# Patient Record
Sex: FEMALE | Race: WHITE | NOT HISPANIC OR LATINO | Employment: FULL TIME | URBAN - METROPOLITAN AREA
[De-identification: names, ages, dates, MRNs, and addresses within clinical notes are randomized per-mention and may not be internally consistent; named-entity substitution may affect disease eponyms.]

---

## 2017-01-12 ENCOUNTER — MYC MEDICAL ADVICE (OUTPATIENT)
Dept: OBGYN | Facility: CLINIC | Age: 47
End: 2017-01-12

## 2017-01-12 DIAGNOSIS — M25.552 PAIN IN JOINT, PELVIC REGION AND THIGH, LEFT: Primary | ICD-10-CM

## 2017-01-13 NOTE — TELEPHONE ENCOUNTER
Her 12/6/16. Please see My Chart message below. Pt is still taking motrin and tylenol around the clock. Pain is on her left side. Notices baljit at night when keeps her from sleeping, side sleeper. Pt states pain is more intense when walking or standing. Pt felt hardness on left side but can not consistently feel it. Routing to Dr. King. Please advise   12/26/16 -S/P HerOption ablation 12/6/16. Symptoms appear to be appropriate for postprocedure course. Reassurance provided. Likely she is experiencing more cramping due to hx of dysmenorrhea. Will monitor. Will determine over next couple weeks-months on severity of periods and if she will need to resume OCPs for control of this. Reviewed medication dosing and timing, is within limits at this time.    Wet prep negative. Informed pt she'd receive call if positive. Discharge likely physiologic after procedure.

## 2017-01-15 DIAGNOSIS — N71.9 ENDOMETRITIS: Primary | ICD-10-CM

## 2017-01-15 RX ORDER — DOXYCYCLINE 100 MG/1
100 CAPSULE ORAL 2 TIMES DAILY
Qty: 28 CAPSULE | Refills: 0 | Status: SHIPPED | OUTPATIENT
Start: 2017-01-15 | End: 2017-01-29

## 2017-01-16 ENCOUNTER — MYC MEDICAL ADVICE (OUTPATIENT)
Dept: PEDIATRICS | Facility: CLINIC | Age: 47
End: 2017-01-16

## 2017-01-17 NOTE — TELEPHONE ENCOUNTER
Called pt back and reached her by cell.  Has not had any further bleeding. Is eating and drinking just fine.  No vaginal discharge.  Bowel movements and urination within normal limits.  Just continues to have persistent left-sided pain, especially notices that when standing for long periods of time, going from sitting to standing position, or turning over in bed.  Reviewed her history and potential complications of the procedure.  Discussed rationale for treatment for endometritis.  Have lower suspicion of a ovarian pathology as this pain started with the procedure therefore I feel it is something related to instrumentation and procedure itself.  However pain is persisting can do further pelvic imaging.  However, last pelvic ultrasounds have not visualized the ovaries, and this is a possibility for next ultrasound.  Therefore she may need higher level imaging, such as CT.  Considering risks and cost of more extensive imaging versus the likelihood of infection and lower cost of medication with lower risks, treatment with doxycycline is my first choice. Patient verbalizes understanding and agrees treatment plan    Harper Busby Masters, DO

## 2017-01-17 NOTE — TELEPHONE ENCOUNTER
Tried to reach pt several times yesterday and left messages on work VM. Tried her work and cell today, left messages. Informed pt to let me know when she is available to talk. Will try back later.    Harper Busby Masters, DO

## 2017-01-20 ENCOUNTER — OFFICE VISIT (OUTPATIENT)
Dept: URGENT CARE | Facility: URGENT CARE | Age: 47
End: 2017-01-20
Payer: COMMERCIAL

## 2017-01-20 VITALS
BODY MASS INDEX: 55.25 KG/M2 | HEART RATE: 93 BPM | SYSTOLIC BLOOD PRESSURE: 106 MMHG | OXYGEN SATURATION: 96 % | TEMPERATURE: 98 F | WEIGHT: 293 LBS | DIASTOLIC BLOOD PRESSURE: 70 MMHG

## 2017-01-20 DIAGNOSIS — H10.33 ACUTE CONJUNCTIVITIS OF BOTH EYES, UNSPECIFIED ACUTE CONJUNCTIVITIS TYPE: Primary | ICD-10-CM

## 2017-01-20 PROCEDURE — 99213 OFFICE O/P EST LOW 20 MIN: CPT | Performed by: INTERNAL MEDICINE

## 2017-01-20 RX ORDER — CIPROFLOXACIN HYDROCHLORIDE 3.5 MG/ML
1 SOLUTION/ DROPS TOPICAL
Qty: 1 BOTTLE | Refills: 0 | Status: SHIPPED | OUTPATIENT
Start: 2017-01-20 | End: 2017-01-27

## 2017-01-20 NOTE — PROGRESS NOTES
[facility] Progress Note        Reena Hobbs MD, MPH  01/20/2017        History:      Ana Wyman is a pleasant 46 year old year old female with Irritation and redness of bilateral eyes, With whitish/yellowish Drainage ( R>L) x 2 days. No change in visual accuity. No fever or illness. No cough or shortness of breath. No headache or neck pain. No trauma to the eye. No photophobia. No nasal drainage. She usually wears contact lenses ( not currently).         Assessment and Plan:        Acute conjunctivitis of both eyes, unspecified acute conjunctivitis type  - ciprofloxacin (CILOXAN) 0.3 % ophthalmic solution; Place 1 drop into both eyes every 4 hours (while awake) for 7 days  Dispense: 1 Bottle; Refill: 0  Advised to wash hands meticulously after caring for the eyes.  Advised to avoid wearing contact lenses and use glasses as she currently is until her symptoms are resolved.  F/u w PCP in 2 days, earlier if symptoms worsen.                   Physical Exam:      /70 mmHg  Pulse 93  Temp(Src) 98  F (36.7  C)  Wt 332 lb (150.594 kg)  SpO2 96%     Constitutional: Patient is in no distress The patient is pleasant and cooperative.   HEENT: Head:  Head is atraumatic, normocephalic.    Eyes: Pupils are equal, round and reactive to light and accomodation.  Sclera is non-icteric. Bilateral conjunctival injection, and exudate noted. Extraocular motion is intact. Visual acuity is intact bilaterally.  Ears:  External acoustic canals are patent and clear.  There is no erythema and bulging( exudate)  of the ( R/L ) tympanic membrane(s ).   Nose:  No Nasal congestion w/o drainage or mucosal ulceration is noted.  Throat:  Oral mucosa is moist.  No oral lesions are noted.  No posterior pharyngeal hyperemia nor exudate noted.     Neck Supple.  There is no cervical lymphadenopathy.  No nuchal rigidity noted.  There is no meningismus.     Cardiovascular: Heart is regular to rate and rhythm.  No  murmur is noted.     Lungs: Clear in the anterior and posterior pulmonary fields.   Abdomen: Soft and non-tender.    Back No flank tenderness is noted.   Extremeties No edema, no calf tenderness.   Neuro: No focal deficit.   Skin No petechiae or purpura is noted.  There is no rash.   Mood Normal              Data:      All new lab and imaging data was reviewed.   Results for orders placed or performed in visit on 12/26/16   Wet prep   Result Value Ref Range    Specimen Description Vagina     Wet Prep       No yeast seen  No clue cells seen  No Trichomonas seen      Micro Report Status FINAL 12/26/2016

## 2017-01-20 NOTE — NURSING NOTE
"Chief Complaint   Patient presents with     Eye Problem     Pain in right eye x 2 days       Initial /70 mmHg  Pulse 93  Temp(Src) 98  F (36.7  C)  Wt 332 lb (150.594 kg)  SpO2 96% Estimated body mass index is 55.25 kg/(m^2) as calculated from the following:    Height as of 12/26/16: 5' 5\" (1.651 m).    Weight as of this encounter: 332 lb (150.594 kg).  BP completed using cuff size: KERI Singleton    "

## 2017-02-02 ENCOUNTER — OFFICE VISIT (OUTPATIENT)
Dept: OBGYN | Facility: CLINIC | Age: 47
End: 2017-02-02
Attending: OBSTETRICS & GYNECOLOGY
Payer: COMMERCIAL

## 2017-02-02 ENCOUNTER — RADIANT APPOINTMENT (OUTPATIENT)
Dept: ULTRASOUND IMAGING | Facility: CLINIC | Age: 47
End: 2017-02-02
Attending: OBSTETRICS & GYNECOLOGY
Payer: COMMERCIAL

## 2017-02-02 VITALS
HEART RATE: 72 BPM | HEIGHT: 65 IN | SYSTOLIC BLOOD PRESSURE: 122 MMHG | WEIGHT: 293 LBS | BODY MASS INDEX: 48.82 KG/M2 | DIASTOLIC BLOOD PRESSURE: 74 MMHG

## 2017-02-02 DIAGNOSIS — R10.32 LLQ PAIN: Primary | ICD-10-CM

## 2017-02-02 DIAGNOSIS — D25.1 INTRAMURAL LEIOMYOMA OF UTERUS: ICD-10-CM

## 2017-02-02 DIAGNOSIS — M25.552 PAIN IN JOINT, PELVIC REGION AND THIGH, LEFT: ICD-10-CM

## 2017-02-02 PROCEDURE — 76830 TRANSVAGINAL US NON-OB: CPT | Performed by: OBSTETRICS & GYNECOLOGY

## 2017-02-02 PROCEDURE — 99213 OFFICE O/P EST LOW 20 MIN: CPT | Performed by: OBSTETRICS & GYNECOLOGY

## 2017-02-02 NOTE — MR AVS SNAPSHOT
"              After Visit Summary   2/2/2017    Ana Wyman    MRN: [id number]           Patient Information     Date Of Birth          1970        Visit Information        Provider Department      2/2/2017 9:40 AM Keith Castaneda MD [facility]        Today's Diagnoses     LLQ pain    -  1     Intramural leiomyoma of uterus            Follow-ups after your visit        Who to contact     If you have questions or need follow up information about today's clinic visit or your schedule please contact [facility] directly at [phone].  Normal or non-critical lab and imaging results will be communicated to you by [employer]hart, letter or phone within 4 business days after the clinic has received the results. If you do not hear from us within 7 days, please contact the clinic through [employer] or phone. If you have a critical or abnormal lab result, we will notify you by phone as soon as possible.  Submit refill requests through [employer] or call your pharmacy and they will forward the refill request to us. Please allow 3 business days for your refill to be completed.          Additional Information About Your Visit        MyChart Information     [employer] gives you secure access to your electronic health record. If you see a primary care provider, you can also send messages to your care team and make appointments. If you have questions, please call your primary care clinic.  If you do not have a primary care provider, please call [phone] and they will assist you.        Care EveryWhere ID     This is your Care EveryWhere ID. This could be used by other organizations to access your [facility] medical records  [id number]        Your Vitals Were     Pulse Height BMI (Body Mass Index)             72 5' 5\" (1.651 m) 55.58 kg/m2          Blood Pressure from Last 3 Encounters:   02/02/17 122/74   01/20/17 106/70   12/26/16 122/82    Weight from Last 3 Encounters:   02/02/17 " 334 lb (151.501 kg)   01/20/17 332 lb (150.594 kg)   12/26/16 332 lb (150.594 kg)              Today, you had the following     No orders found for display       Primary Care Provider Office Phone # Fax #    Kelly Bedoya [phone]       [facility] [address] [place] [address]        Thank you!     Thank you for choosing [facility]  for your care. Our goal is always to provide you with excellent care. Hearing back from our patients is one way we can continue to improve our services. Please take a few minutes to complete the written survey that you may receive in the mail after your visit with us. Thank you!             Your Updated Medication List - Protect others around you: Learn how to safely use, store and throw away your medicines at www.disposemymeds.org.          This list is accurate as of: 2/2/17 11:04 AM.  Always use your most recent med list.                   Brand Name Dispense Instructions for use    clobetasol 0.05 % ointment    TEMOVATE    45 g    Apply clobetasol  0.05 percent ointment, sparingly (a dot 3 mm wide) in a thin film.  Apply to affected area only, once or twice weekly for maintenance.       EPIPEN 2-AMERICA 0.3 MG/0.3ML injection   Generic drug:  EPINEPHrine     1 Device    1 TIME ONLY       multivitamin, therapeutic with minerals Tabs tablet      Take 1 tablet by mouth daily.

## 2017-02-02 NOTE — PROGRESS NOTES
SUBJECTIVE:                                                   Ana Wyman is a 46 year old female who presents to clinic today for the following health issue(s):  Patient presents with:  Pelvic Pain: Had HerOption in December-since that time has always had pain on the left, sometime on the right. Pain increases with exercise.       HPI:  Had HerOption in early DEC. Afterwards noted LLQ pain. Fairly constant. Hurts more with exercising. Hurts if lies on left side. No urinary symptoms. Had course of antibiotics. That gave loose stools.   No bleeding since HerOption so happy about that.   Taking Motrin sporadically. Get some relief.    No LMP recorded. Patient has had an ablation..   Patient is sexually active, .  Using not sexually active for contraception.    reports that she has never smoked. She has never used smokeless tobacco.    STD testing offered?  Declined    Health maintenance updated:  yes    Today's PHQ-2 Score:   PHQ-2 (  [employer]) 2016   Q1: Little interest or pleasure in doing things 0   Q2: Feeling down, depressed or hopeless 0   PHQ-2 Score 0     Today's PHQ-9 Score:   PHQ-9 SCORE 2016   Total Score 2     Today's MACIEL-7 Score:   MACIEL-7 SCORE 2016   Total Score 1       Problem list and histories reviewed & adjusted, as indicated.  Additional history: as documented.    Patient Active Problem List   Diagnosis     Esophageal reflux     Allergic state     Cough     Hypersomnia with sleep apnea     FAMILY HX GI MALIGNANCY     Flatulence, eructation, and gas pain     Family history of malignant neoplasm of genital organ, other     Abdominal pain, left upper quadrant     Dysmenorrhea     Acute bronchitis     CARDIOVASCULAR SCREENING; LDL GOAL LESS THAN 160     Elevated blood pressure reading without diagnosis of hypertension     Lichen sclerosus     Menorrhagia     Right flank pain     Uterine fibroid     Premenstrual syndrome     Weight gain     Morbid obesity (H)     Recurrent  cellulitis of lower leg     MIGUELITO on CPAP     Abnormal glucose     Vitamin D deficiency     Pre-diabetes     Acanthosis nigricans     Cutaneous skin tags     Baker's cyst of knee     Lymphedema bilateral with mixed lipedema.     Past Surgical History   Procedure Laterality Date     C nonspecific procedure       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     C nonspecific procedure  ,     Foot surgeries x 2 (bunionectomy)     C nonspecific procedure       hammer toes     Colonoscopy  5/15/2013     Procedure: COLONOSCOPY;  Colonoscopy;  Surgeon: Kota Blanco MD;  Location: [facility]     Lichen sclerosis       Orthopedic surgery       Vein closure        Social History   Substance Use Topics     Smoking status: Never Smoker      Smokeless tobacco: Never Used     Alcohol Use: 0.0 oz/week     0 Standard drinks or equivalent per week      Comment: 1-2 drink occasionally      Problem (# of Occurrences) Relation (Name,Age of Onset)    Allergies (2) Mother, Brother    C.A.D. (3) Father (92): CAGB 98, Paternal Grandfather (58): fatal, Mother: triple bypass surgery, 65    CANCER (3) Father: stomach Dx age 74, Maternal Grandmother: liver, Paternal Grandmother: stomach    Cancer - colorectal (3) Maternal Grandmother (75), Paternal Aunt, Other: cousin  from colon cancer in 30's    Coronary Artery Disease (4) Father, Paternal Grandfather, Mother, Maternal Grandfather    DIABETES (2) Mother: Type 2, Paternal Grandmother: Type 2    Hypertension (5) Father, Maternal Grandmother, Mother, Brother (Trey), Maternal Grandfather    Lipids (2) Father, Mother    Obesity (5) Father, Mother, Paternal Grandmother, Brother (Trey), Brother (Jayme)            Current Outpatient Prescriptions   Medication Sig     clobetasol (TEMOVATE) 0.05 % ointment Apply clobetasol  0.05 percent ointment, sparingly (a dot 3 mm wide) in a thin film.  Apply to affected area only, once or twice weekly for maintenance.     multivitamin,  "therapeutic with minerals (THERA-VIT-M) TABS Take 1 tablet by mouth daily.     EPIPEN 2-AMERICA 0.3 MG/0.3ML IJ VIVIANA 1 TIME ONLY     No current facility-administered medications for this visit.     Allergies   Allergen Reactions     Clindamycin Diarrhea     Codeine Nausea and Vomiting     Shellfish Allergy        ROS:  12 point review of systems negative other than symptoms noted below.    OBJECTIVE:     /74 mmHg  Pulse 72  Ht 5' 5\" (1.651 m)  Wt 334 lb (151.501 kg)  BMI 55.58 kg/m2  Body mass index is 55.58 kg/(m^2).    Exam:  Constitutional:  Appearance: Well nourished, well developed alert, in no acute distress  Neurologic/Psychiatric:  Mental Status:  Oriented X3      In-Clinic Test Results:  Results for orders placed or performed in visit on 02/02/17 (from the past 24 hour(s))   US Transvaginal Non OB    Narrative    US Transvaginal Non OB    Order #: [id number] Accession #: [id number]         Study Notes        Margaret Siddiqui on 2/2/2017  9:13 AM     Gynecological Ultrasound Report  Pelvic U/S - Transvaginal  [facility]  Referring Provider: Dr. Saleem Masters  Sonographer:  Margaret Siddiqui RDMS  Indication: Pelvic pain post ablation  LMP: No LMP recorded.    Gynecological Ultrasonography:    Uterus: retroverted  Size: 7.5 x 5.5 x 4.2cm  Endometrium: Thickness total 11.0mm  Findings: Thickened  Right Ovary: not seen due to bowel and gas, calcified pedunculated fibroid   vs. bowel  Left Ovary: not seen due to bowel and gas  Cul de Sac/Pouch of Raudel: no free fluid     Impression: Uterus retroverted. Endometrium 11mm. Myometrium not well   visualized. Adnexa not well visualized. Inadequate ultrasound. Needs CT or   MRI if clinically indicated.  Keith Castaneda MD                           ASSESSMENT/PLAN:                                                        ICD-10-CM    1. LLQ pain R10.32    2. Intramural leiomyoma of uterus D25.1      Imaging is inadequate due to her body " habitus. U/S from 2015 points out multiple fibroids. SIS didn't show submucous ones. Procedure was difficult due to to distortion of cavity due to fibroid.   Since pain occurred soon afterwards, fibroid degeneration has to be assumed. NSAIDs are good choice for pain and observation. If pain continues and doesn't improve CT or MRI will be needed for further eval.   Pt is morbidly obese and surgery, LASH, is to be avoided.   Pt was comfortable with explanation and probable etiology. Will call if no improvement for further imaging.    20 minutes was spent face to face with the patient today discussing her history, diagnosis, and follow-up plan as noted above. Over 50% of the visit was spent in counseling and coordination of care.    Total Visit Time: 20 minutes.        Keith Castaneda MD  [facility]

## 2017-03-01 ENCOUNTER — OFFICE VISIT (OUTPATIENT)
Dept: URGENT CARE | Facility: URGENT CARE | Age: 47
End: 2017-03-01
Payer: COMMERCIAL

## 2017-03-01 VITALS — OXYGEN SATURATION: 97 % | HEART RATE: 89 BPM | TEMPERATURE: 98.6 F

## 2017-03-01 DIAGNOSIS — R05.9 COUGH: ICD-10-CM

## 2017-03-01 DIAGNOSIS — Z20.828 EXPOSURE TO INFLUENZA: ICD-10-CM

## 2017-03-01 DIAGNOSIS — J06.9 VIRAL URI: Primary | ICD-10-CM

## 2017-03-01 LAB
FLUAV+FLUBV AG SPEC QL: NEGATIVE
FLUAV+FLUBV AG SPEC QL: NORMAL
SPECIMEN SOURCE: NORMAL

## 2017-03-01 PROCEDURE — 99213 OFFICE O/P EST LOW 20 MIN: CPT

## 2017-03-01 PROCEDURE — 87804 INFLUENZA ASSAY W/OPTIC: CPT | Performed by: PHYSICIAN ASSISTANT

## 2017-03-01 RX ORDER — OSELTAMIVIR PHOSPHATE 75 MG/1
75 CAPSULE ORAL DAILY
Qty: 10 CAPSULE | Refills: 0 | Status: SHIPPED | OUTPATIENT
Start: 2017-03-01 | End: 2017-04-26

## 2017-03-01 NOTE — MR AVS SNAPSHOT
After Visit Summary   3/1/2017    Ana Wyman    MRN: [id number]           Patient Information     Date Of Birth          1970        Visit Information        Provider Department      3/1/2017 9:00 PM Provider, [facility]        Today's Diagnoses     Viral URI    -  1    Exposure to influenza        Cough          Care Instructions      Viral Upper Respiratory Illness (Adult)  You have a viral upper respiratory illness (URI), which is another term for the common cold. This illness is contagious during the first few days. It is spread through the air by coughing and sneezing. It may also be spread by direct contact (touching the sick person and then touching your own eyes, nose, or mouth). Frequent handwashing will decrease risk of spread. Most viral illnesses go away within 7 to 10 days with rest and simple home remedies. Sometimes the illness may last for several weeks. Antibiotics will not kill a virus, and they are generally not prescribed for this condition.    Home care    If symptoms are severe, rest at home for the first 2 to 3 days. When you resume activity, don't let yourself get too tired.    Avoid being exposed to cigarette smoke (yours or others ).    You may use acetaminophen or ibuprofen to control pain and fever, unless another medicine was prescribed. (Note: If you have chronic liver or kidney disease, have ever had a stomach ulcer or gastrointestinal bleeding, or are taking blood-thinning medicines, talk with your healthcare provider before using these medicines.) Aspirin should never be given to anyone under 18 years of age who is ill with a viral infection or fever. It may cause severe liver or brain damage.    Your appetite may be poor, so a light diet is fine. Avoid dehydration by drinking 6 to 8 glasses of fluids per day (water, soft drinks, juices, tea, or soup). Extra fluids will help loosen secretions in the nose and  lungs.    Over-the-counter cold medicines will not shorten the length of time you re sick, but they may be helpful for the following symptoms: cough, sore throat, and nasal and sinus congestion. (Note: Do not use decongestants if you have high blood pressure.)  Follow-up care  Follow up with your healthcare provider, or as advised.  When to seek medical advice  Call your healthcare provider right away if any of these occur:    Cough with lots of colored sputum (mucus)    Severe headache; face, neck, or ear pain    Difficulty swallowing due to throat pain    Fever of 100.4 F (38 C)  Call 911, or get immediate medical care  Call emergency services right away if any of these occur:    Chest pain, shortness of breath, wheezing, or difficulty breathing    Coughing up blood    Inability to swallow due to throat pain    4889-5118 [employer]. [address] [place] [address]. All rights reserved. This information is not intended as a substitute for professional medical care. Always follow your healthcare professional's instructions.      3/1/17 URGENT CARE VISIT:     You are caring for your mother who has just been diagnosed with Influenza A today, I have prescribed Tamiflu for you in an attempt to prevent you from developing Influenza.      However, because you have been recently traveling to the [place] and the [place], you should follow-up immediately if you have any worsening of your fever, neck pain, severe headache, light sensitivity, eye pain, mental confusion, lethargy or if you develop any new illness symptoms.            Follow-ups after your visit        Who to contact     If you have questions or need follow up information about today's clinic visit or your schedule please contact [facility] [place] [facility] directly at [phone].  Normal or non-critical lab and imaging results will be communicated to you by MyChart, letter or phone within 4 business days after the clinic has received  the results. If you do not hear from us within 7 days, please contact the clinic through [employer] or phone. If you have a critical or abnormal lab result, we will notify you by phone as soon as possible.  Submit refill requests through [employer] or call your pharmacy and they will forward the refill request to us. Please allow 3 business days for your refill to be completed.          Additional Information About Your Visit        [employer] Information     [employer] gives you secure access to your electronic health record. If you see a primary care provider, you can also send messages to your care team and make appointments. If you have questions, please call your primary care clinic.  If you do not have a primary care provider, please call [phone] and they will assist you.        Care EveryWhere ID     This is your Care EveryWhere ID. This could be used by other organizations to access your [facility] medical records  [id number]        Your Vitals Were     Pulse Temperature Pulse Oximetry             89 98.6  F (37  C) (Oral) 97%          Blood Pressure from Last 3 Encounters:   02/02/17 122/74   01/20/17 106/70   12/26/16 122/82    Weight from Last 3 Encounters:   02/02/17 (!) 334 lb (151.5 kg)   01/20/17 (!) 332 lb (150.6 kg)   12/26/16 (!) 332 lb (150.6 kg)              We Performed the Following     Influenza A/B antigen          Today's Medication Changes          These changes are accurate as of: 3/1/17  9:42 PM.  If you have any questions, ask your nurse or doctor.               Start taking these medicines.        Dose/Directions    oseltamivir 75 MG capsule   Commonly known as:  TAMIFLU   Used for:  Exposure to influenza   Started by:  ProviderSue        Dose:  75 mg   Take 1 capsule (75 mg) by mouth daily   Quantity:  10 capsule   Refills:  0            Where to get your medicines      These medications were sent to [facility] [address] [place] [address] [id number] AT [facility]  [place] [address]  [address] [facility] [address]    Hours:  24-hours Phone:  [phone]     oseltamivir 75 MG capsule                Primary Care Provider Office Phone # Fax #    Kelly Bedoya [phone]       [facility] [address] [place] [address]        Thank you!     Thank you for choosing [facility]  for your care. Our goal is always to provide you with excellent care. Hearing back from our patients is one way we can continue to improve our services. Please take a few minutes to complete the written survey that you may receive in the mail after your visit with us. Thank you!             Your Updated Medication List - Protect others around you: Learn how to safely use, store and throw away your medicines at www.disposemymeds.org.          This list is accurate as of: 3/1/17  9:42 PM.  Always use your most recent med list.                   Brand Name Dispense Instructions for use    clobetasol 0.05 % ointment    TEMOVATE    45 g    Apply clobetasol  0.05 percent ointment, sparingly (a dot 3 mm wide) in a thin film.  Apply to affected area only, once or twice weekly for maintenance.       EPIPEN 2-AMERICA 0.3 MG/0.3ML injection   Generic drug:  EPINEPHrine     1 Device    1 TIME ONLY       multivitamin, therapeutic with minerals Tabs tablet      Take 1 tablet by mouth daily.       oseltamivir 75 MG capsule    TAMIFLU    10 capsule    Take 1 capsule (75 mg) by mouth daily

## 2017-03-01 NOTE — Clinical Note
Kayla Golden just notified me this chart was assigned to her and in her inbox of overdue charts.   I don't let my charts stay open more than a few days (week max) and this was never in my overdue box.   Anyway, Thankfully, I had 90% of the note done so I was able to close it today.

## 2017-03-02 NOTE — PROGRESS NOTES
SUBJECTIVE:     Ana Wyman presents to  today for evaluation of URI sxs that may be consistent with Influenza.  Pt reports abrupt onset of fever (subjective), chills, dry cough, nasal congestion, clear rhinorrhea, muscle and body aches, intermittent generalized headache and malaise.      She has been staying with her mother who was just dx with influenza today.     ROS:     HEENT: Positive nasal congestion.   RESP: Positive cough as per above. Despite cough, denies any severe SOB.  Denies any hx of asthma or RAD.   GI: Denies any N/V/D. No abdominal pain. Normal BM's  SKIN: Denies rash  NEURO: Positive fever as noted above. Positive mild, waxing and waning generalized HA as per above. Denies any severe headaches, neck stiffness, photophobia, rash, mental status changes or lethargy.           Past Medical History   Diagnosis Date     Allergic rhinitis, cause unspecified      Cellulitis 2005     2 episodes, one with hospitalization [facility]     DUB (dysfunctional uterine bleeding)      Neg EMB 2012     Fibroids      GERD (gastroesophageal reflux disease)      Hallux valgus (acquired)      Hypersomnia with sleep apnea, unspecified      using CPAP     Lichen sclerosus 7/14/2011     Lymph edema 2010- present     Lymphedema bilateral with mixed lipedema. 9/30/2015     Lymphedema bilateral with mixed lipedema. 9/30/2015     Morbid obesity (H) 3/19/2013     MIGUELITO on CPAP 3/19/2013     Sleep study in 2004 and 2012       Pre-diabetes      Sleep apnea      Tendonitis currently     Vitamin D deficiency 3/14/2015       Current Outpatient Prescriptions:      clobetasol (TEMOVATE) 0.05 % ointment, Apply clobetasol  0.05 percent ointment, sparingly (a dot 3 mm wide) in a thin film.  Apply to affected area only, once or twice weekly for maintenance., Disp: 45 g, Rfl: 2     multivitamin, therapeutic with minerals (THERA-VIT-M) TABS, Take 1 tablet by mouth daily., Disp: , Rfl:      EPIPEN 2-AMERICA 0.3 MG/0.3ML IJ VIVIANA, 1 TIME  ONLY, Disp: 1 Device, Rfl: 1    Allergies   Allergen Reactions     Clindamycin Diarrhea     Codeine Nausea and Vomiting     Shellfish Allergy            OBJECTIVE:  Pulse 89  Temp 98.6  F (37  C) (Oral)  SpO2 97%    General appearance: alert and no apparent distress  Skin color is pink and without rash.  HEENT:   Conjunctiva not injected.  Sclera clear.  Left TM is normal: no effusions, no erythema, and normal landmarks.  Right TM is normal: no effusions, no erythema, and normal landmarks.  Nasal mucosa is congested   Oropharyngeal exam is normal: no lesions, erythema, adenopathy or exudate.  Neck is supple with no adenopathy  CARDIAC:NORMAL - regular rate and rhythm without murmur.  RESP: Normal - CTA without rales, rhonchi, or wheezing.  ABDOMEN: Abdomen soft, non-tender. BS normal. No masses, organomegaly    Component      Latest Ref Rng & Units 3/1/2017   Influenza A/B Agn Specimen       Nasal   Influenza A      NEG Negative   Influenza B      NEG Negative . . .       ASSESSMENT/PLAN:      3/1/17 URGENT CARE VISIT:     You are caring for your mother who has just been diagnosed with Influenza A today, I have prescribed Tamiflu for you in an attempt to prevent you from developing Influenza.      However, because you have been recently traveling to the [place] and the [place], you should follow-up immediately if you have any worsening of your fever, neck pain, severe headache, light sensitivity, eye pain, mental confusion, lethargy or if you develop any new illness symptoms.        (J06.9,  B97.89) Viral URI  (primary encounter diagnosis)    (Z20.828) Exposure to influenza  Plan: oseltamivir (TAMIFLU) 75 MG capsule   as per above     (R05) Cough  Plan: Influenza A/B antigen     As per above

## 2017-03-02 NOTE — PATIENT INSTRUCTIONS
Viral Upper Respiratory Illness (Adult)  You have a viral upper respiratory illness (URI), which is another term for the common cold. This illness is contagious during the first few days. It is spread through the air by coughing and sneezing. It may also be spread by direct contact (touching the sick person and then touching your own eyes, nose, or mouth). Frequent handwashing will decrease risk of spread. Most viral illnesses go away within 7 to 10 days with rest and simple home remedies. Sometimes the illness may last for several weeks. Antibiotics will not kill a virus, and they are generally not prescribed for this condition.    Home care    If symptoms are severe, rest at home for the first 2 to 3 days. When you resume activity, don't let yourself get too tired.    Avoid being exposed to cigarette smoke (yours or others ).    You may use acetaminophen or ibuprofen to control pain and fever, unless another medicine was prescribed. (Note: If you have chronic liver or kidney disease, have ever had a stomach ulcer or gastrointestinal bleeding, or are taking blood-thinning medicines, talk with your healthcare provider before using these medicines.) Aspirin should never be given to anyone under 18 years of age who is ill with a viral infection or fever. It may cause severe liver or brain damage.    Your appetite may be poor, so a light diet is fine. Avoid dehydration by drinking 6 to 8 glasses of fluids per day (water, soft drinks, juices, tea, or soup). Extra fluids will help loosen secretions in the nose and lungs.    Over-the-counter cold medicines will not shorten the length of time you re sick, but they may be helpful for the following symptoms: cough, sore throat, and nasal and sinus congestion. (Note: Do not use decongestants if you have high blood pressure.)  Follow-up care  Follow up with your healthcare provider, or as advised.  When to seek medical advice  Call your healthcare provider right away if any  of these occur:    Cough with lots of colored sputum (mucus)    Severe headache; face, neck, or ear pain    Difficulty swallowing due to throat pain    Fever of 100.4 F (38 C)  Call 911, or get immediate medical care  Call emergency services right away if any of these occur:    Chest pain, shortness of breath, wheezing, or difficulty breathing    Coughing up blood    Inability to swallow due to throat pain    6045-8203 [employer]. [address] [place] [address], [place] [address]. All rights reserved. This information is not intended as a substitute for professional medical care. Always follow your healthcare professional's instructions.      3/1/17 URGENT CARE VISIT:     You are caring for your mother who has just been diagnosed with Influenza A today, I have prescribed Tamiflu for you in an attempt to prevent you from developing Influenza.      However, because you have been recently traveling to the [place] and the [facility], you should follow-up immediately if you have any worsening of your fever, neck pain, severe headache, light sensitivity, eye pain, mental confusion, lethargy or if you develop any new illness symptoms.

## 2017-03-02 NOTE — NURSING NOTE
"Chief Complaint   Patient presents with     Urgent Care     URI     cough/cold/body aches     Initial Pulse 89  Temp 98.6  F (37  C) (Oral)  SpO2 97% Estimated body mass index is 55.58 kg/(m^2) as calculated from the following:    Height as of 2/2/17: 5' 5\" (1.651 m).    Weight as of 2/2/17: 334 lb (151.5 kg).  BP completed using cuff size  NA (Not Taken)    Natasha Clay/ALEXIA    "

## 2017-03-24 ENCOUNTER — HOSPITAL ENCOUNTER (OUTPATIENT)
Dept: MAMMOGRAPHY | Facility: CLINIC | Age: 47
Discharge: HOME | End: 2017-03-24
Attending: PEDIATRICS | Admitting: PEDIATRICS
Payer: COMMERCIAL

## 2017-03-24 DIAGNOSIS — Z12.31 VISIT FOR SCREENING MAMMOGRAM: ICD-10-CM

## 2017-03-24 PROCEDURE — G0202 SCR MAMMO BI INCL CAD: HCPCS

## 2017-04-26 ENCOUNTER — OFFICE VISIT (OUTPATIENT)
Dept: URGENT CARE | Facility: URGENT CARE | Age: 47
End: 2017-04-26
Payer: COMMERCIAL

## 2017-04-26 VITALS
DIASTOLIC BLOOD PRESSURE: 70 MMHG | TEMPERATURE: 98.2 F | BODY MASS INDEX: 55.58 KG/M2 | RESPIRATION RATE: 16 BRPM | HEART RATE: 76 BPM | SYSTOLIC BLOOD PRESSURE: 110 MMHG | OXYGEN SATURATION: 98 % | WEIGHT: 293 LBS

## 2017-04-26 DIAGNOSIS — R31.29 MICROSCOPIC HEMATURIA: ICD-10-CM

## 2017-04-26 DIAGNOSIS — M54.50 ACUTE BILATERAL LOW BACK PAIN WITHOUT SCIATICA: Primary | ICD-10-CM

## 2017-04-26 DIAGNOSIS — R42 DIZZINESS: ICD-10-CM

## 2017-04-26 DIAGNOSIS — H69.92 DYSFUNCTION OF EUSTACHIAN TUBE, LEFT: ICD-10-CM

## 2017-04-26 LAB
ALBUMIN UR-MCNC: NEGATIVE MG/DL
APPEARANCE UR: CLEAR
BILIRUB UR QL STRIP: NEGATIVE
COLOR UR AUTO: YELLOW
GLUCOSE UR STRIP-MCNC: NEGATIVE MG/DL
HGB UR QL STRIP: ABNORMAL
KETONES UR STRIP-MCNC: NEGATIVE MG/DL
LEUKOCYTE ESTERASE UR QL STRIP: ABNORMAL
NITRATE UR QL: NEGATIVE
NON-SQ EPI CELLS #/AREA URNS LPF: ABNORMAL /LPF
PH UR STRIP: 6 PH (ref 5–7)
RBC #/AREA URNS AUTO: ABNORMAL /HPF (ref 0–2)
SP GR UR STRIP: 1.01 (ref 1–1.03)
URN SPEC COLLECT METH UR: ABNORMAL
UROBILINOGEN UR STRIP-ACNC: 0.2 EU/DL (ref 0.2–1)
WBC #/AREA URNS AUTO: ABNORMAL /HPF (ref 0–2)

## 2017-04-26 PROCEDURE — 81001 URINALYSIS AUTO W/SCOPE: CPT | Performed by: PHYSICIAN ASSISTANT

## 2017-04-26 PROCEDURE — 87086 URINE CULTURE/COLONY COUNT: CPT | Performed by: FAMILY MEDICINE

## 2017-04-26 PROCEDURE — 99214 OFFICE O/P EST MOD 30 MIN: CPT | Performed by: FAMILY MEDICINE

## 2017-04-26 RX ORDER — CYCLOBENZAPRINE HCL 5 MG
5 TABLET ORAL EVERY 8 HOURS PRN
Qty: 30 TABLET | Refills: 0 | Status: SHIPPED | OUTPATIENT
Start: 2017-04-26 | End: 2017-05-08

## 2017-04-26 RX ORDER — MECLIZINE HYDROCHLORIDE 25 MG/1
25 TABLET ORAL EVERY 6 HOURS PRN
Qty: 30 TABLET | Refills: 0 | Status: SHIPPED | OUTPATIENT
Start: 2017-04-26 | End: 2017-05-08

## 2017-04-26 RX ORDER — SULFAMETHOXAZOLE/TRIMETHOPRIM 800-160 MG
1 TABLET ORAL 2 TIMES DAILY
Qty: 6 TABLET | Refills: 0 | Status: SHIPPED | OUTPATIENT
Start: 2017-04-26 | End: 2017-04-29

## 2017-04-26 NOTE — MR AVS SNAPSHOT
After Visit Summary   4/26/2017    Ana Wyman    MRN: [id number]           Patient Information     Date Of Birth          1970        Visit Information        Provider Department      4/26/2017 5:40 PM Ozzie Mora MD [facility]        Today's Diagnoses     Acute bilateral low back pain without sciatica    -  1    Microscopic hematuria        Dysfunction of eustachian tube, left        Dizziness          Care Instructions    Take full course of antibiotic for possible bladder infection.  We will contact you if any changes in the antibiotic is needed after the urine culture is finalized.  Okay to take flexeril to help with possible back spasm.  Heat or ice to back as needed.  Okay to try meclizine to help with dizziness.  Refrain from bending to minimize symptoms.  Okay to try sudafed 60 mg every 6 hours as needed for eustachian tube dysfunction.          Back Spasm (No Trauma)    Spasm of the back muscles can occur after a sudden forceful twisting or bending force (such as in a car accident), after a simple awkward movement, or after lifting something heavy with poor body positioning. In either case, muscle spasm adds to the pain. Sleeping in an awkward position or on a poor quality mattress can also cause this. Some persons respond to emotional stress by tensing the muscles of their back.  Pain that continues may require further evaluation or other types of treatment such as physical therapy.  Unless you had a physical injury (for example, a car accident or fall), X-rays are usually not ordered for the initial evaluation of back pain. If pain continues and does not respond to medical treatment, X-rays and other tests may be performed at a later time.   Home care  1. As soon as possible, begin sitting or walking to avoid problems from prolonged bedrest (muscle weakness, worsening back stiffness and pain, blood clots in the legs).  2. When in bed, try to find a position of  comfort. A firm mattress is best. Try lying flat on your back with pillows under your knees. You can also try lying on your side with your knees bent up toward your chest and a pillow between your knees.  3. Avoid prolonged sitting, long car rides or travel. This puts more stress on the lower back than standing or walking.   4. During the first 24 to 72 hours after an injury of flare-up apply an ice pack to the painful area for 20 minutes, then remove it for 20 minutes over a period of 60 to 90 minutes or several times a day. This will reduce swelling and pain. Always wrap ice packs in a thin towel.  5. You can start with ice, then switch to heat. Heat (hot shower, hot bath, or heating pad) reduces swelling and pain, and works well for muscle spasms. Heat can be applied to the painful area for 20 minutes , then remove it for 20 minutes over a period of 60 to 90 minutes or several times a day. As a safety precaution, do not sleep on a heating pad as it can burn or damage skin.  6. Ice and heat therapies can be alternated.  7. Gentle stretching will help your back heal faster. Perform this simple routine 2 to 3 times a day until your back is feeling better.     Low back stretch    Lie on your back with your knees bent and both feet on the ground.    Slowly raise your left knee to your chest as you flatten your lower back against the floor. Hold for 20 to 30 seconds.    Relax and repeat the exercise with your right knee.    Do 2 to 3 of these exercises for each leg.    Repeat, hugging both knees to your chest at the same time.    Do not bounce, but use a gentle pull.    8. Be aware of safe lifting methods and do not lift anything over 15 pounds until all the pain is gone.  Medications  Talk to your doctor before using medications, especially if you have other medical problems or are taking other medicines.  You may use acetaminophen (such as Tylenol) or ibuprofen (such as Advil or Motrin) to control pain, unless  another pain medicine was prescribed. If you have a chronic condition such as diabetes, liver or kidney disease, stomach ulcer, or gastrointestinal bleeding, or are taking blood thinners, talk with your doctor before taking any medications.  Be careful if you are given prescription pain medications, narcotics, or medication for muscle spasm. They can cause drowsiness, affect your coordination, reflexes or judgment. Do not drive or operate heavy machinery.  Follow-up care  Follow up with your doctor or this facility if your symptoms do not start to improve after one week. Physical therapy or further tests may be needed.  If X-rays were taken, they will be reviewed by a radiologist. You will be notified of any new findings that may affect your care.  Call 911  Seek emergency medica care if any of the following occur:    Trouble breathing    Confusion    Drowsiness or trouble awakening    Fainting or loss of consciousness    Rapid or very slow heart rate    Loss of bowel or bladder control  When to seek medical care  Get prompt medicat attention if any of the following occur:    Pain becomes worse or spreads to your legs    Weakness or numbness in one or both legs    Numbness in the groin or genital area    Unexplained fever over 100.4 F (38.0 C)    Burning or pain when passing urine    3795-7650 [employer]. [address] [place] [address]. All rights reserved. This information is not intended as a substitute for professional medical care. Always follow your healthcare professional's instructions.        Hematuria: Possible Causes     Many things can lead to blood in the urine (hematuria). The blood may be seen with the eye. Or it may only be seen when the urine is looked at under a microscope. Some of the most common causes of blood in the urine are listed below. Often, no cause for the blood can be found. This is called idiopathic hematuria.    Kidney or bladder stones are collections of  crystals. They form in the urine. Stones may be found anywhere in the urinary tract. But they form most often in the kidneys or bladder. In addition to blood in the urine, they can cause severe pain.   BPH stands for benign prostatic hyperplasia. It is enlargement of the prostate gland. It happens as men age. BPH often causes problems with urination. It sometimes causes blood in the urine.   A urinary tract infection (UTI) is due to bacteria growing in the urinary tract. It can cause blood in the urine. Other symptoms include burning or pain with urination. You may need to urinate often or urgently. You may also have a fever.     Damage to the urinary tract may cause blood in the urine. This damage may be due to a blow or accident. It may also result from the use of a urinary catheter. Very hard exercise may sometimes irritate the urinary tract and cause bleeding.   Cancer may occur anywhere in the urinary tract. A tumor may sometimes cause no symptoms other than bleeding. Other possible causes of bleeding include:    Prostatitis (infection of the prostate gland)    Taking anticoagulant medications    Blockage in the urinary tract    Disease or inflammation of the kidney    Cystic diseases of the kidneys    Sickle cell anemia    Tumors of the urinary tract     0282-3654 The [employer]. [address] [place] [address]. All rights reserved. This information is not intended as a substitute for professional medical care. Always follow your healthcare professional's instructions.        Inner Ear Problems: Causes of Dizziness (Vertigo)  Benign positional vertigo (BPV)    This is the most common cause of vertigo. BPV (also called benign positional paroxysmal vertigo or BPPV) results when crystals in the canals shift into the wrong position. Episodes usually occur when the head is moved in a certain way. This can happen when turning in bed, bending, or looking up.  BPV:    Causes episodes of vertigo  that last for seconds. These episodes can occur several times a day, depending on body position.    Doesn t cause hearing loss.    Often goes away on its own, but may go away sooner with treatment.  Infection or inflammation    Sometimes the semicircular canals swell and send incorrect balance signals. This problem may be caused by a viral infection. Depending on the cause, hearing can be affected (labyrinthitis) or can remain normal (neuronitis).   Infection or inflammation:    Causes episodes of vertigo that last for hours or days. The first episode is usually the worst.    Can cause hearing loss.    Often goes away on its own, but may go away sooner with treatment.    May require vestibular rehabilitation if you have persistent imbalance.  Meniere s disease    Although uncommon, this condition happens when there is too much fluid in the canals. This causes increased pressure and swelling, and affects balance and hearing signals.  Meniere s disease may:    Cause episodes of vertigo that last for hours.    Cause fluctuating hearing problems, usually in one ear, that worsen over time.    Cause buzzing or ringing in the ears (tinnitus).    Cause a feeling of fullness or pressure in the ear.    Cause any of these symptoms: vertigo, hearing loss, tinnitus, or ear fullness to last a lifetime.    6574-7259 [employer]. [address] [place] [address], [place] [address]. All rights reserved. This information is not intended as a substitute for professional medical care. Always follow your healthcare professional's instructions.              Follow-ups after your visit        Who to contact     If you have questions or need follow up information about today's clinic visit or your schedule please contact [facility] directly at [phone].  Normal or non-critical lab and imaging results will be communicated to you by MyChart, letter or phone within 4 business days after the clinic has received the  results. If you do not hear from us within 7 days, please contact the clinic through [employer] or phone. If you have a critical or abnormal lab result, we will notify you by phone as soon as possible.  Submit refill requests through [employer] or call your pharmacy and they will forward the refill request to us. Please allow 3 business days for your refill to be completed.          Additional Information About Your Visit        [employer] Information     [employer] gives you secure access to your electronic health record. If you see a primary care provider, you can also send messages to your care team and make appointments. If you have questions, please call your primary care clinic.  If you do not have a primary care provider, please call [phone] and they will assist you.        Care EveryWhere ID     This is your Care EveryWhere ID. This could be used by other organizations to access your [place] medical records  [id number]        Your Vitals Were     Pulse Temperature Respirations Pulse Oximetry BMI (Body Mass Index)       76 98.2  F (36.8  C) (Oral) 16 98% 55.58 kg/m2        Blood Pressure from Last 3 Encounters:   04/26/17 110/70   02/02/17 122/74   01/20/17 106/70    Weight from Last 3 Encounters:   04/26/17 (!) 334 lb (151.5 kg)   02/02/17 (!) 334 lb (151.5 kg)   01/20/17 (!) 332 lb (150.6 kg)              We Performed the Following     UA reflex to Microscopic and Culture     Urine Culture Aerobic Bacterial     Urine Microscopic          Today's Medication Changes          These changes are accurate as of: 4/26/17  6:18 PM.  If you have any questions, ask your nurse or doctor.               Start taking these medicines.        Dose/Directions    cyclobenzaprine 5 MG tablet   Commonly known as:  FLEXERIL   Used for:  Acute bilateral low back pain without sciatica   Started by:  Ozzie Mora MD        Dose:  5 mg   Take 1 tablet (5 mg) by mouth every 8 hours as needed   Quantity:  30 tablet   Refills:  0        meclizine 25 MG tablet   Commonly known as:  ANTIVERT   Used for:  Dizziness   Started by:  Ozzie Mora MD        Dose:  25 mg   Take 1 tablet (25 mg) by mouth every 6 hours as needed for dizziness   Quantity:  30 tablet   Refills:  0       sulfamethoxazole-trimethoprim 800-160 MG per tablet   Commonly known as:  BACTRIM DS/SEPTRA DS   Used for:  Microscopic hematuria   Started by:  Ozzie Mora MD        Dose:  1 tablet   Take 1 tablet by mouth 2 times daily for 3 days   Quantity:  6 tablet   Refills:  0            Where to get your medicines      These medications were sent to [facility] - [place] - [address]   [address] [place] [address]     Phone:  [phone]     cyclobenzaprine 5 MG tablet    meclizine 25 MG tablet    sulfamethoxazole-trimethoprim 800-160 MG per tablet                Primary Care Provider Office Phone # Fax #    Kelly Bedoya [phone]       [facility] [address] [place] [address]        Thank you!     Thank you for choosing [facility]  for your care. Our goal is always to provide you with excellent care. Hearing back from our patients is one way we can continue to improve our services. Please take a few minutes to complete the written survey that you may receive in the mail after your visit with us. Thank you!             Your Updated Medication List - Protect others around you: Learn how to safely use, store and throw away your medicines at www.disposemymeds.org.          This list is accurate as of: 4/26/17  6:18 PM.  Always use your most recent med list.                   Brand Name Dispense Instructions for use    clobetasol 0.05 % ointment    TEMOVATE    45 g    Apply clobetasol  0.05 percent ointment, sparingly (a dot 3 mm wide) in a thin film.  Apply to affected area only, once or twice weekly for maintenance.       cyclobenzaprine 5 MG tablet    FLEXERIL     30 tablet    Take 1 tablet (5 mg) by mouth every 8 hours as needed       EPIPEN 2-AMERICA 0.3 MG/0.3ML injection   Generic drug:  EPINEPHrine     1 Device    1 TIME ONLY       meclizine 25 MG tablet    ANTIVERT    30 tablet    Take 1 tablet (25 mg) by mouth every 6 hours as needed for dizziness       multivitamin, therapeutic with minerals Tabs tablet      Take 1 tablet by mouth daily.       sulfamethoxazole-trimethoprim 800-160 MG per tablet    BACTRIM DS/SEPTRA DS    6 tablet    Take 1 tablet by mouth 2 times daily for 3 days

## 2017-04-26 NOTE — NURSING NOTE
"Ana Wyman is a 47 year old female.      Chief Complaint   Patient presents with     Ear Problem     pt is here for L ear pain - started on Sunday - has dizziness when she bends over.     Urgent Care     Urinary Problem     pt is also having back pain that started last night and is wondering if she may have a UTI       Initial /70 (BP Location: Right arm, Patient Position: Chair, Cuff Size: Adult Large)  Pulse 76  Temp 98.2  F (36.8  C) (Oral)  Resp 16  Wt (!) 334 lb (151.5 kg)  SpO2 98%  BMI 55.58 kg/m2 Estimated body mass index is 55.58 kg/(m^2) as calculated from the following:    Height as of 2/2/17: 5' 5\" (1.651 m).    Weight as of this encounter: 334 lb (151.5 kg).  Medication Reconciliation: complete      Questioned patient about current smoking habits.  Pt. has never smoked.      Ashly Diana CMA      "

## 2017-04-26 NOTE — PATIENT INSTRUCTIONS
Take full course of antibiotic for possible bladder infection.  We will contact you if any changes in the antibiotic is needed after the urine culture is finalized.  Okay to take flexeril to help with possible back spasm.  Heat or ice to back as needed.  Okay to try meclizine to help with dizziness.  Refrain from bending to minimize symptoms.  Okay to try sudafed 60 mg every 6 hours as needed for eustachian tube dysfunction.          Back Spasm (No Trauma)    Spasm of the back muscles can occur after a sudden forceful twisting or bending force (such as in a car accident), after a simple awkward movement, or after lifting something heavy with poor body positioning. In either case, muscle spasm adds to the pain. Sleeping in an awkward position or on a poor quality mattress can also cause this. Some persons respond to emotional stress by tensing the muscles of their back.  Pain that continues may require further evaluation or other types of treatment such as physical therapy.  Unless you had a physical injury (for example, a car accident or fall), X-rays are usually not ordered for the initial evaluation of back pain. If pain continues and does not respond to medical treatment, X-rays and other tests may be performed at a later time.   Home care  1. As soon as possible, begin sitting or walking to avoid problems from prolonged bedrest (muscle weakness, worsening back stiffness and pain, blood clots in the legs).  2. When in bed, try to find a position of comfort. A firm mattress is best. Try lying flat on your back with pillows under your knees. You can also try lying on your side with your knees bent up toward your chest and a pillow between your knees.  3. Avoid prolonged sitting, long car rides or travel. This puts more stress on the lower back than standing or walking.   4. During the first 24 to 72 hours after an injury of flare-up apply an ice pack to the painful area for 20 minutes, then remove it for 20 minutes  over a period of 60 to 90 minutes or several times a day. This will reduce swelling and pain. Always wrap ice packs in a thin towel.  5. You can start with ice, then switch to heat. Heat (hot shower, hot bath, or heating pad) reduces swelling and pain, and works well for muscle spasms. Heat can be applied to the painful area for 20 minutes , then remove it for 20 minutes over a period of 60 to 90 minutes or several times a day. As a safety precaution, do not sleep on a heating pad as it can burn or damage skin.  6. Ice and heat therapies can be alternated.  7. Gentle stretching will help your back heal faster. Perform this simple routine 2 to 3 times a day until your back is feeling better.     Low back stretch    Lie on your back with your knees bent and both feet on the ground.    Slowly raise your left knee to your chest as you flatten your lower back against the floor. Hold for 20 to 30 seconds.    Relax and repeat the exercise with your right knee.    Do 2 to 3 of these exercises for each leg.    Repeat, hugging both knees to your chest at the same time.    Do not bounce, but use a gentle pull.    8. Be aware of safe lifting methods and do not lift anything over 15 pounds until all the pain is gone.  Medications  Talk to your doctor before using medications, especially if you have other medical problems or are taking other medicines.  You may use acetaminophen (such as Tylenol) or ibuprofen (such as Advil or Motrin) to control pain, unless another pain medicine was prescribed. If you have a chronic condition such as diabetes, liver or kidney disease, stomach ulcer, or gastrointestinal bleeding, or are taking blood thinners, talk with your doctor before taking any medications.  Be careful if you are given prescription pain medications, narcotics, or medication for muscle spasm. They can cause drowsiness, affect your coordination, reflexes or judgment. Do not drive or operate heavy machinery.  Follow-up  care  Follow up with your doctor or this facility if your symptoms do not start to improve after one week. Physical therapy or further tests may be needed.  If X-rays were taken, they will be reviewed by a radiologist. You will be notified of any new findings that may affect your care.  Call 911  Seek emergency medica care if any of the following occur:    Trouble breathing    Confusion    Drowsiness or trouble awakening    Fainting or loss of consciousness    Rapid or very slow heart rate    Loss of bowel or bladder control  When to seek medical care  Get prompt medicat attention if any of the following occur:    Pain becomes worse or spreads to your legs    Weakness or numbness in one or both legs    Numbness in the groin or genital area    Unexplained fever over 100.4 F (38.0 C)    Burning or pain when passing urine    6198-8950 [employer]. [address] [place], [place] [id number]. All rights reserved. This information is not intended as a substitute for professional medical care. Always follow your healthcare professional's instructions.        Hematuria: Possible Causes     Many things can lead to blood in the urine (hematuria). The blood may be seen with the eye. Or it may only be seen when the urine is looked at under a microscope. Some of the most common causes of blood in the urine are listed below. Often, no cause for the blood can be found. This is called idiopathic hematuria.    Kidney or bladder stones are collections of crystals. They form in the urine. Stones may be found anywhere in the urinary tract. But they form most often in the kidneys or bladder. In addition to blood in the urine, they can cause severe pain.   BPH stands for benign prostatic hyperplasia. It is enlargement of the prostate gland. It happens as men age. BPH often causes problems with urination. It sometimes causes blood in the urine.   A urinary tract infection (UTI) is due to bacteria growing in the urinary tract.  It can cause blood in the urine. Other symptoms include burning or pain with urination. You may need to urinate often or urgently. You may also have a fever.     Damage to the urinary tract may cause blood in the urine. This damage may be due to a blow or accident. It may also result from the use of a urinary catheter. Very hard exercise may sometimes irritate the urinary tract and cause bleeding.   Cancer may occur anywhere in the urinary tract. A tumor may sometimes cause no symptoms other than bleeding. Other possible causes of bleeding include:    Prostatitis (infection of the prostate gland)    Taking anticoagulant medications    Blockage in the urinary tract    Disease or inflammation of the kidney    Cystic diseases of the kidneys    Sickle cell anemia    Tumors of the urinary tract     2775-5096 [employer]. [address] [place] [address]. All rights reserved. This information is not intended as a substitute for professional medical care. Always follow your healthcare professional's instructions.        Inner Ear Problems: Causes of Dizziness (Vertigo)  Benign positional vertigo (BPV)    This is the most common cause of vertigo. BPV (also called benign positional paroxysmal vertigo or BPPV) results when crystals in the canals shift into the wrong position. Episodes usually occur when the head is moved in a certain way. This can happen when turning in bed, bending, or looking up.  BPV:    Causes episodes of vertigo that last for seconds. These episodes can occur several times a day, depending on body position.    Doesn t cause hearing loss.    Often goes away on its own, but may go away sooner with treatment.  Infection or inflammation    Sometimes the semicircular canals swell and send incorrect balance signals. This problem may be caused by a viral infection. Depending on the cause, hearing can be affected (labyrinthitis) or can remain normal (neuronitis).   Infection or  inflammation:    Causes episodes of vertigo that last for hours or days. The first episode is usually the worst.    Can cause hearing loss.    Often goes away on its own, but may go away sooner with treatment.    May require vestibular rehabilitation if you have persistent imbalance.  Meniere s disease    Although uncommon, this condition happens when there is too much fluid in the canals. This causes increased pressure and swelling, and affects balance and hearing signals.  Meniere s disease may:    Cause episodes of vertigo that last for hours.    Cause fluctuating hearing problems, usually in one ear, that worsen over time.    Cause buzzing or ringing in the ears (tinnitus).    Cause a feeling of fullness or pressure in the ear.    Cause any of these symptoms: vertigo, hearing loss, tinnitus, or ear fullness to last a lifetime.    5028-9987 [employer]. [address] [place] [address], [place] [address]. All rights reserved. This information is not intended as a substitute for professional medical care. Always follow your healthcare professional's instructions.

## 2017-04-26 NOTE — PROGRESS NOTES
SUBJECTIVE:   Ana Wyman is a 47 year old female presenting with a chief complaint of ear pain left.  Onset of symptoms was 3 day(s) ago.  Course of illness is worsening.    Severity moderate  Current and Associated symptoms: dizziness  Treatment measures tried include Fluids, OTC meds and Rest.  Predisposing factors include seasonal allergies.    Patient also complain of back pain which started last night, worried about UTI.  Denies any dysuria.  Patient denies any history of kidney stones.  Patient did help move boxes and furniture over the weekend, denies any specific injuries.  Patient did drink plenty of fluids.    Past Medical History:   Diagnosis Date     Allergic rhinitis, cause unspecified      Cellulitis 2005    2 episodes, one with hospitalization [facility]     DUB (dysfunctional uterine bleeding)     Neg EMB 2012     Fibroids      GERD (gastroesophageal reflux disease)      Hallux valgus (acquired)      Hypersomnia with sleep apnea, unspecified     using CPAP     Lichen sclerosus 7/14/2011     Lymph edema 2010- present     Lymphedema bilateral with mixed lipedema. 9/30/2015     Lymphedema bilateral with mixed lipedema. 9/30/2015     Morbid obesity (H) 3/19/2013     MIGUELITO on CPAP 3/19/2013    Sleep study in 2004 and 2012       Pre-diabetes      Sleep apnea      Tendonitis currently     Vitamin D deficiency 3/14/2015     Current Outpatient Prescriptions   Medication Sig Dispense Refill     clobetasol (TEMOVATE) 0.05 % ointment Apply clobetasol  0.05 percent ointment, sparingly (a dot 3 mm wide) in a thin film.  Apply to affected area only, once or twice weekly for maintenance. 45 g 2     multivitamin, therapeutic with minerals (THERA-VIT-M) TABS Take 1 tablet by mouth daily.       EPIPEN 2-AMERICA 0.3 MG/0.3ML IJ VIVIANA 1 TIME ONLY 1 Device 1     Social History   Substance Use Topics     Smoking status: Never Smoker     Smokeless tobacco: Never Used     Alcohol use 0.0 oz/week     0 Standard drinks or  equivalent per week      Comment: 1-2 drink occasionally       ROS:  CONSTITUTIONAL:NEGATIVE for fever, chills, change in weight and POSITIVE  for fatigue and malaise  INTEGUMENTARY/SKIN: NEGATIVE for worrisome rashes, moles or lesions  ENT/MOUTH: POSITIVE for earache left  RESP:NEGATIVE for significant cough or SOB  CV: NEGATIVE for chest pain, palpitations or peripheral edema  GI: NEGATIVE for nausea, abdominal pain, heartburn, or change in bowel habits  : POSITIVE for frequency and NEGATIVE for dysuria  MUSCULOSKELETAL: POSITIVE  for back pain   NEURO: NEGATIVE for weakness, dizziness or paresthesias and POSITIVE for vertigo  PSYCHIATRIC: NEGATIVE for changes in mood or affect    OBJECTIVE  :/70 (BP Location: Right arm, Patient Position: Chair, Cuff Size: Adult Large)  Pulse 76  Temp 98.2  F (36.8  C) (Oral)  Resp 16  Wt (!) 334 lb (151.5 kg)  SpO2 98%  BMI 55.58 kg/m2  GENERAL APPEARANCE: healthy, alert and no distress  EYES: EOMI,  PERRL, conjunctiva clear  HENT: ear canals and TM's normal.  Nose and mouth without ulcers, erythema or lesions  NECK: supple, nontender, no lymphadenopathy  RESP: lungs clear to auscultation - no rales, rhonchi or wheezes  CV: regular rates and rhythm, normal S1 S2, no murmur noted  BACK: no flank tenderness  NEURO: Normal strength and tone, sensory exam grossly normal,  normal speech and mentation  SKIN: no suspicious lesions or rashes  PSYCH: mentation appears normal and affect normal/bright    Results for orders placed or performed in visit on 04/26/17   UA reflex to Microscopic and Culture   Result Value Ref Range    Color Urine Yellow     Appearance Urine Clear     Glucose Urine Negative NEG mg/dL    Bilirubin Urine Negative NEG    Ketones Urine Negative NEG mg/dL    Specific Gravity Urine 1.010 1.003 - 1.035    Blood Urine Small (A) NEG    pH Urine 6.0 5.0 - 7.0 pH    Protein Albumin Urine Negative NEG mg/dL    Urobilinogen Urine 0.2 0.2 - 1.0 EU/dL    Nitrite  Urine Negative NEG    Leukocyte Esterase Urine Small (A) NEG    Source Midstream Urine    Urine Microscopic   Result Value Ref Range    WBC Urine 5-10 (A) 0 - 2 /HPF    RBC Urine O - 2 0 - 2 /HPF    Squamous Epithelial /LPF Urine Few FEW /LPF       ASSESSMENT/PLAN:  (M54.5) Acute bilateral low back pain without sciatica  (primary encounter diagnosis)  Plan: UA reflex to Microscopic and Culture,         cyclobenzaprine (FLEXERIL) 5 MG tablet            (R31.29) Microscopic hematuria  Comment: UTI  Plan: Urine Culture Aerobic Bacterial,         sulfamethoxazole-trimethoprim (BACTRIM         DS/SEPTRA DS) 800-160 MG per tablet            (H69.82) Dysfunction of eustachian tube, left  Plan: sudafed    (R42) Dizziness  Comment: inner ear, vertigo  Plan: meclizine (ANTIVERT) 25 MG tablet            Reviewed initial labs, discussed that back pain may be due to musculoskeletal etiology instead of pyelonephritis.  RX flexeril given for back pain and spasm, heat and ice to area and NSAIDs for discomfort.  Due to mildly abnormal urine, will empirically treat for UTI with Bactrim and follow up on urine culture, will adjust antibiotic if needed.      Discussed vertigo and inner ear etiology and eustachian tube dysfunction.  Reassurance given in regards to no acute ear infection.  Encourage to try sudafed to help with eustachian tube dysfunction.  RX meclizine given to help with vertigo sensation and reviewed that if persists, may need to see ENT or vestibular clinic.    Return to clinic if no resolution of symptoms.    Ozzie Mora MD  April 26, 2017 6:27 PM

## 2017-04-27 LAB
BACTERIA SPEC CULT: NORMAL
MICRO REPORT STATUS: NORMAL
SPECIMEN SOURCE: NORMAL

## 2017-04-28 ENCOUNTER — TELEPHONE (OUTPATIENT)
Dept: NURSING | Facility: CLINIC | Age: 47
End: 2017-04-28

## 2017-04-28 PROCEDURE — 96374 THER/PROPH/DIAG INJ IV PUSH: CPT

## 2017-04-28 PROCEDURE — 99285 EMERGENCY DEPT VISIT HI MDM: CPT

## 2017-04-28 PROCEDURE — 96361 HYDRATE IV INFUSION ADD-ON: CPT

## 2017-04-28 PROCEDURE — 93005 ELECTROCARDIOGRAM TRACING: CPT

## 2017-04-29 ENCOUNTER — APPOINTMENT (OUTPATIENT)
Dept: ULTRASOUND IMAGING | Facility: CLINIC | Age: 47
End: 2017-04-29
Attending: EMERGENCY MEDICINE
Payer: COMMERCIAL

## 2017-04-29 ENCOUNTER — HOSPITAL ENCOUNTER (EMERGENCY)
Facility: CLINIC | Age: 47
Discharge: HOME | End: 2017-04-29
Attending: EMERGENCY MEDICINE | Admitting: EMERGENCY MEDICINE
Payer: COMMERCIAL

## 2017-04-29 ENCOUNTER — APPOINTMENT (OUTPATIENT)
Dept: GENERAL RADIOLOGY | Facility: CLINIC | Age: 47
End: 2017-04-29
Attending: EMERGENCY MEDICINE
Payer: COMMERCIAL

## 2017-04-29 VITALS
DIASTOLIC BLOOD PRESSURE: 67 MMHG | HEART RATE: 107 BPM | TEMPERATURE: 100 F | OXYGEN SATURATION: 99 % | HEIGHT: 65 IN | WEIGHT: 293 LBS | SYSTOLIC BLOOD PRESSURE: 108 MMHG | BODY MASS INDEX: 48.82 KG/M2 | RESPIRATION RATE: 22 BRPM

## 2017-04-29 DIAGNOSIS — M79.662 PAIN OF LEFT LOWER LEG: ICD-10-CM

## 2017-04-29 DIAGNOSIS — L03.116 CELLULITIS OF LEFT LOWER LEG: ICD-10-CM

## 2017-04-29 DIAGNOSIS — R50.9 FEVER, UNSPECIFIED: ICD-10-CM

## 2017-04-29 DIAGNOSIS — M54.50 ACUTE LOW BACK PAIN WITHOUT SCIATICA, UNSPECIFIED BACK PAIN LATERALITY: ICD-10-CM

## 2017-04-29 LAB
ALBUMIN SERPL-MCNC: 3.7 G/DL (ref 3.4–5)
ALBUMIN UR-MCNC: NEGATIVE MG/DL
ALP SERPL-CCNC: 111 U/L (ref 40–150)
ALT SERPL W P-5'-P-CCNC: 29 U/L (ref 0–50)
ANION GAP SERPL CALCULATED.3IONS-SCNC: 7 MMOL/L (ref 3–14)
APPEARANCE UR: CLEAR
AST SERPL W P-5'-P-CCNC: 20 U/L (ref 0–45)
BACTERIA #/AREA URNS HPF: ABNORMAL /HPF
BASOPHILS # BLD AUTO: 0.1 10E9/L (ref 0–0.2)
BASOPHILS NFR BLD AUTO: 0.5 %
BILIRUB SERPL-MCNC: 0.3 MG/DL (ref 0.2–1.3)
BILIRUB UR QL STRIP: NEGATIVE
BUN SERPL-MCNC: 12 MG/DL (ref 7–30)
CALCIUM SERPL-MCNC: 8.8 MG/DL (ref 8.5–10.1)
CHLORIDE SERPL-SCNC: 104 MMOL/L (ref 94–109)
CO2 SERPL-SCNC: 26 MMOL/L (ref 20–32)
COLOR UR AUTO: YELLOW
CREAT SERPL-MCNC: 0.85 MG/DL (ref 0.52–1.04)
D DIMER PPP FEU-MCNC: 0.4 UG/ML FEU (ref 0–0.5)
DIFFERENTIAL METHOD BLD: NORMAL
EOSINOPHIL # BLD AUTO: 0.4 10E9/L (ref 0–0.7)
EOSINOPHIL NFR BLD AUTO: 4.5 %
ERYTHROCYTE [DISTWIDTH] IN BLOOD BY AUTOMATED COUNT: 13.4 % (ref 10–15)
GFR SERPL CREATININE-BSD FRML MDRD: 72 ML/MIN/1.7M2
GLUCOSE SERPL-MCNC: 115 MG/DL (ref 70–99)
GLUCOSE UR STRIP-MCNC: NEGATIVE MG/DL
HCT VFR BLD AUTO: 41.6 % (ref 35–47)
HGB BLD-MCNC: 13.5 G/DL (ref 11.7–15.7)
HGB UR QL STRIP: ABNORMAL
IMM GRANULOCYTES # BLD: 0 10E9/L (ref 0–0.4)
IMM GRANULOCYTES NFR BLD: 0.3 %
INTERPRETATION ECG - MUSE: NORMAL
KETONES UR STRIP-MCNC: NEGATIVE MG/DL
LACTATE BLD-SCNC: 1.8 MMOL/L (ref 0.7–2.1)
LEUKOCYTE ESTERASE UR QL STRIP: NEGATIVE
LYMPHOCYTES # BLD AUTO: 1.5 10E9/L (ref 0.8–5.3)
LYMPHOCYTES NFR BLD AUTO: 15.3 %
MCH RBC QN AUTO: 29.3 PG (ref 26.5–33)
MCHC RBC AUTO-ENTMCNC: 32.5 G/DL (ref 31.5–36.5)
MCV RBC AUTO: 90 FL (ref 78–100)
MONOCYTES # BLD AUTO: 0.8 10E9/L (ref 0–1.3)
MONOCYTES NFR BLD AUTO: 8.8 %
MUCOUS THREADS #/AREA URNS LPF: PRESENT /LPF
NEUTROPHILS # BLD AUTO: 6.7 10E9/L (ref 1.6–8.3)
NEUTROPHILS NFR BLD AUTO: 70.6 %
NITRATE UR QL: NEGATIVE
NRBC # BLD AUTO: 0 10*3/UL
NRBC BLD AUTO-RTO: 0 /100
NT-PROBNP SERPL-MCNC: 14 PG/ML (ref 0–450)
PH UR STRIP: 5 PH (ref 5–7)
PLATELET # BLD AUTO: 314 10E9/L (ref 150–450)
POTASSIUM SERPL-SCNC: 4.1 MMOL/L (ref 3.4–5.3)
PROT SERPL-MCNC: 7.8 G/DL (ref 6.8–8.8)
RBC # BLD AUTO: 4.6 10E12/L (ref 3.8–5.2)
RBC #/AREA URNS AUTO: 1 /HPF (ref 0–2)
SODIUM SERPL-SCNC: 137 MMOL/L (ref 133–144)
SP GR UR STRIP: 1.01 (ref 1–1.03)
SQUAMOUS #/AREA URNS AUTO: 1 /HPF (ref 0–1)
TROPONIN I SERPL-MCNC: NORMAL UG/L (ref 0–0.04)
URN SPEC COLLECT METH UR: ABNORMAL
UROBILINOGEN UR STRIP-MCNC: 0 MG/DL (ref 0–2)
WBC # BLD AUTO: 9.5 10E9/L (ref 4–11)
WBC #/AREA URNS AUTO: 4 /HPF (ref 0–2)

## 2017-04-29 PROCEDURE — 71020 XR CHEST 2 VW: CPT

## 2017-04-29 PROCEDURE — 84484 ASSAY OF TROPONIN QUANT: CPT | Performed by: EMERGENCY MEDICINE

## 2017-04-29 PROCEDURE — 81001 URINALYSIS AUTO W/SCOPE: CPT | Performed by: EMERGENCY MEDICINE

## 2017-04-29 PROCEDURE — 85379 FIBRIN DEGRADATION QUANT: CPT | Performed by: EMERGENCY MEDICINE

## 2017-04-29 PROCEDURE — 93970 EXTREMITY STUDY: CPT

## 2017-04-29 PROCEDURE — 85025 COMPLETE CBC W/AUTO DIFF WBC: CPT | Performed by: EMERGENCY MEDICINE

## 2017-04-29 PROCEDURE — 96361 HYDRATE IV INFUSION ADD-ON: CPT

## 2017-04-29 PROCEDURE — 83605 ASSAY OF LACTIC ACID: CPT | Performed by: EMERGENCY MEDICINE

## 2017-04-29 PROCEDURE — 87040 BLOOD CULTURE FOR BACTERIA: CPT | Performed by: EMERGENCY MEDICINE

## 2017-04-29 PROCEDURE — 25000132 ZZH RX MED GY IP 250 OP 250 PS 637: Performed by: EMERGENCY MEDICINE

## 2017-04-29 PROCEDURE — 96374 THER/PROPH/DIAG INJ IV PUSH: CPT

## 2017-04-29 PROCEDURE — 83880 ASSAY OF NATRIURETIC PEPTIDE: CPT | Performed by: EMERGENCY MEDICINE

## 2017-04-29 PROCEDURE — 80053 COMPREHEN METABOLIC PANEL: CPT | Performed by: EMERGENCY MEDICINE

## 2017-04-29 PROCEDURE — 25000128 H RX IP 250 OP 636: Performed by: EMERGENCY MEDICINE

## 2017-04-29 RX ORDER — ACETAMINOPHEN 500 MG
1000 TABLET ORAL ONCE
Status: COMPLETED | OUTPATIENT
Start: 2017-04-29 | End: 2017-04-29

## 2017-04-29 RX ORDER — SODIUM CHLORIDE 9 MG/ML
1000 INJECTION, SOLUTION INTRAVENOUS CONTINUOUS
Status: DISCONTINUED | OUTPATIENT
Start: 2017-04-29 | End: 2017-04-29 | Stop reason: HOSPADM

## 2017-04-29 RX ORDER — HYDROCODONE BITARTRATE AND ACETAMINOPHEN 5; 325 MG/1; MG/1
1-2 TABLET ORAL EVERY 4 HOURS PRN
Qty: 15 TABLET | Refills: 0 | Status: SHIPPED | OUTPATIENT
Start: 2017-04-29 | End: 2017-07-23

## 2017-04-29 RX ORDER — CEPHALEXIN 500 MG/1
500 CAPSULE ORAL 4 TIMES DAILY
Qty: 40 CAPSULE | Refills: 0 | Status: SHIPPED | OUTPATIENT
Start: 2017-04-29 | End: 2017-05-08

## 2017-04-29 RX ORDER — LIDOCAINE 40 MG/G
CREAM TOPICAL
Status: DISCONTINUED | OUTPATIENT
Start: 2017-04-29 | End: 2017-04-29 | Stop reason: HOSPADM

## 2017-04-29 RX ORDER — MORPHINE SULFATE 4 MG/ML
4 INJECTION, SOLUTION INTRAMUSCULAR; INTRAVENOUS
Status: DISCONTINUED | OUTPATIENT
Start: 2017-04-29 | End: 2017-04-29 | Stop reason: HOSPADM

## 2017-04-29 RX ADMIN — MORPHINE SULFATE 4 MG: 4 INJECTION, SOLUTION INTRAMUSCULAR; INTRAVENOUS at 00:55

## 2017-04-29 RX ADMIN — SODIUM CHLORIDE 1000 ML: 9 INJECTION, SOLUTION INTRAVENOUS at 00:54

## 2017-04-29 RX ADMIN — ACETAMINOPHEN 1000 MG: 500 TABLET, FILM COATED ORAL at 00:54

## 2017-04-29 ASSESSMENT — ENCOUNTER SYMPTOMS
COUGH: 0
SHORTNESS OF BREATH: 1
BACK PAIN: 1
NUMBNESS: 1
FEVER: 0
ABDOMINAL PAIN: 0

## 2017-04-29 NOTE — ED NOTES
Low back pain, left leg pain, fever  Thursday.  Went to UC, started on antibiotic and muscle relaxer.  Still having pain, back pain moved to the top of her back today.  Called nurse line, was told to take ASA and come to ED, did not take ASA.  ABCDs intact.

## 2017-04-29 NOTE — ED PROVIDER NOTES
"  History     Chief Complaint:  Back pain    HPI   Ana Wyman is a 47 year old female who presents to the emergency department today for evaluation of back pain. The patient reports yesterday she developed bilateral lower leg pain worse in the left, low back pain, and a fever. She went to urgent care in which she was discharged to home with prescriptions for flexeril and bactrim. However, her pain worsened today and radiate to the eft upper back pain as well as a fever as high as 101 at home.  She has not taken tylenol or ibuprofen and her temperature upon presentation is 100. Her pain is better with drinking water and worse with movement or lying down for a period of time as well as associated with shortness of breath. Due to worsening redness and pain in her left lower extremity, called the nursing line who referred her to take an aspirin and present to the ED for further evaluation. She describes is as hot and as if something is \"climbing up from the shin to the knee\" on the anterior aspect of the left lower leg. She also has numbness to the medial aspect of her left lower leg which is chronic for her. Additionally, the patient complains of left ear pain in which she has been started on sudafed for. The patient denies cough, abdominal pain, tingling, and chest pain.     Allergies:  Clindamycin  Codeine  Shellfish Allergy     Medications:    Flexeril  Bactrim  Meclizine  Temovate  Sudafed    Medications:    Microgestin  Prometrium  MVI  Epipen      Past Medical History:    Hallux valgus  Allergic rhinitis  Hypersomia with sleep apnea  Lymph edema   Tendonitis  Cellulitis  GERD  Fibroids  DUB  Lymphedema  Vitamin D deficiency  Morbid obesity   Lichen sclerosus     Past Surgical History:   Right hand surgery  Bunionectomy x2   Hammer toes  Colonoscopy  Lichen sclerosis  Orthopedic surgery  Vein close     Family History:   CAD  Obesity  Cancer  HLD  HTN  Diabetes  Lipids    Social History:  The patient was " "accompanied to the ED by a friend.  Smoking Status: Never  Smokeless Tobacco: Never  Alcohol Use: Occasionally   Marital Status:  Single      Review of Systems   Constitutional: Negative for fever.   HENT: Positive for ear pain (left).    Respiratory: Positive for shortness of breath. Negative for cough.    Cardiovascular: Positive for leg swelling (with redness). Negative for chest pain.   Gastrointestinal: Negative for abdominal pain.   Musculoskeletal: Positive for back pain.   Neurological: Positive for numbness.   All other systems reviewed and are negative.    Physical Exam   First Vitals:  BP: (!) 166/97  Pulse: 107  Temp: 100  F (37.8  C)  Resp: 22  Height: 165.1 cm (5' 5\")  Weight: (!) 151.5 kg (334 lb)  SpO2: 98 %      Physical Exam  General: Large adult female sitting upright.   Eyes: PERRL, Conjunctive within normal limits. No scleral icterus  ENT: Moist mucous membranes, oropharynx clear. TMs clear bilaterally.  CV: Normal S1S2, no murmur, rub or gallop. Tachycardic rate and rhythm  Resp: Clear to auscultation bilaterally, no wheezes, rales or rhonchi. Normal respiratory effort.  GI: Abdomen is soft, nontender and nondistended. No palpable masses. No rebound or guarding. CVA tenderness to percussion on the left.   MSK: Nontender. Normal active range of motion. Bilateral lower extremity edema, left steadily worse than right, tenderness left posterior calf. No back tenderness to palpation.  Skin: Warm and dry. No rashes or lesions or ecchymoses on visible skin. Erythema bilateral lower legs worse on the left, circumferential and increased warmth bilateral legs.   Neuro: Alert and oriented. Responds appropriately to all questions and commands. No focal findings appreciated. Normal muscle tone. Strength equal bilaterally. Sensation intact light touch bilateral lower extremity exception of medial left calf.   Psych: Normal mood and affect. Pleasant.    Emergency Department Course     ECG:  ECG taken at 0043, " ECG read at 0045  Normal sinus rhythm  Left axis deviation  Minimal voltage criteria for LVH, may be normal variant  Abnormal ECG  Rate 100 bpm. DE interval 142. QRS duration 96. QT/QTc 336/433. P-R-T axes 54 -31 6.    Imaging:  Radiology findings were communicated with the patient who voiced understanding of the findings.  US Lower Extremity Venous Duplex Right  IMPRESSION: No evidence of DVT .  Report per radiology     XR Chest 2 Views  IMPRESSION: No acute cardiopulmonary abnormality.  Report per radiology     Laboratory:  Laboratory findings were communicated with the patient who voiced understanding of the findings.  UA: clear yellow urine, moderate blood, WBC 4, few bacteria, mucous present o/w WNL  CBC: WNL. (WBC 9.5, HGB 13.5, )   CMP: Glucose 115(H) o/w WNL (Creatinine 0.85)  D Dimer (Collected 0103): 0.4  Troponin (Collected 0103): <0.015  BNP: 14  Lactic Acid: 1.8    Blood cultures: Pending    Interventions:  0054 NS 1,000mL IV  0054 Tylenol 1,000mg PO  0055 Morphine 4mg IV     Emergency Department Course:  Nursing notes and vitals reviewed.  The patient was sent for a US Lower Extremity Venous Duplex Right and XR Chest 2 Views while in the emergency department, results above.   IV was inserted and blood was drawn for laboratory testing, results above.  The patient provided a urine sample here in the emergency department. This was sent for laboratory testing, findings above.  0023: I performed an exam of the patient as documented above. Temperature 99.7 when I checked orally.   0310: Patient rechecked and updated. She denies any new concerns. Feels comfortable with plan.  I discussed the treatment plan with the patient. They expressed understanding of this plan and consented to discharge. They will be discharged home with instructions for care and follow up. In addition, the patient will return to the emergency department if their symptoms persist, worsen, if new symptoms arise or if there is any  concern.  All questions were answered.  I personally reviewed the laboratory and imaging results with the Patient and answered all related questions prior to discharge.    Impression & Plan      Medical Decision Making:  This is a 47 year old female who presents to the emergency department with concerns for left leg pain, increased redness on the left leg as well as left flank pain and shortness of breath. She was evaluated by urgent care days ago and diagnosed with possible UTI. She has been on bactrim since for a three day course. Today multiple etiologies are considered for her pain. As she was febrile I considered infectious causes such as UTI or cellulitis. Noninfectious cause such as pulmonary embolism were also considered. D-dimer was low and ultrasound of the leg was negative. This makes pulmonary embolism extremely unlikely. I do not suspect acute coronary syndrome or other chest pathology. Chest x-ray did not show pleural effusion, pneumothorax, or other acute lung pathology. She has no evidence of urinary tract infection with only a small number of white blood cells. She is recommended to finish the course of bactrim and the possibility that she did have a UTI, however, she reported that the provider told her she had 5-10 white blood cells in her urine which makes UTI less likely initially. Ultimately, it seems as though cellulitis is most probable cause of her fever, swelling, and increased redness. The back pain may be musculoskeletal in origin. She will be given a course of keflex and follow up with her primary provider within 2-3 days for reassessment. Return immediately to the emergency department if symptoms worsen. She verbalized understanding of this plan and is discharged in improved condition.     Diagnosis:    ICD-10-CM    1. Cellulitis of left lower leg L03.116    2. Fever, unspecified R50.9    3. Pain of left lower leg M79.662    4. Acute low back pain without sciatica, unspecified back pain  laterality M54.5      Discharge Medications:  Discharge Medication List as of 4/29/2017  3:04 AM      START taking these medications    Details   cephALEXin (KEFLEX) 500 MG capsule Take 1 capsule (500 mg) by mouth 4 times daily for 10 days, Disp-40 capsule, R-0, Local Print      HYDROcodone-acetaminophen (NORCO) 5-325 MG per tablet Take 1-2 tablets by mouth every 4 hours as needed for moderate to severe pain, Disp-15 tablet, R-0, Local Print           Scribe Disclosure:  I, Ninoska Luis, am serving as a scribe at 12:21 AM on 4/29/2017 to document services personally performed by Victoria Andujar MD, based on my observations and the provider's statements to me.    4/28/2017   [facility] EMERGENCY DEPARTMENT       Victoria Andujar MD  04/30/17 0650

## 2017-04-29 NOTE — ED AVS SNAPSHOT
[facility] Emergency Department    [address]    [facility] [address]    Phone:  [phone]    Fax:  [phone]                                       Ana Wyman   MRN: [id number]    Department:  [facility] Emergency Department   Date of Visit:  4/28/2017           Patient Information     Date Of Birth          1970        Your diagnoses for this visit were:     Cellulitis of left lower leg     Fever, unspecified     Pain of left lower leg     Acute low back pain without sciatica, unspecified back pain laterality        You were seen by Victoria Andujar MD.      Follow-up Information     Follow up with Kelly Bedoya MD.    Specialties:  Internal Medicine, Pediatrics    Why:  2-3 days for reassessment    Contact information:    [address] [place] [address]  [phone]          Follow up with [facility] Emergency Department.    Specialty:  EMERGENCY MEDICINE    Why:  As needed, If symptoms worsen    Contact information:    [address] [facility] [address] [phone]        Discharge Instructions         Discharge Instructions  Cellulitis    Cellulitis is an infection of the skin that occurs when bacteria enter the skin.   Symptoms are generally redness, swelling, warmth and pain.  Your infection appeared to be appropriate to treat at home with antibiotics.  However, sometimes your infection may be worse than it seemed at first, or may worsen with time. If you have new or worse symptoms, you may need to be seen again in the Emergency Department or by your primary doctor.    Return to the Emergency Department if:    The redness, pain, or swelling gets a lot worse.  If the red area was marked, return if it is red beyond the marked area.    You are unable to get your antibiotics, or are vomiting them up or you can t take them.    You are feeling more ill, weak or  "lightheaded.    You start to run a new fever (temperature >101).    Anything else about the infection worries or concerns you.  Treatment:    Start your antibiotics right away, and take them as prescribed. Be sure to finish the whole prescription, even if you are better.    Apply a heating pad, warm packs, or warm water soaks to the infected area for 15 minutes at a time, at least 3 times a day. Do not use a heating pad on your feet or legs if you have diabetes. Do not sleep with a heating pad on, since this can cause burns or skin injury.    Rest your injured area for at least 1-2 days. After that you may start using your extremity again as long as there is not too much pain.     Raise the injured area above the level of your heart as much as possible in the first 1-2 days.    Tylenol  (acetaminophen), Motrin  (ibuprofen), or Advil  (ibuprofen) may help may help reduce pain and fever and may help you feel more comfortable. Be sure to read and follow the package directions, and ask your doctor if you have questions.    Follow-up with your doctor:    Re-check in clinic within 2-3 days.  Probiotics: If you have been given an antibiotic, you may want to also take a probiotic pill or eat yogurt with live cultures. Probiotics have \"good bacteria\" to help your intestines stay healthy. Studies have shown that probiotics help prevent diarrhea and other intestine problems (including C. diff infection) when you take antibiotics. You can buy these without a prescription in the pharmacy section of the store.     If you were given a prescription for medicine here today, be sure to read all of the information (including the package insert) that comes with your prescription.  This will include important information about the medicine, its side effects, and any warnings that you need to know about.  The pharmacist who fills the prescription can provide more information and answer questions you may have about the medicine.  If you " have questions or concerns that the pharmacist cannot address, please call or return to the Emergency Department.     Opioid Medication Information    Pain medications are among the most commonly prescribed medicines, so we are including this information for all our patients. If you did not receive pain medication or get a prescription for pain medicine, you can ignore it.     You may have been given a prescription for an opioid (narcotic) pain medicine and/or have received a pain medicine while here in the Emergency Department. These medicines can make you drowsy or impaired. You must not drive, operate dangerous equipment, or engage in any other dangerous activities while taking these medications. If you drive while taking these medications, you could be arrested for DUI, or driving under the influence. Do not drink any alcohol while you are taking these medications.     Opioid pain medications can cause addiction. If you have a history of chemical dependency of any type, you are at a higher risk of becoming addicted to pain medications.  Only take these prescribed medications to treat your pain when all other options have been tried. Take it for as short a time and as few doses as possible. Store your pain pills in a secure place, as they are frequently stolen and provide a dangerous opportunity for children or visitors in your house to start abusing these powerful medications. We will not replace any lost or stolen medicine.  As soon as your pain is better, you should flush all your remaining medication.     Many prescription pain medications contain Tylenol  (acetaminophen), including Vicodin , Tylenol #3 , Norco , Lortab , and Percocet .  You should not take any extra pills of Tylenol  if you are using these prescription medications or you can get very sick.  Do not ever take more than 3000 mg of acetaminophen in any 24 hour period.    All opioids tend to cause constipation. Drink plenty of water and eat foods  that have a lot of fiber, such as fruits, vegetables, prune juice, apple juice and high fiber cereal.  Take a laxative if you don t move your bowels at least every other day. Miralax , Milk of Magnesia, Colace , or Senna  can be used to keep you regular.      Remember that you can always come back to the Emergency Department if you are not able to see your regular doctor in the amount of time listed above, if you get any new symptoms, or if there is anything that worries you.      Discharge Instructions  Fever    You have been seen today for a fever. Fever is a normal body reaction to illness or inflammation. Fever is a sign that your body is doing what it should to fight something off. Fever is not dangerous, but it can make you feel miserable, and you will probably feel better if you get your fever to go down. Most infections are caused by a virus, and antibiotics will not help. Your doctor will tell you whether antibiotics are needed in your case.     At this time your doctor does not find that your fever is a sign of anything dangerous or life-threatening.  However, sometimes the signs of serious illness do not show up right away.  If you have new or worse symptoms, you may need to be seen again in the Emergency Department or by your primary doctor.      What can I do to help myself?    Fill any prescriptions the doctor gave you and take them right away--especially antibiotics.    If you have a fever, get plenty of rest and drink lots of fluids, especially water.    What clothes or blankets you have on won t change your fever. Do what is comfortable for you.    Bathing or sponging in lukewarm water may help you feel better.    Tylenol  (acetaminophen), Motrin  (ibuprofen), or Advil  (ibuprofen) help bring fever down and may help you feel more comfortable. Be sure to read and follow the package directions, and ask your doctor if you have questions.    Do not drink alcohol.    Return to the Emergency Department  "if:    Any of the symptoms you have get much worse.    You seem very sick, like being too weak to get up.    You have any new symptoms, especially serious things like abdominal pain or chest pain.    You are short of breath.    You have a severe headache.    You are vomiting so much you can t keep fluids or medicines down.    You have confusion or seem unusually drowsy.    You have a seizure or convulsion.    You are not getting better after 3-5 days.    You have anything else that worries you.  Probiotics: If you have been given an antibiotic, you may want to also take a probiotic pill or eat yogurt with live cultures. Probiotics have \"good bacteria\" to help your intestines stay healthy. Studies have shown that probiotics help prevent diarrhea and other intestine problems (including C. diff infection) when you take antibiotics. You can buy these without a prescription in the pharmacy section of the store.   If your doctor has told you to follow-up at your clinic, be sure to call right away and go to your appointment.  If there is any problem with keeping your appointment, call your doctor or return to the Emergency Department.  If you were given a prescription for medicine here today, be sure to read all of the information (including the package insert) that comes with your prescription.  This will include important information about the medicine, its side effects, and any warnings that you need to know about.  The pharmacist who fills the prescription can provide more information and answer questions you may have about the medicine.  If you have questions or concerns that the pharmacist cannot address, please call or return to the Emergency Department.       Remember that you can always come back to the Emergency Department if you are not able to see your regular doctor in the amount of time listed above, if you get any new symptoms, or if there is anything that worries you.      Discharge Instructions  Back " Pain  You were seen today for back pain. Back pain can have many causes, but most will get better without surgery or other specific treatment. Sometimes there is a herniated ( slipped ) disc. We don t usually do MRI scans to look for these right away, since most herniated discs will get better on their own with time.  Today, we did not find any evidence that your back pain was caused by a serious condition, such as an infection, fracture, or tumor. However, sometimes symptoms develop over time and cannot be found during an emergency visit, so it is very important that you follow up with your primary doctor.  Return to the Emergency Department if:    You develop a fever with your back pain.     You have weakness or change in sensation in one or both legs.    You lose control of your bowels or bladder, or can t empty your bladder.    Your pain gets much worse.     Follow-up with your doctor:    Unless your pain has completely gone away, please make an appointment with your doctor within one week.  You may need further management of your back pain, such as more pain medication, imaging such as an X-ray or MRI, or physical therapy.    What can I do to help myself?    Remain Active -- People are often afraid that they will hurt their back further or delay recovery by remaining active, but this is one of the best things you can do for your back. In fact, prolonged bed rest is not recommended. Studies have shown that people with low back pain recover faster when they remain active. Movement helps to bring blood flow to the muscles and relieve muscle spasms as well as preventing loss of muscle strength.    Heat -- Using a heating pad can help with low back pain during the first few weeks. Do not sleep with a heating pad, as you can be burned.     Pain medications - You may take a pain medication such as Tylenol  (acetaminophen), Advil , Nuprin  (ibuprofen) or Aleve  (naproxen).  If you have been given a narcotic such as  Vicodin  (hydrocodone with acetaminophen), Percocet  (oxycodone with acetaminophen), codeine, or a muscle relaxant such as Flexeril  (cyclobenzaprine) or Soma  (carisoprodol), do not drive for four hours after you have taken it. If the narcotic contains Tylenol  (acetaminophen), do not take Tylenol  with it. All narcotics will cause constipation, so eat a high fiber diet.   If you were given a prescription for medicine here today, be sure to read all of the information (including the package insert) that comes with your prescription.  This will include important information about the medicine, its side effects, and any warnings that you need to know about.  The pharmacist who fills the prescription can provide more information and answer questions you may have about the medicine.  If you have questions or concerns that the pharmacist cannot address, please call or return to the Emergency Department.   Remember that you can always come back to the Emergency Department if you are not able to see your regular doctor in the amount of time listed above, if you get any new symptoms, or if there is anything that worries you.      24 Hour Appointment Hotline       To make an appointment at any [facility], call [phone] ([phone]). If you don't have a family doctor or clinic, we will help you find one. [facility] are conveniently located to serve the needs of you and your family.             Review of your medicines      START taking        Dose / Directions Last dose taken    cephALEXin 500 MG capsule   Commonly known as:  KEFLEX   Dose:  500 mg   Quantity:  40 capsule        Take 1 capsule (500 mg) by mouth 4 times daily for 10 days   Refills:  0        HYDROcodone-acetaminophen 5-325 MG per tablet   Commonly known as:  NORCO   Dose:  1-2 tablet   Quantity:  15 tablet        Take 1-2 tablets by mouth every 4 hours as needed for moderate to severe pain   Refills:  0          Our records show that you  are taking the medicines listed below. If these are incorrect, please call your family doctor or clinic.        Dose / Directions Last dose taken    clobetasol 0.05 % ointment   Commonly known as:  TEMOVATE   Quantity:  45 g        Apply clobetasol  0.05 percent ointment, sparingly (a dot 3 mm wide) in a thin film.  Apply to affected area only, once or twice weekly for maintenance.   Refills:  2        cyclobenzaprine 5 MG tablet   Commonly known as:  FLEXERIL   Dose:  5 mg   Quantity:  30 tablet        Take 1 tablet (5 mg) by mouth every 8 hours as needed   Refills:  0        EPIPEN 2-AMERICA 0.3 MG/0.3ML injection   Quantity:  1 Device   Generic drug:  EPINEPHrine        1 TIME ONLY   Refills:  1        meclizine 25 MG tablet   Commonly known as:  ANTIVERT   Dose:  25 mg   Quantity:  30 tablet        Take 1 tablet (25 mg) by mouth every 6 hours as needed for dizziness   Refills:  0        multivitamin, therapeutic with minerals Tabs tablet   Dose:  1 tablet        Take 1 tablet by mouth daily.   Refills:  0        sulfamethoxazole-trimethoprim 800-160 MG per tablet   Commonly known as:  BACTRIM DS/SEPTRA DS   Dose:  1 tablet   Quantity:  6 tablet        Take 1 tablet by mouth 2 times daily for 3 days   Refills:  0                Prescriptions were sent or printed at these locations (2 Prescriptions)                   Other Prescriptions                Printed at Department/Unit printer (2 of 2)         cephALEXin (KEFLEX) 500 MG capsule               HYDROcodone-acetaminophen (NORCO) 5-325 MG per tablet                Procedures and tests performed during your visit     Procedure/Test Number of Times Performed    Blood culture 2    CBC with platelets differential 1    Cardiac Continuous Monitoring 1    Comprehensive metabolic panel 1    D dimer quantitative 1    EKG 12-lead, tracing only 1    Lactic acid whole blood 1    Nt probnp inpatient (BNP) 1    Peripheral IV catheter 1    Pulse oximetry nursing 1    Troponin I  1    UA with Microscopic 1    US Lower Extremity Venous Duplex Bilateral 1    XR Chest 2 Views 1      Orders Needing Specimen Collection     None      Pending Results     Date and Time Order Name Status Description    4/29/2017 0036 EKG 12-lead, tracing only Preliminary     4/29/2017 0036 Blood culture In process     4/29/2017 0036 Blood culture In process             Pending Culture Results     Date and Time Order Name Status Description    4/29/2017 0036 Blood culture In process     4/29/2017 0036 Blood culture In process             Test Results From Your Hospital Stay        4/29/2017  1:27 AM      Component Results     Component Value Ref Range & Units Status    WBC 9.5 4.0 - 11.0 10e9/L Final    RBC Count 4.60 3.8 - 5.2 10e12/L Final    Hemoglobin 13.5 11.7 - 15.7 g/dL Final    Hematocrit 41.6 35.0 - 47.0 % Final    MCV 90 78 - 100 fl Final    MCH 29.3 26.5 - 33.0 pg Final    MCHC 32.5 31.5 - 36.5 g/dL Final    RDW 13.4 10.0 - 15.0 % Final    Platelet Count 314 150 - 450 10e9/L Final    Diff Method Automated Method  Final    % Neutrophils 70.6 % Final    % Lymphocytes 15.3 % Final    % Monocytes 8.8 % Final    % Eosinophils 4.5 % Final    % Basophils 0.5 % Final    % Immature Granulocytes 0.3 % Final    Nucleated RBCs 0 0 /100 Final    Absolute Neutrophil 6.7 1.6 - 8.3 10e9/L Final    Absolute Lymphocytes 1.5 0.8 - 5.3 10e9/L Final    Absolute Monocytes 0.8 0.0 - 1.3 10e9/L Final    Absolute Eosinophils 0.4 0.0 - 0.7 10e9/L Final    Absolute Basophils 0.1 0.0 - 0.2 10e9/L Final    Abs Immature Granulocytes 0.0 0 - 0.4 10e9/L Final    Absolute Nucleated RBC 0.0  Final         4/29/2017  1:46 AM      Component Results     Component Value Ref Range & Units Status    Sodium 137 133 - 144 mmol/L Final    Potassium 4.1 3.4 - 5.3 mmol/L Final    Chloride 104 94 - 109 mmol/L Final    Carbon Dioxide 26 20 - 32 mmol/L Final    Anion Gap 7 3 - 14 mmol/L Final    Glucose 115 (H) 70 - 99 mg/dL Final    Urea Nitrogen 12 7 -  30 mg/dL Final    Creatinine 0.85 0.52 - 1.04 mg/dL Final    GFR Estimate 72 >60 mL/min/1.7m2 Final    Non  GFR Calc    GFR Estimate If Black 87 >60 mL/min/1.7m2 Final    African American GFR Calc    Calcium 8.8 8.5 - 10.1 mg/dL Final    Bilirubin Total 0.3 0.2 - 1.3 mg/dL Final    Albumin 3.7 3.4 - 5.0 g/dL Final    Protein Total 7.8 6.8 - 8.8 g/dL Final    Alkaline Phosphatase 111 40 - 150 U/L Final    ALT 29 0 - 50 U/L Final    AST 20 0 - 45 U/L Final         4/29/2017  1:38 AM      Component Results     Component Value Ref Range & Units Status    D Dimer 0.4 0.0 - 0.50 ug/ml FEU Final    This D-dimer assay is intended for use in conjuntion with a clinical pretest   probability assessment model to exclude pulmonary embolism (PE) and as an aid   in the diagnosis of deep venous thrombosis (DVT) in outpatients suspected of   PE   or DVT. The cut-off value is 0.5 g/mL FEU.           4/29/2017  1:46 AM      Component Results     Component Value Ref Range & Units Status    Troponin I ES  0.000 - 0.045 ug/L Final    <0.015  The 99th percentile for upper reference range is 0.045 ug/L.  Troponin values in   the range of 0.045 - 0.120 ug/L may be associated with risks of adverse   clinical events.           4/29/2017  1:46 AM      Component Results     Component Value Ref Range & Units Status    N-Terminal Pro BNP Inpatient 14 0 - 450 pg/mL Final    Reference range shown and results flagged as abnormal are suggested inpatient   cut points for confirming diagnosis if CHF in an acute setting. Establishing   a   baseline value for each individual patient is useful for follow-up. An   inpatient or emergency department NT-proPBNP <300 pg/mL effectively rules out   acute CHF, with 99% negative predictive value.  The outpatient non-acute reference range for ruling out CHF is:   0-125 pg/mL (age 18 to less than 75)   0-450 pg/mL (age 75 yrs and older)           4/29/2017  1:27 AM      Component Results      Component Value Ref Range & Units Status    Lactic Acid 1.8 0.7 - 2.1 mmol/L Final         4/29/2017  2:49 AM      Component Results     Component Value Ref Range & Units Status    Color Urine Yellow  Final    Appearance Urine Clear  Final    Glucose Urine Negative NEG mg/dL Final    Bilirubin Urine Negative NEG Final    Ketones Urine Negative NEG mg/dL Final    Specific Gravity Urine 1.012 1.003 - 1.035 Final    Blood Urine Moderate (A) NEG Final    pH Urine 5.0 5.0 - 7.0 pH Final    Protein Albumin Urine Negative NEG mg/dL Final    Urobilinogen mg/dL 0.0 0.0 - 2.0 mg/dL Final    Nitrite Urine Negative NEG Final    Leukocyte Esterase Urine Negative NEG Final    Source Midstream Urine  Final    WBC Urine 4 (H) 0 - 2 /HPF Final    RBC Urine 1 0 - 2 /HPF Final    Bacteria Urine Few (A) NEG /HPF Final    Squamous Epithelial /HPF Urine 1 0 - 1 /HPF Final    Mucous Urine Present (A) NEG /LPF Final         4/29/2017  1:22 AM         4/29/2017  1:23 AM         4/29/2017  2:09 AM      Narrative     XR CHEST 2 VW 4/29/2017 2:02 AM    HISTORY: Dyspnea.    COMPARISON: 9/26/2013    FINDINGS: No airspace consolidation, pleural effusion or pneumothorax.  Stable heart size.        Impression     IMPRESSION: No acute cardiopulmonary abnormality.    MADISON FIGUEROA MD         4/29/2017  1:56 AM      Narrative     BILATERAL LOWER EXTREMITY VENOUS DOPPLER 4/29/2017 1:53 AM    HISTORY: Leg pain and swelling.    COMPARISON: None.    TECHNIQUE: Color flow and spectral Doppler with waveform analysis  performed.    FINDINGS: Bilateral common femoral, superficial femoral, and popliteal  veins are well seen and appear normal. They have normal patency and  compressibility. Deep veins of the calves are well seen and appear  normal. No evidence for deep venous thrombosis.        Impression     IMPRESSION: No evidence of DVT .    MADISON FIGUEROA MD                Clinical Quality Measure: Blood Pressure Screening     Your blood pressure was checked  while you were in the emergency department today. The last reading we obtained was  BP: 108/67 . Please read the guidelines below about what these numbers mean and what you should do about them.  If your systolic blood pressure (the top number) is less than 120 and your diastolic blood pressure (the bottom number) is less than 80, then your blood pressure is normal. There is nothing more that you need to do about it.  If your systolic blood pressure (the top number) is 120-139 or your diastolic blood pressure (the bottom number) is 80-89, your blood pressure may be higher than it should be. You should have your blood pressure rechecked within a year by a primary care provider.  If your systolic blood pressure (the top number) is 140 or greater or your diastolic blood pressure (the bottom number) is 90 or greater, you may have high blood pressure. High blood pressure is treatable, but if left untreated over time it can put you at risk for heart attack, stroke, or kidney failure. You should have your blood pressure rechecked by a primary care provider within the next 4 weeks.  If your provider in the emergency department today gave you specific instructions to follow-up with your doctor or provider even sooner than that, you should follow that instruction and not wait for up to 4 weeks for your follow-up visit.        Thank you for choosing [facility]       Thank you for choosing [facility] for your care. Our goal is always to provide you with excellent care. Hearing back from our patients is one way we can continue to improve our services. Please take a few minutes to complete the written survey that you may receive in the mail after you visit with us. Thank you!        [employer] Information     [employer] gives you secure access to your electronic health record. If you see a primary care provider, you can also send messages to your care team and make appointments. If you have questions, please call your primary care clinic.   If you do not have a primary care provider, please call [phone] and they will assist you.        Care EveryWhere ID     This is your Care EveryWhere ID. This could be used by other organizations to access your [facility] medical records  [id number]        After Visit Summary       This is your record. Keep this with you and show to your community pharmacist(s) and doctor(s) at your next visit.

## 2017-04-29 NOTE — TELEPHONE ENCOUNTER
"Call Type: Triage Call    Presenting Problem: \"I have upper, left back pain.\" Pain =7-8/10. Pt.  also has a fever. Fever = 101-oral. Pt. says that she has the  chills, a headache, and dizziness-with moving her head. Pt's shins  are red and hot, also.  Triage Note:  Guideline Title: Back Symptoms  Recommended Disposition: Activate   Original Inclination: Wanted to speak with a nurse  Override Disposition:  Intended Action: Go to Hospital / ED  Physician Contacted: No  Any other cardiac signs/symptoms for more than 5 minutes, now or within last hour.  Pain is NOT associated with taking a deep breath or a productive cough, movement,  or touch to a localized area on the chest or upper body. ?  YES  New or worsening signs and symptoms that may indicate shock ? NO  Physician Instructions:  Care Advice: IMMEDIATE ACTION  Write down provider's name. List or place the following in a bag for  transport with the patient: current prescription and/or nonprescription  medications  alternative treatments, therapies and medications  and street drugs.  After calling , have the person chew one aspirin tablet (325 mg), or  4 baby aspirin (81mg) with a small amount of water now if conscious, not  allergic to aspirin, or if has not been told to avoid taking aspirin by  their provider.  It is important to use aspirin, not acetaminophen.  An adult should stay with the patient, preferably one trained in CPR. If  the person is not trained in CPR, then he or she should provide hands-only  (compression-only) CPR as recommended by the [employer] Association.  "

## 2017-04-29 NOTE — DISCHARGE INSTRUCTIONS
Discharge Instructions  Cellulitis    Cellulitis is an infection of the skin that occurs when bacteria enter the skin.   Symptoms are generally redness, swelling, warmth and pain.  Your infection appeared to be appropriate to treat at home with antibiotics.  However, sometimes your infection may be worse than it seemed at first, or may worsen with time. If you have new or worse symptoms, you may need to be seen again in the Emergency Department or by your primary doctor.    Return to the Emergency Department if:    The redness, pain, or swelling gets a lot worse.  If the red area was marked, return if it is red beyond the marked area.    You are unable to get your antibiotics, or are vomiting them up or you can t take them.    You are feeling more ill, weak or lightheaded.    You start to run a new fever (temperature >101).    Anything else about the infection worries or concerns you.  Treatment:    Start your antibiotics right away, and take them as prescribed. Be sure to finish the whole prescription, even if you are better.    Apply a heating pad, warm packs, or warm water soaks to the infected area for 15 minutes at a time, at least 3 times a day. Do not use a heating pad on your feet or legs if you have diabetes. Do not sleep with a heating pad on, since this can cause burns or skin injury.    Rest your injured area for at least 1-2 days. After that you may start using your extremity again as long as there is not too much pain.     Raise the injured area above the level of your heart as much as possible in the first 1-2 days.    Tylenol  (acetaminophen), Motrin  (ibuprofen), or Advil  (ibuprofen) may help may help reduce pain and fever and may help you feel more comfortable. Be sure to read and follow the package directions, and ask your doctor if you have questions.    Follow-up with your doctor:    Re-check in clinic within 2-3 days.  Probiotics: If you have been given an antibiotic, you may want to also  "take a probiotic pill or eat yogurt with live cultures. Probiotics have \"good bacteria\" to help your intestines stay healthy. Studies have shown that probiotics help prevent diarrhea and other intestine problems (including C. diff infection) when you take antibiotics. You can buy these without a prescription in the pharmacy section of the store.     If you were given a prescription for medicine here today, be sure to read all of the information (including the package insert) that comes with your prescription.  This will include important information about the medicine, its side effects, and any warnings that you need to know about.  The pharmacist who fills the prescription can provide more information and answer questions you may have about the medicine.  If you have questions or concerns that the pharmacist cannot address, please call or return to the Emergency Department.     Opioid Medication Information    Pain medications are among the most commonly prescribed medicines, so we are including this information for all our patients. If you did not receive pain medication or get a prescription for pain medicine, you can ignore it.     You may have been given a prescription for an opioid (narcotic) pain medicine and/or have received a pain medicine while here in the Emergency Department. These medicines can make you drowsy or impaired. You must not drive, operate dangerous equipment, or engage in any other dangerous activities while taking these medications. If you drive while taking these medications, you could be arrested for DUI, or driving under the influence. Do not drink any alcohol while you are taking these medications.     Opioid pain medications can cause addiction. If you have a history of chemical dependency of any type, you are at a higher risk of becoming addicted to pain medications.  Only take these prescribed medications to treat your pain when all other options have been tried. Take it for as short " a time and as few doses as possible. Store your pain pills in a secure place, as they are frequently stolen and provide a dangerous opportunity for children or visitors in your house to start abusing these powerful medications. We will not replace any lost or stolen medicine.  As soon as your pain is better, you should flush all your remaining medication.     Many prescription pain medications contain Tylenol  (acetaminophen), including Vicodin , Tylenol #3 , Norco , Lortab , and Percocet .  You should not take any extra pills of Tylenol  if you are using these prescription medications or you can get very sick.  Do not ever take more than 3000 mg of acetaminophen in any 24 hour period.    All opioids tend to cause constipation. Drink plenty of water and eat foods that have a lot of fiber, such as fruits, vegetables, prune juice, apple juice and high fiber cereal.  Take a laxative if you don t move your bowels at least every other day. Miralax , Milk of Magnesia, Colace , or Senna  can be used to keep you regular.      Remember that you can always come back to the Emergency Department if you are not able to see your regular doctor in the amount of time listed above, if you get any new symptoms, or if there is anything that worries you.      Discharge Instructions  Fever    You have been seen today for a fever. Fever is a normal body reaction to illness or inflammation. Fever is a sign that your body is doing what it should to fight something off. Fever is not dangerous, but it can make you feel miserable, and you will probably feel better if you get your fever to go down. Most infections are caused by a virus, and antibiotics will not help. Your doctor will tell you whether antibiotics are needed in your case.     At this time your doctor does not find that your fever is a sign of anything dangerous or life-threatening.  However, sometimes the signs of serious illness do not show up right away.  If you have new or  "worse symptoms, you may need to be seen again in the Emergency Department or by your primary doctor.      What can I do to help myself?    Fill any prescriptions the doctor gave you and take them right away--especially antibiotics.    If you have a fever, get plenty of rest and drink lots of fluids, especially water.    What clothes or blankets you have on won t change your fever. Do what is comfortable for you.    Bathing or sponging in lukewarm water may help you feel better.    Tylenol  (acetaminophen), Motrin  (ibuprofen), or Advil  (ibuprofen) help bring fever down and may help you feel more comfortable. Be sure to read and follow the package directions, and ask your doctor if you have questions.    Do not drink alcohol.    Return to the Emergency Department if:    Any of the symptoms you have get much worse.    You seem very sick, like being too weak to get up.    You have any new symptoms, especially serious things like abdominal pain or chest pain.    You are short of breath.    You have a severe headache.    You are vomiting so much you can t keep fluids or medicines down.    You have confusion or seem unusually drowsy.    You have a seizure or convulsion.    You are not getting better after 3-5 days.    You have anything else that worries you.  Probiotics: If you have been given an antibiotic, you may want to also take a probiotic pill or eat yogurt with live cultures. Probiotics have \"good bacteria\" to help your intestines stay healthy. Studies have shown that probiotics help prevent diarrhea and other intestine problems (including C. diff infection) when you take antibiotics. You can buy these without a prescription in the pharmacy section of the store.   If your doctor has told you to follow-up at your clinic, be sure to call right away and go to your appointment.  If there is any problem with keeping your appointment, call your doctor or return to the Emergency Department.  If you were given a " prescription for medicine here today, be sure to read all of the information (including the package insert) that comes with your prescription.  This will include important information about the medicine, its side effects, and any warnings that you need to know about.  The pharmacist who fills the prescription can provide more information and answer questions you may have about the medicine.  If you have questions or concerns that the pharmacist cannot address, please call or return to the Emergency Department.       Remember that you can always come back to the Emergency Department if you are not able to see your regular doctor in the amount of time listed above, if you get any new symptoms, or if there is anything that worries you.      Discharge Instructions  Back Pain  You were seen today for back pain. Back pain can have many causes, but most will get better without surgery or other specific treatment. Sometimes there is a herniated ( slipped ) disc. We don t usually do MRI scans to look for these right away, since most herniated discs will get better on their own with time.  Today, we did not find any evidence that your back pain was caused by a serious condition, such as an infection, fracture, or tumor. However, sometimes symptoms develop over time and cannot be found during an emergency visit, so it is very important that you follow up with your primary doctor.  Return to the Emergency Department if:    You develop a fever with your back pain.     You have weakness or change in sensation in one or both legs.    You lose control of your bowels or bladder, or can t empty your bladder.    Your pain gets much worse.     Follow-up with your doctor:    Unless your pain has completely gone away, please make an appointment with your doctor within one week.  You may need further management of your back pain, such as more pain medication, imaging such as an X-ray or MRI, or physical therapy.    What can I do to help  myself?    Remain Active -- People are often afraid that they will hurt their back further or delay recovery by remaining active, but this is one of the best things you can do for your back. In fact, prolonged bed rest is not recommended. Studies have shown that people with low back pain recover faster when they remain active. Movement helps to bring blood flow to the muscles and relieve muscle spasms as well as preventing loss of muscle strength.    Heat -- Using a heating pad can help with low back pain during the first few weeks. Do not sleep with a heating pad, as you can be burned.     Pain medications - You may take a pain medication such as Tylenol  (acetaminophen), Advil , Nuprin  (ibuprofen) or Aleve  (naproxen).  If you have been given a narcotic such as Vicodin  (hydrocodone with acetaminophen), Percocet  (oxycodone with acetaminophen), codeine, or a muscle relaxant such as Flexeril  (cyclobenzaprine) or Soma  (carisoprodol), do not drive for four hours after you have taken it. If the narcotic contains Tylenol  (acetaminophen), do not take Tylenol  with it. All narcotics will cause constipation, so eat a high fiber diet.   If you were given a prescription for medicine here today, be sure to read all of the information (including the package insert) that comes with your prescription.  This will include important information about the medicine, its side effects, and any warnings that you need to know about.  The pharmacist who fills the prescription can provide more information and answer questions you may have about the medicine.  If you have questions or concerns that the pharmacist cannot address, please call or return to the Emergency Department.   Remember that you can always come back to the Emergency Department if you are not able to see your regular doctor in the amount of time listed above, if you get any new symptoms, or if there is anything that worries you.

## 2017-04-29 NOTE — ED AVS SNAPSHOT
[facility] Emergency Department    [address]    [facility] [address]    Phone:  [phone]    Fax:  [phone]                                       Ana Wyman   MRN: [id number]    Department:  [facility] Emergency Department   Date of Visit:  4/28/2017           After Visit Summary Signature Page     I have received my discharge instructions, and my questions have been answered. I have discussed any challenges I see with this plan with the nurse or doctor.    ..........................................................................................................................................  Patient/Patient Representative Signature      ..........................................................................................................................................  Patient Representative Print Name and Relationship to Patient    ..................................................               ................................................  Date                                            Time    ..........................................................................................................................................  Reviewed by Signature/Title    ...................................................              ..............................................  Date                                                            Time

## 2017-05-05 LAB
BACTERIA SPEC CULT: NO GROWTH
BACTERIA SPEC CULT: NO GROWTH
Lab: NORMAL
Lab: NORMAL
MICRO REPORT STATUS: NORMAL
MICRO REPORT STATUS: NORMAL
SPECIMEN SOURCE: NORMAL
SPECIMEN SOURCE: NORMAL

## 2017-05-08 ENCOUNTER — OFFICE VISIT (OUTPATIENT)
Dept: PEDIATRICS | Facility: CLINIC | Age: 47
End: 2017-05-08
Payer: COMMERCIAL

## 2017-05-08 VITALS
SYSTOLIC BLOOD PRESSURE: 126 MMHG | DIASTOLIC BLOOD PRESSURE: 80 MMHG | BODY MASS INDEX: 48.82 KG/M2 | TEMPERATURE: 99.3 F | OXYGEN SATURATION: 96 % | WEIGHT: 293 LBS | HEIGHT: 65 IN | HEART RATE: 88 BPM

## 2017-05-08 DIAGNOSIS — L03.116 LEFT LEG CELLULITIS: Primary | ICD-10-CM

## 2017-05-08 DIAGNOSIS — E66.01 MORBID OBESITY, UNSPECIFIED OBESITY TYPE (H): ICD-10-CM

## 2017-05-08 DIAGNOSIS — I89.0 LYMPHEDEMA OF BOTH LOWER EXTREMITIES: ICD-10-CM

## 2017-05-08 DIAGNOSIS — H69.92 DYSFUNCTION OF EUSTACHIAN TUBE, LEFT: ICD-10-CM

## 2017-05-08 PROCEDURE — 99214 OFFICE O/P EST MOD 30 MIN: CPT | Performed by: PEDIATRICS

## 2017-05-08 RX ORDER — CEPHALEXIN 500 MG/1
500 CAPSULE ORAL 4 TIMES DAILY
Qty: 28 CAPSULE | Refills: 0 | Status: SHIPPED | OUTPATIENT
Start: 2017-05-08 | End: 2017-05-15

## 2017-05-08 RX ORDER — FLUTICASONE PROPIONATE 50 MCG
1 SPRAY, SUSPENSION (ML) NASAL DAILY
Qty: 1 BOTTLE | Refills: 1 | Status: SHIPPED | OUTPATIENT
Start: 2017-05-08 | End: 2018-06-11

## 2017-05-08 NOTE — PROGRESS NOTES
"  SUBJECTIVE:                                                    Ana Wyman is a 47 year old female who presents to clinic today for the following health issues:      ED/UC Followup:    Facility:  [facility]   Date of visit: 04/29/17  Reason for visit: Cellulitis of left lower leg   Current Status: Pt continuing to have low back pain but has improved, leg continues to have pain and heat, taking antibiotics as prescribed.             Cellulitis of left lower extremity has been improving on current antibiotic.  Fever curve downtrending.  No fever spikes.   Still with some low back pain.  Pain in left leg has improved.   No streaking of redness up leg.   Usually wearing compression stockings for lymphedema - didn't wear today to help facilitate my exam.  Only one day of antibiotics left - concerned that this will not be sufficient as there is still significant warmth in affected area.    Also with clogged sensation in left ear with some deep internal itch.   High pollen counts at present - though doesn't exhibit other symptoms related to seasonal allergies.    Problem list and histories reviewed & adjusted, as indicated.  Additional history: as documented      Reviewed and updated as needed this visit by clinical staff       Reviewed and updated as needed this visit by Provider         ROS:  Constitutional, derm, msk, ent  systems are negative, except as otherwise noted.    OBJECTIVE:                                                    /80 (BP Location: Right arm, Patient Position: Right side, Cuff Size: Adult Regular)  Pulse 88  Temp 99.3  F (37.4  C) (Tympanic)  Ht 5' 5\" (1.651 m)  Wt (!) 337 lb (152.9 kg)  SpO2 96%  BMI 56.08 kg/m2  Body mass index is 56.08 kg/(m^2).  GENERAL: healthy, alert and no distress  HENT: both ears: normal: no effusions, no erythema, normal landmarks, nose and mouth without ulcers or lesions, oropharynx clear and oral mucous membranes moist  MS: chronic " venous stasis changes with hyperpigmentation to anterior shins, 1+ edema to mid shin  SKIN: lower left leg with almost circumferential area of faint erythema, warmth to palpation under calf muscle - distal lower leg.  No streaking of redness up leg or down into foot.  Mildly tender on exam.  No purulent drainage or open skin.       ASSESSMENT/PLAN:                                                        ICD-10-CM    1. Left leg cellulitis L03.116 cephALEXin (KEFLEX) 500 MG capsule  Plan to extend course of antibiotics for 7 days given incomplete resolution of symptoms, though has shown clear improvement.  Will alert me with any worsening and send me an update upon completion of antibiotics.     2. Dysfunction of eustachian tube, left H69.82 fluticasone (FLONASE) 50 MCG/ACT spray  Trial of nasal steroid for presumed ETD   3. Morbid obesity, unspecified obesity type (H) E66.01 Complicating current infection due to lymphedema   4. Lymphedema of both lower extremities I89.0        Patient Instructions   Extend course of antibiotics an additional 7 days - send me an update when you finish this course - sooner if trending the wrong direction (fevers or rash)    Trial of nasal steroid for your ears      Kelly Bedoya MD  [facility]

## 2017-05-08 NOTE — PATIENT INSTRUCTIONS
Extend course of antibiotics an additional 7 days - send me an update when you finish this course - sooner if trending the wrong direction (fevers or rash)    Trial of nasal steroid for your ears

## 2017-05-08 NOTE — MR AVS SNAPSHOT
After Visit Summary   5/8/2017    Ana Wyman    MRN: [id number]           Patient Information     Date Of Birth          1970        Visit Information        Provider Department      5/8/2017 1:20 PM Kelly Bedoya MD [facility]        Today's Diagnoses     Left leg cellulitis    -  1    Dysfunction of eustachian tube, left          Care Instructions    Extend course of antibiotics an additional 7 days - send me an update when you finish this course - sooner if trending the wrong direction (fevers or rash)    Trial of nasal steroid for your ears        Follow-ups after your visit        Who to contact     If you have questions or need follow up information about today's clinic visit or your schedule please contact [facility] directly at [phone].  Normal or non-critical lab and imaging results will be communicated to you by MyChart, letter or phone within 4 business days after the clinic has received the results. If you do not hear from us within 7 days, please contact the clinic through MyChart or phone. If you have a critical or abnormal lab result, we will notify you by phone as soon as possible.  Submit refill requests through [employer] or call your pharmacy and they will forward the refill request to us. Please allow 3 business days for your refill to be completed.          Additional Information About Your Visit        MyChart Information     [employer] gives you secure access to your electronic health record. If you see a primary care provider, you can also send messages to your care team and make appointments. If you have questions, please call your primary care clinic.  If you do not have a primary care provider, please call [phone] and they will assist you.        Care EveryWhere ID     This is your Care EveryWhere ID. This could be used by other organizations to access your [place] medical records  [id number]        Your Vitals Were      "Pulse Temperature Height Pulse Oximetry BMI (Body Mass Index)       88 99.3  F (37.4  C) (Tympanic) 5' 5\" (1.651 m) 96% 56.08 kg/m2        Blood Pressure from Last 3 Encounters:   05/08/17 126/80   04/29/17 108/67   04/26/17 110/70    Weight from Last 3 Encounters:   05/08/17 (!) 337 lb (152.9 kg)   04/29/17 (!) 334 lb (151.5 kg)   04/26/17 (!) 334 lb (151.5 kg)              Today, you had the following     No orders found for display         Today's Medication Changes          These changes are accurate as of: 5/8/17  1:58 PM.  If you have any questions, ask your nurse or doctor.               Start taking these medicines.        Dose/Directions    fluticasone 50 MCG/ACT spray   Commonly known as:  FLONASE   Used for:  Dysfunction of eustachian tube, left   Started by:  Kelly Bedoya MD        Dose:  1 spray   Spray 1 spray into both nostrils daily   Quantity:  1 Bottle   Refills:  1            Where to get your medicines      These medications were sent to [facility] - [place] - [address] [place] [address]   [address] [place] [address] [address], [place] [address]     Phone:  [phone]     cephALEXin 500 MG capsule    fluticasone 50 MCG/ACT spray                Primary Care Provider Office Phone # Fax #    Kelly Bedoya [phone]       [address] [place] [address]        Thank you!     Thank you for choosing [facility]  for your care. Our goal is always to provide you with excellent care. Hearing back from our patients is one way we can continue to improve our services. Please take a few minutes to complete the written survey that you may receive in the mail after your visit with us. Thank you!             Your Updated Medication List - Protect others around you: Learn how to safely use, store and throw away your medicines at www.disposemymeds.org.          This list is accurate as of: 5/8/17  1:58 PM.  Always use " your most recent med list.                   Brand Name Dispense Instructions for use    cephALEXin 500 MG capsule    KEFLEX    28 capsule    Take 1 capsule (500 mg) by mouth 4 times daily for 7 days       clobetasol 0.05 % ointment    TEMOVATE    45 g    Apply clobetasol  0.05 percent ointment, sparingly (a dot 3 mm wide) in a thin film.  Apply to affected area only, once or twice weekly for maintenance.       EPIPEN 2-AMERICA 0.3 MG/0.3ML injection   Generic drug:  EPINEPHrine     1 Device    1 TIME ONLY       fluticasone 50 MCG/ACT spray    FLONASE    1 Bottle    Spray 1 spray into both nostrils daily       HYDROcodone-acetaminophen 5-325 MG per tablet    NORCO    15 tablet    Take 1-2 tablets by mouth every 4 hours as needed for moderate to severe pain       multivitamin, therapeutic with minerals Tabs tablet      Take 1 tablet by mouth daily.

## 2017-05-08 NOTE — NURSING NOTE
"Chief Complaint   Patient presents with     ER F/U       Initial /80 (BP Location: Right arm, Patient Position: Right side, Cuff Size: Adult Regular)  Pulse 88  Temp 99.3  F (37.4  C) (Tympanic)  Ht 5' 5\" (1.651 m)  Wt (!) 337 lb (152.9 kg)  SpO2 96%  BMI 56.08 kg/m2 Estimated body mass index is 56.08 kg/(m^2) as calculated from the following:    Height as of this encounter: 5' 5\" (1.651 m).    Weight as of this encounter: 337 lb (152.9 kg).  Medication Reconciliation: complete  "

## 2017-06-11 ENCOUNTER — MYC MEDICAL ADVICE (OUTPATIENT)
Dept: PEDIATRICS | Facility: CLINIC | Age: 47
End: 2017-06-11

## 2017-06-13 NOTE — TELEPHONE ENCOUNTER
Huddled with Saida - would recommend an office visit to discuss, looks like new issue.     Sent [employer] message to patient.

## 2017-07-23 ENCOUNTER — OFFICE VISIT (OUTPATIENT)
Dept: URGENT CARE | Facility: URGENT CARE | Age: 47
End: 2017-07-23
Payer: COMMERCIAL

## 2017-07-23 VITALS
TEMPERATURE: 98.3 F | SYSTOLIC BLOOD PRESSURE: 120 MMHG | OXYGEN SATURATION: 96 % | DIASTOLIC BLOOD PRESSURE: 83 MMHG | HEART RATE: 93 BPM

## 2017-07-23 DIAGNOSIS — R10.11 RUQ ABDOMINAL PAIN: ICD-10-CM

## 2017-07-23 DIAGNOSIS — R10.31 ABDOMINAL PAIN, RIGHT LOWER QUADRANT: Primary | ICD-10-CM

## 2017-07-23 DIAGNOSIS — R31.29 MICROHEMATURIA: ICD-10-CM

## 2017-07-23 LAB
ALBUMIN SERPL-MCNC: 3.4 G/DL (ref 3.4–5)
ALBUMIN UR-MCNC: NEGATIVE MG/DL
ALP SERPL-CCNC: 75 U/L (ref 40–150)
ALT SERPL W P-5'-P-CCNC: 29 U/L (ref 0–50)
ANION GAP SERPL CALCULATED.3IONS-SCNC: 5 MMOL/L (ref 3–14)
APPEARANCE UR: CLEAR
AST SERPL W P-5'-P-CCNC: 26 U/L (ref 0–45)
BASOPHILS # BLD AUTO: 0.1 10E9/L (ref 0–0.2)
BASOPHILS NFR BLD AUTO: 0.5 %
BILIRUB SERPL-MCNC: 0.6 MG/DL (ref 0.2–1.3)
BILIRUB UR QL STRIP: NEGATIVE
BUN SERPL-MCNC: 10 MG/DL (ref 7–30)
CALCIUM SERPL-MCNC: 9.1 MG/DL (ref 8.5–10.1)
CHLORIDE SERPL-SCNC: 105 MMOL/L (ref 94–109)
CO2 SERPL-SCNC: 28 MMOL/L (ref 20–32)
COLOR UR AUTO: YELLOW
CREAT SERPL-MCNC: 0.7 MG/DL (ref 0.52–1.04)
DIFFERENTIAL METHOD BLD: NORMAL
EOSINOPHIL # BLD AUTO: 0.4 10E9/L (ref 0–0.7)
EOSINOPHIL NFR BLD AUTO: 3.4 %
ERYTHROCYTE [DISTWIDTH] IN BLOOD BY AUTOMATED COUNT: 14 % (ref 10–15)
ERYTHROCYTE [SEDIMENTATION RATE] IN BLOOD BY WESTERGREN METHOD: 20 MM/H (ref 0–20)
GFR SERPL CREATININE-BSD FRML MDRD: 89 ML/MIN/1.7M2
GLUCOSE SERPL-MCNC: 93 MG/DL (ref 70–99)
GLUCOSE UR STRIP-MCNC: NEGATIVE MG/DL
HCT VFR BLD AUTO: 41.1 % (ref 35–47)
HGB BLD-MCNC: 13.2 G/DL (ref 11.7–15.7)
HGB UR QL STRIP: ABNORMAL
KETONES UR STRIP-MCNC: NEGATIVE MG/DL
LEUKOCYTE ESTERASE UR QL STRIP: NEGATIVE
LYMPHOCYTES # BLD AUTO: 2.4 10E9/L (ref 0.8–5.3)
LYMPHOCYTES NFR BLD AUTO: 23.4 %
MCH RBC QN AUTO: 29.5 PG (ref 26.5–33)
MCHC RBC AUTO-ENTMCNC: 32.1 G/DL (ref 31.5–36.5)
MCV RBC AUTO: 92 FL (ref 78–100)
MONOCYTES # BLD AUTO: 0.8 10E9/L (ref 0–1.3)
MONOCYTES NFR BLD AUTO: 7.6 %
NEUTROPHILS # BLD AUTO: 6.7 10E9/L (ref 1.6–8.3)
NEUTROPHILS NFR BLD AUTO: 65.1 %
NITRATE UR QL: NEGATIVE
NON-SQ EPI CELLS #/AREA URNS LPF: ABNORMAL /LPF
PH UR STRIP: 5.5 PH (ref 5–7)
PLATELET # BLD AUTO: 313 10E9/L (ref 150–450)
POTASSIUM SERPL-SCNC: 4.3 MMOL/L (ref 3.4–5.3)
PROT SERPL-MCNC: 7.1 G/DL (ref 6.8–8.8)
RBC # BLD AUTO: 4.48 10E12/L (ref 3.8–5.2)
RBC #/AREA URNS AUTO: ABNORMAL /HPF (ref 0–2)
SODIUM SERPL-SCNC: 138 MMOL/L (ref 133–144)
SP GR UR STRIP: 1.02 (ref 1–1.03)
URN SPEC COLLECT METH UR: ABNORMAL
UROBILINOGEN UR STRIP-ACNC: 0.2 EU/DL (ref 0.2–1)
WBC # BLD AUTO: 10.3 10E9/L (ref 4–11)
WBC #/AREA URNS AUTO: ABNORMAL /HPF (ref 0–2)

## 2017-07-23 PROCEDURE — 81001 URINALYSIS AUTO W/SCOPE: CPT | Performed by: PHYSICIAN ASSISTANT

## 2017-07-23 PROCEDURE — 85025 COMPLETE CBC W/AUTO DIFF WBC: CPT | Performed by: PHYSICIAN ASSISTANT

## 2017-07-23 PROCEDURE — 99214 OFFICE O/P EST MOD 30 MIN: CPT | Performed by: PHYSICIAN ASSISTANT

## 2017-07-23 PROCEDURE — 36415 COLL VENOUS BLD VENIPUNCTURE: CPT | Performed by: PHYSICIAN ASSISTANT

## 2017-07-23 PROCEDURE — 80053 COMPREHEN METABOLIC PANEL: CPT | Performed by: PHYSICIAN ASSISTANT

## 2017-07-23 PROCEDURE — 85652 RBC SED RATE AUTOMATED: CPT | Performed by: PHYSICIAN ASSISTANT

## 2017-07-23 NOTE — PROGRESS NOTES
"  SUBJECTIVE:      Ana Wyman presents to  today for evaluation of waxing and waning RUQ and RLQ pain.  No fever. Sxs stable/not worsening. Denies any increased RLQ or RUQ pain with walking, bending, stooping or other movement.     Of note, patient offered option of ER evaluation for more extensive evaluation where they have ability to do advanced imaging at beginning of today's visit.  She does not want to go to ER now and requests evaluation here.   RLQ: Pain reports slow, gradual onset of right lower abdominal pain Friday night.  She estimates pain at a constant 5/10 on pain scale.  Positive for radiation into right hip area. NO radiation into back. No worsening and no improvement of pain since onset 48 hours ago. She is unable to identify any aggravating or relieving factors. No fever. Appetite is good/normal. Energy level is good/normal.     RUQ: Patient also developed RUQ pain Friday night. Admits onset of pain came on after eating bratwursts and drinking alcohol at a ball game. Again, this pain is constant and waxes and wanes. She rates this pain at worst 5/10 and states it is 3/10 now. She does have some radiation of this pain into right side. No radiation into back. Denies any interscapular pain. Upon questioning, admits she has had several bouts of similar RUQ pain in past. She has not been evaluated for gallstones previously.   Abdominal surgical Hx: Gallbladder and appendix still intact.     ROS:     RESP: Denies any cough, wheezing, CP or SOB  CARDIAC: Denies any CP, SOB, irregular heartbeats or other cardiac sxs   GI: RLQ and RUQ pain as per above. Appetite still good. Denies any N/V/D. No abdominal pain. Normal BM's. No constipation. No bloody stools.   SKIN: Denies rash  NEURO: Denies any headache, mental status changes or lethargy.   URINARY REVIEW:  When I discussed findings of microhematuria with patient today she states she has had a small amount of blood in her urine for \"about 10 " "years\".  She has never been dx with kidney stones. She has never seen urology for evaluation of microhematuria.  Denies any dysuria, urinary urgency/frequency, hematuria, fever, chills, nausea, vomiting, back or flank pain.  GYN: RLQ pain as per above. Patient states she had similar pain related to fibroids in past. Single. Not sexually active. S/P surgical ablation for what sounds like benign uterine fibroids.   BACK: Denies any back or flank pain. Denies any LE pain, numbness or tingling     Past Medical History:   Diagnosis Date     Allergic rhinitis, cause unspecified      Cellulitis 2005    2 episodes, one with hospitalization [facility]     DUB (dysfunctional uterine bleeding)     Neg EMB 2012     Fibroids      GERD (gastroesophageal reflux disease)      Hallux valgus (acquired)      Hypersomnia with sleep apnea, unspecified     using CPAP     Lichen sclerosus 7/14/2011     Lymph edema 2010- present     Lymphedema bilateral with mixed lipedema. 9/30/2015     Lymphedema bilateral with mixed lipedema. 9/30/2015     Morbid obesity (H) 3/19/2013     MIGUELITO on CPAP 3/19/2013    Sleep study in 2004 and 2012       Pre-diabetes      Sleep apnea      Tendonitis currently     Vitamin D deficiency 3/14/2015     Past Surgical History:   Procedure Laterality Date     C NONSPECIFIC PROCEDURE  1994    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     C NONSPECIFIC PROCEDURE  1999,2001    Foot surgeries x 2 (bunionectomy)     C NONSPECIFIC PROCEDURE  2000    hammer toes     COLONOSCOPY  5/15/2013    Procedure: COLONOSCOPY;  Colonoscopy;  Surgeon: Kota Blanco MD;  Location:  GI     lichen sclerosis       ORTHOPEDIC SURGERY       vein closure  12/16       Current Outpatient Prescriptions   Medication     fluticasone (FLONASE) 50 MCG/ACT spray     clobetasol (TEMOVATE) 0.05 % ointment     multivitamin, therapeutic with minerals (THERA-VIT-M) TABS     EPIPEN 2-AMERICA 0.3 MG/0.3ML IJ VIVIANA     No current facility-administered " medications for this visit.      Allergies   Allergen Reactions     Clindamycin Diarrhea     Codeine Nausea and Vomiting     Shellfish Allergy            OBJECTIVE:  /83 (BP Location: Other (Comment), Patient Position: Chair, Cuff Size: Adult Regular)  Pulse 93  Temp 98.3  F (36.8  C) (Oral)  SpO2 96%    General appearance: alert and no apparent distress. She appears comfortable here today.   Skin color is pink and without rash.  HEENT:   Conjunctiva not injected.  Sclera clear.  Neck is supple. FROM,  with no adenopathy  CARDIAC:NORMAL - regular rate and rhythm without murmur.  RESP: Normal - CTA without rales, rhonchi, or wheezing.  ABDOMEN: Positive for very mild RUQ and RLQ pain on palpation.  No guarding or rebound tenderness. Abdomen is soft and  non-tender elsewhere. BS normal. Exam for masses or organomegaly is compromised due to body habitus but no masses or organomegaly appreciated today. No CVA tenderness   EXT: No LE edema, tenderness or erythema     Component      Latest Ref Rng & Units 7/23/2017   WBC      4.0 - 11.0 10e9/L 10.3   RBC Count      3.8 - 5.2 10e12/L 4.48   Hemoglobin      11.7 - 15.7 g/dL 13.2   Hematocrit      35.0 - 47.0 % 41.1   MCV      78 - 100 fl 92   MCH      26.5 - 33.0 pg 29.5   MCHC      31.5 - 36.5 g/dL 32.1   RDW      10.0 - 15.0 % 14.0   Platelet Count      150 - 450 10e9/L 313   Diff Method       Automated Method   % Neutrophils      % 65.1   % Lymphocytes      % 23.4   % Monocytes      % 7.6   % Eosinophils      % 3.4   % Basophils      % 0.5   Absolute Neutrophil      1.6 - 8.3 10e9/L 6.7   Absolute Lymphocytes      0.8 - 5.3 10e9/L 2.4   Absolute Monocytes      0.0 - 1.3 10e9/L 0.8   Absolute Eosinophils      0.0 - 0.7 10e9/L 0.4   Absolute Basophils      0.0 - 0.2 10e9/L 0.1       Component      Latest Ref Rng & Units 7/23/2017   Color Urine       Yellow   Appearance Urine       Clear   Glucose Urine      NEG mg/dL Negative   Bilirubin Urine      NEG Negative    Ketones Urine      NEG mg/dL Negative   Specific Gravity Urine      1.003 - 1.035 1.020   Blood Urine      NEG Moderate (A)   pH Urine      5.0 - 7.0 pH 5.5   Protein Albumin Urine      NEG mg/dL Negative   Urobilinogen Urine      0.2 - 1.0 EU/dL 0.2   Nitrite Urine      NEG Negative   Leukocyte Esterase Urine      NEG Negative   Source       Midstream Urine   WBC Urine      0 - 2 /HPF O - 2   RBC Urine      0 - 2 /HPF 2-5 (A)   Squamous Epithelial /LPF Urine      FEW /LPF Few       Component      Latest Ref Rng & Units 7/23/2017   Sodium      133 - 144 mmol/L 138   Potassium      3.4 - 5.3 mmol/L 4.3   Chloride      94 - 109 mmol/L 105   Carbon Dioxide      20 - 32 mmol/L 28   Anion Gap      3 - 14 mmol/L 5   Glucose      70 - 99 mg/dL 93   Urea Nitrogen      7 - 30 mg/dL 10   Creatinine      0.52 - 1.04 mg/dL 0.70   GFR Estimate      >60 mL/min/1.7m2 89   GFR Estimate If Black      >60 mL/min/1.7m2 >90   Calcium      8.5 - 10.1 mg/dL 9.1   Bilirubin Total      0.2 - 1.3 mg/dL 0.6   Albumin      3.4 - 5.0 g/dL 3.4   Protein Total      6.8 - 8.8 g/dL 7.1   Alkaline Phosphatase      40 - 150 U/L 75   ALT      0 - 50 U/L 29   AST      0 - 45 U/L 26       Component      Latest Ref Rng & Units 7/23/2017   Sed Rate      0 - 20 mm/h 20     ASSESSMENT/PLAN:        7/23/17 Urgent Care Plan: (copy reviewed verbally and provided to patient in printed form today)    As we discussed, the exact cause of your 2 days of right upper abdominal pain  and right lower abdominal pain is uncertain at this time.  It is good that you have not had any worsening pain, spreading pain, fever, vomiting, diarrhea or any decrease in appetite.  This is reassuring but I do think it is important to have close follow-up tomorrow.      Although your symptoms are not classic, gallstones or appendicitis are possible. Therefore, it will be important to monitor your symptoms closely overnight and to follow-up with your primary care clinic tomorrow for  "further evaluation. Your primary care provider will let you know if you need an ultrasound of your gallbladder or other evaluation.     If your symptoms change, worsen or if you develop any of the \"red flag\" symptoms we reviewed here today (also listed in the above educational handout), report directly to the emergency room.      In addition to the above, abdominal pain uncertain cause \"red flag\" signs and sxs are reviewed with pt both verbally and by way of printed educational material for home review.  Pt verbalizes understanding of and agrees to the above plan.       (R10.31) Abdominal pain, right lower quadrant  (primary encounter diagnosis)  Comment: Potential for appendicitis reviewed with patient.  It is reassuring that she has no fever, no progression of sxs over past 48 hours, excellent/normal appetite, WBC WINL and very reassuring abdominal exam here today. However, smoldering appendicitis unlikely but possible and this is reviewed with patient. Given GYN hx, GYN etiology also possible.     Plan: CBC with platelets differential, ESR:         Erythrocyte sedimentation rate, *UA reflex to         Microscopic and Culture ([place] [facility] [place]         Clinics (except [place] and [place]),         Comprehensive metabolic panel, Urine         Microscopic    Close watchful waiting overnight as per above patient handout. Follow-up with PCP tomorrow for further evaluation.     (R10.11) RUQ abdominal pain  Comment: Good hx for possible gallstones. Reassuring pain is resolving not progressing. LFT's WINL. CBC WINL. Exam of abdomen very reassuring today.   Plan: CBC with platelets differential, ESR:         Erythrocyte sedimentation rate, *UA reflex to         Microscopic and Culture ([place] [facility] [place]         Clinics (except [place] and [place]),         Comprehensive metabolic panel    Advised to avoid fatty foods. Follow-up with PCP tomorrow for consideration of further evaluation. See above patient " misty.     (R31.29) Microhematuria  Comment: Longstanding for many years by patient report   Plan: Advised patient discuss this finding again with PCP at advised follow-up visit.

## 2017-07-23 NOTE — PATIENT INSTRUCTIONS
*Abdominal Pain, Unknown Cause (Female)    The exact cause of your abdominal (stomach) pain is not certain. This does not mean that this is something to worry about, or the right tests were not done. Everyone likes to know the exact cause of the problem, but sometimes with abdominal pain, there is no clear-cut cause, and this could be a good thing. The good news is that your symptoms can be treated, and you will feel better.   Your condition does not seem serious now; however, sometimes the signs of a serious problem may take more time to appear. For this reason, it is important for you to watch for any new symptoms, problems, or worsening of your condition.  Over the next few days, the abdominal pain may come and go, or be continuous. Other common symptoms can include nausea and vomiting. Sometimes it can be difficult to tell if you feel nauseous, you may just feel bad and not associate that feeling with nausea. Constipation, diarrhea, and a fever may go along with the pain.  The pain may continue even if treated correctly over the following days. Depending on how things go, sometimes the cause can become clear and may require further or different treatment. Additional evaluations, medications, or tests may be needed.  Home care  Your health care provider may prescribe medications for pain, symptoms, or an infection.  Follow the health care provider's instructions for taking these medications.  General care    Rest until your next exam. No strenuous activities.    Try to find positions that ease discomfort. A small pillow placed on the abdomen may help relieve pain.    Something warm on your abdomen (such as a heating pad) may help, but be careful not to burn yourself.  Diet    Do not force yourself to eat, especially if having cramps, vomiting, or diarrhea.    Water is important so you do not get dehydrated. Soup may also be good. Sports drinks may also help, especially if they are not too acidic. Make sure you  don't drink sugary drinks as this can make things worse. Take liquids in small amounts. Do not guzzle them.    Caffeine sometimes makes the pain and cramping worse.    Avoid dairy products if you have vomiting or diarrhea.    Don't eat large amounts at a time. Wait a few minutes between bites.    Eat a diet low in fiber (called a low-residue diet). Foods allowed include refined breads, white rice, fruit and vegetable juices without pulp, tender meats. These foods will pass more easily through the intestine.    Avoid fried or fatty foods, dairy, alcohol and spicy foods until your symptoms go away.  Follow-up care  Follow up with your health care provider as instructed, or if your pain does not begin to improve in the next 24 hours.  When to seek medical care  Seek prompt medical care if any of the following occur:    Pain gets worse or moves to the right lower abdomen    New or worsening vomiting or diarrhea    Swelling of the abdomen    Unable to pass stool for more than three days    New fever over 101  F (38.3 C), or rising fever    Blood in vomit or bowel movements (dark red or black color)    Jaundice (yellow color of eyes and skin)    Weakness, dizziness    Chest, arm, back, neck or jaw pain    Unexpected vaginal bleeding or missed period  Call 911  Call emergency services if any of the following occur:    Trouble breathing    Confusion    Fainting or loss of consciousness    Rapid heart rate    Seizure    [id number] [facility], [address] [place] [address], [place] [address]. All rights reserved. This information is not intended as a substitute for professional medical care. Always follow your healthcare professional's instructions.    7/23/17 Urgent Care Plan:     As we discussed, the exact cause of your 2 days of right upper abdominal pain  and right lower abdominal pain is uncertain at this time.  It is good that you have not had any worsening pain, spreading pain, fever, vomiting, diarrhea or any decrease in  "appetite.  This is reassuring but I do think it is important to have close follow-up tomorrow.      Although your symptoms are not classic, gallstones or appendicitis are possible. Therefore, it will be important to monitor your symptoms closely overnight and to follow-up with your primary care clinic tomorrow for further evaluation. Your primary care provider will let you know if you need an ultrasound of your gall bladder or other evaluation.     If your symptoms change, worsen or if you develop any of the \"red flag\" symptoms we reviewed here today (also listed in the above educational handout), report directly to the emergency room.     "

## 2017-07-23 NOTE — MR AVS SNAPSHOT
After Visit Summary   7/23/2017    Ana Wyman    MRN: [id number]           Patient Information     Date Of Birth          1970        Visit Information        Provider Department      7/23/2017 1:10 PM Pritesh Jimenez PA-C [facility]        Today's Diagnoses     Abdominal pain, right lower quadrant    -  1    RUQ abdominal pain          Care Instructions      *Abdominal Pain, Unknown Cause (Female)    The exact cause of your abdominal (stomach) pain is not certain. This does not mean that this is something to worry about, or the right tests were not done. Everyone likes to know the exact cause of the problem, but sometimes with abdominal pain, there is no clear-cut cause, and this could be a good thing. The good news is that your symptoms can be treated, and you will feel better.   Your condition does not seem serious now; however, sometimes the signs of a serious problem may take more time to appear. For this reason, it is important for you to watch for any new symptoms, problems, or worsening of your condition.  Over the next few days, the abdominal pain may come and go, or be continuous. Other common symptoms can include nausea and vomiting. Sometimes it can be difficult to tell if you feel nauseous, you may just feel bad and not associate that feeling with nausea. Constipation, diarrhea, and a fever may go along with the pain.  The pain may continue even if treated correctly over the following days. Depending on how things go, sometimes the cause can become clear and may require further or different treatment. Additional evaluations, medications, or tests may be needed.  Home care  Your health care provider may prescribe medications for pain, symptoms, or an infection.  Follow the health care provider's instructions for taking these medications.  General care    Rest until your next exam. No strenuous activities.    Try to find positions that ease  discomfort. A small pillow placed on the abdomen may help relieve pain.    Something warm on your abdomen (such as a heating pad) may help, but be careful not to burn yourself.  Diet    Do not force yourself to eat, especially if having cramps, vomiting, or diarrhea.    Water is important so you do not get dehydrated. Soup may also be good. Sports drinks may also help, especially if they are not too acidic. Make sure you don't drink sugary drinks as this can make things worse. Take liquids in small amounts. Do not guzzle them.    Caffeine sometimes makes the pain and cramping worse.    Avoid dairy products if you have vomiting or diarrhea.    Don't eat large amounts at a time. Wait a few minutes between bites.    Eat a diet low in fiber (called a low-residue diet). Foods allowed include refined breads, white rice, fruit and vegetable juices without pulp, tender meats. These foods will pass more easily through the intestine.    Avoid fried or fatty foods, dairy, alcohol and spicy foods until your symptoms go away.  Follow-up care  Follow up with your health care provider as instructed, or if your pain does not begin to improve in the next 24 hours.  When to seek medical care  Seek prompt medical care if any of the following occur:    Pain gets worse or moves to the right lower abdomen    New or worsening vomiting or diarrhea    Swelling of the abdomen    Unable to pass stool for more than three days    New fever over 101  F (38.3 C), or rising fever    Blood in vomit or bowel movements (dark red or black color)    Jaundice (yellow color of eyes and skin)    Weakness, dizziness    Chest, arm, back, neck or jaw pain    Unexpected vaginal bleeding or missed period  Call 911  Call emergency services if any of the following occur:    Trouble breathing    Confusion    Fainting or loss of consciousness    Rapid heart rate    Seizure    [id number] Trey Lozano, [address] [facility], [place] [address]. All rights  "reserved. This information is not intended as a substitute for professional medical care. Always follow your healthcare professional's instructions.    7/23/17 Urgent Care Plan:     As we discussed, the exact cause of your 2 days of right upper abdominal pain  and right lower abdominal pain is uncertain at this time.  It is good that you have not had any worsening pain, spreading pain, fever, vomiting, diarrhea or any decrease in appetite.  This is reassuring but I do think it is important to have close follow-up tomorrow.      Although your symptoms are not classic, gallstones or appendicitis are possible. Therefore, it will be important to monitor your symptoms closely overnight and to follow-up with your primary care clinic tomorrow for further evaluation. Your primary care provider will let you know if you need an ultrasound of your gall bladder or other evaluation.     If your symptoms change, worsen or if you develop any of the \"red flag\" symptoms we reviewed here today (also listed in the above educational handout), report directly to the emergency room.             Follow-ups after your visit        Who to contact     If you have questions or need follow up information about today's clinic visit or your schedule please contact [facility] directly at [phone].  Normal or non-critical lab and imaging results will be communicated to you by [employer], letter or phone within 4 business days after the clinic has received the results. If you do not hear from us within 7 days, please contact the clinic through [employer] or phone. If you have a critical or abnormal lab result, we will notify you by phone as soon as possible.  Submit refill requests through [facility] or call your pharmacy and they will forward the refill request to us. Please allow 3 business days for your refill to be completed.          Additional Information About Your Visit        [facility] Information     [facility] gives you secure " access to your electronic health record. If you see a primary care provider, you can also send messages to your care team and make appointments. If you have questions, please call your primary care clinic.  If you do not have a primary care provider, please call [phone] and they will assist you.        Care EveryWhere ID     This is your Care EveryWhere ID. This could be used by other organizations to access your [facility] medical records  [id number]        Your Vitals Were     Pulse Temperature Pulse Oximetry             93 98.3  F (36.8  C) (Oral) 96%          Blood Pressure from Last 3 Encounters:   07/23/17 120/83   05/08/17 126/80   04/29/17 108/67    Weight from Last 3 Encounters:   05/08/17 (!) 337 lb (152.9 kg)   04/29/17 (!) 334 lb (151.5 kg)   04/26/17 (!) 334 lb (151.5 kg)              We Performed the Following     *UA reflex to Microscopic and Culture ([facility] (except [place] and [place])     CBC with platelets differential     Comprehensive metabolic panel     ESR: Erythrocyte sedimentation rate     Urine Microscopic        Primary Care Provider Office Phone # Fax #    Kelly Bedoya [phone]       [facility] [address] [place] [address]        Equal Access to Services     GIO GOMEZ AH: Hadii megan ku hadasho Soomaali, waaxda luqadaha, qaybta kaalmada adeegyada, yoselin idivirginie christina. So [facility] [phone].    ATENCIÓN: Si habla español, tiene a tejada disposición servicios gratuitos de asistencia lingüística. Llame al [phone].    We comply with applicable federal civil rights laws and [place] laws. We do not discriminate on the basis of race, color, national origin, age, disability sex, sexual orientation or gender identity.            Thank you!     Thank you for choosing [facility]  for your care. Our goal is always to provide you with excellent care. Hearing back from our patients  is one way we can continue to improve our services. Please take a few minutes to complete the written survey that you may receive in the mail after your visit with us. Thank you!             Your Updated Medication List - Protect others around you: Learn how to safely use, store and throw away your medicines at www.disposemymeds.org.          This list is accurate as of: 7/23/17  2:55 PM.  Always use your most recent med list.                   Brand Name Dispense Instructions for use Diagnosis    clobetasol 0.05 % ointment    TEMOVATE    45 g    Apply clobetasol  0.05 percent ointment, sparingly (a dot 3 mm wide) in a thin film.  Apply to affected area only, once or twice weekly for maintenance.    Lichen sclerosus       EPIPEN 2-MAERICA 0.3 MG/0.3ML injection   Generic drug:  EPINEPHrine     1 Device    1 TIME ONLY    Food allergies       fluticasone 50 MCG/ACT spray    FLONASE    1 Bottle    Spray 1 spray into both nostrils daily    Dysfunction of eustachian tube, left       multivitamin, therapeutic with minerals Tabs tablet      Take 1 tablet by mouth daily.

## 2017-07-23 NOTE — NURSING NOTE
"Chief Complaint   Patient presents with     Urgent Care     Pain     R lower abdominal quadrant that radiates thru the R side of the hip- started friday. Hx of same issue post uterine ablation, had some test done but no diagnoses.       Initial /83 (BP Location: Other (Comment), Patient Position: Chair, Cuff Size: Adult Regular)  Pulse 93  Temp 98.3  F (36.8  C) (Oral)  SpO2 96% Estimated body mass index is 56.08 kg/(m^2) as calculated from the following:    Height as of 5/8/17: 5' 5\" (1.651 m).    Weight as of 5/8/17: 337 lb (152.9 kg).  Medication Reconciliation: complete     Abbey Whitfield CMA (AAMA)        "

## 2017-10-27 ENCOUNTER — APPOINTMENT (OUTPATIENT)
Dept: MRI IMAGING | Facility: CLINIC | Age: 47
End: 2017-10-27
Attending: EMERGENCY MEDICINE
Payer: COMMERCIAL

## 2017-10-27 ENCOUNTER — OFFICE VISIT (OUTPATIENT)
Dept: URGENT CARE | Facility: URGENT CARE | Age: 47
End: 2017-10-27
Payer: COMMERCIAL

## 2017-10-27 ENCOUNTER — HOSPITAL ENCOUNTER (EMERGENCY)
Facility: CLINIC | Age: 47
Discharge: HOME | End: 2017-10-27
Attending: EMERGENCY MEDICINE | Admitting: EMERGENCY MEDICINE
Payer: COMMERCIAL

## 2017-10-27 VITALS
OXYGEN SATURATION: 98 % | HEART RATE: 84 BPM | SYSTOLIC BLOOD PRESSURE: 100 MMHG | TEMPERATURE: 98.2 F | DIASTOLIC BLOOD PRESSURE: 73 MMHG | RESPIRATION RATE: 18 BRPM

## 2017-10-27 VITALS
TEMPERATURE: 98.9 F | DIASTOLIC BLOOD PRESSURE: 62 MMHG | OXYGEN SATURATION: 95 % | SYSTOLIC BLOOD PRESSURE: 117 MMHG | HEART RATE: 91 BPM

## 2017-10-27 DIAGNOSIS — J34.9 SINUS DISEASE: ICD-10-CM

## 2017-10-27 DIAGNOSIS — R20.9 DISTURBANCE OF SKIN SENSATION: ICD-10-CM

## 2017-10-27 DIAGNOSIS — R42 DIZZINESS: ICD-10-CM

## 2017-10-27 DIAGNOSIS — H53.9 CHANGE IN VISION: ICD-10-CM

## 2017-10-27 DIAGNOSIS — R11.0 NAUSEA: ICD-10-CM

## 2017-10-27 DIAGNOSIS — R41.840 DIFFICULTY CONCENTRATING: ICD-10-CM

## 2017-10-27 DIAGNOSIS — G44.89 OTHER HEADACHE SYNDROME: ICD-10-CM

## 2017-10-27 DIAGNOSIS — R51.9 NONINTRACTABLE EPISODIC HEADACHE, UNSPECIFIED HEADACHE TYPE: Primary | ICD-10-CM

## 2017-10-27 LAB
ANION GAP SERPL CALCULATED.3IONS-SCNC: 6 MMOL/L (ref 3–14)
BASOPHILS # BLD AUTO: 0.1 10E9/L (ref 0–0.2)
BASOPHILS NFR BLD AUTO: 0.7 %
BUN SERPL-MCNC: 12 MG/DL (ref 7–30)
CALCIUM SERPL-MCNC: 9 MG/DL (ref 8.5–10.1)
CHLORIDE SERPL-SCNC: 106 MMOL/L (ref 94–109)
CO2 SERPL-SCNC: 27 MMOL/L (ref 20–32)
COHGB MFR BLD: 0.9 % (ref 0–2)
CREAT SERPL-MCNC: 0.71 MG/DL (ref 0.52–1.04)
DIFFERENTIAL METHOD BLD: NORMAL
EOSINOPHIL # BLD AUTO: 0.3 10E9/L (ref 0–0.7)
EOSINOPHIL NFR BLD AUTO: 2.7 %
ERYTHROCYTE [DISTWIDTH] IN BLOOD BY AUTOMATED COUNT: 13.6 % (ref 10–15)
GFR SERPL CREATININE-BSD FRML MDRD: 88 ML/MIN/1.7M2
GLUCOSE SERPL-MCNC: 98 MG/DL (ref 70–99)
HCT VFR BLD AUTO: 41.9 % (ref 35–47)
HGB BLD-MCNC: 13.6 G/DL (ref 11.7–15.7)
IMM GRANULOCYTES # BLD: 0 10E9/L (ref 0–0.4)
IMM GRANULOCYTES NFR BLD: 0.3 %
INR PPP: 0.98 (ref 0.86–1.14)
LYMPHOCYTES # BLD AUTO: 3 10E9/L (ref 0.8–5.3)
LYMPHOCYTES NFR BLD AUTO: 28.6 %
MCH RBC QN AUTO: 28.8 PG (ref 26.5–33)
MCHC RBC AUTO-ENTMCNC: 32.5 G/DL (ref 31.5–36.5)
MCV RBC AUTO: 89 FL (ref 78–100)
MONOCYTES # BLD AUTO: 0.8 10E9/L (ref 0–1.3)
MONOCYTES NFR BLD AUTO: 7.2 %
NEUTROPHILS # BLD AUTO: 6.3 10E9/L (ref 1.6–8.3)
NEUTROPHILS NFR BLD AUTO: 60.5 %
NRBC # BLD AUTO: 0 10*3/UL
NRBC BLD AUTO-RTO: 0 /100
PLATELET # BLD AUTO: 325 10E9/L (ref 150–450)
POTASSIUM SERPL-SCNC: 3.7 MMOL/L (ref 3.4–5.3)
RBC # BLD AUTO: 4.72 10E12/L (ref 3.8–5.2)
SODIUM SERPL-SCNC: 139 MMOL/L (ref 133–144)
WBC # BLD AUTO: 10.4 10E9/L (ref 4–11)

## 2017-10-27 PROCEDURE — 96374 THER/PROPH/DIAG INJ IV PUSH: CPT | Mod: 59

## 2017-10-27 PROCEDURE — 70544 MR ANGIOGRAPHY HEAD W/O DYE: CPT | Mod: XS

## 2017-10-27 PROCEDURE — 85610 PROTHROMBIN TIME: CPT | Performed by: EMERGENCY MEDICINE

## 2017-10-27 PROCEDURE — 99285 EMERGENCY DEPT VISIT HI MDM: CPT | Mod: 25

## 2017-10-27 PROCEDURE — 93005 ELECTROCARDIOGRAM TRACING: CPT

## 2017-10-27 PROCEDURE — 25000128 H RX IP 250 OP 636: Performed by: EMERGENCY MEDICINE

## 2017-10-27 PROCEDURE — 85025 COMPLETE CBC W/AUTO DIFF WBC: CPT | Performed by: EMERGENCY MEDICINE

## 2017-10-27 PROCEDURE — 80048 BASIC METABOLIC PNL TOTAL CA: CPT | Performed by: EMERGENCY MEDICINE

## 2017-10-27 PROCEDURE — 70553 MRI BRAIN STEM W/O & W/DYE: CPT

## 2017-10-27 PROCEDURE — A9585 GADOBUTROL INJECTION: HCPCS | Performed by: RADIOLOGY

## 2017-10-27 PROCEDURE — 70549 MR ANGIOGRAPH NECK W/O&W/DYE: CPT

## 2017-10-27 PROCEDURE — 96375 TX/PRO/DX INJ NEW DRUG ADDON: CPT | Mod: 59

## 2017-10-27 PROCEDURE — 82375 ASSAY CARBOXYHB QUANT: CPT | Performed by: EMERGENCY MEDICINE

## 2017-10-27 PROCEDURE — 96361 HYDRATE IV INFUSION ADD-ON: CPT

## 2017-10-27 PROCEDURE — 99215 OFFICE O/P EST HI 40 MIN: CPT | Performed by: PHYSICIAN ASSISTANT

## 2017-10-27 PROCEDURE — 25000128 H RX IP 250 OP 636: Performed by: RADIOLOGY

## 2017-10-27 RX ORDER — LORATADINE 10 MG/1
10 TABLET ORAL DAILY
Qty: 7 TABLET | Refills: 0 | Status: SHIPPED | OUTPATIENT
Start: 2017-10-27 | End: 2019-04-30

## 2017-10-27 RX ORDER — KETOROLAC TROMETHAMINE 30 MG/ML
30 INJECTION, SOLUTION INTRAMUSCULAR; INTRAVENOUS ONCE
Status: COMPLETED | OUTPATIENT
Start: 2017-10-27 | End: 2017-10-27

## 2017-10-27 RX ORDER — GADOBUTROL 604.72 MG/ML
10 INJECTION INTRAVENOUS ONCE
Status: COMPLETED | OUTPATIENT
Start: 2017-10-27 | End: 2017-10-27

## 2017-10-27 RX ORDER — METOCLOPRAMIDE HYDROCHLORIDE 5 MG/ML
10 INJECTION INTRAMUSCULAR; INTRAVENOUS ONCE
Status: COMPLETED | OUTPATIENT
Start: 2017-10-27 | End: 2017-10-27

## 2017-10-27 RX ORDER — DIPHENHYDRAMINE HYDROCHLORIDE 50 MG/ML
25 INJECTION INTRAMUSCULAR; INTRAVENOUS ONCE
Status: COMPLETED | OUTPATIENT
Start: 2017-10-27 | End: 2017-10-27

## 2017-10-27 RX ORDER — SODIUM CHLORIDE 9 MG/ML
1000 INJECTION, SOLUTION INTRAVENOUS CONTINUOUS
Status: DISCONTINUED | OUTPATIENT
Start: 2017-10-27 | End: 2017-10-27 | Stop reason: HOSPADM

## 2017-10-27 RX ADMIN — DIPHENHYDRAMINE HYDROCHLORIDE 25 MG: 50 INJECTION, SOLUTION INTRAMUSCULAR; INTRAVENOUS at 18:12

## 2017-10-27 RX ADMIN — KETOROLAC TROMETHAMINE 30 MG: 30 INJECTION, SOLUTION INTRAMUSCULAR at 18:13

## 2017-10-27 RX ADMIN — METOCLOPRAMIDE 10 MG: 5 INJECTION, SOLUTION INTRAMUSCULAR; INTRAVENOUS at 18:14

## 2017-10-27 RX ADMIN — SODIUM CHLORIDE 1000 ML: 9 INJECTION, SOLUTION INTRAVENOUS at 18:11

## 2017-10-27 RX ADMIN — GADOBUTROL 10 ML: 604.72 INJECTION INTRAVENOUS at 19:16

## 2017-10-27 ASSESSMENT — ENCOUNTER SYMPTOMS
DIZZINESS: 1
DIARRHEA: 0
DIZZINESS: 1
HEADACHES: 1
DIARRHEA: 0
CHILLS: 0
BLURRED VISION: 0
SENSORY CHANGE: 1
NAUSEA: 1
FOCAL WEAKNESS: 0
SPEECH CHANGE: 0
ABDOMINAL PAIN: 0
EYE PAIN: 0
DIFFICULTY URINATING: 0
VOMITING: 0
EYE PAIN: 1
LOSS OF CONSCIOUSNESS: 0
FEVER: 0
HEADACHES: 1
DOUBLE VISION: 0
CONSTIPATION: 0
SHORTNESS OF BREATH: 0

## 2017-10-27 NOTE — PROGRESS NOTES
"HPI  October 27, 2017    HPI: Ana Wyman is a 47 year old female with hx impaired glucose & morbid obesity who complains of \"head feeling foggy\" and nausea x 3 weeks with progressive worsening this past week. She notes that she also gets dizzy, especially when looking down, and has intermittent R sided HAs. This week she had had a hard time concentrating and has been mistyping things at work. She noticed a lump on her R occiput yesterday and this is tender, dizziness gets worse when she touches this. Also reports hand numbness earlier today. Denies fever/chills, CP, SOB, abd pain, vomiting, rash, focal weakness, vision changes, syncope, speech changes, facial droop, or any other symptoms. Patient's father had a stroke in his 60's.      Past Medical History:   Diagnosis Date     Allergic rhinitis, cause unspecified      Cellulitis 2005    2 episodes, one with hospitalization [facility]     DUB (dysfunctional uterine bleeding)     Neg EMB 2012     Fibroids      GERD (gastroesophageal reflux disease)      Hallux valgus (acquired)      Hypersomnia with sleep apnea, unspecified     using CPAP     Lichen sclerosus 7/14/2011     Lymph edema 2010- present     Lymphedema bilateral with mixed lipedema. 9/30/2015     Lymphedema bilateral with mixed lipedema. 9/30/2015     Morbid obesity (H) 3/19/2013     MIGUELITO on CPAP 3/19/2013    Sleep study in 2004 and 2012       Pre-diabetes      Sleep apnea      Tendonitis currently     Vitamin D deficiency 3/14/2015     Past Surgical History:   Procedure Laterality Date     C NONSPECIFIC PROCEDURE  1994    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     C NONSPECIFIC PROCEDURE  1999,2001    Foot surgeries x 2 (bunionectomy)     C NONSPECIFIC PROCEDURE  2000    hammer toes     COLONOSCOPY  5/15/2013    Procedure: COLONOSCOPY;  Colonoscopy;  Surgeon: Kota Blanco MD;  Location:  GI     lichen sclerosis       ORTHOPEDIC SURGERY       vein closure  12/16     Social History "   Substance Use Topics     Smoking status: Never Smoker     Smokeless tobacco: Never Used     Alcohol use 0.0 oz/week     0 Standard drinks or equivalent per week      Comment: 1-2 drink occasionally     Patient Active Problem List   Diagnosis     Esophageal reflux     Allergic state     Hypersomnia with sleep apnea     FAMILY HX GI MALIGNANCY     Flatulence, eructation, and gas pain     Family history of malignant neoplasm of genital organ, other     Abdominal pain, left upper quadrant     Dysmenorrhea     Acute bronchitis     CARDIOVASCULAR SCREENING; LDL GOAL LESS THAN 160     Elevated blood pressure reading without diagnosis of hypertension     Lichen sclerosus     Menorrhagia     Right flank pain     Uterine fibroid     Premenstrual syndrome     Weight gain     Morbid obesity (H)     Recurrent cellulitis of lower leg     MIGUELITO on CPAP     Abnormal glucose     Vitamin D deficiency     Pre-diabetes     Acanthosis nigricans     Cutaneous skin tags     Baker's cyst of knee     Lymphedema bilateral with mixed lipedema.     Family History   Problem Relation Age of Onset     C.A.D. Father 92     CAGB 98     Obesity Father      CANCER Father      stomach Dx age 74     Lipids Father      Hypertension Father      Coronary Artery Disease Father      DIABETES Mother      Type 2     Allergies Mother      Obesity Mother      C.A.D. Mother      triple bypass surgery, 65     Lipids Mother      Hypertension Mother      Coronary Artery Disease Mother      C.A.D. Paternal Grandfather 58     fatal     Coronary Artery Disease Paternal Grandfather      Cancer - colorectal Maternal Grandmother 75     CANCER Maternal Grandmother      liver     Hypertension Maternal Grandmother      Cancer - colorectal Paternal Aunt      Allergies Brother      CANCER Paternal Grandmother      stomach     Obesity Paternal Grandmother      DIABETES Paternal Grandmother      Type 2     Obesity Brother      Hypertension Brother      Cancer - colorectal Other  "     cousin  from colon cancer in 30's     Obesity Brother      Coronary Artery Disease Maternal Grandfather      Hypertension Maternal Grandfather         Problem list, Medication list, Allergies, and Medical/Social/Surgical histories reviewed in [facility] and updated as appropriate.      Review of Systems   Constitutional: Negative for chills and fever.        \"head feels foggy\"/difficulty concentrating   Eyes: Negative for blurred vision, double vision and pain.   Respiratory: Negative for shortness of breath.    Cardiovascular: Negative for chest pain.   Gastrointestinal: Positive for nausea. Negative for abdominal pain, diarrhea and vomiting.   Skin: Negative for rash.        Lump on R occiput   Neurological: Positive for dizziness, sensory change and headaches. Negative for speech change, focal weakness and loss of consciousness.   All other systems reviewed and are negative.        Physical Exam   Constitutional: She is oriented to person, place, and time and well-developed, well-nourished, and in no distress.   HENT:   Head: Normocephalic and atraumatic.       Right Ear: Tympanic membrane, external ear and ear canal normal.   Left Ear: Tympanic membrane, external ear and ear canal normal.   Eyes: EOM are normal. Pupils are equal, round, and reactive to light.   Cardiovascular: Normal rate, regular rhythm and normal heart sounds.    Pulmonary/Chest: Effort normal and breath sounds normal.   Musculoskeletal: Normal range of motion.   Neurological: She is alert and oriented to person, place, and time. She has normal sensation, normal strength and intact cranial nerves. Gait normal.   Skin: Skin is warm and dry.   Nursing note and vitals reviewed.    Vital Signs  /62 (BP Location: Right arm, Patient Position: Chair, Cuff Size: Adult Regular)  Pulse 91  Temp 98.9  F (37.2  C) (Oral)  SpO2 95%     Diagnostic Test Results:  none     ASSESSMENT/PLAN      ICD-10-CM    1. Nonintractable episodic headache, " unspecified headache type R51    2. Dizziness R42    3. Nausea R11.0    4. Disturbance of skin sensation R20.9    5. Difficulty concentrating R41.840       Patient with neurologic symptoms and unknown mass on R occiput, expressed concern for intracranial mass, aneurysm or other vascular abnormality and that I felt patient needed imaging. She has been sent to ER at [facility] by private car.      I have discussed any lab or imaging results, the patient's diagnosis, and my plan of treatment with the patient and/or family. Patient is aware to come back in if with worsening symptoms or if no relief despite treatment plan.  Patient voiced understanding and had no further questions.       Follow Up: Data Unavailable    ADAN Arreaga, PACASA  [facility] URGENT [facility]ARE

## 2017-10-27 NOTE — ED AVS SNAPSHOT
[facility] Emergency Department    [address]    [facility] [address]    Phone:  [phone]    Fax:  [phone]                                       Ana Wyman   MRN: [id number]    Department:  [facility] Emergency Department   Date of Visit:  10/27/2017           After Visit Summary Signature Page     I have received my discharge instructions, and my questions have been answered. I have discussed any challenges I see with this plan with the nurse or doctor.    ..........................................................................................................................................  Patient/Patient Representative Signature      ..........................................................................................................................................  Patient Representative Print Name and Relationship to Patient    ..................................................               ................................................  Date                                            Time    ..........................................................................................................................................  Reviewed by Signature/Title    ...................................................              ..............................................  Date                                                            Time

## 2017-10-27 NOTE — ED NOTES
Patient comes in for evaluation of changes in vision and dizziness and confusion. Patient states she was being evaluated for dry eyes for the last few months, now having concerns with vision in right eye for the last week, describes as a fogginess. Has right sided headaches for the last week and also notes a lump behind right ear. Patient also notes the past week she has been typing letters in the wrong order. ABCs intact.

## 2017-10-27 NOTE — NURSING NOTE
"Chief Complaint   Patient presents with     Urgent Care     Facial Pain     right eye pain /right face headache moving towards back of head, dizziness, cloudy vision, lump on back right of head x few weeks       Initial /62 (BP Location: Right arm, Patient Position: Chair, Cuff Size: Adult Regular)  Pulse 91  Temp 98.9  F (37.2  C) (Oral)  SpO2 95% Estimated body mass index is 56.08 kg/(m^2) as calculated from the following:    Height as of 5/8/17: 5' 5\" (1.651 m).    Weight as of 5/8/17: 337 lb (152.9 kg).  Medication Reconciliation: unable or not appropriate to perform   Marce Mack Medical Assistant      "

## 2017-10-27 NOTE — ED PROVIDER NOTES
History     Chief Complaint:  Vision Changes Os    HPI   Ana Wyman is a 47 year old female who presents to the emergency department today with vision changes. The patient reports her symptoms started with vision changes two weeks ago which progressed to headache on right side, cloudy vision in right eye, and last night she noticed a lump behind her right ear, which, when pushed on made her dizzy. After the vision changes she saw her ophthalmologist who did not find anything besides dry eyes, but she decided to visit the ER after her symptoms worsened. She noticed errors in her typing she does not usually make like inverting the letters and addition mistakes. She has a history of migraines from [employer] but nothing recent and denies new medicines, trauma, surgeries, sickness, sinus infections, recent dental work, problems using the bathroom, other pain besides head and eye.     Allergies:  Clindamycin  Codeine    Medications:    Flonase  Temovate  Epipen    Past Medical History:    Allergic rhinitis  Acute bronchitis  Cutaneous skin tags  Cellulitis  Menorrhagia   DUB  GERD  Fibroids  Uterine fibroid   Hallux valgus  Lymph edema  Lichen sclerosus   Hypersomnia with sleep aponia   Lymphedema bilateral with mixed lipedema  Morbid obesity  MIGUELITO on CPAP  Pre-diabetes  Tendonitis   Vitamin D deficiency     Past Surgical History:    Right hand surgery  2 Foot surgeries  Hammer toes   Colonoscopy  Lichen sclerosis   Vein closure    Family History:    CAD  Obesity  Cancer  Lipids  Hypertension  Diabetes    Social History:  The patient was accompanied to the ED by mother.  Smoking Status: Never  Smokeless Tobacco: Never  Alcohol Use: 0/week (1-2 occasionally)   Marital Status:  Single     Review of Systems   Eyes: Positive for pain (right eye) and visual disturbance (cloudy right eye).   Gastrointestinal: Negative for constipation and diarrhea.   Genitourinary: Negative for difficulty urinating.   Neurological:  Positive for dizziness (when touching lump on head) and headaches.   All other systems reviewed and are negative.    Physical Exam   First Vitals:  BP: (!) 143/96  Pulse: 90  Temp: 98.2  F (36.8  C)  Resp: 18  SpO2: 98 %    Physical Exam  General: The patient is alert, in no respiratory distress.    HENT: Injection of both sclera, right more than left. Gross ocular pressure over eyelid is equal.   Mucous membranes moist.    Cardiovascular: Regular rate and rhythm. Good pulses in all four extremities. Normal capillary refill and skin turgor.     Respiratory: Lungs are clear. No nasal flaring. No retractions. No wheezing, no crackles.    Gastrointestinal: Abdomen soft. No guarding, no rebound. No palpable hernias.     Musculoskeletal: No gross deformity.     Skin: No rashes or petechiae.     Neurologic: The patient is alert and oriented x3. GCS 15. No testable cranial nerve deficit. Follows commands with clear and appropriate speech. Gives appropriate answers. Good strength in all extremities. No gross neurologic deficit. Gross sensation intact. Pupils are round and reactive. No meningismus.     Lymphatic:  Motile tender posterior lymph node in right neck. No lower extremity swelling.    Psychiatric: The patient is non-tearful.    Emergency Department Course     ECG:  Indication: rule out atrial fibrillation   Completed at 1724.  Read at 1728.   Normal sinus rhythm  Minimal voltage criteria for LVH, may be normal variant   Borderline ECG   Rate 89 bpm. NV interval 154. QRS duration 98. QT/QTc 360/438. P-R-T axes 51 -24 0.    Imaging:  Radiology findings were communicated with the patient and family who voiced understanding of the findings.    MR Neck w/o & w Contrast Angiogram  IMPRESSION: Normal MR angiogram of the neck.  Report per radiology     MR Head  W/o Contrast Angiogram  IMPRESSION: Normal MR angiogram of the head.    Report per radiology     MR Brain w/o & w Contrast   IMPRESSION: Normal brain MRI.    Report  per radiology     Laboratory:  Laboratory findings were communicated with the patient and family who voiced understanding of the findings.  CBC: AWNL (WBC 10.4, HGB 13.6, )  BMP: AWNL (Creatinine 0.71)  INR: 0.98  Carbon Monoxide: 0.9    Interventions:  1811: 0.9% sodium chloride BOLUS 1,000 mL IV  1812: Benadryl 25mg IV  1813: Toradol 30mg IV  1814: Reglan 10mg IV  1916: Gadavist 10 mL IV  1916: 0.9% sodium chloride flush 60mL IV    Emergency Department Course:  Nursing notes and vitals reviewed.  1715: I performed an exam of the patient as documented above.   The patient was sent for MRs of Head, Neck, and Brain while in the emergency department, results above.   IV was inserted and blood was drawn for laboratory testing, results above.  2032: Patient rechecked and updated.   2035: Findings and plan explained to the Patient and mother. Patient discharged home with instructions regarding supportive care, medications, and reasons to return. The importance of close follow-up was reviewed. The patient was prescribed Claritin    I personally reviewed the laboratory and imaging results with the Patient and mother and answered all related questions prior to discharge.    Impression & Plan      Medical Decision Making:   The patient reports that she first reported problems with her vision and it being cloudy and she was seen by an ophthalmologist, then had an extensive workup of her eye. They thought she had a dry eye. Currently however, tonight though she presents with an episode of inverting letters and unilateral headache. The patient is concerned about potential for stroke or other cause and therefore presented to the ER. I did question about causes that may have lead to this, including trauma, history of migraine, which she does have, use of substances, or exposure to things, and I did check carbon monoxide level on her. An MRI was ordered to look for tumor, I did feel that a seizure would be unlikely, in that  she had pain with this and it appears to be very unilateral, making meningitis unlikely as well. The patient laboratory studies were reassuring. The MRI did note some sinus disease, I therefore did discharge her on a decongestant. We discussed symptomatic treatment and she was discharged home in good condition, ordered to follow up with her primary care doctor as well as neurology as an outpatient. While the right eye was more injected, the left eye was injected as well and I did not feel that  this was likely a primary eye process. The was no signs of dissection on the MRA and no tumor on the MRI.     Diagnosis:    ICD-10-CM    1. Other headache syndrome G44.89    2. Change in vision H53.9    3. Sinus disease J34.9        Disposition:  discharged to home    Discharge Medications:  New Prescriptions    LORATADINE (CLARITIN) 10 MG TABLET    Take 1 tablet (10 mg) by mouth daily       Scribe Disclosure:  Kyara PINTO, am serving as a scribe at 6:29 PM on 10/27/2017 to document services personally performed by Nikhil Curtis MD based on my observations and the provider's statements to me.     10/27/2017   [facility] EMERGENCY DEPARTMENT       Nikhil Curtis MD  10/28/17 0028

## 2017-10-27 NOTE — ED AVS SNAPSHOT
[facility]    [address] [place] [address]    Phone:  [phone]    Fax:  [phone]                                       Ana Wyman   MRN: [id number]    Department:  [facility]   Date of Visit:  10/27/2017           Patient Information     Date Of Birth          1970        Your diagnoses for this visit were:     Other headache syndrome     Change in vision     Sinus disease        You were seen by Nikhil Curtis MD.      Follow-up Information     Follow up with Kelly Bedoya MD. Schedule an appointment as soon as possible for a visit in 4 days.    Specialties:  Internal Medicine, Pediatrics    Contact information:    [facility] [place] [address]  [phone]          Follow up with Neurology, [facility]. Schedule an appointment as soon as possible for a visit in 1 week.    Contact information:    [address]  [facility] [address]  [phone]          Discharge Instructions       Discharge Instructions  Headache    You were seen today for a headache. Headaches may be caused by many different things such as muscle tension, sinus inflammation, anxiety and stress, having too little sleep, too much alcohol, some medical conditions or injury. You may have a migraine, which is caused by changes in the blood vessels in your head.  At this time your doctor does not find that your headache is a sign of anything dangerous or life-threatening.  However, sometimes the signs of serious illness do not show up right away.  If you have new or worse symptoms, you may need to be seen again in the emergency department or by your primary doctor.      Return to the Emergency Department if:    You get a fever of 101 F or higher.    Your headache gets much worse.    You get a stiff neck with your headache.    You get a new headache that is different or worse than  headaches you have had before.    You are vomiting and can t keep food or water down.    You have blurry or double vision or other problems with your eyes.    You have a new weakness on one side of your body.    You have difficulty with balance which is new.    You or your family thinks you are confused.    You have a seizure or convulsion.    What can I do to help myself?    Pain medications - You may take a pain medication such as Tylenol  (acetaminophen), Advil , Nuprin  (ibuprofen) or Aleve  (naproxen).  If you have been given a narcotic such as Vicodin  (hydrocodone with acetaminophen), Percocet  (oxycodone with acetaminophen), codeine, or a muscle relaxant such as Flexeril  (cyclobenzaprine) or Soma  (carisoprodol), do not drive for four hours after you have taken it. If the narcotic contains Tylenol  (acetaminophen), do not take Tylenol  with it. All narcotics will cause constipation, so eat a high fiber diet.        Take a pain reliever as soon as you notice symptoms.  Starting medications as soon as you start to have symptoms may lessen the amount of pain you have.    Relaxing in a quiet, dark room may help.    Get enough sleep and eat meals regularly.    Schedule an appointment with your primary physician as instructed, or at least within 1 week.    You may need to watch for certain foods or other things which may trigger your headaches.  Keeping a journal of your headaches and possible triggers may help you and your primary doctor to identify things which you should avoid which may be causing your headaches.  If you were given a prescription for medicine here today, be sure to read all of the information (including the package insert) that comes with your prescription.  This will include important information about the medicine, its side effects, and any warnings that you need to know about.  The pharmacist who fills the prescription can provide more information and answer questions you may have about the  medicine.  If you have questions or concerns that the pharmacist cannot address, please call or return to the Emergency Department.   Remember that you can always come back to the Emergency Department if you are not able to see your regular doctor in the amount of time listed above, if you get any new symptoms, or if there is anything that worries you.          24 Hour Appointment Hotline       To make an appointment at any [facility], call [phone]). If you don't have a family doctor or clinic, we will help you find one. [facility] are conveniently located to serve the needs of you and your family.             Review of your medicines      START taking        Dose / Directions Last dose taken    loratadine 10 MG tablet   Commonly known as:  CLARITIN   Dose:  10 mg   Quantity:  7 tablet        Take 1 tablet (10 mg) by mouth daily   Refills:  0          Our records show that you are taking the medicines listed below. If these are incorrect, please call your family doctor or clinic.        Dose / Directions Last dose taken    clobetasol 0.05 % ointment   Commonly known as:  TEMOVATE   Quantity:  45 g        Apply clobetasol  0.05 percent ointment, sparingly (a dot 3 mm wide) in a thin film.  Apply to affected area only, once or twice weekly for maintenance.   Refills:  2        EPIPEN 2-AMERICA 0.3 MG/0.3ML injection   Quantity:  1 Device   Generic drug:  EPINEPHrine        1 TIME ONLY   Refills:  1        FISH OIL PO        Refills:  0        fluticasone 50 MCG/ACT spray   Commonly known as:  FLONASE   Dose:  1 spray   Quantity:  1 Bottle        Spray 1 spray into both nostrils daily   Refills:  1        multivitamin, therapeutic with minerals Tabs tablet   Dose:  1 tablet        Take 1 tablet by mouth daily.   Refills:  0                Prescriptions were sent or printed at these locations (1 Prescription)                   Other Prescriptions                Printed at Department/Unit  printer (1 of 1)         loratadine (CLARITIN) 10 MG tablet                Procedures and tests performed during your visit     Basic metabolic panel    CBC with platelets differential    Carbon monoxide    EKG 12 lead    INR    MR Brain w/o & w Contrast    MR Head w/o Contrast Angiogram    MR Neck w/o & w Contrast Angiogram    Peripheral IV: Standard    Pulse oximetry nursing    Vital signs      Orders Needing Specimen Collection     None      Pending Results     No orders found from 10/25/2017 to 10/28/2017.            Pending Culture Results     No orders found from 10/25/2017 to 10/28/2017.            Pending Results Instructions     If you had any lab results that were not finalized at the time of your Discharge, you can call the ED Lab Result RN at [phone]. You will be contacted by this team for any positive Lab results or changes in treatment. The nurses are available 7 days a week from 10A to 6:30P.  You can leave a message 24 hours per day and they will return your call.        Test Results From Your Hospital Stay        10/27/2017  6:37 PM      Component Results     Component Value Ref Range & Units Status    WBC 10.4 4.0 - 11.0 10e9/L Final    RBC Count 4.72 3.8 - 5.2 10e12/L Final    Hemoglobin 13.6 11.7 - 15.7 g/dL Final    Hematocrit 41.9 35.0 - 47.0 % Final    MCV 89 78 - 100 fl Final    MCH 28.8 26.5 - 33.0 pg Final    MCHC 32.5 31.5 - 36.5 g/dL Final    RDW 13.6 10.0 - 15.0 % Final    Platelet Count 325 150 - 450 10e9/L Final    Diff Method Automated Method  Final    % Neutrophils 60.5 % Final    % Lymphocytes 28.6 % Final    % Monocytes 7.2 % Final    % Eosinophils 2.7 % Final    % Basophils 0.7 % Final    % Immature Granulocytes 0.3 % Final    Nucleated RBCs 0 0 /100 Final    Absolute Neutrophil 6.3 1.6 - 8.3 10e9/L Final    Absolute Lymphocytes 3.0 0.8 - 5.3 10e9/L Final    Absolute Monocytes 0.8 0.0 - 1.3 10e9/L Final    Absolute Eosinophils 0.3 0.0 - 0.7 10e9/L Final    Absolute Basophils  0.1 0.0 - 0.2 10e9/L Final    Abs Immature Granulocytes 0.0 0 - 0.4 10e9/L Final    Absolute Nucleated RBC 0.0  Final         10/27/2017  6:56 PM      Component Results     Component Value Ref Range & Units Status    Sodium 139 133 - 144 mmol/L Final    Potassium 3.7 3.4 - 5.3 mmol/L Final    Chloride 106 94 - 109 mmol/L Final    Carbon Dioxide 27 20 - 32 mmol/L Final    Anion Gap 6 3 - 14 mmol/L Final    Glucose 98 70 - 99 mg/dL Final    Urea Nitrogen 12 7 - 30 mg/dL Final    Creatinine 0.71 0.52 - 1.04 mg/dL Final    GFR Estimate 88 >60 mL/min/1.7m2 Final    Non  GFR Calc    GFR Estimate If Black >90 >60 mL/min/1.7m2 Final    African American GFR Calc    Calcium 9.0 8.5 - 10.1 mg/dL Final         10/27/2017  6:52 PM      Component Results     Component Value Ref Range & Units Status    INR 0.98 0.86 - 1.14 Final         10/27/2017  6:42 PM      Component Results     Component Value Ref Range & Units Status    Carbon Monoxide 0.9 0 - 2 % Final         10/27/2017  8:20 PM      Narrative     MRI OF THE BRAIN WITHOUT AND WITH CONTRAST 10/27/2017 7:28 PM     COMPARISON: None.    HISTORY: Headache, right sided, with switching letters.    TECHNIQUE: Multisequence, multiplanar MRI images of the brain were  acquired before and after the administration of IV gadolinium (10 mL  Gadavist).    FINDINGS: The ventricles and basal cisterns are normal in  configuration. There is no midline shift. There are no extra-axial  fluid collections. Gray-white differentiation is well maintained.  There is no evidence for stroke or acute intracranial hemorrhage.  There is no abnormal contrast enhancement in the brain or its  coverings.    There is polypoid mucosal thickening in the right maxillary sinus.  There is no sinusitis or mastoiditis.        Impression     IMPRESSION: Normal brain MRI.    MALIA STEIN MD         10/27/2017  8:20 PM      Narrative     MR ANGIOGRAM OF THE HEAD WITHOUT CONTRAST   10/27/2017 7:29 PM      COMPARISON: None    HISTORY: Headache    TECHNIQUE: 3D time-of-flight MR angiogram of the head without  contrast. MIP reconstruction of all MR angiographic data was  performed.    FINDINGS: The bilateral distal internal carotid, basilar, bilateral  anterior cerebral, bilateral middle cerebral and bilateral posterior  cerebral arteries are patent and unremarkable with no evidence for  cerebral artery stenosis or aneurysm. The anterior communicating  artery is patent and unremarkable. The posterior communicating artery  on the right is patent and unremarkable.         Impression     IMPRESSION: Normal MR angiogram of the head.      MALIA STEIN MD         10/27/2017  8:20 PM      Narrative     MRA NECK WITHOUT AND WITH CONTRAST  10/27/2017 7:28 PM     COMPARISON: None    HISTORY: Headache    TECHNIQUE: 2D time-of-flight MR angiogram of the neck without contrast  and 3D MR angiogram of the neck with 10 mL Gadavist gadolinium IV  contrast.  MIP reconstruction of all MR angiographic data was  performed. Estimates of carotid stenoses are made relative to the  distal internal carotid artery diameters except as noted.    FINDINGS:    Carotids: The common carotid arteries bilaterally are widely patent.  The cervical internal carotid arteries bilaterally are patent without  stenosis.    Vertebrals: The vertebral arteries bilaterally are patent without  stenosis and demonstrate antegrade flow.    Aortic arch: The arteries as they arise from the aortic arch are  normally arranged with no evidence for stenosis.        Impression     IMPRESSION: Normal MR angiogram of the neck.    MALIA STEIN MD                Clinical Quality Measure: Blood Pressure Screening     Your blood pressure was checked while you were in the emergency department today. The last reading we obtained was  BP: 100/73 . Please read the guidelines below about what these numbers mean and what you should do about them.  If your systolic blood pressure  (the top number) is less than 120 and your diastolic blood pressure (the bottom number) is less than 80, then your blood pressure is normal. There is nothing more that you need to do about it.  If your systolic blood pressure (the top number) is 120-139 or your diastolic blood pressure (the bottom number) is 80-89, your blood pressure may be higher than it should be. You should have your blood pressure rechecked within a year by a primary care provider.  If your systolic blood pressure (the top number) is 140 or greater or your diastolic blood pressure (the bottom number) is 90 or greater, you may have high blood pressure. High blood pressure is treatable, but if left untreated over time it can put you at risk for heart attack, stroke, or kidney failure. You should have your blood pressure rechecked by a primary care provider within the next 4 weeks.  If your provider in the emergency department today gave you specific instructions to follow-up with your doctor or provider even sooner than that, you should follow that instruction and not wait for up to 4 weeks for your follow-up visit.        Thank you for choosing [facility]       Thank you for choosing [facility] for your care. Our goal is always to provide you with excellent care. Hearing back from our patients is one way we can continue to improve our services. Please take a few minutes to complete the written survey that you may receive in the mail after you visit with us. Thank you!        [employer] Information     [employer] gives you secure access to your electronic health record. If you see a primary care provider, you can also send messages to your care team and make appointments. If you have questions, please call your primary care clinic.  If you do not have a primary care provider, please call [phone] and they will assist you.        Care EveryWhere ID     This is your Care EveryWhere ID. This could be used by other organizations to access your [facility]  medical records  [id number]        Equal Access to Services     [employer] : Eli Justice, jada beckwith, yoselin pyle. So [facility] [phone].    ATENCIÓN: Si habla español, tiene a tejada disposición servicios gratuitos de asistencia lingüística. Llame al [phone].    We comply with applicable federal civil rights laws and [place] laws. We do not discriminate on the basis of race, color, national origin, age, disability, sex, sexual orientation, or gender identity.            After Visit Summary       This is your record. Keep this with you and show to your community pharmacist(s) and doctor(s) at your next visit.

## 2017-10-27 NOTE — MR AVS SNAPSHOT
After Visit Summary   10/27/2017    Ana Wyman    MRN: [id number]           Patient Information     Date Of Birth          1970        Visit Information        Provider Department      10/27/2017 3:45 PM Linda Naik PA-C [facility]        Today's Diagnoses     Nonintractable episodic headache, unspecified headache type    -  1    Dizziness        Nausea        Disturbance of skin sensation        Difficulty concentrating          Care Instructions    Go to ER at [facility].          Follow-ups after your visit        Who to contact     If you have questions or need follow up information about today's clinic visit or your schedule please contact [facility] directly at [phone].  Normal or non-critical lab and imaging results will be communicated to you by [employer], letter or phone within 4 business days after the clinic has received the results. If you do not hear from us within 7 days, please contact the clinic through [employer] or phone. If you have a critical or abnormal lab result, we will notify you by phone as soon as possible.  Submit refill requests through [employer] or call your pharmacy and they will forward the refill request to us. Please allow 3 business days for your refill to be completed.          Additional Information About Your Visit        MyChart Information     [employer] gives you secure access to your electronic health record. If you see a primary care provider, you can also send messages to your care team and make appointments. If you have questions, please call your primary care clinic.  If you do not have a primary care provider, please call [phone] and they will assist you.        Care EveryWhere ID     This is your Care EveryWhere ID. This could be used by other organizations to access your [facility] medical records  [id number]        Your Vitals Were     Pulse Temperature Pulse Oximetry             91  98.9  F (37.2  C) (Oral) 95%          Blood Pressure from Last 3 Encounters:   10/27/17 117/62   07/23/17 120/83   05/08/17 126/80    Weight from Last 3 Encounters:   05/08/17 (!) 337 lb (152.9 kg)   04/29/17 (!) 334 lb (151.5 kg)   04/26/17 (!) 334 lb (151.5 kg)              Today, you had the following     No orders found for display       Primary Care Provider Office Phone # Fax #    Kelly Bedoya [phone] [address] [place] [address]        Equal Access to Services     [facility]: Hadii megan Justice, jada beckwith, yessy loo, yoselin isbell . So [facility] [phone].    ATENCIÓN: Si habla español, tiene a tejada disposición servicios gratuitos de asistencia lingüística. Llame al [phone].    We comply with applicable federal civil rights laws and [place] laws. We do not discriminate on the basis of race, color, national origin, age, disability, sex, sexual orientation, or gender identity.            Thank you!     Thank you for choosing [facility]  for your care. Our goal is always to provide you with excellent care. Hearing back from our patients is one way we can continue to improve our services. Please take a few minutes to complete the written survey that you may receive in the mail after your visit with us. Thank you!             Your Updated Medication List - Protect others around you: Learn how to safely use, store and throw away your medicines at www.disposemymeds.org.          This list is accurate as of: 10/27/17  4:41 PM.  Always use your most recent med list.                   Brand Name Dispense Instructions for use Diagnosis    clobetasol 0.05 % ointment    TEMOVATE    45 g    Apply clobetasol  0.05 percent ointment, sparingly (a dot 3 mm wide) in a thin film.  Apply to affected area only, once or twice weekly for maintenance.    Lichen sclerosus       EPIPEN 2-AMERICA 0.3 MG/0.3ML  injection   Generic drug:  EPINEPHrine     1 Device    1 TIME ONLY    Food allergies       FISH OIL PO           fluticasone 50 MCG/ACT spray    FLONASE    1 Bottle    Spray 1 spray into both nostrils daily    Dysfunction of Eustachian tube, left       multivitamin, therapeutic with minerals Tabs tablet      Take 1 tablet by mouth daily.

## 2017-10-28 LAB — INTERPRETATION ECG - MUSE: NORMAL

## 2017-10-28 NOTE — DISCHARGE INSTRUCTIONS
Discharge Instructions  Headache    You were seen today for a headache. Headaches may be caused by many different things such as muscle tension, sinus inflammation, anxiety and stress, having too little sleep, too much alcohol, some medical conditions or injury. You may have a migraine, which is caused by changes in the blood vessels in your head.  At this time your doctor does not find that your headache is a sign of anything dangerous or life-threatening.  However, sometimes the signs of serious illness do not show up right away.  If you have new or worse symptoms, you may need to be seen again in the emergency department or by your primary doctor.      Return to the Emergency Department if:    You get a fever of 101 F or higher.    Your headache gets much worse.    You get a stiff neck with your headache.    You get a new headache that is different or worse than headaches you have had before.    You are vomiting and can t keep food or water down.    You have blurry or double vision or other problems with your eyes.    You have a new weakness on one side of your body.    You have difficulty with balance which is new.    You or your family thinks you are confused.    You have a seizure or convulsion.    What can I do to help myself?    Pain medications - You may take a pain medication such as Tylenol  (acetaminophen), Advil , Nuprin  (ibuprofen) or Aleve  (naproxen).  If you have been given a narcotic such as Vicodin  (hydrocodone with acetaminophen), Percocet  (oxycodone with acetaminophen), codeine, or a muscle relaxant such as Flexeril  (cyclobenzaprine) or Soma  (carisoprodol), do not drive for four hours after you have taken it. If the narcotic contains Tylenol  (acetaminophen), do not take Tylenol  with it. All narcotics will cause constipation, so eat a high fiber diet.        Take a pain reliever as soon as you notice symptoms.  Starting medications as soon as you start to have symptoms may lessen the  amount of pain you have.    Relaxing in a quiet, dark room may help.    Get enough sleep and eat meals regularly.    Schedule an appointment with your primary physician as instructed, or at least within 1 week.    You may need to watch for certain foods or other things which may trigger your headaches.  Keeping a journal of your headaches and possible triggers may help you and your primary doctor to identify things which you should avoid which may be causing your headaches.  If you were given a prescription for medicine here today, be sure to read all of the information (including the package insert) that comes with your prescription.  This will include important information about the medicine, its side effects, and any warnings that you need to know about.  The pharmacist who fills the prescription can provide more information and answer questions you may have about the medicine.  If you have questions or concerns that the pharmacist cannot address, please call or return to the Emergency Department.   Remember that you can always come back to the Emergency Department if you are not able to see your regular doctor in the amount of time listed above, if you get any new symptoms, or if there is anything that worries you.

## 2017-11-30 ENCOUNTER — TRANSFERRED RECORDS (OUTPATIENT)
Dept: HEALTH INFORMATION MANAGEMENT | Facility: CLINIC | Age: 47
End: 2017-11-30

## 2017-12-11 ENCOUNTER — TRANSFERRED RECORDS (OUTPATIENT)
Dept: HEALTH INFORMATION MANAGEMENT | Facility: CLINIC | Age: 47
End: 2017-12-11

## 2017-12-15 ENCOUNTER — OFFICE VISIT (OUTPATIENT)
Dept: RHEUMATOLOGY | Facility: CLINIC | Age: 47
End: 2017-12-15
Payer: COMMERCIAL

## 2017-12-15 VITALS
DIASTOLIC BLOOD PRESSURE: 78 MMHG | HEART RATE: 84 BPM | SYSTOLIC BLOOD PRESSURE: 118 MMHG | BODY MASS INDEX: 50.02 KG/M2 | WEIGHT: 293 LBS | OXYGEN SATURATION: 96 % | HEIGHT: 64 IN | TEMPERATURE: 98.9 F

## 2017-12-15 DIAGNOSIS — H04.123 DRY EYES: Primary | ICD-10-CM

## 2017-12-15 LAB — ERYTHROCYTE [SEDIMENTATION RATE] IN BLOOD BY WESTERGREN METHOD: 18 MM/H (ref 0–20)

## 2017-12-15 PROCEDURE — 85652 RBC SED RATE AUTOMATED: CPT | Performed by: INTERNAL MEDICINE

## 2017-12-15 PROCEDURE — 86235 NUCLEAR ANTIGEN ANTIBODY: CPT | Performed by: INTERNAL MEDICINE

## 2017-12-15 PROCEDURE — 86431 RHEUMATOID FACTOR QUANT: CPT | Performed by: INTERNAL MEDICINE

## 2017-12-15 PROCEDURE — 36415 COLL VENOUS BLD VENIPUNCTURE: CPT | Performed by: INTERNAL MEDICINE

## 2017-12-15 PROCEDURE — 86038 ANTINUCLEAR ANTIBODIES: CPT | Performed by: INTERNAL MEDICINE

## 2017-12-15 PROCEDURE — 99204 OFFICE O/P NEW MOD 45 MIN: CPT | Performed by: INTERNAL MEDICINE

## 2017-12-15 RX ORDER — DOXYCYCLINE 50 MG/1
CAPSULE ORAL
COMMUNITY
Start: 2017-12-11 | End: 2018-06-11

## 2017-12-15 ASSESSMENT — ROUTINE ASSESSMENT OF PATIENT INDEX DATA (RAPID3)
TOTAL RAPID3 SCORE: 12.5
RAPID3 INTERPRETATION: HIGH > 12.0

## 2017-12-15 NOTE — MR AVS SNAPSHOT
"              After Visit Summary   12/15/2017    Ana Wyman    MRN: [id number]           Patient Information     Date Of Birth          1970        Visit Information        Provider Department      12/15/2017 10:15 AM Paramjit Bagley MD [facility]        Today's Diagnoses     Dry eyes    -  1       Follow-ups after your visit        Who to contact     If you have questions or need follow up information about today's clinic visit or your schedule please contact [facility] directly at [phone].  Normal or non-critical lab and imaging results will be communicated to you by [employer], letter or phone within 4 business days after the clinic has received the results. If you do not hear from us within 7 days, please contact the clinic through Cloud[employer] or phone. If you have a critical or abnormal lab result, we will notify you by phone as soon as possible.  Submit refill requests through [email or web address] or call your pharmacy and they will forward the refill request to us. Please allow 3 business days for your refill to be completed.          Additional Information About Your Visit        MyChart Information     [email or web address] gives you secure access to your electronic health record. If you see a primary care provider, you can also send messages to your care team and make appointments. If you have questions, please call your primary care clinic.  If you do not have a primary care provider, please call [phone] and they will assist you.        Care EveryWhere ID     This is your Care EveryWhere ID. This could be used by other organizations to access your [facility] medical records  [id number]        Your Vitals Were     Pulse Temperature Height Pulse Oximetry BMI (Body Mass Index)       84 98.9  F (37.2  C) (Oral) 1.626 m (5' 4\") 96% 58.34 kg/m2        Blood Pressure from Last 3 Encounters:   12/15/17 118/78   10/27/17 100/73   10/27/17 117/62    Weight from Last 3 Encounters:   12/15/17 " (!) 154.2 kg (339 lb 14.4 oz)   05/08/17 (!) 152.9 kg (337 lb)   04/29/17 (!) 151.5 kg (334 lb)              We Performed the Following     Anti Nuclear Magaly IgG by IFA with Reflex     ANTONIO antibody panel     ESR     Rheumatoid factor        Primary Care Provider Office Phone # Fax #    Kelly Bedoya [phone] [address] [place] [address]        Equal Access to Services     [facility]: Hadii aad ku hadasho Soomaali, waaxda luqadaha, qaybta kaalmada adeegyada, waxay idiin hayaan adeeg kharash la'miguen . So [facility] [phone].    ATENCIÓN: Si habla español, tiene a tejada disposición servicios gratuitos de asistencia lingüística. [facility] [phone].    We comply with applicable federal civil rights laws and [place] laws. We do not discriminate on the basis of race, color, national origin, age, disability, sex, sexual orientation, or gender identity.            Thank you!     Thank you for choosing [facility]  for your care. Our goal is always to provide you with excellent care. Hearing back from our patients is one way we can continue to improve our services. Please take a few minutes to complete the written survey that you may receive in the mail after your visit with us. Thank you!             Your Updated Medication List - Protect others around you: Learn how to safely use, store and throw away your medicines at www.disposemymeds.org.          This list is accurate as of: 12/15/17 11:11 AM.  Always use your most recent med list.                   Brand Name Dispense Instructions for use Diagnosis    clobetasol 0.05 % ointment    TEMOVATE    45 g    Apply clobetasol  0.05 percent ointment, sparingly (a dot 3 mm wide) in a thin film.  Apply to affected area only, once or twice weekly for maintenance.    Lichen sclerosus       doxycycline monohydrate 50 MG capsule           EPIPEN 2-AMERICA 0.3 MG/0.3ML injection   Generic drug:  EPINEPHrine     1 Device    1  TIME ONLY    Food allergies       FISH OIL PO           fluticasone 50 MCG/ACT spray    FLONASE    1 Bottle    Spray 1 spray into both nostrils daily    Dysfunction of Eustachian tube, left       loratadine 10 MG tablet    CLARITIN    7 tablet    Take 1 tablet (10 mg) by mouth daily        multivitamin, therapeutic with minerals Tabs tablet      Take 1 tablet by mouth daily.        RESTASIS MULTIDOSE 0.05 % ophthalmic emulsion   Generic drug:  cycloSPORINE      INSTILL 1 DROP INTO BOTH EYES TWICE A DAY

## 2017-12-15 NOTE — NURSING NOTE
"Chief Complaint   Patient presents with     [facility] Care       Initial /78 (BP Location: Right arm, Patient Position: Chair, Cuff Size: Adult Large)  Pulse 84  Temp 98.9  F (37.2  C) (Oral)  Ht 1.626 m (5' 4\")  Wt (!) 154.2 kg (339 lb 14.4 oz)  SpO2 96%  BMI 58.34 kg/m2 Estimated body mass index is 58.34 kg/(m^2) as calculated from the following:    Height as of this encounter: 1.626 m (5' 4\").    Weight as of this encounter: 154.2 kg (339 lb 14.4 oz).  Medication Reconciliation: complete    Have you ever seen a rheumatologist No Who NA When NA  Joint pain history  Onset: pt states that she was referred to us from her PCP, her PCP thinks she might have a auto-immune disease. Pt is having problems with her eyes, was told she has inflammation in her eyes. Also has hx of lymphedema. This all started about 6 months ago. Pt has no joint or muscle pains    Involved joints: none  Pain scale:  4.5/10  Eye pain  Wakes the patient from sleep : Yes/ the eye did wake her up in the past  Morning stiffness:No  Meds used:doxycycline, restasis    Interim history  Since last visit:  1. Infections - No  2. New symptoms/medical problem - No  3. Any side effects from Rheum medications -NA  3. ER visits/Hospitalizations/surgeries - No  4. Last PCP visit: 5/8/17  Wt Readings from Last 4 Encounters:   12/15/17 (!) 154.2 kg (339 lb 14.4 oz)   05/08/17 (!) 152.9 kg (337 lb)   04/29/17 (!) 151.5 kg (334 lb)   04/26/17 (!) 151.5 kg (334 lb)     BP Readings from Last 3 Encounters:   12/15/17 118/78   10/27/17 100/73   10/27/17 117/62       "

## 2017-12-15 NOTE — PROGRESS NOTES
[place] - Rheumatology Clinic Visit     Ana Wyman MRN# [id number]   YOB: 1970    Primary care provider: Kelly Bedoya  Dec 15, 2017          Assessment and Plan:   # Dry eyes X onset 2017  # Microscopic Hematuria +ve- chronic of unclear etiology    Patient referred by opthalmology to be evaluated for dry eyes. We will evaluate for Sjogren syndrome. Patient does not have dry mouth. We will get the following work up for Sjogren. Since there is no dry mouth, the yield of lip biopsy (minor salivary gland) is very low.     CBC, Creat within normal limits   AST, ALT within normal limits   TSH, VIt D normal 2016  Sed rate within normal limits  2017    The labs, imaging from patient records are reviewed.     Addendum: December 18, 2017   Antibodies for rheumatoid arthritis, lupus and some related conditions are negative.   Sed rate inflammatory marker is normal.   In Sjogren's we typically positive antibodies such as HAMMAD, SSA, SSB and rheumatoid factor. All of these are normal in your case. My suspicion for primary Sjogren syndrome is very low. If you develop dry mouth as well in future, lip biopsy may be considered to assess for Sjogren syndrome.   We will send these results to your eye doctor.     Most Recent Immunizations   Administered Date(s) Administered     HEPA 05/05/2006     Influenza (IIV3) PF 11/17/2006     Poliovirus, inactivated (IPV) 03/29/2012     TD (ADULT, 7+) 05/23/2005     TDAP Vaccine (Adacel) 03/29/2012       Orders Placed This Encounter   Procedures     Rheumatoid factor     ANTONIO antibody panel     Anti Nuclear Magaly IgG by IFA with Reflex     ESR     F/u PRN    Data Unavailable    There are no discontinued medications.  Current Outpatient Prescriptions   Medication Sig Dispense Refill     RESTASIS MULTIDOSE 0.05 % ophthalmic emulsion INSTILL 1 DROP INTO BOTH EYES TWICE A DAY  5     doxycycline monohydrate 50 MG capsule        Omega-3 Fatty Acids (FISH OIL PO)         multivitamin, therapeutic with minerals (THERA-VIT-M) TABS Take 1 tablet by mouth daily.       EPIPEN 2-AMERICA 0.3 MG/0.3ML IJ VIVIANA 1 TIME ONLY 1 Device 1     loratadine (CLARITIN) 10 MG tablet Take 1 tablet (10 mg) by mouth daily 7 tablet 0     fluticasone (FLONASE) 50 MCG/ACT spray Spray 1 spray into both nostrils daily 1 Bottle 1     clobetasol (TEMOVATE) 0.05 % ointment Apply clobetasol  0.05 percent ointment, sparingly (a dot 3 mm wide) in a thin film.  Apply to affected area only, once or twice weekly for maintenance. 45 g 2       Paramjit Bagley MD  [facility]          Active Problem List:     Patient Active Problem List    Diagnosis Date Noted     Lymphedema bilateral with mixed lipedema. 09/30/2015     Priority: Medium     Baker's cyst of knee 09/03/2015     Priority: Medium     Pre-diabetes 03/24/2015     Priority: Medium     Acanthosis nigricans 03/24/2015     Priority: Medium     Cutaneous skin tags 03/24/2015     Priority: Medium     Vitamin D deficiency 03/14/2015     Priority: Medium     Abnormal glucose 06/03/2013     Priority: Medium     Problem list name updated by automated process. Provider to review       Morbid obesity (H) 03/19/2013     Priority: Medium     Recurrent cellulitis of lower leg 03/19/2013     Priority: Medium     MIGUELITO on CPAP 03/19/2013     Priority: Medium     Sleep study in 2004 and 2012         Weight gain 12/26/2012     Priority: Medium     Premenstrual syndrome 12/04/2012     Priority: Medium     Uterine fibroid 08/02/2012     Priority: Medium     Right flank pain 07/19/2012     Priority: Medium     Menorrhagia 02/20/2012     Priority: Medium     Lichen sclerosus 07/14/2011     Priority: Medium     Elevated blood pressure reading without diagnosis of hypertension 05/23/2011     Priority: Medium     CARDIOVASCULAR SCREENING; LDL GOAL LESS THAN 160 02/10/2010     Priority: Medium     Acute bronchitis 09/14/2009     Priority: Medium     Dysmenorrhea 12/02/2008      Priority: Medium     Flatulence, eructation, and gas pain 06/11/2007     Priority: Medium     Family history of malignant neoplasm of genital organ, other 06/11/2007     Priority: Medium     Abdominal pain, left upper quadrant 06/11/2007     Priority: Medium     FAMILY HX GI MALIGNANCY 05/31/2007     Priority: Medium     Hypersomnia with sleep apnea 07/06/2005     Priority: Medium     Problem list name updated by automated process. Provider to review       Esophageal reflux 09/27/2004     Priority: Medium     Allergic state 09/27/2004     Priority: Medium     Problem list name updated by automated process. Provider to review              History of Present Illness:     Chief Complaint   Patient presents with     Establish Care       December 15, 2017  Have you ever seen a rheumatologist No Who NA When NA  Joint pain history  Onset: pt states that she was referred to us from her PCP, her PCP thinks she might have a auto-immune disease. Pt is having problems with her eyes, was told she has inflammation in her eyes. Also has hx of lymphedema. This all started about 6 months ago. Pt has no joint or muscle pains    Involved joints: none  Pain scale:  4.5/10  Eye pain  Wakes the patient from sleep : Yes/ the eye did wake her up in the past  Morning stiffness:No  Meds used:doxycycline, restasis     Interim history  Since last visit:  1. Infections - No  2. New symptoms/medical problem - No  3. Any side effects from Rheum medications -NA  3. ER visits/Hospitalizations/surgeries - No  4. Last PCP visit: 5/8/17    Wt Readings from Last 4 Encounters:   12/15/17 (!) 154.2 kg (339 lb 14.4 oz)   05/08/17 (!) 152.9 kg (337 lb)   04/29/17 (!) 151.5 kg (334 lb)   04/26/17 (!) 151.5 kg (334 lb)      recurrent mouth ulcers +ve  Hematuria +ve  Dry eyes X onset 2017  No dry mouth    No h/o fevers, rash, swollen glands  No family or personal history of psoriasis, ulcerative colitis or chron's disease. No h/o iritis.   Patient denies  any raynauds, malar rash, skin photosensitivity, recurrent miscarriages or arterial/venous thrombosis in the past  No h/o persistent shortness of breath, cough, chest pain  No h/o persistent nausea, vomiting, constipation, diarrhea, abdominal pain  No h/o hematochezia,  hemoptysis  No h/o seizures  No h/o cancer    BP Readings from Last 3 Encounters:   12/15/17 118/78   10/27/17 100/73   10/27/17 117/62              Review of Systems:   Complete ROS negative except for symptoms mentioned in the HPI          Past Medical History:     Past Medical History:   Diagnosis Date     Allergic rhinitis, cause unspecified      Cellulitis 2005    2 episodes, one with hospitalization [facility]     DUB (dysfunctional uterine bleeding)     Neg EMB 2012     Fibroids      GERD (gastroesophageal reflux disease)      Hallux valgus (acquired)      Hypersomnia with sleep apnea, unspecified     using CPAP     Lichen sclerosus 7/14/2011     Lymph edema 2010- present     Lymphedema bilateral with mixed lipedema. 9/30/2015     Lymphedema bilateral with mixed lipedema. 9/30/2015     Morbid obesity (H) 3/19/2013     MIGUELITO on CPAP 3/19/2013    Sleep study in 2004 and 2012       Pre-diabetes      Sleep apnea      Tendonitis currently     Vitamin D deficiency 3/14/2015     Past Surgical History:   Procedure Laterality Date     C NONSPECIFIC PROCEDURE  1994    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     C NONSPECIFIC PROCEDURE  1999,2001    Foot surgeries x 2 (bunionectomy)     C NONSPECIFIC PROCEDURE  2000    hammer toes     COLONOSCOPY  5/15/2013    Procedure: COLONOSCOPY;  Colonoscopy;  Surgeon: Kota Blanco MD;  Location:  GI     lichen sclerosis       ORTHOPEDIC SURGERY       vein closure  12/16            Social History:     Social History     Occupational History     student life [employer]     Social History Main Topics     Smoking status: Never Smoker     Smokeless tobacco: Never Used     Alcohol use 0.0  oz/week     0 Standard drinks or equivalent per week      Comment: 1-2 drink occasionally     Drug use: No     Sexual activity: No            Family History:     Family History   Problem Relation Age of Onset     C.A.D. Father 92     CAGB 98     Obesity Father      CANCER Father      stomach Dx age 74     Lipids Father      Hypertension Father      Coronary Artery Disease Father      DIABETES Mother      Type 2     Allergies Mother      Obesity Mother      C.A.D. Mother      triple bypass surgery, 65     Lipids Mother      Hypertension Mother      Coronary Artery Disease Mother      C.A.D. Paternal Grandfather 58     fatal     Coronary Artery Disease Paternal Grandfather      Cancer - colorectal Maternal Grandmother 75     CANCER Maternal Grandmother      liver     Hypertension Maternal Grandmother      Cancer - colorectal Paternal Aunt      Allergies Brother      CANCER Paternal Grandmother      stomach     Obesity Paternal Grandmother      DIABETES Paternal Grandmother      Type 2     Obesity Brother      Hypertension Brother      Cancer - colorectal Other      cousin  from colon cancer in 's     Obesity Brother      Coronary Artery Disease Maternal Grandfather      Hypertension Maternal Grandfather             Allergies:     Allergies   Allergen Reactions     Clindamycin Diarrhea     Codeine Nausea and Vomiting     Shellfish Allergy             Medications:     Current Outpatient Prescriptions   Medication Sig Dispense Refill     RESTASIS MULTIDOSE 0.05 % ophthalmic emulsion INSTILL 1 DROP INTO BOTH EYES TWICE A DAY  5     doxycycline monohydrate 50 MG capsule        Omega-3 Fatty Acids (FISH OIL PO)        multivitamin, therapeutic with minerals (THERA-VIT-M) TABS Take 1 tablet by mouth daily.       EPIPEN 2-AMERICA 0.3 MG/0.3ML IJ VIVIANA 1 TIME ONLY 1 Device 1     loratadine (CLARITIN) 10 MG tablet Take 1 tablet (10 mg) by mouth daily 7 tablet 0     fluticasone (FLONASE) 50 MCG/ACT spray Spray 1 spray into  "both nostrils daily 1 Bottle 1     clobetasol (TEMOVATE) 0.05 % ointment Apply clobetasol  0.05 percent ointment, sparingly (a dot 3 mm wide) in a thin film.  Apply to affected area only, once or twice weekly for maintenance. 45 g 2            Physical Exam:   Blood pressure 118/78, pulse 84, temperature 98.9  F (37.2  C), temperature source Oral, height 1.626 m (5' 4\"), weight (!) 154.2 kg (339 lb 14.4 oz), SpO2 96 %, not currently breastfeeding.  Wt Readings from Last 4 Encounters:   12/15/17 (!) 154.2 kg (339 lb 14.4 oz)   05/08/17 (!) 152.9 kg (337 lb)   04/29/17 (!) 151.5 kg (334 lb)   04/26/17 (!) 151.5 kg (334 lb)       Constitutional: well-developed, appearing stated age; cooperative  Eyes: PERRLA, normal conjunctiva, sclera  ENT: nl external ears, nose, lips.No mucous membrane lesions, normal saliva pool  Neck: no cervical lymphadenopathy  Resp: lungs clear to auscultation,   CV: RRR, no added sounds, no edema  GI: Abdomen soft and no tenderness  : not tested  Lymph: no cervical, supraclavicular or epitrochlear nodes  MS: All shoulder, elbow, wrist, MCP/PIP/DIP, hip, knee, ankle, and foot MTP/IP joints were examined and  found normal. No active synovitis or deformity. Full ROM.  No dactylitis,  tenosynovitis, enthesopathy.  Skin: no rash in exposed areas  Psych: nl judgement, orientation, memory, affect.         Data:         Paramjit Bagley MD    [facility]    "

## 2017-12-18 ENCOUNTER — TELEPHONE (OUTPATIENT)
Dept: RHEUMATOLOGY | Facility: CLINIC | Age: 47
End: 2017-12-18

## 2017-12-18 LAB
ANA SER QL IF: NEGATIVE
ENA RNP IGG SER IA-ACNC: <0.2 AI (ref 0–0.9)
ENA SCL70 IGG SER IA-ACNC: <0.2 AI (ref 0–0.9)
ENA SM IGG SER-ACNC: <0.2 AI (ref 0–0.9)
ENA SS-A IGG SER IA-ACNC: <0.2 AI (ref 0–0.9)
ENA SS-B IGG SER IA-ACNC: <0.2 AI (ref 0–0.9)
RHEUMATOID FACT SER NEPH-ACNC: <20 IU/ML (ref 0–20)

## 2017-12-18 NOTE — PROGRESS NOTES
Results released to [facility]:  Dear Ms. Wyman,  Antibodies for rheumatoid arthritis, lupus and some related conditions are negative.   Sed rate inflammatory marker is normal.   In Sjogren's we typically positive antibodies such as HAMMAD, SSA, SSB and rheumatoid factor. All of these are normal in your case. My suspicion for primary Sjogren syndrome is very low. If you develop dry mouth as well in future, lip biopsy may be considered to assess for Sjogren syndrome.   We will send these results to your eye doctor.     Sincerely    Paramjit Bagley MD  [facility]

## 2017-12-19 NOTE — TELEPHONE ENCOUNTER
Faxed records to [facility], Dr. Cyndi Lyles at [phone].    Thank you,    Tiffany Alaniz

## 2017-12-19 NOTE — TELEPHONE ENCOUNTER
Please fax our clinic visit note to [facility] Dr. Rafal SHIPMAN Barnwell. Also please fax the lab results.

## 2018-03-23 ENCOUNTER — OFFICE VISIT (OUTPATIENT)
Dept: URGENT CARE | Facility: URGENT CARE | Age: 48
End: 2018-03-23
Payer: COMMERCIAL

## 2018-03-23 VITALS
BODY MASS INDEX: 57.85 KG/M2 | WEIGHT: 293 LBS | SYSTOLIC BLOOD PRESSURE: 112 MMHG | DIASTOLIC BLOOD PRESSURE: 70 MMHG | RESPIRATION RATE: 20 BRPM | OXYGEN SATURATION: 96 % | TEMPERATURE: 99.3 F | HEART RATE: 107 BPM

## 2018-03-23 DIAGNOSIS — J45.21 MILD INTERMITTENT EXACERBATION OF REACTIVE AIRWAY DISEASE: ICD-10-CM

## 2018-03-23 DIAGNOSIS — R06.2 WHEEZING: Primary | ICD-10-CM

## 2018-03-23 PROCEDURE — 99214 OFFICE O/P EST MOD 30 MIN: CPT | Mod: 25 | Performed by: PHYSICIAN ASSISTANT

## 2018-03-23 PROCEDURE — 94640 AIRWAY INHALATION TREATMENT: CPT | Performed by: PHYSICIAN ASSISTANT

## 2018-03-23 RX ORDER — ALBUTEROL SULFATE 90 UG/1
2 AEROSOL, METERED RESPIRATORY (INHALATION) EVERY 6 HOURS PRN
Qty: 1 INHALER | Refills: 0 | Status: SHIPPED | OUTPATIENT
Start: 2018-03-23 | End: 2018-06-11

## 2018-03-23 RX ORDER — PREDNISONE 20 MG/1
60 TABLET ORAL DAILY
Qty: 15 TABLET | Refills: 0 | Status: SHIPPED | OUTPATIENT
Start: 2018-03-23 | End: 2018-06-11

## 2018-03-23 RX ORDER — AZITHROMYCIN 250 MG/1
TABLET, FILM COATED ORAL
Qty: 6 TABLET | Refills: 0 | Status: SHIPPED | OUTPATIENT
Start: 2018-03-23 | End: 2018-03-27

## 2018-03-23 NOTE — MR AVS SNAPSHOT
After Visit Summary   3/23/2018    Ana Wyman    MRN: [id number]           Patient Information     Date Of Birth          1970        Visit Information        Provider Department      3/23/2018 8:50 PM Alfredito Ely PA-C [facility]        Today's Diagnoses     Wheezing    -  1    Mild intermittent exacerbation of reactive airway disease          Care Instructions    1. Begin oral prednisone in the morning with food  2. Begin antibiotics   3. Continue with albuterol four times daily   4. Follow up Alfredito AYALA 430 pm            Follow-ups after your visit        Who to contact     If you have questions or need follow up information about today's clinic visit or your schedule please contact [facility] directly at [phone].  Normal or non-critical lab and imaging results will be communicated to you by MyChart, letter or phone within 4 business days after the clinic has received the results. If you do not hear from us within 7 days, please contact the clinic through MyChart or phone. If you have a critical or abnormal lab result, we will notify you by phone as soon as possible.  Submit refill requests through [employer] or call your pharmacy and they will forward the refill request to us. Please allow 3 business days for your refill to be completed.          Additional Information About Your Visit        MyChart Information     [employer] gives you secure access to your electronic health record. If you see a primary care provider, you can also send messages to your care team and make appointments. If you have questions, please call your primary care clinic.  If you do not have a primary care provider, please call [phone] and they will assist you.        Care EveryWhere ID     This is your Care EveryWhere ID. This could be used by other organizations to access your [place] medical records  [id number]        Your Vitals Were     Pulse  Temperature Respirations Pulse Oximetry BMI (Body Mass Index)       107 99.3  F (37.4  C) (Tympanic) 20 96% 57.85 kg/m2        Blood Pressure from Last 3 Encounters:   03/23/18 112/70   12/15/17 118/78   10/27/17 100/73    Weight from Last 3 Encounters:   03/23/18 (!) 337 lb (152.9 kg)   12/15/17 (!) 339 lb 14.4 oz (154.2 kg)   05/08/17 (!) 337 lb (152.9 kg)              We Performed the Following     INHALATION/NEBULIZER TREATMENT, INITIAL          Today's Medication Changes          These changes are accurate as of 3/23/18  9:47 PM.  If you have any questions, ask your nurse or doctor.               Start taking these medicines.        Dose/Directions    albuterol 108 (90 BASE) MCG/ACT Inhaler   Commonly known as:  PROAIR HFA/PROVENTIL HFA/VENTOLIN HFA   Used for:  Mild intermittent exacerbation of reactive airway disease        Dose:  2 puff   Inhale 2 puffs into the lungs every 6 hours as needed for shortness of breath / dyspnea or wheezing   Quantity:  1 Inhaler   Refills:  0       azithromycin 250 MG tablet   Commonly known as:  ZITHROMAX   Used for:  Mild intermittent exacerbation of reactive airway disease        Two tablets first day, then one tablet daily for four days.   Quantity:  6 tablet   Refills:  0       predniSONE 20 MG tablet   Commonly known as:  DELTASONE   Used for:  Mild intermittent exacerbation of reactive airway disease        Dose:  60 mg   Take 3 tablets (60 mg) by mouth daily   Quantity:  15 tablet   Refills:  0            Where to get your medicines      Some of these will need a paper prescription and others can be bought over the counter.  Ask your nurse if you have questions.     Bring a paper prescription for each of these medications     albuterol 108 (90 BASE) MCG/ACT Inhaler    azithromycin 250 MG tablet    predniSONE 20 MG tablet                Primary Care Provider Office Phone # Fax #    Kelly Bedoya [phone] [address]  [address] [place] [address]        Equal Access to Services     [facility]: Hadii megan ku hadfarrukho Reshma, waaxda luqadaha, qaybta kaalmada cinda, yoselin francesryanсергей christina. So [facility] [phone].    ATENCIÓN: Si habla español, tiene a tejada disposición servicios gratuitos de asistencia lingüística. Llame al [phone].    We comply with applicable federal civil rights laws and [place] laws. We do not discriminate on the basis of race, color, national origin, age, disability, sex, sexual orientation, or gender identity.            Thank you!     Thank you for choosing [facility] [place] [facility]  for your care. Our goal is always to provide you with excellent care. Hearing back from our patients is one way we can continue to improve our services. Please take a few minutes to complete the written survey that you may receive in the mail after your visit with us. Thank you!             Your Updated Medication List - Protect others around you: Learn how to safely use, store and throw away your medicines at www.disposemymeds.org.          This list is accurate as of 3/23/18  9:47 PM.  Always use your most recent med list.                   Brand Name Dispense Instructions for use Diagnosis    albuterol 108 (90 BASE) MCG/ACT Inhaler    PROAIR HFA/PROVENTIL HFA/VENTOLIN HFA    1 Inhaler    Inhale 2 puffs into the lungs every 6 hours as needed for shortness of breath / dyspnea or wheezing    Mild intermittent exacerbation of reactive airway disease       azithromycin 250 MG tablet    ZITHROMAX    6 tablet    Two tablets first day, then one tablet daily for four days.    Mild intermittent exacerbation of reactive airway disease       clobetasol 0.05 % ointment    TEMOVATE    45 g    Apply clobetasol  0.05 percent ointment, sparingly (a dot 3 mm wide) in a thin film.  Apply to affected area only, once or twice weekly for maintenance.    Lichen sclerosus       doxycycline monohydrate 50 MG capsule           EPIPEN  2-AMERICA 0.3 MG/0.3ML injection   Generic drug:  EPINEPHrine     1 Device    1 TIME ONLY    Food allergies       FISH OIL PO           fluticasone 50 MCG/ACT spray    FLONASE    1 Bottle    Spray 1 spray into both nostrils daily    Dysfunction of Eustachian tube, left       loratadine 10 MG tablet    CLARITIN    7 tablet    Take 1 tablet (10 mg) by mouth daily        multivitamin, therapeutic with minerals Tabs tablet      Take 1 tablet by mouth daily.        predniSONE 20 MG tablet    DELTASONE    15 tablet    Take 3 tablets (60 mg) by mouth daily    Mild intermittent exacerbation of reactive airway disease       RESTASIS MULTIDOSE 0.05 % ophthalmic emulsion   Generic drug:  cycloSPORINE      INSTILL 1 DROP INTO BOTH EYES TWICE A DAY

## 2018-03-24 NOTE — PROGRESS NOTES
"  SUBJECTIVE:   Ana Wyman is a 48 year old female who presents to clinic today for the following health issues:    Acute Illness   Acute illness concerns: cough /chest tightness  Onset: 2 days    Fever: YES-low grade fever    Chills/Sweats: no    Headache (location?): no    Sinus Pressure:no    Conjunctivitis:  no    Ear Pain: no    Rhinorrhea: no    Congestion: no    Sore Throat: no     Cough: YES - productive    Wheeze: YES    Sob     Decreased Appetite: no    Nausea: no    Vomiting: no    Diarrhea:  YES    Fatigue/Achiness: YES    Sick/Strep Exposure: YES     Therapies Tried and outcome: mucinex  History of bronchitis and pneumonia.   Work\" [employer]    ROS:  ROS otherwise negative    OBJECTIVE:                                                    /70 (BP Location: Right arm)  Pulse 107  Temp 99.3  F (37.4  C) (Tympanic)  Resp 20  Wt (!) 337 lb (152.9 kg)  SpO2 96%  BMI 57.85 kg/m2  Body mass index is 57.85 kg/(m^2).   GENERAL: alert, no distress  HENT: ear canals- normal; TMs- normal; Nose- normal; Mouth- no ulcers, no lesions  NECK: no tenderness, no adenopathy  RESP: lungs clear to auscultation - no rales, no rhonchi, no wheezes  CV: regular rates and rhythm, normal S1 S2, no S3 or S4 and no murmur, no click or rub    Diagnostic test results:  duoneb administered with improved airflow. Wheezing reduced but persistent  No results found for this or any previous visit (from the past 24 hour(s)).     ASSESSMENT/PLAN:                                                    (R06.2) Wheezing  (primary encounter diagnosis)  Comment:   Plan: ALBUTEROL/IPRATROPIUM 3ML NEB,         INHALATION/NEBULIZER TREATMENT, INITIAL            (J45.21) Mild intermittent exacerbation of reactive airway disease  Comment: begin oral prednisone tomorrow, zpak and albuterol four times daily. Close follow up in 5 days.   Plan: predniSONE (DELTASONE) 20 MG tablet,         azithromycin (ZITHROMAX) 250 MG tablet,         " albuterol (PROAIR HFA/PROVENTIL HFA/VENTOLIN         HFA) 108 (90 BASE) MCG/ACT Inhaler            [facility] [place] [facility]

## 2018-03-24 NOTE — NURSING NOTE
"Chief Complaint   Patient presents with     Urgent Care     Cough and chest congestion, pain down back, difficulty taking a deep breath sx developed over the past few days.       Initial /70 (BP Location: Right arm)  Pulse 107  Temp 99.3  F (37.4  C) (Tympanic)  Resp 20  Wt (!) 337 lb (152.9 kg)  SpO2 96%  BMI 57.85 kg/m2 Estimated body mass index is 57.85 kg/(m^2) as calculated from the following:    Height as of 12/15/17: 5' 4\" (1.626 m).    Weight as of this encounter: 337 lb (152.9 kg).  Medication Reconciliation: williams Franklin CMA ([facility])    The following nebulizer treatment was given:     MEDICATION: Duoneb  : [employer]  LOT #: [id number]  EXPIRATION DATE: 7/2019  NDC # 5501-4411-80    Pietro Whiting CMA    The following medication was given:     MEDICATION: Prednisone 10 mg  ROUTE: PO  SITE: mouth  DOSE: 20 mg  LOT #: [id number]  :  [employer]  EXPIRATION DATE:  12/2018  NDC#: [id number]    Pietro Whiting CMA                      "

## 2018-03-24 NOTE — PATIENT INSTRUCTIONS
1. Begin oral prednisone in the morning with food  2. Begin antibiotics   3. Continue with albuterol four times daily   4. Follow up Alfredito AYALA 430 pm

## 2018-03-27 ENCOUNTER — RADIANT APPOINTMENT (OUTPATIENT)
Dept: GENERAL RADIOLOGY | Facility: CLINIC | Age: 48
End: 2018-03-27
Attending: PHYSICIAN ASSISTANT
Payer: COMMERCIAL

## 2018-03-27 ENCOUNTER — OFFICE VISIT (OUTPATIENT)
Dept: PEDIATRICS | Facility: CLINIC | Age: 48
End: 2018-03-27
Payer: COMMERCIAL

## 2018-03-27 VITALS
OXYGEN SATURATION: 97 % | TEMPERATURE: 98.3 F | SYSTOLIC BLOOD PRESSURE: 116 MMHG | HEIGHT: 64 IN | DIASTOLIC BLOOD PRESSURE: 78 MMHG | BODY MASS INDEX: 50.02 KG/M2 | WEIGHT: 293 LBS | HEART RATE: 83 BPM

## 2018-03-27 DIAGNOSIS — R06.2 WHEEZING: ICD-10-CM

## 2018-03-27 DIAGNOSIS — J20.9 ACUTE BRONCHITIS, UNSPECIFIED ORGANISM: Primary | ICD-10-CM

## 2018-03-27 DIAGNOSIS — R05.9 COUGH: ICD-10-CM

## 2018-03-27 DIAGNOSIS — R09.89 PULMONARY VASCULAR CONGESTION: ICD-10-CM

## 2018-03-27 PROCEDURE — 71046 X-RAY EXAM CHEST 2 VIEWS: CPT

## 2018-03-27 PROCEDURE — 99214 OFFICE O/P EST MOD 30 MIN: CPT | Performed by: PHYSICIAN ASSISTANT

## 2018-03-27 RX ORDER — BENZONATATE 100 MG/1
100 CAPSULE ORAL 3 TIMES DAILY PRN
Qty: 20 CAPSULE | Refills: 0 | Status: SHIPPED | OUTPATIENT
Start: 2018-03-27 | End: 2018-06-11

## 2018-03-27 RX ORDER — PREDNISONE 20 MG/1
TABLET ORAL
Qty: 18 TABLET | Refills: 0 | Status: SHIPPED | OUTPATIENT
Start: 2018-03-27 | End: 2018-06-11

## 2018-03-27 NOTE — PATIENT INSTRUCTIONS
Begin oral prednisone taper starting on THU  Begin dulera twice daily x one month  Continue with albuterol four times daily   Tessalon pearles as needed

## 2018-03-27 NOTE — PROGRESS NOTES
"  SUBJECTIVE:   Ana Wyman is a 48 year old female who presents to clinic today for the following health issues:    Was seen in  on Friday for bronchitis. I saw patient at that time as well.    She has improved slightly. States she does still have coughing episodes that are productive. She continues to have wheezing and sob. Short term improvement with albuterol.     She has finished her zpak, taken 4 days of prednisone 60 and albuterol throughout the day.     History of bronchitis.     ROS:  ROS otherwise negative    OBJECTIVE:                                                    /78 (BP Location: Right arm, Cuff Size: Adult Large)  Pulse 83  Temp 98.3  F (36.8  C) (Oral)  Ht 5' 4\" (1.626 m)  Wt (!) 336 lb (152.4 kg)  SpO2 97%  BMI 57.67 kg/m2  Body mass index is 57.67 kg/(m^2).   GENERAL: alert, no distress  HENT: ear canals- normal; TMs- normal; Nose- normal; Mouth- no ulcers, no lesions  NECK: no tenderness, no adenopathy  RESP: moderately diminished breath sounds--wheezing and rhonchi present  CV: regular rates and rhythm, normal S1 S2, no S3 or S4 and no murmur, no click or rub    Diagnostic test results:  CXR completed and reveals no infiltrates. Radiology review pending.  No results found for this or any previous visit (from the past 24 hour(s)).     ASSESSMENT/PLAN:                                                    (J20.9) Acute bronchitis, unspecified organism  (primary encounter diagnosis)  Comment: given persistent symptoms, proceed with ICS/LABA MDI, prednisone taper and albuterol PRN.  Plan: XR Chest 2 Views, predniSONE (DELTASONE) 20 MG         tablet, benzonatate (TESSALON PERLES) 100 MG         capsule            (R06.2) Wheezing  Comment:   Plan: XR Chest 2 Views, mometasone-formoterol         (DULERA) 200-5 MCG/ACT oral inhaler, predniSONE        (DELTASONE) 20 MG tablet, DISCONTINUED:         fluticasone-salmeterol (ADVAIR) 250-50 MCG/DOSE        diskus inhaler        "     (R09.89) Pulmonary vascular congestion  Comment: radiology findings after patient left clinic reveals mild vascular congestion. Patient has no prior history of CHF. Unsure if this is reactive to URI or less likely, primarily the cause of patient's symptoms. Discussed with patient this morning and will have her follow up in clinic tomorrow for further evaluation. Her PMD is out of office--she is scheduled with an MD.   Plan:       Alfredito Ely PA-C  [facility]

## 2018-03-27 NOTE — PROGRESS NOTES
"  SUBJECTIVE:   Ana Wyman is a 48 year old female who presents to clinic today for the following health issues:      Acute Illness   Acute illness concerns: cough  Onset: ***    Fever: { :624781::\"no\"}    Chills/Sweats: { :777139::\"no\"}    Headache (location?): { :017062::\"no\"}    Sinus Pressure:{.:205111::\"no\"}    Conjunctivitis:  {.:576478::\"no\"}    Ear Pain: {.:568997::\"no\"}    Rhinorrhea: { :245150::\"no\"}    Congestion: { :782696::\"no\"}    Sore Throat: { :747102::\"no\"}     Cough: {.:880860::\"no\"}    Wheeze: { :556907::\"no\"}    Decreased Appetite: { :814093::\"no\"}    Nausea: { :938624::\"no\"}    Vomiting: { :668184::\"no\"}    Diarrhea:  { :453434::\"no\"}    Dysuria/Freq.: { :040202::\"no\"}    Fatigue/Achiness: { :143894::\"no\"}    Sick/Strep Exposure: { :588624::\"no\"}     Therapies Tried and outcome: ***      {additional problems for provider to add:464267}    Problem list and histories reviewed & adjusted, as indicated.  Additional history: {NONE - AS DOCUMENTED:912402::\"as documented\"}    {HIST REVIEW/ LINKS 2:493128}    Reviewed and updated as needed this visit by clinical staff       Reviewed and updated as needed this visit by Provider         {PROVIDER CHARTING PREFERENCE:018259}  "

## 2018-03-27 NOTE — MR AVS SNAPSHOT
After Visit Summary   3/27/2018    Ana Wyman    MRN: [id number]           Patient Information     Date Of Birth          1970        Visit Information        Provider Department      3/27/2018 4:30 PM Alfredito Ely PA-C [facility]        Today's Diagnoses     Cough    -  1    Wheezing        Acute bronchitis, unspecified organism          Care Instructions    Begin oral prednisone taper starting on THU  Begin dulera twice daily x one month  Continue with albuterol four times daily   Tessalon pearles as needed          Follow-ups after your visit        Who to contact     If you have questions or need follow up information about today's clinic visit or your schedule please [facility] directly at [phone].  Normal or non-critical lab and imaging results will be communicated to you by v2telhart, letter or phone within 4 business days after the clinic has received the results. If you do not hear from us within 7 days, please contact the clinic through [employer] or phone. If you have a critical or abnormal lab result, we will notify you by phone as soon as possible.  Submit refill requests through [employer] or call your pharmacy and they will forward the refill request to us. Please allow 3 business days for your refill to be completed.          Additional Information About Your Visit        MyChart Information     [employer] gives you secure access to your electronic health record. If you see a primary care provider, you can also send messages to your care team and make appointments. If you have questions, please call your primary care clinic.  If you do not have a primary care provider, please call [phone] and they will assist you.        Care EveryWhere ID     This is your Care EveryWhere ID. This could be used by other organizations to access your [place] medical records  [id number]        Your Vitals Were     Pulse Temperature Height Pulse  "Oximetry BMI (Body Mass Index)       83 98.3  F (36.8  C) (Oral) 5' 4\" (1.626 m) 97% 57.67 kg/m2        Blood Pressure from Last 3 Encounters:   03/27/18 116/78   03/23/18 112/70   12/15/17 118/78    Weight from Last 3 Encounters:   03/27/18 (!) 336 lb (152.4 kg)   03/23/18 (!) 337 lb (152.9 kg)   12/15/17 (!) 339 lb 14.4 oz (154.2 kg)                 Today's Medication Changes          These changes are accurate as of 3/27/18  4:55 PM.  If you have any questions, ask your nurse or doctor.               Start taking these medicines.        Dose/Directions    benzonatate 100 MG capsule   Commonly known as:  TESSALON PERLMOHAN   Used for:  Acute bronchitis, unspecified organism   Started by:  Alfredito Ely PA-C        Dose:  100 mg   Take 1 capsule (100 mg) by mouth 3 times daily as needed for cough   Quantity:  20 capsule   Refills:  0       mometasone-formoterol 200-5 MCG/ACT oral inhaler   Commonly known as:  DULERA   Used for:  Wheezing   Started by:  Alfredito Ely PA-C        Dose:  2 puff   Inhale 2 puffs into the lungs 2 times daily   Quantity:  13 g   Refills:  0         These medicines have changed or have updated prescriptions.        Dose/Directions    * predniSONE 20 MG tablet   Commonly known as:  DELTASONE   This may have changed:  Another medication with the same name was added. Make sure you understand how and when to take each.   Used for:  Mild intermittent exacerbation of reactive airway disease   Changed by:  Alfredito Ely PA-C        Dose:  60 mg   Take 3 tablets (60 mg) by mouth daily   Quantity:  15 tablet   Refills:  0       * predniSONE 20 MG tablet   Commonly known as:  DELTASONE   This may have changed:  You were already taking a medication with the same name, and this prescription was added. Make sure you understand how and when to take each.   Used for:  Wheezing, Acute bronchitis, unspecified organism   Changed by:  Alfredito Ely PA-C "        Take 3 tabs (60 mg) by mouth daily x 2 days, 2 tabs (40 mg) daily x 3 days, 1 tab (20 mg) daily x 3 days, then 1/2 tab (10 mg) x 3 days.   Quantity:  18 tablet   Refills:  0       * Notice:  This list has 2 medication(s) that are the same as other medications prescribed for you. Read the directions carefully, and ask your doctor or other care provider to review them with you.         Where to get your medicines      These medications were sent to [facility] - [address]   [address], [place] [address]     Phone:  [phone]     benzonatate 100 MG capsule    mometasone-formoterol 200-5 MCG/ACT oral inhaler    predniSONE 20 MG tablet                Primary Care Provider Office Phone # Fax #    Kelly Bedoya [phone]       [address] [place] [address]        Equal Access to Services     [facility]: Hadii megan ku hadasho Soomaali, waaxda luqadaha, qaybta kaalmada adeegyada, waxay harrisonin haymiguen jakub isbell . So [facility] [phone].    ATENCIÓN: Si lopez celis, tiene a tejada disposición servicios gratuitos de asistencia lingüística. Llame al [phone].    We comply with applicable federal civil rights laws and [place] laws. We do not discriminate on the basis of race, color, national origin, age, disability, sex, sexual orientation, or gender identity.            Thank you!     [facility]. Our goal is always to provide you with excellent care. Hearing back from our patients is one way we can continue to improve our services. Please take a few minutes to complete the written survey that you may receive in the mail after your visit with us. Thank you!             Your Updated Medication List - Protect others around you: Learn how to safely use, store and throw away your medicines at www.disposemymeds.org.          This list is accurate as of  3/27/18  4:55 PM.  Always use your most recent med list.                   Brand Name Dispense Instructions for use Diagnosis    albuterol 108 (90 BASE) MCG/ACT Inhaler    PROAIR HFA/PROVENTIL HFA/VENTOLIN HFA    1 Inhaler    Inhale 2 puffs into the lungs every 6 hours as needed for shortness of breath / dyspnea or wheezing    Mild intermittent exacerbation of reactive airway disease       benzonatate 100 MG capsule    TESSALON PERLES    20 capsule    Take 1 capsule (100 mg) by mouth 3 times daily as needed for cough    Acute bronchitis, unspecified organism       clobetasol 0.05 % ointment    TEMOVATE    45 g    Apply clobetasol  0.05 percent ointment, sparingly (a dot 3 mm wide) in a thin film.  Apply to affected area only, once or twice weekly for maintenance.    Lichen sclerosus       doxycycline monohydrate 50 MG capsule           EPIPEN 2-AMERICA 0.3 MG/0.3ML injection   Generic drug:  EPINEPHrine     1 Device    1 TIME ONLY    Food allergies       FISH OIL PO           fluticasone 50 MCG/ACT spray    FLONASE    1 Bottle    Spray 1 spray into both nostrils daily    Dysfunction of Eustachian tube, left       loratadine 10 MG tablet    CLARITIN    7 tablet    Take 1 tablet (10 mg) by mouth daily        mometasone-formoterol 200-5 MCG/ACT oral inhaler    DULERA    13 g    Inhale 2 puffs into the lungs 2 times daily    Wheezing       multivitamin, therapeutic with minerals Tabs tablet      Take 1 tablet by mouth daily.        * predniSONE 20 MG tablet    DELTASONE    15 tablet    Take 3 tablets (60 mg) by mouth daily    Mild intermittent exacerbation of reactive airway disease       * predniSONE 20 MG tablet    DELTASONE    18 tablet    Take 3 tabs (60 mg) by mouth daily x 2 days, 2 tabs (40 mg) daily x 3 days, 1 tab (20 mg) daily x 3 days, then 1/2 tab (10 mg) x 3 days.    Wheezing, Acute bronchitis, unspecified organism       RESTASIS MULTIDOSE 0.05 % ophthalmic emulsion   Generic drug:  cycloSPORINE      INSTILL 1  DROP INTO BOTH EYES TWICE A DAY        * Notice:  This list has 2 medication(s) that are the same as other medications prescribed for you. Read the directions carefully, and ask your doctor or other care provider to review them with you.

## 2018-03-29 ENCOUNTER — OFFICE VISIT (OUTPATIENT)
Dept: PEDIATRICS | Facility: CLINIC | Age: 48
End: 2018-03-29
Payer: COMMERCIAL

## 2018-03-29 VITALS — HEIGHT: 64 IN | BODY MASS INDEX: 50.02 KG/M2 | WEIGHT: 293 LBS

## 2018-03-29 DIAGNOSIS — R06.02 SHORTNESS OF BREATH: Primary | ICD-10-CM

## 2018-03-29 LAB
BASOPHILS # BLD AUTO: 0 10E9/L (ref 0–0.2)
BASOPHILS NFR BLD AUTO: 0.1 %
DIFFERENTIAL METHOD BLD: ABNORMAL
EOSINOPHIL # BLD AUTO: 0 10E9/L (ref 0–0.7)
EOSINOPHIL NFR BLD AUTO: 0.1 %
ERYTHROCYTE [DISTWIDTH] IN BLOOD BY AUTOMATED COUNT: 14.2 % (ref 10–15)
HCT VFR BLD AUTO: 44.1 % (ref 35–47)
HGB BLD-MCNC: 14.3 G/DL (ref 11.7–15.7)
LYMPHOCYTES # BLD AUTO: 2.3 10E9/L (ref 0.8–5.3)
LYMPHOCYTES NFR BLD AUTO: 17 %
MCH RBC QN AUTO: 29.3 PG (ref 26.5–33)
MCHC RBC AUTO-ENTMCNC: 32.4 G/DL (ref 31.5–36.5)
MCV RBC AUTO: 90 FL (ref 78–100)
MONOCYTES # BLD AUTO: 0.7 10E9/L (ref 0–1.3)
MONOCYTES NFR BLD AUTO: 5.2 %
NEUTROPHILS # BLD AUTO: 10.3 10E9/L (ref 1.6–8.3)
NEUTROPHILS NFR BLD AUTO: 77.6 %
PLATELET # BLD AUTO: 368 10E9/L (ref 150–450)
RBC # BLD AUTO: 4.88 10E12/L (ref 3.8–5.2)
WBC # BLD AUTO: 13.2 10E9/L (ref 4–11)

## 2018-03-29 PROCEDURE — 99214 OFFICE O/P EST MOD 30 MIN: CPT | Mod: GC | Performed by: STUDENT IN AN ORGANIZED HEALTH CARE EDUCATION/TRAINING PROGRAM

## 2018-03-29 PROCEDURE — 36415 COLL VENOUS BLD VENIPUNCTURE: CPT | Performed by: STUDENT IN AN ORGANIZED HEALTH CARE EDUCATION/TRAINING PROGRAM

## 2018-03-29 PROCEDURE — 85025 COMPLETE CBC W/AUTO DIFF WBC: CPT | Performed by: STUDENT IN AN ORGANIZED HEALTH CARE EDUCATION/TRAINING PROGRAM

## 2018-03-29 PROCEDURE — 83880 ASSAY OF NATRIURETIC PEPTIDE: CPT | Performed by: STUDENT IN AN ORGANIZED HEALTH CARE EDUCATION/TRAINING PROGRAM

## 2018-03-29 PROCEDURE — 80053 COMPREHEN METABOLIC PANEL: CPT | Performed by: STUDENT IN AN ORGANIZED HEALTH CARE EDUCATION/TRAINING PROGRAM

## 2018-03-29 NOTE — MR AVS SNAPSHOT
After Visit Summary   3/29/2018    Ana Wyman    MRN: [id number]           Patient Information     Date Of Birth          1970        Visit Information        Provider Department      3/29/2018 3:45 PM Maykel Green[facility] MD [facility]        Today's Diagnoses     Shortness of breath    -  1      Care Instructions    - labs will be done today; will message you with lab results via [email or web address]   - if BNP is high, would do echocardiogram regardless  - echocardiogram has been ordered; they will call you make an appointment; if you are feeling better, don't have to make an appointment  - continue using albuterol and dulera for now  - if not improving in 2-3 weeks or labs/echocardiogram abnormal, f/u with Dr. Bedoya to address          Follow-ups after your visit        Follow-up notes from your care team     Return in about 3 weeks (around 4/19/2018), or if symptoms worsen or fail to improve, for follow-up.      Future tests that were ordered for you today     Open Future Orders        Priority Expected Expires Ordered    Echocardiogram Complete Routine  3/29/2019 3/29/2018            Who to contact     If you have questions or need follow up information about today's clinic visit or your schedule please contact [facility] ([facility]] directly at [phone].  Normal or non-critical lab and imaging results will be communicated to you by MyChart, letter or phone within 4 business days after the clinic has received the results. If you do not hear from us within 7 days, please contact the clinic through Precipiohart or phone. If you have a critical or abnormal lab result, we will notify you by phone as soon as possible.  Submit refill requests through DailyLook or call your pharmacy and they will forward the refill request to us. Please allow 3 business days for your refill to be completed.          Additional Information About Your Visit        Precipiohart Information     DailyLook gives you secure  "access to your electronic health record. If you see a primary care provider, you can also send messages to your care team and make appointments. If you have questions, please call your primary care clinic.  If you do not have a primary care provider, please call [phone] and they will assist you.        Care EveryWhere ID     This is your Care EveryWhere ID. This could be used by other organizations to access your [place] medical records  [id number]        Your Vitals Were     Height BMI (Body Mass Index)                5' 4\" (1.626 m) 57.49 kg/m2           Blood Pressure from Last 3 Encounters:   03/29/18 (P) 110/62   03/27/18 116/78   03/23/18 112/70    Weight from Last 3 Encounters:   03/29/18 (!) 334 lb 14.4 oz (151.9 kg)   03/27/18 (!) 336 lb (152.4 kg)   03/23/18 (!) 337 lb (152.9 kg)              We Performed the Following     BNP-N terminal pro     CBC with platelets and differential     Comprehensive metabolic panel (BMP + Alb, Alk Phos, ALT, AST, Total. Bili, TP)        Primary Care Provider Office Phone # Fax #    Kelly Bedoya [phone]       [address] [place] [address]        Equal Access to Services     GIO GOMEZ AH: Hadii aad ku hadasho Soomaali, waaxda luqadaha, qaybta kaalmada adeegyada, waxay ted haylopez isbell . So [facility] [phone].    ATENCIÓN: Si habla español, tiene a tejada disposición servicios gratuitos de asistencia lingüística. Llame al [phone].    We comply with applicable federal civil rights laws and [place] laws. We do not discriminate on the basis of race, color, national origin, age, disability, sex, sexual orientation, or gender identity.            Thank you!     Thank you for choosing [facility]  for your care. Our goal is always to provide you with excellent care. Hearing back from our patients is one way we can continue to improve our services. Please take a few minutes to complete the written " survey that you may receive in the mail after your visit with us. Thank you!             Your Updated Medication List - Protect others around you: Learn how to safely use, store and throw away your medicines at www.disposemymeds.org.          This list is accurate as of 3/29/18  4:41 PM.  Always use your most recent med list.                   Brand Name Dispense Instructions for use Diagnosis    albuterol 108 (90 BASE) MCG/ACT Inhaler    PROAIR HFA/PROVENTIL HFA/VENTOLIN HFA    1 Inhaler    Inhale 2 puffs into the lungs every 6 hours as needed for shortness of breath / dyspnea or wheezing    Mild intermittent exacerbation of reactive airway disease       benzonatate 100 MG capsule    TESSALON PERLES    20 capsule    Take 1 capsule (100 mg) by mouth 3 times daily as needed for cough    Acute bronchitis, unspecified organism       clobetasol 0.05 % ointment    TEMOVATE    45 g    Apply clobetasol  0.05 percent ointment, sparingly (a dot 3 mm wide) in a thin film.  Apply to affected area only, once or twice weekly for maintenance.    Lichen sclerosus       doxycycline monohydrate 50 MG capsule           EPIPEN 2-AMERICA 0.3 MG/0.3ML injection   Generic drug:  EPINEPHrine     1 Device    1 TIME ONLY    Food allergies       FISH OIL PO           fluticasone 50 MCG/ACT spray    FLONASE    1 Bottle    Spray 1 spray into both nostrils daily    Dysfunction of Eustachian tube, left       loratadine 10 MG tablet    CLARITIN    7 tablet    Take 1 tablet (10 mg) by mouth daily        mometasone-formoterol 200-5 MCG/ACT oral inhaler    DULERA    13 g    Inhale 2 puffs into the lungs 2 times daily    Wheezing       multivitamin, therapeutic with minerals Tabs tablet      Take 1 tablet by mouth daily.        * predniSONE 20 MG tablet    DELTASONE    15 tablet    Take 3 tablets (60 mg) by mouth daily    Mild intermittent exacerbation of reactive airway disease       * predniSONE 20 MG tablet    DELTASONE    18 tablet    Take 3 tabs (60  mg) by mouth daily x 2 days, 2 tabs (40 mg) daily x 3 days, 1 tab (20 mg) daily x 3 days, then 1/2 tab (10 mg) x 3 days.    Wheezing, Acute bronchitis, unspecified organism       RESTASIS MULTIDOSE 0.05 % ophthalmic emulsion   Generic drug:  cycloSPORINE      INSTILL 1 DROP INTO BOTH EYES TWICE A DAY        * Notice:  This list has 2 medication(s) that are the same as other medications prescribed for you. Read the directions carefully, and ask your doctor or other care provider to review them with you.

## 2018-03-29 NOTE — PATIENT INSTRUCTIONS
- labs will be done today; will message you with lab results via Awdio   - if BNP is high, would do echocardiogram regardless  - echocardiogram has been ordered; they will call you make an appointment; if you are feeling better, don't have to make an appointment  - continue using albuterol and dulera for now  - if not improving in 2-3 weeks or labs/echocardiogram abnormal, f/u with Dr. Bedoya to address

## 2018-03-29 NOTE — PROGRESS NOTES
SUBJECTIVE:   Ana Wyman is a 48 year old female who presents to clinic today for the following health issues:      Patient here for follow up chest congestion. Medications not helping. Still coughing, congested, fatigued. Here for further evaluation (see note per VG from 3/27/18).    Ana is a 48-year-old female with history of GERD, MIGUELITO on CPAP, morbid obesity, bilateral lymphedema who presents to clinic for follow-up of shortness of breath. Her symptoms started last Wednesday with chest congestion and shortness of breath, then developed cough on Friday night. Saw Duyen on Friday at , where albuterol nebulizer was tried with mild improvement. She was started on z-akhil and prednisone. Seen again on Tuesday when dulera and tessalon were added. CXR done at that visit and final report showed mild vascular congestion so was called to f/u at clinic. Upon questioning, she usually sleeps with head of bed raised. Also has MIGUELITO and uses CPAP. Currently, not able to walk up stairs much anymore and can only walk short distance even on flat ground before feeling short of breath. This is a change from before. No weight gain or worsening peripheral edema. She does not take any diuretic for her lymphedema. No personal hx of CHF or CAD but does have FH of CAD.    Problem list and histories reviewed & adjusted, as indicated.  Additional history: as documented    Patient Active Problem List   Diagnosis     Esophageal reflux     Allergic state     Hypersomnia with sleep apnea     FAMILY HX GI MALIGNANCY     Flatulence, eructation, and gas pain     Family history of malignant neoplasm of genital organ, other     Abdominal pain, left upper quadrant     Dysmenorrhea     Acute bronchitis     Elevated blood pressure reading without diagnosis of hypertension     Lichen sclerosus     Menorrhagia     Right flank pain     Uterine fibroid     Premenstrual syndrome     Weight gain     Morbid obesity (H)     Recurrent cellulitis of  lower leg     MIGUELITO on CPAP     Abnormal glucose     Vitamin D deficiency     Pre-diabetes     Acanthosis nigricans     Cutaneous skin tags     Baker's cyst of knee     Lymphedema bilateral with mixed lipedema.     Past Surgical History:   Procedure Laterality Date     C NONSPECIFIC PROCEDURE      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     C NONSPECIFIC PROCEDURE  ,    Foot surgeries x 2 (bunionectomy)     C NONSPECIFIC PROCEDURE      hammer toes     COLONOSCOPY  5/15/2013    Procedure: COLONOSCOPY;  Colonoscopy;  Surgeon: Kota Blanco MD;  Location: [facility]     lichen sclerosis       ORTHOPEDIC SURGERY       vein closure         Social History   Substance Use Topics     Smoking status: Never Smoker     Smokeless tobacco: Never Used     Alcohol use 0.0 oz/week     0 Standard drinks or equivalent per week      Comment: 1-2 drink occasionally     Family History   Problem Relation Age of Onset     C.A.D. Father 92     CAGB 98     Obesity Father      CANCER Father      stomach Dx age 74     Lipids Father      Hypertension Father      Coronary Artery Disease Father      DIABETES Mother      Type 2     Allergies Mother      Obesity Mother      C.A.D. Mother      triple bypass surgery, 65     Lipids Mother      Hypertension Mother      Coronary Artery Disease Mother      C.A.D. Paternal Grandfather 58     fatal     Coronary Artery Disease Paternal Grandfather      Cancer - colorectal Maternal Grandmother 75     CANCER Maternal Grandmother      liver     Hypertension Maternal Grandmother      Cancer - colorectal Paternal Aunt      Allergies Brother      CANCER Paternal Grandmother      stomach     Obesity Paternal Grandmother      DIABETES Paternal Grandmother      Type 2     Obesity Brother      Hypertension Brother      Cancer - colorectal Other      cousin  from colon cancer in 's     Obesity Brother      Coronary Artery Disease Maternal Grandfather      Hypertension Maternal  Grandfather          Current Outpatient Prescriptions   Medication Sig Dispense Refill     mometasone-formoterol (DULERA) 200-5 MCG/ACT oral inhaler Inhale 2 puffs into the lungs 2 times daily 13 g 0     predniSONE (DELTASONE) 20 MG tablet Take 3 tabs (60 mg) by mouth daily x 2 days, 2 tabs (40 mg) daily x 3 days, 1 tab (20 mg) daily x 3 days, then 1/2 tab (10 mg) x 3 days. 18 tablet 0     benzonatate (TESSALON PERLES) 100 MG capsule Take 1 capsule (100 mg) by mouth 3 times daily as needed for cough 20 capsule 0     predniSONE (DELTASONE) 20 MG tablet Take 3 tablets (60 mg) by mouth daily 15 tablet 0     albuterol (PROAIR HFA/PROVENTIL HFA/VENTOLIN HFA) 108 (90 BASE) MCG/ACT Inhaler Inhale 2 puffs into the lungs every 6 hours as needed for shortness of breath / dyspnea or wheezing 1 Inhaler 0     RESTASIS MULTIDOSE 0.05 % ophthalmic emulsion INSTILL 1 DROP INTO BOTH EYES TWICE A DAY  5     doxycycline monohydrate 50 MG capsule        Omega-3 Fatty Acids (FISH OIL PO)        loratadine (CLARITIN) 10 MG tablet Take 1 tablet (10 mg) by mouth daily 7 tablet 0     fluticasone (FLONASE) 50 MCG/ACT spray Spray 1 spray into both nostrils daily 1 Bottle 1     clobetasol (TEMOVATE) 0.05 % ointment Apply clobetasol  0.05 percent ointment, sparingly (a dot 3 mm wide) in a thin film.  Apply to affected area only, once or twice weekly for maintenance. 45 g 2     multivitamin, therapeutic with minerals (THERA-VIT-M) TABS Take 1 tablet by mouth daily.       EPIPEN 2-AMERICA 0.3 MG/0.3ML IJ VIVIANA 1 TIME ONLY 1 Device 1     Allergies   Allergen Reactions     Clindamycin Diarrhea     Codeine Nausea and Vomiting     Shellfish Allergy        Reviewed and updated as needed this visit by clinical staff  Tobacco  Allergies  Meds  Problems  Med Hx  Surg Hx  Fam Hx  Soc Hx        Reviewed and updated as needed this visit by Provider  Allergies  Meds  Problems         ROS:  - please review HPI for complete review of system    OBJECTIVE:  "    BP (P) 110/62 (BP Location: Right arm, Patient Position: Sitting, Cuff Size: Adult Regular)  Pulse (P) 102  Temp (P) 98.9  F (37.2  C) (Tympanic)  Ht 5' 4\" (1.626 m)  Wt (!) 334 lb 14.4 oz (151.9 kg)  SpO2 (P) 94%  BMI 57.49 kg/m2  Body mass index is 57.49 kg/(m^2).     GENERAL: alert, obese female, no acute distress  EYES: Eyes grossly normal to inspection, conjunctivae and sclerae normal  NECK: No JVD noted  RESP: Non-labored respirations on room air, good air movement bilaterally, lungs clear to auscultation - no rales, rhonchi or wheezes  CV: Tachycardic, regular rhythm, normal S1 S2, no S3 or S4, no murmur, click or rub, peripheral pulses strong  MS: no gross musculoskeletal defects noted, 2+ pitting edema bilaterally in lower extremity up to knees  SKIN: no suspicious lesions or rashes  NEURO: Normal strength and tone, mentation intact and speech normal  PSYCH: mentation appears normal, affect normal/bright    Diagnostic Test Results:  - CMP: pending  - BNP: pending    ASSESSMENT/PLAN:   1. Shortness of breath  Has seen at clinic multiple times and was treated with acute bronchitis. Finished z-akhil and on albuterol PRN and Dulera. Not improving much. No personal hx of CAD or HF. Previous echo in 2015 showed normal EF and LVH. EKG in 2017 also showed signs of LVH but otherwise normal. No other risk factors for heart disease (non-smoker and non-diabetic). CXR did show mild vascular congestion and she does have 2+ pitting edema but seems like that's at baseline. Not completely consistent with heart failure but should rule this out. This was discussed with pt  - Comprehensive metabolic panel (BMP + Alb, Alk Phos, ALT, AST, Total. Bili, TP)  - BNP-N terminal pro  - CBC with platelets and differential  - Echocardiogram Complete; Future  - continue using albuterol and dulera for now  - prednisone should held if BNP is high  - if not improving in 2-3 weeks or labs/echocardiogram abnormal, f/u with Dr." Elke      FUTURE APPOINTMENTS:       - Follow-up visit in 2-3 weeks with Dr. Cuello    Patient was discussed with Dr. Jed Greenberg MD  [facility]    I have seen this patient and examined him in the presence of Dr. greenberg.  I was present during the key components of the presenting complaints, physical exam, diagnosis, and plan, and fully concur with the plan as listed in the resident's note.

## 2018-03-30 LAB
ALBUMIN SERPL-MCNC: 3.8 G/DL (ref 3.4–5)
ALP SERPL-CCNC: 91 U/L (ref 40–150)
ALT SERPL W P-5'-P-CCNC: 31 U/L (ref 0–50)
ANION GAP SERPL CALCULATED.3IONS-SCNC: 9 MMOL/L (ref 3–14)
AST SERPL W P-5'-P-CCNC: 20 U/L (ref 0–45)
BILIRUB SERPL-MCNC: 0.3 MG/DL (ref 0.2–1.3)
BUN SERPL-MCNC: 18 MG/DL (ref 7–30)
CALCIUM SERPL-MCNC: 9.2 MG/DL (ref 8.5–10.1)
CHLORIDE SERPL-SCNC: 106 MMOL/L (ref 94–109)
CO2 SERPL-SCNC: 24 MMOL/L (ref 20–32)
CREAT SERPL-MCNC: 0.74 MG/DL (ref 0.52–1.04)
GFR SERPL CREATININE-BSD FRML MDRD: 83 ML/MIN/1.7M2
GLUCOSE SERPL-MCNC: 189 MG/DL (ref 70–99)
NT-PROBNP SERPL-MCNC: 13 PG/ML (ref 0–125)
POTASSIUM SERPL-SCNC: 4.3 MMOL/L (ref 3.4–5.3)
PROT SERPL-MCNC: 7.7 G/DL (ref 6.8–8.8)
SODIUM SERPL-SCNC: 139 MMOL/L (ref 133–144)

## 2018-04-02 ENCOUNTER — OFFICE VISIT (OUTPATIENT)
Dept: PEDIATRICS | Facility: CLINIC | Age: 48
End: 2018-04-02
Payer: COMMERCIAL

## 2018-04-02 VITALS
WEIGHT: 293 LBS | HEART RATE: 92 BPM | BODY MASS INDEX: 57.49 KG/M2 | SYSTOLIC BLOOD PRESSURE: 129 MMHG | DIASTOLIC BLOOD PRESSURE: 75 MMHG | OXYGEN SATURATION: 96 % | RESPIRATION RATE: 20 BRPM | TEMPERATURE: 98.2 F

## 2018-04-02 DIAGNOSIS — J20.9 ACUTE BRONCHITIS, UNSPECIFIED ORGANISM: Primary | ICD-10-CM

## 2018-04-02 DIAGNOSIS — R09.89 PULMONARY VASCULAR CONGESTION: ICD-10-CM

## 2018-04-02 PROCEDURE — 99214 OFFICE O/P EST MOD 30 MIN: CPT | Performed by: PHYSICIAN ASSISTANT

## 2018-04-02 RX ORDER — PREDNISONE 10 MG/1
10 TABLET ORAL DAILY
Qty: 3 TABLET | Refills: 0 | Status: SHIPPED | OUTPATIENT
Start: 2018-04-02 | End: 2018-06-11

## 2018-04-02 NOTE — PROGRESS NOTES
SUBJECTIVE:   Ana Wyman is a 48 year old female who presents to clinic today for the following health issues:    Follow Up    Duration: 1 week    Description (location/character/radiation): cough, wheezing, fatigue, shortness of breath, cough    Intensity:  moderate    Accompanying signs and symptoms: HA and diarrhea    History (similar episodes/previous evaluation): Pt was seen on 03/27/2018 for URI    Precipitating or alleviating factors: None    Therapies tried and outcome: prednisone and inhalers were somewhat helpful     Please refer to three previous notes for details.   Patient ran out of prednisone--took extra day of 60 mg.     ROS:  ROS otherwise negative    OBJECTIVE:                                                    /75  Pulse 92  Temp 98.2  F (36.8  C) (Oral)  Resp 20  Wt (!) 334 lb 14.4 oz (151.9 kg)  SpO2 96%  BMI 57.49 kg/m2  Body mass index is 57.49 kg/(m^2).   GENERAL: alert, no resp distress  RESP: diminished breath sounds throughout - no rales, no rhonchi, no wheezes  CV: regular rates and rhythm, normal S1 S2, no S3 or S4 and no murmur, no click or rub    Diagnostic test results:  No results found for this or any previous visit (from the past 24 hour(s)).     ASSESSMENT/PLAN:                                                    1. Acute bronchitis, unspecified organism  Overall improving compared to one week ago. Finish tapering dose of prednisone. Continue with dulera x one month and increase albuterol to four times daily PRN.   - predniSONE (DELTASONE) 10 MG tablet; Take 1 tablet (10 mg) by mouth daily  Dispense: 3 tablet; Refill: 0    (R09.89) Pulmonary vascular congestion  Comment: Labs reviewed and normal. Echo scheduled and since symptoms improving and labs normal, will hold off on echo at this time.   Patient to follow up with PMD in one month.   Plan:     See Patient Instructions    Alfredito Ely PA-C  [facility]

## 2018-04-02 NOTE — PATIENT INSTRUCTIONS
1. Continue with prednisone taper  2. Finish dulera (3 weeks)  3. Continue with albuterol more frequently  4. Follow up with Dr. Bedoya in three weeks

## 2018-04-02 NOTE — MR AVS SNAPSHOT
After Visit Summary   4/2/2018    Ana Wyman    MRN: [id number]           Patient Information     Date Of Birth          1970        Visit Information        Provider Department      4/2/2018 1:30 PM Alfredito Ely PA-C [facility]        Today's Diagnoses     Acute bronchitis, unspecified organism    -  1      Care Instructions    1. Continue with prednisone taper  2. Finish dulera (3 weeks)  3. Continue with albuterol more frequently  4. Follow up with Dr. Bedoya in three weeks            Follow-ups after your visit        Your next 10 appointments already scheduled     Apr 13, 2018 10:45 AM CDT   Ech Complete with [id number]   [facility] ([facility])    [address]  [facility] [address] [phone]           1. Please bring or wear a comfortable two-piece outfit. 2. You may eat, drink and take your normal medicines. 3. For any questions that cannot be answered, please contact the ordering physician ***Please check-in at the [place] Registration Office located in [address] in the [facility] building. When you are finished registering, please go to [address] and have a seat. The technician will call your name for the test.              Who to contact     If you have questions or need follow up information about today's clinic visit or your schedule please contact [facility] directly at [phone].  Normal or non-critical lab and imaging results will be communicated to you by MyChart, letter or phone within 4 business days after the clinic has received the results. If you do not hear from us within 7 days, please contact the clinic through MyChart or phone. If you have a critical or abnormal lab result, we will notify you by phone as soon as possible.  Submit refill requests through [employer] or call your pharmacy and they will forward the refill request to us. Please allow  3 business days for your refill to be completed.          Additional Information About Your Visit        [employer] Information     [employer] gives you secure access to your electronic health record. If you see a primary care provider, you can also send messages to your care team and make appointments. If you have questions, please call your primary care clinic.  If you do not have a primary care provider, please call [phone] and they will assist you.        Care EveryWhere ID     This is your Care EveryWhere ID. This could be used by other organizations to access your [facility] medical records  [id number]        Your Vitals Were     Pulse Temperature Respirations Pulse Oximetry BMI (Body Mass Index)       92 98.2  F (36.8  C) (Oral) 20 96% 57.49 kg/m2        Blood Pressure from Last 3 Encounters:   04/02/18 129/75   03/29/18 (P) 110/62   03/27/18 116/78    Weight from Last 3 Encounters:   04/02/18 (!) 334 lb 14.4 oz (151.9 kg)   03/29/18 (!) 334 lb 14.4 oz (151.9 kg)   03/27/18 (!) 336 lb (152.4 kg)              Today, you had the following     No orders found for display         Today's Medication Changes          These changes are accurate as of 4/2/18  2:05 PM.  If you have any questions, ask your nurse or doctor.               These medicines have changed or have updated prescriptions.        Dose/Directions    * predniSONE 20 MG tablet   Commonly known as:  DELTASONE   This may have changed:  Another medication with the same name was added. Make sure you understand how and when to take each.   Used for:  Mild intermittent exacerbation of reactive airway disease   Changed by:  Alfredito Ely PA-C        Dose:  60 mg   Take 3 tablets (60 mg) by mouth daily   Quantity:  15 tablet   Refills:  0       * predniSONE 20 MG tablet   Commonly known as:  DELTASONE   This may have changed:  Another medication with the same name was added. Make sure you understand how and when to take each.   Used for:   Wheezing, Acute bronchitis, unspecified organism   Changed by:  Alfredito Ely PA-C        Take 3 tabs (60 mg) by mouth daily x 2 days, 2 tabs (40 mg) daily x 3 days, 1 tab (20 mg) daily x 3 days, then 1/2 tab (10 mg) x 3 days.   Quantity:  18 tablet   Refills:  0       * predniSONE 10 MG tablet   Commonly known as:  DELTASONE   This may have changed:  You were already taking a medication with the same name, and this prescription was added. Make sure you understand how and when to take each.   Used for:  Acute bronchitis, unspecified organism   Changed by:  Alfredito Ely PA-C        Dose:  10 mg   Take 1 tablet (10 mg) by mouth daily   Quantity:  3 tablet   Refills:  0       * Notice:  This list has 3 medication(s) that are the same as other medications prescribed for you. Read the directions carefully, and ask your doctor or other care provider to review them with you.         Where to get your medicines      These medications were sent to [facility] - [address]   [address], [place] [address]     Phone:  [phone]     predniSONE 10 MG tablet                Primary Care Provider Office Phone # Fax #    Kelly Bedoya [phone]       [address] [place] [address] [address] [place] [address]        Equal Access to Services     [facility] AH: Hadii megan sheppard hadasho Soange, waaxda luqadaha, qaybta kaalmada cinda, yoselin christina. So [facility] [phone].    ATENCIÓN: Si habla español, tiene a tejada disposición servicios gratuitos de asistencia lingüística. Llame al [phone].    We comply with applicable federal civil rights laws and [place] laws. We do not discriminate on the basis of race, color, national origin, age, disability, sex, sexual orientation, or gender identity.            Thank you!     Thank you for choosing [facility]  for your care. Our goal is always  to provide you with excellent care. Hearing back from our patients is one way we can continue to improve our services. Please take a few minutes to complete the written survey that you may receive in the mail after your visit with us. Thank you!             Your Updated Medication List - Protect others around you: Learn how to safely use, store and throw away your medicines at www.disposemymeds.org.          This list is accurate as of 4/2/18  2:05 PM.  Always use your most recent med list.                   Brand Name Dispense Instructions for use Diagnosis    albuterol 108 (90 BASE) MCG/ACT Inhaler    PROAIR HFA/PROVENTIL HFA/VENTOLIN HFA    1 Inhaler    Inhale 2 puffs into the lungs every 6 hours as needed for shortness of breath / dyspnea or wheezing    Mild intermittent exacerbation of reactive airway disease       benzonatate 100 MG capsule    TESSALON PERLES    20 capsule    Take 1 capsule (100 mg) by mouth 3 times daily as needed for cough    Acute bronchitis, unspecified organism       clobetasol 0.05 % ointment    TEMOVATE    45 g    Apply clobetasol  0.05 percent ointment, sparingly (a dot 3 mm wide) in a thin film.  Apply to affected area only, once or twice weekly for maintenance.    Lichen sclerosus       doxycycline monohydrate 50 MG capsule           EPIPEN 2-AMERICA 0.3 MG/0.3ML injection   Generic drug:  EPINEPHrine     1 Device    1 TIME ONLY    Food allergies       FISH OIL PO           fluticasone 50 MCG/ACT spray    FLONASE    1 Bottle    Spray 1 spray into both nostrils daily    Dysfunction of Eustachian tube, left       loratadine 10 MG tablet    CLARITIN    7 tablet    Take 1 tablet (10 mg) by mouth daily        mometasone-formoterol 200-5 MCG/ACT oral inhaler    DULERA    13 g    Inhale 2 puffs into the lungs 2 times daily    Wheezing       multivitamin, therapeutic with minerals Tabs tablet      Take 1 tablet by mouth daily.        * predniSONE 20 MG tablet    DELTASONE    15 tablet    Take 3  tablets (60 mg) by mouth daily    Mild intermittent exacerbation of reactive airway disease       * predniSONE 20 MG tablet    DELTASONE    18 tablet    Take 3 tabs (60 mg) by mouth daily x 2 days, 2 tabs (40 mg) daily x 3 days, 1 tab (20 mg) daily x 3 days, then 1/2 tab (10 mg) x 3 days.    Wheezing, Acute bronchitis, unspecified organism       * predniSONE 10 MG tablet    DELTASONE    3 tablet    Take 1 tablet (10 mg) by mouth daily    Acute bronchitis, unspecified organism       RESTASIS MULTIDOSE 0.05 % ophthalmic emulsion   Generic drug:  cycloSPORINE      INSTILL 1 DROP INTO BOTH EYES TWICE A DAY        * Notice:  This list has 3 medication(s) that are the same as other medications prescribed for you. Read the directions carefully, and ask your doctor or other care provider to review them with you.

## 2018-04-29 ENCOUNTER — APPOINTMENT (OUTPATIENT)
Dept: ULTRASOUND IMAGING | Facility: CLINIC | Age: 48
End: 2018-04-29
Attending: EMERGENCY MEDICINE
Payer: COMMERCIAL

## 2018-04-29 ENCOUNTER — APPOINTMENT (OUTPATIENT)
Dept: CT IMAGING | Facility: CLINIC | Age: 48
End: 2018-04-29
Attending: EMERGENCY MEDICINE
Payer: COMMERCIAL

## 2018-04-29 ENCOUNTER — HOSPITAL ENCOUNTER (EMERGENCY)
Facility: CLINIC | Age: 48
Discharge: HOME | End: 2018-04-29
Attending: EMERGENCY MEDICINE | Admitting: EMERGENCY MEDICINE
Payer: COMMERCIAL

## 2018-04-29 VITALS
HEART RATE: 89 BPM | SYSTOLIC BLOOD PRESSURE: 134 MMHG | RESPIRATION RATE: 20 BRPM | DIASTOLIC BLOOD PRESSURE: 78 MMHG | TEMPERATURE: 98.4 F | OXYGEN SATURATION: 96 %

## 2018-04-29 DIAGNOSIS — N20.1 URETEROLITHIASIS: ICD-10-CM

## 2018-04-29 LAB
ALBUMIN SERPL-MCNC: 3.7 G/DL (ref 3.4–5)
ALBUMIN UR-MCNC: NEGATIVE MG/DL
ALP SERPL-CCNC: 85 U/L (ref 40–150)
ALT SERPL W P-5'-P-CCNC: 38 U/L (ref 0–50)
ANION GAP SERPL CALCULATED.3IONS-SCNC: 11 MMOL/L (ref 3–14)
APPEARANCE UR: ABNORMAL
AST SERPL W P-5'-P-CCNC: 27 U/L (ref 0–45)
BASOPHILS # BLD AUTO: 0.1 10E9/L (ref 0–0.2)
BASOPHILS NFR BLD AUTO: 0.7 %
BILIRUB DIRECT SERPL-MCNC: <0.1 MG/DL (ref 0–0.2)
BILIRUB SERPL-MCNC: 0.4 MG/DL (ref 0.2–1.3)
BILIRUB UR QL STRIP: NEGATIVE
BUN SERPL-MCNC: 12 MG/DL (ref 7–30)
CALCIUM SERPL-MCNC: 9.1 MG/DL (ref 8.5–10.1)
CHLORIDE SERPL-SCNC: 107 MMOL/L (ref 94–109)
CO2 SERPL-SCNC: 23 MMOL/L (ref 20–32)
COLOR UR AUTO: YELLOW
CREAT SERPL-MCNC: 0.81 MG/DL (ref 0.52–1.04)
D DIMER PPP FEU-MCNC: <0.3 UG/ML FEU (ref 0–0.5)
DIFFERENTIAL METHOD BLD: NORMAL
EOSINOPHIL # BLD AUTO: 0.2 10E9/L (ref 0–0.7)
EOSINOPHIL NFR BLD AUTO: 3 %
ERYTHROCYTE [DISTWIDTH] IN BLOOD BY AUTOMATED COUNT: 13.8 % (ref 10–15)
GFR SERPL CREATININE-BSD FRML MDRD: 75 ML/MIN/1.7M2
GLUCOSE SERPL-MCNC: 138 MG/DL (ref 70–99)
GLUCOSE UR STRIP-MCNC: NEGATIVE MG/DL
HCG UR QL: NEGATIVE
HCT VFR BLD AUTO: 44 % (ref 35–47)
HGB BLD-MCNC: 14.1 G/DL (ref 11.7–15.7)
HGB UR QL STRIP: ABNORMAL
IMM GRANULOCYTES # BLD: 0 10E9/L (ref 0–0.4)
IMM GRANULOCYTES NFR BLD: 0.4 %
KETONES UR STRIP-MCNC: NEGATIVE MG/DL
LEUKOCYTE ESTERASE UR QL STRIP: NEGATIVE
LIPASE SERPL-CCNC: 124 U/L (ref 73–393)
LYMPHOCYTES # BLD AUTO: 2.2 10E9/L (ref 0.8–5.3)
LYMPHOCYTES NFR BLD AUTO: 28.2 %
MCH RBC QN AUTO: 28.8 PG (ref 26.5–33)
MCHC RBC AUTO-ENTMCNC: 32 G/DL (ref 31.5–36.5)
MCV RBC AUTO: 90 FL (ref 78–100)
MONOCYTES # BLD AUTO: 0.6 10E9/L (ref 0–1.3)
MONOCYTES NFR BLD AUTO: 8.1 %
MUCOUS THREADS #/AREA URNS LPF: PRESENT /LPF
NEUTROPHILS # BLD AUTO: 4.5 10E9/L (ref 1.6–8.3)
NEUTROPHILS NFR BLD AUTO: 59.6 %
NITRATE UR QL: NEGATIVE
NRBC # BLD AUTO: 0 10*3/UL
NRBC BLD AUTO-RTO: 0 /100
PH UR STRIP: 5 PH (ref 5–7)
PLATELET # BLD AUTO: 346 10E9/L (ref 150–450)
POTASSIUM SERPL-SCNC: 3.9 MMOL/L (ref 3.4–5.3)
PROT SERPL-MCNC: 7.7 G/DL (ref 6.8–8.8)
RBC # BLD AUTO: 4.9 10E12/L (ref 3.8–5.2)
RBC #/AREA URNS AUTO: 179 /HPF (ref 0–2)
SODIUM SERPL-SCNC: 141 MMOL/L (ref 133–144)
SOURCE: ABNORMAL
SP GR UR STRIP: 1.02 (ref 1–1.03)
SQUAMOUS #/AREA URNS AUTO: 3 /HPF (ref 0–1)
UROBILINOGEN UR STRIP-MCNC: 0 MG/DL (ref 0–2)
WBC # BLD AUTO: 7.6 10E9/L (ref 4–11)
WBC #/AREA URNS AUTO: 3 /HPF (ref 0–5)
YEAST #/AREA URNS HPF: ABNORMAL /HPF

## 2018-04-29 PROCEDURE — 83690 ASSAY OF LIPASE: CPT | Performed by: EMERGENCY MEDICINE

## 2018-04-29 PROCEDURE — 96375 TX/PRO/DX INJ NEW DRUG ADDON: CPT

## 2018-04-29 PROCEDURE — 85379 FIBRIN DEGRADATION QUANT: CPT | Performed by: EMERGENCY MEDICINE

## 2018-04-29 PROCEDURE — 25000128 H RX IP 250 OP 636: Performed by: EMERGENCY MEDICINE

## 2018-04-29 PROCEDURE — 74176 CT ABD & PELVIS W/O CONTRAST: CPT

## 2018-04-29 PROCEDURE — 99285 EMERGENCY DEPT VISIT HI MDM: CPT | Mod: 25

## 2018-04-29 PROCEDURE — 80048 BASIC METABOLIC PNL TOTAL CA: CPT | Performed by: EMERGENCY MEDICINE

## 2018-04-29 PROCEDURE — 85025 COMPLETE CBC W/AUTO DIFF WBC: CPT | Performed by: EMERGENCY MEDICINE

## 2018-04-29 PROCEDURE — 76705 ECHO EXAM OF ABDOMEN: CPT

## 2018-04-29 PROCEDURE — 81025 URINE PREGNANCY TEST: CPT | Performed by: EMERGENCY MEDICINE

## 2018-04-29 PROCEDURE — 25000132 ZZH RX MED GY IP 250 OP 250 PS 637: Performed by: EMERGENCY MEDICINE

## 2018-04-29 PROCEDURE — 96374 THER/PROPH/DIAG INJ IV PUSH: CPT

## 2018-04-29 PROCEDURE — 80076 HEPATIC FUNCTION PANEL: CPT | Performed by: EMERGENCY MEDICINE

## 2018-04-29 PROCEDURE — 96376 TX/PRO/DX INJ SAME DRUG ADON: CPT

## 2018-04-29 PROCEDURE — 81001 URINALYSIS AUTO W/SCOPE: CPT | Performed by: EMERGENCY MEDICINE

## 2018-04-29 RX ORDER — HYDROMORPHONE HYDROCHLORIDE 1 MG/ML
0.5 INJECTION, SOLUTION INTRAMUSCULAR; INTRAVENOUS; SUBCUTANEOUS ONCE
Status: COMPLETED | OUTPATIENT
Start: 2018-04-29 | End: 2018-04-29

## 2018-04-29 RX ORDER — HYDROCODONE BITARTRATE AND ACETAMINOPHEN 5; 325 MG/1; MG/1
1 TABLET ORAL EVERY 6 HOURS PRN
Qty: 20 TABLET | Refills: 0 | Status: SHIPPED | OUTPATIENT
Start: 2018-04-29 | End: 2018-06-11

## 2018-04-29 RX ORDER — TAMSULOSIN HYDROCHLORIDE 0.4 MG/1
0.4 CAPSULE ORAL ONCE
Status: COMPLETED | OUTPATIENT
Start: 2018-04-29 | End: 2018-04-29

## 2018-04-29 RX ORDER — HYDROCODONE BITARTRATE AND ACETAMINOPHEN 5; 325 MG/1; MG/1
2 TABLET ORAL ONCE
Status: COMPLETED | OUTPATIENT
Start: 2018-04-29 | End: 2018-04-29

## 2018-04-29 RX ORDER — ONDANSETRON 4 MG/1
4 TABLET, FILM COATED ORAL EVERY 8 HOURS PRN
Qty: 6 TABLET | Refills: 0 | Status: SHIPPED | OUTPATIENT
Start: 2018-04-29 | End: 2018-07-27

## 2018-04-29 RX ORDER — KETOROLAC TROMETHAMINE 30 MG/ML
30 INJECTION, SOLUTION INTRAMUSCULAR; INTRAVENOUS ONCE
Status: COMPLETED | OUTPATIENT
Start: 2018-04-29 | End: 2018-04-29

## 2018-04-29 RX ORDER — ONDANSETRON 2 MG/ML
4 INJECTION INTRAMUSCULAR; INTRAVENOUS ONCE
Status: COMPLETED | OUTPATIENT
Start: 2018-04-29 | End: 2018-04-29

## 2018-04-29 RX ORDER — TAMSULOSIN HYDROCHLORIDE 0.4 MG/1
0.4 CAPSULE ORAL DAILY
Qty: 7 CAPSULE | Refills: 1 | Status: SHIPPED | OUTPATIENT
Start: 2018-04-30 | End: 2018-06-11

## 2018-04-29 RX ADMIN — HYDROMORPHONE HYDROCHLORIDE 0.5 MG: 1 INJECTION, SOLUTION INTRAMUSCULAR; INTRAVENOUS; SUBCUTANEOUS at 14:18

## 2018-04-29 RX ADMIN — HYDROCODONE BITARTRATE AND ACETAMINOPHEN 2 TABLET: 5; 325 TABLET ORAL at 14:17

## 2018-04-29 RX ADMIN — KETOROLAC TROMETHAMINE 30 MG: 30 INJECTION, SOLUTION INTRAMUSCULAR at 11:16

## 2018-04-29 RX ADMIN — HYDROMORPHONE HYDROCHLORIDE 0.5 MG: 1 INJECTION, SOLUTION INTRAMUSCULAR; INTRAVENOUS; SUBCUTANEOUS at 13:09

## 2018-04-29 RX ADMIN — TAMSULOSIN HYDROCHLORIDE 0.4 MG: 0.4 CAPSULE ORAL at 14:17

## 2018-04-29 RX ADMIN — ONDANSETRON 4 MG: 2 INJECTION INTRAMUSCULAR; INTRAVENOUS at 11:16

## 2018-04-29 ASSESSMENT — ENCOUNTER SYMPTOMS
ABDOMINAL PAIN: 1
FREQUENCY: 1
HEMATURIA: 0
FLANK PAIN: 1
FEVER: 0
BACK PAIN: 1
DYSURIA: 1

## 2018-04-29 ASSESSMENT — PAIN DESCRIPTION - DESCRIPTORS: DESCRIPTORS: ACHING

## 2018-04-29 NOTE — DISCHARGE INSTRUCTIONS
Discharge Instructions  Kidney Stones    Kidney stones are a common problem that can cause a lot of pain but fortunately are usually not dangerous. Kidney stones form in the kidney and then can cause a blockage (obstruction) of the flow of urine from the kidney which leads to pain. Most patients can manage kidney stones at home (without a hospital stay).  However, sometimes your condition may be worse than it seemed at first, or may get worse with time. Most kidney stones will pass on their own, but occasionally stones may need to be removed by an urologist.    Generally, every Emergency Department visit should have a follow-up clinic visit with either a primary or a specialty clinic/provider. Please follow-up as instructed by your emergency provider today.      Return to the Emergency Department if:    Your pain is not controlled despite the medications provided or recommended.    You are vomiting (throwing up) and cannot keep fluids or medications down.    You develop a fever (>100.4 F).    You feel much more ill or develop new symptoms.  What can I do to help myself?    Be sure to drink plenty of fluids.    If instructed to do so, strain your urine (pee) with the urine strainer you were provided with today. Your stone may look like a grain of sand or a small pebble. Collect any stones in the cup provided and bring to your follow-up appointment.    Staying active is good, and may help the stone to pass. You may do whatever you feel up to doing without restrictions.   Treatment:    Non-steroidal anti-inflammatory drugs (NSAIDs). This includes prescription medicines like Toradol  (ketorolac) and non-prescription medicines like Advil  (ibuprofen) and Nuprin  (ibuprofen) and Naproxen. These pain relievers are very effective for kidney stones.    Nausea (sick to your stomach) medication.  Nausea and vomiting are common with kidney stones, so your provider may send you home with medicine for this.     Flomax   (tamsulosin). This medicine is sometimes used for men with prostate problems, but also can help kidney stones to pass. Its effectiveness is controversial or questionable so it is prescribed in certain situations. This medicine can lower blood pressure, and you may feel faint/lightheaded, especially when you first stand up. Be sure to get up gradually, sit down if you feel faint, and avoid activity where feeling faint would be dangerous, such as climbing ladders.  If you were given a prescription for medicine here today, be sure to read all of the information (including the package insert) that comes with your prescription.  This will include important information about the medicine, its side effects, and any warnings that you need to know about.  The pharmacist who fills the prescription can provide more information and answer questions you may have about the medicine.  If you have questions or concerns that the pharmacist cannot address, please call or return to the Emergency Department.   Remember that you can always come back to the Emergency Department if you are not able to see your regular provider in the amount of time listed above, if you get any new symptoms, or if there is anything that worries you.

## 2018-04-29 NOTE — ED NOTES
"Patient reports intermittent flank and right upper abdominal pain for past couple days but noticed worsening of symptoms today. Intermittent nausea with emesis today. Denies fevers but reports feeling \"chills\". Patient denies diarrhea/constipation. Patient arrived at around 10:50 complaining of abdominal pain. ABCs intact. Alert and oriented X4. Oriented to room and call light.    "

## 2018-04-29 NOTE — ED PROVIDER NOTES
History     Chief Complaint:  Flank Pain    HPI   Ana Wyman is a 48 year old female with no history of kidney stones but one previous UTI who presents with right flank pain which wraps to her back. Onset was 2 days ago and worsened this morning after not eating anything last night. Associated is nausea, dry heaving, dysuria and increased frequently. Tylenol has provided minimal relief. The patient denies any previous abdominal surgeries along with recent travel, fever or hematuria.     Allergies:  Clindamycin  Codeine  Shellfish Allergy    Medications:    albuterol (PROAIR HFA/PROVENTIL HFA/VENTOLIN HFA) 108 (90 BASE) MCG/ACT Inhaler  benzonatate (TESSALON PERLES) 100 MG capsule  clobetasol (TEMOVATE) 0.05 % ointment  doxycycline monohydrate 50 MG capsule  EPIPEN 2-AMERICA 0.3 MG/0.3ML IJ VIVIANA  fluticasone (FLONASE) 50 MCG/ACT spray  loratadine (CLARITIN) 10 MG tablet  mometasone-formoterol (DULERA) 200-5 MCG/ACT oral inhaler  multivitamin, therapeutic with minerals (THERA-VIT-M) TABS  Omega-3 Fatty Acids (FISH OIL PO)  predniSONE (DELTASONE) 20 MG tablet  RESTASIS MULTIDOSE 0.05 % ophthalmic emulsion     Past Medical History:    Allergic rhinitis  Cellulitis   DUB (dysfunctional uterine bleeding)   Fibroids  GERD (gastroesophageal reflux disease)   Hallux valgus  Hypersomnia with sleep apnea   Lichen sclerosus   Lymphedema bilateral with mixed lipedema  Morbid obesity  MIGUELITO on CPAP   Pre-diabetes  Sleep apnea   Tendonitis  Vitamin D deficiency  Dysmenorrhea  Uterine fibroid  Skin tags  Baker's cyst of knee  Acanthosis nigricans    Past Surgical History:    Right hand surgery  Foot surgeries x2  Hammer toes  Lichen sclerosis  Vein closure    Family History:    Father: CAD, CABG, obesity, stomach cancer, lipids, hypertension  Mother: Allergies, obesity, CAD, lipids, hypertension    Social History:  Smoking Status: Never  Smokeless Tobacco: Never used  Alcohol Use: Occasional  Marital Status:  Single     Review  of Systems   Constitutional: Negative for fever.   Cardiovascular: Negative for chest pain.   Gastrointestinal: Positive for abdominal pain.   Genitourinary: Positive for dysuria, flank pain and frequency. Negative for hematuria.   Musculoskeletal: Positive for back pain.   All other systems reviewed and are negative.      Physical Exam     Patient Vitals for the past 24 hrs:   BP Temp Temp src Resp SpO2   04/29/18 1100 (!) 138/103 98.4  F (36.9  C) Oral 20 96 %        Physical Exam  Vital signs and nursing notes reviewed.     Constitutional: Pacing in room and appears uncomfortable  HENT: Oropharynx is clear and moist  Eyes: Conjunctivae are normal bilaterally. Pupils equal  Neck: normal range of motion  Cardiovascular: Normal rate, regular rhythm, normal heart sounds.   Pulmonary/Chest: Effort normal and breath sounds normal. No respiratory distress.   Abdominal: Soft. Bowel sounds are normal. Tenderness to RUQ with palpation. No rebound or guarding. Difficult to assess for palpable splenomegaly due to body habitus.  Musculoskeletal: No joint swelling or edema.   Neurological: Alert and oriented. No focal weakness  Skin: Skin is warm and dry. No rash noted.   Psych: normal affect      Emergency Department Course     Imaging:  US Abdomen Limited  Hepatic steatosis. Otherwise normal right upper quadrant abdominal ultrasound.  As per radiology.     CT Abdomen Pelvis w/o Contrast  1. 3-4 mm distal right ureteral stone just above the ureterovesical junction with mild associated hydronephrosis on that side. Additional 1 to 2 mm nonobstructing stone in the lower pole the right kidney.  2. Partially calcified uterine fibroid.  As per radiology.     Laboratory:  CBC: WBC: 7.6, HGB: 14.1, PLT: 346  BMP: Glucose 138 (H), o/w WNL (Creatinine: 0.81)  Hepatic Panel: Bilirubin Direct <0.1. Bilirubin Total 0.4, Albumin 3.7, Protein Total 7.7, ALKPHOS 85, ALT 38, AST 27  Lipase: 124  D dimer: <0.3    UA with micro: blood large,  yeast few,  (H), squamous epithelial 3 (H), mucous present o/w negative  HCG Qualitative Urine: negative    Interventions:  1116 Toradol 30 mg IV   Zofran 4 mg IV  1309 Dilaudid 0.5 mg IV  1417 Flomax 0.4 mg PO   Norco 2 Tablet PO  1418 Dilaudid 0.5 mg IV    Emergency Department Course:  Nursing notes and vitals reviewed.  1053 I performed an exam of the patient as documented above.  Blood drawn. This was sent to the lab for further testing, results above.  IV inserted. Medicine administered as documented above.   The patient provided a urine sample here in the emergency department. This was sent for laboratory testing, findings above.   The patient was sent for a abdomen ultrasound and CT while in the emergency department, findings above.     1225 On recheck, I informed the patient of US results and discussed CT scan.  1346 I reevaluated the patient and provided an update in regards to her ED course.      I personally reviewed the laboratory results with the patient and answered all related questions prior to discharge.   Findings and plan explained to the patient. Patient discharged home with instructions regarding supportive care, medications, and reasons to return. The importance of close follow-up was reviewed.       Impression & Plan      Medical Decision Making:  Ana Wyman is a 48 year old female who presents with acute onset of right upper quadrant and right flank area pain today but she does state she had waxing and waning symptoms the last few days. On examination she did have pain that seemed to be reproducible in the right upper quadrant so at first there was a thought that she may have a biliary colic causing her symptoms so ultrasound and labs were obtained. This however did not show any definitive cause of her pain. It was noted then that she had blood in her urine so CT imaging was obtained which did confirm that she has a distal right ureterolithiasis. She was medicated for pain  control and did quite well. The patient has no suggestion of any other concerning causes of his symptoms. I felt that she was safe to be discharged home. She was given discharge instructions and reasons to return. The patient will follow up with urology if she does not pass the kidney stone in the next 2 days.    Diagnosis:    ICD-10-CM    1. Ureterolithiasis N20.1        Disposition:  Discharged    Discharge Medications:  New Prescriptions    HYDROCODONE-ACETAMINOPHEN (NORCO) 5-325 MG PER TABLET    Take 1 tablet by mouth every 6 hours as needed for severe pain maximum 6 tablet(s) per day    ONDANSETRON (ZOFRAN) 4 MG TABLET    Take 1 tablet (4 mg) by mouth every 8 hours as needed for nausea    TAMSULOSIN (FLOMAX) 0.4 MG CAPSULE    Take 1 capsule (0.4 mg) by mouth daily       Scribe Discloser:  Andrés PINTO, am serving as a scribe on 4/29/2018 at 10:48 AM to personally document services performed by Cesar Abdul MD based on my observations and the provider's statements to me.     4/29/2018   [facility] EMERGENCY DEPARTMENT       Cesar Abdul MD  04/30/18 0758

## 2018-04-29 NOTE — ED AVS SNAPSHOT
[facility] Emergency Department    [address]    [facility] [address]    Phone:  [phone]    Fax:  [phone]                                       Ana Wyman   MRN: [id number]    Department:  [facility] Emergency Department   Date of Visit:  4/29/2018           After Visit Summary Signature Page     I have received my discharge instructions, and my questions have been answered. I have discussed any challenges I see with this plan with the nurse or doctor.    ..........................................................................................................................................  Patient/Patient Representative Signature      ..........................................................................................................................................  Patient Representative Print Name and Relationship to Patient    ..................................................               ................................................  Date                                            Time    ..........................................................................................................................................  Reviewed by Signature/Title    ...................................................              ..............................................  Date                                                            Time

## 2018-04-29 NOTE — ED NOTES
Patient reports improvement in pain after medicine. No emesis after nausea medicine. MD in to see patient and discuss diagnosis, test results, and discharge plan. Patient meets discharge criteria. Discussed AVS with patient. Questions answered. Patient verbalized understanding. Patient reports being ready to go home. Patient discharged home with family by car with all necessary information.

## 2018-04-29 NOTE — LETTER
April 29, 2018      To Whom It May Concern:      Ana Wyman was seen in our Emergency Department today, 04/29/18.  I expect her condition to improve over the next 2 days.  She may return to work when improved.    Sincerely,        Cesar Abdul MD

## 2018-04-29 NOTE — ED AVS SNAPSHOT
[facility] Emergency Department    [address] [place] [address]    Phone:  [phone]    Fax:  [phone]                                       Ana Wyman   MRN: [id number]    Department:  [facility] Emergency Department   Date of Visit:  4/29/2018           Patient Information     Date Of Birth          1970        Your diagnoses for this visit were:     Ureterolithiasis        You were seen by Cesar Abdul MD.      Follow-up Information     Follow up with Clive Nguyen MD. Call in 2 days.    Specialty:  Urology    Why:  if have not passed stone    Contact information:    [address]  [place] [address]  [phone]          Follow up with Kelly Bedoya MD.    Specialties:  Internal Medicine, Pediatrics    Why:  If symptoms worsen specifically fever, uncontrolled pain and vomiting    Contact information:    [address] [place] [address]  [phone]          Discharge Instructions       Discharge Instructions  Kidney Stones    Kidney stones are a common problem that can cause a lot of pain but fortunately are usually not dangerous. Kidney stones form in the kidney and then can cause a blockage (obstruction) of the flow of urine from the kidney which leads to pain. Most patients can manage kidney stones at home (without a hospital stay).  However, sometimes your condition may be worse than it seemed at first, or may get worse with time. Most kidney stones will pass on their own, but occasionally stones may need to be removed by an urologist.    Generally, every Emergency Department visit should have a follow-up clinic visit with either a primary or a specialty clinic/provider. Please follow-up as instructed by your emergency provider today.      Return to the Emergency Department if:    Your pain is not controlled despite the medications provided or recommended.    You are vomiting (throwing up) and  cannot keep fluids or medications down.    You develop a fever (>100.4 F).    You feel much more ill or develop new symptoms.  What can I do to help myself?    Be sure to drink plenty of fluids.    If instructed to do so, strain your urine (pee) with the urine strainer you were provided with today. Your stone may look like a grain of sand or a small pebble. Collect any stones in the cup provided and bring to your follow-up appointment.    Staying active is good, and may help the stone to pass. You may do whatever you feel up to doing without restrictions.   Treatment:    Non-steroidal anti-inflammatory drugs (NSAIDs). This includes prescription medicines like Toradol  (ketorolac) and non-prescription medicines like Advil  (ibuprofen) and Nuprin  (ibuprofen) and Naproxen. These pain relievers are very effective for kidney stones.    Nausea (sick to your stomach) medication.  Nausea and vomiting are common with kidney stones, so your provider may send you home with medicine for this.     Flomax  (tamsulosin). This medicine is sometimes used for men with prostate problems, but also can help kidney stones to pass. Its effectiveness is controversial or questionable so it is prescribed in certain situations. This medicine can lower blood pressure, and you may feel faint/lightheaded, especially when you first stand up. Be sure to get up gradually, sit down if you feel faint, and avoid activity where feeling faint would be dangerous, such as climbing ladders.  If you were given a prescription for medicine here today, be sure to read all of the information (including the package insert) that comes with your prescription.  This will include important information about the medicine, its side effects, and any warnings that you need to know about.  The pharmacist who fills the prescription can provide more information and answer questions you may have about the medicine.  If you have questions or concerns that the pharmacist  cannot address, please call or return to the Emergency Department.   Remember that you can always come back to the Emergency Department if you are not able to see your regular provider in the amount of time listed above, if you get any new symptoms, or if there is anything that worries you.    Your next 10 appointments already scheduled     Apr 30, 2018 10:00 AM SHALAT   [facility] with Kelly Bedoya MD   [facility]    [address] [place] [address]  [address]  [facility] [address] [phone]              24 Hour Appointment Hotline       To make an appointment at any [facility], call [phone] ([phone]). If you don't have a family doctor or clinic, we will help you find one. [facility] are conveniently located to serve the needs of you and your family.             Review of your medicines      START taking        Dose / Directions Last dose taken    HYDROcodone-acetaminophen 5-325 MG per tablet   Commonly known as:  NORCO   Dose:  1 tablet   Quantity:  20 tablet        Take 1 tablet by mouth every 6 hours as needed for severe pain maximum 6 tablet(s) per day   Refills:  0        ondansetron 4 MG tablet   Commonly known as:  ZOFRAN   Dose:  4 mg   Quantity:  6 tablet        Take 1 tablet (4 mg) by mouth every 8 hours as needed for nausea   Refills:  0        tamsulosin 0.4 MG capsule   Commonly known as:  FLOMAX   Dose:  0.4 mg   Quantity:  7 capsule   Start taking on:  4/30/2018        Take 1 capsule (0.4 mg) by mouth daily   Refills:  1          Our records show that you are taking the medicines listed below. If these are incorrect, please call your family doctor or clinic.        Dose / Directions Last dose taken    albuterol 108 (90 Base) MCG/ACT Inhaler   Commonly known as:  PROAIR HFA/PROVENTIL HFA/VENTOLIN HFA   Dose:  2 puff   Quantity:  1 Inhaler        Inhale 2 puffs into the lungs every 6 hours as needed for shortness of  breath / dyspnea or wheezing   Refills:  0        benzonatate 100 MG capsule   Commonly known as:  TESSALON PERLES   Dose:  100 mg   Quantity:  20 capsule        Take 1 capsule (100 mg) by mouth 3 times daily as needed for cough   Refills:  0        clobetasol 0.05 % ointment   Commonly known as:  TEMOVATE   Quantity:  45 g        Apply clobetasol  0.05 percent ointment, sparingly (a dot 3 mm wide) in a thin film.  Apply to affected area only, once or twice weekly for maintenance.   Refills:  2        doxycycline monohydrate 50 MG capsule        Refills:  0        EPIPEN 2-AMERICA 0.3 MG/0.3ML injection   Quantity:  1 Device   Generic drug:  EPINEPHrine        1 TIME ONLY   Refills:  1        FISH OIL PO        Refills:  0        fluticasone 50 MCG/ACT spray   Commonly known as:  FLONASE   Dose:  1 spray   Quantity:  1 Bottle        Spray 1 spray into both nostrils daily   Refills:  1        loratadine 10 MG tablet   Commonly known as:  CLARITIN   Dose:  10 mg   Quantity:  7 tablet        Take 1 tablet (10 mg) by mouth daily   Refills:  0        mometasone-formoterol 200-5 MCG/ACT oral inhaler   Commonly known as:  DULERA   Dose:  2 puff   Quantity:  13 g        Inhale 2 puffs into the lungs 2 times daily   Refills:  0        multivitamin, therapeutic with minerals Tabs tablet   Dose:  1 tablet        Take 1 tablet by mouth daily.   Refills:  0        * predniSONE 20 MG tablet   Commonly known as:  DELTASONE   Dose:  60 mg   Quantity:  15 tablet        Take 3 tablets (60 mg) by mouth daily   Refills:  0        * predniSONE 20 MG tablet   Commonly known as:  DELTASONE   Quantity:  18 tablet        Take 3 tabs (60 mg) by mouth daily x 2 days, 2 tabs (40 mg) daily x 3 days, 1 tab (20 mg) daily x 3 days, then 1/2 tab (10 mg) x 3 days.   Refills:  0        * predniSONE 10 MG tablet   Commonly known as:  DELTASONE   Dose:  10 mg   Quantity:  3 tablet        Take 1 tablet (10 mg) by mouth daily   Refills:  0        RESTASIS  MULTIDOSE 0.05 % ophthalmic emulsion   Generic drug:  cycloSPORINE        INSTILL 1 DROP INTO BOTH EYES TWICE A DAY   Refills:  5        * Notice:  This list has 3 medication(s) that are the same as other medications prescribed for you. Read the directions carefully, and ask your doctor or other care provider to review them with you.            Information about OPIOIDS     PRESCRIPTION OPIOIDS: WHAT YOU NEED TO KNOW   You have a prescription for an opioid (narcotic) pain medicine. Opioids can cause addiction. If you have a history of chemical dependency of any type, you are at a higher risk of becoming addicted to opioids. Only take this medicine after all other options have been tried. Take it for as short a time and as few doses as possible.     Do not:    Drive. If you drive while taking these medicines, you could be arrested for driving under the influence (DUI).    Operate heavy machinery    Do any other dangerous activities while taking these medicines.     Drink any alcohol while taking these medicines.      Take with any other medicines that contain acetaminophen. Read all labels carefully. Look for the word  acetaminophen  or  Tylenol.  Ask your pharmacist if you have questions or are unsure.    Store your pills in a secure place, locked if possible. We will not replace any lost or stolen medicine. If you don t finish your medicine, please throw away (dispose) as directed by your pharmacist. The [place] [employer] has more information about safe disposal: https://www.pca.[facility].[place]/living-green/managing-unwanted-medications    All opioids tend to cause constipation. Drink plenty of water and eat foods that have a lot of fiber, such as fruits, vegetables, prune juice, apple juice and high-fiber cereal. Take a laxative (Miralax, milk of magnesia, Colace, Senna) if you don t move your bowels at least every other day.         Prescriptions were sent or printed at these locations (3  Prescriptions)                   Other Prescriptions                Printed at Department/Unit printer (3 of 3)         HYDROcodone-acetaminophen (NORCO) 5-325 MG per tablet               ondansetron (ZOFRAN) 4 MG tablet               tamsulosin (FLOMAX) 0.4 MG capsule                Procedures and tests performed during your visit     Basic metabolic panel    CBC with platelets differential    CT Abdomen Pelvis w/o Contrast    D dimer quantitative    HCG qualitative urine    Hepatic panel    Lipase    UA with Microscopic    US Abdomen Limited      Orders Needing Specimen Collection     None      Pending Results     No orders found from 4/27/2018 to 4/30/2018.            Pending Culture Results     No orders found from 4/27/2018 to 4/30/2018.            Pending Results Instructions     If you had any lab results that were not finalized at the time of your Discharge, you can call the ED Lab Result RN at [phone]. You will be contacted by this team for any positive Lab results or changes in treatment. The nurses are available 7 days a week from 10A to 6:30P.  You can leave a message 24 hours per day and they will return your call.        Test Results From Your Hospital Stay        4/29/2018 12:08 PM      Component Results     Component Value Ref Range & Units Status    Color Urine Yellow  Final    Appearance Urine Slightly Cloudy  Final    Glucose Urine Negative NEG^Negative mg/dL Final    Bilirubin Urine Negative NEG^Negative Final    Ketones Urine Negative NEG^Negative mg/dL Final    Specific Gravity Urine 1.020 1.003 - 1.035 Final    Blood Urine Large (A) NEG^Negative Final    pH Urine 5.0 5.0 - 7.0 pH Final    Protein Albumin Urine Negative NEG^Negative mg/dL Final    Urobilinogen mg/dL 0.0 0.0 - 2.0 mg/dL Final    Nitrite Urine Negative NEG^Negative Final    Leukocyte Esterase Urine Negative NEG^Negative Final    Source Midstream Urine  Final    WBC Urine 3 0 - 5 /HPF Final    RBC Urine 179 (H) 0 - 2 /HPF  Final    Yeast Urine Few (A) NEG^Negative /HPF Final    Squamous Epithelial /HPF Urine 3 (H) 0 - 1 /HPF Final    Mucous Urine Present (A) NEG^Negative /LPF Final         4/29/2018 11:59 AM      Component Results     Component Value Ref Range & Units Status    HCG Qual Urine Negative NEG^Negative Final    This test is for screening purposes.  Results should be interpreted along with   the clinical picture.  Confirmation testing is available if warranted by   ordering [id number], HCG Quantitative Pregnancy.           4/29/2018 11:52 AM      Component Results     Component Value Ref Range & Units Status    WBC 7.6 4.0 - 11.0 10e9/L Final    RBC Count 4.90 3.8 - 5.2 10e12/L Final    Hemoglobin 14.1 11.7 - 15.7 g/dL Final    Hematocrit 44.0 35.0 - 47.0 % Final    MCV 90 78 - 100 fl Final    MCH 28.8 26.5 - 33.0 pg Final    MCHC 32.0 31.5 - 36.5 g/dL Final    RDW 13.8 10.0 - 15.0 % Final    Platelet Count 346 150 - 450 10e9/L Final    Diff Method Automated Method  Final    % Neutrophils 59.6 % Final    % Lymphocytes 28.2 % Final    % Monocytes 8.1 % Final    % Eosinophils 3.0 % Final    % Basophils 0.7 % Final    % Immature Granulocytes 0.4 % Final    Nucleated RBCs 0 0 /100 Final    Absolute Neutrophil 4.5 1.6 - 8.3 10e9/L Final    Absolute Lymphocytes 2.2 0.8 - 5.3 10e9/L Final    Absolute Monocytes 0.6 0.0 - 1.3 10e9/L Final    Absolute Eosinophils 0.2 0.0 - 0.7 10e9/L Final    Absolute Basophils 0.1 0.0 - 0.2 10e9/L Final    Abs Immature Granulocytes 0.0 0 - 0.4 10e9/L Final    Absolute Nucleated RBC 0.0  Final         4/29/2018 12:11 PM      Component Results     Component Value Ref Range & Units Status    Sodium 141 133 - 144 mmol/L Final    Potassium 3.9 3.4 - 5.3 mmol/L Final    Chloride 107 94 - 109 mmol/L Final    Carbon Dioxide 23 20 - 32 mmol/L Final    Anion Gap 11 3 - 14 mmol/L Final    Glucose 138 (H) 70 - 99 mg/dL Final    Urea Nitrogen 12 7 - 30 mg/dL Final    Creatinine 0.81 0.52 - 1.04 mg/dL Final    GFR  Estimate 75 >60 mL/min/1.7m2 Final    Non  GFR Calc    GFR Estimate If Black >90 >60 mL/min/1.7m2 Final    African American GFR Calc    Calcium 9.1 8.5 - 10.1 mg/dL Final         4/29/2018 12:11 PM      Component Results     Component Value Ref Range & Units Status    Bilirubin Direct <0.1 0.0 - 0.2 mg/dL Final    Bilirubin Total 0.4 0.2 - 1.3 mg/dL Final    Albumin 3.7 3.4 - 5.0 g/dL Final    Protein Total 7.7 6.8 - 8.8 g/dL Final    Alkaline Phosphatase 85 40 - 150 U/L Final    ALT 38 0 - 50 U/L Final    AST 27 0 - 45 U/L Final         4/29/2018 12:11 PM      Component Results     Component Value Ref Range & Units Status    Lipase 124 73 - 393 U/L Final         4/29/2018 11:58 AM      Narrative     RIGHT UPPER QUADRANT ULTRASOUND 4/29/2018 11:55 AM    HISTORY:  Right upper quadrant pain and flank pain.     COMPARISON: None.    FINDINGS:    Gallbladder:  Normal with no cholelithiasis, wall thickening or focal  tenderness.      Bile ducts:   CHD is normal diameter.  No intrahepatic biliary  dilatation.    Liver:  Increased echogenicity and decreased through transmission. No  masses are seen.    Pancreas:   Normal.     Right kidney:   Normal.         Impression     IMPRESSION:  Hepatic steatosis. Otherwise normal right upper quadrant  abdominal ultrasound.    YANE GAY MD         4/29/2018  1:29 PM      Narrative     Exam: CT ABDOMEN PELVIS W/O CONTRAST  4/29/2018 12:57 PM    History: Right flank pain.    Comparison: Ultrasound on the same day.    Technique: Volumetric acquisition with reconstruction in the axial,  coronal planes through the abdomen and pelvis without contrast.  Radiation dose for this scan was reduced using automated exposure  control, adjustment of the mA and/or kV according to patient size, or  iterative reconstruction technique.    Contrast: None    Findings:   Lung Bases: Lung bases are clear. No pleural or pericardial effusion.    Abdomen: Unenhanced liver, spleen, adrenal  glands, pancreas and  gallbladder appear normal. 3-4 mm distal right ureteral stone  approximately 1-2 cm above the ureterovesical junction on that side.  There is mild associated hydronephrosis on the right. Additional 1 to  2 mm nonobstructing stone in the lower pole of the right kidney. No  left renal or ureteral calculi, no left hydronephrosis or hydroureter.    No areas of bowel wall thickening or bowel dilatation. Normal  appendix.    No free fluid. Partially calcified uterine fibroid in the fundus.  Pelvic structures otherwise normal. No abdominal or pelvic  lymphadenopathy.    Bones: No concerning lytic or sclerotic lesions.        Impression     Impression:   1. 3-4 mm distal right ureteral stone just above the ureterovesical  junction with mild associated hydronephrosis on that side. Additional  1 to 2 mm nonobstructing stone in the lower pole the right kidney.  2. Partially calcified uterine fibroid.    YANE GAY MD         4/29/2018 12:54 PM      Component Results     Component Value Ref Range & Units Status    D Dimer <0.3 0.0 - 0.50 ug/ml FEU Final    This D-dimer assay is intended for use in conjunction with a clinical pretest   probability assessment model to exclude pulmonary embolism (PE) and deep   venous thrombosis (DVT) in outpatients suspected of PE or DVT. The cut-off   value is 0.5 ug/mL FEU.                  Clinical Quality Measure: Blood Pressure Screening     Your blood pressure was checked while you were in the emergency department today. The last reading we obtained was  BP: (!) 138/103 . Please read the guidelines below about what these numbers mean and what you should do about them.  If your systolic blood pressure (the top number) is less than 120 and your diastolic blood pressure (the bottom number) is less than 80, then your blood pressure is normal. There is nothing more that you need to do about it.  If your systolic blood pressure (the top number) is 120-139 or your  diastolic blood pressure (the bottom number) is 80-89, your blood pressure may be higher than it should be. You should have your blood pressure rechecked within a year by a primary care provider.  If your systolic blood pressure (the top number) is 140 or greater or your diastolic blood pressure (the bottom number) is 90 or greater, you may have high blood pressure. High blood pressure is treatable, but if left untreated over time it can put you at risk for heart attack, stroke, or kidney failure. You should have your blood pressure rechecked by a primary care provider within the next 4 weeks.  If your provider in the emergency department today gave you specific instructions to follow-up with your doctor or provider even sooner than that, you should follow that instruction and not wait for up to 4 weeks for your follow-up visit.        Thank you for choosing [facility]       Thank you for choosing [facility] for your care. Our goal is always to provide you with excellent care. Hearing back from our patients is one way we can continue to improve our services. Please take a few minutes to complete the written survey that you may receive in the mail after you visit with us. Thank you!        [facility] Information     [facility] gives you secure access to your electronic health record. If you see a primary care provider, you can also send messages to your care team and make appointments. If you have questions, please call your primary care clinic.  If you do not have a primary care provider, please call [phone] and they will assist you.        Care EveryWhere ID     This is your Care EveryWhere ID. This could be used by other organizations to access your [facility] medical records  [id number]        Equal Access to Services     GIO GOMEZ : Hadii megan ordoñezo Soange, waaxda luqadaha, qaybta kaalmada cinda, yoselin christina. So [facility] [phone].    ATENCIÓN: zenobia Veliz  disposición servicios gratuitos de asistencia lingüística. Clementina al [phone].    We comply with applicable federal civil rights laws and [place] laws. We do not discriminate on the basis of race, color, national origin, age, disability, sex, sexual orientation, or gender identity.            After Visit Summary       This is your record. Keep this with you and show to your community pharmacist(s) and doctor(s) at your next visit.

## 2018-05-21 ENCOUNTER — OFFICE VISIT (OUTPATIENT)
Dept: UROLOGY | Facility: CLINIC | Age: 48
End: 2018-05-21
Payer: COMMERCIAL

## 2018-05-21 VITALS — OXYGEN SATURATION: 96 % | BODY MASS INDEX: 50.02 KG/M2 | HEIGHT: 64 IN | WEIGHT: 293 LBS | HEART RATE: 82 BPM

## 2018-05-21 DIAGNOSIS — N20.0 CALCULUS OF KIDNEY: Primary | ICD-10-CM

## 2018-05-21 LAB
ALBUMIN UR-MCNC: NEGATIVE MG/DL
APPEARANCE UR: CLEAR
BILIRUB UR QL STRIP: NEGATIVE
COLOR UR AUTO: YELLOW
GLUCOSE UR STRIP-MCNC: NEGATIVE MG/DL
HGB UR QL STRIP: NEGATIVE
KETONES UR STRIP-MCNC: NEGATIVE MG/DL
LEUKOCYTE ESTERASE UR QL STRIP: NEGATIVE
NITRATE UR QL: NEGATIVE
PH UR STRIP: 7 PH (ref 5–7)
SOURCE: NORMAL
SP GR UR STRIP: 1.02 (ref 1–1.03)
UROBILINOGEN UR STRIP-ACNC: 0.2 EU/DL (ref 0.2–1)

## 2018-05-21 PROCEDURE — 99202 OFFICE O/P NEW SF 15 MIN: CPT | Performed by: PHYSICIAN ASSISTANT

## 2018-05-21 PROCEDURE — 81003 URINALYSIS AUTO W/O SCOPE: CPT | Mod: QW | Performed by: PHYSICIAN ASSISTANT

## 2018-05-21 RX ORDER — ONDANSETRON 4 MG/1
4 TABLET, ORALLY DISINTEGRATING ORAL EVERY 8 HOURS PRN
Qty: 10 TABLET | Refills: 0 | Status: SHIPPED | OUTPATIENT
Start: 2018-05-21 | End: 2018-07-27

## 2018-05-21 ASSESSMENT — PAIN SCALES - GENERAL: PAINLEVEL: MILD PAIN (3)

## 2018-05-21 NOTE — MR AVS SNAPSHOT
After Visit Summary   5/21/2018    Ana Wyman    MRN: [id number]           Patient Information     Date Of Birth          1970        Visit Information        Provider Department      5/21/2018 3:30 PM Margaret Lawrence PA-C [facility] [place]        Today's Diagnoses     Calculus of kidney    -  1      Care Instructions    Standard recommendations on kidney stone prevention:  -These include maintaining fluid intake of 3 liters per day or more with a goal of making 2 or more liters of urine per day.  If alcoholic or caffeinated beverages are consumed, you need to drink water along with these beverages to maintain hydration.    -A few ounces of lemon juice concentrate a day diluted in water can help prevent stones (citrate is a stone inhibitor).    -Sodium influences calcium excretion in the urine. Limit intake of red meat, salt, and salty processed foods.    -Magnesium is also a stone inhibitor.  TheraLith XR is a magnesium citrate supplement that could be taken daily, found at your pharmacy.  -Uric acid-high levels in the blood can lead to kidney stones and gout, especially if the urine pH is low.  Reduce her protein intake, especially red meats.  -Weight loss, if overweight, can reduce the recurrence of kidney stones.  -Diabetes-if diabetic, you are at a greater risk of having kidney stones.  -Maintain calcium intake in diet through continued consumption of dairy products etc.    -Limit foods that are high in oxalate such as spinach, sweet potatoes, dark chocolate, soy products, and some nuts such as peanuts.   -Medications that can increase risk of kidney stones: Ephedrine, Guaifenesin, Triamterene, Lasix, Diamox, Topamax, Zonegran, laxatives.     -Additional/different recommendations pending any stone analysis.      Please call [phone] to schedule CT Scan.          Follow-ups after your visit        Your next 10 appointments already scheduled  "    Jun 11, 2018 10:00 AM CDT   PHYSICAL with Kelly Bedoya MD   [facility] [place] ([facility])    [address]  [address]  [place] [address] [phone]              Future tests that were ordered for you today     Open Future Orders        Priority Expected Expires Ordered    CT Abdomen Pelvis w/o Contrast Routine  5/21/2019 5/21/2018            Who to contact     If you have questions or need follow up information about today's clinic visit or your schedule please contact [facility] [place] directly at [phone].  Normal or non-critical lab and imaging results will be communicated to you by Novogeniehart, letter or phone within 4 business days after the clinic has received the results. If you do not hear from us within 7 days, please contact the clinic through Robotic Warest or phone. If you have a critical or abnormal lab result, we will notify you by phone as soon as possible.  Submit refill requests through SeatID or call your pharmacy and they will forward the refill request to us. Please allow 3 business days for your refill to be completed.          Additional Information About Your Visit        Novogeniehart Information     SeatID gives you secure access to your electronic health record. If you see a primary care provider, you can also send messages to your care team and make appointments. If you have questions, please call your primary care clinic.  If you do not have a primary care provider, please call [phone] and they will assist you.        Care EveryWhere ID     This is your Care EveryWhere ID. This could be used by other organizations to access your [place] medical records  [id number]        Your Vitals Were     Pulse Height Pulse Oximetry BMI (Body Mass Index)          82 1.626 m (5' 4\") 96% 57.33 kg/m2         Blood Pressure from Last 3 Encounters:   04/29/18 134/78   04/02/18 129/75   03/29/18 (P) 110/62    Weight " from Last 3 Encounters:   05/21/18 (!) 151.5 kg (334 lb)   04/02/18 (!) 151.9 kg (334 lb 14.4 oz)   03/29/18 (!) 151.9 kg (334 lb 14.4 oz)              We Performed the Following     UA without Microscopic          Today's Medication Changes          These changes are accurate as of 5/21/18  4:01 PM.  If you have any questions, ask your nurse or doctor.               Start taking these medicines.        Dose/Directions    ondansetron 4 MG ODT tab   Commonly known as:  ZOFRAN ODT   Used for:  Calculus of kidney   Started by:  Margaret Lawrence PA-C        Dose:  4 mg   Take 1 tablet (4 mg) by mouth every 8 hours as needed for nausea   Quantity:  10 tablet   Refills:  0            Where to get your medicines      These medications were sent to [facility] [place] [address] [place] [address], [facility] [address]     Phone:  [phone]     ondansetron 4 MG ODT tab                Primary Care Provider Office Phone # Fax #    Kelly Bedoya [phone] [address] [place] [address]        Equal Access to Services     GIO GOMEZ AH: Hadii megan ku hadasho Soomaali, waaxda luqadaha, qaybta kaalmada adeegyada, yoselin terryin haymiguen jakub christina. So [facility] [phone].    ATENCIÓN: Si habla español, tiene a tejada disposición servicios gratuitos de asistencia lingüística. Llame al [phone].    We comply with applicable federal civil rights laws and [place] laws. We do not discriminate on the basis of race, color, national origin, age, disability, sex, sexual orientation, or gender identity.            Thank you!     Thank you for choosing [facility] [place]  for your care. Our goal is always to provide you with excellent care. Hearing back from our patients is one way we can continue to improve our services. Please take a few minutes to complete the written survey that you may receive in the  mail after your visit with us. Thank you!             Your Updated Medication List - Protect others around you: Learn how to safely use, store and throw away your medicines at www.disposemymeds.org.          This list is accurate as of 5/21/18  4:01 PM.  Always use your most recent med list.                   Brand Name Dispense Instructions for use Diagnosis    albuterol 108 (90 Base) MCG/ACT Inhaler    PROAIR HFA/PROVENTIL HFA/VENTOLIN HFA    1 Inhaler    Inhale 2 puffs into the lungs every 6 hours as needed for shortness of breath / dyspnea or wheezing    Mild intermittent exacerbation of reactive airway disease       benzonatate 100 MG capsule    TESSALON PERLES    20 capsule    Take 1 capsule (100 mg) by mouth 3 times daily as needed for cough    Acute bronchitis, unspecified organism       clobetasol 0.05 % ointment    TEMOVATE    45 g    Apply clobetasol  0.05 percent ointment, sparingly (a dot 3 mm wide) in a thin film.  Apply to affected area only, once or twice weekly for maintenance.    Lichen sclerosus       doxycycline monohydrate 50 MG capsule           EPIPEN 2-AMERICA 0.3 MG/0.3ML injection   Generic drug:  EPINEPHrine     1 Device    1 TIME ONLY    Food allergies       FISH OIL PO           fluticasone 50 MCG/ACT spray    FLONASE    1 Bottle    Spray 1 spray into both nostrils daily    Dysfunction of Eustachian tube, left       HYDROcodone-acetaminophen 5-325 MG per tablet    NORCO    20 tablet    Take 1 tablet by mouth every 6 hours as needed for severe pain maximum 6 tablet(s) per day        loratadine 10 MG tablet    CLARITIN    7 tablet    Take 1 tablet (10 mg) by mouth daily        mometasone-formoterol 200-5 MCG/ACT oral inhaler    DULERA    13 g    Inhale 2 puffs into the lungs 2 times daily    Wheezing       multivitamin, therapeutic with minerals Tabs tablet      Take 1 tablet by mouth daily.        ondansetron 4 MG ODT tab    ZOFRAN ODT    10 tablet    Take 1 tablet (4 mg) by mouth every 8  hours as needed for nausea    Calculus of kidney       ondansetron 4 MG tablet    ZOFRAN    6 tablet    Take 1 tablet (4 mg) by mouth every 8 hours as needed for nausea        * predniSONE 20 MG tablet    DELTASONE    15 tablet    Take 3 tablets (60 mg) by mouth daily    Mild intermittent exacerbation of reactive airway disease       * predniSONE 20 MG tablet    DELTASONE    18 tablet    Take 3 tabs (60 mg) by mouth daily x 2 days, 2 tabs (40 mg) daily x 3 days, 1 tab (20 mg) daily x 3 days, then 1/2 tab (10 mg) x 3 days.    Wheezing, Acute bronchitis, unspecified organism       * predniSONE 10 MG tablet    DELTASONE    3 tablet    Take 1 tablet (10 mg) by mouth daily    Acute bronchitis, unspecified organism       RESTASIS MULTIDOSE 0.05 % ophthalmic emulsion   Generic drug:  cycloSPORINE      INSTILL 1 DROP INTO BOTH EYES TWICE A DAY        tamsulosin 0.4 MG capsule    FLOMAX    7 capsule    Take 1 capsule (0.4 mg) by mouth daily        * Notice:  This list has 3 medication(s) that are the same as other medications prescribed for you. Read the directions carefully, and ask your doctor or other care provider to review them with you.

## 2018-05-21 NOTE — LETTER
5/21/2018       RE: Ana Wyman  [address] [address]     Dear Colleague,    Thank you for referring your patient, Ana Wyman, to the [facility] [place] at [facility]. Please see a copy of my visit note below.    CC: Right ureteral stone.    HPI: It is a pleasure to see Ms. Aan Wyman, a 48 year old female seen today in the urology clinic in consultation from Dr. Abdul for evaluation of the above. This was first detected on CT in the ED on 4/29/18 after the patient presented complaining of acute renal colic symptoms. The stone measures 3-4 mm and is located in the distal right ureter (just above the UVJ) with associated hydronephrosis. UA in the ED was negative for infection. BMP revealed Cr and Ca to be normal. With pain under good control, the patient was discharged with a prescription for tamsulosin and instructions to follow up with urology.    Currently, the patient reports intermittent right flank pain. The patient has not been straining their urine. Denies gross hematuria, dysuria, fevers, chills, N/V. No prior history of kidney stones.    RECENT IMAGING:  Exam: CT ABDOMEN PELVIS W/O CONTRAST  4/29/2018 12:57 PM     History: Right flank pain.     Comparison: Ultrasound on the same day.     Technique: Volumetric acquisition with reconstruction in the axial,  coronal planes through the abdomen and pelvis without contrast.  Radiation dose for this scan was reduced using automated exposure  control, adjustment of the mA and/or kV according to patient size, or  iterative reconstruction technique.     Contrast: None     Findings:   Lung Bases: Lung bases are clear. No pleural or pericardial effusion.     Abdomen: Unenhanced liver, spleen, adrenal glands, pancreas and  gallbladder appear normal. 3-4 mm distal right ureteral stone  approximately 1-2 cm above the ureterovesical junction on  that side.  There is mild associated hydronephrosis on the right. Additional 1 to  2 mm nonobstructing stone in the lower pole of the right kidney. No  left renal or ureteral calculi, no left hydronephrosis or hydroureter.     No areas of bowel wall thickening or bowel dilatation. Normal  appendix.     No free fluid. Partially calcified uterine fibroid in the fundus.  Pelvic structures otherwise normal. No abdominal or pelvic  lymphadenopathy.     Bones: No concerning lytic or sclerotic lesions.       Impression:   1. 3-4 mm distal right ureteral stone just above the ureterovesical  junction with mild associated hydronephrosis on that side. Additional  1 to 2 mm nonobstructing stone in the lower pole the right kidney.  2. Partially calcified uterine fibroid.  Past Medical History:   Diagnosis Date     Allergic rhinitis, cause unspecified      Cellulitis 2005    2 episodes, one with hospitalization [facility]     DUB (dysfunctional uterine bleeding)     Neg EMB 2012     Fibroids      GERD (gastroesophageal reflux disease)      Hallux valgus (acquired)      Hypersomnia with sleep apnea, unspecified     using CPAP     Lichen sclerosus 7/14/2011     Lymph edema 2010- present     Lymphedema bilateral with mixed lipedema. 9/30/2015     Lymphedema bilateral with mixed lipedema. 9/30/2015     Morbid obesity (H) 3/19/2013     MIGUELITO on CPAP 3/19/2013    Sleep study in 2004 and 2012       Pre-diabetes      Sleep apnea      Tendonitis currently     Vitamin D deficiency 3/14/2015     Past Surgical History:   Procedure Laterality Date     C NONSPECIFIC PROCEDURE  1994    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     C NONSPECIFIC PROCEDURE  1999,2001    Foot surgeries x 2 (bunionectomy)     C NONSPECIFIC PROCEDURE  2000    hammer toes     COLONOSCOPY  5/15/2013    Procedure: COLONOSCOPY;  Colonoscopy;  Surgeon: Kota Blanco MD;  Location:  GI     lichen sclerosis       ORTHOPEDIC SURGERY       vein closure  12/16      Current Outpatient Prescriptions   Medication Sig Dispense Refill     loratadine (CLARITIN) 10 MG tablet Take 1 tablet (10 mg) by mouth daily 7 tablet 0     multivitamin, therapeutic with minerals (THERA-VIT-M) TABS Take 1 tablet by mouth daily.       Omega-3 Fatty Acids (FISH OIL PO)        ondansetron (ZOFRAN) 4 MG tablet Take 1 tablet (4 mg) by mouth every 8 hours as needed for nausea 6 tablet 0     albuterol (PROAIR HFA/PROVENTIL HFA/VENTOLIN HFA) 108 (90 BASE) MCG/ACT Inhaler Inhale 2 puffs into the lungs every 6 hours as needed for shortness of breath / dyspnea or wheezing (Patient not taking: Reported on 5/21/2018) 1 Inhaler 0     benzonatate (TESSALON PERLES) 100 MG capsule Take 1 capsule (100 mg) by mouth 3 times daily as needed for cough (Patient not taking: Reported on 5/21/2018) 20 capsule 0     clobetasol (TEMOVATE) 0.05 % ointment Apply clobetasol  0.05 percent ointment, sparingly (a dot 3 mm wide) in a thin film.  Apply to affected area only, once or twice weekly for maintenance. (Patient not taking: Reported on 5/21/2018) 45 g 2     doxycycline monohydrate 50 MG capsule        EPIPEN 2-AMERICA 0.3 MG/0.3ML IJ VIVIANA 1 TIME ONLY 1 Device 1     fluticasone (FLONASE) 50 MCG/ACT spray Spray 1 spray into both nostrils daily (Patient not taking: Reported on 5/21/2018) 1 Bottle 1     HYDROcodone-acetaminophen (NORCO) 5-325 MG per tablet Take 1 tablet by mouth every 6 hours as needed for severe pain maximum 6 tablet(s) per day (Patient not taking: Reported on 5/21/2018) 20 tablet 0     mometasone-formoterol (DULERA) 200-5 MCG/ACT oral inhaler Inhale 2 puffs into the lungs 2 times daily 13 g 0     predniSONE (DELTASONE) 10 MG tablet Take 1 tablet (10 mg) by mouth daily (Patient not taking: Reported on 5/21/2018) 3 tablet 0     predniSONE (DELTASONE) 20 MG tablet Take 3 tablets (60 mg) by mouth daily (Patient not taking: Reported on 5/21/2018) 15 tablet 0     predniSONE (DELTASONE) 20 MG tablet Take 3 tabs (60  mg) by mouth daily x 2 days, 2 tabs (40 mg) daily x 3 days, 1 tab (20 mg) daily x 3 days, then 1/2 tab (10 mg) x 3 days. (Patient not taking: Reported on 5/21/2018) 18 tablet 0     RESTASIS MULTIDOSE 0.05 % ophthalmic emulsion INSTILL 1 DROP INTO BOTH EYES TWICE A DAY  5     tamsulosin (FLOMAX) 0.4 MG capsule Take 1 capsule (0.4 mg) by mouth daily (Patient not taking: Reported on 5/21/2018) 7 capsule 1     Allergies   Allergen Reactions     Clindamycin Diarrhea     Codeine Nausea and Vomiting     Shellfish Allergy      FAMILY HISTORY: There is no family h/o  malignancy.  There is family h/o urolithiasis (dad, brother, mom).     SOCIAL HISTORY: The patient does not smoke cigarettes. Denies EtOH and illicit drug abuse.     ROS: A comprehensive 14 point ROS was obtained and otherwise negative except for that outlined above in the HPI.    GENERAL PHYSICAL EXAM:   Vitals: Stable, afebrile, reviewed in EMR  GENERAL: Well groomed, well developed, well nourished female in NAD.  HEAD: Normocephalic. Atraumatic.  RESPIRATORY: No increased respiratory effort.   GI: Soft, NT  MS: Full ROM in extremities, gait normal, normal muscle tone  SKIN: Warm to touch, dry.  NEURO: Alert and oriented x 3.  PSYCH: Normal mood and affect, pleasant and agreeable during interview and exam.    UA today neg.      ASSESSMENT AND PLAN: 48 year old female with a right-sided distal ureteral calculus with associated  hydronephrosis. Symptoms currently controlled.   - CT w/o to eval for passage. Will call with results. Will need f/u of right nonobstructing kidney stone with KUB in 6-12 months.    - Warning signs discussed including fevers, chills, N/V, gross hematuria, severe pain that is refractory to medications which should prompt more urgent evaluation. Patient understands to proceed to the ER should these warning signs occur outside of clinic hours.    During counseling for this visit, we covered the natural history of kidney stones, the risk  of progression to symptomatic pain/infection, and the possibility of renal failure/kidney damage.  We covered treatment options including observation, medical expulsive therapy, ureteroscopy/laser/stent, extracorporeal shock wave lithotripsy (ESWL), and percutaneous nephrolithotomy (PCNL).      Standard recommendations on kidney stone prevention were provided.    I have enjoyed participating in the medical care of this patient and will continue to keep you updated on her progress.  Please do not hesitate to contact me with any questions or concerns.      Margaret Lawrence PA-C  [facility]    20 minutes were spent with the patient today, > 50% in counseling and coordination of care of kidney stone.

## 2018-05-21 NOTE — NURSING NOTE
Pt had pain and nausea from a stone last week.  Pt sometimes has R flank to the abd from time to time.  Pt denies gross hem.  Pt has 2 stones according to BRODY Ojeda, CMA

## 2018-05-21 NOTE — PROGRESS NOTES
CC: Right ureteral stone.    HPI: It is a pleasure to see Ms. Ana Wyman, a 48 year old female seen today in the urology clinic in consultation from Dr. Abdul for evaluation of the above. This was first detected on CT in the ED on 4/29/18 after the patient presented complaining of acute renal colic symptoms. The stone measures 3-4 mm and is located in the distal right ureter (just above the UVJ) with associated hydronephrosis. UA in the ED was negative for infection. BMP revealed Cr and Ca to be normal. With pain under good control, the patient was discharged with a prescription for tamsulosin and instructions to follow up with urology.    Currently, the patient reports intermittent right flank pain. The patient has not been straining their urine. Denies gross hematuria, dysuria, fevers, chills, N/V. No prior history of kidney stones.    RECENT IMAGING:  Exam: CT ABDOMEN PELVIS W/O CONTRAST  4/29/2018 12:57 PM     History: Right flank pain.     Comparison: Ultrasound on the same day.     Technique: Volumetric acquisition with reconstruction in the axial,  coronal planes through the abdomen and pelvis without contrast.  Radiation dose for this scan was reduced using automated exposure  control, adjustment of the mA and/or kV according to patient size, or  iterative reconstruction technique.     Contrast: None     Findings:   Lung Bases: Lung bases are clear. No pleural or pericardial effusion.     Abdomen: Unenhanced liver, spleen, adrenal glands, pancreas and  gallbladder appear normal. 3-4 mm distal right ureteral stone  approximately 1-2 cm above the ureterovesical junction on that side.  There is mild associated hydronephrosis on the right. Additional 1 to  2 mm nonobstructing stone in the lower pole of the right kidney. No  left renal or ureteral calculi, no left hydronephrosis or hydroureter.     No areas of bowel wall thickening or bowel dilatation. Normal  appendix.     No free fluid. Partially  calcified uterine fibroid in the fundus.  Pelvic structures otherwise normal. No abdominal or pelvic  lymphadenopathy.     Bones: No concerning lytic or sclerotic lesions.       Impression:   1. 3-4 mm distal right ureteral stone just above the ureterovesical  junction with mild associated hydronephrosis on that side. Additional  1 to 2 mm nonobstructing stone in the lower pole the right kidney.  2. Partially calcified uterine fibroid.    Past Medical History:   Diagnosis Date     Allergic rhinitis, cause unspecified      Cellulitis 2005    2 episodes, one with hospitalization [facility]     DUB (dysfunctional uterine bleeding)     Neg EMB 2012     Fibroids      GERD (gastroesophageal reflux disease)      Hallux valgus (acquired)      Hypersomnia with sleep apnea, unspecified     using CPAP     Lichen sclerosus 7/14/2011     Lymph edema 2010- present     Lymphedema bilateral with mixed lipedema. 9/30/2015     Lymphedema bilateral with mixed lipedema. 9/30/2015     Morbid obesity (H) 3/19/2013     MIGUELITO on CPAP 3/19/2013    Sleep study in 2004 and 2012       Pre-diabetes      Sleep apnea      Tendonitis currently     Vitamin D deficiency 3/14/2015       Past Surgical History:   Procedure Laterality Date     C NONSPECIFIC PROCEDURE  1994    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     C NONSPECIFIC PROCEDURE  1999,2001    Foot surgeries x 2 (bunionectomy)     C NONSPECIFIC PROCEDURE  2000    hammer toes     COLONOSCOPY  5/15/2013    Procedure: COLONOSCOPY;  Colonoscopy;  Surgeon: Kota Blanco MD;  Location:  GI     lichen sclerosis       ORTHOPEDIC SURGERY       vein closure  12/16       Current Outpatient Prescriptions   Medication Sig Dispense Refill     loratadine (CLARITIN) 10 MG tablet Take 1 tablet (10 mg) by mouth daily 7 tablet 0     multivitamin, therapeutic with minerals (THERA-VIT-M) TABS Take 1 tablet by mouth daily.       Omega-3 Fatty Acids (FISH OIL PO)        ondansetron (ZOFRAN) 4 MG tablet  Take 1 tablet (4 mg) by mouth every 8 hours as needed for nausea 6 tablet 0     albuterol (PROAIR HFA/PROVENTIL HFA/VENTOLIN HFA) 108 (90 BASE) MCG/ACT Inhaler Inhale 2 puffs into the lungs every 6 hours as needed for shortness of breath / dyspnea or wheezing (Patient not taking: Reported on 5/21/2018) 1 Inhaler 0     benzonatate (TESSALON PERLES) 100 MG capsule Take 1 capsule (100 mg) by mouth 3 times daily as needed for cough (Patient not taking: Reported on 5/21/2018) 20 capsule 0     clobetasol (TEMOVATE) 0.05 % ointment Apply clobetasol  0.05 percent ointment, sparingly (a dot 3 mm wide) in a thin film.  Apply to affected area only, once or twice weekly for maintenance. (Patient not taking: Reported on 5/21/2018) 45 g 2     doxycycline monohydrate 50 MG capsule        EPIPEN 2-AMERICA 0.3 MG/0.3ML IJ VIVIANA 1 TIME ONLY 1 Device 1     fluticasone (FLONASE) 50 MCG/ACT spray Spray 1 spray into both nostrils daily (Patient not taking: Reported on 5/21/2018) 1 Bottle 1     HYDROcodone-acetaminophen (NORCO) 5-325 MG per tablet Take 1 tablet by mouth every 6 hours as needed for severe pain maximum 6 tablet(s) per day (Patient not taking: Reported on 5/21/2018) 20 tablet 0     mometasone-formoterol (DULERA) 200-5 MCG/ACT oral inhaler Inhale 2 puffs into the lungs 2 times daily 13 g 0     predniSONE (DELTASONE) 10 MG tablet Take 1 tablet (10 mg) by mouth daily (Patient not taking: Reported on 5/21/2018) 3 tablet 0     predniSONE (DELTASONE) 20 MG tablet Take 3 tablets (60 mg) by mouth daily (Patient not taking: Reported on 5/21/2018) 15 tablet 0     predniSONE (DELTASONE) 20 MG tablet Take 3 tabs (60 mg) by mouth daily x 2 days, 2 tabs (40 mg) daily x 3 days, 1 tab (20 mg) daily x 3 days, then 1/2 tab (10 mg) x 3 days. (Patient not taking: Reported on 5/21/2018) 18 tablet 0     RESTASIS MULTIDOSE 0.05 % ophthalmic emulsion INSTILL 1 DROP INTO BOTH EYES TWICE A DAY  5     tamsulosin (FLOMAX) 0.4 MG capsule Take 1 capsule (0.4  mg) by mouth daily (Patient not taking: Reported on 5/21/2018) 7 capsule 1       Allergies   Allergen Reactions     Clindamycin Diarrhea     Codeine Nausea and Vomiting     Shellfish Allergy        FAMILY HISTORY: There is no family h/o  malignancy.  There is family h/o urolithiasis (dad, brother, mom).     SOCIAL HISTORY: The patient does not smoke cigarettes. Denies EtOH and illicit drug abuse.     ROS: A comprehensive 14 point ROS was obtained and otherwise negative except for that outlined above in the HPI.    GENERAL PHYSICAL EXAM:   Vitals: Stable, afebrile, reviewed in EMR  GENERAL: Well groomed, well developed, well nourished female in NAD.  HEAD: Normocephalic. Atraumatic.  RESPIRATORY: No increased respiratory effort.   GI: Soft, NT  MS: Full ROM in extremities, gait normal, normal muscle tone  SKIN: Warm to touch, dry.  NEURO: Alert and oriented x 3.  PSYCH: Normal mood and affect, pleasant and agreeable during interview and exam.    UA today neg.      ASSESSMENT AND PLAN: 48 year old female with a right-sided distal ureteral calculus with associated  hydronephrosis. Symptoms currently controlled.   - CT w/o to eval for passage. Will call with results. Will need f/u of right nonobstructing kidney stone with KUB in 6-12 months.    - Warning signs discussed including fevers, chills, N/V, gross hematuria, severe pain that is refractory to medications which should prompt more urgent evaluation. Patient understands to proceed to the ER should these warning signs occur outside of clinic hours.    During counseling for this visit, we covered the natural history of kidney stones, the risk of progression to symptomatic pain/infection, and the possibility of renal failure/kidney damage.  We covered treatment options including observation, medical expulsive therapy, ureteroscopy/laser/stent, extracorporeal shock wave lithotripsy (ESWL), and percutaneous nephrolithotomy (PCNL).      Standard recommendations on  kidney stone prevention were provided.    I have enjoyed participating in the medical care of this patient and will continue to keep you updated on her progress.  Please do not hesitate to contact me with any questions or concerns.      Margaret Lawrence PA-C  [facility]    20 minutes were spent with the patient today, > 50% in counseling and coordination of care of kidney stone.

## 2018-05-22 ENCOUNTER — HOSPITAL ENCOUNTER (OUTPATIENT)
Dept: CT IMAGING | Facility: CLINIC | Age: 48
Discharge: HOME | End: 2018-05-22
Attending: PHYSICIAN ASSISTANT | Admitting: PHYSICIAN ASSISTANT
Payer: COMMERCIAL

## 2018-05-22 DIAGNOSIS — N20.0 CALCULUS OF KIDNEY: ICD-10-CM

## 2018-05-22 PROCEDURE — 74176 CT ABD & PELVIS W/O CONTRAST: CPT

## 2018-06-11 ENCOUNTER — OFFICE VISIT (OUTPATIENT)
Dept: PEDIATRICS | Facility: CLINIC | Age: 48
End: 2018-06-11
Payer: COMMERCIAL

## 2018-06-11 VITALS
HEART RATE: 92 BPM | SYSTOLIC BLOOD PRESSURE: 118 MMHG | DIASTOLIC BLOOD PRESSURE: 82 MMHG | WEIGHT: 293 LBS | HEIGHT: 64 IN | BODY MASS INDEX: 50.02 KG/M2 | TEMPERATURE: 98.7 F

## 2018-06-11 DIAGNOSIS — L90.0 LICHEN SCLEROSUS: ICD-10-CM

## 2018-06-11 DIAGNOSIS — G47.33 OSA ON CPAP: ICD-10-CM

## 2018-06-11 DIAGNOSIS — Z12.11 SPECIAL SCREENING FOR MALIGNANT NEOPLASMS, COLON: ICD-10-CM

## 2018-06-11 DIAGNOSIS — J45.20 MILD INTERMITTENT ASTHMA WITHOUT COMPLICATION: ICD-10-CM

## 2018-06-11 DIAGNOSIS — R10.31 ABDOMINAL PAIN, RIGHT LOWER QUADRANT: ICD-10-CM

## 2018-06-11 DIAGNOSIS — Z12.31 VISIT FOR SCREENING MAMMOGRAM: ICD-10-CM

## 2018-06-11 DIAGNOSIS — K76.0 FATTY INFILTRATION OF LIVER: ICD-10-CM

## 2018-06-11 DIAGNOSIS — N20.0 CALCULUS OF KIDNEY: ICD-10-CM

## 2018-06-11 DIAGNOSIS — I89.0 LYMPHEDEMA: ICD-10-CM

## 2018-06-11 DIAGNOSIS — E66.01 MORBID OBESITY (H): ICD-10-CM

## 2018-06-11 DIAGNOSIS — Z00.00 ROUTINE GENERAL MEDICAL EXAMINATION AT A HEALTH CARE FACILITY: Primary | ICD-10-CM

## 2018-06-11 PROCEDURE — 99214 OFFICE O/P EST MOD 30 MIN: CPT | Mod: 25 | Performed by: PEDIATRICS

## 2018-06-11 PROCEDURE — 99396 PREV VISIT EST AGE 40-64: CPT | Performed by: PEDIATRICS

## 2018-06-11 RX ORDER — CLOBETASOL PROPIONATE 0.5 MG/G
OINTMENT TOPICAL
Qty: 45 G | Refills: 2 | Status: SHIPPED | OUTPATIENT
Start: 2018-06-11 | End: 2021-01-18

## 2018-06-11 RX ORDER — ALBUTEROL SULFATE 90 UG/1
2 AEROSOL, METERED RESPIRATORY (INHALATION) EVERY 6 HOURS PRN
Qty: 2 INHALER | Refills: 3 | Status: SHIPPED | OUTPATIENT
Start: 2018-06-11 | End: 2018-11-23

## 2018-06-11 ASSESSMENT — ENCOUNTER SYMPTOMS
PARESTHESIAS: 0
WEAKNESS: 0
ABDOMINAL PAIN: 1
CONSTIPATION: 0
HEMATOCHEZIA: 0
CHILLS: 0
FREQUENCY: 0
SHORTNESS OF BREATH: 0
SORE THROAT: 0
DIZZINESS: 0
ARTHRALGIAS: 0
NAUSEA: 1
COUGH: 0
NERVOUS/ANXIOUS: 0
JOINT SWELLING: 0
PALPITATIONS: 0
HEARTBURN: 1
DIARRHEA: 0
HEADACHES: 0
MYALGIAS: 0
EYE PAIN: 1
HEMATURIA: 0
DYSURIA: 0
FEVER: 0

## 2018-06-11 NOTE — LETTER
My Asthma Action Plan  Name: Ana Wyman   YOB: 1970  Date: 6/11/2018   My doctor: Kelly Bedoya MD   My clinic: [facility]        My Control Medicine: Loratadine 10 mg tab  My Rescue Medicine: Albuterol (Proair/Ventolin/Proventil) inhaler 108/90 Base   My Asthma Severity: intermittent  Avoid your asthma triggers: upper respiratory infections               GREEN ZONE   Good Control    I feel good    No cough or wheeze    Can work, sleep and play without asthma symptoms       Take your asthma control medicine every day.     1. If exercise triggers your asthma, take your rescue medication    15 minutes before exercise or sports, and    During exercise if you have asthma symptoms  2. Spacer to use with inhaler: If you have a spacer, make sure to use it with your inhaler             YELLOW ZONE Getting Worse  I have ANY of these:    I do not feel good    Cough or wheeze    Chest feels tight    Wake up at night   1. Keep taking your Green Zone medications  2. Start taking your rescue medicine:    every 20 minutes for up to 1 hour. Then every 4 hours for 24-48 hours.  3. If you stay in the Yellow Zone for more than 12-24 hours, contact your doctor.  4. If you do not return to the Green Zone in 12-24 hours or you get worse, start taking your oral steroid medicine if prescribed by your provider.           RED ZONE Medical Alert - Get Help  I have ANY of these:    I feel awful    Medicine is not helping    Breathing getting harder    Trouble walking or talking    Nose opens wide to breathe       1. Take your rescue medicine NOW  2. If your provider has prescribed an oral steroid medicine, start taking it NOW  3. Call your doctor NOW  4. If you are still in the Red Zone after 20 minutes and you have not reached your doctor:    Take your rescue medicine again and    Call 911 or go to the emergency room right away    See your regular doctor within 2 weeks of an Emergency Room or Urgent  Care visit for follow-up treatment.          Annual Reminders:  Meet with Asthma Educator,  Flu Shot in the Fall, consider Pneumonia Vaccination for patients with asthma (aged 19 and older).    Pharmacy:    [id number] [place], [place] - [address]  [id number] - [place], [place] - [address]  [id number] - [place], [place] - [address]  AT [facility] [place], [place] - [address]                       Asthma Triggers  How To Control Things That Make Your Asthma Worse    Triggers are things that make your asthma worse.  Look at the list below to help you find your triggers and what you can do about them.  You can help prevent asthma flare-ups by staying away from your triggers.      Trigger                                                          What you can do   Cigarette Smoke  Tobacco smoke can make asthma worse. Do not allow smoking in your home, car or around you.  Be sure no one smokes at a child s day care or school.  If you smoke, ask your health care provider for ways to help you quit.  Ask family members to quit too.  Ask your health care provider for a referral to Quit Plan to help you quit smoking, or call 5-241-047-PLAN.     Colds, Flu, Bronchitis  These are common triggers of asthma. Wash your hands often.  Don t touch your eyes, nose or mouth.  Get a flu shot every year.     Dust Mites  These are tiny bugs that live in cloth or carpet. They are too small to see. Wash sheets and blankets in hot water every week.   Encase pillows and mattress in dust mite proof covers.  Avoid having carpet if you can. If you have carpet, vacuum weekly.   Use a dust mask and HEPA vacuum.   Pollen and Outdoor Mold  Some people are allergic to trees, grass, or weed pollen, or molds. Try to keep your windows closed.  Limit time out doors when pollen count is high.   Ask you health care provider about taking medicine during  allergy season.     Animal Dander  Some people are allergic to skin flakes, urine or saliva from pets with fur or feathers. Keep pets with fur or feathers out of your home.    If you can t keep the pet outdoors, then keep the pet out of your bedroom.  Keep the bedroom door closed.  Keep pets off cloth furniture and away from stuffed toys.     Mice, Rats, and Cockroaches  Some people are allergic to the waste from these pests.   Cover food and garbage.  Clean up spills and food crumbs.  Store grease in the refrigerator.   Keep food out of the bedroom.   Indoor Mold  This can be a trigger if your home has high moisture. Fix leaking faucets, pipes, or other sources of water.   Clean moldy surfaces.  Dehumidify basement if it is damp and smelly.   Smoke, Strong Odors, and Sprays  These can reduce air quality. Stay away from strong odors and sprays, such as perfume, powder, hair spray, paints, smoke incense, paint, cleaning products, candles and new carpet.   Exercise or Sports  Some people with asthma have this trigger. Be active!  Ask your doctor about taking medicine before sports or exercise to prevent symptoms.    Warm up for 5-10 minutes before and after sports or exercise.     Other Triggers of Asthma  Cold air:  Cover your nose and mouth with a scarf.  Sometimes laughing or crying can be a trigger.  Some medicines and food can trigger asthma.

## 2018-06-11 NOTE — PATIENT INSTRUCTIONS
Expect a phone call for your consultation with liver doctor at [facility] and for your colonoscopy    [facility] [phone] - call to schedule your pelvic US    Schedule here or in [place] for gynecology - Dr Perez, Dr Paul    Referral in place for bariatric clinic - call insurance for coverage    Albuterol and steroid cream refilled    Come back for fasting lab visit when it works for you for cholesterol    Schedule your mammogram anytime - we have a machine here - [phone]            Preventive Health Recommendations  Female Ages 40 to 49    Yearly exam:     See your health care provider every year in order to  1. Review health changes.   2. Discuss preventive care.    3. Review your medicines if your doctor prescribed any.      Get a Pap test every three years (unless you have an abnormal result and your provider advises testing more often).      If you get Pap tests with HPV test, you only need to test every 5 years, unless you have an abnormal result. You do not need a Pap test if your uterus was removed (hysterectomy) and you have not had cancer.      You should be tested each year for STDs (sexually transmitted diseases), if you're at risk.       Ask your doctor if you should have a mammogram.      Have a colonoscopy (test for colon cancer) if someone in your family has had colon cancer or polyps before age 50.       Have a cholesterol test every 5 years.       Have a diabetes test (fasting glucose) after age 45. If you are at risk for diabetes, you should have this test every 3 years.    Shots: Get a flu shot each year. Get a tetanus shot every 10 years.     Nutrition:     Eat at least 5 servings of fruits and vegetables each day.    Eat whole-grain bread, whole-wheat pasta and brown rice instead of white grains and rice.    Talk to your provider about Calcium and Vitamin D.     Lifestyle    Exercise at least 150 minutes a week (an average of 30 minutes a day, 5 days a  week). This will help you control your weight and prevent disease.    Limit alcohol to one drink per day.    No smoking.     Wear sunscreen to prevent skin cancer.    See your dentist every six months for an exam and cleaning.

## 2018-06-11 NOTE — PROGRESS NOTES
SUBJECTIVE:   CC: Ana Wyman is an 48 year old woman who presents for preventive health visit.     Physical   Annual:     Getting at least 3 servings of Calcium per day::  Yes    Bi-annual eye exam::  Yes    Dental care twice a year::  Yes    Sleep apnea or symptoms of sleep apnea::  Sleep apnea    Diet::  Regular (no restrictions), Breakfast skipped and Other    Frequency of exercise::  2-3 days/week    Duration of exercise::  15-30 minutes    Taking medications regularly::  Yes    Medication side effects::  Not applicable    Additional concerns today::  YES (Right lower abdominal pain, DME compression stockings )                  Today's PHQ-2 Score:   PHQ-2 ( 1999 Pfizer) 6/11/2018   Q1: Little interest or pleasure in doing things 0   Q2: Feeling down, depressed or hopeless 0   PHQ-2 Score 0   Q1: Little interest or pleasure in doing things Not at all   Q2: Feeling down, depressed or hopeless Not at all   PHQ-2 Score 0       Abuse: Current or Past(Physical, Sexual or Emotional)- No  Do you feel safe in your environment - Yes    Social History   Substance Use Topics     Smoking status: Never Smoker     Smokeless tobacco: Never Used     Alcohol use 0.0 oz/week     0 Standard drinks or equivalent per week      Comment: 1-2 drink occasionally     Alcohol Use 6/11/2018   If you drink alcohol do you typically have greater than 3 drinks per day OR greater than 7 drinks per week? No   No flowsheet data found.    Reviewed orders with patient.  Reviewed health maintenance and updated orders accordingly - Yes    Patient under age 50, mutual decision reflected in health maintenance.      Pertinent mammograms are reviewed under the imaging tab.  History of abnormal Pap smear: NO - age 30-65 PAP every 5 years with negative HPV co-testing recommended    Reviewed and updated as needed this visit by clinical staff  Tobacco  Allergies  Meds  Med Hx  Surg Hx  Fam Hx  Soc Hx        Reviewed and updated as needed  this visit by Provider          Nephrolithiasis - last saw urology 5/21 - passed stone and doesn't currently have concern for stone, but worried about recurrence.    Unable to catch last stone to strain.    MIGUELITO - on CPAP, but having difficulties with mask - air hitting right eye and causing dryness - getting help with mask fit and ophthalmology.    Right lower abdominal pain - had CT 5/22 - no acute findings, but ovaries not commented upon.  Pain ongoing for about one year.  Usually present.  Flares every now and again - feels like things are stuck together on the inside.  Magnified with kidney stones.      10,000 steps/day at work - with new job at [employer], getting many more steps    Lymphedema - no acute worsening    Lichen sclerosis - diagnosed several years ago - responds well to topical treatments.  No new areas of concern or change in symptoms.    Uterine ablation - one bout of bleeding since that time - otherwise, no bleeding    Pneumonia/bronchitis - following up visit in April -  feeling better now - no acute symptoms.    Asthma - in general well controlled with rare albuterol use.  Hasn't tried albuterol for exercise, but is wondering if this would be helpful with some symptoms that she gets on occasion.    Eye dryness/pain - related to CPAP mask fit - following with eye doctor    Morbid obesity - did well in the past losing weight on a high protein diet - reviewed past weight graphs.   With new job, hopeful that she can recreate this weight loss and devote more time to healthy eating and meal preparation.    Fatty infiltration of liver identified on CT scan - worried about this finding and its implications for her health, long term risks.      Review of Systems   Constitutional: Negative for chills and fever.   HENT: Negative for congestion, ear pain, hearing loss and sore throat.    Eyes: Positive for pain and visual disturbance.   Respiratory: Negative for cough and shortness of breath.   "  Cardiovascular: Positive for peripheral edema. Negative for chest pain and palpitations.   Gastrointestinal: Positive for abdominal pain, heartburn and nausea. Negative for constipation, diarrhea and hematochezia.   Genitourinary: Negative for dysuria, frequency, genital sores, hematuria, pelvic pain, urgency, vaginal bleeding and vaginal discharge.   Musculoskeletal: Negative for arthralgias, joint swelling and myalgias.   Skin: Negative for rash.   Neurological: Negative for dizziness, weakness, headaches and paresthesias.   Psychiatric/Behavioral: Negative for mood changes. The patient is not nervous/anxious.           OBJECTIVE:   /82 (BP Location: Other (Comment), Patient Position: Right side, Cuff Size: Adult Regular)  Pulse 92  Temp 98.7  F (37.1  C) (Tympanic)  Ht 5' 4\" (1.626 m)  Wt (!) 338 lb (153.3 kg)  BMI 58.02 kg/m2  Physical Exam  GENERAL: healthy, alert and no distress  EYES: Eyes grossly normal to inspection, PERRL and conjunctivae and sclerae normal  HENT: ear canals and TM's normal, nose and mouth without ulcers or lesions  NECK: no adenopathy, no asymmetry, masses, or scars and thyroid normal to palpation  RESP: lungs clear to auscultation - no rales, rhonchi or wheezes  BREAST: normal without masses, tenderness or nipple discharge and no palpable axillary masses or adenopathy  CV: regular rates and rhythm, normal S1 S2, no S3 or S4, no murmur, click or rub, peripheral pulses strong and chronic venous stasis changes, woody edema to mid calf bilaterally  ABDOMEN: soft, nontender, no hepatosplenomegaly, no masses and bowel sounds normal  MS: extremities normal- no gross deformities noted  SKIN: acanthosis nigricans axillary region, otherwise no suspicious lesions or rashes  NEURO: Normal strength and tone, mentation intact and speech normal  PSYCH: mentation appears normal, affect normal/bright    ASSESSMENT/PLAN:       ICD-10-CM    1. Routine general medical examination at a [facility] " care facility Z00.00 Lipid panel reflex to direct LDL Fasting     **A1C FUTURE anytime    Reviewed recent extensive lab work with patient   2. Morbid obesity (H) E66.01 BARIATRIC ADULT REFERRAL     Lipid panel reflex to direct LDL Fasting     **A1C FUTURE anytime    Long discussion about treatment options for morbid obesity to help with reversal of fatty liver disease - encouraged patient to think about surgical procedures - referral placed   3. Lichen sclerosus L90.0 clobetasol (TEMOVATE) 0.05 % ointment  Patient to follow up with OB/Gyn for repeat exam   4. MIGUELITO on CPAP G47.33 Continue CPAP - working on mask fit to resolve eye issues    Z99.89    5. Calculus of kidney N20.0 Improved at present, continue with aggressive hydration, watch for recurrence with diet changes   6. Abdominal pain, right lower quadrant R10.31 US Pelvic Complete with Transvaginal  Recommended pelvic ultrasound for better ovary study, follow up with Ob/Gyn   7. Fatty infiltration of liver K76.0 GASTROENTEROLOGY ADULT REF CONSULT ONLY  Hepatology referral  LFTs have been normal to date   8. Mild intermittent asthma without complication J45.20 albuterol (PROAIR HFA/PROVENTIL HFA/VENTOLIN HFA) 108 (90 Base) MCG/ACT Inhaler  Well controlled, continue current medications     9. Lymphedema bilateral with mixed lipedema. I89.0 Stable, continue current treatment measures   10. Visit for screening mammogram Z12.31 *MA Screening Digital Bilateral   11. Special screening for malignant neoplasms, colon Z12.11 GASTROENTEROLOGY ADULT REF PROCEDURE ONLY Sophie Luke [phone]; [facility]       COUNSELING:  Special attention given to:        Regular exercise       Healthy diet/nutrition       Vision screening       Colon cancer screening       ([place])menopause management    BP Screening:   Last 3 BP Readings:    BP Readings from Last 3 Encounters:   06/11/18 118/82   04/29/18 134/78   04/02/18 129/75       The following was recommended to the  "patient:  Re-screen BP within a year and recommended lifestyle modifications     reports that she has never smoked. She has never used smokeless tobacco.    Estimated body mass index is 58.02 kg/(m^2) as calculated from the following:    Height as of this encounter: 5' 4\" (1.626 m).    Weight as of this encounter: 338 lb (153.3 kg).   Weight management plan: Patient referred to endocrine and/or weight management specialty    Counseling Resources:  ATP IV Guidelines  Pooled Cohorts Equation Calculator  Breast Cancer Risk Calculator  FRAX Risk Assessment  ICSI Preventive Guidelines  Dietary Guidelines for Americans, 2010  USDA's MyPlate  ASA Prophylaxis  Lung CA Screening    Kelly Bedoya MD  [facility]  "

## 2018-06-11 NOTE — MR AVS SNAPSHOT
After Visit Summary   2018    Ana Wyman    MRN: [id number]           Patient Information     Date Of Birth          1970        Visit Information        Provider Department      2018 10:00 AM Kelly Bedoya MD [facility]        Today's Diagnoses     Routine general medical examination at a health care facility    -  1    Lichen sclerosus        MIGUELITO on CPAP        Calculus of kidney        Morbid obesity (H)        Abdominal pain, right lower quadrant        Fatty infiltration of liver        Special screening for malignant neoplasms, colon        Visit for screening mammogram        Mild intermittent asthma without complication          Care Instructions    Expect a phone call for your consultation with liver doctor at [facility] and for your colonoscopy    [facility] [phone] - call to schedule your pelvic US    Schedule here or in [place] for gynecology - Dr Perez, Dr Paul    Referral in place for bariatric clinic - call insurance for coverage    Albuterol and steroid cream refilled    Come back for fasting lab visit when it works for you for cholesterol    Schedule your mammogram anytime - we have a machine here - [phone]            Preventive Health Recommendations  Female Ages 40 to 49    Yearly exam:     See your health care provider every year in order to  1. Review health changes.   2. Discuss preventive care.    3. Review your medicines if your doctor prescribed any.      Get a Pap test every three years (unless you have an abnormal result and your provider advises testing more often).      If you get Pap tests with HPV test, you only need to test every 5 years, unless you have an abnormal result. You do not need a Pap test if your uterus was removed (hysterectomy) and you have not had cancer.      You should be tested each year for STDs (sexually transmitted diseases), if you're at risk.       Ask your doctor if  you should have a mammogram.      Have a colonoscopy (test for colon cancer) if someone in your family has had colon cancer or polyps before age 50.       Have a cholesterol test every 5 years.       Have a diabetes test (fasting glucose) after age 45. If you are at risk for diabetes, you should have this test every 3 years.    Shots: Get a flu shot each year. Get a tetanus shot every 10 years.     Nutrition:     Eat at least 5 servings of fruits and vegetables each day.    Eat whole-grain bread, whole-wheat pasta and brown rice instead of white grains and rice.    Talk to your provider about Calcium and Vitamin D.     Lifestyle    Exercise at least 150 minutes a week (an average of 30 minutes a day, 5 days a week). This will help you control your weight and prevent disease.    Limit alcohol to one drink per day.    No smoking.     Wear sunscreen to prevent skin cancer.    See your dentist every six months for an exam and cleaning.          Follow-ups after your visit        Additional Services     BARIATRIC ADULT REFERRAL       Your provider has referred you to: [facility]: [facility] [phone]  [email or web address]    Please be aware that coverage of these services is subject to the terms and limitations of your health insurance plan.  Call member services at your health plan with any benefit or coverage questions.      Please bring the following with you to your appointment:      (1) List of current medications   (2) This referral request   (3) Any documents/labs given to you for this referral            GASTROENTEROLOGY ADULT REF CONSULT ONLY       Preferred Location: [facility] [phone]      Please be aware that coverage of these services is subject to the terms and limitations of your health insurance plan.  Call member services at your health plan with any benefit or coverage questions.   Any procedures must be performed at a [facility] facility OR coordinated by your clinic's referral office.    Please bring the following with you to your appointment:    (1) Any X-Rays, CTs or MRIs which have been performed.  Contact the facility where they were done to arrange for  prior to your scheduled appointment.    (2) List of current medications   (3) This referral request   (4) Any documents/labs given to you for this referral            GASTROENTEROLOGY ADULT REF PROCEDURE ONLY [facility] Luke [phone]; [facility]       Last Lab Result: Creatinine (mg/dL)       Date                     Value                 04/29/2018               0.81             ----------  Body mass index is 58.02 kg/(m^2).      Patient will be contacted to schedule procedure.     Please be aware that coverage of these services is subject to the terms and limitations of your health insurance plan.  Call member services at your health plan with any benefit or coverage questions.  Any procedures must be performed at a [facility] facility OR coordinated by your clinic's referral office.    Please bring the following with you to your appointment:    (1) Any X-Rays, CTs or MRIs which have been performed.  Contact the facility where they were done to arrange for  prior to your scheduled appointment.    (2) List of current medications   (3) This referral request   (4) Any documents/labs given to you for this referral                  Future tests that were ordered for you today     Open Future Orders        Priority Expected Expires Ordered    *MA Screening Digital Bilateral Routine  6/11/2019 6/11/2018    US Pelvic Complete with Transvaginal Routine  6/11/2019 6/11/2018    Lipid panel reflex to direct LDL Fasting Routine 6/11/2018 6/11/2019 6/11/2018    **A1C FUTURE anytime Routine 6/11/2018 6/11/2019 6/11/2018            Who to contact     If you have questions or need follow up information about today's clinic visit or  "your schedule please contact [facility] directly at [phone].  Normal or non-critical lab and imaging results will be communicated to you by MyChart, letter or phone within 4 business days after the clinic has received the results. If you do not hear from us within 7 days, please contact the clinic through [facility]rdPlacehart or phone. If you have a critical or abnormal lab result, we will notify you by phone as soon as possible.  Submit refill requests through [employer] or call your pharmacy and they will forward the refill request to us. Please allow 3 business days for your refill to be completed.          Additional Information About Your Visit        [employer] Information     [employer] gives you secure access to your electronic health record. If you see a primary care provider, you can also send messages to your care team and make appointments. If you have questions, please call your primary care clinic.  If you do not have a primary care provider, please call [phone] and they will assist you.        Care EveryWhere ID     This is your Care EveryWhere ID. This could be used by other organizations to access your [facility] medical records  [id number]        Your Vitals Were     Pulse Temperature Height BMI (Body Mass Index)          92 98.7  F (37.1  C) (Tympanic) 5' 4\" (1.626 m) 58.02 kg/m2         Blood Pressure from Last 3 Encounters:   06/11/18 118/82   04/29/18 134/78   04/02/18 129/75    Weight from Last 3 Encounters:   06/11/18 (!) 338 lb (153.3 kg)   05/21/18 (!) 334 lb (151.5 kg)   04/02/18 (!) 334 lb 14.4 oz (151.9 kg)              We Performed the Following     BARIATRIC ADULT REFERRAL     GASTROENTEROLOGY ADULT REF CONSULT ONLY     GASTROENTEROLOGY ADULT REF PROCEDURE ONLY Sophie Luke [phone]; [facility]          Today's Medication Changes          These changes are accurate as of 6/11/18 10:59 AM.  If you have any questions, ask your nurse or doctor.               Stop taking these " medicines if you haven't already. Please contact your care team if you have questions.     EPIPEN 2-AMERICA 0.3 MG/0.3ML injection   Generic drug:  EPINEPHrine   Stopped by:  Kelly Bedoya MD                Where to get your medicines      These medications were sent to [facility] - [address]   [address], [place] [address]     Phone:  [phone]     albuterol 108 (90 Base) MCG/ACT Inhaler         Some of these will need a paper prescription and others can be bought over the counter.  Ask your nurse if you have questions.     Bring a paper prescription for each of these medications     clobetasol 0.05 % ointment                Primary Care Provider Office Phone # Fax #    Kelly Bedyoa [phone] [address] [place] [address]        Equal Access to Services     [facility]: Hadii aad ku hadasho Soomaali, waaxda luqadaha, qaybta kaalmada adeegyada, waxay idiin hayaan jakub francesaraсергей isbell . So [facility] [place] [phone].    ATENCIÓN: Si habla español, tiene a tejada disposición servicios gratuitos de asistencia lingüística. Llame al [phone].    We comply with applicable federal civil rights laws and [place] laws. We do not discriminate on the basis of race, color, national origin, age, disability, sex, sexual orientation, or gender identity.            Thank you!     Thank you for choosing [facility]  for your care. Our goal is always to provide you with excellent care. Hearing back from our patients is one way we can continue to improve our services. Please take a few minutes to complete the written survey that you may receive in the mail after your visit with us. Thank you!             Your Updated Medication List - Protect others around you: Learn how to safely use, store and throw away your medicines at www.disposemymeds.org.          This list is accurate as of 6/11/18 10:59 AM.  Always  use your most recent med list.                   Brand Name Dispense Instructions for use Diagnosis    albuterol 108 (90 Base) MCG/ACT Inhaler    PROAIR HFA/PROVENTIL HFA/VENTOLIN HFA    2 Inhaler    Inhale 2 puffs into the lungs every 6 hours as needed for shortness of breath / dyspnea or wheezing    Mild intermittent asthma without complication       clobetasol 0.05 % ointment    TEMOVATE    45 g    Apply clobetasol  0.05 percent ointment, sparingly (a dot 3 mm wide) in a thin film.  Apply to affected area only, once or twice weekly for maintenance.    Lichen sclerosus       FISH OIL PO           loratadine 10 MG tablet    CLARITIN    7 tablet    Take 1 tablet (10 mg) by mouth daily        multivitamin, therapeutic with minerals Tabs tablet      Take 1 tablet by mouth daily.        ondansetron 4 MG ODT tab    ZOFRAN ODT    10 tablet    Take 1 tablet (4 mg) by mouth every 8 hours as needed for nausea    Calculus of kidney       ondansetron 4 MG tablet    ZOFRAN    6 tablet    Take 1 tablet (4 mg) by mouth every 8 hours as needed for nausea        RESTASIS MULTIDOSE 0.05 % ophthalmic emulsion   Generic drug:  cycloSPORINE      INSTILL 1 DROP INTO BOTH EYES TWICE A DAY

## 2018-06-12 ASSESSMENT — ASTHMA QUESTIONNAIRES: ACT_TOTALSCORE: 23

## 2018-06-15 ENCOUNTER — MYC MEDICAL ADVICE (OUTPATIENT)
Dept: PEDIATRICS | Facility: CLINIC | Age: 48
End: 2018-06-15

## 2018-06-15 ENCOUNTER — HOSPITAL ENCOUNTER (OUTPATIENT)
Facility: CLINIC | Age: 48
End: 2018-06-15
Attending: INTERNAL MEDICINE | Admitting: INTERNAL MEDICINE
Payer: COMMERCIAL

## 2018-06-17 ENCOUNTER — HEALTH MAINTENANCE LETTER (OUTPATIENT)
Age: 48
End: 2018-06-17

## 2018-06-18 NOTE — TELEPHONE ENCOUNTER
Routing to Joyce, Referral Coordinator - Patient states she needs a Pre-Auth done for her bariatric surgery. Can you get the attached form in the 'Visit Media' tab over to the correct place? I believe the surgeon is suppose to complete this.     Thank you!

## 2018-06-20 PROBLEM — J45.20 MILD INTERMITTENT ASTHMA WITHOUT COMPLICATION: Status: ACTIVE | Noted: 2018-06-20

## 2018-06-20 PROBLEM — J45.20 MILD INTERMITTENT ASTHMA WITHOUT COMPLICATION: Status: ACTIVE | Noted: 2018-06-11

## 2018-06-22 ENCOUNTER — HOSPITAL ENCOUNTER (OUTPATIENT)
Dept: ULTRASOUND IMAGING | Facility: CLINIC | Age: 48
Discharge: HOME | End: 2018-06-22
Attending: PEDIATRICS | Admitting: PEDIATRICS
Payer: COMMERCIAL

## 2018-06-22 DIAGNOSIS — R10.31 ABDOMINAL PAIN, RIGHT LOWER QUADRANT: ICD-10-CM

## 2018-06-22 PROCEDURE — 76830 TRANSVAGINAL US NON-OB: CPT

## 2018-07-15 ENCOUNTER — HEALTH MAINTENANCE LETTER (OUTPATIENT)
Age: 48
End: 2018-07-15

## 2018-07-16 ENCOUNTER — RADIANT APPOINTMENT (OUTPATIENT)
Dept: MAMMOGRAPHY | Facility: CLINIC | Age: 48
End: 2018-07-16
Attending: PEDIATRICS
Payer: COMMERCIAL

## 2018-07-16 DIAGNOSIS — Z12.31 VISIT FOR SCREENING MAMMOGRAM: ICD-10-CM

## 2018-07-16 PROCEDURE — 77067 SCR MAMMO BI INCL CAD: CPT | Mod: TC

## 2018-07-26 NOTE — PROGRESS NOTES
"2018    New Bariatric Surgery Consultation Note    RE: Ana Wyman  MR#: [id number]  : 1970    Referring provider: No flowsheet data found.    Chief Complaint/Reason for visit: evaluation for possible weight loss surgery    Dear Kelly Bedoya MD (General),    I had the pleasure of seeing your patient, Ana Wyman, to evaluate her obesity and consider her for possible weight loss surgery. As you know, Ana Wyman is 48 year old.  She has a height of 5' 4\", a weight of 339 lbs 4.8 oz, and calculated Body mass index is 58.24 kg/(m^2).    HISTORY OF PRESENT ILLNESS:  Weight Loss History Reviewed with Patient 2018   How long have you been overweight? Since late teens through early 20's   What is the most that you have ever weighed? 349   What is the most weight you have lost? 50   I have tried the following methods to lose weight Watching portions or calories, Exercise, Weight Watchers, Atkins type diet (low carb/high protein), Pre packaged meals ex: Nutrisystem, Prescription Medications, Physician directed program   I have tried the following weight loss medications? (Check all that apply) Phentermine/Adipex-p/Suprenza, Qysmia (phentermine + topiramate)   Have you ever had weight loss surgery? No   Best success was with a low carb diet.      CO-MORBIDITIES OF OBESITY INCLUDE:     2018   I have the following co-morbidities associated with obesity: Pre-Diabetes, Sleep Apnea, Lower Extremity Edema, GERD (Reflux), Fatty Liver   Do you use a CPAP? Yes   Are you taking daily medication for heartburn, acid reflux, or GERD (acid reflux disease)? Yes       PAST MEDICAL HISTORY:  Past Medical History:   Diagnosis Date     Allergic rhinitis, cause unspecified      Cellulitis     2 episodes, one with hospitalization [facility]     DUB (dysfunctional uterine bleeding)     Neg EMB 2012     Esophageal reflux     ongoing     Fibroids      Genital problems     Lichens Schlerosis "     GERD (gastroesophageal reflux disease)      Hallux valgus (acquired)      History of steroid therapy     Inhalers, eye drops     Hypersomnia with sleep apnea, unspecified     using CPAP     Kidney stone May 2018    2 stones     Lichen sclerosus 7/14/2011     Lymph edema 2010- present     Lymphedema bilateral with mixed lipedema. 9/30/2015     Lymphedema bilateral with mixed lipedema. 9/30/2015     Morbid obesity (H) 3/19/2013     MIGUELITO on CPAP 3/19/2013    Sleep study in 2004 and 2012       Pneumonia     Years ago - a few times     Pre-diabetes      Sleep apnea      Tendonitis currently     Urinary tract infection     A few times in past 10 years     Vision disorder [id number]    Dry Eye     Vitamin D deficiency 3/14/2015       PAST SURGICAL HISTORY: No previous bleeding, anesthesia concerns with surgery.  Mother has problems with anesthesia.  Was in coma for 3 months follow CABG.     Past Surgical History:   Procedure Laterality Date     C NONSPECIFIC PROCEDURE  1994    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     C NONSPECIFIC PROCEDURE  1999,2001    Foot surgeries x 2 (bunionectomy)     C NONSPECIFIC PROCEDURE  2000    hammer toes     COLONOSCOPY  5/15/2013    Procedure: COLONOSCOPY;  Colonoscopy;  Surgeon: Kota Blanco MD;  Location:  GI     GYN SURGERY  2016    Uterine Ablation     lichen sclerosis       ORTHOPEDIC SURGERY       VASCULAR SURGERY  2015    Vienous closure on both legs     vein closure  12/16       FAMILY HISTORY:   Family History   Problem Relation Age of Onset     C.A.D. Father 92     CAGB 98     Obesity Father      Cancer Father      stomach Dx age 74     Lipids Father      Hypertension Father      Coronary Artery Disease Father      Cerebrovascular Disease Father      Other Cancer Father      Esophogeal     Diabetes Mother      Type 2     Allergies Mother      Obesity Mother      C.A.D. Mother      triple bypass surgery, 65     Lipids Mother      Hypertension Mother      Coronary  Artery Disease Mother      Hyperlipidemia Mother      Colon Polyps Mother      Anesthesia Reaction Mother      Thyroid Disease Mother      C.A.D. Paternal Grandfather 58     fatal     Coronary Artery Disease Paternal Grandfather      Cancer - colorectal Maternal Grandmother 75     Cancer Maternal Grandmother      liver     Hypertension Maternal Grandmother      Colon Cancer Maternal Grandmother      Other Cancer Maternal Grandmother      liver, leaukeamia     Colon Polyps Maternal Grandmother      Cancer - colorectal Paternal Aunt      Allergies Brother      Cancer Paternal Grandmother      stomach     Obesity Paternal Grandmother      Diabetes Paternal Grandmother      Type 2     Asthma Paternal Grandmother      Obesity Brother      Hypertension Brother      Cancer - colorectal Other      cousin  from colon cancer in 30's     Obesity Brother      Coronary Artery Disease Maternal Grandfather      Hypertension Maternal Grandfather        SOCIAL HISTORY:   Social History Questions Reviewed With Patient 2018   Which best describes your employment status (select all that apply) I work full-time   If you work, 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 No   Who do you have in your support network that can be available to help you for the first 2 weeks after surgery? Mother, friend   Who can you count on for support throughout your weight loss surgery journey? Mother, friends   Can you afford 3 meals a day?  Yes   Can you afford 50-60 dollars a month for vitamins? Yes   Works at [employer].  Has been there for 5 months.  Much more active job than before.     HABITS:     2018   How often do you drink alcohol? Monthly or less   If you do drink alcohol, how many drinks might you have in a day? (one drink = 5 oz. wine, 1 can/bottle of beer, 1 shot liquor) 1 or 2   Do you currently use any of the following Nicotine products? No    Have you ever used any of the following nicotine products? No   Have you or are you currently using street drugs or prescription strength medication for which you do not have a prescription for? No   Do you have a history of chemical dependency (alcohol or drug abuse)? No       PSYCHOLOGICAL HISTORY:   Psychological History Reviewed With Patient 7/26/2018   Have you ever attempted suicide? Never.   Have you had thoughts of suicide in the past year? No   Have you ever been hospitalized for mental illness or a suicide attempt? Never.   Do you have a history of chronic pain? No   Have you ever been diagnosed with fibromyalgia? No   Are you currently being treated for any of the following? (select all that apply) I do not have a mental illness       ROS:     7/26/2018   Skin:  Leg swelling   HEENT: Dizziness/lightheadedness   If you answered yes to missing teeth, please indicate how many: 0   Musculoskeletal: Joint Pain, Back pain, Swelling of legs   Cardiovascular: None of the above   Pulmonary: Shortness of breath with activity   Gastrointestinal: Heartburn, Reflux   Genitourinary: None of the above   Hematological: None of the above   Neurological: None of the above   Female only: Irregular menstrual cycles   No menses since uterine ablation.  Currently not sexually active.      EATING BEHAVIORS:     7/26/2018   Have you or anyone else thought that you had an eating disorder? No   Do you currently binge eat (eat a large amount of food in a short time)? No   Are you an emotional eater? Yes   Do you get up to eat after falling asleep? No       EXERCISE:     7/26/2018   How often do you exercise? Less than 1 time per week   What is the duration of your exercise (in minutes)? I do not exercise.   What types of exercise do you do? walking   What keeps you from being more active?  Shortness of breath, Lack of Time, Too tired, Worried people will look at me   Gets 10,000 to 12,000 steps a day.      MEDICATIONS:  Current  "Outpatient Prescriptions   Medication     albuterol (PROAIR HFA/PROVENTIL HFA/VENTOLIN HFA) 108 (90 Base) MCG/ACT Inhaler     clobetasol (TEMOVATE) 0.05 % ointment     loratadine (CLARITIN) 10 MG tablet     multivitamin, therapeutic with minerals (THERA-VIT-M) TABS     Omega-3 Fatty Acids (FISH OIL PO)     Omeprazole Magnesium (PRILOSEC OTC PO)     RESTASIS MULTIDOSE 0.05 % ophthalmic emulsion     ondansetron (ZOFRAN ODT) 4 MG ODT tab     ondansetron (ZOFRAN) 4 MG tablet     No current facility-administered medications for this visit.        ALLERGIES:  Allergies   Allergen Reactions     Clindamycin Diarrhea     Codeine Nausea and Vomiting     Shellfish Allergy        PHYSICAL EXAM:  /86 (BP Location: Right arm, Patient Position: Sitting, Cuff Size: Adult Large)  Pulse 82  Resp 12  Ht 5' 4\" (1.626 m)  Wt (!) 339 lb 4.8 oz (153.9 kg)  SpO2 96%  BMI 58.24 kg/m2  GENERAL:  Good development and normal affect in no acute distress. Patient is alert and orientated x 3 and answers all questions appropriately.  HEENT: Head is normocephalic, nontraumatic. Pupils equal and round without conjunctival injection. Neck is supple without lymphadenopathy, thyroidmegaly, or mass. Trachea midline. Dentition WNL.   CARDIOVASCULAR:  Regular rate and rhythm without murmurs, rubs, or gallops.  RESPIRATORY: Lungs are clear to auscultation bilaterally with good breath sounds.  GASTROINTESTINAL: Abdomen is obese, nondistended, soft, nontender, without organomegaly or masses. No bruits.  LOWER EXTREMITIES: Pitting edema bilaterally seen through support stocking, no cyanosis, ulceration, or chronic venous stasis noted.  MUSCULOSKELETAL:  Moves all 4 extremities equal and strong. Has a normal gait.   NEUROLOGIC: Cranial nerves II-XII grossly intact.  SKIN: No intertriginous irritation or rash.   In summary, Ana Wyman has Class III obesity with a body mass index of Body mass index is 58.24 kg/(m^2). kg/m2 and the " comorbidities stated above. She completed an informational seminar and is a candidate for the laparoscopic gastric bypass.  She will have to complete the following pre-requisites:  Lose 10 lbs prior to surgery.  Goal Weight: 329.3 lbs  Have preoperative laboratory tests drawn.   Psychological Evaluation with MMPI and clearance for weight loss surgery.  Achieve clearance from dietitian to see surgeon.  A total of 6 structured dietitian weight loss visits are required by your insurance.    Today in the office we discussed gastric bypass surgery.  Preoperative, perioperative, and postoperative processes, management, and follow up were addressed.  Risks and benefits were outlined including the risk of death, staple line leak (1-2%), PE, DVT, ulcer, N/V, stricture, hernia, wound infection, weight regain, and vitamin deficiencies. We discussed the need for 2 forms of birth control for the first 18 months following bariatric surgery.  I emphasized exercise and activity along with appropriate food choice as the main foundation for weight loss with surgery providing surgical reinforcement of this. All questions were answered.  A goal sheet and support group handout were given to the patient.      Once the patient has completed the requirements in their task list and there are no further recommendations, the pt will be allowed to see the surgeon of her choice for consultation on the laparoscopic gastric bypass surgery. Patient verbalizes understanding of the process to surgery and expectations for the postoperative period including the need for lifelong lifestyle changes, vitamin supplementation, and laboratory monitoring.     Sincerely,    Sarah Stacy PA-C    I spent a total of 60 minutes face to face with Ana during today's office visit. Over 50% of this time was spent counseling the patient and/or coordinating care.

## 2018-07-27 ENCOUNTER — OFFICE VISIT (OUTPATIENT)
Dept: SURGERY | Facility: CLINIC | Age: 48
End: 2018-07-27
Payer: COMMERCIAL

## 2018-07-27 VITALS — WEIGHT: 293 LBS | BODY MASS INDEX: 58.24 KG/M2

## 2018-07-27 VITALS
HEART RATE: 82 BPM | DIASTOLIC BLOOD PRESSURE: 86 MMHG | SYSTOLIC BLOOD PRESSURE: 128 MMHG | WEIGHT: 293 LBS | BODY MASS INDEX: 50.02 KG/M2 | OXYGEN SATURATION: 96 % | HEIGHT: 64 IN | RESPIRATION RATE: 12 BRPM

## 2018-07-27 DIAGNOSIS — E55.9 VITAMIN D DEFICIENCY: ICD-10-CM

## 2018-07-27 DIAGNOSIS — E66.01 MORBID OBESITY (H): ICD-10-CM

## 2018-07-27 DIAGNOSIS — K21.9 GASTROESOPHAGEAL REFLUX DISEASE WITHOUT ESOPHAGITIS: ICD-10-CM

## 2018-07-27 DIAGNOSIS — I89.0 LYMPHEDEMA: ICD-10-CM

## 2018-07-27 DIAGNOSIS — Z13.0 SCREENING FOR IRON DEFICIENCY ANEMIA: ICD-10-CM

## 2018-07-27 DIAGNOSIS — R73.03 PREDIABETES: ICD-10-CM

## 2018-07-27 DIAGNOSIS — K76.0 FATTY LIVER: ICD-10-CM

## 2018-07-27 DIAGNOSIS — G47.33 OSA ON CPAP: ICD-10-CM

## 2018-07-27 DIAGNOSIS — E66.01 MORBID OBESITY (H): Primary | ICD-10-CM

## 2018-07-27 PROCEDURE — 99215 OFFICE O/P EST HI 40 MIN: CPT | Performed by: PHYSICIAN ASSISTANT

## 2018-07-27 PROCEDURE — 97802 MEDICAL NUTRITION INDIV IN: CPT | Performed by: DIETITIAN, REGISTERED

## 2018-07-27 NOTE — MR AVS SNAPSHOT
MRN:[id number]                      After Visit Summary   7/27/2018    Ana Wyman    MRN: [id number]           Visit Information        Provider Department      7/27/2018 9:30 AM 2, Jolene Larry Diet, RD [facility]      Your next 10 appointments already scheduled     Aug 31, 2018  3:00 PM CDT   Return Bariatric Nutrition Visit with Jolene Larry Diet 1, RD   [facility] [place] ([facility])    [address]  [address]  [place] [address]   [phone]            Sep 04, 2018  8:45 AM CDT   Office Visit with Jaquelin Perez MD   [facility]    [address]  [facility] [address]   [phone]           Bring a current list of meds and any records pertaining to this visit. For Physicals, please bring immunization records and any forms needing to be filled out. Please arrive 10 minutes early to complete paperwork.            Sep 10, 2018   Procedure with Lydia Vickers MD   [facility] ([facility])    [address]  [facility] [address]   [phone]           [facility] is located at [address] [facility]. [place]            Sep 26, 2018 12:00 PM CDT   (Arrive by 11:30 AM)   New Visit with Laila Taylor LP   [facility] ([facility] [place])    [address]  [place] [address] [phone]            Oct 03, 2018  2:00 PM CDT   Return Visit with Laila Taylor LP   [facility] [place] ([facility] [place])    [address]  [place] [address] [phone]            Oct 24, 2018  4:00 PM CDT   Return Visit with Laila Taylor LP   [facility] [place] ([facility] [place])    [address]  [place] [address] [phone]              MyChart Information     MyChart gives  you secure access to your electronic health record. If you see a primary care provider, you can also send messages to your care team and make appointments. If you have questions, please call your primary care clinic.  If you do not have a primary care provider, please call [phone] and they will assist you.        Care EveryWhere ID     This is your Care EveryWhere ID. This could be used by other organizations to access your [place] medical records  [id number]        Equal Access to Services     GIO GOMEZ : Eli Justice, jada beckwith, qagabrielle kaaljayjay loo, yoselin christina. So [facility] [phone].    ATENCIÓN: Si habla español, tiene a tejada disposición servicios gratuitos de asistencia lingüística. Llame al [phone].    We comply with applicable federal civil rights laws and [place] laws. We do not discriminate on the basis of race, color, national origin, age, disability, sex, sexual orientation, or gender identity.

## 2018-07-27 NOTE — PROGRESS NOTES
"New Bariatric Nutrition Consultation Note    Reason For Visit: Nutrition Assessment    Ana Wyman is a 48 year old presenting today for new bariatric nutrition consult.  Pt is interested in laparoscopic gastric bypass.  Patient is accompanied by self.    Support System Reviewed With Patient 7/26/2018   Who do you have in your support network that can be available to help you for the first 2 weeks after surgery? Mother, friend   Who can you count on for support throughout your weight loss surgery journey? Mother, friends       ANTHROPOMETRICS:  Estimated body mass index is 58.24 kg/(m^2) as calculated from the following:    Height as of an earlier encounter on 7/27/18: 1.626 m (5' 4\").    Weight as of this encounter: 153.9 kg (339 lb 4.8 oz).    Required weight loss goal pre-op: 10 lbs from initial consult weight (goal weight 329.3 lbs or less before surgery)       7/26/2018   I have tried the following methods to lose weight Watching portions or calories, Exercise, Weight Watchers, Atkins type diet (low carb/high protein), Pre packaged meals ex: Nutrisystem, Prescription Medications, Physician directed program       Weight Loss Questions Reviewed With Patient 7/26/2018   How long have you been overweight? Since late teens through early 20's       SUPPLEMENT INFORMATION:  Multivitamin   Vitamin D     NUTRITION HISTORY:  Recall Diet Questions Reviewed With Patient 7/26/2018   Describe what you typically consume for breakfast (typical or most recent): egg, toast   Describe what you typically consume for lunch (typical or most recent): protien (chicken , vegetables, fruit)   Describe what you typically consume for supper (typical or most recent): protein, veggies, carb   Describe what you typically consume as snacks (typical or most recent): fruit, popcorn, ice cream   How many ounces of water, or other low calorie drinks, do you drink daily (8 oz=1 glass)? 48 oz   How many ounces of caffeine (coffee, tea, pop) " do you drink daily (8 oz=1 glass)? 16 oz   How many ounces of carbonated (pop, beer, sparkling water) drinks do you drinky daily (8 oz=1 glass)? 8 oz   How often do you drink alcohol? Monthly or less   If you do drink alcohol, how many drinks might you have in a day? (one drink = 5 oz. wine, 1 can/bottle of beer, 1 shot liquor) 1 or 2       Eating Habits 7/26/2018   Do you have any dietary restrictions? No   Do you currently binge eat (eat a large amount of food in a short time)? No   Are you an emotional eater? Yes   Do you get up to eat after falling asleep? No   What foods do you crave? 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  May work 8:00-4:30 or 12:00-8:30.  Patient wants to have surgery by the end of the year.  Explained 6 month structured program required.  Patient feels her emotional eating has improved.  Patient's mother now living with her.  Patient's father passed away from esophageal cancer so prefers gastric bypass surgery. Patient likes carbonated water and coffee/tea.      Dining Out History Reviewed With Patient 7/26/2018   How often do you dine out? Around once a week.   Where do you dine out? (select all that apply) sit-down restaurants   What types of food do you order when you dine out? fish, beef, salads       Physical Activity Reviewed With Patient 7/26/2018   How often do you exercise? Less than 1 time per week   What is the duration of your exercise (in minutes)? I do not exercise.   What types of exercise do you do? walking   What keeps you from being more active?  Shortness of breath, Lack of Time, Too tired, Worried people will look at me       NUTRITION DIAGNOSIS:  Obesity r/t long history of self-monitoring deficit and excessive energy intake aeb BMI >30 kg/m2.    INTERVENTION:  Intervention Provided/Education Provided on post-op diet guidelines, vitamins/minerals essential post-operatively,  ways to help prepare for post-op diet guidelines pre-operatively and portion/calorie-control.  Provided pt with list of goals.       Questions Reviewed With Patient 7/26/2018   How ready are you to make changes regarding your weight? Number 1 = Not ready at all to make changes up to 10 = very ready. 10   How confident are you that you can change? 1 = Not confident that you will be successful making changes up to 10 = very confident. 9       Patient Understanding: good  Expected Compliance: fair-good    GOALS:  Practice drinking liquids at breakfast and lunch  Chew foods to applesauce texture  Start calcium supplements    Follow-Up:   Patient to participate in 6 month structured weight loss program per insurance. Patient may have 1 session from MD visit in June.  PA-C to verify.    Time spent with patient: 55 minutes    Veronique Reina, RD, LD  [facility] Outpatient Dietitian  [phone] (office phone)

## 2018-07-27 NOTE — LETTER
Ana Wyman  July 27, 2018        Bariatric Task List      Required Weight loss:    Lose 10 lbs prior to surgery.  Goal Weight: 329.3 lbs  Tasks:  Have preoperative laboratory tests drawn.   Psychological Evaluation with MMPI and clearance for weight loss surgery.  Achieve clearance from dietitian to see surgeon.  A total of 6 structured dietitian weight loss visits are required by your insurance.

## 2018-07-27 NOTE — Clinical Note
Thank you for referring this patient to our bariatric clinic for an initial evaluation.  I look forward to working with you during this process.  Here is a copy of my visit.    Sincerely,   Sarah Stacy PA-C

## 2018-07-31 DIAGNOSIS — R73.03 PREDIABETES: ICD-10-CM

## 2018-07-31 DIAGNOSIS — K76.0 FATTY LIVER: ICD-10-CM

## 2018-07-31 DIAGNOSIS — G47.33 OSA ON CPAP: ICD-10-CM

## 2018-07-31 DIAGNOSIS — E66.01 MORBID OBESITY (H): ICD-10-CM

## 2018-07-31 DIAGNOSIS — E55.9 VITAMIN D DEFICIENCY: ICD-10-CM

## 2018-07-31 DIAGNOSIS — Z13.0 SCREENING FOR IRON DEFICIENCY ANEMIA: ICD-10-CM

## 2018-07-31 DIAGNOSIS — Z00.00 ROUTINE GENERAL MEDICAL EXAMINATION AT A HEALTH CARE FACILITY: ICD-10-CM

## 2018-07-31 LAB
ALBUMIN SERPL-MCNC: 3.8 G/DL (ref 3.4–5)
ALP SERPL-CCNC: 80 U/L (ref 40–150)
ALT SERPL W P-5'-P-CCNC: 31 U/L (ref 0–50)
ANION GAP SERPL CALCULATED.3IONS-SCNC: 8 MMOL/L (ref 3–14)
AST SERPL W P-5'-P-CCNC: 19 U/L (ref 0–45)
BILIRUB SERPL-MCNC: 0.4 MG/DL (ref 0.2–1.3)
BUN SERPL-MCNC: 11 MG/DL (ref 7–30)
CALCIUM SERPL-MCNC: 8.8 MG/DL (ref 8.5–10.1)
CHLORIDE SERPL-SCNC: 106 MMOL/L (ref 94–109)
CHOLEST SERPL-MCNC: 178 MG/DL
CO2 SERPL-SCNC: 25 MMOL/L (ref 20–32)
CREAT SERPL-MCNC: 0.78 MG/DL (ref 0.52–1.04)
DEPRECATED CALCIDIOL+CALCIFEROL SERPL-MC: 34 UG/L (ref 20–75)
ERYTHROCYTE [DISTWIDTH] IN BLOOD BY AUTOMATED COUNT: 13.5 % (ref 10–15)
GFR SERPL CREATININE-BSD FRML MDRD: 79 ML/MIN/1.7M2
GLUCOSE SERPL-MCNC: 114 MG/DL (ref 70–99)
HBA1C MFR BLD: 6 % (ref 0–5.6)
HCT VFR BLD AUTO: 42.1 % (ref 35–47)
HDLC SERPL-MCNC: 48 MG/DL
HGB BLD-MCNC: 13.7 G/DL (ref 11.7–15.7)
LDLC SERPL CALC-MCNC: 92 MG/DL
MCH RBC QN AUTO: 29.7 PG (ref 26.5–33)
MCHC RBC AUTO-ENTMCNC: 32.5 G/DL (ref 31.5–36.5)
MCV RBC AUTO: 91 FL (ref 78–100)
NONHDLC SERPL-MCNC: 130 MG/DL
PLATELET # BLD AUTO: 294 10E9/L (ref 150–450)
POTASSIUM SERPL-SCNC: 4.1 MMOL/L (ref 3.4–5.3)
PROT SERPL-MCNC: 7.5 G/DL (ref 6.8–8.8)
RBC # BLD AUTO: 4.61 10E12/L (ref 3.8–5.2)
SODIUM SERPL-SCNC: 139 MMOL/L (ref 133–144)
TRIGL SERPL-MCNC: 191 MG/DL
WBC # BLD AUTO: 8.8 10E9/L (ref 4–11)

## 2018-07-31 PROCEDURE — 83036 HEMOGLOBIN GLYCOSYLATED A1C: CPT | Performed by: PEDIATRICS

## 2018-07-31 PROCEDURE — 80061 LIPID PANEL: CPT | Performed by: PEDIATRICS

## 2018-07-31 PROCEDURE — 85027 COMPLETE CBC AUTOMATED: CPT | Performed by: PEDIATRICS

## 2018-07-31 PROCEDURE — 36415 COLL VENOUS BLD VENIPUNCTURE: CPT | Performed by: PEDIATRICS

## 2018-07-31 PROCEDURE — 82306 VITAMIN D 25 HYDROXY: CPT | Performed by: PEDIATRICS

## 2018-07-31 PROCEDURE — 80053 COMPREHEN METABOLIC PANEL: CPT | Performed by: PEDIATRICS

## 2018-08-16 ENCOUNTER — TELEPHONE (OUTPATIENT)
Dept: PEDIATRICS | Facility: CLINIC | Age: 48
End: 2018-08-16

## 2018-08-16 NOTE — TELEPHONE ENCOUNTER
Reason for Call:  Other referral    Detailed comments: pt would like a referral to [facility]. Please call pt when referral is in.    Phone Number Patient can be reached at: Home number on file [phone] (home)    Best Time: any    Can we leave a detailed message on this number? YES    Call taken on 8/16/2018 at 3:21 PM by Ashly Patricio

## 2018-08-17 NOTE — TELEPHONE ENCOUNTER
Left voicemail letting patient know the [facility] consult referral was entered 6/11/18. She can call [facility] to schedule.   Advised she call back if having any problems.

## 2018-08-17 NOTE — TELEPHONE ENCOUNTER
Patient called back, she states [facility] has rescheduled her appointment 3 times.   She'd like another GI group.     Gave her [facility]'s GI scheduling number. Advised she check wit her insurance on covered GI providers.     Patient agrees with plan.

## 2018-08-17 NOTE — TELEPHONE ENCOUNTER
Covering for partner - is this for something new?     I see a referral placed to [facility] on 6/11/18.     Does she need another referral?    RADHA Medrano MD  Internal Medicine-Pediatrics      Procedure Information   Procedure Modifiers Provider Requested Approved   9010.007 - GASTROENTEROLOGY ADULT REF CONSULT ONLY   1 1      Diagnosis Information   Diagnosis   K76.0 (ICD-10-CM) - Fatty infiltration of liver      Referral Notes   Type Date User   Provider Comments 06/11/2018 10:49 AM Kelly Bedoya MD      Summary   Provider Comments      Note   Note     Preferred Location: [facility] [phone]        Please be aware that coverage of these services is subject to the terms and limitations of your health insurance plan.  Call member services at your health plan with any benefit or coverage questions.  Any procedures must be performed at a [facility] facility OR coordinated by your clinic's referral office.     Please bring the following with you to your appointment:     (1) Any X-Rays, CTs or MRIs which have been performed.  Contact the facility where they were done to arrange for  prior to your scheduled appointment.    (2) List of current medications   (3) This referral request   (4) Any documents/labs given to you for this referral

## 2018-08-22 ENCOUNTER — HOSPITAL ENCOUNTER (EMERGENCY)
Facility: CLINIC | Age: 48
Discharge: HOME | End: 2018-08-22
Attending: EMERGENCY MEDICINE | Admitting: EMERGENCY MEDICINE
Payer: COMMERCIAL

## 2018-08-22 ENCOUNTER — APPOINTMENT (OUTPATIENT)
Dept: ULTRASOUND IMAGING | Facility: CLINIC | Age: 48
End: 2018-08-22
Attending: EMERGENCY MEDICINE
Payer: COMMERCIAL

## 2018-08-22 ENCOUNTER — APPOINTMENT (OUTPATIENT)
Dept: CT IMAGING | Facility: CLINIC | Age: 48
End: 2018-08-22
Attending: EMERGENCY MEDICINE
Payer: COMMERCIAL

## 2018-08-22 VITALS
RESPIRATION RATE: 20 BRPM | TEMPERATURE: 99.1 F | HEIGHT: 65 IN | OXYGEN SATURATION: 94 % | DIASTOLIC BLOOD PRESSURE: 62 MMHG | SYSTOLIC BLOOD PRESSURE: 104 MMHG | BODY MASS INDEX: 48.82 KG/M2 | WEIGHT: 293 LBS

## 2018-08-22 DIAGNOSIS — N30.00 ACUTE CYSTITIS WITHOUT HEMATURIA: ICD-10-CM

## 2018-08-22 DIAGNOSIS — R10.84 ABDOMINAL PAIN, GENERALIZED: ICD-10-CM

## 2018-08-22 LAB
ALBUMIN SERPL-MCNC: 3.8 G/DL (ref 3.4–5)
ALBUMIN UR-MCNC: NEGATIVE MG/DL
ALP SERPL-CCNC: 104 U/L (ref 40–150)
ALT SERPL W P-5'-P-CCNC: 31 U/L (ref 0–50)
ANION GAP SERPL CALCULATED.3IONS-SCNC: 8 MMOL/L (ref 3–14)
APPEARANCE UR: CLEAR
AST SERPL W P-5'-P-CCNC: 21 U/L (ref 0–45)
BASOPHILS # BLD AUTO: 0.1 10E9/L (ref 0–0.2)
BASOPHILS NFR BLD AUTO: 0.6 %
BILIRUB DIRECT SERPL-MCNC: <0.1 MG/DL (ref 0–0.2)
BILIRUB SERPL-MCNC: 0.4 MG/DL (ref 0.2–1.3)
BILIRUB UR QL STRIP: NEGATIVE
BUN SERPL-MCNC: 11 MG/DL (ref 7–30)
CALCIUM SERPL-MCNC: 9.2 MG/DL (ref 8.5–10.1)
CHLORIDE SERPL-SCNC: 106 MMOL/L (ref 94–109)
CO2 SERPL-SCNC: 26 MMOL/L (ref 20–32)
COLOR UR AUTO: YELLOW
CREAT SERPL-MCNC: 0.7 MG/DL (ref 0.52–1.04)
DIFFERENTIAL METHOD BLD: ABNORMAL
EOSINOPHIL # BLD AUTO: 0.4 10E9/L (ref 0–0.7)
EOSINOPHIL NFR BLD AUTO: 3.4 %
ERYTHROCYTE [DISTWIDTH] IN BLOOD BY AUTOMATED COUNT: 13.4 % (ref 10–15)
GFR SERPL CREATININE-BSD FRML MDRD: 89 ML/MIN/1.7M2
GLUCOSE SERPL-MCNC: 132 MG/DL (ref 70–99)
GLUCOSE UR STRIP-MCNC: NEGATIVE MG/DL
HCG UR QL: NEGATIVE
HCT VFR BLD AUTO: 44.4 % (ref 35–47)
HGB BLD-MCNC: 14.5 G/DL (ref 11.7–15.7)
HGB UR QL STRIP: ABNORMAL
IMM GRANULOCYTES # BLD: 0 10E9/L (ref 0–0.4)
IMM GRANULOCYTES NFR BLD: 0.3 %
KETONES UR STRIP-MCNC: NEGATIVE MG/DL
LEUKOCYTE ESTERASE UR QL STRIP: ABNORMAL
LIPASE SERPL-CCNC: 117 U/L (ref 73–393)
LYMPHOCYTES # BLD AUTO: 3.6 10E9/L (ref 0.8–5.3)
LYMPHOCYTES NFR BLD AUTO: 31.2 %
MCH RBC QN AUTO: 29.7 PG (ref 26.5–33)
MCHC RBC AUTO-ENTMCNC: 32.7 G/DL (ref 31.5–36.5)
MCV RBC AUTO: 91 FL (ref 78–100)
MONOCYTES # BLD AUTO: 0.6 10E9/L (ref 0–1.3)
MONOCYTES NFR BLD AUTO: 4.9 %
MUCOUS THREADS #/AREA URNS LPF: PRESENT /LPF
NEUTROPHILS # BLD AUTO: 6.8 10E9/L (ref 1.6–8.3)
NEUTROPHILS NFR BLD AUTO: 59.6 %
NITRATE UR QL: NEGATIVE
NRBC # BLD AUTO: 0 10*3/UL
NRBC BLD AUTO-RTO: 0 /100
PH UR STRIP: 6 PH (ref 5–7)
PLATELET # BLD AUTO: 362 10E9/L (ref 150–450)
POTASSIUM SERPL-SCNC: 3.9 MMOL/L (ref 3.4–5.3)
PROT SERPL-MCNC: 8 G/DL (ref 6.8–8.8)
RBC # BLD AUTO: 4.89 10E12/L (ref 3.8–5.2)
RBC #/AREA URNS AUTO: 2 /HPF (ref 0–2)
SODIUM SERPL-SCNC: 140 MMOL/L (ref 133–144)
SOURCE: ABNORMAL
SP GR UR STRIP: 1.01 (ref 1–1.03)
SQUAMOUS #/AREA URNS AUTO: 2 /HPF (ref 0–1)
UROBILINOGEN UR STRIP-MCNC: 0 MG/DL (ref 0–2)
WBC # BLD AUTO: 11.5 10E9/L (ref 4–11)
WBC #/AREA URNS AUTO: 14 /HPF (ref 0–5)

## 2018-08-22 PROCEDURE — 74176 CT ABD & PELVIS W/O CONTRAST: CPT

## 2018-08-22 PROCEDURE — 83690 ASSAY OF LIPASE: CPT | Performed by: EMERGENCY MEDICINE

## 2018-08-22 PROCEDURE — 85025 COMPLETE CBC W/AUTO DIFF WBC: CPT | Performed by: EMERGENCY MEDICINE

## 2018-08-22 PROCEDURE — 80053 COMPREHEN METABOLIC PANEL: CPT | Performed by: EMERGENCY MEDICINE

## 2018-08-22 PROCEDURE — 80076 HEPATIC FUNCTION PANEL: CPT | Performed by: EMERGENCY MEDICINE

## 2018-08-22 PROCEDURE — 99284 EMERGENCY DEPT VISIT MOD MDM: CPT | Mod: 25

## 2018-08-22 PROCEDURE — 76705 ECHO EXAM OF ABDOMEN: CPT

## 2018-08-22 PROCEDURE — 25000128 H RX IP 250 OP 636: Performed by: EMERGENCY MEDICINE

## 2018-08-22 PROCEDURE — 96365 THER/PROPH/DIAG IV INF INIT: CPT

## 2018-08-22 PROCEDURE — 81001 URINALYSIS AUTO W/SCOPE: CPT | Performed by: EMERGENCY MEDICINE

## 2018-08-22 PROCEDURE — 81025 URINE PREGNANCY TEST: CPT | Performed by: EMERGENCY MEDICINE

## 2018-08-22 PROCEDURE — 80048 BASIC METABOLIC PNL TOTAL CA: CPT | Performed by: EMERGENCY MEDICINE

## 2018-08-22 RX ORDER — CEFTRIAXONE 1 G/1
1 INJECTION, POWDER, FOR SOLUTION INTRAMUSCULAR; INTRAVENOUS ONCE
Status: COMPLETED | OUTPATIENT
Start: 2018-08-22 | End: 2018-08-22

## 2018-08-22 RX ORDER — CEPHALEXIN 500 MG/1
500 CAPSULE ORAL 4 TIMES DAILY
Qty: 28 CAPSULE | Refills: 0 | Status: SHIPPED | OUTPATIENT
Start: 2018-08-22 | End: 2019-02-05

## 2018-08-22 RX ADMIN — CEFTRIAXONE SODIUM 1 G: 1 INJECTION, POWDER, FOR SOLUTION INTRAMUSCULAR; INTRAVENOUS at 20:42

## 2018-08-22 ASSESSMENT — ENCOUNTER SYMPTOMS
ABDOMINAL PAIN: 1
FLANK PAIN: 1

## 2018-08-22 NOTE — ED PROVIDER NOTES
"  History     Chief Complaint:    Abdominal Pain and Flank Pain      HPI   Ana Wyman is a 48 year old female with a history of GERD who presents with abdominal pain that radiates to her right flank. The patient states that she has been having epigastric abdominal pain over the past three weeks that radiates to her right flank. She states that her abdominal pain is exacerbated when she eats fried food. She states that he abdominal pain has gotten significantly worse yesterday. Today, she notes that she was very nauseous at work and her abdominal pain continued to worsen so she presented to the ED for evaluation. She does note that her symptoms feel vaguely similar to a kidney stone, however, she denies any urinary symptoms.     Allergies:  Clindamycin  Codeine     Medications:    Claritin  Prilosec  Albuterol     Past Medical History:    Allergic rhinitis  Cellulitis  DUB  GERD  Fibroids  Hallux valgus  Hx of steroid therapy  Hypersomnia with sleep apnea  Lichen sclerosus  Lymphedema bilateral with mixed lipidemia  Pre-diabetes    Past Surgical History:    Right hand surgery  Foot surgery x2  Colonoscopy  GYN surgery  Lichen sclerosis  Orthopedic surgery  Vascular surgery    Family History:    CAD  Obesity  Cancer  Hyperlipidemia  HTN  DM II  Thyroid disease    Social History:  Negative for tobacco use.  Positive for alcohol use.  Marital Status:  Single [1]     Review of Systems   Gastrointestinal: Positive for abdominal pain.   Genitourinary: Positive for flank pain.   All other systems reviewed and are negative.    Physical Exam   First Vitals:  BP: 143/90  Heart Rate: 92  Temp: 99.1  F (37.3  C)  Resp: 20  Height: 165.1 cm (5' 5\")  Weight: 149.7 kg (330 lb)  SpO2: 98 %      Physical Exam  Constitutional: Patient is well appearing. No distress.  Head: Atraumatic.  Mouth/Throat: Oropharynx is clear and moist. No oropharyngeal exudate.  Eyes: Conjunctivae and EOM are normal. No scleral icterus.  Neck: " Normal range of motion. Neck supple.   Cardiovascular: Normal rate, regular rhythm, normal heart sounds and intact distal pulses.   Pulmonary/Chest: Breath sounds normal. No respiratory distress.  Abdominal: Soft. Bowel sounds are normal. No distension. RUQ tenderness worse with deep inspiration. No rebound or guarding.   Musculoskeletal: Normal range of motion. No edema or tenderness.   Neurological: Alert and orientated to person, place, and time. No observable focal neuro deficit  Skin: Warm and dry. No rash noted. Not diaphoretic.       Emergency Department Course   Imaging:  Radiographic findings were communicated with the patient who voiced understanding of the findings.  US Abdomen, limited:  Limited study due to patient body habitus. The common duct  is not visualized. There are no findings to suggest acute as per radiology.       Laboratory:  CBC: WBC: 11.5 (H), HGB: 14.5, PLT: 362  BMP: Glucose 132 (H), o/w WNL (Creatinine: 0.70)    Hepatic panel: All WNL  Lipase: 117  UA with Microscopic: Blood small (A), Leukocyte esterase Small (A), WBC/HPF 14 (H), Squamous epithelial/HPF 2 (H), Mucous Present (A), o/w WNL  HCG: negative      Interventions:  2042 Rocephin 1 g IV      Emergency Department Course:  Nursing notes and vitals reviewed. (1855) I performed an exam of the patient as documented above.     The patient provided a urine sample here in the emergency department. This was sent for laboratory testing, findings above.     IV inserted. Medicine administered as documented above. Blood drawn. This was sent to the lab for further testing, results above.     The patient was sent for a Abdomen US while in the emergency department, findings above.     2022 I rechecked the patient and discussed the results of her workup thus far.     Findings and plan explained to the Patient. Patient discharged home with instructions regarding supportive care, medications, and reasons to return. The importance of close  follow-up was reviewed. The patient was prescribed Keflex.    I personally reviewed the laboratory results with the Patient and answered all related questions prior to discharge.       Impression & Plan    Medical Decision Making:  This patient has symptoms consistent with a urinary tract infection.  Urinalysis confirms the infection.  There has no fever, back/flank pain or significant abdominal pain.  There is no clinical evidence of pyelonephritis, appendicitis,  or diverticulitis.  The patient will be started on antibiotics for the infection.  Extensive workup was undertaken for the vague abdominal pain as above completely neg and no signs or sx above the diaphragm.  Return if increasing pain, vomiting, fever, or inability to tolerate the oral antibiotic.  Follow up with primary physician is indicated if not improving in 2-3 days.  Abd exam benign on 3 separate occasions.    All questions and concerns were answered. The patient was discharged home and recommended to follow up with her primary physician and return with any new or worsening symptoms.       Critical Care time:  none    Diagnosis:    ICD-10-CM    1. Acute cystitis without hematuria N30.00    2. Abdominal pain, generalized R10.84        Disposition:  discharged to home    Discharge Medications:  Discharge Medication List as of 8/22/2018  9:38 PM      START taking these medications    Details   cephALEXin (KEFLEX) 500 MG capsule Take 1 capsule (500 mg) by mouth 4 times daily for 7 days, Disp-28 capsule, R-0, Local Print           Scribe Disclosure:  I,  Andrew Encarnacion, am serving as a scribe on 8/22/2018 at 6:55 PM to personally document services performed by Leo Oleary MD based on my observations and the provider's statements to me.          Andrew Encarnacion  8/22/2018   [facility] EMERGENCY DEPARTMENT       Leo Oleary MD  08/22/18 [id number]

## 2018-08-22 NOTE — ED AVS SNAPSHOT
[facility]    [address] [place] [address]    Phone:  [phone]    Fax:  [phone]                                       Ana Wyman   MRN: [id number]    Department:  [facility]   Date of Visit:  8/22/2018           Patient Information     Date Of Birth          1970        Your diagnoses for this visit were:     Acute cystitis without hematuria     Abdominal pain, generalized        You were seen by Leo Oleary MD.      Follow-up Information     Follow up with Kelly Bedoya MD. Schedule an appointment as soon as possible for a visit in 2 days.    Specialties:  Internal Medicine, Pediatrics    Why:  As needed, If symptoms worsen    Contact information:    [address] [place] [address] [place] [address]  [phone]          Discharge Instructions         Abdominal Pain    Abdominal pain is pain in the stomach or belly area. Everyone has this pain from time to time. In many cases it goes away on its own. But abdominal pain can sometimes be due to a serious problem, such as appendicitis. So it s important to know when to seek help.  Causes of abdominal pain  There are many possible causes of abdominal pain. Common causes in adults include:    Constipation, diarrhea, or gas    Stomach acid flowing back up into the esophagus (acid reflux or heartburn)    Severe acid reflux, called GERD (gastroesophageal reflux disease)    A sore in the lining of the stomach or small intestine (peptic ulcer)    Inflammation of the gallbladder, liver, or pancreas    Gallstones or kidney stones    Appendicitis     Intestinal blockage     An internal organ pushing through a muscle or other tissue (hernia)    Urinary tract infections    In women, menstrual cramps, fibroids, or endometriosis    Inflammation or infection of the intestines  Diagnosing the cause of abdominal pain  Your healthcare provider will do a physical  exam help find the cause of your pain. If needed, tests will be ordered. Belly pain has many possible causes. So it can be hard to find the reason for your pain. Giving details about your pain can help. Tell your provider where and when you feel the pain, and what makes it better or worse. Also let your provider know if you have other symptoms such as:    Fever    Tiredness    Upset stomach (nausea)    Vomiting    Changes in bathroom habits  Treating abdominal pain  Some causes of pain need emergency medical treatment right away. These include appendicitis or a bowel blockage. Other problems can be treated with rest, fluids, or medicines. Your healthcare provider can give you specific instructions for treatment or self-care based on what is causing your pain.  If you have vomiting or diarrhea, sip water or other clear fluids. When you are ready to eat solid foods again, start with small amounts of easy-to-digest, low-fat foods. These include apple sauce, toast, or crackers.   When to seek medical care  Call 911 or go to the hospital right away if you:    Can t pass stool and are vomiting    Are vomiting blood or have bloody diarrhea or black, tarry diarrhea    Have chest, neck, or shoulder pain    Feel like you might pass out    Have pain in your shoulder blades with nausea    Have sudden, severe belly pain    Have new, severe pain unlike any you have felt before    Have a belly that is rigid, hard, and tender to touch  Call your healthcare provider if you have:    Pain for more than 5 days    Bloating for more than 2 days    Diarrhea for more than 5 days    A fever of 100.4 F (38 C) or higher, or as directed by your healthcare provider    Pain that gets worse    Weight loss for no reason    Continued lack of appetite    Blood in your stool  How to prevent abdominal pain  Here are some tips to help prevent abdominal pain:    Eat smaller amounts of food at one time.    Avoid greasy, fried, or other high-fat  foods.    Avoid foods that give you gas.    Exercise regularly.    Drink plenty of fluids.  To help prevent GERD symptoms:    Quit smoking.    Reduce alcohol and certain foods that increase stomach acid.    Avoid aspirin and over-the-counter pain and fever medicines (NSAIDS or nonsteroidal anti-inflammatory drugs), if possible    Lose extra weight.    Finish eating at least 2 hours before you go to bed or lie down.    Raise the head of your bed.  Date Last Reviewed: 7/1/2016 2000-2017 [employer]. [address] [place] [address]. All rights reserved. This information is not intended as a substitute for professional medical care. Always follow your healthcare professional's instructions.          Understanding Urinary Tract Infections (UTIs)  Most UTIs are caused by bacteria, although they may also be caused by viruses or fungi. Bacteria from the bowel are the most common source of infection. The infection may start because of any of the following:    Sexual activity. During sex, bacteria can travel from the penis, vagina, or rectum into the urethra.     Bacteria on the skin outside the rectum may travel into the urethra. This is more common in women since the rectum and urethra are closer to each other than in men. Wiping from front to back after using the toilet and keeping the area clean can help prevent germs from getting to the urethra.    Blockage of urine flow through the urinary tract. If urine sits too long, germs may start to grow out of control.      Parts of the urinary tract  The infection can occur in any part of the urinary tract.    The kidneys collect and store urine.    The ureters carry urine from the kidneys to the bladder.    The bladder holds urine until you are ready to let it out.    The urethra carries urine from the bladder out of the body. It is shorter in women, so bacteria can move through it more easily. The urethra is longer in men, so a UTI is less likely to  reach the bladder or kidneys in men.  Date Last Reviewed: 1/1/2017 2000-2017 [employer]. [address] [place] [address], [place] [address]. All rights reserved. This information is not intended as a substitute for professional medical care. Always follow your healthcare professional's instructions.          Your next 10 appointments already scheduled     Aug 31, 2018  8:30 AM CDT   Return Bariatric Nutrition Visit with Jolene Cadena 1, RD   [facility] ([facility])    [address] [place] [address]  [address]  [facility] [address]   [phone]            Sep 26, 2018 12:00 PM CDT   (Arrive by 11:30 AM)   New Visit with Laila Taylor LP   [facility] ([facility])    [address]  [facility] [address] [phone]            Oct 03, 2018  2:00 PM CDT   Return Visit with Laila Taylor LP   [facility] ([facility])    [address]  [facility] [address] [phone]            Oct 08, 2018   Procedure with Clive Walker MD   [facility] ([facility])    [address] [place] [address]   [phone]           [facility] is located at [address] [place]            Oct 15, 2018 10:15 AM CDT   MyChart OB-GYN Annual Physical with Jaquelin Perez MD   [facility] ([facility])    [address] [place] [address]  [address]  [facility] [address] [phone]            Oct 24, 2018  4:00 PM CDT   Return Visit with Laila Taylor LP   [facility] ([facility])    [address]  [facility] [address] [phone]              24 Hour Appointment Hotline       To make an appointment at any [facility], call [phone] ([phone]). If you don't have a family doctor or clinic, we will help you find one. [facility] are conveniently located to  serve the needs of you and your family.             Review of your medicines      START taking        Dose / Directions Last dose taken    cephALEXin 500 MG capsule   Commonly known as:  KEFLEX   Dose:  500 mg   Quantity:  28 capsule        Take 1 capsule (500 mg) by mouth 4 times daily for 7 days   Refills:  0          Our records show that you are taking the medicines listed below. If these are incorrect, please call your family doctor or clinic.        Dose / Directions Last dose taken    albuterol 108 (90 Base) MCG/ACT inhaler   Commonly known as:  PROAIR HFA/PROVENTIL HFA/VENTOLIN HFA   Dose:  2 puff   Quantity:  2 Inhaler        Inhale 2 puffs into the lungs every 6 hours as needed for shortness of breath / dyspnea or wheezing   Refills:  3        clobetasol 0.05 % ointment   Commonly known as:  TEMOVATE   Quantity:  45 g        Apply clobetasol  0.05 percent ointment, sparingly (a dot 3 mm wide) in a thin film.  Apply to affected area only, once or twice weekly for maintenance.   Refills:  2        FISH OIL PO        Refills:  0        loratadine 10 MG tablet   Commonly known as:  CLARITIN   Dose:  10 mg   Quantity:  7 tablet        Take 1 tablet (10 mg) by mouth daily   Refills:  0        multivitamin, therapeutic with minerals Tabs tablet   Dose:  1 tablet        Take 1 tablet by mouth daily.   Refills:  0        PRILOSEC OTC PO   Dose:  20 mg        Take 20 mg by mouth daily   Refills:  0        RESTASIS MULTIDOSE 0.05 % ophthalmic emulsion   Generic drug:  cycloSPORINE        INSTILL 1 DROP INTO BOTH EYES TWICE A DAY   Refills:  5                Prescriptions were sent or printed at these locations (1 Prescription)                   Other Prescriptions                Printed at Department/Unit printer (1 of 1)         cephALEXin (KEFLEX) 500 MG capsule                Procedures and tests performed during your visit     Basic metabolic panel    CBC with platelets differential    CT Abdomen Pelvis w/o  Contrast    HCG qualitative urine (UPT)    Hepatic panel    Lipase    UA with Microscopic    US Abdomen Limited      Orders Needing Specimen Collection     None      Pending Results     Date and Time Order Name Status Description    8/22/2018 2021 CT Abdomen Pelvis w/o Contrast Preliminary             Pending Culture Results     No orders found from 8/20/2018 to 8/23/2018.            Pending Results Instructions     If you had any lab results that were not finalized at the time of your Discharge, you can call the ED Lab Result RN at [phone]. You will be contacted by this team for any positive Lab results or changes in treatment. The nurses are available 7 days a week from 10A to 6:30P.  You can leave a message 24 hours per day and they will return your call.        Test Results From Your Hospital Stay        8/22/2018  7:29 PM      Component Results     Component Value Ref Range & Units Status    Color Urine Yellow  Final    Appearance Urine Clear  Final    Glucose Urine Negative NEG^Negative mg/dL Final    Bilirubin Urine Negative NEG^Negative Final    Ketones Urine Negative NEG^Negative mg/dL Final    Specific Gravity Urine 1.012 1.003 - 1.035 Final    Blood Urine Small (A) NEG^Negative Final    pH Urine 6.0 5.0 - 7.0 pH Final    Protein Albumin Urine Negative NEG^Negative mg/dL Final    Urobilinogen mg/dL 0.0 0.0 - 2.0 mg/dL Final    Nitrite Urine Negative NEG^Negative Final    Leukocyte Esterase Urine Small (A) NEG^Negative Final    Source Midstream Urine  Final    WBC Urine 14 (H) 0 - 5 /HPF Final    RBC Urine 2 0 - 2 /HPF Final    Squamous Epithelial /HPF Urine 2 (H) 0 - 1 /HPF Final    Mucous Urine Present (A) NEG^Negative /LPF Final         8/22/2018  7:17 PM      Component Results     Component Value Ref Range & Units Status    HCG Qual Urine Negative NEG^Negative Final    This test is for screening purposes.  Results should be interpreted along with   the clinical picture.  Confirmation testing is  available if warranted by   ordering JIT873, HCG Quantitative Pregnancy.           8/22/2018  8:05 PM      Component Results     Component Value Ref Range & Units Status    Sodium 140 133 - 144 mmol/L Final    Potassium 3.9 3.4 - 5.3 mmol/L Final    Chloride 106 94 - 109 mmol/L Final    Carbon Dioxide 26 20 - 32 mmol/L Final    Anion Gap 8 3 - 14 mmol/L Final    Glucose 132 (H) 70 - 99 mg/dL Final    Urea Nitrogen 11 7 - 30 mg/dL Final    Creatinine 0.70 0.52 - 1.04 mg/dL Final    GFR Estimate 89 >60 mL/min/1.7m2 Final    Non  GFR Calc    GFR Estimate If Black >90 >60 mL/min/1.7m2 Final    African American GFR Calc    Calcium 9.2 8.5 - 10.1 mg/dL Final         8/22/2018  7:39 PM      Component Results     Component Value Ref Range & Units Status    WBC 11.5 (H) 4.0 - 11.0 10e9/L Final    RBC Count 4.89 3.8 - 5.2 10e12/L Final    Hemoglobin 14.5 11.7 - 15.7 g/dL Final    Hematocrit 44.4 35.0 - 47.0 % Final    MCV 91 78 - 100 fl Final    MCH 29.7 26.5 - 33.0 pg Final    MCHC 32.7 31.5 - 36.5 g/dL Final    RDW 13.4 10.0 - 15.0 % Final    Platelet Count 362 150 - 450 10e9/L Final    Diff Method Automated Method  Final    % Neutrophils 59.6 % Final    % Lymphocytes 31.2 % Final    % Monocytes 4.9 % Final    % Eosinophils 3.4 % Final    % Basophils 0.6 % Final    % Immature Granulocytes 0.3 % Final    Nucleated RBCs 0 0 /100 Final    Absolute Neutrophil 6.8 1.6 - 8.3 10e9/L Final    Absolute Lymphocytes 3.6 0.8 - 5.3 10e9/L Final    Absolute Monocytes 0.6 0.0 - 1.3 10e9/L Final    Absolute Eosinophils 0.4 0.0 - 0.7 10e9/L Final    Absolute Basophils 0.1 0.0 - 0.2 10e9/L Final    Abs Immature Granulocytes 0.0 0 - 0.4 10e9/L Final    Absolute Nucleated RBC 0.0  Final         8/22/2018  9:22 PM      Narrative      US ABDOMEN LIMITED   8/22/2018 8:14 PM     COMPARISON: None.    HISTORY: Right upper quadrant pain.      FINDINGS: The liver demonstrates a coarse, hyperechoic echotexture  consistent with diffuse  fatty infiltration. No focal liver lesions  identified.     The gallbladder is contracted. There is no shadowing or echogenic  material within the gallbladder. There is no gallbladder wall  thickening. There is no pericholecystic fluid. There is no sonographic  Patel's sign. The common duct is not well visualized.    The visualized portions of the pancreas are unremarkable; the tail and  body are not well visualized due to patient body habitus and overlying  bowel gas.    The right kidney measures 12.3 cm in length. Right renal echotexture  is normal. There is no hydronephrosis on the right. There is no renal  cyst on the right.    The abdominal aorta and inferior vena cava are visualized.        Impression     IMPRESSION: Limited study due to patient body habitus. The common duct  is not visualized. There are no findings to suggest acute  cholecystitis. There is diffuse fatty infiltration of the liver.    MALIA STEIN MD         8/22/2018  8:05 PM      Component Results     Component Value Ref Range & Units Status    Bilirubin Direct <0.1 0.0 - 0.2 mg/dL Final    Bilirubin Total 0.4 0.2 - 1.3 mg/dL Final    Albumin 3.8 3.4 - 5.0 g/dL Final    Protein Total 8.0 6.8 - 8.8 g/dL Final    Alkaline Phosphatase 104 40 - 150 U/L Final    ALT 31 0 - 50 U/L Final    AST 21 0 - 45 U/L Final         8/22/2018  8:05 PM      Component Results     Component Value Ref Range & Units Status    Lipase 117 73 - 393 U/L Final         8/22/2018  9:19 PM      Narrative     CT ABDOMEN AND PELVIS WITHOUT CONTRAST   8/22/2018 8:55 PM     HISTORY: Right flank pain.    COMPARISON: 5/22/2018.    TECHNIQUE: Without intravenous or oral contrast, helical sections were  acquired from the top of the diaphragm through the pubic symphysis.  Coronal reconstructions were generated. Radiation dose for this scan  was reduced using automated exposure control, adjustment of the mA  and/or kV according to the patient's size, or iterative  reconstruction  technique. (Renal stone protocol)    FINDINGS:     Right urinary tract: No renal or ureteral calculi. No dilatation of  the intrarenal collecting system or ureter.    Left urinary tract: No renal or ureteral calculi. No dilatation of the  intrarenal collecting system or ureter.    Urinary bladder: No visualized calculi.    Remainder of the abdomen and pelvis: Scan through the lower chest is  unremarkable. Mild to moderate diffuse fatty infiltration of the  liver. The liver, spleen, pancreas and adrenal glands are otherwise  unremarkable to the limits of a noncontrast CT scan. The gallbladder  is present. The small and large bowel are normal in caliber. The  appendix is unremarkable. No bowel wall thickening, pneumatosis or  free intraperitoneal gas. 6 cm partially calcified uterine fibroid. No  enlarged lymph nodes or free fluid in the abdomen or pelvis.        Impression     IMPRESSION:   1. No renal or ureteral calculi or evidence of urinary obstruction.  2. No other cause of acute pain identified in the abdomen or pelvis.                Clinical Quality Measure: Blood Pressure Screening     Your blood pressure was checked while you were in the emergency department today. The last reading we obtained was  BP: 104/62 . Please read the guidelines below about what these numbers mean and what you should do about them.  If your systolic blood pressure (the top number) is less than 120 and your diastolic blood pressure (the bottom number) is less than 80, then your blood pressure is normal. There is nothing more that you need to do about it.  If your systolic blood pressure (the top number) is 120-139 or your diastolic blood pressure (the bottom number) is 80-89, your blood pressure may be higher than it should be. You should have your blood pressure rechecked within a year by a primary care provider.  If your systolic blood pressure (the top number) is 140 or greater or your diastolic blood pressure  (the bottom number) is 90 or greater, you may have high blood pressure. High blood pressure is treatable, but if left untreated over time it can put you at risk for heart attack, stroke, or kidney failure. You should have your blood pressure rechecked by a primary care provider within the next 4 weeks.  If your provider in the emergency department today gave you specific instructions to follow-up with your doctor or provider even sooner than that, you should follow that instruction and not wait for up to 4 weeks for your follow-up visit.        Thank you for choosing [facility]       Thank you for choosing [facility] for your care. Our goal is always to provide you with excellent care. Hearing back from our patients is one way we can continue to improve our services. Please take a few minutes to complete the written survey that you may receive in the mail after you visit with us. Thank you!        [employer] Information     [employer] gives you secure access to your electronic health record. If you see a primary care provider, you can also send messages to your care team and make appointments. If you have questions, please call your primary care clinic.  If you do not have a primary care provider, please call [phone] and they will assist you.        Care EveryWhere ID     This is your Care EveryWhere ID. This could be used by other organizations to access your [facility] medical records  [id number]        Equal Access to Services     GIO GOMEZ : Eli ordoñezo Reshma, waaxda luqadaha, qaybta kaalmada aderere, yoselin christina. So [facility] [phone].    ATENCIÓN: Si habla español, tiene a tejada disposición servicios gratuitos de asistencia lingüística. Llame al [phone].    We comply with applicable federal civil rights laws and [place] laws. We do not discriminate on the basis of race, color, national origin, age, disability, sex, sexual orientation, or gender identity.             After Visit Summary       This is your record. Keep this with you and show to your community pharmacist(s) and doctor(s) at your next visit.

## 2018-08-22 NOTE — ED AVS SNAPSHOT
[facility] Department    [address]    [facility] [address]    Phone:  [phone]    Fax:  [phone]                                       Ana Wyman   MRN: [id number]    Department:  [facility] Department   Date of Visit:  8/22/2018           After Visit Summary Signature Page     I have received my discharge instructions, and my questions have been answered. I have discussed any challenges I see with this plan with the nurse or doctor.    ..........................................................................................................................................  Patient/Patient Representative Signature      ..........................................................................................................................................  Patient Representative Print Name and Relationship to Patient    ..................................................               ................................................  Date                                            Time    ..........................................................................................................................................  Reviewed by Signature/Title    ...................................................              ..............................................  Date                                                            Time

## 2018-08-22 NOTE — ED TRIAGE NOTES
Pt has right sided abdominal pain getting worse over past 3 weeks.  Pain radiates around to right flank area.  Eating fried or carbonated foods make the pain worse.  She is nauseated.

## 2018-08-23 NOTE — DISCHARGE INSTRUCTIONS
Abdominal Pain    Abdominal pain is pain in the stomach or belly area. Everyone has this pain from time to time. In many cases it goes away on its own. But abdominal pain can sometimes be due to a serious problem, such as appendicitis. So it s important to know when to seek help.  Causes of abdominal pain  There are many possible causes of abdominal pain. Common causes in adults include:    Constipation, diarrhea, or gas    Stomach acid flowing back up into the esophagus (acid reflux or heartburn)    Severe acid reflux, called GERD (gastroesophageal reflux disease)    A sore in the lining of the stomach or small intestine (peptic ulcer)    Inflammation of the gallbladder, liver, or pancreas    Gallstones or kidney stones    Appendicitis     Intestinal blockage     An internal organ pushing through a muscle or other tissue (hernia)    Urinary tract infections    In women, menstrual cramps, fibroids, or endometriosis    Inflammation or infection of the intestines  Diagnosing the cause of abdominal pain  Your healthcare provider will do a physical exam help find the cause of your pain. If needed, tests will be ordered. Belly pain has many possible causes. So it can be hard to find the reason for your pain. Giving details about your pain can help. Tell your provider where and when you feel the pain, and what makes it better or worse. Also let your provider know if you have other symptoms such as:    Fever    Tiredness    Upset stomach (nausea)    Vomiting    Changes in bathroom habits  Treating abdominal pain  Some causes of pain need emergency medical treatment right away. These include appendicitis or a bowel blockage. Other problems can be treated with rest, fluids, or medicines. Your healthcare provider can give you specific instructions for treatment or self-care based on what is causing your pain.  If you have vomiting or diarrhea, sip water or other clear fluids. When you are ready to eat solid foods again,  start with small amounts of easy-to-digest, low-fat foods. These include apple sauce, toast, or crackers.   When to seek medical care  Call 911 or go to the hospital right away if you:    Can t pass stool and are vomiting    Are vomiting blood or have bloody diarrhea or black, tarry diarrhea    Have chest, neck, or shoulder pain    Feel like you might pass out    Have pain in your shoulder blades with nausea    Have sudden, severe belly pain    Have new, severe pain unlike any you have felt before    Have a belly that is rigid, hard, and tender to touch  Call your healthcare provider if you have:    Pain for more than 5 days    Bloating for more than 2 days    Diarrhea for more than 5 days    A fever of 100.4 F (38 C) or higher, or as directed by your healthcare provider    Pain that gets worse    Weight loss for no reason    Continued lack of appetite    Blood in your stool  How to prevent abdominal pain  Here are some tips to help prevent abdominal pain:    Eat smaller amounts of food at one time.    Avoid greasy, fried, or other high-fat foods.    Avoid foods that give you gas.    Exercise regularly.    Drink plenty of fluids.  To help prevent GERD symptoms:    Quit smoking.    Reduce alcohol and certain foods that increase stomach acid.    Avoid aspirin and over-the-counter pain and fever medicines (NSAIDS or nonsteroidal anti-inflammatory drugs), if possible    Lose extra weight.    Finish eating at least 2 hours before you go to bed or lie down.    Raise the head of your bed.  Date Last Reviewed: 7/1/2016 2000-2017 [employer]. [address] [place] [address], [place] [address]. All rights reserved. This information is not intended as a substitute for professional medical care. Always follow your healthcare professional's instructions.          Understanding Urinary Tract Infections (UTIs)  Most UTIs are caused by bacteria, although they may also be caused by viruses or fungi. Bacteria from the bowel  are the most common source of infection. The infection may start because of any of the following:    Sexual activity. During sex, bacteria can travel from the penis, vagina, or rectum into the urethra.     Bacteria on the skin outside the rectum may travel into the urethra. This is more common in women since the rectum and urethra are closer to each other than in men. Wiping from front to back after using the toilet and keeping the area clean can help prevent germs from getting to the urethra.    Blockage of urine flow through the urinary tract. If urine sits too long, germs may start to grow out of control.      Parts of the urinary tract  The infection can occur in any part of the urinary tract.    The kidneys collect and store urine.    The ureters carry urine from the kidneys to the bladder.    The bladder holds urine until you are ready to let it out.    The urethra carries urine from the bladder out of the body. It is shorter in women, so bacteria can move through it more easily. The urethra is longer in men, so a UTI is less likely to reach the bladder or kidneys in men.  Date Last Reviewed: 1/1/2017 2000-2017 [employer]. [address] [place] [address]. All rights reserved. This information is not intended as a substitute for professional medical care. Always follow your healthcare professional's instructions.

## 2018-08-31 ENCOUNTER — OFFICE VISIT (OUTPATIENT)
Dept: SURGERY | Facility: CLINIC | Age: 48
End: 2018-08-31
Payer: COMMERCIAL

## 2018-08-31 VITALS — HEIGHT: 64 IN | WEIGHT: 293 LBS | BODY MASS INDEX: 50.02 KG/M2

## 2018-08-31 DIAGNOSIS — E66.01 MORBID OBESITY (H): ICD-10-CM

## 2018-08-31 PROCEDURE — 97803 MED NUTRITION INDIV SUBSEQ: CPT | Performed by: DIETITIAN, REGISTERED

## 2018-08-31 NOTE — PROGRESS NOTES
"PRE SURGICAL WEIGHT LOSS NUTRITION APPOINTMENT    Ana Wyman  1970  female  [id number]  48 year old    ASSESSMENT    Desired Surgical Procedure: gastric bypass    REASON FOR VISIT:  Ana Wyman is a 48 year old year old female presents today for a pre-surgical weight loss follow-up appointment. Patient accompanied by self.    DIAGNOSIS:  Weight Status Obesity Grade III BMI >40    ANTHROPOMETRICS:  Height: 162.6 cm (5' 4\")  Initial Weight: 153.9 kg (339 lb 4.8 oz)   Current weight: (!) 150.6 kg (332 lb 1.6 oz)  BMI: 57.12 kg/(m^2).    VITAMINS AND MINERALS:  1 Multivitamin with Minerals  1 pill (not sure of amount) Calcium citrate with Vitamin D   5000 International units Vitamin D      NUTRITION HISTORY:  Breakfast: protein drink, banana or 1 egg, turkey sausage, occasionally 1 slice toast  Lunch: lettuce salad with chicken or side salad with left over meat  Supper: protein, vegetable potato or corn on the cob  Snacks: occasional cookie or muffin at work or popcorn  Fluids Consumed: Water, Protein Drink and carbonated water, coffee  Eating slower: No  Chewing foods thoroughly: No  Take 20-30 minutes to consume each meal: No  Fluids and meals separate by at least 30 minutes: No  Carbonation: diet pop, carbonated water  Caffeine: coffee, protein drink  Additional Information: Patient was surprised her weight was down 7 pounds. This past month pt had a vacation and was not always mindful about her food choices. Patient was disappointed to hear that she will not likely be having surgery at the end of 2018. Offered suggestion for strength training (DVD, jm, PT for back/ knee pain).    PHYSICAL ACTIVITY:  Type: walking  Frequency: 1-2 (days per week)  Duration: 10-15 (minutes)     DIAGNOSIS:  Previous Nutrition Diagnosis: Obesity related to long history of self- monitoring deficit and excessive energy intake evidenced by BMI of 58.24 kg/m2  No change, modified below    Previous goals:   Practice " drinking liquids at breakfast and lunch-improving  Chew foods to applesauce texture -improving  Start calcium supplements-partially met    Current Nutrition Diagnosis: Obesity related to long history of self-monitoring deficit and excessive energy intake as evidenced by BMI of 57.12 kg/m2.    INTERVENTION:  Nutrition Prescription: Recommended energy/nutrient modification.    GOALS:  Increase calcium to 5234-5694 mg (2-3 separate doses) and take 2 hours away from multivitamin/ mineral  Separate fluids and meals by 30 minutes  Increase walking to 3 X per week for 10-15 minutes    Intervention:  _Congratulated patient on weight loss  - Discussed progress towards previous goals.  - Reinforced importance of making behavior changes in preparation for bariatric surgery.   - Assessed learning needs and learning preferences       NUTRITION MONITORING AND EVALUATION:  Anticipated compliance: fair-good  Patient demonstrated fair-good understanding.       Follow up: Continue to monitor patient closely regarding weight loss and diet.  # of visits needed: 3 (one visit counted in June 2018 with primary care MD)  Cleared by RD: No     TIME SPENT WITH PATIENT: 35 minutes    Chapo Braun RD, LD  [facility]  [phone]

## 2018-08-31 NOTE — MR AVS SNAPSHOT
MRN:[id number]                      After Visit Summary   8/31/2018    Ana Wyman    MRN: [id number]           Visit Information        Provider Department      8/31/2018 8:30 AM 1Jolene RD [facility] BARIATRICS      Your next 10 appointments already scheduled     Sep 24, 2018  3:00 PM CDT   Return Bariatric Visit with Jolene Cadena 1, LULU   [facility] ([facility])    [address]  [address]  [place] [address]   [phone]            Sep 26, 2018 12:00 PM CDT   (Arrive by 11:30 AM)   New Visit with Laila Taylor LP   [facility] ([facility] [place])    [address] [place] [address] [address]  [facility] [address]   [phone]            Oct 03, 2018  2:00 PM CDT   Return Visit with Laila Taylor LP   [facility] ([facility] [place])    [address] [place] [address] [address]  [facility] [address]   [phone]            Oct 22, 2018  9:30 AM CDT   MyChart OB-GYN Annual Physical with Jaquelin Perez MD   [facility] ([facility])    [address] [place] [address]  [address]  [facility] [address] [phone]            Oct 24, 2018  4:00 PM CDT   Return Visit with Laila Taylor LP   [facility] ([facility] [place])    [address] [place] [address] [address]  [facility] [address]   [phone]            Oct 29, 2018   Procedure with Ochoa Denis MD   [facility] ([facility])    [address] [place] [address] [phone]           [facility] is located at [address] [place]              MyChar Information     [employer] gives you secure access to your electronic health record. If you see a primary care provider, you can also send messages to your care team and make appointments. If you have questions, please call your primary care clinic.  If you do not have a  primary care provider, please call [phone] and they will assist you.        Care EveryWhere ID     This is your Care EveryWhere ID. This could be used by other organizations to access your [facility] medical records  [id number]        Equal Access to Services     GIO GOMEZ : Eli Justice, jada beckwith, yoselin pyle. So [facility] [phone].    ATENCIÓN: Si habla español, tiene a tejada disposición servicios gratuitos de asistencia lingüística. Llame al [phone].    We comply with applicable federal civil rights laws and [place] laws. We do not discriminate on the basis of race, color, national origin, age, disability, sex, sexual orientation, or gender identity.

## 2018-09-06 ENCOUNTER — E-VISIT (OUTPATIENT)
Dept: PEDIATRICS | Facility: CLINIC | Age: 48
End: 2018-09-06
Payer: COMMERCIAL

## 2018-09-06 DIAGNOSIS — N30.00 ACUTE CYSTITIS WITHOUT HEMATURIA: Primary | ICD-10-CM

## 2018-09-06 PROCEDURE — 99444 ZZC PHYSICIAN ONLINE EVALUATION & MANAGEMENT SERVICE: CPT | Performed by: PEDIATRICS

## 2018-09-06 RX ORDER — CEPHALEXIN 500 MG/1
500 CAPSULE ORAL 4 TIMES DAILY
Qty: 40 CAPSULE | Refills: 0 | Status: SHIPPED | OUTPATIENT
Start: 2018-09-06 | End: 2018-10-29

## 2018-09-08 DIAGNOSIS — N30.00 ACUTE CYSTITIS WITHOUT HEMATURIA: ICD-10-CM

## 2018-09-08 LAB
ALBUMIN UR-MCNC: NEGATIVE MG/DL
APPEARANCE UR: CLEAR
BILIRUB UR QL STRIP: NEGATIVE
COLOR UR AUTO: YELLOW
GLUCOSE UR STRIP-MCNC: NEGATIVE MG/DL
HGB UR QL STRIP: ABNORMAL
KETONES UR STRIP-MCNC: NEGATIVE MG/DL
LEUKOCYTE ESTERASE UR QL STRIP: NEGATIVE
NITRATE UR QL: NEGATIVE
NON-SQ EPI CELLS #/AREA URNS LPF: NORMAL /LPF
PH UR STRIP: 5.5 PH (ref 5–7)
RBC #/AREA URNS AUTO: NORMAL /HPF
SOURCE: ABNORMAL
SP GR UR STRIP: 1.01 (ref 1–1.03)
UROBILINOGEN UR STRIP-ACNC: 0.2 EU/DL (ref 0.2–1)
WBC #/AREA URNS AUTO: NORMAL /HPF

## 2018-09-08 PROCEDURE — 81001 URINALYSIS AUTO W/SCOPE: CPT | Performed by: PEDIATRICS

## 2018-09-24 ENCOUNTER — OFFICE VISIT (OUTPATIENT)
Dept: SURGERY | Facility: CLINIC | Age: 48
End: 2018-09-24
Payer: COMMERCIAL

## 2018-09-24 VITALS — WEIGHT: 293 LBS | HEIGHT: 64 IN | BODY MASS INDEX: 50.02 KG/M2

## 2018-09-24 DIAGNOSIS — E66.01 MORBID OBESITY (H): ICD-10-CM

## 2018-09-24 PROCEDURE — 97803 MED NUTRITION INDIV SUBSEQ: CPT | Performed by: DIETITIAN, REGISTERED

## 2018-09-24 NOTE — MR AVS SNAPSHOT
MRN:[id number]                      After Visit Summary   9/24/2018    Ana Wyman    MRN: [id number]           Visit Information        Provider Department      9/24/2018 3:00 PM 1, Sh Kendy Diet, RD [facility] Weight Loss      Your next 10 appointments already scheduled     Sep 26, 2018 12:00 PM CDT   (Arrive by 11:30 AM)   New Visit with Laila Taylor LP   [facility] [place] ([facility] [place])    [address] [place] [address] [address]  [facility] [address]   [phone]            Oct 16, 2018  3:00 PM CDT   Return Visit with Laila Taylor LP   [facility] ([facility] [place])    [address] [place] [address] [address]  [facility] [address]   [phone]            Oct 29, 2018  9:00 AM CDT   MyChart OB-GYN Annual Physical with Jaquelin Perez MD   [facility] ([facility])    [address] [place] [address]  [address]  [facility] [address]   [phone]            Oct 29, 2018  1:30 PM CDT   Return Bariatric Nutrition Visit with Jolene Larry Diet 2, RD   [facility] ([facility])    [address] [place] [address] [address]  [place] [address]   [phone]            Nov 08, 2018  4:00 PM CST   Return Visit with Laila Taylor LP   [facility] ([facility])    [address] [place] [address] [address]  [facility] [address]   [phone]              MyChart Information     [employer] gives you secure access to your electronic health record. If you see a primary care provider, you can also send messages to your care team and make appointments. If you have questions, please call your primary care clinic.  If you do not have a primary care provider, please call [phone] and they will assist you.        Care EveryWhere ID     This is your Care EveryWhere ID. This could be used by other organizations to access your [facility]  records  [id number]        Equal Access to Services     GIO GOMEZ : Eli Justice, jada beckwith, yessy loo, yoselin christina. So [facility] [phone].    ATENCIÓN: Si habla español, tiene a tejada disposición servicios gratuitos de asistencia lingüística. Llame al [phone].    We comply with applicable federal civil rights laws and [place] laws. We do not discriminate on the basis of race, color, national origin, age, disability, sex, sexual orientation, or gender identity.

## 2018-09-24 NOTE — PROGRESS NOTES
"PRE SURGICAL WEIGHT LOSS NUTRITION APPOINTMENT    Ana Wyman  1970  female  [id number]  48 year old    ASSESSMENT    Desired Surgical Procedure: gastric bypass    REASON FOR VISIT:  Ana Wyman is a 48 year old year old female presents today for a pre-surgical weight loss follow-up appointment. Patient accompanied by self.    DIAGNOSIS:  Weight Status Obesity Grade III BMI >40    ANTHROPOMETRICS:  Height: 162.6 cm (5' 4\")  Initial Weight:  153.9 kg (339 lb 4.8 oz)   Weight last visit:  150.6 kg (332 lb 1.6 oz)  Current weight: (!) 151.4 kg (333 lb 11.2 oz)  BMI: 57.4 kg/(m^2).    VITAMINS AND MINERALS:  1 Multivitamin with Minerals  500 mg Calcium with Vitamin D BID or TID  4-5 of 2000 International units Vitamin D  2500 mcg Vitamin B-12      NUTRITION HISTORY:  Breakfast: 1 egg, 1/2 avocado, 1 slice of toast  Lunch: leftovers from dinner (protein salad), fresh fruit  Supper: meat loaf, lettuce salad  Snacks: fresh fruit or microwaved popcorn  Fluids Consumed: Water, Crystal Light and Protein Drink  Eating slower: Yes  Chewing foods thoroughly: Yes  Take 20-30 minutes to consume each meal: No  Fluids and meals separate by at least 30 minutes: Yes  Carbonation: 1-2 flavored logan  Caffeine: coffee  Additional Information: Patient was disappointed her weight was up slightly this month. 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    PHYSICAL ACTIVITY:  Type: treadmill  Frequency: 2-3 (days per week)  Duration: 20 (minutes)     DIAGNOSIS:  Previous Nutrition Diagnosis: Obesity related to long history of self- monitoring deficit and excessive energy intake evidenced by BMI of 57.012 kg/m2  No change, modified below    Previous goals:   Increase calcium to 9320-3608 mg (2-3 separate doses) and take 2 hours away from multivitamin/ mineral-improving  Separate fluids and meals by 30 minutes  Increase walking to 3 X " per week for 10-15 minutes       Current Nutrition Diagnosis: Obesity related to long history of self-monitoring deficit and excessive energy intake as evidenced by BMI of 57.4 kg/m2.    INTERVENTION:  Nutrition Prescription: Recommended energy/nutrient modification.    GOALS:  Reduce vitamin B-12 (2500 mcg) to 1 X per week for now (will adjust post-op as pt purchased 2 different doses)  Transition to decaf coffee  Limit avocado to 1/8-1/4 at one time    Intervention:  - Discussed progress towards previous goals.  - Reinforced importance of making behavior changes in preparation for bariatric surgery.   - Assessed learning needs and learning preferences       NUTRITION MONITORING AND EVALUATION:  Anticipated compliance: good  Patient demonstrated fair-good understanding.   *Patient has met pre bariatric surgery diet requirements    Follow up: Continue to monitor patient closely regarding weight loss and diet.  # of visits needed: 2  Cleared by RD: No     TIME SPENT WITH PATIENT: 25 minutes  Chapo Braun RD, LD    [facility]  [phone]

## 2018-09-26 ENCOUNTER — OFFICE VISIT (OUTPATIENT)
Dept: PSYCHOLOGY | Facility: CLINIC | Age: 48
End: 2018-09-26
Payer: COMMERCIAL

## 2018-09-26 DIAGNOSIS — E66.01 PSYCHOLOGICAL FACTORS AFFECTING MORBID OBESITY (H): Primary | ICD-10-CM

## 2018-09-26 DIAGNOSIS — F54 PSYCHOLOGICAL FACTORS AFFECTING MORBID OBESITY (H): Primary | ICD-10-CM

## 2018-09-26 DIAGNOSIS — E66.01 MORBID OBESITY (H): ICD-10-CM

## 2018-09-26 PROCEDURE — 90834 PSYTX W PT 45 MINUTES: CPT | Performed by: PSYCHOLOGIST

## 2018-09-26 ASSESSMENT — ANXIETY QUESTIONNAIRES
1. FEELING NERVOUS, ANXIOUS, OR ON EDGE: SEVERAL DAYS
2. NOT BEING ABLE TO STOP OR CONTROL WORRYING: NOT AT ALL
GAD7 TOTAL SCORE: 1
7. FEELING AFRAID AS IF SOMETHING AWFUL MIGHT HAPPEN: NOT AT ALL
5. BEING SO RESTLESS THAT IT IS HARD TO SIT STILL: NOT AT ALL
3. WORRYING TOO MUCH ABOUT DIFFERENT THINGS: NOT AT ALL
6. BECOMING EASILY ANNOYED OR IRRITABLE: NOT AT ALL

## 2018-09-26 ASSESSMENT — PATIENT HEALTH QUESTIONNAIRE - PHQ9: 5. POOR APPETITE OR OVEREATING: NOT AT ALL

## 2018-09-26 NOTE — MR AVS SNAPSHOT
MRN:[id number]                      After Visit Summary   9/26/2018    Ana Wyman    MRN: [id number]           Visit Information        Provider Department      9/26/2018 12:00 PM Laila Taylor LP [facility] BARIATRICS      Your next 10 appointments already scheduled     Oct 16, 2018  3:00 PM CDT   Return Visit with Laila Taylor LP   [facility] ([facility])    [address]  [facility] [address]   [phone]            Oct 29, 2018  9:00 AM CDT   MyChart OB-GYN Annual Physical with Jaquelin Perez MD   [facility] ([facility])    [address]  [address]  [facility] [address] [phone]            Oct 29, 2018  1:30 PM CDT   Return Bariatric Nutrition Visit with Jolene Larry Diet 2, RD   [facility] ([facility])    [address] [place] [address]  [address]  [facility] [address] [phone]            Nov 08, 2018  4:00 PM CST   Return Visit with Laila Taylor LP   [facility] ([facility])    [address]  [address]  [facility] [address] [phone]              MyChart Information     [employer] gives you secure access to your electronic health record. If you see a primary care provider, you can also send messages to your care team and make appointments. If you have questions, please call your primary care clinic.  If you do not have a primary care provider, please call [phone] and they will assist you.        Care EveryWhere ID     This is your Care EveryWhere ID. This could be used by other organizations to access your [facility] medical records  [id number]        Equal Access to Services     GIO GOMEZ : Hadii megan garcia Soange, waaxda luqadaha, qaybta kaalmada cinda, yoselin christina. [facility] [phone].    ATENCIÓN: Si zenobia garcia tejada  disposición servicios gratuitos de asistencia lingüística. Llame al [phone].    We comply with applicable federal civil rights laws and [place] laws. We do not discriminate on the basis of race, color, national origin, age, disability, sex, sexual orientation, or gender identity.

## 2018-09-26 NOTE — PROGRESS NOTES
Progress Note - Initial Session    Client Name:  Ana Wyman Date: 9/26/2018       Service Type: Individual/Bariatric Evaluation      Session Start Time: 12:00  Session End Time: 12:50      Session Length: 38 - 52      Session #: 1     Attendees: Client attended alone         Diagnostic Assessment in progress.  Unable to complete documentation at the conclusion of the first session due to gathering extensive informatino regarding client's weight history, eating patterns, knowledge of weight loss surgery process, and current progress toward weight loss goals.       Mental Status Assessment:  Appearance:   Appropriate   Eye Contact:   Good   Psychomotor Behavior: Normal   Attitude:   Cooperative   Orientation:   All  Speech   Rate / Production: Normal    Volume:  Normal   Mood:    Normal  Affect:    Appropriate   Thought Content:  Clear   Thought Form:  Coherent  Goal Directed  Logical   Insight:    Good       Safety Issues and Plan for Safety and Risk Management:  Client denies current fears or concerns for personal safety.  Client denies current or recent suicidal ideation or behaviors.  Client denies current or recent homicidal ideation or behaviors.  Client denies current or recent self injurious behavior or ideation.  Client denies other safety concerns.  A safety and risk management plan has not been developed at this time, however client was given the after-hours number / 911 should there be a change in any of these risk factors.  Client reports there are no firearms in the house.      Diagnostic Criteria:  316 Psychological Factors Affecting Obesity  -emotional/stress eating  -difficulty maintaining motivation and progress with weight loss attempts      DSM5 Diagnoses: (Sustained by DSM5 Criteria Listed Above)  Diagnoses: 316 Psychological Factors Affecting Obesity  Psychosocial & Contextual Factors: preparing for weight loss surgery, good support system, mother living with   WHODAS 2.0  (12 item)            This questionnaire asks about difficulties due to health conditions. Health conditions include disease or illnesses, other health problems that may be short or long lasting, injuries, mental health or emotional problems, and problems with alcohol or drugs.                     Think back over the past 30 days and answer these questions, thinking about how much difficulty you had doing the following activities. For each question, please [place] only one response.    S1 Standing for long periods such as 30 minutes? None =         1   S2 Taking care of household responsibilities? None =         1   S3 Learning a new task, for example, learning how to get to a new place? None =         1   S4 How much of a problem do you have joining community activities (for example, festivals, [employer] or other activities) in the same way as anyone else can? None =         1   S5 How much have you been emotionally affected by your health problems? None =         1     In the past 30 days, how much difficulty did you have in:   S6 Concentrating on doing something for ten minutes? None =         1   S7 Walking a long distance such as a kilometer (or equivalent)? None =         1   S8 Washing your whole body? None =         1   S9 Getting dressed? None =         1   S10 Dealing with people you do not know? None =         1   S11 Maintaining a friendship? None =         1   S12 Your day to day work? None =         1     H1 Overall, in the past 30 days, how many days were these difficulties present? Record number of days 0   H2 In the past 30 days, for how many days were you totally unable to carry out your usual activities or work because of any health condition? Record number of days  0   H3 In the past 30 days, not counting the days that you were totally unable, for how many days did you cut back or reduce your usual activities or work because of any health condition? Record number of days 0       Collateral Reports  Completed:  Not Applicable      PLAN: (Homework, other):  Client will return in 2 weeks to complete the Bariatric DA. Client completed the MMPI-2 following today's session.      Laila Taylor, MARCO

## 2018-09-27 ASSESSMENT — ANXIETY QUESTIONNAIRES: GAD7 TOTAL SCORE: 1

## 2018-09-27 ASSESSMENT — PATIENT HEALTH QUESTIONNAIRE - PHQ9: SUM OF ALL RESPONSES TO PHQ QUESTIONS 1-9: 1

## 2018-10-16 ENCOUNTER — OFFICE VISIT (OUTPATIENT)
Dept: PSYCHOLOGY | Facility: CLINIC | Age: 48
End: 2018-10-16
Payer: COMMERCIAL

## 2018-10-16 DIAGNOSIS — F54 PSYCHOLOGICAL FACTORS AFFECTING MORBID OBESITY (H): Primary | ICD-10-CM

## 2018-10-16 DIAGNOSIS — E66.01 PSYCHOLOGICAL FACTORS AFFECTING MORBID OBESITY (H): Primary | ICD-10-CM

## 2018-10-16 DIAGNOSIS — E66.01 MORBID OBESITY (H): ICD-10-CM

## 2018-10-16 PROCEDURE — 90791 PSYCH DIAGNOSTIC EVALUATION: CPT | Performed by: PSYCHOLOGIST

## 2018-10-16 ASSESSMENT — PATIENT HEALTH QUESTIONNAIRE - PHQ9: 5. POOR APPETITE OR OVEREATING: SEVERAL DAYS

## 2018-10-16 ASSESSMENT — ANXIETY QUESTIONNAIRES
GAD7 TOTAL SCORE: 1
1. FEELING NERVOUS, ANXIOUS, OR ON EDGE: NOT AT ALL
2. NOT BEING ABLE TO STOP OR CONTROL WORRYING: NOT AT ALL
6. BECOMING EASILY ANNOYED OR IRRITABLE: NOT AT ALL
7. FEELING AFRAID AS IF SOMETHING AWFUL MIGHT HAPPEN: NOT AT ALL
5. BEING SO RESTLESS THAT IT IS HARD TO SIT STILL: NOT AT ALL
3. WORRYING TOO MUCH ABOUT DIFFERENT THINGS: NOT AT ALL

## 2018-10-16 NOTE — PROGRESS NOTES
Adult Bariatric Pre-Surgical Psychological Assessment - Structured Interview      CLIENT'S NAME: Ana Wyman  MRN:   [id number]  :   1970  ACCT. NUMBER: [id number]  DATE OF SERVICE: 10/16/18      Identifying Information:  Client is a 48 year old, , single female. Client was referred for an evaluation by the [facility] Weight Loss Surgery Team. Client is currently employed full time. Client attended the session alone.       Client's Statement of Presenting Concern:  Client is presenting for a psychological assessment as a routine part of the process for pursuing weight loss surgery.        History of Presenting Concern:  Client reported she first began struggling with her weight went she went to college. She was reportedly of average weight throughout childhood. Client stated that once she started attending college and working, she was much less active than she was in high school. 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 She does have a family history of obesity. Client endorsed a history of problematic eating patterns including: emotional eating, skipping meals and then overeating at night, eating unhealthy convenience foods, eating fastfood frequently, and eating too large of portions. Since beginning the process of preparing for weight loss surgery, she has been making progress on modifying these patterns and has lost approximately 7 pounds over the past 1-2 months.       Client reported a history of weight loss attempts through diet and exercise. She has attempted to lose weight through Weight Watchers, Velia Ej, [employer], and eating a paleo diet. She reported losing approximately 50 pounds in 9 months on the paleo diet; however, once she discontinued the diet she regained the weight. Client has also participated in a medical weight loss program and was prescribed  "phentermine. Client stated her main motivation for pursuing weight loss surgery is to improve her overall health and quality of life. She would like to be able to be more active again and wants to not have to worry about whether or not she will \"fit in seats.\" She reported hoping that she can reverse or prevent health issues. She has been diagnosed with several medical conditions that may be improved if she loses weight.  Client reported feeling veryconfident that she can make the necessary lifestyle changes and is committed to doing so. Client demonstrated a good understanding of the risks of surgery and the need for ongoing lifestyle changes, including lifelong vitamin supplementation. Client reported her mother, who lives with her, is her main support. Client also has four close friends who will be available immediately following surgery and thereafter. She has attended a weight loss support group and plans to continue going.      Client denied experiencing current stressors she feels would impede her ability to follow through with weight loss surgery recommendations. She has no reported history of mental health disorders and/or treatment for mental health disorders. Client stated her main stressor is her mother's health issues, which currently are improving. Client also stated she has had job changes in the past few years which have been stressful, but for the best. Client stated she took a pay cut for her current job and at times feels she \"took a step backward\" when moving to her new job. She occasionally worries about others' judgments about her in this regard, particularly a friend she works with who is in a higher position. Client did report she has some financial strain, but is able to afford vitamins, doctor appointments, etc. In the past when the client was stressed, she would eat to cope. She is now very aware of this pattern and is actively working to avoid engaging in this behavior. Instead, the " "client likes to work on house projects, talk out the problem, and spend time with friends. Client reports that other professional(s) are involved in providing support / services: PCP and the weight loss surgery team.      Social History:  Client reported she grew up in [place], [place]. She was the third born of three children. Client has two older brothers with whom she has fine relationships. Client's parents were  until her father's death in . Client stated her father  from multiple health issues including esophogeal cancer. Client reported her she had a good relationship with her father although he at times was not the easiest to get along with. Client stated she believes her father had possible mental health issues. She indicated she has a close relationship witg her mother. Client reported that her childhood was \"good\" and \"fun.\" She stated she had a good group of friends, a lot of family support, and was involved in music and sports. Client reported a history of a few committed relationships with the longest lasting approximately 3 years. She has never been  and has no children. Her mother is currently living with her.  Client identified some stable and meaningful social connections. Client reported that she has not been involved with the legal system. Client's highest education level was college graduate. She earned a Bachelor's degree majoring in [employer] and a Master's degree in higher education administration. Client reported she worked at [employer] as the  for 20 years. She now works at [employer] and feels this job is far less stressful. Client did not identify any learning problems. There are no ethnic, cultural or Episcopal factors that may be relevant for therapy. Client identified her preferred language to be English. Client reported she does not need the assistance of an  or other support involved in therapy. Modifications will not be used to assist " communication in therapy. Client did not serve in the .     Client reports family history includes Allergies in her brother and mother; Anesthesia Reaction in her mother; Asthma in her paternal grandmother; C.A.D. in her mother; C.A.D. (age of onset: 58) in her paternal grandfather; C.A.D. (age of onset: 92) in her father; Cancer in her father, maternal grandmother, and paternal grandmother; Cancer - colorectal in her paternal aunt and another family member; Cancer - colorectal (age of onset: 75) in her maternal grandmother; Cerebrovascular Disease in her father; Colon Cancer in her maternal grandmother; Colon Polyps in her maternal grandmother and mother; Coronary Artery Disease in her father, maternal grandfather, mother, and paternal grandfather; Diabetes in her mother and paternal grandmother; Hyperlipidemia in her mother; Hypertension in her brother, father, maternal grandfather, maternal grandmother, and mother; Lipids in her father and mother; Obesity in her brother, brother, father, mother, and paternal grandmother; Other Cancer in her father and maternal grandmother; Thyroid Disease in her mother.    Mental Health History:  Client reported the following biological family members or relatives with mental health issues: father with possible depression/undiagnosed. Client has not received mental health services in the past. Hospitalizations: None.  Client is not currently receiving any mental health services.      Chemical Health History:  Client reported the following biological family members or relatives with chemical health issues: brother with alcohol abuse and two nephews with drug abuse. Client has not received chemical dependency treatment in the past. Client is not currently receiving any chemical dependency treatment. Client reports no problems as a result of their drinking / drug use.    Client Reports:  Client reports using alcohol 1 times per month and has 1-2 mixed drinks at a time.  Client first started drinking at age 19.  Client denies using tobacco.  Client denies using marijuana.  Client reports using caffeine 2 times per day and drinks 1-2 cups of coffee at a time. Client started using caffeine at age 11.  Client denies using street drugs.  Client denies the non-medical use of prescription or over the counter drugs.    CAGE: None of the patient's responses to the CAGE screening were positive / Negative CAGE score   Based on the negative Cage-Aid score and clinical interview there are not indications of drug or alcohol abuse.    Discussed the general effects of drugs and alcohol on health and well-being. Therapist gave client printed information about the effects of chemical use on her health and well being.  We also discussed the specific risks of alcohol use following bariatric surgery, including issues related to cross-addiction.       Significant Losses / Trauma / Abuse / Neglect Issues:  There are indications or report of significant loss, trauma, abuse or neglect issues related to: death of father and grandparents. Client feels she has coped adequately with these losses..    Issues of possible neglect are not present.      Medical Issues:  Client has had a physical exam to rule out medical causes for current symptoms. Date of last physical exam was within the past year. Client was encouraged to follow up with PCP if symptoms were to develop. The client has a [facility] Care Provider, who is named Kelly Bedoya.. The client reports not having a psychiatrist. Client reports the following current medical concerns: obesity, sleep apnea, cellulitis, lymphodema, bunions, hammer toe, back pain, foot pain, and veinous closures. The client reports the presence of chronic or episodic pain in the form of back and foot. The pain level is mild and has a frequency of daily.. There are significant nutritional concerns related to obesity.    Client reports current meds as:   Current  Outpatient Prescriptions   Medication Sig     albuterol (PROAIR HFA/PROVENTIL HFA/VENTOLIN HFA) 108 (90 Base) MCG/ACT Inhaler Inhale 2 puffs into the lungs every 6 hours as needed for shortness of breath / dyspnea or wheezing     cephALEXin (KEFLEX) 500 MG capsule Take 1 capsule (500 mg) by mouth 4 times daily     clobetasol (TEMOVATE) 0.05 % ointment Apply clobetasol  0.05 percent ointment, sparingly (a dot 3 mm wide) in a thin film.  Apply to affected area only, once or twice weekly for maintenance.     loratadine (CLARITIN) 10 MG tablet Take 1 tablet (10 mg) by mouth daily     multivitamin, therapeutic with minerals (THERA-VIT-M) TABS Take 1 tablet by mouth daily.     Omega-3 Fatty Acids (FISH OIL PO)      Omeprazole Magnesium (PRILOSEC OTC PO) Take 20 mg by mouth daily      RESTASIS MULTIDOSE 0.05 % ophthalmic emulsion INSTILL 1 DROP INTO BOTH EYES TWICE A DAY     No current facility-administered medications for this visit.        Client Allergies:  Allergies   Allergen Reactions     Clindamycin Diarrhea     Codeine Nausea and Vomiting     Shellfish Allergy        Medical History:  Past Medical History:   Diagnosis Date     Allergic rhinitis, cause unspecified      Cellulitis 2005    2 episodes, one with hospitalization [facility]     DUB (dysfunctional uterine bleeding)     Neg EMB 2012     Esophageal reflux     ongoing     Fibroids      Genital problems     Lichens Schlerosis     GERD (gastroesophageal reflux disease)      Hallux valgus (acquired)      History of steroid therapy     Inhalers, eye drops     Hypersomnia with sleep apnea, unspecified     using CPAP     Kidney stone May 2018    2 stones     Lichen sclerosus 7/14/2011     Lymph edema 2010- present     Lymphedema bilateral with mixed lipedema. 9/30/2015     Lymphedema bilateral with mixed lipedema. 9/30/2015     Morbid obesity (H) 3/19/2013     MIGUELITO on CPAP 3/19/2013    Sleep study in 2004 and 2012       Pneumonia     Years ago - a few times      Pre-diabetes      Sleep apnea      Tendonitis currently     Urinary tract infection     A few times in past 10 years     Vision disorder [id number]    Dry Eye     Vitamin D deficiency 3/14/2015         Medication Adherence:  N/A - Client does not have prescribed psychiatric medications.      Mental Status Assessment:  Appearance:   Appropriate   Eye Contact:   Good   Psychomotor Behavior: Normal   Attitude:   Cooperative   Orientation:   All  Speech   Rate / Production: Normal    Volume:  Normal   Mood:    Normal  Affect:    Appropriate   Thought Content:  Clear   Thought Form:  Coherent  Goal Directed  Logical   Insight:    Good       Review of Symptoms:  Depression: No symptoms; history of related to stressors  Halima:  No symptoms  Psychosis: No symptoms  Anxiety: No symptoms; history of related to stressors  Panic:  No symptoms  Post Traumatic Stress Disorder: No symptoms  Obsessive Compulsive Disorder: No symptoms  Eating Disorder: No symptoms  ADD / ADHD: No symptoms  Conduct Disorder: No symptoms        Safety Issues and Plan for Safety and Risk Management:  Client denies a history of suicidal ideation, suicide attempts, self-injurious behavior, homicidal ideation, homicidal behavior and and other safety concerns    Client denies current fears or concerns for personal safety.  Client denies current or recent suicidal ideation or behaviors.  Client denies current or recent homicidal ideation or behaviors.  Client denies current or recent self injurious behavior or ideation.  Client denies other safety concerns.  Client reports there are no firearms in the house.  A safety and risk management plan has not been developed at this time, however client was given the after-hours number / 911 should there be a change in any of these risk factors.      Patient's Strengths and Limitations:  Client identified the following strengths or resources that will help her succeed in counseling/assessment: commitment to health and  well being, friends / good social support, family support and positive work environment. Client identified the following supports: family and friends. Things that may interfere with the clients success in counseling/assessment include:none reported.    Diagnostic Criteria:  316 Psychological Factors Affecting Obesity  -emotional/stress eating  -difficulty maintaining motivation and progress with weight loss attempts    R/O Anxiety and Depressive symptoms; history of development when experiencing stressors    Functional Status:  Client's symptoms have caused reduced functional status in the following areas: Activities of Daily Living and and Physical Health      DSM5 Diagnoses: (Sustained by DSM5 Criteria Listed Above)  316 Psychological Factors Affecting Obesity  Psychosocial & Contextual Factors: preparing for weight loss surgery, good support system, mother living with   WHODAS 2.0 (12 item)                          This questionnaire asks about difficulties due to health conditions. Health conditions include disease or illnesses, other health problems that may be short or long lasting, injuries, mental health or emotional problems, and problems with alcohol or drugs.                              Think back over the past 30 days and answer these questions, thinking about how much difficulty you had doing the following activities. For each question, please [place] only one response.     S1 Standing for long periods such as 30 minutes? None =         1   S2 Taking care of household responsibilities? None =         1   S3 Learning a new task, for example, learning how to get to a new place? None =         1   S4 How much of a problem do you have joining community activities (for example, festivals, [employer] or other activities) in the same way as anyone else can? None =         1   S5 How much have you been emotionally affected by your health problems? None =         1           In the past 30 days, how much  "difficulty did you have in:   S6 Concentrating on doing something for ten minutes? None =         1   S7 Walking a long distance such as a kilometer (or equivalent)? None =         1   S8 Washing your whole body? None =         1   S9 Getting dressed? None =         1   S10 Dealing with people you do not know? None =         1   S11 Maintaining a friendship? None =         1   S12 Your day to day work? None =         1      H1 Overall, in the past 30 days, how many days were these difficulties present? Record number of days 0   H2 In the past 30 days, for how many days were you totally unable to carry out your usual activities or work because of any health condition? Record number of days  0   H3 In the past 30 days, not counting the days that you were totally unable, for how many days did you cut back or reduce your usual activities or work because of any health condition? Record number of days 0         Attendance Agreement:  Client has signed Attendance Agreement:Yes      Preliminary Treatment Plan:    Client will return for follow-up session next month.  Client will continue with scheduled weight loss surgery clinic appointments.  She has writer's contact information and is aware that she may contact writer in the meantime as needed.      Client will work on the following homework assignment: Practice no beverages 30 minutes before/after meals at least one meal per day, Walk for at least 15 minutes at least three times per week and Find 2-4 alternatives to carbonated beverages. Also assigned to challenge all-or-nothing thinking regarding foods (eg., \"This is the last time I can have this, so I should\") and begin strategizing holiday meals    The client reports no currently identified Jehovah's witness, ethnic or cultural issues relevant to therapy/assessment.     services are not indicated.    Modifications to assist communication are not indicated.    The concerns identified by the client will be addressed " in therapy/assessment.    The client is receiving treatment / structured support from the following professional(s) / service and treatment. Collaboration will be initiated with: primary care physician and [facility].    Referral to another professional/service is not indicated at this time..    A Release of Information is not needed at this time.    Report to child / adult protection services was NA.    Client will have access to their [facility]' medical record.    Laila Pina PsyD, LP  Licensed Psychologist  [facility]  [phone]         10/16/2018

## 2018-10-16 NOTE — MR AVS SNAPSHOT
MRN:[id number]                      After Visit Summary   10/16/2018    Ana Wyman    MRN: [id number]           Visit Information        Provider Department      10/16/2018 3:00 PM Laila Taylor LP [facility] BARIATRICS      Your next 10 appointments already scheduled     Oct 29, 2018  9:00 AM CDT   MyChart OB-GYN Annual Physical with Jaquelin Perez MD   [facility]    [address]  [address]  [place] [address] [phone]            Oct 29, 2018  1:30 PM CDT   Return Bariatric Nutrition Visit with  Wl Diet 2, RD   [facility] ([facility])    [address]  [address]  [place] [id number] [phone]            Nov 08, 2018  4:00 PM CST   Return Visit with Laila Taylor LP   [facility] ([facility])    [address]  [address]  [place] [id number] [phone]              MyChart Information     [email or web address] gives you secure access to your electronic health record. If you see a primary care provider, you can also send messages to your care team and make appointments. If you have questions, please call your primary care clinic.  If you do not have a primary care provider, please call [phone] and they will assist you.        Care EveryWhere ID     This is your Care EveryWhere ID. This could be used by other organizations to access your [facility] medical records  [id number]        Equal Access to Services     MAX GOMEZ AH: Hadii aad ku hadasho Soomaali, waaxda luqadaha, qaybta kaalmada adeegyada, waxdidier jean haylopez christina. So [facility] [phone].    ATENCIÓN: Si habla español, tiene a tejada disposición servicios gratuitos de asistencia lingüística. Llame al [phone].    We comply with applicable federal civil rights laws and [place] laws. We do not discriminate on the basis of  race, color, national origin, age, disability, sex, sexual orientation, or gender identity.

## 2018-10-17 ASSESSMENT — PATIENT HEALTH QUESTIONNAIRE - PHQ9: SUM OF ALL RESPONSES TO PHQ QUESTIONS 1-9: 1

## 2018-10-17 ASSESSMENT — ANXIETY QUESTIONNAIRES: GAD7 TOTAL SCORE: 1

## 2018-10-29 ENCOUNTER — OFFICE VISIT (OUTPATIENT)
Dept: OBGYN | Facility: CLINIC | Age: 48
End: 2018-10-29
Payer: COMMERCIAL

## 2018-10-29 ENCOUNTER — OFFICE VISIT (OUTPATIENT)
Dept: SURGERY | Facility: CLINIC | Age: 48
End: 2018-10-29
Payer: COMMERCIAL

## 2018-10-29 VITALS — WEIGHT: 293 LBS | BODY MASS INDEX: 56.42 KG/M2

## 2018-10-29 VITALS — DIASTOLIC BLOOD PRESSURE: 74 MMHG | SYSTOLIC BLOOD PRESSURE: 118 MMHG | WEIGHT: 293 LBS | BODY MASS INDEX: 57.02 KG/M2

## 2018-10-29 DIAGNOSIS — Z12.4 SCREENING FOR CERVICAL CANCER: ICD-10-CM

## 2018-10-29 DIAGNOSIS — Z00.01 ENCOUNTER FOR ROUTINE ADULT HEALTH EXAMINATION WITH ABNORMAL FINDINGS: Primary | ICD-10-CM

## 2018-10-29 DIAGNOSIS — E66.01 MORBID OBESITY (H): ICD-10-CM

## 2018-10-29 DIAGNOSIS — B37.2 YEAST INFECTION OF THE SKIN: ICD-10-CM

## 2018-10-29 PROCEDURE — G0145 SCR C/V CYTO,THINLAYER,RESCR: HCPCS | Performed by: OBSTETRICS & GYNECOLOGY

## 2018-10-29 PROCEDURE — 99396 PREV VISIT EST AGE 40-64: CPT | Performed by: OBSTETRICS & GYNECOLOGY

## 2018-10-29 PROCEDURE — 97803 MED NUTRITION INDIV SUBSEQ: CPT | Performed by: DIETITIAN, REGISTERED

## 2018-10-29 PROCEDURE — 87624 HPV HI-RISK TYP POOLED RSLT: CPT | Performed by: OBSTETRICS & GYNECOLOGY

## 2018-10-29 RX ORDER — FLUCONAZOLE 150 MG/1
150 TABLET ORAL
Qty: 3 TABLET | Refills: 0 | Status: SHIPPED | OUTPATIENT
Start: 2018-10-29 | End: 2019-01-14

## 2018-10-29 RX ORDER — CLOTRIMAZOLE 1 %
CREAM (GRAM) TOPICAL 2 TIMES DAILY
Qty: 15 G | Refills: 0 | Status: SHIPPED | OUTPATIENT
Start: 2018-10-29 | End: 2019-02-05

## 2018-10-29 NOTE — PROGRESS NOTES
"PRE SURGICAL WEIGHT LOSS NUTRITION APPOINTMENT    Ana Wyman  1970  female  [id number]  48 year old    ASSESSMENT    Desired Surgical Procedure: gastric bypass    REASON FOR VISIT:  Ana Wyman is a 48 year old year old female presents today for a pre-surgical weight loss follow-up appointment. Patient accompanied by self.    DIAGNOSIS:  Weight Status Obesity Grade III BMI >40    ANTHROPOMETRICS:  Height: 5'4\"  Initial Weight: 339 lbs     Weight last visit: 333 lbs    Current weight: 149.1 kg (328 lb 11.2 oz)  BMI: 56.4  kg/(m^2).    VITAMINS AND MINERALS:  1 Multivitamin with Minerals  500 mg Calcium with Vitamin D 3 times per day  4-5 2000 International units Vitamin D  2500 mcg B12 1 time per week     NUTRITION HISTORY:  Breakfast: egg and toast or pumpkin pancakes (eggs, vanilla, pumpkin)   Lunch: packing lunch (leftovers-chicken or beef stir barnett or fish or salad); peanut butter and jelly sanhwich   Supper: asparagus/squash or vegetable   Snacks: protein shake; sugar free pudding   Fluids Consumed: Water and tea, decaf coffee, milk , Crystal Light   Eating slower: Yes  Chewing foods thoroughly: Yes  Take 20-30 minutes to consume each meal: Yes  Fluids and meals separate by at least 30 minutes: No  Carbonation: carbonation -1 time per week   Caffeine: all decaf coffee or decaf tea   Additional Information: Patient has been making healthy changes.  Patient working with therapist to determine how to inform patient's family that she is having weight loss surgery.  Patient not consistent with stopping liquids prior to meals.  Patient trying to plan for Thanksgiving and how to balance eating.     PHYSICAL ACTIVITY:  Type: treadmill; aims for 8000 steps per day   Frequency: 2 (days per week)    DIAGNOSIS:  Previous Nutrition Diagnosis: Obesity related to long history of self- monitoring deficit and excessive energy intake evidenced by BMI of 57.4 kg/m2  No change, modified below    Previous goals: "   Reduce B12 2500 mcg to 1 time per week-met  Change to decaf coffee-met  Limit avocado to 1/8-1/4 when eats-not met as did not buy avocado    Current Nutrition Diagnosis: Obesity related to long history of self-monitoring deficit and excessive energy intake as evidenced by BMI of 56.4 kg/m2.    INTERVENTION:  Nutrition Prescription: Recommended energy/nutrient modification.    GOALS:  Determine eating plan for Thanksgiving  Avoid liquids 30 minutes before, during and after meals  Eliminate caffeine and carbonation completely    Intervention:  - Discussed progress towards previous goals.  - Reinforced importance of making behavior changes in preparation for bariatric surgery.   - Assessed learning needs and learning preferences  - Congratulated patient on positive changes    NUTRITION MONITORING AND EVALUATION:  Anticipated compliance: fair-good  Patient demonstrated good understanding.     Follow up: Continue to monitor patient closely regarding weight loss and diet.  # of visits needed: 1   Cleared by RD: No     TIME SPENT WITH PATIENT: 25 minutes    Veronique Reina, RD, LD  [facility] Outpatient Dietitian  [phone] (office phone)

## 2018-10-29 NOTE — PATIENT INSTRUCTIONS
Preventive Health Recommendations  Female Ages 40-64  Yearly exam:    See your health care provider every year in order to    Review health changes.      Discuss preventive care.       Review your medicines if you your doctor has prescribed any.    Pap Smears: You should have a Pap test every 3 years or have a Pap test with HPV screening every 5 years.    You do not need a Pap test if your uterus was removed (hysterectomy) and you have not had cancer.   Breast Cancer Screening: You should begin mammography for breast cancer screening by age 40 or 50 depending on your personal risks and your doctors recommendation. Screening should occur every year or every other year based on your discussion with your doctor.  Colorectal Cancer Screening: Starting at age 50 you need to undergo colonoscopy (every 5-10 years) or other colon cancer screening.    Health Maintenance:  - Your cholesterol should be checked every 5 years, more often if you have increased risk factors such as diabetes, high blood pressure, tobacco use, obesity, or a strong family history of cardiovascular disease before age 50 in men and 60 in women  - If you are at risk for diabetes due to being overweight or obese, having a strong family history, or having a history of gestational diabetes you should have a diabetes test (fasting glucose or Hemoglobin A1c level) every 3 years.  Shots: Get a flu shot each year. Get a tetanus shot every 10 years.    Nutrition:      Eat at least 5 servings of fruits and vegetables each day.    Eat whole-grain bread, whole-wheat pasta, faro, quinoa, barley and brown rice instead of white grains and rice.    Consume 1000mg of Calcium and 1000u of Vitamin D daily. If these are not in your regular diet you should take a supplement.    To calculate the calcium in your food add a zero to the percent found on the packaging (ex: 30% = 300mg of calcium per serving)    Good sources of Calcium include:    Low-fat dairy products (200 to  300 milligrams per serving)      Dark green leafy vegetables     Canned salmon or sardines with bones     Soy products, such as tofu     Calcium-fortified cereals and orange juice    Osteopenia is low bone mass, your risk increases once you reach menopause and your calcium needs then increase to 1,200mg daily    The [employer] recommends that total calcium intake, from supplements and diet combined, should be no more than 2,000 milligrams daily for people older than 50.    Lifestyle    Get in at least 150 minutes of physical activity a week (30 minutes a day, 5 days of the week), of which about half should be vigorous exercise (jogging, swimming, lifting weights).  This will help you control your weight and prevent disease. You must increase the intensity and duration of exercise in order to lose weight, if that is your goal. To keep your bones strong and prevent fractures, plan at least 90 min/week of high impact exercise.    High-impact exercise includes workouts like:  Brisk walking.  Climbing stairs.  Dancing.  Hiking.  Jogging.  Jumping rope.  Step aerobics.  Light weight lifting routines.  Chris Chi and yoga.  Tennis or other racquet sports.      Limit alcohol to one drink per day.    No smoking.      Wear sunscreen to prevent skin cancer.    See your dentist every six months for an exam and cleaning        Menopause and Perimenopause    Hot flashes, night sweats, mood changes, sleep disturbances, changes in the menstrual periods, decreased interest in sex, vaginal dryness or painful sex, and changes in the skin and hair are all common symptoms of perimenopause (the period of time, lasting several years, leading up to menopause). Menopause is defined as 12 months without a menstrual period.     It might be helpful to make a list of your symptoms, and to rank them in order of how much they bother you, or which ones you would fix first if you could. This can help us decide what treatment options might  be best for you. Treatment can include one or more of the following: lifestyle changes like diet and exercise, supplements, prescription medications for hormones, and other prescriptions that are nonhormonal. We will discuss these in more detail after your test results (if you are having lab work done) are available, when you come back. Some tests that might be done for women in perimenopause include blood work for hormone levels and to check the thyroid or ultrasound or biopsy of the lining of the uterus (if you are having abnormal bleeding).       Some Suggested Vulvar Pain & Itching Measures    The vulva is the external genitalia in the female.  The skin of the vulva can be quite sensitive.  Because it is moist and frequently subjected to friction while sitting and moving, this area can be easily injured.  There are various strategies that can be used to prevent irritation and allow the vulva to heal.  Keeping this area dry can accelerate healing.  Chemicals found in toilet tissues, laundry soaps and detergents that come in contact with the vulva can cause irritation.  Avoiding contact with potential irritants that contain chemicals is important.  Fabric softeners in undergarments, chemicals in deodorant soaps, bubble baths, feminine hygiene spray and panty liners, etc., can all cause irritation to the vulva.  The following recommendations are specific measures that can help minimize vulvar irritation.    Wear white 100% cotton underwear and do not wear underwear at night.  Do not wear pantyhose, tights, or other close-fitting clothes.  Enclosing this area with synthetic fibers holds both heat and moisture in the skin, conditions which potentiate the development of infections.  Tight-fitting clothes may also increase your symptoms of discomfort.    After washing underwear, put it through at least one whole cycle with water only.  Some women have suffered needlessly from irritants in detergents whose residue was  left in clothes by incomplete rinsing.  Rinsing clothes thoroughly is more important than which detergent is used although to be on the safe side, the milder the soap, the better.  Wash new underwear before wearing.  Fabric softeners and dryer sheets should not be used.    Soaking in the tub or a sitz bath twice daily for 10-15 minutes can help. After soaking, pat dry the vulva and apply vaseline to the entire outer area.     Rinse skin off with plain water frequently.  Use tap water, distilled water, sitz baths, squirt bottles, or bidets.  Additionally, hand held showers can be helpful for rinsing the vulva.  A wet warm washcloth can be held against the vulva to soak if a bath is unavailable.     Use very mild soap for bathing.  It is best not to use any soaps on the vulva.  The vulva should be rinsed with warm water.  Bars of soap such as Neutrogena unscented face soap, Dove, Basis, Pears (made in [place]), and castile soap with olive oil (Ramandeep) are gentle to the other skin areas.  They are found at pharmacies or health food stores.  Remember that frequent baths with soaps may increase the irritation.  You cannot wash away your symptoms.    Some patients find the use of cool gel packs on the vulva to be helpful.    Do not use products with benzocaine.    Consider using 100% cotton menstrual pads and tampons.  Many women with vulvar pain experience a significant increase in irritation and pain every month when they use commercial paper pads or tampons.  This monthly increase in pain can often be reduced by using 100% washable and reusable cotton menstrual pads.  Pure cotton tampons are available. Or try a menstrual cup.    Don't sit or remain in a wet bathing suit for prolonged periods. Change underwear shortly after exercising as well.

## 2018-10-29 NOTE — MR AVS SNAPSHOT
MRN:[id number]                      After Visit Summary   10/29/2018    Ana Wyman    MRN: [id number]           Visit Information        Provider Department      10/29/2018 1:30 PM 2, Jolene Larry Diet, RD [facility] [place] [facility]      Your next 10 appointments already scheduled     Nov 08, 2018  4:00 PM CST   Return Visit with Laila Taylor LP   [facility] [place] ([facility] [place])    [address]  [address]  [place] [address] [phone]            Nov 19, 2018 11:15 AM CST   SHORT with Jaquelin Perez MD   [facility] ([facility])    [address]  [address]  [place] [address] [phone]            Nov 26, 2018  1:30 PM CST   Return Bariatric Nutrition Visit with Jolene Larry Diet 1, RD   [facility] [place] ([facility])    [address]  [address]  [place] [id number] [phone]              MyChart Information     [employer] gives you secure access to your electronic health record. If you see a primary care provider, you can also send messages to your care team and make appointments. If you have questions, please call your primary care clinic.  If you do not have a primary care provider, please call [phone] and they will assist you.        Care EveryWhere ID     This is your Care EveryWhere ID. This could be used by other organizations to access your [facility] records  [id number]        Equal Access to Services     GIO GOMEZ : Hadii aad ku hadasho Soomaali, waaxda luqadaha, qaybta kaalmada adeegyada, waxdidier terryin haymiguen jakub christina. So [facility] [phone].    ATENCIÓN: Si habla español, tiene a tejada disposición servicios gratuitos de asistencia lingüística. Llame al [phone].    We comply with applicable federal civil rights laws and [place] laws. We do not discriminate on the  basis of race, color, national origin, age, disability, sex, sexual orientation, or gender identity.

## 2018-10-29 NOTE — NURSING NOTE
"  Chief Complaint   Patient presents with     Physical     last pap 05/15/14- NIL- no HPV testing     Vaginal Problem     pt has lichen schlerosus, has not had a flare up until recently. Pt reports soreness, itching and some bleeding for the past 10 days.        Initial /74 (BP Location: Right arm, Patient Position: Chair, Cuff Size: Adult Large)  Wt (!) 332 lb 3.2 oz (150.7 kg)  BMI 57.02 kg/m2 Estimated body mass index is 57.02 kg/(m^2) as calculated from the following:    Height as of 9/24/18: 5' 4\" (1.626 m).    Weight as of this encounter: 332 lb 3.2 oz (150.7 kg).  Medication Reconciliation: complete      Mabel Gomez CMA      "

## 2018-10-29 NOTE — MR AVS SNAPSHOT
After Visit Summary   10/29/2018    Ana Wyman    MRN: [id number]           Patient Information     Date Of Birth          1970        Visit Information        Provider Department      10/29/2018 9:00 AM Jaquelin Perez MD [facility]        Today's Diagnoses     Encounter for routine adult health examination with abnormal findings    -  1    Yeast infection of the skin          Care Instructions    Preventive Health Recommendations  Female Ages 40-64  Yearly exam:    See your health care provider every year in order to    Review health changes.      Discuss preventive care.       Review your medicines if you your doctor has prescribed any.    Pap Smears: You should have a Pap test every 3 years or have a Pap test with HPV screening every 5 years.    You do not need a Pap test if your uterus was removed (hysterectomy) and you have not had cancer.   Breast Cancer Screening: You should begin mammography for breast cancer screening by age 40 or 50 depending on your personal risks and your doctors recommendation. Screening should occur every year or every other year based on your discussion with your doctor.  Colorectal Cancer Screening: Starting at age 50 you need to undergo colonoscopy (every 5-10 years) or other colon cancer screening.    Health Maintenance:  - Your cholesterol should be checked every 5 years, more often if you have increased risk factors such as diabetes, high blood pressure, tobacco use, obesity, or a strong family history of cardiovascular disease before age 50 in men and 60 in women  - If you are at risk for diabetes due to being overweight or obese, having a strong family history, or having a history of gestational diabetes you should have a diabetes test (fasting glucose or Hemoglobin A1c level) every 3 years.  Shots: Get a flu shot each year. Get a tetanus shot every 10 years.    Nutrition:      Eat at least 5 servings of fruits and vegetables each  day.    Eat whole-grain bread, whole-wheat pasta, faro, quinoa, barley and brown rice instead of white grains and rice.    Consume 1000mg of Calcium and 1000u of Vitamin D daily. If these are not in your regular diet you should take a supplement.    To calculate the calcium in your food add a zero to the percent found on the packaging (ex: 30% = 300mg of calcium per serving)    Good sources of Calcium include:    Low-fat dairy products (200 to 300 milligrams per serving)      Dark green leafy vegetables     Canned salmon or sardines with bones     Soy products, such as tofu     Calcium-fortified cereals and orange juice    Osteopenia is low bone mass, your risk increases once you reach menopause and your calcium needs then increase to 1,200mg daily    The [place] [employer] recommends that total calcium intake, from supplements and diet combined, should be no more than 2,000 milligrams daily for people older than 50.    Lifestyle    Get in at least 150 minutes of physical activity a week (30 minutes a day, 5 days of the week), of which about half should be vigorous exercise (jogging, swimming, lifting weights).  This will help you control your weight and prevent disease. You must increase the intensity and duration of exercise in order to lose weight, if that is your goal. To keep your bones strong and prevent fractures, plan at least 90 min/week of high impact exercise.    High-impact exercise includes workouts like:  Brisk walking.  Climbing stairs.  Dancing.  Hiking.  Jogging.  Jumping rope.  Step aerobics.  Light weight lifting routines.  Chris Chi and yoga.  Tennis or other racquet sports.      Limit alcohol to one drink per day.    No smoking.      Wear sunscreen to prevent skin cancer.    See your dentist every six months for an exam and cleaning        Menopause and Perimenopause    Hot flashes, night sweats, mood changes, sleep disturbances, changes in the menstrual periods, decreased interest in sex,  vaginal dryness or painful sex, and changes in the skin and hair are all common symptoms of perimenopause (the period of time, lasting several years, leading up to menopause). Menopause is defined as 12 months without a menstrual period.     It might be helpful to make a list of your symptoms, and to rank them in order of how much they bother you, or which ones you would fix first if you could. This can help us decide what treatment options might be best for you. Treatment can include one or more of the following: lifestyle changes like diet and exercise, supplements, prescription medications for hormones, and other prescriptions that are nonhormonal. We will discuss these in more detail after your test results (if you are having lab work done) are available, when you come back. Some tests that might be done for women in perimenopause include blood work for hormone levels and to check the thyroid or ultrasound or biopsy of the lining of the uterus (if you are having abnormal bleeding).       Some Suggested Vulvar Pain & Itching Measures    The vulva is the external genitalia in the female.  The skin of the vulva can be quite sensitive.  Because it is moist and frequently subjected to friction while sitting and moving, this area can be easily injured.  There are various strategies that can be used to prevent irritation and allow the vulva to heal.  Keeping this area dry can accelerate healing.  Chemicals found in toilet tissues, laundry soaps and detergents that come in contact with the vulva can cause irritation.  Avoiding contact with potential irritants that contain chemicals is important.  Fabric softeners in undergarments, chemicals in deodorant soaps, bubble baths, feminine hygiene spray and panty liners, etc., can all cause irritation to the vulva.  The following recommendations are specific measures that can help minimize vulvar irritation.    Wear white 100% cotton underwear and do not wear underwear at  night.  Do not wear pantyhose, tights, or other close-fitting clothes.  Enclosing this area with synthetic fibers holds both heat and moisture in the skin, conditions which potentiate the development of infections.  Tight-fitting clothes may also increase your symptoms of discomfort.    After washing underwear, put it through at least one whole cycle with water only.  Some women have suffered needlessly from irritants in detergents whose residue was left in clothes by incomplete rinsing.  Rinsing clothes thoroughly is more important than which detergent is used although to be on the safe side, the milder the soap, the better.  Wash new underwear before wearing.  Fabric softeners and dryer sheets should not be used.    Soaking in the tub or a sitz bath twice daily for 10-15 minutes can help. After soaking, pat dry the vulva and apply vaseline to the entire outer area.     Rinse skin off with plain water frequently.  Use tap water, distilled water, sitz baths, squirt bottles, or bidets.  Additionally, hand held showers can be helpful for rinsing the vulva.  A wet warm washcloth can be held against the vulva to soak if a bath is unavailable.     Use very mild soap for bathing.  It is best not to use any soaps on the vulva.  The vulva should be rinsed with warm water.  Bars of soap such as Neutrogena unscented face soap, Dove, Basis, Pears (made in [place]), and castile soap with olive oil (Ramandeep) are gentle to the other skin areas.  They are found at pharmacies or health food stores.  Remember that frequent baths with soaps may increase the irritation.  You cannot wash away your symptoms.    Some patients find the use of cool gel packs on the vulva to be helpful.    Do not use products with benzocaine.    Consider using 100% cotton menstrual pads and tampons.  Many women with vulvar pain experience a significant increase in irritation and pain every month when they use commercial paper pads or tampons.  This monthly  increase in pain can often be reduced by using 100% washable and reusable cotton menstrual pads.  Pure cotton tampons are available. Or try a menstrual cup.    Don't sit or remain in a wet bathing suit for prolonged periods. Change underwear shortly after exercising as well.              Follow-ups after your visit        Your next 10 appointments already scheduled     Oct 29, 2018  1:30 PM CDT   Return Bariatric Nutrition Visit with Jolene Cadena 2, RD   [facility] [place] ([facility])    [address]  [address]  [place] [address]   [phone]            Nov 08, 2018  4:00 PM CST   Return Visit with Laila Taylor LP   [facility] ([facility])    [address]  [address]  [place] [address]   [phone]            Nov 19, 2018 11:15 AM CST   [facility]    [address]  [address]  [facility] [address] [phone]              Who to contact     If you have questions or need follow up information about today's clinic visit or your schedule please contact [facility] directly at [phone].  Normal or non-critical lab and imaging results will be communicated to you by [employer], letter or phone within 4 business days after the clinic has received the results. If you do not hear from us within 7 days, please contact the clinic through [employer] or phone. If you have a critical or abnormal lab result, we will notify you by phone as soon as possible.  Submit refill requests through [employer] or call your pharmacy and they will forward the refill request to us. Please allow 3 business days for your refill to be completed.          Additional Information About Your Visit        [employer] Information     [employer] gives you secure access to your electronic health record. If you see a primary care provider, you can also send messages to  your care team and make appointments. If you have questions, please call your primary care clinic.  If you do not have a primary care provider, please call [phone] and they will assist you.        Care EveryWhere ID     This is your Care EveryWhere ID. This could be used by other organizations to access your [facility] medical records  [id number]        Your Vitals Were     BMI (Body Mass Index)                   57.02 kg/m2            Blood Pressure from Last 3 Encounters:   10/29/18 118/74   08/22/18 104/62   07/27/18 128/86    Weight from Last 3 Encounters:   10/29/18 (!) 332 lb 3.2 oz (150.7 kg)   09/24/18 (!) 333 lb 11.2 oz (151.4 kg)   08/31/18 (!) 332 lb 1.6 oz (150.6 kg)              Today, you had the following     No orders found for display         Today's Medication Changes          These changes are accurate as of 10/29/18 10:00 AM.  If you have any questions, ask your nurse or doctor.               Start taking these medicines.        Dose/Directions    clotrimazole 1 % cream   Commonly known as:  LOTRIMIN   Used for:  Yeast infection of the skin   Started by:  Jaquelin Perez MD        Apply topically 2 times daily for 14 days   Quantity:  15 g   Refills:  0       fluconazole 150 MG tablet   Commonly known as:  DIFLUCAN   Used for:  Yeast infection of the skin   Started by:  Jaquelin Perez MD        Dose:  150 mg   Take 1 tablet (150 mg) by mouth every 3 days   Quantity:  3 tablet   Refills:  0            Where to get your medicines      These medications were sent to [facility] - [address]   [address] [address]     Phone:  [phone]     clotrimazole 1 % cream    fluconazole 150 MG tablet                Primary Care Provider Office Phone # Fax #    Kelly Bedoya [phone]       [address] [place] [address]        Equal Access to Services     GIO GOMEZ AH: Hadii aad ku  radha Justice, wabrennada luqadaha, qaybta kaalmada cinda, yoselin christina. So [facility] [phone].    ATENCIÓN: Si lopez celis, tiene a tejada disposición servicios gratuitos de asistencia lingüística. Clementina al [phone].    We comply with applicable federal civil rights laws and [place] laws. We do not discriminate on the basis of race, color, national origin, age, disability, sex, sexual orientation, or gender identity.            Thank you!     Thank you for choosing [facility]  for your care. Our goal is always to provide you with excellent care. Hearing back from our patients is one way we can continue to improve our services. Please take a few minutes to complete the written survey that you may receive in the mail after your visit with us. Thank you!             Your Updated Medication List - Protect others around you: Learn how to safely use, store and throw away your medicines at www.disposemymeds.org.          This list is accurate as of 10/29/18 10:00 AM.  Always use your most recent med list.                   Brand Name Dispense Instructions for use Diagnosis    albuterol 108 (90 Base) MCG/ACT inhaler    PROAIR HFA/PROVENTIL HFA/VENTOLIN HFA    2 Inhaler    Inhale 2 puffs into the lungs every 6 hours as needed for shortness of breath / dyspnea or wheezing    Mild intermittent asthma without complication       clobetasol 0.05 % ointment    TEMOVATE    45 g    Apply clobetasol  0.05 percent ointment, sparingly (a dot 3 mm wide) in a thin film.  Apply to affected area only, once or twice weekly for maintenance.    Lichen sclerosus       clotrimazole 1 % cream    LOTRIMIN    15 g    Apply topically 2 times daily for 14 days    Yeast infection of the skin       FISH OIL PO           fluconazole 150 MG tablet    DIFLUCAN    3 tablet    Take 1 tablet (150 mg) by mouth every 3 days    Yeast infection of the skin       loratadine 10 MG tablet    CLARITIN    7 tablet     Take 1 tablet (10 mg) by mouth daily        multivitamin, therapeutic with minerals Tabs tablet      Take 1 tablet by mouth daily.        PRILOSEC OTC PO      Take 20 mg by mouth daily        RESTASIS MULTIDOSE 0.05 % ophthalmic emulsion   Generic drug:  cycloSPORINE      INSTILL 1 DROP INTO BOTH EYES TWICE A DAY

## 2018-10-29 NOTE — PROGRESS NOTES
SUBJECTIVE:                                                   Ana Wyman is a 48 year old  female who presents to clinic today for preventive health exam.   Amenorrheic secondary to endometrial ablation 2016. No intermenstrual bleeding,   spotting, or discharge. Not sexually active.    She would like to discuss burning sensation in the inguinal folds concerning for exacerbation of lichen sclerosis. She has noted increased discomfort and even scant bleeding over the past 1-2 weeks. Has not needed to use clobetasol since her endometrial ablation. Also noted a rash under her panus that developed at the same time and has since then resolved.     Undergoing preparation for bariatric surgery, likely proceed in 2019.  Exercise: walking occasionally    ROS:  GI: + chronic RLQ abdominal pain  Breast: no nipple discharge, lumps, skin changes    Lab Results   Component Value Date    PAP NIL 05/15/2014    PAP NIL 2011    PAP NIL 2008        Family history of breast CA: No  Family history of uterine/ovarian CA: No  Family history of colon CA: Yes (Please explain): see below    Patient Active Problem List   Diagnosis     Esophageal reflux     Allergic state     Hypersomnia with sleep apnea     FAMILY HX GI MALIGNANCY     Family history of malignant neoplasm of genital organ, other     Elevated blood pressure reading without diagnosis of hypertension     Lichen sclerosus     Uterine fibroid     Morbid obesity (H)     MIGUELITO on CPAP     Vitamin D deficiency     Pre-diabetes     Acanthosis nigricans     Cutaneous skin tags     Baker's cyst of knee     Lymphedema bilateral with mixed lipedema.     Mild intermittent asthma without complication     Fatty liver     Past Surgical History:   Procedure Laterality Date     C NONSPECIFIC PROCEDURE      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     C NONSPECIFIC PROCEDURE  ,    Foot surgeries x 2 (bunionectomy)     C NONSPECIFIC PROCEDURE       hammer toes     COLONOSCOPY  5/15/2013    Procedure: COLONOSCOPY;  Colonoscopy;  Surgeon: Kota Blanco MD;  Location:  GI     GYN SURGERY  2016    Uterine Ablation     lichen sclerosis       ORTHOPEDIC SURGERY       VASCULAR SURGERY  2015    Vienous closure on both legs     vein closure        Social History   Substance Use Topics     Smoking status: Never Smoker     Smokeless tobacco: Never Used     Alcohol use 0.0 oz/week      Comment: occasional      Problem (# of Occurrences) Relation (Name,Age of Onset)    Allergies (2) Mother (Lily), Brother    Anesthesia Reaction (1) Mother (Lily)    Asthma (1) Paternal Grandmother (Shanthi)    C.A.D. (3) Father (Faheem, 92): CAGB 98, Mother (Lily): triple bypass surgery, 65, Paternal Grandfather (Francis, 58): fatal    Cancer (3) Father (Faheem): stomach Dx age 74, Maternal Grandmother (Myrna): liver, Paternal Grandmother (Shanthi): stomach    Cancer - colorectal (3) Maternal Grandmother (Myrna, 75), Paternal Aunt, Other: cousin  from colon cancer in 30's    Cerebrovascular Disease (1) Father (Faheem)    Colon Cancer (1) Maternal Grandmother (Myrna)    Colon Polyps (2) Mother (Lily), Maternal Grandmother (Myrna)    Coronary Artery Disease (4) Father (Faheem), Mother (Lily), Paternal Grandfather (Francis), Maternal Grandfather (Bar)    Diabetes (2) Mother (Lily): Type 2, Paternal Grandmother (Shanthi): Type 2    Hyperlipidemia (1) Mother (Lily)    Hypertension (5) Father (Faheem), Mother (Lily), Maternal Grandmother (Myrna), Brother (Trey), Maternal Grandfather (Bar)    Lipids (2) Father (Faheem), Mother (Lily)    Obesity (5) Father (Faheem), Mother (Lily), Paternal Grandmother (Shanthi), Brother (Trey), Brother (Jayme)    Other Cancer (2) Father (Faheem): Esophogeal, Maternal Grandmother (Myrna): liver, leaukeamia    Thyroid Disease (1) Mother (Lily)              Current Outpatient Prescriptions on File Prior to  Visit:  albuterol (PROAIR HFA/PROVENTIL HFA/VENTOLIN HFA) 108 (90 Base) MCG/ACT Inhaler Inhale 2 puffs into the lungs every 6 hours as needed for shortness of breath / dyspnea or wheezing   loratadine (CLARITIN) 10 MG tablet Take 1 tablet (10 mg) by mouth daily   multivitamin, therapeutic with minerals (THERA-VIT-M) TABS Take 1 tablet by mouth daily.   Omega-3 Fatty Acids (FISH OIL PO)    Omeprazole Magnesium (PRILOSEC OTC PO) Take 20 mg by mouth daily    RESTASIS MULTIDOSE 0.05 % ophthalmic emulsion INSTILL 1 DROP INTO BOTH EYES TWICE A DAY   clobetasol (TEMOVATE) 0.05 % ointment Apply clobetasol  0.05 percent ointment, sparingly (a dot 3 mm wide) in a thin film.  Apply to affected area only, once or twice weekly for maintenance. (Patient not taking: Reported on 10/29/2018)     No current facility-administered medications on file prior to visit.   Allergies   Allergen Reactions     Clindamycin Diarrhea     Codeine Nausea and Vomiting     Shellfish Allergy        Problem list and histories reviewed and adjusted as indicated.     OBJECTIVE:     /74 (BP Location: Right arm, Patient Position: Chair, Cuff Size: Adult Large)  Wt (!) 332 lb 3.2 oz (150.7 kg)  BMI 57.02 kg/m2    Gen: Healthy appearing female, no acute distress, comfortable  HENT: No scleral injection or icterus  CV: Regular rate and rhythm, well perfused, no murmur  Resp: CTAB, normal work of breathing, no cough or wheeze  Breast Exam: No visible masses or suspicious skin changes.  No discrete or dominant masses to palpation.  No axillary lymphadenopathy.  GI: Abdomen soft, non-tender. No masses, organomegaly  Skin: No suspicious lesions or rashes  Psychiatric: mentation appears normal and affect bright    : External genitalia well-estrogenized, some attenuation of labia minora, normal skin turgor and coloration.  No obvious excoriations, lesions, or rashes of the mons or labia. Bilateral inguinal folds with tissue breakdown and white exudate,  left inguinal fold with 2cm fissure, erythema bilaterally. Bartholins, urethra, skeins normal.  Normal pink vaginal mucosa.  SSE: Normal cervix, normal physiologic discharge.   Bimanual: No CMT, exam limited by morbid obesity, uterus not palpable. No adnexal masses or tenderness appreciated.     ASSESSMENT/PLAN:                                                    Ana Wyman is a 48 year old female  with satisfactory annual exam and evaluation of inguinal rashes with skin breakdown concerning for cutaneous yeast vs lichen. Given fissuring and appearance concern for yeast. Will treat accordingly and f/u in 2 weeks. If appearance of lichen at that time will restart clobetasol.     PLAN:  Dx:  1)  Pap smear collected for co-testing  2)  Mammogram: Bi-rads 1, repeat annually  3)  Inguinal yeast infection: due to moist environment and pre-diabetic condition will plan combined oral and topical treatment for 14 day and follow up eval in clinic to determine if ongoing clobetasol for lichen sclerosis is appropriate.     PE:  Reviewed health maintenance including diet, regular exercise   and periodic exams.    Return to clinic in 2w.    COUNSELING:   Reviewed preventive health counseling, as reflected in patient instructions   reports that she has never smoked. She has never used smokeless tobacco.        Jaquelin Perez MD   Obstetrics and Gynecology

## 2018-10-31 LAB
COPATH REPORT: NORMAL
PAP: NORMAL

## 2018-11-02 LAB
FINAL DIAGNOSIS: NORMAL
HPV HR 12 DNA CVX QL NAA+PROBE: NEGATIVE
HPV16 DNA SPEC QL NAA+PROBE: NEGATIVE
HPV18 DNA SPEC QL NAA+PROBE: NEGATIVE
SPECIMEN DESCRIPTION: NORMAL
SPECIMEN SOURCE CVX/VAG CYTO: NORMAL

## 2018-11-06 ENCOUNTER — DOCUMENTATION ONLY (OUTPATIENT)
Dept: PSYCHOLOGY | Facility: CLINIC | Age: 48
End: 2018-11-06
Payer: COMMERCIAL

## 2018-11-06 DIAGNOSIS — F54 PSYCHOLOGICAL FACTORS AFFECTING MORBID OBESITY (H): Primary | ICD-10-CM

## 2018-11-06 DIAGNOSIS — E66.01 PSYCHOLOGICAL FACTORS AFFECTING MORBID OBESITY (H): Primary | ICD-10-CM

## 2018-11-06 DIAGNOSIS — E66.01 MORBID OBESITY (H): ICD-10-CM

## 2018-11-06 PROCEDURE — 96101 HC PSYCH TST BY PSYCHOLOGIST/MD, PER HR MMPI-2: CPT | Performed by: PSYCHOLOGIST

## 2018-11-06 NOTE — PROGRESS NOTES
[facility]  Psychological Evaluation         Patient: Ana Wyman  YOB: 1986  MRN: [id number]      [place] Multiphasic Personality Inventory--Second Edition (MMPI-2)  Client completed the [place] Multiphasic Personality Inventory-2 (MMPI-2), a self-report personality inventory, as a part of the psychological assessment required for pursuing weight loss surgery.  Validity scales indicate the client denied most psychological problems, possibly in an attempt to present oneself in a positive light, limited awareness and insight, or due to truly experiencing few difficulties at this time. As such, test results should be interpreted with caution and in light of other clinical information. Test results were consistent with her report upon direct interview. All clinical scales were within normal limits indicating a reported absence of significant mental health symptoms at this time. Supplemental scales indicated the client is likely to conform to rules and expectations, avoid conflict, and be conventional and low in stimulation-seeking. She is likely to be tolerant, even-tempered, and a team player. Some preoccupation with health problems and somatic symptoms is likely.     Assessment of eating disorder symptoms indicates that client has not met criteria for anorexia nervosa or bulimia nervosa.  Client  reported she has not used laxatives, has not purged, and has not used excessive exercise as a means of weight control.  Client does not meet criteria for binge eating disorder. Client reported she does not eat until uncomfortably full.  She reported she does not feel disgusted, depressed, or guilty after eating.      Generalized Anxiety Disorder Questionnaire (MACIEL-7)  This questionnaire is designed to screen for anxiety in adults.  Based on the client's score of 1, she is reporting minimal symptoms of anxiety. Client identified the following symptoms of anxiety: trouble  relaxing    Patient Health Questionnaire- 9 (PHQ-9)   This questionnaire is designed to screen for depression in adults.  Based on the client's score of 1, she is reporting minimal symptoms of depression. Client identified the following symptoms of depression: feeling tired or having little energy.

## 2018-11-08 ENCOUNTER — DOCUMENTATION ONLY (OUTPATIENT)
Dept: PSYCHOLOGY | Facility: CLINIC | Age: 48
End: 2018-11-08
Payer: COMMERCIAL

## 2018-11-08 ENCOUNTER — OFFICE VISIT (OUTPATIENT)
Dept: PSYCHOLOGY | Facility: CLINIC | Age: 48
End: 2018-11-08
Payer: COMMERCIAL

## 2018-11-08 DIAGNOSIS — E66.01 PSYCHOLOGICAL FACTORS AFFECTING MORBID OBESITY (H): Primary | ICD-10-CM

## 2018-11-08 DIAGNOSIS — F54 PSYCHOLOGICAL FACTORS AFFECTING MORBID OBESITY (H): Primary | ICD-10-CM

## 2018-11-08 DIAGNOSIS — E66.01 MORBID OBESITY (H): ICD-10-CM

## 2018-11-08 PROCEDURE — 90834 PSYTX W PT 45 MINUTES: CPT | Mod: 59 | Performed by: PSYCHOLOGIST

## 2018-11-08 PROCEDURE — 96101 HC PSYCHOLOGICAL TEST BY PSYCHOLOGIST/MD, PER HR: CPT | Performed by: PSYCHOLOGIST

## 2018-11-08 NOTE — PROGRESS NOTES
"Progress Note    Client Name: Ana Wyman    Date: 11/8/2018         Service Type: Individual      Session Start Time: 4:05  Session End Time: 4:55      Session Length: 50 mins     Session #: 3     Attendees: Client attended alone    DATA     Progress Since Last Session (Related to Symptoms / Goals / Homework):  Symptoms: Stable; Depression is reported to be in the minimal range. PHQ-9 score is 1. Anxiety is reported to be in the minimal range. MACIEL-7 score is 1. Client denied risk issues. She has been making progress on following pre-weight loss surgery recommendations. Client denied engaging in disordered eating. She continues to be more active by using the treadmill and walking at work. She was happy to say that she has lost approximately 13 pounds thus far and hopes to lose additional weight prior to surgery. Client has no history of diagnosis or treatment of a mental health disorder.    Homework:  Practice no beverages 30 minutes before/after meals at least one meal per day (met), Walk for at least 15 minutes at least three times per week (met-using treadmill while watching TV); Find 2-4 alternatives to carbonated beverages (met-Crystal Light, essential oil water, peppermint decaf tea),Challenge all-or-nothing thinking regarding foods (eg., \"This is the last time I can have this, so I should\")-met (was helpful overall, able to use most of the time); and begin strategizing holiday meals (met-talked with mother about Thanksgiving dinner, planned healthier meals)     Current / Ongoing Stressors and Concerns:  Client denied experiencing current stressors she feels would impede her ability to follow through with weight loss surgery recommendations. She has no reported history of mental health disorders and/or treatment for mental health disorders. Client stated her main stressor is her mother's health issues, which currently are improving. Client's mother currently lives with her, which the client is enjoying. " "Client also stated she has had job changes in the past few years which have been stressful, but for the best. Client stated she took a pay cut for her current job and at times feels she \"took a step backward\" when moving to her new job. She occasionally worries about others' judgments about her in this regard, particularly a friend she works with who is in a higher position.  Client did report she has some financial strain, but is able to afford vitamins, healthy food options, doctor appointments, etc. In the past when the client was stressed, she would eat to cope. She is now very aware of this pattern and is actively working to avoid engaging in this behavior. Instead, the client likes to work on house projects, talk out the problem, and spend time with friends.         Treatment Objective(s) Addressed in This Session:   Reassess mental health symptoms  Review results of MMPI-2  Review changes that can occur in relationships following weight loss surgery  Provide recommendations      Intervention:  One-month follow-up psychological assessment was conducted. Reviewed client's progress with homework and lifestyle changes over the past month. She successfully completed all assigned homework and has been working on following all dietician recommendations. She continues to lose weight and is feeling positive about the changes she has made. She is aware of the requirements following surgery and stated she is confident in her ability to follow them.       Reviewed results of the MMPI-2. Client indicated the test results are consistent with her experiencess and are an accurate reflection of her functioning. Discussed that mental health symptoms could increase following surgery. Client is agreeable to checking in with this provider as needed and to scheduling with a therapist in the future if needed.       Discussed changes that may occur in relationships following weight loss surgery and how to manage these changes. " Assisted the client in anticipating reactions from others and offered suggestions about how to manage these interactions while staying focused on her own goals. Discussed specific relationships including her relationships with co-workers, friends, and family members. Client voiced increased comfort and confidence in addressing potential issues in an assertive way. She offered assertive examples of what she might say if relational concerns arise. She also stated that she has had open and honest communications with her close friend and family members and reported no concerns about changes in relationships overall.       Summary of Psychological Test  Results:     [place] Multiphasic Personality Inventory--Second Edition (MMPI-2)  Client completed the [place] Multiphasic Personality Inventory-2 (MMPI-2), a self-report personality inventory, as a part of the psychological assessment required for pursuing weight loss surgery.  Validity scales indicate the client denied most psychological problems, possibly in an attempt to present oneself in a positive light, limited awareness and insight, or due to truly experiencing few difficulties at this time. As such, test results should be interpreted with caution and in light of other clinical information. Test results were consistent with her report upon direct interview. All clinical scales were within normal limits indicating a reported absence of significant mental health symptoms at this time. Supplemental scales indicated the client is likely to conform to rules and expectations, avoid conflict, and be conventional and low in stimulation-seeking. She is likely to be tolerant, even-tempered, and a team player. Some preoccupation with health problems and somatic symptoms is likely.      Assessment of eating disorder symptoms indicates that client has not met criteria for anorexia nervosa or bulimia nervosa.  Client  reported she has not used laxatives, has not purged, and  has not used excessive exercise as a means of weight control.  Client does not meet criteria for binge eating disorder. Client reported she does not eat until uncomfortably full.  She reported she does not feel disgusted, depressed, or guilty after eating.       Generalized Anxiety Disorder Questionnaire (MACIEL-7)  This questionnaire is designed to screen for anxiety in adults.  Based on the client's score of 1, she is reporting minimal symptoms of anxiety. Client identified the following symptoms of anxiety: trouble relaxing     Patient Health Questionnaire- 9 (PHQ-9)   This questionnaire is designed to screen for depression in adults.  Based on the client's score of 1, she is reporting minimal symptoms of depression. Client identified the following symptoms of depression: feeling tired or having little energy.       ASSESSMENT: Current Emotional / Mental Status (status of significant symptoms):   Risk status (Self / Other harm or suicidal ideation)   Client denies current fears or concerns for personal safety.   Client denies current or recent suicidal ideation or behaviors.   Client denies current or recent homicidal ideation or behaviors.   Client denies current or recent self injurious behavior or ideation.   Client denies other safety concerns.   A safety and risk management plan has not been developed at this time, however client was given the after-hours number / 911 should there be a change in any of these risk factors.     Appearance:   Appropriate    Eye Contact:   Good    Psychomotor Behavior: Normal    Attitude:   Cooperative    Orientation:   All   Speech    Rate / Production: Normal     Volume:  Normal    Mood:    Normal   Affect:    Appropriate    Thought Content:  Clear    Thought Form:  Coherent  Goal Directed  Logical    Insight:    Good      Medication Review:   No current psychiatric medications prescribed     Medication Compliance:   NA     Changes in Health Issues:   None reported     Chemical  Use Review:   Substance Use: Chemical use reviewed, no active concerns identified      Tobacco Use: No current tobacco use.       Collateral Reports Completed:   Routed note to Care Team Member(s)    Summary and Final Recommendation:  To summarize the 3 primary conditions being evaluated in the psychological assessment:      1. Client is prepared to comply with ongoing aftercare and lifestyle changes after surgery--condition satisfied  Client has made meaningful initial changes to her eating and activity habits.  Client reported an understanding of the need for lifestyle changes to eating and activity habits.  Client reported she is willing to focus on making lifestyle changes to eating and activity habits post surgery.             2. Client is emotionally stable to proceed with surgery--condition satisfied  Client is not reporting any current symptoms consistent with a mental health disorder. She has a history of some anxiety and depressive symptoms related to past stressors (e.g., leaving a place of employment after 25 years), but has never been diagnosed with a mental health disorder nor has she felt the need to seek treatment.  There is no history of suicidal ideation or hospitalizations for mental health reasons. She has been committed to making healthy lifestyle changes despite experiencing mild stressors in her life.      3. Client is cognitively capable of understanding the risks of the procedure--condition satisfied   Client has been able to demonstrate that she understands the risks of surgery (compared to the risks of not having surgery).  Client voiced understanding that mental health symptoms could worsen during the process of weight loss surgery and agreed to check-in as needed with concerns. She agreed to come in and see this provider as needed or if mental health disorder symptoms develop. There are no reported mental health or substance use risk factors that should preclude the client from  continuing the process of weight loss surgery.          PLAN: (Client Tasks / Therapist Tasks / Other)  Client has completed psychological assessment required for bariatric surgery.  Complete report will be forwarded to the weight loss surgery clinic for review.  Client has been cleared from a psychological perspective to continue with the process of weight loss surgery.       Recommend standard post-surgical follow up with sessions 1- and 3- months post-surgery, to assess client's functioning and adjustment post-surgical lifestyle.        Client was encouraged to attend a weight loss surgery support group, starting before surgery and continuing afterwards, for additional accountability and support.      Client was provided with writer's contact information and is aware that she may contact writer sooner as needed.        Laila Pina PsyD, LP                                         11/8/2018  Licensed Psychologist  [facility]  [phone]

## 2018-11-08 NOTE — Clinical Note
Hello: I have cleared Ms. Wyman from a psychological perspective to go ahead with surgery. There are no mental concerns at this time and she has no history of significant mental health problems. The full test report can be found under Doc Only dated 11/8/18.  Please let me know if you have any questions or concerns. Thank you for the referral!

## 2018-11-08 NOTE — PROGRESS NOTES
Adult Bariatric Pre-Surgical Psychological Assessment - Structured Interview      CLIENT'S NAME: Ana Wyman  MRN:   [id number]  :   1970  ACCT. NUMBER: [id number]  DATE OF SERVICE: 18      Identifying Information:  Client is a 48 year old, , single female. Client was referred for an evaluation by the [facility] Weight Loss Surgery Team. Client is currently employed full time. Client attended the session alone.       Client's Statement of Presenting Concern:  Client is presenting for a psychological assessment as a routine part of the process for pursuing weight loss surgery.        History of Presenting Concern:  Client reported she first began struggling with her weight went she went to college. She was reportedly of average weight throughout childhood. Client stated that once she started attending college and working, she was much less active than she was in high school. 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 She does have a family history of obesity. Client endorsed a history of problematic eating patterns including: emotional eating, skipping meals and then overeating at night, eating unhealthy convenience foods, eating fastfood frequently, and eating too large of portions. Since beginning the process of preparing for weight loss surgery, she has been making progress on modifying these patterns and has lost approximately 7 pounds over the past 1-2 months.       Client reported a history of weight loss attempts through diet and exercise. She has attempted to lose weight through Weight Watchers, [employer]ia [employer], [employer], and eating a paleo diet. She reported losing approximately 50 pounds in 9 months on the paleo diet; however, once she discontinued the diet she regained the weight. Client has also participated in a medical weight loss program and was prescribed  "phentermine. Client stated her main motivation for pursuing weight loss surgery is to improve her overall health and quality of life. She would like to be able to be more active again and wants to not have to worry about whether or not she will \"fit in seats.\" She reported hoping that she can reverse or prevent health issues. She has been diagnosed with several medical conditions that may be improved if she loses weight.  Client reported feeling veryconfident that she can make the necessary lifestyle changes and is committed to doing so. Client demonstrated a good understanding of the risks of surgery and the need for ongoing lifestyle changes, including lifelong vitamin supplementation. Client reported her mother, who lives with her, is her main support. Client also has four close friends who will be available immediately following surgery and thereafter. She has attended a weight loss support group and plans to continue going.      Client denied experiencing current stressors she feels would impede her ability to follow through with weight loss surgery recommendations. She has no reported history of mental health disorders and/or treatment for mental health disorders. Client stated her main stressor is her mother's health issues, which currently are improving. Client also stated she has had job changes in the past few years which have been stressful, but for the best. Client stated she took a pay cut for her current job and at times feels she \"took a step backward\" when moving to her new job. She occasionally worries about others' judgments about her in this regard, particularly a friend she works with who is in a higher position. Client did report she has some financial strain, but is able to afford vitamins, doctor appointments, etc. In the past when the client was stressed, she would eat to cope. She is now very aware of this pattern and is actively working to avoid engaging in this behavior. Instead, the " "client likes to work on house projects, talk out the problem, and spend time with friends. Client reports that other professional(s) are involved in providing support / services: PCP and the weight loss surgery team.      Social History:  Client reported she grew up in [place], [place]. She was the third born of three children. Client has two older brothers with whom she has fine relationships. Client's parents were  until her father's death in . Client stated her father  from multiple health issues including esophogeal cancer. Client reported her she had a good relationship with her father although he at times was not the easiest to get along with. Client stated she believes her father had possible mental health issues. She indicated she has a close relationship witg her mother. Client reported that her childhood was \"good\" and \"fun.\" She stated she had a good group of friends, a lot of family support, and was involved in music and sports. Client reported a history of a few committed relationships with the longest lasting approximately 3 years. She has never been  and has no children. Her mother is currently living with her.  Client identified some stable and meaningful social connections. Client reported that she has not been involved with the legal system. Client's highest education level was college graduate. She earned a Bachelor's degree majoring in [employer] and a Master's degree in higher education administration. Client reported she worked at [employer] as the  for 20 years. She now works at [employer] and feels this job is far less stressful. Client did not identify any learning problems. There are no ethnic, cultural or Judaism factors that may be relevant for therapy. Client identified her preferred language to be English. Client reported she does not need the assistance of an  or other support involved in therapy. Modifications will not be used to assist " communication in therapy. Client did not serve in the .     Client reports family history includes Allergies in her brother and mother; Anesthesia Reaction in her mother; Asthma in her paternal grandmother; C.A.D. in her mother; C.A.D. (age of onset: 58) in her paternal grandfather; C.A.D. (age of onset: 92) in her father; Cancer in her father, maternal grandmother, and paternal grandmother; Cancer - colorectal in her paternal aunt and another family member; Cancer - colorectal (age of onset: 75) in her maternal grandmother; Cerebrovascular Disease in her father; Colon Cancer in her maternal grandmother; Colon Polyps in her maternal grandmother and mother; Coronary Artery Disease in her father, maternal grandfather, mother, and paternal grandfather; Diabetes in her mother and paternal grandmother; Hyperlipidemia in her mother; Hypertension in her brother, father, maternal grandfather, maternal grandmother, and mother; Lipids in her father and mother; Obesity in her brother, brother, father, mother, and paternal grandmother; Other Cancer in her father and maternal grandmother; Thyroid Disease in her mother.    Mental Health History:  Client reported the following biological family members or relatives with mental health issues: father with possible depression/undiagnosed. Client has not received mental health services in the past. Hospitalizations: None.  Client is not currently receiving any mental health services.      Chemical Health History:  Client reported the following biological family members or relatives with chemical health issues: brother with alcohol abuse and two nephews with drug abuse. Client has not received chemical dependency treatment in the past. Client is not currently receiving any chemical dependency treatment. Client reports no problems as a result of their drinking / drug use.    Client Reports:  Client reports using alcohol 1 times per month and has 1-2 mixed drinks at a time.  Client first started drinking at age 19.  Client denies using tobacco.  Client denies using marijuana.  Client reports using caffeine 2 times per day and drinks 1-2 cups of coffee at a time. Client started using caffeine at age 11.  Client denies using street drugs.  Client denies the non-medical use of prescription or over the counter drugs.    CAGE: None of the patient's responses to the CAGE screening were positive / Negative CAGE score   Based on the negative Cage-Aid score and clinical interview there are not indications of drug or alcohol abuse.    Discussed the general effects of drugs and alcohol on health and well-being. Therapist gave client printed information about the effects of chemical use on her health and well being.  We also discussed the specific risks of alcohol use following bariatric surgery, including issues related to cross-addiction.       Significant Losses / Trauma / Abuse / Neglect Issues:  There are indications or report of significant loss, trauma, abuse or neglect issues related to: death of father and grandparents. Client feels she has coped adequately with these losses..    Issues of possible neglect are not present.      Medical Issues:  Client has had a physical exam to rule out medical causes for current symptoms. Date of last physical exam was within the past year. Client was encouraged to follow up with PCP if symptoms were to develop. The client has a [place] Primary Care Provider, who is named Kelly Bedoya.. The client reports not having a psychiatrist. Client reports the following current medical concerns: obesity, sleep apnea, cellulitis, lymphodema, bunions, hammer toe, back pain, foot pain, and veinous closures. The client reports the presence of chronic or episodic pain in the form of back and foot. The pain level is mild and has a frequency of daily.. There are significant nutritional concerns related to obesity.    Client reports current meds as:   Current  Outpatient Prescriptions   Medication Sig     albuterol (PROAIR HFA/PROVENTIL HFA/VENTOLIN HFA) 108 (90 Base) MCG/ACT Inhaler Inhale 2 puffs into the lungs every 6 hours as needed for shortness of breath / dyspnea or wheezing     clobetasol (TEMOVATE) 0.05 % ointment Apply clobetasol  0.05 percent ointment, sparingly (a dot 3 mm wide) in a thin film.  Apply to affected area only, once or twice weekly for maintenance. (Patient not taking: Reported on 10/29/2018)     clotrimazole (LOTRIMIN) 1 % cream Apply topically 2 times daily for 14 days     fluconazole (DIFLUCAN) 150 MG tablet Take 1 tablet (150 mg) by mouth every 3 days     loratadine (CLARITIN) 10 MG tablet Take 1 tablet (10 mg) by mouth daily     multivitamin, therapeutic with minerals (THERA-VIT-M) TABS Take 1 tablet by mouth daily.     Omega-3 Fatty Acids (FISH OIL PO)      Omeprazole Magnesium (PRILOSEC OTC PO) Take 20 mg by mouth daily      RESTASIS MULTIDOSE 0.05 % ophthalmic emulsion INSTILL 1 DROP INTO BOTH EYES TWICE A DAY     No current facility-administered medications for this visit.        Client Allergies:  Allergies   Allergen Reactions     Clindamycin Diarrhea     Codeine Nausea and Vomiting     Shellfish Allergy        Medical History:  Past Medical History:   Diagnosis Date     Allergic rhinitis, cause unspecified      Cellulitis 2005    2 episodes, one with hospitalization [facility]     DUB (dysfunctional uterine bleeding)     Neg EMB 2012     Esophageal reflux     ongoing     Fibroids      Genital problems     Lichens Schlerosis     GERD (gastroesophageal reflux disease)      Hallux valgus (acquired)      History of steroid therapy     Inhalers, eye drops     Hypersomnia with sleep apnea, unspecified     using CPAP     Kidney stone May 2018    2 stones     Lichen sclerosus 7/14/2011     Lymph edema 2010- present     Lymphedema bilateral with mixed lipedema. 9/30/2015     Lymphedema bilateral with mixed lipedema. 9/30/2015     Morbid obesity  (H) 3/19/2013     MIGUELITO on CPAP 3/19/2013    Sleep study in 2004 and 2012       Pneumonia     Years ago - a few times     Pre-diabetes      Sleep apnea      Tendonitis currently     Urinary tract infection     A few times in past 10 years     Vision disorder [id number]    Dry Eye     Vitamin D deficiency 3/14/2015         Medication Adherence:  N/A - Client does not have prescribed psychiatric medications.      Mental Status Assessment:  Appearance:   Appropriate   Eye Contact:   Good   Psychomotor Behavior: Normal   Attitude:   Cooperative   Orientation:   All  Speech   Rate / Production: Normal    Volume:  Normal   Mood:    Normal  Affect:    Appropriate   Thought Content:  Clear   Thought Form:  Coherent  Goal Directed  Logical   Insight:    Good       Review of Symptoms:  Depression: No symptoms; history of related to stressors  Halima:  No symptoms  Psychosis: No symptoms  Anxiety: No symptoms; history of related to stressors  Panic:  No symptoms  Post Traumatic Stress Disorder: No symptoms  Obsessive Compulsive Disorder: No symptoms  Eating Disorder: No symptoms  ADD / ADHD: No symptoms  Conduct Disorder: No symptoms        Safety Issues and Plan for Safety and Risk Management:  Client denies a history of suicidal ideation, suicide attempts, self-injurious behavior, homicidal ideation, homicidal behavior and and other safety concerns    Client denies current fears or concerns for personal safety.  Client denies current or recent suicidal ideation or behaviors.  Client denies current or recent homicidal ideation or behaviors.  Client denies current or recent self injurious behavior or ideation.  Client denies other safety concerns.  Client reports there are no firearms in the house.  A safety and risk management plan has not been developed at this time, however client was given the after-hours number / 911 should there be a change in any of these risk factors.      Patient's Strengths and Limitations:  Client  identified the following strengths or resources that will help her succeed in counseling/assessment: commitment to health and well being, friends / good social support, family support and positive work environment. Client identified the following supports: family and friends. Things that may interfere with the clients success in counseling/assessment include:none reported.    Diagnostic Criteria:  316 Psychological Factors Affecting Obesity  -emotional/stress eating  -difficulty maintaining motivation and progress with weight loss attempts    R/O Anxiety and Depressive symptoms; history of development when experiencing stressors    Functional Status:  Client's symptoms have caused reduced functional status in the following areas: Activities of Daily Living and and Physical Health      DSM5 Diagnoses: (Sustained by DSM5 Criteria Listed Above)  316 Psychological Factors Affecting Obesity  Psychosocial & Contextual Factors: preparing for weight loss surgery, good support system, mother living with   WHODAS 2.0 (12 item)                          This questionnaire asks about difficulties due to health conditions. Health conditions include disease or illnesses, other health problems that may be short or long lasting, injuries, mental health or emotional problems, and problems with alcohol or drugs.                              Think back over the past 30 days and answer these questions, thinking about how much difficulty you had doing the following activities. For each question, please [place] only one response.     S1 Standing for long periods such as 30 minutes? None =         1   S2 Taking care of household responsibilities? None =         1   S3 Learning a new task, for example, learning how to get to a new place? None =         1   S4 How much of a problem do you have joining community activities (for example, festivals, Anglican or other activities) in the same way as anyone else can? None =         1   S5 How much  "have you been emotionally affected by your health problems? None =         1           In the past 30 days, how much difficulty did you have in:   S6 Concentrating on doing something for ten minutes? None =         1   S7 Walking a long distance such as a kilometer (or equivalent)? None =         1   S8 Washing your whole body? None =         1   S9 Getting dressed? None =         1   S10 Dealing with people you do not know? None =         1   S11 Maintaining a friendship? None =         1   S12 Your day to day work? None =         1      H1 Overall, in the past 30 days, how many days were these difficulties present? Record number of days 0   H2 In the past 30 days, for how many days were you totally unable to carry out your usual activities or work because of any health condition? Record number of days  0   H3 In the past 30 days, not counting the days that you were totally unable, for how many days did you cut back or reduce your usual activities or work because of any health condition? Record number of days 0         Attendance Agreement:  Client has signed Attendance Agreement:Yes      Preliminary Treatment Plan:    Client will return for follow-up session next month.  Client will continue with scheduled weight loss surgery clinic appointments.  She has writer's contact information and is aware that she may contact writer in the meantime as needed.      Client will work on the following homework assignment: Practice no beverages 30 minutes before/after meals at least one meal per day, Walk for at least 15 minutes at least three times per week and Find 2-4 alternatives to carbonated beverages. Also assigned to challenge all-or-nothing thinking regarding foods (eg., \"This is the last time I can have this, so I should\") and begin strategizing holiday meals    The client reports no currently identified Church, ethnic or cultural issues relevant to therapy/assessment.     services are not " "indicated.    Modifications to assist communication are not indicated.    The concerns identified by the client will be addressed in therapy/assessment.    The client is receiving treatment / structured support from the following professional(s) / service and treatment. Collaboration will be initiated with: primary care physician and [facility].    Referral to another professional/service is not indicated at this time..    A Release of Information is not needed at this time.    Report to child / adult protection services was NA.    Client will have access to their [facility]' medical record.      1-Month Follow-Up Session (11/8/18)       DATA     Progress Since Last Session (Related to Symptoms / Goals / Homework):  Symptoms: Stable; Depression is reported to be in the minimal range. PHQ-9 score is 1. Anxiety is reported to be in the minimal range. MACIEL-7 score is 1. Client denied risk issues. She has been making progress on following pre-weight loss surgery recommendations. Client denied engaging in disordered eating. She continues to be more active by using the treadmill and walking at work. She was happy to say that she has lost approximately 13 pounds thus far and hopes to lose additional weight prior to surgery. Client has no history of diagnosis or treatment of a mental health disorder.    Homework:  Practice no beverages 30 minutes before/after meals at least one meal per day (met), Walk for at least 15 minutes at least three times per week (met-using treadmill while watching TV); Find 2-4 alternatives to carbonated beverages (met-Crystal Light, essential oil water, peppermint decaf tea),Challenge all-or-nothing thinking regarding foods (eg., \"This is the last time I can have this, so I should\")-met (was helpful overall, able to use most of the time); and begin strategizing holiday meals (met-talked with mother about Thanksgiving dinner, planned healthier " "meals)     Current / Ongoing Stressors and Concerns:  Client denied experiencing current stressors she feels would impede her ability to follow through with weight loss surgery recommendations. She has no reported history of mental health disorders and/or treatment for mental health disorders. Client stated her main stressor is her mother's health issues, which currently are improving. Client's mother currently lives with her, which the client is enjoying. Client also stated she has had job changes in the past few years which have been stressful, but for the best. Client stated she took a pay cut for her current job and at times feels she \"took a step backward\" when moving to her new job. She occasionally worries about others' judgments about her in this regard, particularly a friend she works with who is in a higher position.  Client did report she has some financial strain, but is able to afford vitamins, healthy food options, doctor appointments, etc. In the past when the client was stressed, she would eat to cope. She is now very aware of this pattern and is actively working to avoid engaging in this behavior. Instead, the client likes to work on house projects, talk out the problem, and spend time with friends.       Treatment Objective(s) Addressed in This Session:   Reassess mental health symptoms  Review results of MMPI-2  Review changes that can occur in relationships following weight loss surgery  Provide recommendations    Intervention:  One-month follow-up psychological assessment was conducted. Reviewed client's progress with homework and lifestyle changes over the past month. She successfully completed all assigned homework and has been working on following all dietician recommendations. She continues to lose weight and is feeling positive about the changes she has made. She is aware of the requirements following surgery and stated she is confident in her ability to follow them.       Reviewed results " of the MMPI-2. Client indicated the test results are consistent with her experiencess and are an accurate reflection of her functioning. Discussed that mental health symptoms could increase following surgery. Client is agreeable to checking in with this provider as needed and to scheduling with a therapist in the future if needed.       Discussed changes that may occur in relationships following weight loss surgery and how to manage these changes. Assisted the client in anticipating reactions from others and offered suggestions about how to manage these interactions while staying focused on her own goals. Discussed specific relationships including her relationships with co-workers, friends, and family members. Client voiced increased comfort and confidence in addressing potential issues in an assertive way. She offered assertive examples of what she might say if relational concerns arise. She also stated that she has had open and honest communications with her close friend and family members and reported no concerns about changes in relationships overall.       Summary of Psychological Test  Results:     [place] Multiphasic Personality Inventory--Second Edition (MMPI-2)  Client completed the [place] Multiphasic Personality Inventory-2 (MMPI-2), a self-report personality inventory, as a part of the psychological assessment required for pursuing weight loss surgery.  Validity scales indicate the client denied most psychological problems, possibly in an attempt to present oneself in a positive light, limited awareness and insight, or due to truly experiencing few difficulties at this time. As such, test results should be interpreted with caution and in light of other clinical information. Test results were consistent with her report upon direct interview. All clinical scales were within normal limits indicating a reported absence of significant mental health symptoms at this time. Supplemental scales indicated the  client is likely to conform to rules and expectations, avoid conflict, and be conventional and low in stimulation-seeking. She is likely to be tolerant, even-tempered, and a team player. Some preoccupation with health problems and somatic symptoms is likely.      Assessment of eating disorder symptoms indicates that client has not met criteria for anorexia nervosa or bulimia nervosa.  Client  reported she has not used laxatives, has not purged, and has not used excessive exercise as a means of weight control.  Client does not meet criteria for binge eating disorder. Client reported she does not eat until uncomfortably full.  She reported she does not feel disgusted, depressed, or guilty after eating.       Generalized Anxiety Disorder Questionnaire (MACIEL-7)  This questionnaire is designed to screen for anxiety in adults.  Based on the client's score of 1, she is reporting minimal symptoms of anxiety. Client identified the following symptoms of anxiety: trouble relaxing     Patient Health Questionnaire- 9 (PHQ-9)   This questionnaire is designed to screen for depression in adults.  Based on the client's score of 1, she is reporting minimal symptoms of depression. Client identified the following symptoms of depression: feeling tired or having little energy.       Summary and Final Recommendation:  To summarize the 3 primary conditions being evaluated in the psychological assessment:      1. Client is prepared to comply with ongoing aftercare and lifestyle changes after surgery--condition satisfied  Client has made meaningful initial changes to her eating and activity habits.  Client reported an understanding of the need for lifestyle changes to eating and activity habits.  Client reported she is willing to focus on making lifestyle changes to eating and activity habits post surgery.         2. Client is emotionally stable to proceed with surgery--condition satisfied  Client is not reporting any current symptoms  consistent with a mental health disorder. She has a history of some anxiety and depressive symptoms related to past stressors (e.g., leaving a place of employment after 25 years), but has never been diagnosed with a mental health disorder nor has she felt the need to seek treatment.  There is no history of suicidal ideation or hospitalizations for mental health reasons. She has been committed to making healthy lifestyle changes despite experiencing mild stressors in her life.      3. Client is cognitively capable of understanding the risks of the procedure--condition satisfied   Client has been able to demonstrate that she understands the risks of surgery (compared to the risks of not having surgery).  Client voiced understanding that mental health symptoms could worsen during the process of weight loss surgery and agreed to check-in as needed with concerns. She agreed to come in and see this provider as needed or if mental health disorder symptoms develop. There are no reported mental health or substance use risk factors that should preclude the client from continuing the process of weight loss surgery.          PLAN: (Client Tasks / Therapist Tasks / Other)  Client has completed psychological assessment required for bariatric surgery. Complete report will be forwarded to the weight loss surgery clinic for review.  Client has been cleared from a psychological perspective to continue with the process of weight loss surgery.       Recommend standard post-surgical follow up with sessions 1- and 3- months post-surgery, to assess client's functioning and adjustment post-surgical lifestyle.        Client was encouraged to attend a weight loss surgery support group, starting before surgery and continuing afterwards, for additional accountability and support.      Client was provided with writer's contact information and is aware that she may contact writer sooner as needed.        Laila Pina PsyD,  MARCO                                         11/8/2018  Licensed Psychologist  [place]  [phone]

## 2018-11-08 NOTE — MR AVS SNAPSHOT
MRN:[id number]                      After Visit Summary   11/8/2018    Ana Wyman    MRN: [id number]           Visit Information        Provider Department      11/8/2018 4:00 PM Laila Taylor LP [facility]      Your next 10 appointments already scheduled     Nov 26, 2018  1:30 PM CST   Return Bariatric Nutrition Visit with Jolene Wl Diet 1, RD   [facility] ([facility])    [address] [place] [address]  [address]  [place] [address] [phone]            Nov 26, 2018  2:45 PM CST   MyChart OB-GYN Return with Jaquelin Perez MD   [facility] ([facility]    [address]  [address]  [place] [address] [phone]            Dec 03, 2018  9:30 AM CST   MyChart Podiatry New with Wilder Roberts DPM   [facility] ([facility])    [address]  [address]  [place] [address] [phone]              MyChart Information     EV Connect gives you secure access to your electronic health record. If you see a primary care provider, you can also send messages to your care team and make appointments. If you have questions, please call your primary care clinic.  If you do not have a primary care provider, please call [phone] and they will assist you.        Care EveryWhere ID     This is your Care EveryWhere ID. This could be used by other organizations to access your [facility] medical records  [id number]        Equal Access to Services     MAX GOMEZ AH: Hadii aad ku hadasho Soomaali, waaxda luqadaha, qaybta kaalmada adeegyada, yoselin jean haylopez christina. So [facility] [phone].    ATENCIÓN: Si habla español, tiene a tejada disposición servicios gratuitos de asistencia lingüística. Llame al [phone].    We comply with applicable federal civil rights laws and [place] laws. We do not discriminate  on the basis of race, color, national origin, age, disability, sex, sexual orientation, or gender identity.

## 2018-11-23 ENCOUNTER — OFFICE VISIT (OUTPATIENT)
Dept: URGENT CARE | Facility: URGENT CARE | Age: 48
End: 2018-11-23
Payer: COMMERCIAL

## 2018-11-23 VITALS
HEART RATE: 109 BPM | TEMPERATURE: 99.2 F | OXYGEN SATURATION: 95 % | SYSTOLIC BLOOD PRESSURE: 112 MMHG | DIASTOLIC BLOOD PRESSURE: 70 MMHG

## 2018-11-23 DIAGNOSIS — J45.20 MILD INTERMITTENT ASTHMA WITHOUT COMPLICATION: ICD-10-CM

## 2018-11-23 DIAGNOSIS — J06.9 VIRAL URI WITH COUGH: Primary | ICD-10-CM

## 2018-11-23 LAB
DEPRECATED S PYO AG THROAT QL EIA: NORMAL
SPECIMEN SOURCE: NORMAL

## 2018-11-23 PROCEDURE — 87081 CULTURE SCREEN ONLY: CPT | Performed by: FAMILY MEDICINE

## 2018-11-23 PROCEDURE — 99213 OFFICE O/P EST LOW 20 MIN: CPT | Performed by: FAMILY MEDICINE

## 2018-11-23 PROCEDURE — 87880 STREP A ASSAY W/OPTIC: CPT | Performed by: PEDIATRICS

## 2018-11-23 RX ORDER — ALBUTEROL SULFATE 90 UG/1
2 AEROSOL, METERED RESPIRATORY (INHALATION) EVERY 6 HOURS PRN
Qty: 2 INHALER | Refills: 2 | Status: SHIPPED | OUTPATIENT
Start: 2018-11-23 | End: 2020-02-14

## 2018-11-23 NOTE — MR AVS SNAPSHOT
After Visit Summary   11/23/2018    Ana Wyman    MRN: [id number]           Patient Information     Date Of Birth          1970        Visit Information        Provider Department      11/23/2018 9:55 AM [facility]        Today's Diagnoses     Throat pain    -  1    Mild intermittent asthma without complication          Care Instructions    Daytime cold/dayquil (has tylenol built in) and night time cold/nyquil for cough/congestion symptoms      If short of breath or wheezy take 2 puffs of albuterol every 2-4 hours as needed. Use this with a spacer/chamber      Return if symptoms persist: fevers beyond day 3    Stay hydrated: around 8 glasses of water a day          Follow-ups after your visit        Your next 10 appointments already scheduled     Nov 26, 2018  1:30 PM CST   Return Bariatric Nutrition Visit with Jolene Bell, RD   [facility] ([facility])    [address] [place] [address] [phone]            Nov 26, 2018  2:45 PM CST   MyChart OB-GYN [facility] with [facility]quelin [facility] MD   [facility]    [address] [place] [address]  [address]  [facility] [address] [phone]            Dec 03, 2018  9:30 AM CST   MyChart Podiatry New with Wilder Roberts DPM   [facility]    [address] [place] [address]  [address]  [facility] [address] [phone]              Who to contact     If you have questions or need follow up information about today's clinic visit or your schedule please contact [facility] directly at [phone].  Normal or non-critical lab and imaging results will be communicated to you by MyChart, letter or phone within 4 business days after the clinic has received the results. If you do not hear from us within 7 days, please contact the clinic  through Watch-Sites or phone. If you have a critical or abnormal lab result, we will notify you by phone as soon as possible.  Submit refill requests through Watch-Sites or call your pharmacy and they will forward the refill request to us. Please allow 3 business days for your refill to be completed.          Additional Information About Your Visit        Artklikkhart Information     Watch-Sites gives you secure access to your electronic health record. If you see a primary care provider, you can also send messages to your care team and make appointments. If you have questions, please call your primary care clinic.  If you do not have a primary care provider, please call [phone] and they will assist you.        Care EveryWhere ID     This is your Care EveryWhere ID. This could be used by other organizations to access your [facility] medical records  [id number]        Your Vitals Were     Pulse Temperature Pulse Oximetry             109 99.2  F (37.3  C) (Oral) 95%          Blood Pressure from Last 3 Encounters:   11/23/18 112/70   10/29/18 118/74   08/22/18 104/62    Weight from Last 3 Encounters:   10/29/18 328 lb 11.2 oz (149.1 kg)   10/29/18 (!) 332 lb 3.2 oz (150.7 kg)   09/24/18 (!) 333 lb 11.2 oz (151.4 kg)              We Performed the Following     Beta strep group A culture     Rapid strep screen          Where to get your medicines      These medications were sent to [facility] - [address]   [address], [place] [address]     Phone:  [phone]     albuterol 108 (90 Base) MCG/ACT inhaler          Primary Care Provider Office Phone # Fax #    Kelly Bedoya [phone] [address] [place] [address]        Equal Access to Services     GIO GOMEZ : Eli Justice, jada beckwith, qaybta kaaljayjay loo, yoselin christina. So [facility] [phone].    ATENCIÓN: Hardik marroquin  español, tiene a tejada disposición servicios gratuitos de asistencia lingüística. Clementina stiles [phone].    We comply with applicable federal civil rights laws and [place] laws. We do not discriminate on the basis of race, color, national origin, age, disability, sex, sexual orientation, or gender identity.            Thank you!     Thank you for choosing [facility]  for your care. Our goal is always to provide you with excellent care. Hearing back from our patients is one way we can continue to improve our services. Please take a few minutes to complete the written survey that you may receive in the mail after your visit with us. Thank you!             Your Updated Medication List - Protect others around you: Learn how to safely use, store and throw away your medicines at www.disposemymeds.org.          This list is accurate as of 11/23/18 10:46 AM.  Always use your most recent med list.                   Brand Name Dispense Instructions for use Diagnosis    albuterol 108 (90 Base) MCG/ACT inhaler    PROAIR HFA/PROVENTIL HFA/VENTOLIN HFA    2 Inhaler    Inhale 2 puffs into the lungs every 6 hours as needed for shortness of breath / dyspnea or wheezing    Mild intermittent asthma without complication       clobetasol 0.05 % ointment    TEMOVATE    45 g    Apply clobetasol  0.05 percent ointment, sparingly (a dot 3 mm wide) in a thin film.  Apply to affected area only, once or twice weekly for maintenance.    Lichen sclerosus       FISH OIL PO           fluconazole 150 MG tablet    DIFLUCAN    3 tablet    Take 1 tablet (150 mg) by mouth every 3 days    Yeast infection of the skin       loratadine 10 MG tablet    CLARITIN    7 tablet    Take 1 tablet (10 mg) by mouth daily        multivitamin, therapeutic with minerals Tabs tablet      Take 1 tablet by mouth daily.        PRILOSEC OTC PO      Take 20 mg by mouth daily        RESTASIS MULTIDOSE 0.05 % ophthalmic emulsion   Generic drug:  cycloSPORINE       INSTILL 1 DROP INTO BOTH EYES TWICE A DAY

## 2018-11-23 NOTE — PROGRESS NOTES
Subjective:   Ana Wyman is a 48 year old female who presents for   Chief Complaint   Patient presents with     URI     started yesterday with cough, sore throat, headache, bodyaches, chills, fever, red eyes      Sore throat starting yesterday and had a fever to 101-103 -> tylenol knocked it down to 100.   Strep throat going around in the family. Having head aches and body aches. Cough starting today.   No hx of asthma but gets chest colds that causes reactive airway disease. At this time not feeling wheezy but a little short of breath.     Patient Active Problem List    Diagnosis Date Noted     Fatty liver 07/27/2018     Priority: Medium     Mild intermittent asthma without complication 06/11/2018     Priority: Medium     Lymphedema bilateral with mixed lipedema. 09/30/2015     Priority: Medium     Baker's cyst of knee 09/03/2015     Priority: Medium     Pre-diabetes 03/24/2015     Priority: Medium     Acanthosis nigricans 03/24/2015     Priority: Medium     Cutaneous skin tags 03/24/2015     Priority: Medium     Vitamin D deficiency 03/14/2015     Priority: Medium     Morbid obesity (H) 03/19/2013     Priority: Medium     MIGUELITO on CPAP 03/19/2013     Priority: Medium     Sleep study in 2004 and 2012         Uterine fibroid 08/02/2012     Priority: Medium     Lichen sclerosus 07/14/2011     Priority: Medium     Elevated blood pressure reading without diagnosis of hypertension 05/23/2011     Priority: Medium     Family history of malignant neoplasm of genital organ, other 06/11/2007     Priority: Medium     FAMILY HX GI MALIGNANCY 05/31/2007     Priority: Medium     Hypersomnia with sleep apnea 07/06/2005     Priority: Medium     Problem list name updated by automated process. Provider to review       Esophageal reflux 09/27/2004     Priority: Medium     Allergic state 09/27/2004     Priority: Medium     Problem list name updated by automated process. Provider to review         Current Outpatient Prescriptions    Medication     albuterol (PROAIR HFA/PROVENTIL HFA/VENTOLIN HFA) 108 (90 Base) MCG/ACT inhaler     clobetasol (TEMOVATE) 0.05 % ointment     fluconazole (DIFLUCAN) 150 MG tablet     loratadine (CLARITIN) 10 MG tablet     multivitamin, therapeutic with minerals (THERA-VIT-M) TABS     Omega-3 Fatty Acids (FISH OIL PO)     Omeprazole Magnesium (PRILOSEC OTC PO)     RESTASIS MULTIDOSE 0.05 % ophthalmic emulsion     [DISCONTINUED] albuterol (PROAIR HFA/PROVENTIL HFA/VENTOLIN HFA) 108 (90 Base) MCG/ACT Inhaler     No current facility-administered medications for this visit.      ROS:  As above per HPI    Objective:   /70 (BP Location: Right arm, Patient Position: Sitting, Cuff Size: Adult Regular)  Pulse 109  Temp 99.2  F (37.3  C) (Oral)  SpO2 95%, There is no height or weight on file to calculate BMI.  Gen:  NAD, well-nourished, sitting in chair comfortably  HEENT: EOMI, sclera anicteric, Head normocephalic, ; nares patent; moist mucous membranes, erythematous oropharynx  Neck: trachea midline, no thyromegaly  CV:  Hemodynamically stable, RRR  Pulm:  no increased work of breathing , CTAB, no wheezes/rales/rhonchi   Extrem: no cyanosis, edema or clubbing  Skin: no obvious rashes or abnormalities  Psych: Euthymic, linear thoughts, normal rate of speech    Results for orders placed or performed in visit on 11/23/18   Rapid strep screen   Result Value Ref Range    Specimen Description Throat     Rapid Strep A Screen       NEGATIVE: No Group A streptococcal antigen detected by immunoassay, await culture report.     Assessment & Plan:   Ana Wyman, 48 year old female who presents with:  Viral URI with cough  Unremarkable lung exam. Discussed if fevers persist beyond day 3 to call for recommendation. Early presentation of pneumonia can slowly develop and discussed imaging at this time is not indicated.     Mild intermittent asthma without complication  Refill provided - encouraged using with spacer. 2 puffs  every 2-4 hours as needed  - albuterol (PROAIR HFA/PROVENTIL HFA/VENTOLIN HFA) 108 (90 Base) MCG/ACT inhaler  Dispense: 2 Inhaler; Refill: 2      Price Rae MD   [facility]     Options for treatment and/or follow-up care were reviewed with the patient. Ana Wyman and/or legal guardian was engaged and actively involved in the decision making process. Patient/guardian verbalized understanding of the options discussed and was satisfied with the final plan.

## 2018-11-23 NOTE — PATIENT INSTRUCTIONS
Daytime cold/dayquil (has tylenol built in) and night time cold/nyquil for cough/congestion symptoms      If short of breath or wheezy take 2 puffs of albuterol every 2-4 hours as needed. Use this with a spacer/chamber      Return if symptoms persist: fevers beyond day 3    Stay hydrated: around 8 glasses of water a day

## 2018-11-24 LAB
BACTERIA SPEC CULT: NORMAL
SPECIMEN SOURCE: NORMAL

## 2018-11-26 ENCOUNTER — OFFICE VISIT (OUTPATIENT)
Dept: SURGERY | Facility: CLINIC | Age: 48
End: 2018-11-26
Payer: COMMERCIAL

## 2018-11-26 ENCOUNTER — OFFICE VISIT (OUTPATIENT)
Dept: OBGYN | Facility: CLINIC | Age: 48
End: 2018-11-26
Payer: COMMERCIAL

## 2018-11-26 VITALS — DIASTOLIC BLOOD PRESSURE: 70 MMHG | SYSTOLIC BLOOD PRESSURE: 104 MMHG | BODY MASS INDEX: 56.32 KG/M2 | WEIGHT: 293 LBS

## 2018-11-26 VITALS — WEIGHT: 293 LBS | HEIGHT: 64 IN | BODY MASS INDEX: 50.02 KG/M2

## 2018-11-26 DIAGNOSIS — E66.01 MORBID OBESITY (H): ICD-10-CM

## 2018-11-26 DIAGNOSIS — B37.2 YEAST DERMATITIS: Primary | ICD-10-CM

## 2018-11-26 PROCEDURE — 97803 MED NUTRITION INDIV SUBSEQ: CPT | Performed by: DIETITIAN, REGISTERED

## 2018-11-26 PROCEDURE — 99213 OFFICE O/P EST LOW 20 MIN: CPT | Performed by: OBSTETRICS & GYNECOLOGY

## 2018-11-26 NOTE — MR AVS SNAPSHOT
MRN:[id number]                      After Visit Summary   11/26/2018    Ana Wyman    MRN: [id number]           Visit Information        Provider Department      11/26/2018 1:30 PM 1, Jolene Cadena RD [facility]      Your next 10 appointments already scheduled     Nov 26, 2018  2:45 PM CST   MyChart OB-GYN Return with Jaquelin Perez MD   [facility] ([facility]    [address] [place] [address]  [address]  [facility] [address] [phone]            Dec 03, 2018  9:30 AM CST   [facility] Podiatry New with Wilder Roberts DPM   [facility] ([facility]    [address] [place] [address]  [address]  [facility] [address] [phone]              MyChart Information     Mark Forgedt gives you secure access to your electronic health record. If you see a primary care provider, you can also send messages to your care team and make appointments. If you have questions, please call your primary care clinic.  If you do not have a primary care provider, please call [phone] and they will assist you.        Care EveryWhere ID     This is your Care EveryWhere ID. This could be used by other organizations to access your [facility] medical records  [id number]        Equal Access to Services     GIO GOMEZ AH: Hadii megan sheppard hadasho Soomaali, waaxda luqadaha, qaybta kaalmada adeegyada, yoselin christina. So [facility] [phone].    ATENCIÓN: Si habla español, tiene a tejada disposición servicios gratuitos de asistencia lingüística. Llame al [phone].    We comply with applicable federal civil rights laws and [place] laws. We do not discriminate on the basis of race, color, national origin, age, disability, sex, sexual orientation, or gender identity.

## 2018-11-26 NOTE — MR AVS SNAPSHOT
After Visit Summary   11/26/2018    Ana Wyman    MRN: [id number]           Patient Information     Date Of Birth          1970        Visit Information        Provider Department      11/26/2018 2:45 PM Jaquelin Perez MD [facility]        Today's Diagnoses     Yeast dermatitis    -  1       Follow-ups after your visit        Your next 10 appointments already scheduled     Dec 03, 2018  9:30 AM [facility] Wilder [facility]   [facility]    [address]  [address]  [facility] [address] [phone]              Who to contact     If you have questions or need follow up information about today's clinic visit or your schedule please contact [facility] directly at [phone].  Normal or non-critical lab and imaging results will be communicated to you by [place] [employer], letter or phone within 4 business days after the clinic has received the results. If you do not hear from us within 7 days, please contact the clinic through [place] [employer] or phone. If you have a critical or abnormal lab result, we will notify you by phone as soon as possible.  Submit refill requests through Framed Data or call your pharmacy and they will forward the refill request to us. Please allow 3 business days for your refill to be completed.          Additional Information About Your Visit        MyChart Information     Framed Data gives you secure access to your electronic health record. If you see a primary care provider, you can also send messages to your care team and make appointments. If you have questions, please call your primary care clinic.  If you do not have a primary care provider, please call [phone] and they will assist you.        Care EveryWhere ID     This is your Care EveryWhere ID. This could be used by other organizations to access your [facility] medical records  [id number]        Your Vitals Were     BMI  (Body Mass Index)                   56.32 kg/m2            Blood Pressure from Last 3 Encounters:   11/26/18 104/70   11/23/18 112/70   10/29/18 118/74    Weight from Last 3 Encounters:   11/26/18 328 lb 1.6 oz (148.8 kg)   11/26/18 326 lb 6.4 oz (148.1 kg)   10/29/18 328 lb 11.2 oz (149.1 kg)              Today, you had the following     No orders found for display       Primary Care Provider Office Phone # Fax #    Kelly Bedoya [phone] [address] [place] [address]        Equal Access to Services     [facility]: Hadii megan sheppard hadneli [place], waaxda luqadaha, qaybta kaalmada cinda, yoselin isbell . So [facility] [phone].    ATENCIÓN: Si habla español, tiene a tejada disposición servicios gratuitos de asistencia lingüística. Llame al [phone].    We comply with applicable federal civil rights laws and [place] laws. We do not discriminate on the basis of race, color, national origin, age, disability, sex, sexual orientation, or gender identity.            Thank you!     Thank you for choosing [facility]  for your care. Our goal is always to provide you with excellent care. Hearing back from our patients is one way we can continue to improve our services. Please take a few minutes to complete the written survey that you may receive in the mail after your visit with us. Thank you!             Your Updated Medication List - Protect others around you: Learn how to safely use, store and throw away your medicines at www.disposemymeds.org.          This list is accurate as of 11/26/18  3:04 PM.  Always use your most recent med list.                   Brand Name Dispense Instructions for use Diagnosis    albuterol 108 (90 Base) MCG/ACT inhaler    PROAIR HFA/PROVENTIL HFA/VENTOLIN HFA    2 Inhaler    Inhale 2 puffs into the lungs every 6 hours as needed for shortness of breath / dyspnea or wheezing    Mild intermittent asthma  without complication       clobetasol 0.05 % ointment    TEMOVATE    45 g    Apply clobetasol  0.05 percent ointment, sparingly (a dot 3 mm wide) in a thin film.  Apply to affected area only, once or twice weekly for maintenance.    Lichen sclerosus       FISH OIL PO           fluconazole 150 MG tablet    DIFLUCAN    3 tablet    Take 1 tablet (150 mg) by mouth every 3 days    Yeast infection of the skin       loratadine 10 MG tablet    CLARITIN    7 tablet    Take 1 tablet (10 mg) by mouth daily        multivitamin, therapeutic with minerals Tabs tablet      Take 1 tablet by mouth daily.        PRILOSEC OTC PO      Take 20 mg by mouth daily        RESTASIS MULTIDOSE 0.05 % ophthalmic emulsion   Generic drug:  cycloSPORINE      INSTILL 1 DROP INTO BOTH EYES TWICE A DAY

## 2018-11-26 NOTE — NURSING NOTE
"Chief Complaint   Patient presents with     RECHECK     vaginal irritation and itching, patient states overall she is doing better, still has off and on episodes of itching.        Initial /70 (BP Location: Right arm, Patient Position: Chair, Cuff Size: Adult Large)  Wt 328 lb 1.6 oz (148.8 kg)  BMI 56.32 kg/m2 Estimated body mass index is 56.32 kg/(m^2) as calculated from the following:    Height as of an earlier encounter on 18: 5' 4\" (1.626 m).    Weight as of this encounter: 328 lb 1.6 oz (148.8 kg).  BP completed using cuff size: large    Questioned patient about current smoking habits.  Pt. has never smoked.          The following HM Due: NONE    Mabel Gomez CMA               "

## 2018-11-26 NOTE — PROGRESS NOTES
"PRE SURGICAL WEIGHT LOSS NUTRITION APPOINTMENT    Ana Wyman  1970  female  [id number]  48 year old    ASSESSMENT    Desired Surgical Procedure: gastric bypass    REASON FOR VISIT:  Ana Wyman is a 48 year old year old female presents today for a pre-surgical weight loss follow-up appointment. Patient accompanied by self.    DIAGNOSIS:  Weight Status Obesity Grade III BMI >40    ANTHROPOMETRICS:  Height: 162.6 cm (5' 4\")  Initial Weight:  153.8 kg (339 lb)   Weight last visit:  149.1 kg (328 lb 11.2 oz)  Current weight: 148.1 kg (326 lb 6.4 oz)  BMI: 56.14 kg/(m^2).    VITAMINS AND MINERALS:  1 Multivitamin with Minerals  500 mg Calcium with Vitamin D TID  4000 International units Vitamin D      NUTRITION HISTORY:  Breakfast: 1 egg, [place] real or high protein oatmeal, fresh fruit  Lunch: left overs from dinner (protein, vegetable, sometimes a grain of potatoes)  Supper: protein, 2 different vegetables, some times a grain or potato  Snacks: light pop corn or apple (2-3X per week)   Fluids Consumed: Water, Protein Drink and herbal tea  Eating slower: Yes  Chewing foods thoroughly: Yes  Take 20-30 minutes to consume each meal: Yes  Fluids and meals separate by at least 30 minutes: Yes  Carbonation: none  Caffeine: none  Additional Information: Patient has made excellent progress with making lifestyle changes in preparation for surgery. Over [place] she told her family about her family about her plan for bariatric surgery and they seem to be very supportive.    PHYSICAL ACTIVITY:  Type: treadmill  Frequency: 3 (days per week)  Duration: 15-20 (minutes)     DIAGNOSIS:  Previous Nutrition Diagnosis: Obesity related to long history of self- monitoring deficit and excessive energy intake evidenced by BMI of 56.4 kg/m2  No change, modified below    Previous goals:   Determine eating plan for [place]-met  Avoid liquids 30 minutes before, during and after meals-met  Eliminate caffeine and " carbonation completely-met       Current Nutrition Diagnosis: Obesity related to long history of self-monitoring deficit and excessive energy intake as evidenced by BMI of 56.14 kg/m2.    INTERVENTION:  Nutrition Prescription: Recommended energy/nutrient modification.    GOALS:  Continue to practice all pre-bariatric lifestyle changes    Intervention:  - Discussed progress towards previous goals.  - Reinforced importance of making behavior changes in preparation for bariatric surgery.   - Assessed learning needs and learning preferences       NUTRITION MONITORING AND EVALUATION:  Anticipated compliance: good  Patient demonstrated good understanding.   Patient has met pre bariatric surgery diet requirements    Follow up: Continue to monitor patient closely regarding weight loss and diet.  # of visits needed: 0  Cleared by RD: Yes     TIME SPENT WITH PATIENT: 20 minutes    Chapo Braun RD, LD  [facility]  [phone]

## 2018-11-26 NOTE — PROGRESS NOTES
Gyn Follow up    CC: Inguinal rash    S: Feeling much better after a 14 day course of over the counter yeast cream. Rare episodes of pruritis in the inguinal creases since then, self limited.   Reports mild vasomotor symptoms.   Preparing for bariatric surgery in 1 month.     ROS:   Gen: no weight changes, fevers or chills    O: /70 (BP Location: Right arm, Patient Position: Chair, Cuff Size: Adult Large)  Wt 328 lb 1.6 oz (148.8 kg)  BMI 56.32 kg/m2   Gen: sitting in chair in no acute distress, comfortable  Skin: warm and dry, no rashes/lesions  Psych: mood stable, appropriate affect  Neuro: A+Ox3   : External genitalia normal well-estrogenized, healthy tissue.  Inguinal crease without rash or excretions. Very superficial 1cm fissure in left inguinal crease. No central/labial discoloration, unchanged attenuation of labia minora.       A/P: Ana Wyman is a 48 year old female  who cutaneous yeast infection, resolved with over the counter topical miconazole. No evidence of active lichen at this time.     - continue topical monistat as needed. If no resolution with 7 days of treatment for recurrent yeast outbreaks return to clinic for evaluation.     Jaquelin Perez MD

## 2018-12-03 ENCOUNTER — OFFICE VISIT (OUTPATIENT)
Dept: URGENT CARE | Facility: URGENT CARE | Age: 48
End: 2018-12-03
Payer: COMMERCIAL

## 2018-12-03 ENCOUNTER — OFFICE VISIT (OUTPATIENT)
Dept: PODIATRY | Facility: CLINIC | Age: 48
End: 2018-12-03
Payer: COMMERCIAL

## 2018-12-03 ENCOUNTER — RADIANT APPOINTMENT (OUTPATIENT)
Dept: GENERAL RADIOLOGY | Facility: CLINIC | Age: 48
End: 2018-12-03
Attending: PHYSICIAN ASSISTANT
Payer: COMMERCIAL

## 2018-12-03 ENCOUNTER — RADIANT APPOINTMENT (OUTPATIENT)
Dept: GENERAL RADIOLOGY | Facility: CLINIC | Age: 48
End: 2018-12-03
Attending: PODIATRIST
Payer: COMMERCIAL

## 2018-12-03 VITALS
SYSTOLIC BLOOD PRESSURE: 110 MMHG | DIASTOLIC BLOOD PRESSURE: 72 MMHG | BODY MASS INDEX: 50.02 KG/M2 | WEIGHT: 293 LBS | HEIGHT: 64 IN

## 2018-12-03 VITALS
DIASTOLIC BLOOD PRESSURE: 68 MMHG | SYSTOLIC BLOOD PRESSURE: 108 MMHG | TEMPERATURE: 97.5 F | HEART RATE: 86 BPM | OXYGEN SATURATION: 95 %

## 2018-12-03 DIAGNOSIS — B35.1 ONYCHOMYCOSIS: ICD-10-CM

## 2018-12-03 DIAGNOSIS — M79.674 TOE PAIN, RIGHT: Primary | ICD-10-CM

## 2018-12-03 DIAGNOSIS — M79.674 TOE PAIN, RIGHT: ICD-10-CM

## 2018-12-03 DIAGNOSIS — R05.9 COUGH: ICD-10-CM

## 2018-12-03 DIAGNOSIS — R07.0 THROAT PAIN: Primary | ICD-10-CM

## 2018-12-03 DIAGNOSIS — L60.0 ONYCHOCRYPTOSIS: ICD-10-CM

## 2018-12-03 DIAGNOSIS — J45.20 MILD INTERMITTENT ASTHMA WITHOUT COMPLICATION: ICD-10-CM

## 2018-12-03 LAB
DEPRECATED S PYO AG THROAT QL EIA: NORMAL
SPECIMEN SOURCE: NORMAL

## 2018-12-03 PROCEDURE — 71046 X-RAY EXAM CHEST 2 VIEWS: CPT

## 2018-12-03 PROCEDURE — 99243 OFF/OP CNSLTJ NEW/EST LOW 30: CPT | Mod: 25 | Performed by: PODIATRIST

## 2018-12-03 PROCEDURE — 87880 STREP A ASSAY W/OPTIC: CPT | Performed by: PEDIATRICS

## 2018-12-03 PROCEDURE — 73660 X-RAY EXAM OF TOE(S): CPT | Mod: RT

## 2018-12-03 PROCEDURE — 99214 OFFICE O/P EST MOD 30 MIN: CPT | Performed by: PHYSICIAN ASSISTANT

## 2018-12-03 PROCEDURE — 11720 DEBRIDE NAIL 1-5: CPT | Performed by: PODIATRIST

## 2018-12-03 PROCEDURE — 87081 CULTURE SCREEN ONLY: CPT | Performed by: PHYSICIAN ASSISTANT

## 2018-12-03 RX ORDER — PREDNISONE 20 MG/1
40 TABLET ORAL DAILY
Qty: 12 TABLET | Refills: 0 | Status: SHIPPED | OUTPATIENT
Start: 2018-12-03 | End: 2019-02-05

## 2018-12-03 NOTE — MR AVS SNAPSHOT
After Visit Summary   12/3/2018    Ana Wyman    MRN: [id number]           Patient Information     Date Of Birth          1970        Visit Information        Provider Department      12/3/2018 9:30 AM Wilder Ricks DPM [facility]        Today's Diagnoses     Toe pain, right    -  1      Care Instructions    Thank you for choosing [facility]!    DR. RICKS'S CLINIC LOCATIONS:   MONDAY - [place] TUESDAY - [place]   [address] [facility]  [address]   [place] [address] [place] [address]   [phone]       THURSDAY AM - [place] THURSDAY PM - [place]   [address]   [place] [address] [place], [place] [address] [phone]       FRIDAY AM - [place] SET UP SURGERY: [phone] [address] APPOINTMENTS: [phone]   [place] [address] BILLING QUESTIONS: [phone] FAX NUMBER: [phone]     Follow Up: as needed    ROUTINE FOOT CARE NURSES  [facility]  [phone] [facility]  [phone]   [facility]  [phone]  [facility] [phone]  [place]   [facility]  [phone]  [facility] [phone]       Please call one of the above companies for insurance coverage, cost, and location information.      FYI: Body Mass Index (BMI)  Many things can cause foot and ankle problems. Foot structure, activity level, foot mechanics and injuries are common causes of pain. One very important issue that often goes unmentioned, is body weight. Extra weight can cause increased stress on muscles, ligaments, bones and tendons. Sometimes just a few extra pounds is all it takes to put one over her/his threshold. Without reducing that stress, it can be difficult to alleviate pain. Some people are uncomfortable addressing this issue, but we feel it is  important for you to think about it. As Foot &  Ankle specialists, our job is addressing the lower extremity problem and possible causes. Regarding extra body weight, we encourage patients to discuss diet and weight management plans with their primary care doctors. It is this team approach that gives you the best opportunity for pain relief and getting you back on your feet.              Follow-ups after your visit        Your next 10 appointments already scheduled     Dec 04, 2018 10:30 AM CST   (Arrive by 10:15 AM)   Bariatric Surgeon Visit with Maykel Calvin MD   [facility] ([facility])    [address] [place] [address] [phone]              Who to contact     If you have questions or need follow up information about today's clinic visit or your schedule please contact [facility] [place] directly at [phone].  Normal or non-critical lab and imaging results will be communicated to you by MyChart, letter or phone within 4 business days after the clinic has received the results. If you do not hear from us within 7 days, please contact the clinic through [facility] [employer] or phone. If you have a critical or abnormal lab result, we will notify you by phone as soon as possible.  Submit refill requests through [employer] or call your pharmacy and they will forward the refill request to us. Please allow 3 business days for your refill to be completed.          Additional Information About Your Visit        MyChart Information     [employer] gives you secure access to your electronic health record. If you see a primary care provider, you can also send messages to your care team and make appointments. If you have questions, please call your primary care clinic.  If you do not have a primary care provider, please call [phone] and they will assist you.        Care EveryWhere ID     This is your Care EveryWhere ID. This could be used  "by other organizations to access your [place] medical records  [id number]        Your Vitals Were     Height BMI (Body Mass Index)                5' 4\" (1.626 m) 56.32 kg/m2           Blood Pressure from Last 3 Encounters:   12/03/18 110/72   11/26/18 104/70   11/23/18 112/70    Weight from Last 3 Encounters:   12/03/18 328 lb 1.6 oz (148.8 kg)   11/26/18 328 lb 1.6 oz (148.8 kg)   11/26/18 326 lb 6.4 oz (148.1 kg)               Primary Care Provider Office Phone # Fax #    Kelly Bedoya [phone] [address] [place] [address]        Equal Access to Services     [facility] Center[facility] : Hadii megan ordoñezo Soange, waaxda luqadaha, qaybta kaalmada adeegyada, yoselin isbell . So [facility] [phone].    ATENCIÓN: Si habla español, tiene a tejada disposición servicios gratuitos de asistencia lingüística. Llame al [phone].    We comply with applicable federal civil rights laws and [place] laws. We do not discriminate on the basis of race, color, national origin, age, disability, sex, sexual orientation, or gender identity.            Thank you!     Thank you for choosing [facility]  for your care. Our goal is always to provide you with excellent care. Hearing back from our patients is one way we can continue to improve our services. Please take a few minutes to complete the written survey that you may receive in the mail after your visit with us. Thank you!             Your Updated Medication List - Protect others around you: Learn how to safely use, store and throw away your medicines at www.disposemymeds.org.          This list is accurate as of 12/3/18 10:06 AM.  Always use your most recent med list.                   Brand Name Dispense Instructions for use Diagnosis    albuterol 108 (90 Base) MCG/ACT inhaler    PROAIR HFA/PROVENTIL HFA/VENTOLIN HFA    2 Inhaler    Inhale 2 puffs into the lungs every 6 hours as needed for shortness of " breath / dyspnea or wheezing    Mild intermittent asthma without complication       clobetasol 0.05 % external ointment    TEMOVATE    45 g    Apply clobetasol  0.05 percent ointment, sparingly (a dot 3 mm wide) in a thin film.  Apply to affected area only, once or twice weekly for maintenance.    Lichen sclerosus       FISH OIL PO           fluconazole 150 MG tablet    DIFLUCAN    3 tablet    Take 1 tablet (150 mg) by mouth every 3 days    Yeast infection of the skin       loratadine 10 MG tablet    CLARITIN    7 tablet    Take 1 tablet (10 mg) by mouth daily        multivitamin w/minerals tablet      Take 1 tablet by mouth daily.        PRILOSEC OTC PO      Take 20 mg by mouth daily        RESTASIS MULTIDOSE 0.05 % ophthalmic emulsion   Generic drug:  cycloSPORINE      INSTILL 1 DROP INTO BOTH EYES TWICE A DAY

## 2018-12-03 NOTE — PROGRESS NOTES
SUBJECTIVE:   Ana Wyman is a 48 year old female presenting with a chief complaint of cough, ST, chest congestion with no SOB or chest pain.  Felt feverish.  Hoarse voice.  Hx of pneumonia and asthma with cold sx.  Onset of symptoms was 10 day(s) ago.  Course of illness is worsening.    Severity moderate  Current and Associated symptoms: no ear pain, GI sx, rashes  Treatment measures tried include Tylenol/Ibuprofen, Decongestants, OTC Cough med, Fluids and Rest.  Predisposing factors include as above.    Past Medical History:   Diagnosis Date     Allergic rhinitis, cause unspecified      Cellulitis 2005    2 episodes, one with hospitalization [facility]     DUB (dysfunctional uterine bleeding)     Neg EMB 2012     Esophageal reflux     ongoing     Fibroids      Genital problems     Lichens Schlerosis     GERD (gastroesophageal reflux disease)      Hallux valgus (acquired)      History of steroid therapy     Inhalers, eye drops     Hypersomnia with sleep apnea, unspecified     using CPAP     Kidney stone May 2018    2 stones     Lichen sclerosus 7/14/2011     Lymph edema 2010- present     Lymphedema bilateral with mixed lipedema. 9/30/2015     Lymphedema bilateral with mixed lipedema. 9/30/2015     Morbid obesity (H) 3/19/2013     MIGUELITO on CPAP 3/19/2013    Sleep study in 2004 and 2012       Pneumonia     Years ago - a few times     Pre-diabetes      Sleep apnea      Tendonitis currently     Urinary tract infection     A few times in past 10 years     Vision disorder [id number]    Dry Eye     Vitamin D deficiency 3/14/2015     Current Outpatient Prescriptions   Medication Sig Dispense Refill     albuterol (PROAIR HFA/PROVENTIL HFA/VENTOLIN HFA) 108 (90 Base) MCG/ACT inhaler Inhale 2 puffs into the lungs every 6 hours as needed for shortness of breath / dyspnea or wheezing 2 Inhaler 2     clobetasol (TEMOVATE) 0.05 % ointment Apply clobetasol  0.05 percent ointment, sparingly (a dot 3 mm wide) in a thin film.   Apply to affected area only, once or twice weekly for maintenance. 45 g 2     fluconazole (DIFLUCAN) 150 MG tablet Take 1 tablet (150 mg) by mouth every 3 days 3 tablet 0     loratadine (CLARITIN) 10 MG tablet Take 1 tablet (10 mg) by mouth daily 7 tablet 0     multivitamin, therapeutic with minerals (THERA-VIT-M) TABS Take 1 tablet by mouth daily.       Omega-3 Fatty Acids (FISH OIL PO)        Omeprazole Magnesium (PRILOSEC OTC PO) Take 20 mg by mouth daily        RESTASIS MULTIDOSE 0.05 % ophthalmic emulsion INSTILL 1 DROP INTO BOTH EYES TWICE A DAY  5     Social History   Substance Use Topics     Smoking status: Never Smoker     Smokeless tobacco: Never Used     Alcohol use 0.0 oz/week      Comment: occasional       ROS:  Review of systems negative except as stated above.    OBJECTIVE:  /68 (BP Location: Right arm, Patient Position: Sitting, Cuff Size: Adult Large)  Pulse 86  Temp 97.5  F (36.4  C) (Tympanic)  SpO2 95%  GENERAL APPEARANCE: healthy, alert and no distress  EYES: EOMI,  PERRL, conjunctiva clear  HENT: ear canals and TM's normal.  Nose and mouth without ulcers, erythema or lesions  NECK: supple, nontender, no lymphadenopathy  RESP: scattered rhonchi and mild wheezing late expiratory phase  CV: regular rates and rhythm, normal S1 S2, no murmur noted  NEURO: Normal strength and tone, sensory exam grossly normal,  normal speech and mentation  SKIN: no suspicious lesions or rashes    Results for orders placed or performed in visit on 12/03/18   Rapid strep screen   Result Value Ref Range    Specimen Description Throat     Rapid Strep A Screen       NEGATIVE: No Group A streptococcal antigen detected by immunoassay, await culture report.   Beta strep group A culture   Result Value Ref Range    Specimen Description Throat     Culture Micro No beta hemolytic Streptococcus Group A isolated        Chest x-ray - no infiltrate noted.      assessment/plan:  (R07.0) Throat pain  (primary encounter  diagnosis)  Comment:   Plan: Rapid strep screen, Beta strep group A culture        Viral in nature and OTC med for sx relief, gargles.  Follow-up with PCP as needed      (R05) Cough  Comment:   Plan: XR Chest 2 Views        Chest x-ray clear for infection.      (J45.20) Mild intermittent asthma without complication  Comment:   Plan: predniSONE (DELTASONE) 20 MG tablet         Mild wheezing and rhonchi noted. Short burst of Prednisone and use albuterol as needed. RTC if SOB or chest pain develops.  OTC med for sx relief.  No signs of pneumonia, cardiac issues, pneumothorax.

## 2018-12-03 NOTE — PROGRESS NOTES
"Foot & Ankle Surgery  December 3, 2018    CC: \"toenails on both feet - ingrown and growing strangely\"; R 2nd toe pain    I was asked to see Ana Wyman regarding the chief complaint by:  Dr. JUVENAL Bedoya    HPI:  Pt is a 48 year old female who presents with above complaint.  Problems with the L 3rd and R 2nd toenails.  States the L 3rd toe is growing a new nail and the old nail is growing crooked.  She also states the R 2nd toenail is \"sucking in\" her toe.  She tried  Getting a pedicure but this hasn't been sufficient for discomfort control.  She is also having problems with the R 2nd toe.  She had a \"pin\" placed in the past by an outside doctor and the toe is starting to hurt    ROS:   Pos for CC.  The patient denies current nausea, vomiting, chills, fevers, belly pain, calf pain, chest pain or SOB.  Complete remainder of ROS is otherwise neg.    VITALS:    Vitals:    12/03/18 0938   BP: 110/72   Weight: 328 lb 1.6 oz (148.8 kg)   Height: 5' 4\" (1.626 m)       PMH:    Past Medical History:   Diagnosis Date     Allergic rhinitis, cause unspecified      Cellulitis 2005    2 episodes, one with hospitalization [facility]     DUB (dysfunctional uterine bleeding)     Neg EMB 2012     Esophageal reflux     ongoing     Fibroids      Genital problems     Lichens Schlerosis     GERD (gastroesophageal reflux disease)      Hallux valgus (acquired)      History of steroid therapy     Inhalers, eye drops     Hypersomnia with sleep apnea, unspecified     using CPAP     Kidney stone May 2018    2 stones     Lichen sclerosus 7/14/2011     Lymph edema 2010- present     Lymphedema bilateral with mixed lipedema. 9/30/2015     Lymphedema bilateral with mixed lipedema. 9/30/2015     Morbid obesity (H) 3/19/2013     MIGUELITO on CPAP 3/19/2013    Sleep study in 2004 and 2012       Pneumonia     Years ago - a few times     Pre-diabetes      Sleep apnea      Tendonitis currently     Urinary tract infection     A few times in past 10 years     " Vision disorder [id number]    Dry Eye     Vitamin D deficiency 3/14/2015       SXHX:    Past Surgical History:   Procedure Laterality Date     C NONSPECIFIC PROCEDURE  1994    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     C NONSPECIFIC PROCEDURE  1999,2001    Foot surgeries x 2 (bunionectomy)     C NONSPECIFIC PROCEDURE  2000    hammer toes     COLONOSCOPY  5/15/2013    Procedure: COLONOSCOPY;  Colonoscopy;  Surgeon: Kota Blanco MD;  Location:  GI     GYN SURGERY  2016    Uterine Ablation     lichen sclerosis       ORTHOPEDIC SURGERY       VASCULAR SURGERY  2015    Vienous closure on both legs     vein closure  12/16        MEDS:    Current Outpatient Prescriptions   Medication     albuterol (PROAIR HFA/PROVENTIL HFA/VENTOLIN HFA) 108 (90 Base) MCG/ACT inhaler     clobetasol (TEMOVATE) 0.05 % ointment     fluconazole (DIFLUCAN) 150 MG tablet     loratadine (CLARITIN) 10 MG tablet     multivitamin, therapeutic with minerals (THERA-VIT-M) TABS     Omega-3 Fatty Acids (FISH OIL PO)     Omeprazole Magnesium (PRILOSEC OTC PO)     RESTASIS MULTIDOSE 0.05 % ophthalmic emulsion     No current facility-administered medications for this visit.        ALL:     Allergies   Allergen Reactions     Clindamycin Diarrhea     Codeine Nausea and Vomiting     Shellfish Allergy        FMH:    Family History   Problem Relation Age of Onset     C.A.D. Father 92     CAGB 98     Obesity Father      Cancer Father      stomach Dx age 74     Lipids Father      Hypertension Father      Coronary Artery Disease Father      Cerebrovascular Disease Father      Other Cancer Father      Esophogeal     Diabetes Mother      Type 2     Allergies Mother      Obesity Mother      C.A.D. Mother      triple bypass surgery, 65     Lipids Mother      Hypertension Mother      Coronary Artery Disease Mother      Hyperlipidemia Mother      Colon Polyps Mother      Anesthesia Reaction Mother      Thyroid Disease Mother      C.A.D. Paternal Grandfather 58      fatal     Coronary Artery Disease Paternal Grandfather      Cancer - colorectal Maternal Grandmother 75     Cancer Maternal Grandmother      liver     Hypertension Maternal Grandmother      Colon Cancer Maternal Grandmother      Other Cancer Maternal Grandmother      liver, leaukeamia     Colon Polyps Maternal Grandmother      Cancer - colorectal Paternal Aunt      Allergies Brother      Cancer Paternal Grandmother      stomach     Obesity Paternal Grandmother      Diabetes Paternal Grandmother      Type 2     Asthma Paternal Grandmother      Obesity Brother      Hypertension Brother      Cancer - colorectal Other      cousin  from colon cancer in 30's     Obesity Brother      Coronary Artery Disease Maternal Grandfather      Hypertension Maternal Grandfather        SocHx:    Social History     Social History     Marital status: Single     Spouse name: N/A     Number of children: 0     Years of education: 18     Occupational History     student life [employer]     Social History Main Topics     Smoking status: Never Smoker     Smokeless tobacco: Never Used     Alcohol use 0.0 oz/week      Comment: occasional     Drug use: No     Sexual activity: No     Other Topics Concern     Blood Transfusions No     Weight Concern Yes     20 pound weight loss on weight watchers     Parent/Sibling W/ Cabg, Mi Or Angioplasty Before 65f 55m? No     Social History Narrative    Living arrangements - the patient lives alone.            EXAMINATION:  Gen:   No apparent distress  Neuro:   A&Ox3, no deficits  Psych:    Answering questions appropriately for age and situation with normal affect  Head:    NCAT  Eye:    Visual scanning without deficit  Ear:    Response to auditory stimuli wnl  Lung:    Non-labored breathing on RA noted  Abd:    NTND per patient report  Lymph:    Neg for pitting/non-pitting edema BLE  Vasc:    Pulses palpable, CFT minimally delayed  Neuro:    Light touch sensation intact to all  sensory nerve distributions without paresthesias  Derm:    Multiple nails are mycotic.  L 3rd toe shows a distal, medially-deviating old nail plate, ingrown medially, and a separate underlying nail plate.  R 2nd nail is mycotic, onychocryptosis and ingrown medially.  No wound or SOI.  MSK:    R 2nd toe previous hammertoe surgery.  Mildly tender with palpation.  No acute deformity noted  Calf:    Neg for redness, swelling or tenderness      Imaging:  3 views WB - pin in hallux.  Previous surgery vs DJD 2nd PIPJ.      Assessment:  48 year old female with onychocryptosis L 3rd and R 2nd toe in setting of onychomycosis; R 2nd toe pain sp previous hammertoe surgery with pin placement      Plan:  Discussed etiologies, anatomy and options  1.  Onychocryptosis L 3rd and R 2nd toenails in setting of onychomycosis  -slantbacks performed, leading edges  and removed with nail clipper  -nail care handout dispesned for routine nail care  -discussed avulsion options.  Her nails, baljit R 2nd, is nearly cylindrical and may been a P&A.      2.  Right 2nd toe pain  -personally reviewed imaging with patient  -RICE/NSAID vs tylenol prn  -comfortable shoe gear  -discussed fluoro-guided steroid injection and revision surgery.  She states she'd like to start with the basic treatment plan and will follow up prn for further cares, which is reasonable.     Follow up:  prn or sooner with acute issues      Patient's medical history was reviewed today    Body mass index is 56.32 kg/(m^2).  Weight management plan: Patient was referred to their PCP to discuss a diet and exercise plan.        Wilder Roberts, RONDA FACFAS FACFAOM  Podiatric Foot & Ankle Surgeon  [facility]  [phone]

## 2018-12-03 NOTE — PATIENT INSTRUCTIONS
Thank you for choosing [facility]!    DR. RICKS'S CLINIC LOCATIONS:   MONDAY - [place] TUESDAY - [place]   [address] [facility]  [address]   [place] [address] [place] [address]   [phone]       THURSDAY AM - [place] THURSDAY PM - [place]   [address]   [place] [address] [place], [place] [id number] [phone]       FRIDAY AM - [place] SET UP SURGERY: [phone] [address] APPOINTMENTS: [phone]   [place] [address] BILLING QUESTIONS: [phone] FAX NUMBER: [phone]     Follow Up: as needed    ROUTINE FOOT CARE NURSES  [facility]  [phone] [facility]  [phone]   [facility]  [phone]  [facility] [phone]  [place]   [facility]  [phone]  [facility] [phone]       Please call one of the above companies for insurance coverage, cost, and location information.      FYI: Body Mass Index (BMI)  Many things can cause foot and ankle problems. Foot structure, activity level, foot mechanics and injuries are common causes of pain. One very important issue that often goes unmentioned, is body weight. Extra weight can cause increased stress on muscles, ligaments, bones and tendons. Sometimes just a few extra pounds is all it takes to put one over her/his threshold. Without reducing that stress, it can be difficult to alleviate pain. Some people are uncomfortable addressing this issue, but we feel it is important for you to think about it. As Foot &  Ankle specialists, our job is addressing the lower extremity problem and possible causes. Regarding extra body weight, we encourage patients to discuss diet and weight management plans with their primary care doctors. It is this team approach that gives you the best opportunity for pain relief and getting  you back on your feet.

## 2018-12-04 ENCOUNTER — OFFICE VISIT (OUTPATIENT)
Dept: SURGERY | Facility: CLINIC | Age: 48
End: 2018-12-04
Payer: COMMERCIAL

## 2018-12-04 VITALS
HEIGHT: 64 IN | WEIGHT: 293 LBS | RESPIRATION RATE: 16 BRPM | OXYGEN SATURATION: 96 % | HEART RATE: 94 BPM | SYSTOLIC BLOOD PRESSURE: 128 MMHG | BODY MASS INDEX: 50.02 KG/M2 | DIASTOLIC BLOOD PRESSURE: 71 MMHG

## 2018-12-04 DIAGNOSIS — E66.01 MORBID OBESITY WITH BMI OF 50.0-59.9, ADULT (H): Primary | ICD-10-CM

## 2018-12-04 LAB
BACTERIA SPEC CULT: NORMAL
SPECIMEN SOURCE: NORMAL

## 2018-12-04 PROCEDURE — 99243 OFF/OP CNSLTJ NEW/EST LOW 30: CPT | Performed by: SURGERY

## 2018-12-04 NOTE — MR AVS SNAPSHOT
"              After Visit Summary   12/4/2018    Ana Wyman    MRN: [id number]           Patient Information     Date Of Birth          1970        Visit Information        Provider Department      12/4/2018 10:30 AM Maykel Calvin MD [facility]      Today's Diagnoses     Morbid obesity with BMI of 50.0-59.9, adult (H)    -  1       Follow-ups after your visit        Who to contact     If you have questions or need follow up information about today's clinic visit or your schedule please contact [facility] directly at [phone].  Normal or non-critical lab and imaging results will be communicated to you by MyChart, letter or phone within 4 business days after the clinic has received the results. If you do not hear from us within 7 days, please contact the clinic through [employer] or phone. If you have a critical or abnormal lab result, we will notify you by phone as soon as possible.  Submit refill requests through [employer] or call your pharmacy and they will forward the refill request to us. Please allow 3 business days for your refill to be completed.          Additional Information About Your Visit        MyChart Information     [employer] gives you secure access to your electronic health record. If you see a primary care provider, you can also send messages to your care team and make appointments. If you have questions, please call your primary care clinic.  If you do not have a primary care provider, please call [phone] and they will assist you.        Care EveryWhere ID     This is your Care EveryWhere ID. This could be used by other organizations to access your [facility] records  [id number]        Your Vitals Were     Pulse Respirations Height Pulse Oximetry BMI (Body Mass Index)       94 16 5' 4\" (1.626 m) 96% 55.73 kg/m2        Blood Pressure from Last 3 Encounters: "   12/04/18 128/71   12/03/18 108/68   12/03/18 110/72    Weight from Last 3 Encounters:   12/04/18 324 lb 11.2 oz (147.3 kg)   12/03/18 328 lb 1.6 oz (148.8 kg)   11/26/18 328 lb 1.6 oz (148.8 kg)              Today, you had the following     No orders found for display       Primary Care Provider Office Phone # Fax #    Kelly Bedoya [phone] [address] [place] [address]        Equal Access to Services     : Hadii aad ku hadasho Soomaali, waaxda luqadaha, qaybta kaalmada cinda, yoselin isbell . So [facility] [phone].    ATENCIÓN: Si habla español, tiene a tejada disposición servicios gratuitos de asistencia lingüística. [facility] [phone].    We comply with applicable federal civil rights laws and [place] laws. We do not discriminate on the basis of race, color, national origin, age, disability, sex, sexual orientation, or gender identity.            Thank you!     Thank you for choosing [facility]  for your care. Our goal is always to provide you with excellent care. Hearing back from our patients is one way we can continue to improve our services. Please take a few minutes to complete the written survey that you may receive in the mail after your visit with us. Thank you!             Your Updated Medication List - Protect others around you: Learn how to safely use, store and throw away your medicines at www.disposemymeds.org.          This list is accurate as of 12/4/18 11:06 AM.  Always use your most recent med list.                   Brand Name Dispense Instructions for use Diagnosis    albuterol 108 (90 Base) MCG/ACT inhaler    PROAIR HFA/PROVENTIL HFA/VENTOLIN HFA    2 Inhaler    Inhale 2 puffs into the lungs every 6 hours as needed for shortness of breath / dyspnea or wheezing    Mild intermittent asthma without complication       clobetasol 0.05 % external ointment    TEMOVATE    45  g    Apply clobetasol  0.05 percent ointment, sparingly (a dot 3 mm wide) in a thin film.  Apply to affected area only, once or twice weekly for maintenance.    Lichen sclerosus       FISH OIL PO           fluconazole 150 MG tablet    DIFLUCAN    3 tablet    Take 1 tablet (150 mg) by mouth every 3 days    Yeast infection of the skin       loratadine 10 MG tablet    CLARITIN    7 tablet    Take 1 tablet (10 mg) by mouth daily        multivitamin w/minerals tablet      Take 1 tablet by mouth daily.        predniSONE 20 MG tablet    DELTASONE    12 tablet    Take 2 tablets (40 mg) by mouth daily for 6 days    Mild intermittent asthma without complication       PRILOSEC OTC PO      Take 20 mg by mouth daily        RESTASIS MULTIDOSE 0.05 % ophthalmic emulsion   Generic drug:  cycloSPORINE      INSTILL 1 DROP INTO BOTH EYES TWICE A DAY

## 2018-12-04 NOTE — PROGRESS NOTES
"Dear Dr. Bedoya, Kelly Curiel,      Referring provider: Dr. Bedoya.  I was asked to see the patient regarding obesity by the referring provider above.    I had the pleasure of meeting with your patient Ana Wyman in our weight loss surgery office.  This patient is a 48 year old female who has been undergoing our thorough preoperative screening process in anticipation of potential bariatric surgery.    At initial evaluation we recorded Ana Wyman's Initial BMI: 58.2 and Initial Weight: 339 lb (153.8 kg).  The patient has been unsuccessful with other methods of permanent weight loss and suffers from multiple weight related medical conditions.  Due to lack of success in achieving weight loss through other methods, she is interested in undergoing bariatric surgery.    PREVIOUS WEIGHT LOSS ATTEMPTS:     7/26/2018   I have tried the following methods to lose weight Watching portions or calories, Exercise, Weight Watchers, Atkins type diet (low carb/high protein), Pre packaged meals ex: Nutrisystem, Prescription Medications, Physician directed program       CO-MORBIDITIES OF OBESITY INCLUDE:     7/26/2018   I have the following co-morbidities associated with obesity: Pre-Diabetes, Sleep Apnea, Lower Extremity Edema, GERD (Reflux), Fatty Liver   Do you use a CPAP? Yes   Are you taking daily medication for heartburn, acid reflux, or GERD (acid reflux disease)? Yes       VITALS:  /71 (BP Location: Right arm, Patient Position: Sitting, Cuff Size: Adult Large)  Pulse 94  Resp 16  Ht 5' 4\" (1.626 m)  Wt 324 lb 11.2 oz (147.3 kg)  SpO2 96%  BMI 55.73 kg/m2    PE:  GENERAL: Alert and oriented x3. NAD  HEENT exam: Sclerae not icteric. Hearing good. Head normocephalic and atraumatic. Thyroid not enlarged  CARDIOVASCULAR: No JVD  RESPIRATORY: Breathing unlabored  GASTROINTESTINAL: Obese, no obvious hernia  LOWER EXTREMITIES: no deformities, no edema, pulses OK  MUSCULOSKELETAL: Normal gait, Moves all 4 " extremities equal and strong  NEUROLOGIC: no gross defect  SKIN: warm and dry to touch     In summary, she has undergone an appropriate medical evaluation, dietitian evaluation, as well as psychologic screening. The patient appears to be an appropriate candidate for bariatric surgery.    In the office today, I discussed the laparoscopic gastric bypass surgery.  Risks, benefits and anticipated outcomes were outlined including the risk of death, staple line leak (1-2%), PE, DVT, ulcer, worsening GERD, N/V, stricture, hernia, wound infection, weight regain, and vitamin deficiencies. This patient has a good chance of sustaining permanent weight loss due to this procedure.  This should also allow improvement if not resolution of his/her weight related medical conditions.    At present we are going to present your patient's file for prior authorization to insurance.  Pending prior authorization, I anticipate a surgical date in the near future.  We will keep you updated on any progress.  If you have questions regarding care please feel free to contact me.  Total time spent in the clinic was 20 minutes with greater than 50% in face-to-face consultation.        Sincerely,    Maykel Calvin    Please route or send letter to:  Primary Care Provider (PCP) and Referring Provider

## 2018-12-21 ENCOUNTER — TELEPHONE (OUTPATIENT)
Dept: SURGERY | Facility: CLINIC | Age: 48
End: 2018-12-21

## 2018-12-21 NOTE — TELEPHONE ENCOUNTER
Reason for call:  Other   Patient called regarding (reason for call): call back  Additional comments: Patient would like to schedule surgery. She recieved authorization from her insurance.    Phone number to reach patient:  Cell number on file:    Telephone Information:   Mobile [phone]       Best Time:  Before Noon if possible    Can we leave a detailed message on this number?  YES

## 2018-12-28 ENCOUNTER — TELEPHONE (OUTPATIENT)
Dept: PEDIATRICS | Facility: CLINIC | Age: 48
End: 2018-12-28

## 2018-12-28 NOTE — TELEPHONE ENCOUNTER
Yes, ok to use a same day spot earlier in January.      Kelly Bedoya MD  Internal Medicine - Pediatrics

## 2018-12-28 NOTE — TELEPHONE ENCOUNTER
Pt is scheduled for bariatric surgery on 1/28/18.  She is wondering if  can fit her in for a pre-op.    Please advise-    Anny Carson RN

## 2019-01-03 ENCOUNTER — ALLIED HEALTH/NURSE VISIT (OUTPATIENT)
Dept: SURGERY | Facility: CLINIC | Age: 49
End: 2019-01-03
Payer: COMMERCIAL

## 2019-01-10 ENCOUNTER — MYC MEDICAL ADVICE (OUTPATIENT)
Dept: PODIATRY | Facility: CLINIC | Age: 49
End: 2019-01-10

## 2019-01-14 ENCOUNTER — OFFICE VISIT (OUTPATIENT)
Dept: PEDIATRICS | Facility: CLINIC | Age: 49
End: 2019-01-14
Payer: COMMERCIAL

## 2019-01-14 VITALS
WEIGHT: 293 LBS | OXYGEN SATURATION: 96 % | HEART RATE: 89 BPM | HEIGHT: 64 IN | BODY MASS INDEX: 50.02 KG/M2 | DIASTOLIC BLOOD PRESSURE: 84 MMHG | SYSTOLIC BLOOD PRESSURE: 132 MMHG | TEMPERATURE: 99.2 F

## 2019-01-14 DIAGNOSIS — J45.20 MILD INTERMITTENT ASTHMA WITHOUT COMPLICATION: ICD-10-CM

## 2019-01-14 DIAGNOSIS — Z01.818 PREOP GENERAL PHYSICAL EXAM: Primary | ICD-10-CM

## 2019-01-14 DIAGNOSIS — G47.33 OSA ON CPAP: ICD-10-CM

## 2019-01-14 DIAGNOSIS — E66.01 MORBID OBESITY (H): ICD-10-CM

## 2019-01-14 PROCEDURE — 93000 ELECTROCARDIOGRAM COMPLETE: CPT | Performed by: PEDIATRICS

## 2019-01-14 PROCEDURE — 99214 OFFICE O/P EST MOD 30 MIN: CPT | Performed by: PEDIATRICS

## 2019-01-14 ASSESSMENT — MIFFLIN-ST. JEOR: SCORE: 2088.73

## 2019-01-14 NOTE — PROGRESS NOTES
[facility]  [address]  [address]  [place] [address]  [phone]  Dept: [phone]    PRE-OP EVALUATION:  Today's date: 2019    Ana Wyman (: 1970) presents for pre-operative evaluation assessment as requested by Dr. Calvin.  She requires evaluation and anesthesia risk assessment prior to undergoing surgery/procedure for treatment of LAPAROSCOPIC GASTRIC BYPASS POSSIBLE LAPAROSOPIC CHOLECYSTECTOMY  .    Fax number for surgical facility: within system   Primary Physician: Kelly Bedoya  Type of Anesthesia Anticipated: General    Patient has a Health Care Directive or Living Will:  NO    Preop Questions 2019   Who is doing your surgery? Dr Calvin   [facility]   Date of Surgery/Procedure: 19   Facility or Hospital where procedure/surgery will be performed: [facility]   1.  Do you have a history of Heart attack, stroke, stent, coronary bypass surgery, or other heart surgery? No   2.  Do you ever have any pain or discomfort in your chest? No   3.  Do you have a history of  Heart Failure? No   4.   Are you troubled by shortness of breath when:  walking on a level surface, or up a slight hill, or at night? No   5.  Do you currently have a cold, bronchitis or other respiratory infection? UNKNOWN - recent URI   6.  Do you have a cough, shortness of breath, or wheezing? No   7.  Do you sometimes get pains in the calves of your legs when you walk? No   8. Do you or anyone in your family have previous history of blood clots? No    9.  Do you or does anyone in your family have a serious bleeding problem such as prolonged bleeding following surgeries or cuts? No   10. Have you ever had problems with anemia or been told to take iron pills? No   11. Have you had any abnormal blood loss such as black, tarry or bloody stools, or abnormal vaginal bleeding? No   12. Have you ever had a blood  transfusion? No   13. Have you or any of your relatives ever had problems with anesthesia? YES - pt's mother and father - sensitive to anesthesia, patient gets PONV   14. Do you have sleep apnea, excessive snoring or daytime drowsiness? YES - Sleep apnea - cpap machine    15. Do you have any prosthetic heart valves? No   16. Do you have prosthetic joints? No   17. Is there any chance that you may be pregnant? No         HPI:     HPI related to upcoming procedure: patient with obesity planning geri-en-Y gastric bypass surgery and possible cholecystectomy.    Intermittent RUQ pain, no pain at present, but has had some recent episodes.    MIGUELITO - on CPAP and plans to bring her own machine to the hospital.    Reflux - controlled on current medications    Asthma - mild, intermittent - well controlled without current exacerbation.      See problem list for active medical problems.  Problems all longstanding and stable, except as noted/documented.  See ROS for pertinent symptoms related to these conditions.                                                                                                                                                          .    MEDICAL HISTORY:     Patient Active Problem List    Diagnosis Date Noted     Fatty liver 07/27/2018     Priority: Medium     Mild intermittent asthma without complication 06/11/2018     Priority: Medium     Lymphedema bilateral with mixed lipedema. 09/30/2015     Priority: Medium     Baker's cyst of knee 09/03/2015     Priority: Medium     Pre-diabetes 03/24/2015     Priority: Medium     Acanthosis nigricans 03/24/2015     Priority: Medium     Cutaneous skin tags 03/24/2015     Priority: Medium     Vitamin D deficiency 03/14/2015     Priority: Medium     Morbid obesity (H) 03/19/2013     Priority: Medium     MIGUELITO on CPAP 03/19/2013     Priority: Medium     Sleep study in 2004 and 2012         Uterine fibroid 08/02/2012     Priority: Medium     Lichen sclerosus  07/14/2011     Priority: Medium     Elevated blood pressure reading without diagnosis of hypertension 05/23/2011     Priority: Medium     Family history of malignant neoplasm of genital organ, other 06/11/2007     Priority: Medium     FAMILY HX GI MALIGNANCY 05/31/2007     Priority: Medium     Hypersomnia with sleep apnea 07/06/2005     Priority: Medium     Problem list name updated by automated process. Provider to review       Esophageal reflux 09/27/2004     Priority: Medium     Allergic state 09/27/2004     Priority: Medium     Problem list name updated by automated process. Provider to review        Past Medical History:   Diagnosis Date     Allergic rhinitis, cause unspecified      Cellulitis 2005    2 episodes, one with hospitalization [facility]     DUB (dysfunctional uterine bleeding)     Neg EMB 2012     Esophageal reflux     ongoing     Fibroids      Genital problems     Lichens Schlerosis     GERD (gastroesophageal reflux disease)      Hallux valgus (acquired)      History of steroid therapy     Inhalers, eye drops     Hypersomnia with sleep apnea, unspecified     using CPAP     Kidney stone May 2018    2 stones     Lichen sclerosus 7/14/2011     Lymph edema 2010- present     Lymphedema bilateral with mixed lipedema. 9/30/2015     Lymphedema bilateral with mixed lipedema. 9/30/2015     Morbid obesity (H) 3/19/2013     MIGUELITO on CPAP 3/19/2013    Sleep study in 2004 and 2012       Pneumonia     Years ago - a few times     Pre-diabetes      Sleep apnea      Tendonitis currently     Urinary tract infection     A few times in past 10 years     Vision disorder [id number]    Dry Eye     Vitamin D deficiency 3/14/2015     Past Surgical History:   Procedure Laterality Date     C NONSPECIFIC PROCEDURE  1994    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     C NONSPECIFIC PROCEDURE  1999,2001    Foot surgeries x 2 (bunionectomy)     C NONSPECIFIC PROCEDURE  2000    hammer toes     COLONOSCOPY  5/15/2013    Procedure:  "COLONOSCOPY;  Colonoscopy;  Surgeon: Kota Blanco MD;  Location:  GI     GYN SURGERY  2016    Uterine Ablation     lichen sclerosis       ORTHOPEDIC SURGERY       VASCULAR SURGERY  2015    Vienous closure on both legs     vein closure  12/16     Current Outpatient Medications   Medication Sig Dispense Refill     albuterol (PROAIR HFA/PROVENTIL HFA/VENTOLIN HFA) 108 (90 Base) MCG/ACT inhaler Inhale 2 puffs into the lungs every 6 hours as needed for shortness of breath / dyspnea or wheezing 2 Inhaler 2     clobetasol (TEMOVATE) 0.05 % ointment Apply clobetasol  0.05 percent ointment, sparingly (a dot 3 mm wide) in a thin film.  Apply to affected area only, once or twice weekly for maintenance. 45 g 2     multivitamin, therapeutic with minerals (THERA-VIT-M) TABS Take 1 tablet by mouth daily.       Omeprazole Magnesium (PRILOSEC OTC PO) Take 20 mg by mouth daily        loratadine (CLARITIN) 10 MG tablet Take 1 tablet (10 mg) by mouth daily 7 tablet 0     Omega-3 Fatty Acids (FISH OIL PO)        OTC products: herbals and vitamins - fish oil - stopped today    Allergies   Allergen Reactions     Clindamycin Diarrhea     Codeine Nausea and Vomiting     Shellfish Allergy       Latex Allergy: NO    Social History     Tobacco Use     Smoking status: Never Smoker     Smokeless tobacco: Never Used   Substance Use Topics     Alcohol use: Yes     Alcohol/week: 0.0 oz     Comment: occasional     History   Drug Use No       REVIEW OF SYSTEMS:   Constitutional, neuro, ENT, endocrine, pulmonary, cardiac, gastrointestinal, genitourinary, musculoskeletal, integument and psychiatric systems are negative, except as otherwise noted.    EXAM:   /84 (BP Location: Right arm, Patient Position: Right side, Cuff Size: Adult Large)   Pulse 89   Temp 99.2  F (37.3  C) (Tympanic)   Ht 1.626 m (5' 4\")   Wt 147.9 kg (326 lb)   SpO2 96%   BMI 55.96 kg/m      GENERAL APPEARANCE: healthy, alert and no distress     EYES: EOMI, PERRL    "  HENT: ear canals and TM's normal and nose and mouth without ulcers or lesions     NECK: no adenopathy, no asymmetry, masses, or scars and thyroid normal to palpation     RESP: lungs clear to auscultation - no rales, rhonchi or wheezes     CV: regular rates and rhythm, normal S1 S2, no S3 or S4 and no murmur, click or rub     ABDOMEN: obese, soft, nontender, no HSM or masses and bowel sounds normal     MS: extremities normal- no gross deformities noted, no evidence of inflammation in joints, FROM in all extremities.     SKIN: no suspicious lesions or rashes     NEURO: Normal strength and tone, sensory exam grossly normal, mentation intact and speech normal     PSYCH: mentation appears normal. and affect normal/bright     LYMPHATICS: No cervical adenopathy    DIAGNOSTICS:   EKG: appears normal, NSR, normal axis, normal intervals, no acute ST/T changes c/w ischemia, no LVH by voltage criteria    Recent Labs   Lab Test 08/22/18  1845 07/31/18  1022  10/27/17  1803  03/12/15  0925   HGB 14.5 13.7   < > 13.6   < > 13.3    294   < > 325   < > 345   INR  --   --   --  0.98  --   --     139   < > 139   < > 141   POTASSIUM 3.9 4.1   < > 3.7   < > 4.4   CR 0.70 0.78   < > 0.71   < > 0.75   A1C  --  6.0*  --   --   --  6.0    < > = values in this interval not displayed.        IMPRESSION:   Reason for surgery/procedure: geri-en-Y gastric bypass to treat morbid obesity, possible cholecystectomy     The proposed surgical procedure is considered INTERMEDIATE risk.    REVISED CARDIAC RISK INDEX  The patient has the following serious cardiovascular risks for perioperative complications such as (MI, PE, VFib and 3  AV Block):  No serious cardiac risks  INTERPRETATION: 0 risks: Class I (very low risk - 0.4% complication rate)    The patient has the following additional risks for perioperative complications:  Morbid obesity  MIGUELITO      ICD-10-CM    1. Preop general physical exam Z01.818 EKG 12-lead complete w/read - Clinics    2. Morbid obesity (H) E66.01    3. MIGUELITO on CPAP G47.33     Z99.89    4. Mild intermittent asthma without complication J45.20        RECOMMENDATIONS:       Cardiovascular Risk  Performs 4 METs exercise without symptoms (Light housework (dusting, washing dishes), Climb a flight of stairs and Walk on level ground at 15 minutes per mile (4 miles/hour)) .       --Patient is to hold all scheduled medications on the day of surgery    Anticoagulant or Antiplatelet Medication Use  NSAIDS: hold 7-10 days prior to procedure and after surgery        APPROVAL GIVEN to proceed with proposed procedure, without further diagnostic evaluation       Signed Electronically by: Kelly Bedoya MD    Copy of this evaluation report is provided to requesting physician.    [facility] Preop Guidelines    Revised Cardiac Risk Index

## 2019-01-14 NOTE — H&P (VIEW-ONLY)
[facility]  [address]  [address]  [place] [address]  [phone]  Dept: [phone]    PRE-OP EVALUATION:  Today's date: 2019    Ana Wyman (: 1970) presents for pre-operative evaluation assessment as requested by Dr. Calvin.  She requires evaluation and anesthesia risk assessment prior to undergoing surgery/procedure for treatment of LAPAROSCOPIC GASTRIC BYPASS POSSIBLE LAPAROSOPIC CHOLECYSTECTOMY  .    Fax number for surgical facility: within system   Primary Physician: Kelly Bedoya  Type of Anesthesia Anticipated: General    Patient has a Health Care Directive or Living Will:  NO    Preop Questions 2019   Who is doing your surgery? Dr Calvin   [facility]   Date of Surgery/Procedure: 19   Facility or Hospital where procedure/surgery will be performed: [facility]   1.  Do you have a history of Heart attack, stroke, stent, coronary bypass surgery, or other heart surgery? No   2.  Do you ever have any pain or discomfort in your chest? No   3.  Do you have a history of  Heart Failure? No   4.   Are you troubled by shortness of breath when:  walking on a level surface, or up a slight hill, or at night? No   5.  Do you currently have a cold, bronchitis or other respiratory infection? UNKNOWN - recent URI   6.  Do you have a cough, shortness of breath, or wheezing? No   7.  Do you sometimes get pains in the calves of your legs when you walk? No   8. Do you or anyone in your family have previous history of blood clots? No    9.  Do you or does anyone in your family have a serious bleeding problem such as prolonged bleeding following surgeries or cuts? No   10. Have you ever had problems with anemia or been told to take iron pills? No   11. Have you had any abnormal blood loss such as black, tarry or bloody stools, or abnormal vaginal bleeding? No   12. Have you ever had a blood  transfusion? No   13. Have you or any of your relatives ever had problems with anesthesia? YES - pt's mother and father - sensitive to anesthesia, patient gets PONV   14. Do you have sleep apnea, excessive snoring or daytime drowsiness? YES - Sleep apnea - cpap machine    15. Do you have any prosthetic heart valves? No   16. Do you have prosthetic joints? No   17. Is there any chance that you may be pregnant? No         HPI:     HPI related to upcoming procedure: patient with obesity planning geri-en-Y gastric bypass surgery and possible cholecystectomy.    Intermittent RUQ pain, no pain at present, but has had some recent episodes.    MIGUELITO - on CPAP and plans to bring her own machine to the hospital.    Reflux - controlled on current medications    Asthma - mild, intermittent - well controlled without current exacerbation.      See problem list for active medical problems.  Problems all longstanding and stable, except as noted/documented.  See ROS for pertinent symptoms related to these conditions.                                                                                                                                                          .    MEDICAL HISTORY:     Patient Active Problem List    Diagnosis Date Noted     Fatty liver 07/27/2018     Priority: Medium     Mild intermittent asthma without complication 06/11/2018     Priority: Medium     Lymphedema bilateral with mixed lipedema. 09/30/2015     Priority: Medium     Baker's cyst of knee 09/03/2015     Priority: Medium     Pre-diabetes 03/24/2015     Priority: Medium     Acanthosis nigricans 03/24/2015     Priority: Medium     Cutaneous skin tags 03/24/2015     Priority: Medium     Vitamin D deficiency 03/14/2015     Priority: Medium     Morbid obesity (H) 03/19/2013     Priority: Medium     MIGUELITO on CPAP 03/19/2013     Priority: Medium     Sleep study in 2004 and 2012         Uterine fibroid 08/02/2012     Priority: Medium     Lichen sclerosus  07/14/2011     Priority: Medium     Elevated blood pressure reading without diagnosis of hypertension 05/23/2011     Priority: Medium     Family history of malignant neoplasm of genital organ, other 06/11/2007     Priority: Medium     FAMILY HX GI MALIGNANCY 05/31/2007     Priority: Medium     Hypersomnia with sleep apnea 07/06/2005     Priority: Medium     Problem list name updated by automated process. Provider to review       Esophageal reflux 09/27/2004     Priority: Medium     Allergic state 09/27/2004     Priority: Medium     Problem list name updated by automated process. Provider to review        Past Medical History:   Diagnosis Date     Allergic rhinitis, cause unspecified      Cellulitis 2005    2 episodes, one with hospitalization [facility]     DUB (dysfunctional uterine bleeding)     Neg EMB 2012     Esophageal reflux     ongoing     Fibroids      Genital problems     Lichens Schlerosis     GERD (gastroesophageal reflux disease)      Hallux valgus (acquired)      History of steroid therapy     Inhalers, eye drops     Hypersomnia with sleep apnea, unspecified     using CPAP     Kidney stone May 2018    2 stones     Lichen sclerosus 7/14/2011     Lymph edema 2010- present     Lymphedema bilateral with mixed lipedema. 9/30/2015     Lymphedema bilateral with mixed lipedema. 9/30/2015     Morbid obesity (H) 3/19/2013     MIGUELITO on CPAP 3/19/2013    Sleep study in 2004 and 2012       Pneumonia     Years ago - a few times     Pre-diabetes      Sleep apnea      Tendonitis currently     Urinary tract infection     A few times in past 10 years     Vision disorder [id number]    Dry Eye     Vitamin D deficiency 3/14/2015     Past Surgical History:   Procedure Laterality Date     C NONSPECIFIC PROCEDURE  1994    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     C NONSPECIFIC PROCEDURE  1999,2001    Foot surgeries x 2 (bunionectomy)     C NONSPECIFIC PROCEDURE  2000    hammer toes     COLONOSCOPY  5/15/2013    Procedure:  "COLONOSCOPY;  Colonoscopy;  Surgeon: Kota Blanco MD;  Location:  GI     GYN SURGERY  2016    Uterine Ablation     lichen sclerosis       ORTHOPEDIC SURGERY       VASCULAR SURGERY  2015    Vienous closure on both legs     vein closure  12/16     Current Outpatient Medications   Medication Sig Dispense Refill     albuterol (PROAIR HFA/PROVENTIL HFA/VENTOLIN HFA) 108 (90 Base) MCG/ACT inhaler Inhale 2 puffs into the lungs every 6 hours as needed for shortness of breath / dyspnea or wheezing 2 Inhaler 2     clobetasol (TEMOVATE) 0.05 % ointment Apply clobetasol  0.05 percent ointment, sparingly (a dot 3 mm wide) in a thin film.  Apply to affected area only, once or twice weekly for maintenance. 45 g 2     multivitamin, therapeutic with minerals (THERA-VIT-M) TABS Take 1 tablet by mouth daily.       Omeprazole Magnesium (PRILOSEC OTC PO) Take 20 mg by mouth daily        loratadine (CLARITIN) 10 MG tablet Take 1 tablet (10 mg) by mouth daily 7 tablet 0     Omega-3 Fatty Acids (FISH OIL PO)        OTC products: herbals and vitamins - fish oil - stopped today    Allergies   Allergen Reactions     Clindamycin Diarrhea     Codeine Nausea and Vomiting     Shellfish Allergy       Latex Allergy: NO    Social History     Tobacco Use     Smoking status: Never Smoker     Smokeless tobacco: Never Used   Substance Use Topics     Alcohol use: Yes     Alcohol/week: 0.0 oz     Comment: occasional     History   Drug Use No       REVIEW OF SYSTEMS:   Constitutional, neuro, ENT, endocrine, pulmonary, cardiac, gastrointestinal, genitourinary, musculoskeletal, integument and psychiatric systems are negative, except as otherwise noted.    EXAM:   /84 (BP Location: Right arm, Patient Position: Right side, Cuff Size: Adult Large)   Pulse 89   Temp 99.2  F (37.3  C) (Tympanic)   Ht 1.626 m (5' 4\")   Wt 147.9 kg (326 lb)   SpO2 96%   BMI 55.96 kg/m      GENERAL APPEARANCE: healthy, alert and no distress     EYES: EOMI, PERRL    "  HENT: ear canals and TM's normal and nose and mouth without ulcers or lesions     NECK: no adenopathy, no asymmetry, masses, or scars and thyroid normal to palpation     RESP: lungs clear to auscultation - no rales, rhonchi or wheezes     CV: regular rates and rhythm, normal S1 S2, no S3 or S4 and no murmur, click or rub     ABDOMEN: obese, soft, nontender, no HSM or masses and bowel sounds normal     MS: extremities normal- no gross deformities noted, no evidence of inflammation in joints, FROM in all extremities.     SKIN: no suspicious lesions or rashes     NEURO: Normal strength and tone, sensory exam grossly normal, mentation intact and speech normal     PSYCH: mentation appears normal. and affect normal/bright     LYMPHATICS: No cervical adenopathy    DIAGNOSTICS:   EKG: appears normal, NSR, normal axis, normal intervals, no acute ST/T changes c/w ischemia, no LVH by voltage criteria    Recent Labs   Lab Test 08/22/18  1845 07/31/18  1022  10/27/17  1803  03/12/15  0925   HGB 14.5 13.7   < > 13.6   < > 13.3    294   < > 325   < > 345   INR  --   --   --  0.98  --   --     139   < > 139   < > 141   POTASSIUM 3.9 4.1   < > 3.7   < > 4.4   CR 0.70 0.78   < > 0.71   < > 0.75   A1C  --  6.0*  --   --   --  6.0    < > = values in this interval not displayed.        IMPRESSION:   Reason for surgery/procedure: geri-en-Y gastric bypass to treat morbid obesity, possible cholecystectomy     The proposed surgical procedure is considered INTERMEDIATE risk.    REVISED CARDIAC RISK INDEX  The patient has the following serious cardiovascular risks for perioperative complications such as (MI, PE, VFib and 3  AV Block):  No serious cardiac risks  INTERPRETATION: 0 risks: Class I (very low risk - 0.4% complication rate)    The patient has the following additional risks for perioperative complications:  Morbid obesity  MIGUELITO      ICD-10-CM    1. Preop general physical exam Z01.818 EKG 12-lead complete w/read - Clinics    2. Morbid obesity (H) E66.01    3. MIGUELITO on CPAP G47.33     Z99.89    4. Mild intermittent asthma without complication J45.20        RECOMMENDATIONS:       Cardiovascular Risk  Performs 4 METs exercise without symptoms (Light housework (dusting, washing dishes), Climb a flight of stairs and Walk on level ground at 15 minutes per mile (4 miles/hour)) .       --Patient is to hold all scheduled medications on the day of surgery    Anticoagulant or Antiplatelet Medication Use  NSAIDS: hold 7-10 days prior to procedure and after surgery        APPROVAL GIVEN to proceed with proposed procedure, without further diagnostic evaluation       Signed Electronically by: Kelly Bedoya MD    Copy of this evaluation report is provided to requesting physician.    [place] Preop Guidelines    Revised Cardiac Risk Index

## 2019-01-15 ASSESSMENT — ASTHMA QUESTIONNAIRES: ACT_TOTALSCORE: 23

## 2019-01-25 ENCOUNTER — TELEPHONE (OUTPATIENT)
Dept: SURGERY | Facility: CLINIC | Age: 49
End: 2019-01-25

## 2019-01-25 NOTE — TELEPHONE ENCOUNTER
Patient called and asked a question about whether or not she would need to buy iron to take PO.  She is scheduled for surgery in 3 days and had an ablation over a year ago.    After reviewing her last hemoglobin informed her that as long as she is taking her 2 MVI with iron she can hold off buying additional iron until her levels are checked at hr 3 month visit.    Corinne Webb MS, PAAbdullahiC

## 2019-01-28 ENCOUNTER — APPOINTMENT (OUTPATIENT)
Dept: SURGERY | Facility: PHYSICIAN GROUP | Age: 49
End: 2019-01-28
Payer: COMMERCIAL

## 2019-01-28 ENCOUNTER — ANESTHESIA EVENT (OUTPATIENT)
Dept: SURGERY | Facility: CLINIC | Age: 49
End: 2019-01-28
Payer: COMMERCIAL

## 2019-01-28 ENCOUNTER — ANESTHESIA (OUTPATIENT)
Dept: SURGERY | Facility: CLINIC | Age: 49
End: 2019-01-28
Payer: COMMERCIAL

## 2019-01-28 ENCOUNTER — HOSPITAL ENCOUNTER (INPATIENT)
Facility: CLINIC | Age: 49
LOS: 2 days | Discharge: HOME | End: 2019-01-30
Attending: SURGERY | Admitting: SURGERY
Payer: COMMERCIAL

## 2019-01-28 DIAGNOSIS — G89.18 ACUTE POST-OPERATIVE PAIN: ICD-10-CM

## 2019-01-28 DIAGNOSIS — E66.01 MORBID OBESITY (H): Primary | ICD-10-CM

## 2019-01-28 LAB
CREAT SERPL-MCNC: 0.63 MG/DL (ref 0.52–1.04)
GFR SERPL CREATININE-BSD FRML MDRD: >90 ML/MIN/{1.73_M2}
HCG UR QL: NEGATIVE
HGB BLD-MCNC: 14.1 G/DL (ref 11.7–15.7)
PLATELET # BLD AUTO: 253 10E9/L (ref 150–450)

## 2019-01-28 PROCEDURE — 25000128 H RX IP 250 OP 636: Performed by: PHYSICIAN ASSISTANT

## 2019-01-28 PROCEDURE — 81025 URINE PREGNANCY TEST: CPT | Performed by: ANESTHESIOLOGY

## 2019-01-28 PROCEDURE — 37000009 ZZH ANESTHESIA TECHNICAL FEE, EACH ADDTL 15 MIN: Performed by: SURGERY

## 2019-01-28 PROCEDURE — 25000128 H RX IP 250 OP 636: Performed by: NURSE ANESTHETIST, CERTIFIED REGISTERED

## 2019-01-28 PROCEDURE — 36415 COLL VENOUS BLD VENIPUNCTURE: CPT | Performed by: ANESTHESIOLOGY

## 2019-01-28 PROCEDURE — 25000128 H RX IP 250 OP 636: Performed by: ANESTHESIOLOGY

## 2019-01-28 PROCEDURE — 25000125 ZZHC RX 250: Performed by: NURSE ANESTHETIST, CERTIFIED REGISTERED

## 2019-01-28 PROCEDURE — 43644 LAP GASTRIC BYPASS/ROUX-EN-Y: CPT | Mod: AS | Performed by: PHYSICIAN ASSISTANT

## 2019-01-28 PROCEDURE — 36000063 ZZH SURGERY LEVEL 4 EA 15 ADDTL MIN: Performed by: SURGERY

## 2019-01-28 PROCEDURE — 25000128 H RX IP 250 OP 636: Performed by: SURGERY

## 2019-01-28 PROCEDURE — 37000008 ZZH ANESTHESIA TECHNICAL FEE, 1ST 30 MIN: Performed by: SURGERY

## 2019-01-28 PROCEDURE — 85049 AUTOMATED PLATELET COUNT: CPT | Performed by: PHYSICIAN ASSISTANT

## 2019-01-28 PROCEDURE — 25000125 ZZHC RX 250: Performed by: SURGERY

## 2019-01-28 PROCEDURE — 36415 COLL VENOUS BLD VENIPUNCTURE: CPT | Performed by: PHYSICIAN ASSISTANT

## 2019-01-28 PROCEDURE — 71000013 ZZH RECOVERY PHASE 1 LEVEL 1 EA ADDTL HR: Performed by: SURGERY

## 2019-01-28 PROCEDURE — 40000169 ZZH STATISTIC PRE-PROCEDURE ASSESSMENT I: Performed by: SURGERY

## 2019-01-28 PROCEDURE — 25000125 ZZHC RX 250: Performed by: PHYSICIAN ASSISTANT

## 2019-01-28 PROCEDURE — 82565 ASSAY OF CREATININE: CPT | Performed by: PHYSICIAN ASSISTANT

## 2019-01-28 PROCEDURE — 12000000 ZZH R&B MED SURG/OB

## 2019-01-28 PROCEDURE — 0D164ZA BYPASS STOMACH TO JEJUNUM, PERCUTANEOUS ENDOSCOPIC APPROACH: ICD-10-PCS | Performed by: SURGERY

## 2019-01-28 PROCEDURE — 25800025 ZZH RX 258: Performed by: SURGERY

## 2019-01-28 PROCEDURE — 27210794 ZZH OR GENERAL SUPPLY STERILE: Performed by: SURGERY

## 2019-01-28 PROCEDURE — 71000012 ZZH RECOVERY PHASE 1 LEVEL 1 FIRST HR: Performed by: SURGERY

## 2019-01-28 PROCEDURE — 36000093 ZZH SURGERY LEVEL 4 1ST 30 MIN: Performed by: SURGERY

## 2019-01-28 PROCEDURE — 85018 HEMOGLOBIN: CPT | Performed by: ANESTHESIOLOGY

## 2019-01-28 PROCEDURE — 25000566 ZZH SEVOFLURANE, EA 15 MIN: Performed by: SURGERY

## 2019-01-28 PROCEDURE — 43644 LAP GASTRIC BYPASS/ROUX-EN-Y: CPT | Performed by: SURGERY

## 2019-01-28 RX ORDER — SODIUM CHLORIDE, SODIUM LACTATE, POTASSIUM CHLORIDE, CALCIUM CHLORIDE 600; 310; 30; 20 MG/100ML; MG/100ML; MG/100ML; MG/100ML
INJECTION, SOLUTION INTRAVENOUS CONTINUOUS
Status: DISCONTINUED | OUTPATIENT
Start: 2019-01-28 | End: 2019-01-28 | Stop reason: HOSPADM

## 2019-01-28 RX ORDER — ONDANSETRON 4 MG/1
4 TABLET, ORALLY DISINTEGRATING ORAL EVERY 30 MIN PRN
Status: DISCONTINUED | OUTPATIENT
Start: 2019-01-28 | End: 2019-01-28 | Stop reason: HOSPADM

## 2019-01-28 RX ORDER — CEFAZOLIN SODIUM IN 0.9 % NACL 3 G/100 ML
3 INTRAVENOUS SOLUTION, PIGGYBACK (ML) INTRAVENOUS
Status: COMPLETED | OUTPATIENT
Start: 2019-01-28 | End: 2019-01-28

## 2019-01-28 RX ORDER — ONDANSETRON 2 MG/ML
4 INJECTION INTRAMUSCULAR; INTRAVENOUS EVERY 6 HOURS PRN
Status: DISCONTINUED | OUTPATIENT
Start: 2019-01-28 | End: 2019-01-30 | Stop reason: HOSPADM

## 2019-01-28 RX ORDER — FENTANYL CITRATE 50 UG/ML
25-50 INJECTION, SOLUTION INTRAMUSCULAR; INTRAVENOUS
Status: DISCONTINUED | OUTPATIENT
Start: 2019-01-28 | End: 2019-01-28 | Stop reason: HOSPADM

## 2019-01-28 RX ORDER — SCOLOPAMINE TRANSDERMAL SYSTEM 1 MG/1
1 PATCH, EXTENDED RELEASE TRANSDERMAL ONCE
Status: COMPLETED | OUTPATIENT
Start: 2019-01-28 | End: 2019-01-28

## 2019-01-28 RX ORDER — DIAZEPAM 10 MG/2ML
5 INJECTION, SOLUTION INTRAMUSCULAR; INTRAVENOUS EVERY 4 HOURS PRN
Status: DISCONTINUED | OUTPATIENT
Start: 2019-01-28 | End: 2019-01-30 | Stop reason: HOSPADM

## 2019-01-28 RX ORDER — ALBUTEROL SULFATE 90 UG/1
2 AEROSOL, METERED RESPIRATORY (INHALATION) EVERY 6 HOURS PRN
Status: DISCONTINUED | OUTPATIENT
Start: 2019-01-28 | End: 2019-01-30 | Stop reason: HOSPADM

## 2019-01-28 RX ORDER — LIDOCAINE 40 MG/G
CREAM TOPICAL
Status: DISCONTINUED | OUTPATIENT
Start: 2019-01-28 | End: 2019-01-30 | Stop reason: HOSPADM

## 2019-01-28 RX ORDER — HYDROMORPHONE HYDROCHLORIDE 1 MG/ML
.3-.5 INJECTION, SOLUTION INTRAMUSCULAR; INTRAVENOUS; SUBCUTANEOUS EVERY 5 MIN PRN
Status: DISCONTINUED | OUTPATIENT
Start: 2019-01-28 | End: 2019-01-28 | Stop reason: HOSPADM

## 2019-01-28 RX ORDER — SIMETHICONE 20 MG/.3ML
100 EMULSION ORAL 4 TIMES DAILY PRN
Status: DISCONTINUED | OUTPATIENT
Start: 2019-01-28 | End: 2019-01-30 | Stop reason: HOSPADM

## 2019-01-28 RX ORDER — GLYCOPYRROLATE 0.2 MG/ML
INJECTION, SOLUTION INTRAMUSCULAR; INTRAVENOUS PRN
Status: DISCONTINUED | OUTPATIENT
Start: 2019-01-28 | End: 2019-01-28

## 2019-01-28 RX ORDER — DIPHENHYDRAMINE HCL 25 MG
25 CAPSULE ORAL EVERY 6 HOURS PRN
Status: DISCONTINUED | OUTPATIENT
Start: 2019-01-28 | End: 2019-01-30 | Stop reason: HOSPADM

## 2019-01-28 RX ORDER — ONDANSETRON 2 MG/ML
INJECTION INTRAMUSCULAR; INTRAVENOUS PRN
Status: DISCONTINUED | OUTPATIENT
Start: 2019-01-28 | End: 2019-01-28

## 2019-01-28 RX ORDER — ONDANSETRON 4 MG/1
4 TABLET, ORALLY DISINTEGRATING ORAL EVERY 6 HOURS PRN
Status: DISCONTINUED | OUTPATIENT
Start: 2019-01-28 | End: 2019-01-30 | Stop reason: HOSPADM

## 2019-01-28 RX ORDER — OXYCODONE HYDROCHLORIDE 5 MG/1
5-10 TABLET ORAL
Status: DISCONTINUED | OUTPATIENT
Start: 2019-01-28 | End: 2019-01-30 | Stop reason: HOSPADM

## 2019-01-28 RX ORDER — LIDOCAINE HYDROCHLORIDE 20 MG/ML
INJECTION, SOLUTION INFILTRATION; PERINEURAL PRN
Status: DISCONTINUED | OUTPATIENT
Start: 2019-01-28 | End: 2019-01-28

## 2019-01-28 RX ORDER — CEFAZOLIN SODIUM IN 0.9 % NACL 3 G/100 ML
3 INTRAVENOUS SOLUTION, PIGGYBACK (ML) INTRAVENOUS EVERY 8 HOURS
Status: COMPLETED | OUTPATIENT
Start: 2019-01-28 | End: 2019-01-29

## 2019-01-28 RX ORDER — CEFAZOLIN SODIUM 1 G/3ML
INJECTION, POWDER, FOR SOLUTION INTRAMUSCULAR; INTRAVENOUS PRN
Status: DISCONTINUED | OUTPATIENT
Start: 2019-01-28 | End: 2019-01-28

## 2019-01-28 RX ORDER — PROPOFOL 10 MG/ML
INJECTION, EMULSION INTRAVENOUS PRN
Status: DISCONTINUED | OUTPATIENT
Start: 2019-01-28 | End: 2019-01-28

## 2019-01-28 RX ORDER — KETOROLAC TROMETHAMINE 30 MG/ML
30 INJECTION, SOLUTION INTRAMUSCULAR; INTRAVENOUS
Status: DISCONTINUED | OUTPATIENT
Start: 2019-01-28 | End: 2019-01-28 | Stop reason: HOSPADM

## 2019-01-28 RX ORDER — NALOXONE HYDROCHLORIDE 0.4 MG/ML
.1-.4 INJECTION, SOLUTION INTRAMUSCULAR; INTRAVENOUS; SUBCUTANEOUS
Status: DISCONTINUED | OUTPATIENT
Start: 2019-01-28 | End: 2019-01-30 | Stop reason: HOSPADM

## 2019-01-28 RX ORDER — ACETAMINOPHEN 650 MG/1
650 SUPPOSITORY RECTAL EVERY 4 HOURS PRN
Status: DISCONTINUED | OUTPATIENT
Start: 2019-01-28 | End: 2019-01-30 | Stop reason: HOSPADM

## 2019-01-28 RX ORDER — DEXAMETHASONE SODIUM PHOSPHATE 4 MG/ML
INJECTION, SOLUTION INTRA-ARTICULAR; INTRALESIONAL; INTRAMUSCULAR; INTRAVENOUS; SOFT TISSUE PRN
Status: DISCONTINUED | OUTPATIENT
Start: 2019-01-28 | End: 2019-01-28

## 2019-01-28 RX ORDER — PROCHLORPERAZINE MALEATE 5 MG
10 TABLET ORAL EVERY 6 HOURS PRN
Status: DISCONTINUED | OUTPATIENT
Start: 2019-01-28 | End: 2019-01-30 | Stop reason: HOSPADM

## 2019-01-28 RX ORDER — EPHEDRINE SULFATE 50 MG/ML
INJECTION, SOLUTION INTRAMUSCULAR; INTRAVENOUS; SUBCUTANEOUS PRN
Status: DISCONTINUED | OUTPATIENT
Start: 2019-01-28 | End: 2019-01-28

## 2019-01-28 RX ORDER — ONDANSETRON 2 MG/ML
4 INJECTION INTRAMUSCULAR; INTRAVENOUS EVERY 30 MIN PRN
Status: DISCONTINUED | OUTPATIENT
Start: 2019-01-28 | End: 2019-01-28 | Stop reason: HOSPADM

## 2019-01-28 RX ORDER — HEPARIN SODIUM 5000 [USP'U]/.5ML
5000 INJECTION, SOLUTION INTRAVENOUS; SUBCUTANEOUS
Status: COMPLETED | OUTPATIENT
Start: 2019-01-28 | End: 2019-01-28

## 2019-01-28 RX ORDER — DIPHENHYDRAMINE HYDROCHLORIDE 25 MG/1
5000 TABLET ORAL DAILY
COMMUNITY

## 2019-01-28 RX ORDER — HYDROMORPHONE HYDROCHLORIDE 1 MG/ML
.3-.5 INJECTION, SOLUTION INTRAMUSCULAR; INTRAVENOUS; SUBCUTANEOUS
Status: DISCONTINUED | OUTPATIENT
Start: 2019-01-28 | End: 2019-01-30 | Stop reason: HOSPADM

## 2019-01-28 RX ORDER — MEPERIDINE HYDROCHLORIDE 25 MG/ML
12.5 INJECTION INTRAMUSCULAR; INTRAVENOUS; SUBCUTANEOUS EVERY 5 MIN PRN
Status: DISCONTINUED | OUTPATIENT
Start: 2019-01-28 | End: 2019-01-28 | Stop reason: HOSPADM

## 2019-01-28 RX ORDER — FENTANYL CITRATE 50 UG/ML
INJECTION, SOLUTION INTRAMUSCULAR; INTRAVENOUS PRN
Status: DISCONTINUED | OUTPATIENT
Start: 2019-01-28 | End: 2019-01-28

## 2019-01-28 RX ORDER — SODIUM CHLORIDE, SODIUM LACTATE, POTASSIUM CHLORIDE, CALCIUM CHLORIDE 600; 310; 30; 20 MG/100ML; MG/100ML; MG/100ML; MG/100ML
INJECTION, SOLUTION INTRAVENOUS CONTINUOUS
Status: DISCONTINUED | OUTPATIENT
Start: 2019-01-28 | End: 2019-01-30 | Stop reason: HOSPADM

## 2019-01-28 RX ORDER — LABETALOL HYDROCHLORIDE 5 MG/ML
10 INJECTION, SOLUTION INTRAVENOUS
Status: DISCONTINUED | OUTPATIENT
Start: 2019-01-28 | End: 2019-01-28 | Stop reason: HOSPADM

## 2019-01-28 RX ORDER — NALOXONE HYDROCHLORIDE 0.4 MG/ML
.1-.4 INJECTION, SOLUTION INTRAMUSCULAR; INTRAVENOUS; SUBCUTANEOUS
Status: ACTIVE | OUTPATIENT
Start: 2019-01-28 | End: 2019-01-29

## 2019-01-28 RX ORDER — MAGNESIUM HYDROXIDE 1200 MG/15ML
LIQUID ORAL PRN
Status: DISCONTINUED | OUTPATIENT
Start: 2019-01-28 | End: 2019-01-28 | Stop reason: HOSPADM

## 2019-01-28 RX ORDER — DIPHENHYDRAMINE HYDROCHLORIDE 50 MG/ML
25 INJECTION INTRAMUSCULAR; INTRAVENOUS EVERY 6 HOURS PRN
Status: DISCONTINUED | OUTPATIENT
Start: 2019-01-28 | End: 2019-01-30 | Stop reason: HOSPADM

## 2019-01-28 RX ORDER — BUPIVACAINE HYDROCHLORIDE AND EPINEPHRINE 2.5; 5 MG/ML; UG/ML
INJECTION, SOLUTION INFILTRATION; PERINEURAL PRN
Status: DISCONTINUED | OUTPATIENT
Start: 2019-01-28 | End: 2019-01-28 | Stop reason: HOSPADM

## 2019-01-28 RX ADMIN — HYDROMORPHONE HYDROCHLORIDE 0.5 MG: 1 INJECTION, SOLUTION INTRAMUSCULAR; INTRAVENOUS; SUBCUTANEOUS at 22:11

## 2019-01-28 RX ADMIN — HYDROMORPHONE HYDROCHLORIDE 0.5 MG: 1 INJECTION, SOLUTION INTRAMUSCULAR; INTRAVENOUS; SUBCUTANEOUS at 11:24

## 2019-01-28 RX ADMIN — LIDOCAINE HYDROCHLORIDE 80 MG: 20 INJECTION, SOLUTION INFILTRATION; PERINEURAL at 07:45

## 2019-01-28 RX ADMIN — ROCURONIUM BROMIDE 20 MG: 10 INJECTION INTRAVENOUS at 08:43

## 2019-01-28 RX ADMIN — CEFAZOLIN 1 G: 1 INJECTION, POWDER, FOR SOLUTION INTRAMUSCULAR; INTRAVENOUS at 09:40

## 2019-01-28 RX ADMIN — Medication 3 G: at 07:48

## 2019-01-28 RX ADMIN — ROCURONIUM BROMIDE 10 MG: 10 INJECTION INTRAVENOUS at 09:46

## 2019-01-28 RX ADMIN — ONDANSETRON 4 MG: 2 INJECTION INTRAMUSCULAR; INTRAVENOUS at 11:09

## 2019-01-28 RX ADMIN — SUCCINYLCHOLINE CHLORIDE 160 MG: 20 INJECTION, SOLUTION INTRAMUSCULAR; INTRAVENOUS; PARENTERAL at 07:45

## 2019-01-28 RX ADMIN — HYDROMORPHONE HYDROCHLORIDE 0.5 MG: 1 INJECTION, SOLUTION INTRAMUSCULAR; INTRAVENOUS; SUBCUTANEOUS at 11:47

## 2019-01-28 RX ADMIN — HYDROMORPHONE HYDROCHLORIDE 0.5 MG: 1 INJECTION, SOLUTION INTRAMUSCULAR; INTRAVENOUS; SUBCUTANEOUS at 11:35

## 2019-01-28 RX ADMIN — FENTANYL CITRATE 50 MCG: 50 INJECTION, SOLUTION INTRAMUSCULAR; INTRAVENOUS at 11:08

## 2019-01-28 RX ADMIN — Medication 3 G: at 16:01

## 2019-01-28 RX ADMIN — FENTANYL CITRATE 100 MCG: 50 INJECTION, SOLUTION INTRAMUSCULAR; INTRAVENOUS at 07:45

## 2019-01-28 RX ADMIN — SUGAMMADEX 300 MG: 100 INJECTION, SOLUTION INTRAVENOUS at 10:38

## 2019-01-28 RX ADMIN — DEXAMETHASONE SODIUM PHOSPHATE 4 MG: 4 INJECTION, SOLUTION INTRA-ARTICULAR; INTRALESIONAL; INTRAMUSCULAR; INTRAVENOUS; SOFT TISSUE at 07:55

## 2019-01-28 RX ADMIN — ROCURONIUM BROMIDE 30 MG: 10 INJECTION INTRAVENOUS at 07:55

## 2019-01-28 RX ADMIN — HYDROMORPHONE HYDROCHLORIDE 0.5 MG: 1 INJECTION, SOLUTION INTRAMUSCULAR; INTRAVENOUS; SUBCUTANEOUS at 15:32

## 2019-01-28 RX ADMIN — ROCURONIUM BROMIDE 10 MG: 10 INJECTION INTRAVENOUS at 09:31

## 2019-01-28 RX ADMIN — PROPOFOL 350 MG: 10 INJECTION, EMULSION INTRAVENOUS at 07:45

## 2019-01-28 RX ADMIN — ONDANSETRON 4 MG: 2 INJECTION INTRAMUSCULAR; INTRAVENOUS at 10:31

## 2019-01-28 RX ADMIN — SODIUM CHLORIDE, POTASSIUM CHLORIDE, SODIUM LACTATE AND CALCIUM CHLORIDE: 600; 310; 30; 20 INJECTION, SOLUTION INTRAVENOUS at 21:37

## 2019-01-28 RX ADMIN — MIDAZOLAM 2 MG: 1 INJECTION INTRAMUSCULAR; INTRAVENOUS at 07:35

## 2019-01-28 RX ADMIN — ROCURONIUM BROMIDE 10 MG: 10 INJECTION INTRAVENOUS at 09:10

## 2019-01-28 RX ADMIN — HYDROMORPHONE HYDROCHLORIDE 0.5 MG: 1 INJECTION, SOLUTION INTRAMUSCULAR; INTRAVENOUS; SUBCUTANEOUS at 18:51

## 2019-01-28 RX ADMIN — DEXMEDETOMIDINE HYDROCHLORIDE 0.5 MCG/KG/HR: 100 INJECTION, SOLUTION INTRAVENOUS at 07:43

## 2019-01-28 RX ADMIN — HEPARIN SODIUM 5000 UNITS: 10000 INJECTION, SOLUTION INTRAVENOUS; SUBCUTANEOUS at 07:55

## 2019-01-28 RX ADMIN — PROCHLORPERAZINE EDISYLATE 5 MG: 5 INJECTION INTRAMUSCULAR; INTRAVENOUS at 11:35

## 2019-01-28 RX ADMIN — FENTANYL CITRATE 50 MCG: 50 INJECTION, SOLUTION INTRAMUSCULAR; INTRAVENOUS at 11:18

## 2019-01-28 RX ADMIN — HYDROMORPHONE HYDROCHLORIDE 0.5 MG: 1 INJECTION, SOLUTION INTRAMUSCULAR; INTRAVENOUS; SUBCUTANEOUS at 08:13

## 2019-01-28 RX ADMIN — SCOPALAMINE 1 PATCH: 1 PATCH, EXTENDED RELEASE TRANSDERMAL at 07:09

## 2019-01-28 RX ADMIN — ROCURONIUM BROMIDE 10 MG: 10 INJECTION INTRAVENOUS at 10:07

## 2019-01-28 RX ADMIN — SODIUM CHLORIDE, POTASSIUM CHLORIDE, SODIUM LACTATE AND CALCIUM CHLORIDE: 600; 310; 30; 20 INJECTION, SOLUTION INTRAVENOUS at 14:21

## 2019-01-28 RX ADMIN — SODIUM CHLORIDE, POTASSIUM CHLORIDE, SODIUM LACTATE AND CALCIUM CHLORIDE: 600; 310; 30; 20 INJECTION, SOLUTION INTRAVENOUS at 09:16

## 2019-01-28 RX ADMIN — SODIUM CHLORIDE, POTASSIUM CHLORIDE, SODIUM LACTATE AND CALCIUM CHLORIDE: 600; 310; 30; 20 INJECTION, SOLUTION INTRAVENOUS at 06:01

## 2019-01-28 RX ADMIN — GLYCOPYRROLATE 0.2 MG: 0.2 INJECTION, SOLUTION INTRAMUSCULAR; INTRAVENOUS at 08:06

## 2019-01-28 RX ADMIN — SODIUM CHLORIDE, POTASSIUM CHLORIDE, SODIUM LACTATE AND CALCIUM CHLORIDE: 600; 310; 30; 20 INJECTION, SOLUTION INTRAVENOUS at 08:10

## 2019-01-28 RX ADMIN — ROCURONIUM BROMIDE 10 MG: 10 INJECTION INTRAVENOUS at 08:58

## 2019-01-28 RX ADMIN — ROCURONIUM BROMIDE 20 MG: 10 INJECTION INTRAVENOUS at 08:01

## 2019-01-28 RX ADMIN — FAMOTIDINE 20 MG: 10 INJECTION, SOLUTION INTRAVENOUS at 13:19

## 2019-01-28 ASSESSMENT — ACTIVITIES OF DAILY LIVING (ADL)
ADLS_ACUITY_SCORE: 13
ADLS_ACUITY_SCORE: 12

## 2019-01-28 ASSESSMENT — ENCOUNTER SYMPTOMS: DYSRHYTHMIAS: 0

## 2019-01-28 NOTE — PROGRESS NOTES
Admission medication history interview status for the 1/28/2019  admission is complete. See EPIC admission navigator for prior to admission medications     Medication history source reliability:Good    Medication history interview source(s):Patient    Medication history resources (including written lists, pill bottles, clinic record):None    Primary pharmacy[facility]    Additional medication history information not noted on PTA med list :  Patient brought home med - Albuterol Inhaler and artificial tears    Time spent in this activity: 45 minutes    Prior to Admission medications    Medication Sig Last Dose Taking? Auth Provider   albuterol (PROAIR HFA/PROVENTIL HFA/VENTOLIN HFA) 108 (90 Base) MCG/ACT inhaler Inhale 2 puffs into the lungs every 6 hours as needed for shortness of breath / dyspnea or wheezing more than a week at prn Yes Price Rae MD   BIOTIN PO Take 1 tablet by mouth daily 1/27/2019 at am Yes Reported, Patient   CALCIUM PO Take 1 tablet by mouth daily 1/27/2019 at am Yes Reported, Patient   Cholecalciferol (VITAMIN D PO) Take 1 tablet by mouth daily 1/27/2019 at a Yes Reported, Patient   clobetasol (TEMOVATE) 0.05 % ointment Apply clobetasol  0.05 percent ointment, sparingly (a dot 3 mm wide) in a thin film.  Apply to affected area only, once or twice weekly for maintenance.  Patient taking differently: Apply topically 2 times daily as needed Apply clobetasol  0.05 percent ointment, sparingly (a dot 3 mm wide) in a thin film.  Apply to affected area only, once or twice weekly for maintenance. more than a month at prn Yes Kelly Bedoya MD   Hypromellose (ARTIFICIAL TEARS OP) Apply 1-2 drops to eye daily as needed Past Week at prn Yes Reported, Patient   loratadine (CLARITIN) 10 MG tablet Take 1 tablet (10 mg) by mouth daily  Patient taking differently: Take 10 mg by mouth daily as needed  more than a week at prn Yes Nikhil Curtis MD   multivitamin, therapeutic with minerals  (THERA-VIT-M) TABS Take 1 tablet by mouth daily. 1/27/2019 at am Yes Unknown, Entered By History   ranitidine (ZANTAC) 150 MG tablet Take 150 mg by mouth daily as needed for heartburn 1/24/2019 at prn Yes Reported, Patient

## 2019-01-28 NOTE — ANESTHESIA CARE TRANSFER NOTE
Patient: Ana Wyman    Procedure(s):  LAPAROSCOPIC GASTRIC BYPASS    Diagnosis: MORBID OBESITY  Diagnosis Additional Information: No value filed.    Anesthesia Type:   General, ETT, RSI     Note:  Airway :Face Mask  Patient transferred to:PACU  Handoff Report: Identifed the Patient, Identified the Reponsible Provider, Reviewed the pertinent medical history, Discussed the surgical course, Reviewed Intra-OP anesthesia mangement and issues during anesthesia, Set expectations for post-procedure period and Allowed opportunity for questions and acknowledgement of understanding      Vitals: (Last set prior to Anesthesia Care Transfer)    CRNA VITALS  1/28/2019 1017 - 1/28/2019 1053      1/28/2019             EKG:  Sinus rhythm                Electronically Signed By: JEOVANNY Carmen CRNA  January 28, 2019  10:53 AM

## 2019-01-28 NOTE — BRIEF OP NOTE
General Surgery Brief Operative Note    Pre-operative diagnosis: MORBID OBESITY   Post-operative diagnosis Same   Procedure: Procedure(s):  LAPAROSCOPIC GASTRIC BYPASS    Surgeon(s), Assistant(s): Surgeon(s) and Role:     * Maykel Calvin MD - Primary     * Jody Cordova PA-C - Assisting   Estimated blood loss: 5 mL   Drains: None   Specimens: * No specimens in log *   Findings: See postop diagnosis   Condition: Stable   Comments:      Jody Cordova PA-C See dictated operative report for full details              Teaching Attending Note


Name of Resident: Donta Butterfield





ATTENDING PHYSICIAN STATEMENT





I saw and evaluated the patient.


I reviewed the resident's note and discussed the case with the resident.


I agree with the resident's findings and plan as documented.








SUBJECTIVE:





No fever or chills, no pain.  no events 


OBJECTIVE:


NAD, R mouth angle pulled to R . no other asymmetry  . lip smacking activity  


Cv: RRR


Lungs: decreased breath sounds b/l bases 


Abd:  soft, Nt, ND , NL BS 


Ext: 1 + edema 





A/P 





Unfortunate 62 y/o lady with h/o TD , ESRD, VC syndrome , iron def anemia , 

sacral decub, Dm II , and Ht and other medical problems who presented from 

wound care with unusual facial movements . 





1- Sacral decub ulcer. possible infection 


- Abx stopped by ID 


- MRI pending  to r/o OM .





2- ESRD: cont HD per renal 





3- TD: chronic. monitor 


off her antiphsycotics 





4- h/o seizure , cont keppra 





5- SVc syndrome : cont eliquis 











HLOC pending MRI , after that SNF

## 2019-01-28 NOTE — ANESTHESIA PREPROCEDURE EVALUATION
Anesthesia Pre-Procedure Evaluation    Patient: Ana Wyman   MRN: [id number] : 1970          Preoperative Diagnosis: MORBID OBESITY    Procedure(s):  LAPAROSCOPIC GASTRIC BYPASS POSSIBLE LAPAROSOPIC CHOLECYSTECTOMY    Past Medical History:   Diagnosis Date     Allergic rhinitis, cause unspecified      Cellulitis     2 episodes, one with hospitalization [facility]     DUB (dysfunctional uterine bleeding)     Neg EMB 2012     Esophageal reflux     ongoing     Fibroids      Genital problems     Lichens Schlerosis     GERD (gastroesophageal reflux disease)      Hallux valgus (acquired)      History of steroid therapy     Inhalers, eye drops     Hypersomnia with sleep apnea, unspecified     using CPAP     Kidney stone May 2018    2 stones     Lichen sclerosus 2011     Lymph edema 2010- present     Lymphedema bilateral with mixed lipedema. 2015     Lymphedema bilateral with mixed lipedema. 2015     Morbid obesity (H) 3/19/2013     MIGUELITO on CPAP 3/19/2013    Sleep study in  and        Pneumonia     Years ago - a few times     Pre-diabetes      Sleep apnea      Tendonitis currently     Urinary tract infection     A few times in past 10 years     Vision disorder [id number]    Dry Eye     Vitamin D deficiency 3/14/2015     Past Surgical History:   Procedure Laterality Date     C NONSPECIFIC PROCEDURE      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     C NONSPECIFIC PROCEDURE  ,    Foot surgeries x 2 (bunionectomy)     C NONSPECIFIC PROCEDURE      hammer toes     COLONOSCOPY  5/15/2013    Procedure: COLONOSCOPY;  Colonoscopy;  Surgeon: Kota Blanco MD;  Location:  GI     GYN SURGERY  2016    Uterine Ablation     lichen sclerosis       ORTHOPEDIC SURGERY       VASCULAR SURGERY      Vienous closure on both legs     vein closure  2016    to prevent lymphedema     Allergies   Allergen Reactions     Clindamycin Diarrhea     Codeine Nausea and Vomiting      Shellfish Allergy      Social History     Tobacco Use     Smoking status: Never Smoker     Smokeless tobacco: Never Used   Substance Use Topics     Alcohol use: Yes     Alcohol/week: 0.0 oz     Comment: occasional     Prior to Admission medications    Medication Sig Start Date End Date Taking? Authorizing Provider   albuterol (PROAIR HFA/PROVENTIL HFA/VENTOLIN HFA) 108 (90 Base) MCG/ACT inhaler Inhale 2 puffs into the lungs every 6 hours as needed for shortness of breath / dyspnea or wheezing 11/23/18  Yes Price Rae MD   BIOTIN PO Take 1 tablet by mouth daily   Yes Reported, Patient   CALCIUM PO Take 1 tablet by mouth daily   Yes Reported, Patient   Cholecalciferol (VITAMIN D PO) Take 1 tablet by mouth daily   Yes Reported, Patient   clobetasol (TEMOVATE) 0.05 % ointment Apply clobetasol  0.05 percent ointment, sparingly (a dot 3 mm wide) in a thin film.  Apply to affected area only, once or twice weekly for maintenance.  Patient taking differently: Apply topically 2 times daily as needed Apply clobetasol  0.05 percent ointment, sparingly (a dot 3 mm wide) in a thin film.  Apply to affected area only, once or twice weekly for maintenance. 6/11/18  Yes Kelly Bedoya MD   Hypromellose (ARTIFICIAL TEARS OP) Apply 1-2 drops to eye daily as needed   Yes Reported, Patient   loratadine (CLARITIN) 10 MG tablet Take 1 tablet (10 mg) by mouth daily  Patient taking differently: Take 10 mg by mouth daily as needed  10/27/17  Yes Nikhil Curtis MD   multivitamin, therapeutic with minerals (THERA-VIT-M) TABS Take 1 tablet by mouth daily.   Yes Unknown, Entered By History   ranitidine (ZANTAC) 150 MG tablet Take 150 mg by mouth daily as needed for heartburn   Yes Reported, Patient     Current Facility-Administered Medications Ordered in Epic   Medication Dose Route Frequency Last Rate Last Dose     ceFAZolin (ANCEF) intermittent infusion 3 g (pre-mix)  3 g Intravenous Pre-Op/Pre-procedure x 1 dose          heparin sodium injection 5,000 Units  5,000 Units Subcutaneous Pre-Op/Pre-procedure x 1 dose         lactated ringers infusion   Intravenous Continuous 25 mL/hr at 01/28/19 0601       lidocaine 1 % 1 mL  1 mL Other Q1H PRN         No current [facility]-ordered outpatient medications on file.       lactated ringers 25 mL/hr at 01/28/19 0601     Recent Labs   Lab Test 08/22/18  1845 07/31/18  1022    139   POTASSIUM 3.9 4.1   CHLORIDE 106 106   CO2 26 25   ANIONGAP 8 8   * 114*   BUN 11 11   CR 0.70 0.78   SINGH 9.2 8.8     Recent Labs   Lab Test 01/28/19  0618 08/22/18  1845 07/31/18  1022   WBC  --  11.5* 8.8   HGB 14.1 14.5 13.7   PLT  --  362 294     Recent Labs   Lab Test 01/25/12  1705   ABO O   RH  Pos     Recent Labs   Lab Test 04/29/17  0103 02/21/12  2050 02/21/12  1830   TROPI <0.015  The 99th percentile for upper reference range is 0.045 ug/L.  Troponin values in   the range of 0.045 - 0.120 ug/L may be associated with risks of adverse   clinical events.   0.013 <0.012     No results for input(s): PH, PCO2, PO2, HCO3 in the last 84185 hours.  Recent Labs   Lab Test 01/28/19  0605   HCG Negative     No results found for this or any previous visit (from the past 744 hour(s)).    RECENT LABS:         Anesthesia Evaluation     .             ROS/MED HX    ENT/Pulmonary:     (+)sleep apnea, allergic rhinitis, asthma uses CPAP , recent URI resolved . .    Neurologic:       Cardiovascular:        (-) CHF and arrhythmias   METS/Exercise Tolerance:  >4 METS   Hematologic:         Musculoskeletal:   (+) , , other musculoskeletal- lymphedema      GI/Hepatic:     (+) GERD liver disease,       Renal/Genitourinary:     (+) Nephrolithiasis , Other Renal/ Genitourinary, DUB, fibroids      Endo: Comment: Pre-DM    (+) Obesity, .      Psychiatric:         Infectious Disease:         Malignancy:         Other:                          Physical Exam  Normal systems: dental    Airway   Mallampati: II  TM distance: >3  "FB  Neck ROM: full    Dental     Cardiovascular   Rhythm and rate: regular and normal      Pulmonary    breath sounds clear to auscultation            Lab Results   Component Value Date    WBC 11.5 (H) 08/22/2018    HGB 14.5 08/22/2018    HCT 44.4 08/22/2018     08/22/2018    CRP 27.2 (H) 10/14/2016    SED 18 12/15/2017     08/22/2018    POTASSIUM 3.9 08/22/2018    CHLORIDE 106 08/22/2018    CO2 26 08/22/2018    BUN 11 08/22/2018    CR 0.70 08/22/2018     (H) 08/22/2018    SINGH 9.2 08/22/2018    ALBUMIN 3.8 08/22/2018    PROTTOTAL 8.0 08/22/2018    ALT 31 08/22/2018    AST 21 08/22/2018    ALKPHOS 104 08/22/2018    BILITOTAL 0.4 08/22/2018    LIPASE 117 08/22/2018    AMYLASE 56 03/22/2004    INR 0.98 10/27/2017    TSH 2.60 02/04/2016    T4 0.81 08/07/2014    HCG Negative 01/28/2019    HCGS Negative 09/26/2013       Preop Vitals  BP Readings from Last 3 Encounters:   01/28/19 119/72   01/14/19 132/84   12/04/18 128/71    Pulse Readings from Last 3 Encounters:   01/28/19 87   01/14/19 89   12/04/18 94      Resp Readings from Last 3 Encounters:   01/28/19 16   12/04/18 16   08/22/18 20    SpO2 Readings from Last 3 Encounters:   01/28/19 96%   01/14/19 96%   12/04/18 96%      Temp Readings from Last 1 Encounters:   01/28/19 36.4  C (97.5  F) (Temporal)    Ht Readings from Last 1 Encounters:   01/28/19 1.651 m (5' 5\")      Wt Readings from Last 1 Encounters:   01/14/19 147.9 kg (326 lb)    Estimated body mass index is 54.25 kg/m  as calculated from the following:    Height as of this encounter: 1.651 m (5' 5\").    Weight as of 1/14/19: 147.9 kg (326 lb).       Anesthesia Plan      History & Physical Review  History and physical reviewed and following examination; no interval change.    ASA Status:  3 .    NPO Status:  > 8 hours    Plan for General, ETT and RSI with Intravenous and Propofol induction. Maintenance will be Balanced.    PONV prophylaxis:  Ondansetron (or other 5HT-3) and Dexamethasone or " Solumedrol  Additional equipment: Videolaryngoscope Modified RSI with Sux  Hydromorphone  glidescope      Postoperative Care  Postoperative pain management:  IV analgesics.      Consents  Anesthetic plan, risks, benefits and alternatives discussed with:  Patient..                 Tyler Vizcaino MD

## 2019-01-28 NOTE — ADDENDUM NOTE
Addendum  created 01/28/19 1336 by Kristian Stanley APRN CRNA    Intraprocedure Smart[employer] edited

## 2019-01-28 NOTE — OR NURSING
Patient Ana Wyman is in stable condition and has met criteria for PACU discharge.  SONIA Charis was  informed and a sign out was given for Unit/Station transfer.

## 2019-01-28 NOTE — ANESTHESIA POSTPROCEDURE EVALUATION
Patient: Ana Wyman    Procedure(s):  LAPAROSCOPIC GASTRIC BYPASS    Diagnosis:MORBID OBESITY  Diagnosis Additional Information: No value filed.    Anesthesia Type:  General, ETT, RSI    Note:  Anesthesia Post Evaluation    Patient location during evaluation: PACU  Patient participation: Able to fully participate in evaluation  Level of consciousness: sleepy but conscious and responsive to verbal stimuli  Pain management: adequate  Airway patency: patent  Cardiovascular status: acceptable and hemodynamically stable  Respiratory status: acceptable and unassisted  Hydration status: acceptable  PONV: none     Anesthetic complications: None          Last vitals:  Vitals:    01/28/19 1130 01/28/19 1140 01/28/19 1150   BP: 128/68 111/73 116/67   Pulse: 77 82 77   Resp: 11 15 12   Temp:      SpO2: 97% 95% 95%         Electronically Signed By: Tyler Vizcaino MD  January 28, 2019  11:57 AM

## 2019-01-28 NOTE — OP NOTE
General Surgery Operative Note    PREOPERATIVE DIAGNOSIS:  MORBID OBESITY    POSTOPERATIVE DIAGNOSIS:  Same    PROCEDURE:   Procedure(s):  LAPAROSCOPIC GASTRIC BYPASS    ANESTHESIA:  General.      SURGEON:  Maykel Calvin MD    ASSISTANT:  Jody Cordova PA-C. The physician assistant was medically necessary for their expertise in camera management, suctioning, and retraction    INDICATIONS:  Morbid Obesity, multiple co-morbidities  Due to the patient's comorbidity conditions of pre-diabetes, Sleep apnea, and GERD, in association with elevated body mass index, bariatric surgery has been recommended and is being performed today.   Moreover, as the surgeon performing this procedure, I certify that the following are true in regards to this patient at or prior to the day of surgery:   1. The patient's body mass index (BMI) is or has been greater than or equal to 35 kg/m2.  2. The patient has at least one co-morbidity related to obesity (as outlined above).  3. The patient has been previously unsuccessful with medical treatment for obesity.  Please note that some of this information has been documented as part of a comprehensive pre-bariatric surgery process in the outpatient clinic and is NOT immediately available in the inpatient encounter for this bariatric operation.        PROCEDURE:  The patient was taken to the operating suite.  The operative area was prepped and draped in a sterile fashion.  Surgeon initiated timeout was acknowledged.  The abdomen was insufflated via a left supraumbilical incision using a veress needle with low pressures. An 11mm trocar was inserted. There was no injury seen when the camera was placed. 5mm trocars were placed in the right subxiphoid position and the left upper quadrant and 12 mm trocars in the right medial and left far lateral abdomen. The abdomen was inspected. The ligament of Treitz was identified and we went distal to an area of long mesenteric reach. The mesentery was divided with the  harmonic scalpel and the bowel divided with a 60 blue stapler load. A 130cm Kay limb was measured and a side to side Jejuno-jejunostomy was created with a blue 60 load. The defect was closed with a transversely oriented 60 blue load. The mesenteric defect was closed with running 2-0 silk, the anastomotic crotch was reinforced with 2-0 suture. The omentum was divided in the midline. A liver retractor was placed. The angle of His was dissected clean. A window was made behind the stomach.  An anterior gastrotomy was made and a circular stapler anvil size 21 was introduced.  This was placed out through the anterior portion of the proximal stomach.  Gastrostomy was closed with a blue load of the 60 stapler after the orogastric tube was removed.  Pouch was created with multiple applications of the 60 blue load stapler.  Hemostasis was assured on both staple lines with clips and cautery.  The Kay limb was brought up from below and checked for twists, there were none.  The end of the Kay limb was opened with harmonic scalpel.  A mesenteric vessel was sacrificed.  The circular stapler size 21 was introduced from the left side and placed into the small bowel.  The shaft was placed through the antimesenteric portion of the small bowel.  Pieces of the stapler were connected. It was closed, fired, opened and removed without difficulty.   Excess Kay limb was divided with a blue load of the stapler with suture line reinforcement.  Hemostasis on the candycane was assured with cautery.  Gastrojejunostomy was reinforced with interrupted 2-0 Vicryl.  Bubble test was done showing no leakage.  Bell's defect was closed with running 2-0 silk.  Omentum was laid in front. Gallbladder appeared soft and normal. The abdomen was inspected and appeared to be dry.  Left lateral trocar was removed and the fascia closed with 0 Vicryl under direct vision.  Gas was suctioned out of the abdomen and trocars were all removed.   Sponge count was  reported as correct.  The skin edges were reapproximated with 4-0 Vicryl and Steri-Strips.  The patient was  awakened and taken to the PACU in stable condition.  At the conclusion of the case, all counts were correct.    Maykel Calvin

## 2019-01-29 ENCOUNTER — APPOINTMENT (OUTPATIENT)
Dept: GENERAL RADIOLOGY | Facility: CLINIC | Age: 49
End: 2019-01-29
Attending: SURGERY
Payer: COMMERCIAL

## 2019-01-29 LAB
ANION GAP SERPL CALCULATED.3IONS-SCNC: 6 MMOL/L (ref 3–14)
CHLORIDE SERPL-SCNC: 109 MMOL/L (ref 94–109)
CO2 SERPL-SCNC: 28 MMOL/L (ref 20–32)
HGB BLD-MCNC: 12.5 G/DL (ref 11.7–15.7)
POTASSIUM SERPL-SCNC: 3.8 MMOL/L (ref 3.4–5.3)
SODIUM SERPL-SCNC: 143 MMOL/L (ref 133–144)

## 2019-01-29 PROCEDURE — 25000125 ZZHC RX 250: Performed by: PHYSICIAN ASSISTANT

## 2019-01-29 PROCEDURE — 80051 ELECTROLYTE PANEL: CPT | Performed by: PHYSICIAN ASSISTANT

## 2019-01-29 PROCEDURE — 25000128 H RX IP 250 OP 636: Performed by: PHYSICIAN ASSISTANT

## 2019-01-29 PROCEDURE — 12000000 ZZH R&B MED SURG/OB

## 2019-01-29 PROCEDURE — 40000986 XR UPPER GI WATER SOLUBLE

## 2019-01-29 PROCEDURE — 25000132 ZZH RX MED GY IP 250 OP 250 PS 637: Performed by: PHYSICIAN ASSISTANT

## 2019-01-29 PROCEDURE — 85018 HEMOGLOBIN: CPT | Performed by: PHYSICIAN ASSISTANT

## 2019-01-29 PROCEDURE — 36415 COLL VENOUS BLD VENIPUNCTURE: CPT | Performed by: PHYSICIAN ASSISTANT

## 2019-01-29 RX ADMIN — ACETAMINOPHEN 650 MG: 160 SUSPENSION ORAL at 15:01

## 2019-01-29 RX ADMIN — FAMOTIDINE 20 MG: 10 INJECTION, SOLUTION INTRAVENOUS at 13:32

## 2019-01-29 RX ADMIN — ENOXAPARIN SODIUM 40 MG: 40 INJECTION SUBCUTANEOUS at 20:41

## 2019-01-29 RX ADMIN — OXYCODONE HYDROCHLORIDE 5 MG: 5 TABLET ORAL at 13:32

## 2019-01-29 RX ADMIN — Medication 3 G: at 00:36

## 2019-01-29 RX ADMIN — ENOXAPARIN SODIUM 40 MG: 40 INJECTION SUBCUTANEOUS at 09:08

## 2019-01-29 RX ADMIN — HYDROMORPHONE HYDROCHLORIDE 0.5 MG: 1 INJECTION, SOLUTION INTRAMUSCULAR; INTRAVENOUS; SUBCUTANEOUS at 04:48

## 2019-01-29 RX ADMIN — SODIUM CHLORIDE, POTASSIUM CHLORIDE, SODIUM LACTATE AND CALCIUM CHLORIDE: 600; 310; 30; 20 INJECTION, SOLUTION INTRAVENOUS at 10:59

## 2019-01-29 RX ADMIN — SODIUM CHLORIDE, POTASSIUM CHLORIDE, SODIUM LACTATE AND CALCIUM CHLORIDE: 600; 310; 30; 20 INJECTION, SOLUTION INTRAVENOUS at 04:48

## 2019-01-29 RX ADMIN — OXYCODONE HYDROCHLORIDE 5 MG: 5 TABLET ORAL at 23:22

## 2019-01-29 RX ADMIN — SODIUM CHLORIDE, POTASSIUM CHLORIDE, SODIUM LACTATE AND CALCIUM CHLORIDE: 600; 310; 30; 20 INJECTION, SOLUTION INTRAVENOUS at 23:25

## 2019-01-29 RX ADMIN — FAMOTIDINE 20 MG: 10 INJECTION, SOLUTION INTRAVENOUS at 02:14

## 2019-01-29 RX ADMIN — HYDROMORPHONE HYDROCHLORIDE 0.5 MG: 1 INJECTION, SOLUTION INTRAMUSCULAR; INTRAVENOUS; SUBCUTANEOUS at 09:08

## 2019-01-29 RX ADMIN — SODIUM CHLORIDE, POTASSIUM CHLORIDE, SODIUM LACTATE AND CALCIUM CHLORIDE: 600; 310; 30; 20 INJECTION, SOLUTION INTRAVENOUS at 17:21

## 2019-01-29 RX ADMIN — DIATRIZOATE MEGLUMINE AND DIATRIZOATE SODIUM 45 ML: 660; 100 SOLUTION ORAL; RECTAL at 08:38

## 2019-01-29 ASSESSMENT — ACTIVITIES OF DAILY LIVING (ADL)
ADLS_ACUITY_SCORE: 13

## 2019-01-29 ASSESSMENT — MIFFLIN-ST. JEOR: SCORE: 2095.88

## 2019-01-29 NOTE — CONSULTS
"NUTRITION EDUCATION    REASON FOR ASSESSMENT:  Bariatric Surgery Consult    CURRENT DIET:  Bariatric Clear Liquids    NUTRITION HISTORY:  Patient worked with clinical dietitian in [facility] prior to surgery to assist with lifestyle modification.    ANTHROPOMETRICS:   Ht: 5' 5\"  Wt: 324 lbs 1.22 oz (147 kg)  BMI: Body mass index is 53.93 kg/m .  IBW: 56.82 kg  %IBW: 259%    ASSESSED NUTRITION NEEDS:  Energy Needs: 0188-4890 kcals (11 - 14 kcal/kg ABW)                      Protein Needs: 57-85g (1 - 1.5 gm/kg IBW)  Fluid Needs: 0328-5033 mL (1mL/kcal/ABW)    Know patient will not meet assessed needs due to the restrictive nature of surgery.    NUTRITION DIAGNOSIS:   Food- and nutrition related knowledge deficit related to bariatric surgery as evidence by laparoscopic gastric bypass surgery on 1/28/2019.    INTERVENTIONS:    Nutrition Prescription:    Recommended patient follow bariatric diet advancement for laparoscopic gastric bypass    Implementation:    Nutrition Education (Content):  a) Reviewed laparoscopic gastric bypass diet guidelines  b) Provided handouts:  Nutrition After laparoscopic gastric bypass and Vitamin and Mineral Supplements After Weight Loss Surgery    Nutrition Education (Application):  c) Discussed current eating habits and recommended alternative food choices  d) Patient verbalizes understanding of diet by listing appropriate foods for home    Anticipate good compliance    Diet Education - refer to Education Flowsheet    Goals:    Patient will follow bariatric diet advancement schedule    Patient will follow post-operative vitamin mineral schedule      Follow Up:    Patient to follow up in 2 weeks with RD in [facility]    Provided RD contact information for future questions        Lydia Quijano RD, LD  Clinical Dietitian   "

## 2019-01-29 NOTE — PROGRESS NOTES
"[facility]  Bariatric Surgery Progress Note    Admission Date: 2019         Assessment and Plan:   Ana Wyman is a 48 year old female S/P Procedure(s):  LAPAROSCOPIC GASTRIC BYPASS, 1 Day Post-Op.    - UGI pending, if nL start clears and PO meds  - Start PO pain meds and ordered home meds  - Hgb stable, start Lovenox  - Continue Pepcid IV  - Appreciate Pharmacy and Nutrition assistance  - Ambulate 4x day and encourage IS  - Home probably tomorrow             Interval History:   Sore but pain controlled, UO adequate, ambulating some. Meds reviewed.                     Physical Exam:   Blood pressure 131/68, pulse 93, temperature 98.4  F (36.9  C), temperature source Oral, resp. rate 16, height 1.651 m (5' 5\"), weight 147 kg (324 lb 1.2 oz), SpO2 92 %, not currently breastfeeding.  Temperature Temp  Av.2  F (36.8  C)  Min: 97.5  F (36.4  C)  Max: 98.7  F (37.1  C)   I/O last 3 completed shifts:  In: 5355 [I.V.:5355]  Out: 4030 [Urine:4025; Blood:5]  Constitutional:  Awake, alert, oriented, and in no apparent distress.   Lungs: No increased work of breathing, good air exchange, clear to auscultation bilaterally, and no crackles or wheezing.   Cardiovascular: Regular rate and rhythm, normal S1 and S2, and no murmur noted.   Abdomen: Soft, non-distended, appropriately tender at incision(s). Binder present.    Wounds: Clean, dry, and intact. No erythema or drainage.    Extremities: No edema or calf tenderness. +SCDs.          Data:   UGI: pending    Recent Labs   Lab Test 19  0733 19  1505 19  0618 18  1845 18  1022 18  1120   WBC  --   --   --  11.5* 8.8 7.6   HGB 12.5  --  14.1 14.5 13.7 14.1   HCT  --   --   --  44.4 42.1 44.0   PLT  --  253  --  362 294 346      Recent Labs   Lab Test 19  0733 19  1505 18  1845 18  1022 18  1120   POTASSIUM 3.8  --  3.9 4.1 3.9   CHLORIDE 109  --  106 106 107   CO2 28  --  " 26 25 23   BUN  --   --  11 11 12   CR  --  0.63 0.70 0.78 0.81     No results for input(s): GLC, BGM in the last 168 hours.    Jody Cordova PA-C  [facility]  [phone]

## 2019-01-29 NOTE — PLAN OF CARE
A&O, VSS on 2L O2, Lung sounds clear, Bowel sounds hypoactive, Pt. Has had frequent urination w/ polyuria. 6 lap sites covered with steri-strips and band aids, w/ scant dried drainage. Ambulates standby assist. NPO. Pain controlled by PRN dilaudid.

## 2019-01-30 VITALS
HEART RATE: 83 BPM | DIASTOLIC BLOOD PRESSURE: 75 MMHG | BODY MASS INDEX: 48.82 KG/M2 | SYSTOLIC BLOOD PRESSURE: 128 MMHG | RESPIRATION RATE: 18 BRPM | TEMPERATURE: 98.1 F | OXYGEN SATURATION: 95 % | WEIGHT: 293 LBS | HEIGHT: 65 IN

## 2019-01-30 PROCEDURE — 25000125 ZZHC RX 250: Performed by: PHYSICIAN ASSISTANT

## 2019-01-30 PROCEDURE — 25000128 H RX IP 250 OP 636: Performed by: PHYSICIAN ASSISTANT

## 2019-01-30 PROCEDURE — 25000132 ZZH RX MED GY IP 250 OP 250 PS 637: Performed by: PHYSICIAN ASSISTANT

## 2019-01-30 RX ORDER — OXYCODONE HYDROCHLORIDE 5 MG/1
5-10 TABLET ORAL EVERY 4 HOURS PRN
Qty: 25 TABLET | Refills: 0 | Status: SHIPPED | OUTPATIENT
Start: 2019-01-30 | End: 2019-02-05

## 2019-01-30 RX ADMIN — OXYCODONE HYDROCHLORIDE 5 MG: 5 TABLET ORAL at 06:05

## 2019-01-30 RX ADMIN — ENOXAPARIN SODIUM 40 MG: 40 INJECTION SUBCUTANEOUS at 09:49

## 2019-01-30 RX ADMIN — SODIUM CHLORIDE, POTASSIUM CHLORIDE, SODIUM LACTATE AND CALCIUM CHLORIDE: 600; 310; 30; 20 INJECTION, SOLUTION INTRAVENOUS at 06:05

## 2019-01-30 RX ADMIN — FAMOTIDINE 20 MG: 10 INJECTION, SOLUTION INTRAVENOUS at 00:58

## 2019-01-30 RX ADMIN — OXYCODONE HYDROCHLORIDE 5 MG: 5 TABLET ORAL at 12:21

## 2019-01-30 RX ADMIN — RANITIDINE 75 MG: 75 TABLET, COATED ORAL at 09:49

## 2019-01-30 ASSESSMENT — ACTIVITIES OF DAILY LIVING (ADL)
ADLS_ACUITY_SCORE: 13

## 2019-01-30 NOTE — DISCHARGE SUMMARY
"  Discharge Summary    Ana Wyman MRN# [id number]   YOB: 1970 Age: 48 year old     Date of Admission:  1/28/2019  Date of Discharge:  1/30/2019  Admitting Physician:  Maykel Calvin MD  Discharging Service:  Surgery  Primary Provider: Kelly Bedoya         Admission Diagnosis:   Principle Diagnosis: Morbid Obesity Body mass index is 53.93 kg/m .  Secondary Diagnosis: None         Procedures:   Procedure(s):  LAPAROSCOPIC GASTRIC BYPASS         Brief History of Illness:   This patient was a 48 year old female who presented to the [facility] for potential bariatric surgery.  The patient has failed previous weight loss attempts and has co-morbidities of pre-diabetes, sleep apnea, lower extremity edema, GERD, and fatty liver due to morbid obesity.  After pre-op screening, discussing the risks, benefits, and possible complications, an informed consent was obtained and the patient underwent the above procedure.  Please see the Operative Report for full details.         Hospital Course:   The patient recovered as anticipated.  The patient was discharged to home in stable condition by the surgical service.  She verbalized understanding of all discharge instructions.  She was asked to call with any further questions or concerns.  Please see chart for details.          Consultations:   Consultation during this admission received from nutrition          Discharge Disposition:   Discharged to home          Condition on Discharge:   Discharge condition: Stable   Discharge vitals: Blood pressure 128/75, pulse 83, temperature 98.1  F (36.7  C), temperature source Oral, resp. rate 18, height 1.651 m (5' 5\"), weight 147 kg (324 lb 1.2 oz), SpO2 95 %, not currently breastfeeding.           Discharge Medications:     Current Discharge Medication List      START taking these medications    Details   oxyCODONE (ROXICODONE) 5 MG tablet Take 1-2 tablets (5-10 mg) by mouth every 4 " hours as needed for moderate to severe pain  Qty: 25 tablet, Refills: 0    Associated Diagnoses: Acute post-operative pain         CONTINUE these medications which have CHANGED    Details   ranitidine (ZANTAC) 75 MG tablet Take 1 tablet (75 mg) by mouth 2 times daily  Qty: 60 tablet, Refills: 2    Associated Diagnoses: Morbid obesity (H)         CONTINUE these medications which have NOT CHANGED    Details   albuterol (PROAIR HFA/PROVENTIL HFA/VENTOLIN HFA) 108 (90 Base) MCG/ACT inhaler Inhale 2 puffs into the lungs every 6 hours as needed for shortness of breath / dyspnea or wheezing  Qty: 2 Inhaler, Refills: 2    Comments: Okay to dispense generic equivalent, other formulation, or similar medication covered by patient's insurance  Associated Diagnoses: Mild intermittent asthma without complication      BIOTIN PO Take 1 tablet by mouth daily      CALCIUM PO Take 1 tablet by mouth daily      Cholecalciferol (VITAMIN D PO) Take 1 tablet by mouth daily      clobetasol (TEMOVATE) 0.05 % ointment Apply clobetasol  0.05 percent ointment, sparingly (a dot 3 mm wide) in a thin film.  Apply to affected area only, once or twice weekly for maintenance.  Qty: 45 g, Refills: 2    Associated Diagnoses: Lichen sclerosus      Hypromellose (ARTIFICIAL TEARS OP) Apply 1-2 drops to eye daily as needed      loratadine (CLARITIN) 10 MG tablet Take 1 tablet (10 mg) by mouth daily  Qty: 7 tablet, Refills: 0      multivitamin, therapeutic with minerals (THERA-VIT-M) TABS Take 1 tablet by mouth daily.                  Discharge Instructions:   Follow up at the [facility] next week as scheduled.  Please call the clinic if you have any questions or concerns.  Follow up with your PCP in 4-6 weeks.  Continue to ambulate and use your incentive spirometer at home.  Do not take NSAIDs (such as ibuprofen, naproxen, or aspirin).  You may hold your vitamins/supplements if you are having nausea, try to resume your chewable vitamin if you are  able to.  You may take a chewable calcium in place of the tablet for 1-2 months.                After Care Instructions     Activity      Your activity upon discharge: activity as tolerated. No heavy lifting > 20 lbs or strenuous exercise x 2-3 weeks. No driving or alcohol while on pain meds.         Diet      Follow this diet upon discharge: bariatric full  lqiuids         Wound care and dressings      Instructions to care for your wound at home: keep wound(s) clean and dry. You may shower, but do not soak incisions x 2 weeks. Leave steri strips in place, they will fall off on their own. Apply dry dressing as needed.               Jody Cordova PA-C, dictating on behalf of Maykel Calvin MD  Office #: [phone]

## 2019-01-30 NOTE — PLAN OF CARE
A&O x4. VSS on RA.  POD 1 Gastric Bypass. Sites CDI. C/o pain in her upper left abdomen at 5-9/10. Dilaudid, oxycodone and Tylenol to manage. Up SBA x4. UGI showed no leak. Started on clear liquids. Tolerating well. IVF @150 ml/hr. LS clear. IS to 1500. BS hypoactive. Pt reports passing minimal amounts of gas. Denies nausea. Scopolamine patch to manage. Voiding adequately.

## 2019-01-30 NOTE — PLAN OF CARE
A+O AVSS on CPAP. LS clear, IS to 2000. BS hypo, flatus+. Lap sites CDI. Oxy for pain. Voiding adequately. Up SBA. Tolerating clears

## 2019-01-30 NOTE — PROGRESS NOTES
Discharge and follow up instructions provided. Patient verifies understanding. Discharge medication received. Patient discharging home accompanied by spouse and step force.

## 2019-01-30 NOTE — PROGRESS NOTES
"[facility]  Bariatric Surgery Progress Note    Admission Date: 2019         Assessment and Plan:   Ana Wyman is a 48 year old female S/P Procedure(s):  LAPAROSCOPIC GASTRIC BYPASS, 2 Days Post-Op.     - Fulls  - Ambulate 4x day and encourage IS  - Home after lunch  - DC instructions discussed             Interval History:   Tolerating diet, pain controlled with PO meds, UO adequate, ambulating, + flatus. Meds reviewed.                     Physical Exam:   Blood pressure 128/75, pulse 83, temperature 98.1  F (36.7  C), temperature source Oral, resp. rate 18, height 1.651 m (5' 5\"), weight 147 kg (324 lb 1.2 oz), SpO2 95 %, not currently breastfeeding.  Temperature Temp  Av.3  F (36.8  C)  Min: 97.8  F (36.6  C)  Max: 98.7  F (37.1  C)   I/O last 3 completed shifts:  In: 6744.5 [P.O.:780; I.V.:5964.5]  Out: 3950 [Urine:3950]  Constitutional:  Awake, alert, oriented, and in no apparent distress.   Lungs: No increased work of breathing, good air exchange, clear to auscultation bilaterally, and no crackles or wheezing.   Cardiovascular: Regular rate and rhythm, normal S1 and S2, and no murmur noted.   Abdomen: Soft, non-distended, appropriately tender at incision(s), + BS. Binder present.    Wounds: Clean, dry, and intact.  No erythema or drainage.    Extremities: No edema or calf tenderness. +SCDs.          Data:   No new labs/imaging.      Jody Cordova PA-C  [facility]  [phone]  "

## 2019-01-30 NOTE — PLAN OF CARE
A&O. VSS on RA. Up with SBA. Tylenol for pain, oxy available. +flatus. Incisions intact. Plans discharge tomorrow. IVF infusing.

## 2019-01-30 NOTE — DISCHARGE INSTRUCTIONS
You were given the medication Sugammadex that may decrease effectiveness of birth control.  We recommend you use a backup method of birth control for 7 days after surgery.  Continue to take your hormonal contraceptive during this period.

## 2019-01-31 ENCOUNTER — TELEPHONE (OUTPATIENT)
Dept: PEDIATRICS | Facility: CLINIC | Age: 49
End: 2019-01-31

## 2019-01-31 NOTE — TELEPHONE ENCOUNTER
Please contact patient for In-patient follow up.  [phone] (home) [phone] (work)    Visit date: 01/28 - D/C'd 01/30/2019  Diagnosis listed:Morbid Obesity (H), Morbid Obesity  Number of visits in past 12 months:ED 1/ IP 1

## 2019-01-31 NOTE — TELEPHONE ENCOUNTER
"ED / Discharge Outreach Protocol    Patient Contact    Attempt # 1    Was call answered?  Yes.  \"May I please speak with <patient name>\"  Is patient available?   Yes    ED/Discharge Protocol    \"Hi, my name is Anny Carson, a registered nurse, and I am calling on behalf of Dr. Bedoya's office at [place].  I am calling to follow up and see how things are going for you after your recent visit.\"    \"I see that you were in the (ER/UC/IP) on 1/28/19.    How are you doing now that you are home?\" \"Doing well\"    Is patient experiencing symptoms that may require a hospital visit?  no    Discharge Instructions    \"Let's review your discharge instructions.  What is/are the follow-up recommendations?  Pt. Response: Reviewed discharge instructions.    \"Were you instructed to make a follow-up appointment?\"  Pt. Response: Yes.  Has appointment been made?   Yes      \"When you see the provider, I would recommend that you bring your discharge instructions with you.    Medications    \"How many new medications are you on since your hospitalization/ED visit?\"    2 or more - Epic MTM referral needed  \"How many of your current medicines changed (dose, timing, name, etc.) while you were in the hospital/ED visit?\"   0-1  \"Do you have questions about your medications?\"   No  \"Were you newly diagnosed with heart failure, COPD, diabetes or did you have a heart attack?\"   No  For patients on insulin: \"Did you start on insulin in the hospital or did you have your insulin dose changed?\"   No    Medication reconciliation completed? Yes    Was MTM referral placed (*Make sure to put transitions as reason for referral)?   No    Call Summary    \"Do you have any questions or concerns about your condition or care plan at the moment?\"    No      Patient was in ER 2 in the past year (assess appropriateness of ER visits.)      \"If you have questions or things don't continue to improve, we encourage you contact us through the main clinic number,  " "[phone].  Even if the clinic is not open, triage nurses are available 24/7 to help you.     We would like you to know that our clinic has extended hours (provide information).  We also have urgent care (provide details on closest location and hours/contact info)\"      \"Thank you for your time and take care!\"      "

## 2019-02-01 ENCOUNTER — TELEPHONE (OUTPATIENT)
Dept: SURGERY | Facility: CLINIC | Age: 49
End: 2019-02-01

## 2019-02-01 NOTE — TELEPHONE ENCOUNTER
Surgery Date: 1/28/19  Surgery Type: bypass  Surgeon: Nikunj    Fluid Intake: 59 oz of water; asked about drinking protein water and protein drinks; encouraged patient that these things do count as liquid if not consumed at mealtime.    Pain Assessment:    Pain level: 0/10 at this time; states well controlled with tylenol during the day and 5 mg of oxycodone at night.    Patient asked about tylenol dosage.  Reviewed recommended dose of 1000 mg every 8 hours scheduled; patient asking about different forms of tylenol; states patient can take liquid or similar amount of child chewable if not able to swallow pills.  Reminded of limit of 3000 mg in 24 hours.    Diet: full liquids well tolerated. Pt referred to dietician for diet specific questions; see plan.    Nausea: no  Vomiting: no  NSAIDs: no  Smoking: no    Fever: no    Incisions: clean, dry, intact with steristrips; no s/s of infection per pt report    BMs:  Last BM: today  Color/consistency: dark brown, semisolid    Sleeping/Rest: pt reports resting well    Activity: reports walking around the home; tolerating activity well    Other symptoms : denies chest pain, SOB; states she is slightly dizzy when getting up.  Discussed increasing fluids, changing positions slowly.    Plan/Advice: Patient referred to dietician on phone for diet specific questions about specific items.  1 week followup appointment verified.   Patient encouraged to call triage line with any questions or concerns.    Patient verbalized understanding/agreement with plan.  Ne Morris RN on 2/1/2019 at 10:29 AM

## 2019-02-05 ENCOUNTER — OFFICE VISIT (OUTPATIENT)
Dept: SURGERY | Facility: CLINIC | Age: 49
End: 2019-02-05
Payer: COMMERCIAL

## 2019-02-05 VITALS
TEMPERATURE: 97.8 F | OXYGEN SATURATION: 96 % | DIASTOLIC BLOOD PRESSURE: 70 MMHG | WEIGHT: 293 LBS | SYSTOLIC BLOOD PRESSURE: 112 MMHG | RESPIRATION RATE: 18 BRPM | BODY MASS INDEX: 51.4 KG/M2 | HEART RATE: 82 BPM

## 2019-02-05 DIAGNOSIS — Z98.84 BARIATRIC SURGERY STATUS: Primary | ICD-10-CM

## 2019-02-05 DIAGNOSIS — G47.33 OSA ON CPAP: ICD-10-CM

## 2019-02-05 DIAGNOSIS — K91.2 POSTSURGICAL MALABSORPTION: ICD-10-CM

## 2019-02-05 DIAGNOSIS — E66.01 MORBID OBESITY (H): ICD-10-CM

## 2019-02-05 PROCEDURE — 99024 POSTOP FOLLOW-UP VISIT: CPT | Performed by: PHYSICIAN ASSISTANT

## 2019-02-05 PROCEDURE — 99207 ZZC NO CHARGE NURSE ONLY: CPT

## 2019-02-05 RX ORDER — CYANOCOBALAMIN (VITAMIN B-12) 2500 MCG
1250 TABLET, SUBLINGUAL SUBLINGUAL
COMMUNITY
End: 2022-07-18

## 2019-02-05 RX ORDER — CHOLECALCIFEROL (VITAMIN D3) 50 MCG
1 TABLET ORAL EVERY MORNING
COMMUNITY

## 2019-02-05 NOTE — PROGRESS NOTES
[facility]   1 Week Surgical Follow-Up     PCP:  Kelly Bedoya    DOS: 01/28/19  Surgeon: JERI  Surgery Type: Bypass  HISTORY OF PRESENT ILLNESS:  Ana Wyman returns today for her follow-up appointment status post bariatric surgery.  She is currently using Nothing for pain medication. Last dose of tylenol 2 days yesterday Refill Needed: No.  Patient's Pain Scale: 2. The pain is located left side abdomen.  Yesterday was helping mother and took her to the ED.  She did more activity than she should have.    Patient's current daily fluid intake in oz is: 58 oz water, protein water.  Patient has started postoperative Vitamins: Yes.  Using CPAP    Activity:   Activity: walking 5 minutes 5-6 times/day  REVIEW OF SYSTEMS:  GI:    Nausea: No  Vomiting: No  Diarrhea: No  Constipation: No  Last BM: today; more formed, dark  Dysphagia: No  GERD: No - taking zantac    CV/Pulmonary:   Chest Pain: No  Dizzy: Yes  Light Headed: Yes(moving from bent over to standing, in shower, before meals)  Patient has a history of low blood pressure.    SOB: No - Using IS up to 4000  Endo:  Diabetes: No  :    Contraception: ablation  Dysuria: No(urine is dark in color, encouraged fluids; denies other s/s)  Vascular:    LE Edema: No  PHYSICAL EXAMINATION:    /70 (BP Location: Left arm, Patient Position: Sitting, Cuff Size: Adult Large)   Pulse 82   Temp 97.8  F (36.6  C) (Oral)   Resp 18   Wt 308 lb 14.4 oz (140.1 kg)   SpO2 96%   BMI 51.40 kg/m         GENERAL: No acute distress. Alert and oriented time 3.  HEART: Regular rate and rhythm.  LUNGS:  Clear to auscultation bilaterally.  ABDOMEN: Soft, incisions clean,dry, and intact. Tenderness WNL for post op.  EXTREMITIES: No lower extremity edema bilaterally. No calf swelling or tenderness. Negative pushpa's  SKIN: No rashes.  PSYCHOLOGICAL: Stable. Pleasant      ASSESSMENT:    1. S/P bariatric surgery.  2. Post surgical malabsorption  3. Low blood  pressure    PLAN:   Continue with recommended antacid medication for the next 3 months.   Strive to drink 64 oz of fluid daily.  Strive to move hourly while awake to decrease risk of developing a blood clot.  Continue to do deep breathing exercises twice daily or use your spirometer.  Follow up with your primary care provider to discuss obesity related conditions by one month post op. - Can also discuss low blood pressure  Start recommended postoperative vitamins within in the first 6 weeks.  Advance diet per Dietitian at your 2 week appointment.   Make follow up appointment to meet with psychologist at 1 and 3 months post operatively.   Wear binder to support abdominal muscles and gradually wean off over the next few weeks.   Return to clinic for 2 wk post op appointment and bring post op vitamins along.  Call with questions or concerns at any time.

## 2019-02-05 NOTE — PATIENT INSTRUCTIONS
Continue with recommended antacid medication for the next 3 months.   Strive to drink 64 oz of fluid daily.  Strive to move hourly while awake to decrease risk of developing a blood clot.  Continue to do deep breathing exercises twice daily or use your spirometer.  Follow up with your primary care provider to discuss obesity related conditions by one month post op. - can discuss low blood pressure  Start recommended postoperative vitamins within in the first 6 weeks.  Advance diet per Dietitian at your 2 week appointment.   Make follow up appointment to meet with psychologist at 1 and 3 months post operatively.   Wear binder to support abdominal muscles and gradually wean off over the next few weeks.   Return to clinic for 2 wk post op appointment and bring post op vitamins along.  Call with questions or concerns at any time.

## 2019-02-05 NOTE — PROGRESS NOTES
Patient seen by provider in clinic who completed assessment.    Ne Morris RN on 2/5/2019 at 1:41 PM

## 2019-02-07 ENCOUNTER — TELEPHONE (OUTPATIENT)
Dept: SURGERY | Facility: CLINIC | Age: 49
End: 2019-02-07

## 2019-02-13 ENCOUNTER — OFFICE VISIT (OUTPATIENT)
Dept: SURGERY | Facility: CLINIC | Age: 49
End: 2019-02-13
Payer: COMMERCIAL

## 2019-02-13 ENCOUNTER — TELEPHONE (OUTPATIENT)
Dept: SURGERY | Facility: CLINIC | Age: 49
End: 2019-02-13

## 2019-02-13 VITALS
RESPIRATION RATE: 16 BRPM | DIASTOLIC BLOOD PRESSURE: 77 MMHG | SYSTOLIC BLOOD PRESSURE: 105 MMHG | HEART RATE: 77 BPM | BODY MASS INDEX: 50.37 KG/M2 | WEIGHT: 293 LBS | OXYGEN SATURATION: 97 % | TEMPERATURE: 98.1 F

## 2019-02-13 VITALS — WEIGHT: 293 LBS | BODY MASS INDEX: 50.02 KG/M2 | HEIGHT: 64 IN

## 2019-02-13 DIAGNOSIS — K91.2 POSTOPERATIVE MALABSORPTION: ICD-10-CM

## 2019-02-13 DIAGNOSIS — E66.01 MORBID OBESITY (H): ICD-10-CM

## 2019-02-13 DIAGNOSIS — E66.01 MORBID OBESITY WITH BMI OF 50.0-59.9, ADULT (H): Primary | ICD-10-CM

## 2019-02-13 DIAGNOSIS — Z98.84 BARIATRIC SURGERY STATUS: ICD-10-CM

## 2019-02-13 PROCEDURE — 99207 ZZC NO CHARGE NURSE ONLY: CPT

## 2019-02-13 PROCEDURE — 97803 MED NUTRITION INDIV SUBSEQ: CPT | Performed by: DIETITIAN, REGISTERED

## 2019-02-13 RX ORDER — MULTIVITAMIN WITH IRON
1 TABLET ORAL EVERY EVENING
COMMUNITY
End: 2022-07-18

## 2019-02-13 ASSESSMENT — MIFFLIN-ST. JEOR: SCORE: 1983.04

## 2019-02-13 NOTE — TELEPHONE ENCOUNTER
"Patient was seen for her 2 week PO and doing well.  States she was feeling \"kind of funny here\" but didn't report that.  After she left the appointment today felt tightness in her chest and vomited.    Reports that at breakfast today she had 1 egg and multi-vitamins.  Drank 8 ounces water between breakfast and her visit.    States left the appointment and about 45\" later had water and 1 sip of protein drink.  Had tightness in her chest and vomited.     She then waited a bit and had a tiny bit of cottage and chix.  Vomited food and water after this. - also had the chest tightness after eating.  Chest tightness doesn't occur unless eats.    Had nose bleed this am and had blood in first vomit but non since.  Vomited total of 6-7 times since left appointment.    Patient reports some nausea and when she gets the pain in right between breast bones after eating - otherwise no vomiting.     Not light headed of dizzy but has HA.  No fever.  BM's - same/normalizing.    Suspect from sx that patient is blocked from breakfast this am.     Plan:  Sip on warm water//tea until it goes through or comes up.   Sleep propped up if still vomiting.  Once dislodged, stay on liquids for 2-3 days and then gradually advance to purees.   Call clinic at 0830 tomorrow with update.  If light headed or dizzy, increase HE, SOB, blood in vomit or stool or vomiting/chest pain not felt controlled go to ED.   Patient verbalized understanding and is agreeable to plan.  Laura Paulino, MS, RD, RN    "

## 2019-02-13 NOTE — PROGRESS NOTES
"BARIATRIC PROGRESS NOTE - 2 Week Post Op  DATE OF VISIT: February 13, 2019    Ana Wyman  1970  female  [id number]  48 year old    ASSESSMENT:    REASON FOR VISIT:  Ana Wyman is a 48 year old year old female presents today for a 2 Week Post Op nutrition follow-up appointment. Patient is accompanied by self      DIAGNOSIS:  Status: post gastric bypass surgery.   Obesity Grade III BMI >40    ANTHROPOMETRICS:  Height: 5'4\"  Initial weight: 153.8 kg (339 lbs)    Current Weight: 302.7 lbs   BMI: 51.96 kg/(m^2).    VITAMINS AND MINERALS:   2 Multivitamin with Minerals   500 mg Calcium With Vitamin D x 2  2000 International units Vitamin Dx 2  2500 mcg Vitamin B-12 sublingual per week  N/A mg Iron  5000 mcg Biotin      NUTRITION HISTORY:  Tolerating diet: Bariatric pureed diet  Fluids/water intake: 64 ounces/day  Small bites: Yes  Portion size: 1/2-1 cup or less  20-30 minute meals: Yes  Fluids and meals  by 30 minutes: Yes  Chew foods thoroughly: Yes  Breakfast: egg, banana   Lunch: chicken, cottage cheese, baby food (ham)    Dinner: chili (mashed), mashed potato   Snacks: protein drink   Nausea: a little bit with banana and yogurt, guessing it has to do more with taking vitamins in the morning   Vomiting: none  Constipation: a little with full liquid diet  Additional Information:   Pt is back to work (from home) and planing to go in by next week. Patient's mother is in hospital, pt has been visiting mother everyday and taking walk breaks to get in her physical activity. Pt mentions burning pain on her side, where her incisions is located, states that it is better today. Per RN, pt will be increasing vitamin D to 2000 international unit(s) x 3 per day and moving multivitamin to HS.     PHYSICAL ACTIVITY:  Type: 2-3 x per day walk   Frequency: 7 days a week.  Duration: 10 minutes.    DIAGNOSIS:   Current Nutrition Diagnosis: Altered gastrointestinal function related to alternation in " gastrointestinal structure as evidenced by history of gastric bypass.     INTERVENTION  Nutrition Prescription: Recommended bariatric puree diet.    Goals:  Start puree diet at day 14 post op.  Continue MVI, calcium with vitamin D, vitamin B12, vitamin D  Aim for 60 to 90 grams protein.  Continue 48 to 64 oz of fluid per day.    Implementation:  Discussed transition to puree diet.  Emphasized importance of adequate protein.  Reviewed required vitamins and mineral supplements.  Verbalizes good understanding of surgery diet guidelines.  Assessed learning needs and learning preferences.      NUTRITION MONITORING AND EVALUATION:   Monitor diet tolerance and weight loss.      Anticipated Compliance: good  Follow Up: Continue to monitor patient closely regarding weight loss and diet.  At 4 weeks Post Op    TIME SPENT WITH PATIENT: 25 minutes.    Katiana Bell RD, LD

## 2019-02-13 NOTE — PROGRESS NOTES
"[facility]  2 Week Surgical Follow-Up     Current PCP:  Kelly Bedoya  Surgeon: JERI  HISTORY OF PRESENT ILLNESS:  Ana Wyman returns today for her follow-up appointment status post Surgery Type: Bypass DOS: 01/28/19.  She is accompanied by Self.  Her daily fluid intake in oz is: 48-64 oz water, protien water and protein drink.   Feels well emotionally Yes.   Patient reports using: Alcohol - No     Cigarettes - No  Pain:    She is currently using Pain Meds: nothing for 2 days for pain relief. She rates her pain at a Pain Scale: 5 - 6 on the pain scale. The pain is located left side of abdomen and mostly with movement and deep breathing. Discomfort started Saturday after patient walked and then did grocery shopping - more than she had done since surgery.  Refill Needed: No  Activity:  The patient is considered a fall risk: No.   What are you doing for physical activity? Activity: walking 2-3 times daily for 10\"  Patient plans to walk at work and use treadmill, DVD's and W[employer] Fit.    Review of Systems:   GI:  Nausea: Yes occurs occasionally.  It is related to taking MVI, B-Complex and Biotin in the am with little food in her stomach. She will change these to HS and take calcium in morning.  Vomiting: No   GERD: Yes with taking MVI in morning.   Diarrhea: No          Constipation: No    Patient's Last BM: more NL - today       Neuro/CV/Pulmonary:   Dizzy: No   Light Headed: Yes when needs to eat and with showering so is now taking warm showers  SOB: No   Chest Pain: No .  Vascular:    LE Edema: slight but patient states markedly improved from prior to surgery - no longer feels need to wear support hose.  Gui's Negative  :  Form of birth control is Contraception: ablation  Symptoms of UTI:  Dysuria: No  Endo: Diabetes: No  BS check (freq): na                  Avg. BS Level: na  PHYSICAL EXAMINATION:    VITALS:  /77 (BP Location: Left arm, Patient Position: Sitting, Cuff Size: " Adult Large)   Pulse 77   Temp 98.1  F (36.7  C)   Resp 16   Wt 302 lb 11.2 oz (137.3 kg)   SpO2 97%   BMI 50.37 kg/m                      LUNGS:  clear to auscultation  ABDOMEN: Abdomen soft, non-tender. BS normal. No masses, organomegaly  INCISION: dry and intact    MEDICATIONS/VITAMINS/ALLERGIES REVIEWED: Yes  ASA Hx: No    ASSESSMENT:    1.  DOS: 01/28/19 status post gastric bypass surgery.    Steri strip removed?  Yes       PLAN:    Make follow up appointment to meet with psychologist within 3 months post operatively.     Follow up with your primary care provider to obesity related conditions by one month post op.     Advance diet per Dietitian at your 2 week appointment.     Call with questions or concerns at any time.    Return to clinic in 4 weeks for nutrition visit.    Return to clinic postop month 3.    She will change MVI, B-Complex, and Niacin to HS and take calcium in morning to help alleviate am nausea.    Guidelines provided re: how to increase activity/exercise?  Yes

## 2019-02-14 ENCOUNTER — TELEPHONE (OUTPATIENT)
Dept: SURGERY | Facility: CLINIC | Age: 49
End: 2019-02-14

## 2019-02-14 NOTE — TELEPHONE ENCOUNTER
Patient had gastric bypass 1/28/19.  Spoke with her yesterday re: suspected food/vitamin blockage.    Called patient today to check status.  Patient reports she kept drinking warm water yesterday until food passed about 6 pm last night.    Today feels well - is able to drink.  Plans to stay on liquids for about 2 days and gradually advance to purees.    Suggested that next time patient have eggs, she keeps them very moist or whips with milk to get them fluffy.   Also may want to take multivitamin with liquids about 1/2 hour after eating.    Patient to call with further questions/concerns.  Patient verbalized understanding and is agreeable to plan.  Laura Paulino, MS, RD, RN

## 2019-02-25 ENCOUNTER — OFFICE VISIT (OUTPATIENT)
Dept: SURGERY | Facility: CLINIC | Age: 49
End: 2019-02-25
Payer: COMMERCIAL

## 2019-02-25 VITALS — BODY MASS INDEX: 50.02 KG/M2 | WEIGHT: 293 LBS | HEIGHT: 64 IN

## 2019-02-25 DIAGNOSIS — E66.01 MORBID OBESITY (H): ICD-10-CM

## 2019-02-25 DIAGNOSIS — Z98.84 BARIATRIC SURGERY STATUS: ICD-10-CM

## 2019-02-25 PROCEDURE — 97803 MED NUTRITION INDIV SUBSEQ: CPT | Performed by: DIETITIAN, REGISTERED

## 2019-02-25 ASSESSMENT — MIFFLIN-ST. JEOR: SCORE: 1945.84

## 2019-02-25 NOTE — PROGRESS NOTES
"BARIATRIC PROGRESS NOTE - 4 Week Post Op  DATE OF VISIT: February 25, 2019    Ana Wyman  1970  female  [id number]  48 year old    ASSESSMENT:    REASON FOR VISIT:  Ana Wyman is a 48 year old year old female presents today for a 4 Week Post Op nutrition follow-up appointment. Patient is accompanied by self      DIAGNOSIS:  Status: post gastric bypass surgery.   Obesity Grade III BMI >40    ANTHROPOMETRICS:  Height: 162.6 cm (5' 4\")  Initial weight: 339 lbs    Current Weight: 133.6 kg (294 lb 8 oz)  BMI: 50.66 kg/(m^2).    VITAMINS AND MINERALS:   2 Multivitamin with Minerals (HS)  500 mg Calcium With Vitamin D (TID with meals)  2000 International units Vitamin Dx 2  2500 mcg Vitamin B-12 sublingual per week  N/A mg Iron  5000 mcg Biotin    NUTRITION HISTORY:  Tolerating diet: Bariatric pureed diet  Fluids/water intake: 50-60 ounces/day (water, herbal tea, crystal light, protein drink)  Small bites: Yes  Portion size: 1/2-3/4  20-30 minute meals: Yes  Fluids and meals  by 30 minutes: Yes  Chew foods thoroughly: Yes  Breakfast: oatmeal with PB2  scrambled eggs  yogurt   Lunch: chili (pureed)  mashed potatoes  cottage cheese  canned chicken  tuna   Dinner: (same as lunch) SF pudding made with protein drink   Snacks: none  Nausea: occasional - after eating too quickly  Vomiting: occasional - after eating too quickly   Constipation: yes   Additional Information: Pt feeling well and tolerating diet. One episode of eating too quickly then vomited. Some constipation followed by loose stool.       PHYSICAL ACTIVITY:  Type: walking/generally keeping active    DIAGNOSIS:   Previous Nutrition Diagnosis: Altered gastrointestinal function related to alternation in gastrointestinal structure as evidenced by history of gastric bypass.   No change    Previous Goals:  Start puree diet at day 14 post op. - met  Continue MVI, calcium with vitamin D, vitamin B12, vitamin D - met  Aim for 60 to 90 " grams protein. - not met  Continue 48 to 64 oz of fluid per day. - met    Current Nutrition Diagnosis: Altered gastrointestinal function related to alternation in gastrointestinal structure as evidenced by history of gastric bypass.     INTERVENTION  Nutrition Prescription: Recommended bariatric soft diet.    Goals:  Start soft diet at day 28 post op.  Continue MVI, calcium with vitamin D, vitamin B12, vitamin D  Continue 48 to 64 oz of fluid per day.    Implementation:  Discussed transition to soft diet.  Emphasized importance of adequate protein.  Reviewed required vitamins and mineral supplements.  Verbalizes good understanding of surgery diet guidelines.  Assessed learning needs and learning preferences.      NUTRITION MONITORING AND EVALUATION:   Monitor diet tolerance and weight loss.      Anticipated Compliance: good  Follow Up: Continue to monitor patient closely regarding weight loss and diet.  At 3 months Post Op    TIME SPENT WITH PATIENT: 35 minutes.    Lydia Quijano RD, LD  Clinical Dietitian

## 2019-03-15 ENCOUNTER — APPOINTMENT (OUTPATIENT)
Dept: CT IMAGING | Facility: CLINIC | Age: 49
End: 2019-03-15
Attending: EMERGENCY MEDICINE
Payer: COMMERCIAL

## 2019-03-15 ENCOUNTER — HOSPITAL ENCOUNTER (EMERGENCY)
Facility: CLINIC | Age: 49
Discharge: HOME | End: 2019-03-15
Attending: EMERGENCY MEDICINE | Admitting: EMERGENCY MEDICINE
Payer: COMMERCIAL

## 2019-03-15 VITALS
RESPIRATION RATE: 18 BRPM | BODY MASS INDEX: 48.41 KG/M2 | WEIGHT: 282 LBS | DIASTOLIC BLOOD PRESSURE: 82 MMHG | OXYGEN SATURATION: 97 % | TEMPERATURE: 98 F | SYSTOLIC BLOOD PRESSURE: 127 MMHG

## 2019-03-15 DIAGNOSIS — R10.11 RUQ ABDOMINAL PAIN: ICD-10-CM

## 2019-03-15 LAB
ALBUMIN SERPL-MCNC: 3.8 G/DL (ref 3.4–5)
ALBUMIN UR-MCNC: NEGATIVE MG/DL
ALP SERPL-CCNC: 72 U/L (ref 40–150)
ALT SERPL W P-5'-P-CCNC: 38 U/L (ref 0–50)
ANION GAP SERPL CALCULATED.3IONS-SCNC: 7 MMOL/L (ref 3–14)
APPEARANCE UR: CLEAR
AST SERPL W P-5'-P-CCNC: 25 U/L (ref 0–45)
BACTERIA #/AREA URNS HPF: ABNORMAL /HPF
BASOPHILS # BLD AUTO: 0 10E9/L (ref 0–0.2)
BASOPHILS NFR BLD AUTO: 0.4 %
BILIRUB SERPL-MCNC: 0.6 MG/DL (ref 0.2–1.3)
BILIRUB UR QL STRIP: NEGATIVE
BUN SERPL-MCNC: 15 MG/DL (ref 7–30)
CALCIUM SERPL-MCNC: 9.2 MG/DL (ref 8.5–10.1)
CHLORIDE SERPL-SCNC: 109 MMOL/L (ref 94–109)
CO2 SERPL-SCNC: 25 MMOL/L (ref 20–32)
COLOR UR AUTO: ABNORMAL
CREAT SERPL-MCNC: 0.78 MG/DL (ref 0.52–1.04)
D DIMER PPP FEU-MCNC: 1.8 UG/ML FEU (ref 0–0.5)
DIFFERENTIAL METHOD BLD: NORMAL
EOSINOPHIL # BLD AUTO: 0.3 10E9/L (ref 0–0.7)
EOSINOPHIL NFR BLD AUTO: 3.7 %
ERYTHROCYTE [DISTWIDTH] IN BLOOD BY AUTOMATED COUNT: 14 % (ref 10–15)
GFR SERPL CREATININE-BSD FRML MDRD: 89 ML/MIN/{1.73_M2}
GLUCOSE SERPL-MCNC: 90 MG/DL (ref 70–99)
GLUCOSE UR STRIP-MCNC: NEGATIVE MG/DL
HCT VFR BLD AUTO: 42.8 % (ref 35–47)
HGB BLD-MCNC: 13.7 G/DL (ref 11.7–15.7)
HGB UR QL STRIP: ABNORMAL
IMM GRANULOCYTES # BLD: 0 10E9/L (ref 0–0.4)
IMM GRANULOCYTES NFR BLD: 0.1 %
KETONES UR STRIP-MCNC: NEGATIVE MG/DL
LEUKOCYTE ESTERASE UR QL STRIP: NEGATIVE
LIPASE SERPL-CCNC: 185 U/L (ref 73–393)
LYMPHOCYTES # BLD AUTO: 2.6 10E9/L (ref 0.8–5.3)
LYMPHOCYTES NFR BLD AUTO: 34.2 %
MCH RBC QN AUTO: 29.2 PG (ref 26.5–33)
MCHC RBC AUTO-ENTMCNC: 32 G/DL (ref 31.5–36.5)
MCV RBC AUTO: 91 FL (ref 78–100)
MONOCYTES # BLD AUTO: 0.6 10E9/L (ref 0–1.3)
MONOCYTES NFR BLD AUTO: 7.3 %
MUCOUS THREADS #/AREA URNS LPF: PRESENT /LPF
NEUTROPHILS # BLD AUTO: 4.1 10E9/L (ref 1.6–8.3)
NEUTROPHILS NFR BLD AUTO: 54.3 %
NITRATE UR QL: NEGATIVE
NRBC # BLD AUTO: 0 10*3/UL
NRBC BLD AUTO-RTO: 0 /100
PH UR STRIP: 6 PH (ref 5–7)
PLATELET # BLD AUTO: 258 10E9/L (ref 150–450)
POTASSIUM SERPL-SCNC: 3.3 MMOL/L (ref 3.4–5.3)
PROT SERPL-MCNC: 7.7 G/DL (ref 6.8–8.8)
RBC # BLD AUTO: 4.69 10E12/L (ref 3.8–5.2)
RBC #/AREA URNS AUTO: 1 /HPF (ref 0–2)
SODIUM SERPL-SCNC: 141 MMOL/L (ref 133–144)
SOURCE: ABNORMAL
SP GR UR STRIP: 1 (ref 1–1.03)
SQUAMOUS #/AREA URNS AUTO: 1 /HPF (ref 0–1)
TROPONIN I SERPL-MCNC: <0.015 UG/L (ref 0–0.04)
UROBILINOGEN UR STRIP-MCNC: 0 MG/DL (ref 0–2)
WBC # BLD AUTO: 7.6 10E9/L (ref 4–11)
WBC #/AREA URNS AUTO: 1 /HPF (ref 0–5)

## 2019-03-15 PROCEDURE — 84484 ASSAY OF TROPONIN QUANT: CPT | Performed by: EMERGENCY MEDICINE

## 2019-03-15 PROCEDURE — 71260 CT THORAX DX C+: CPT

## 2019-03-15 PROCEDURE — 83690 ASSAY OF LIPASE: CPT | Performed by: EMERGENCY MEDICINE

## 2019-03-15 PROCEDURE — 81001 URINALYSIS AUTO W/SCOPE: CPT | Performed by: EMERGENCY MEDICINE

## 2019-03-15 PROCEDURE — 25800030 ZZH RX IP 258 OP 636: Performed by: EMERGENCY MEDICINE

## 2019-03-15 PROCEDURE — 85025 COMPLETE CBC W/AUTO DIFF WBC: CPT | Performed by: EMERGENCY MEDICINE

## 2019-03-15 PROCEDURE — 25000128 H RX IP 250 OP 636: Performed by: EMERGENCY MEDICINE

## 2019-03-15 PROCEDURE — 74177 CT ABD & PELVIS W/CONTRAST: CPT

## 2019-03-15 PROCEDURE — 25000132 ZZH RX MED GY IP 250 OP 250 PS 637: Performed by: EMERGENCY MEDICINE

## 2019-03-15 PROCEDURE — 85379 FIBRIN DEGRADATION QUANT: CPT | Performed by: EMERGENCY MEDICINE

## 2019-03-15 PROCEDURE — 25000125 ZZHC RX 250: Performed by: EMERGENCY MEDICINE

## 2019-03-15 PROCEDURE — 80053 COMPREHEN METABOLIC PANEL: CPT | Performed by: EMERGENCY MEDICINE

## 2019-03-15 PROCEDURE — 96361 HYDRATE IV INFUSION ADD-ON: CPT

## 2019-03-15 PROCEDURE — 93005 ELECTROCARDIOGRAM TRACING: CPT

## 2019-03-15 PROCEDURE — 96360 HYDRATION IV INFUSION INIT: CPT

## 2019-03-15 PROCEDURE — 99285 EMERGENCY DEPT VISIT HI MDM: CPT | Mod: 25

## 2019-03-15 RX ORDER — IOPAMIDOL 755 MG/ML
500 INJECTION, SOLUTION INTRAVASCULAR ONCE
Status: COMPLETED | OUTPATIENT
Start: 2019-03-15 | End: 2019-03-15

## 2019-03-15 RX ORDER — ONDANSETRON 4 MG/1
4 TABLET, ORALLY DISINTEGRATING ORAL EVERY 8 HOURS PRN
Qty: 10 TABLET | Refills: 0 | Status: SHIPPED | OUTPATIENT
Start: 2019-03-15 | End: 2019-03-29

## 2019-03-15 RX ADMIN — SODIUM CHLORIDE, POTASSIUM CHLORIDE, SODIUM LACTATE AND CALCIUM CHLORIDE 500 ML: 600; 310; 30; 20 INJECTION, SOLUTION INTRAVENOUS at 21:43

## 2019-03-15 RX ADMIN — SODIUM CHLORIDE 90 ML: 9 INJECTION, SOLUTION INTRAVENOUS at 22:37

## 2019-03-15 RX ADMIN — IOPAMIDOL 83 ML: 755 INJECTION, SOLUTION INTRAVENOUS at 22:37

## 2019-03-15 RX ADMIN — LIDOCAINE HYDROCHLORIDE 15 ML: 20 SOLUTION ORAL; TOPICAL at 21:52

## 2019-03-15 ASSESSMENT — ENCOUNTER SYMPTOMS
ABDOMINAL PAIN: 1
DIARRHEA: 0
CONSTIPATION: 0
NAUSEA: 0

## 2019-03-15 NOTE — ED AVS SNAPSHOT
[facility]  [address]  [facility] [address]  Phone:  [phone]  Fax:  [phone]                                    Ana Wyman   MRN: [id number]    Department:  [facility]   Date of Visit:  3/15/2019           After Visit Summary Signature Page    I have received my discharge instructions, and my questions have been answered. I have discussed any challenges I see with this plan with the nurse or doctor.    ..........................................................................................................................................  Patient/Patient Representative Signature      ..........................................................................................................................................  Patient Representative Print Name and Relationship to Patient    ..................................................               ................................................  Date                                   Time    ..........................................................................................................................................  Reviewed by Signature/Title    ...................................................              ..............................................  Date                                               Time          22EPIC Rev 08/18

## 2019-03-16 NOTE — ED PROVIDER NOTES
"  History     Chief Complaint:  Abdominal Pain      HPI   Ana Wyman is a 49 year old female status post gastric bypass surgery at the end of January who presents with abdominal pain. The patient states that for the last several days she has had right upper quadrant abdominal pain radiating to her right shoulder blade. The pain is described as a constant dull pain that is worse with breathing. She states that she does not feel like she is breathing as deep due to the pain. Initially she thought she might be constipated, however, she used prune juice and had normal bowel movements but her pain persisted. The patient denies any nausea.  She does note that earlier today while she was standing she felt a \"lump\" in her right upper quadrant, however, since laying down here in the ED it is no longer present.     Allergies:  Nsaids  Clindamycin   Codeine      Medications:    Albuterol   Claritin   Vitamin B12  Vitamin D     Past Medical History:    Esophageal reflux  Hallux valgus   History of steroid therapy   Kidney stone   Lichen Sclerosus   Lymphedema bilateral   Asthma   Vitamin D deficiency     Past Surgical History:    Right hand surgery   Foot surgeries x 2  Uterine ablation   Laparoscopic bypass gastric   Orthopedic surgery     Family History:    CAD-Father, Mother  Obesity-Father, Mother, Brother   Cancer-Father  Lipids-Father, Mother  Hypertension-Father, Mother, Brother  Diabetes-Mother  Thyroid Disease-Mother    Social History:  Marital Status: Single  Presents to the ED with brother  Tobacco Use: Never Used  Alcohol Use: Yes  PCP: Kelly Bedoya        Review of Systems   Gastrointestinal: Positive for abdominal pain. Negative for constipation, diarrhea and nausea.   All other systems reviewed and are negative.      Physical Exam   First Vitals:  BP: 127/82  Heart Rate: 73  Temp: 98  F (36.7  C)  Resp: 18  Weight: 127.9 kg (282 lb)  SpO2: 97 %      Physical Exam  Gen: well appearing, in no acute " distress  HEENT:  mmm, no rhinorrhea  Neck: supple, no abnormal swelling  Lungs:  CTAB,  no resp distress  CV: rrr, no m/r/g, ppi  Abd: soft, + ttp RUQ and epigastrium, no rigidity  Ext: no peripheral edema  Skin: warm, dry, well perfused, no rashes/bruising/lesions on exposed skin  Neuro: alert, MAEE, no gross motor or sensory deficits, gait stable  Psych: Normal mood, normal affect      Emergency Department Course   ECG:  @   Indication: Abdominal pain   Vent. Rate 68 bpm. MS interval 160 ms. QRS duration 104 ms. QT/QTc 400/425 ms. P-R-T axis 18 -25 2.   Normal sinus rhythm. Normal ECG.    Read @  by Dr. Shaw.     Imaging:  CT Chest (PE) Abdomen Pelvis With Contrast:   No acute process  Report per radiology.   Radiographic findings were communicated with the patient who voiced understanding of the findings.    Laboratory:  D dimer: 1.8 (H)   Lipase: 185  CMP: Potassium 3.3 (L), otherwise WNL (Creatinine 0.78)   CBC:  WBC 7.6, HGB 13.7, , otherwise WNL   () Troponin I: <0.015    UA: Clear straw urine, Blood small, Bacteria few, Mucous present, otherwise WNL     Interventions:  () Lactated Ringers, 500 mLs, IV   () GI Cocktail (Maalox/Mylanta and viscous Lidocaine), 30 mL suspension, PO      Emergency Department Course:  Nursing notes and vitals reviewed.  () I performed an exam of the patient as documented above.    EKG was done, interpretation as above.   A peripheral IV was established. Blood was drawn from the patient. This was sent for laboratory testing, findings above.    The patient was sent for a CT PE while in the emergency department, findings above.        Impression & Plan      Medical Decision Makin-year-old female with a recent history of lap scopic gastric bypass surgery at the end of January presenting with epigastric and right upper quadrant pain/right lower chest pain.  Concern here would be for cholecystitis acute hepatitis choledocholithiasis ureteral stone  right lower lobe pneumonia or PE.  Less likely atypical ACS pneumothorax leaking ruptured AAA.  We will do EKG basic blood tests d-dimer for risk stratification and then see which CT scan we do a chest abdomen pelvis or abdomen pelvis dictated by d-dimer.      Update: Patient remained stable here in the emergency department.  Her workup was unremarkable for a significant cardiopulmonary etiology or intra-abdominal etiology.  At this point we do not see an infectious/vascular/surgical emergency and I think she can be safely discharged home focus on symptomatic treatment we did discuss gastritis versus peptic ulcer disease versus sphincter of Oddi dysfunction and what the ongoing treatment for that would be.  She will contact her bariatric surgery service in the morning to let him know the symptoms she is having and is welcome to return with any new or worsening his symptoms here to the emergency department.      Diagnosis:    ICD-10-CM    1. RUQ abdominal pain R10.11        Disposition:    discharged to home    Discharge Medications:     Medication List      ASK your doctor about these medications    ranitidine 75 MG tablet  Commonly known as:  ZANTAC  75 mg, Oral, 2 TIMES DAILY  Ask about: Should I take this medication?              I, Mamie Diaz, am serving as a scribe on 3/15/2019 at 9:11 PM to personally document services performed by Dr. Shaw based on my observations and the provider's statements to me.   3/15/2019   [facility] EMERGENCY DEPARTMENT       Stanley Shaw MD  03/15/19 [id number]

## 2019-03-16 NOTE — DISCHARGE INSTRUCTIONS
Please make an appointment to follow up with your Bariatric Surgeon  at least by phone tomorrow    Return to ER immediately if you develop: worsening symptoms, Fever > 101, persistent nausea or vomiting OR you have any other concerns about your health.    Discharge Instructions  Abdominal Pain    Abdominal pain (belly pain) can be caused by many things. Your evaluation today does not show the exact cause for your pain. Your provider today has decided that it is unlikely your pain is due to a life threatening problem, or a problem requiring surgery or hospital admission. Sometimes those problems cannot be found right away, so it is very important that you follow up as directed.  Sometimes only the changes which occur over time allow the cause of your pain to be found.    Generally, every Emergency Department visit should have a follow-up clinic visit with either a primary or a specialty clinic/provider. Please follow-up as instructed by your emergency provider today. With abdominal pain, we often recommend very close follow-up, such as the following day.    ADULTS:  Return to the Emergency Department right away if:    You get an oral temperature above 102oF or as directed by your provider.  You have blood in your stools. This may be bright red or appear as black, tarry stools.    You keep vomiting (throwing up) or cannot drink liquids.  You see blood when you vomit.   You cannot have a bowel movement or you cannot pass gas.  Your stomach gets bloated or bigger.  Your skin or the whites of your eyes look yellow.  You faint.  You have bloody, frequent or painful urination (peeing).  You have new symptoms or anything that worries you.    CHILDREN:  Return to the Emergency Department right away if your child has any of the above-listed symptoms or the following:    Pushes your hand away or screams/cries when his/her belly is touched.  You notice your child is very fussy or weak.  Your child is very tired and is too tired  to eat or drink.  Your child is dehydrated.  Signs of dehydration can be:  Significant change in the amount of wet diapers/urine.  Your infant or child starts to have dry mouth and lips, or no saliva (spit) or tears.    PREGNANT WOMEN:  Return to the Emergency Department right away if you have any of the above-listed symptoms or the following:    You have bleeding, leaking fluid or passing tissue from the vagina.  You have worse pain or cramping, or pain in your shoulder or back.  You have vomiting that will not stop.  You have a temperature of 100oF or more.  Your baby is not moving as much as usual.  You faint.  You get a bad headache with or without eye problems and abdominal pain.  You have a seizure.  You have unusual discharge from your vagina and abdominal pain.    Abdominal pain is pretty common during pregnancy.  Your pain may or may not be related to your pregnancy. You should follow-up closely with your OB provider so they can evaluate you and your baby.  Until you follow-up with your regular provider, do the following:     Avoid sex and do not put anything in your vagina.  Drink clear fluids.  Only take medications approved by your provider.    MORE INFORMATION:    Appendicitis:  A possible cause of abdominal pain in any person who still has their appendix is acute appendicitis. Appendicitis is often hard to diagnose.  Testing does not always rule out early appendicitis or other causes of abdominal pain. Close follow-up with your provider and re-evaluations may be needed to figure out the reason for your abdominal pain.    Follow-up:  It is very important that you make an appointment with your clinic and go to the appointment.  If you do not follow-up with your primary provider, it may result in missing an important development which could result in permanent injury or disability and/or lasting pain.  If there is any problem keeping your appointment, call your provider or return to the Emergency  "Department.    Medications:  Take your medications as directed by your provider today.  Before using over-the-counter medications, ask your provider and make sure to take the medications as directed.  If you have any questions about medications, ask your provider.    Diet:  Resume your normal diet as much as possible, but do not eat fried, fatty or spicy foods while you have pain.  Do not drink alcohol or have caffeine.  Do not smoke tobacco.    Probiotics: If you have been given an antibiotic, you may want to also take a probiotic pill or eat yogurt with live cultures. Probiotics have \"good bacteria\" to help your intestines stay healthy. Studies have shown that probiotics help prevent diarrhea (loose stools) and other intestine problems (including C. diff infection) when you take antibiotics. You can buy these without a prescription in the pharmacy section of the store.     If you were given a prescription for medicine here today, be sure to read all of the information (including the package insert) that comes with your prescription.  This will include important information about the medicine, its side effects, and any warnings that you need to know about.  The pharmacist who fills the prescription can provide more information and answer questions you may have about the medicine.  If you have questions or concerns that the pharmacist cannot address, please call or return to the Emergency Department.       Remember that you can always come back to the Emergency Department if you are not able to see your regular provider in the amount of time listed above, if you get any new symptoms, or if there is anything that worries you.      "

## 2019-03-16 NOTE — ED TRIAGE NOTES
Pt with RQ abd pain for last two days.  Worsening today, worse with inspiration.  Recent gastric bypass surgery.  Denies n/v, endorses mild constipation.

## 2019-03-17 LAB — INTERPRETATION ECG - MUSE: NORMAL

## 2019-03-18 ENCOUNTER — TELEPHONE (OUTPATIENT)
Dept: SURGERY | Facility: CLINIC | Age: 49
End: 2019-03-18

## 2019-03-18 DIAGNOSIS — R10.11 RUQ ABDOMINAL PAIN: Primary | ICD-10-CM

## 2019-03-18 RX ORDER — SUCRALFATE 1 G/1
1 TABLET ORAL 4 TIMES DAILY
Qty: 40 TABLET | Refills: 0 | Status: SHIPPED | OUTPATIENT
Start: 2019-03-18 | End: 2019-03-29

## 2019-03-18 RX ORDER — OMEPRAZOLE 40 MG/1
40 CAPSULE, DELAYED RELEASE ORAL DAILY
Qty: 30 CAPSULE | Refills: 1 | Status: SHIPPED | OUTPATIENT
Start: 2019-03-18 | End: 2019-04-30

## 2019-03-18 NOTE — TELEPHONE ENCOUNTER
Called pt back about plan from VOLODYMYR Stacy.    Plan of care:  -stop Zantac, replace with omeprazole 40 mg daily (x30 days)  -start carafate tablets 1 gm four times a day (before meals and bed)  -apply ice pack several times a day to painful area  -take tylenol 1000 mg every 8 hours  -use IcyHot patches to painful area per package directions (avoid patches with NSAIDs like aspirin or ibuprofen)    RN to call and check in tomorrow to see if these approaches helped.  Consult with surgeon tomorrow if no relief.    Pt agrees to try this; agrees to call clinic if symptoms become worse.  Ne Morris RN on 3/18/2019 at 10:43 AM

## 2019-03-18 NOTE — TELEPHONE ENCOUNTER
Pt reports she was in ED Friday and told to follow up with clinic today about her pain. Pt reports as follows:    -Pt reports RUQ abdominal pain just below rib radiating to R back and R shoulder that started as dull pain on Tuesday and gradually got worse over the week. Reports pain became sharp 7/10 on Friday after she worked and was active at a concert.  States pain associated with movement and not related to eating or drinking; does not move anywhere else.  States pain is worse at times but constant, never goes away.  States pain did not really improve in ED with GI cocktail. States pain is now 5/10, not too bad.    -Seen in ED on Friday, CT abdomen completed, ruled out for PE, gallstones, kidney stones; MD suggested that pt may have ulcer or gallbladder dysfuction. Told to check with clinic today to see if she needs HIDA scan.    -States she has nausea with eating for the past several days.  States vomited x 1 yesterday after eating roast beef, carrots, potato (something she had tolerated before).  States she has not been eating sugary or fatty/greasy foods and has been following her diet.    -States she has had a little constipation and has needed to use prune juice; last prune juice on Friday, last BM Saturday.  No lower abdominal pain.    RN to check with PA, call pt back.  Ne Morris RN on 3/18/2019 at 9:46 AM

## 2019-03-19 ENCOUNTER — TELEPHONE (OUTPATIENT)
Dept: SURGERY | Facility: CLINIC | Age: 49
End: 2019-03-19

## 2019-03-19 DIAGNOSIS — R10.11 ABDOMINAL PAIN, RIGHT UPPER QUADRANT: Primary | ICD-10-CM

## 2019-03-19 NOTE — TELEPHONE ENCOUNTER
Consulted with PA, Dr. Calvin regarding pt concerns/symptoms.  Discussed presentation, previous tests, ED visit.    RUQ US ordered by VOLODYMYR Stacy.    Per radiology pt needs to be NPO for 8 hours before procedure and can have it done at any [facility] location.    Order entered for future order.  PA discussed with RN that pt may wait and see if pain continues to improve before trying US.    Called pt to update as above, including prep instructions.  Per PA did offer to pt that she could wait and see if pain improves before scheduling US.  Pt states she believes she will wait 24 hours and see how she feels first.      Agrees to call clinic with any worsening s/s or concerns.  Ne Morris, RN on 3/19/2019 at 2:15 PM

## 2019-03-19 NOTE — TELEPHONE ENCOUNTER
"Follow up done with patient today re: right-sided abdominal pain under rib and wraps around back.    Patient reports her mom is finally out of hospital as of yesterday after 6 weeks.  Patient did a lot of running around all day yesterday - got plenty of activity but didn't do her usual 30\" of walking/exercise twice daily.    Didn't have a chance to  her Omeprazole/Carafate until last night.  Took the Omeprazole 40 mg last night along with the Carafate and 1000 mg Tylenol last night and again this am.  Will take the Carafate before lunch today.  No pain patch due to lack of time to pick it up.    Today patient states she still has a more dull pain on her right side at 1-2/10.  States the pain didn't flare up like in the past after both 30\" walking sessions and after working Saturday night at [employer].    With flare-up pain lasts several hours at 7/10.  Hasn't experienced the flare-up pain yesterday or today.     Discussed that this is possibly musculoskeletal  pain but will discuss possible GI causes with surgeon today and get back to her today.  In the meantime, if patient has compression garments/wear, she could try these to support her abdomen.  Patient verbalized understanding and is agreeable to plan.  Also continue meds as above until plan developed with surgeon.  Laura Paulino, MS, RD, RN    "

## 2019-03-25 ENCOUNTER — HOSPITAL ENCOUNTER (OUTPATIENT)
Dept: ULTRASOUND IMAGING | Facility: CLINIC | Age: 49
Discharge: HOME | End: 2019-03-25
Attending: PHYSICIAN ASSISTANT | Admitting: PHYSICIAN ASSISTANT
Payer: COMMERCIAL

## 2019-03-25 DIAGNOSIS — R10.11 ABDOMINAL PAIN, RIGHT UPPER QUADRANT: ICD-10-CM

## 2019-03-25 PROCEDURE — 76705 ECHO EXAM OF ABDOMEN: CPT

## 2019-03-26 ENCOUNTER — TELEPHONE (OUTPATIENT)
Dept: SURGERY | Facility: CLINIC | Age: 49
End: 2019-03-26

## 2019-03-26 NOTE — TELEPHONE ENCOUNTER
Pt called to ask about medications post bariatric surgery.  Asked if it was ok to take sudafed and claritin for her allergies.  Reviewed Micromedex; no contraindications found.  Pt informed there are no bariatric surgery concerns with these medications.  Ne Morris RN on 3/26/2019 at 9:08 AM

## 2019-03-27 ENCOUNTER — TELEPHONE (OUTPATIENT)
Dept: SURGERY | Facility: CLINIC | Age: 49
End: 2019-03-27

## 2019-03-27 NOTE — TELEPHONE ENCOUNTER
"Patient had gastric bypass 1/28/19.    Seen in ED 3/15/19 for RUQ pain.    When speaking with patient it is difficult to determine if pain related to GB or musculoskeletal.  Difficult to determine if vomiting related to food advancement or GB.    Patient had RUQ US 3/25/19 =   IMPRESSION:   1. Gallbladder sludge and suspected tiny gallstones/gravel.  2. Fatty liver.    Patient calls today with 2 issues.    1. Right-sided hip and right lower back pain past 2-3 days 8-9/10 has to stop every 20\" - after walking at least 60\" can stand straighter and not have stop walking due to the pain which goes to a 4-5/10.  Going to chiropractor tonight for relief but not sure if related to RUQ pain.    Pain RUQ continues to radiate to back 3-4/10 pain comes and goes but is always there as a dull ache.    2. Vomiting started 3 days ago  Occurs after meals - occurred 3 times in the past 3 days.  Prior to vomiting patient gets pain between center of chest - feels like it is sitting there and comes up 20-60\" later.  Phlegmy looking food - encased in bubble is what comes up.  Patient experiences a lot of gurgling noises after vomits.    Patient vomited with 1 oz beef roast, 1 spoonful of carrots and potato X 2 - once with mild seasoning and once without.  No food got stuck.     Patient has not had new foods but more solid meats in the past couple of days; not purees as she was.    Cottage cheese last night with crax stayed down but one crax came up   Yogurt went fine this am - 5 oz.     Patient taking for GI meds:  Call out to patient - per note only taking Omeprazole 40 mg daily.     BM's - less frequent and more hard in consistency.    No nausea between meals or at night.    Plan:  Will contact surgeon re: RUQ US read and get back to patient.  Patient to go back to purees and full liquids.  Patient to go to ED if blood in stool or vomit, if increase HR or dyspnea, or light headed or dizzy or increase in pain and vomiting.  Patient " verbalized understanding and is agreeable to plan.  Laura Paulino, MS, RD, RN

## 2019-03-27 NOTE — TELEPHONE ENCOUNTER
When patient returns call, please ask her what GI meds she is taking and note.  Thanks Laura Paulino MS, RD, RN    Patient returned call.  Patient stated she is only taking the Omeprazole 40 mg daily.    Stopped the Carafate 2 days ago - didn't notice a difference when on or off it.  Not taking the Zantac of Zofran.     Laura Paulino MS, RD, RN

## 2019-03-28 ENCOUNTER — TELEPHONE (OUTPATIENT)
Dept: SURGERY | Facility: CLINIC | Age: 49
End: 2019-03-28

## 2019-03-28 NOTE — TELEPHONE ENCOUNTER
Patient called with update today.    Patient reports that she hasn't vomited since changing to purees yesterday.    Pain right hip and right lower back is just slightly better with chiropractor adjustment yesterday - states can walk upright without a bit better.    RUQ pain continues as dull ache.  Patient has lost 42 # since surgery.  Informed patient will discuss update with surgeon and get back to her.  Patient verbalized understanding and is agreeable to plan.  Laura Paulino, MS, RD, RN

## 2019-03-29 ENCOUNTER — OFFICE VISIT (OUTPATIENT)
Dept: SURGERY | Facility: CLINIC | Age: 49
End: 2019-03-29
Payer: COMMERCIAL

## 2019-03-29 ENCOUNTER — TELEPHONE (OUTPATIENT)
Dept: SURGERY | Facility: CLINIC | Age: 49
End: 2019-03-29

## 2019-03-29 VITALS
OXYGEN SATURATION: 98 % | DIASTOLIC BLOOD PRESSURE: 60 MMHG | HEART RATE: 71 BPM | WEIGHT: 282 LBS | HEIGHT: 64 IN | SYSTOLIC BLOOD PRESSURE: 110 MMHG | RESPIRATION RATE: 16 BRPM | BODY MASS INDEX: 48.14 KG/M2

## 2019-03-29 DIAGNOSIS — K80.20 GALLSTONES: Primary | ICD-10-CM

## 2019-03-29 DIAGNOSIS — R10.11 RIGHT UPPER QUADRANT PAIN: ICD-10-CM

## 2019-03-29 PROCEDURE — 99214 OFFICE O/P EST MOD 30 MIN: CPT | Mod: 24 | Performed by: SURGERY

## 2019-03-29 ASSESSMENT — MIFFLIN-ST. JEOR: SCORE: 1884.14

## 2019-03-29 NOTE — TELEPHONE ENCOUNTER
Type of surgery: laparoscopic cholecystectomy  Location of surgery: [facility]  Date and time of surgery: 04/11/19 12:20pm  Surgeon: Dr Calvin  Pre-Op Appt Date: 4/5/19 Dr Vani Xie  Post-Op Appt Date: pt to schedule   Packet sent out: Yes  Pre-cert/Authorization completed:  Not Applicable  Date: 03/29/19    General anesthesia

## 2019-03-29 NOTE — NURSING NOTE
Pain Location: RUQ    Pain type: dull, sharp and takes breath away    Pain radiating to back: Yes    Does food affect pain: Yes    Start of Symptoms: 2-3 week(s) ago    Symptoms: Constipation, Back pain, Vomiting

## 2019-03-29 NOTE — PROGRESS NOTES
Chief complaint:  Abdominal pain, right upper quadrant    HPI:  This patient is a 49 year old female who presents with intermittent right upper quadrant pain for 1 month.  The pain is associated with eating any type of food. The pain radiates to the right back.  Additional associated symptoms include with nausea and anorexia.  She  does not have a history of jaundice or dark urine.  She  has not had pancreatitis in the past.    She has also noted some GERD and nausea with eating certain foods.     Past Medical History:   has a past medical history of Allergic rhinitis, cause unspecified, Cellulitis (2005), DUB (dysfunctional uterine bleeding), Esophageal reflux, Fibroids, Genital problems, GERD (gastroesophageal reflux disease), Hallux valgus (acquired), History of steroid therapy, Hypersomnia with sleep apnea, unspecified, Kidney stone (May 2018), Lichen sclerosus (7/14/2011), Lymph edema (2010- present), Lymphedema bilateral with mixed lipedema. (9/30/2015), Lymphedema bilateral with mixed lipedema. (9/30/2015), Morbid obesity (H) (3/19/2013), MIGUELITO on CPAP (3/19/2013), Pneumonia, Pre-diabetes, Sleep apnea, Tendonitis (currently), Uncomplicated asthma, Urinary tract infection, Vision disorder (9940-5837), and Vitamin D deficiency (3/14/2015).    Past Surgical History:  Past Surgical History:   Procedure Laterality Date     C NONSPECIFIC PROCEDURE  1994    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     C NONSPECIFIC PROCEDURE  1999,2001    Foot surgeries x 2 (bunionectomy)     C NONSPECIFIC PROCEDURE  2000    hammer toes     COLONOSCOPY  5/15/2013    Procedure: COLONOSCOPY;  Colonoscopy;  Surgeon: Kota Blanco MD;  Location:  GI     GYN SURGERY  2016    Uterine Ablation     LAPAROSCOPIC BYPASS GASTRIC, CHOLECYSTECTOMY, COMBINED N/A 1/28/2019    Procedure: LAPAROSCOPIC GASTRIC BYPASS;  Surgeon: Maykel Calvin MD;  Location: [facility] OR     lichen sclerosis       ORTHOPEDIC SURGERY       VASCULAR SURGERY  2015     Vienous closure on both legs     vein closure  12/2016    to prevent lymphedema      Additional abdominal operations: gastric bypass earlier this year    Social History:  Social History     Socioeconomic History     Marital status: Single     Spouse name: Not on file     Number of children: 0     Years of education: 18     Highest education level: Not on file   Occupational History     Occupation: student life     Employer: "[employer]"   Social Needs     Financial resource strain: Not on file     Food insecurity:     Worry: Not on file     Inability: Not on file     Transportation needs:     Medical: Not on file     Non-medical: Not on file   Tobacco Use     Smoking status: Never Smoker     Smokeless tobacco: Never Used   Substance and Sexual Activity     Alcohol use: Yes     Alcohol/week: 0.0 oz     Comment: occasional     Drug use: No     Sexual activity: No     Partners: Male     Birth control/protection: Pill   Lifestyle     Physical activity:     Days per week: Not on file     Minutes per session: Not on file     Stress: Not on file   Relationships     Social connections:     Talks on phone: Not on file     Gets together: Not on file     Attends Latter day service: Not on file     Active member of club or organization: Not on file     Attends meetings of clubs or organizations: Not on file     Relationship status: Not on file     Intimate partner violence:     Fear of current or ex partner: Not on file     Emotionally abused: Not on file     Physically abused: Not on file     Forced sexual activity: Not on file   Other Topics Concern      Service Not Asked     Blood Transfusions No     Caffeine Concern Not Asked     Occupational Exposure Not Asked     Hobby Hazards Not Asked     Sleep Concern Not Asked     Stress Concern Not Asked     Weight Concern Yes     Comment: 20 pound weight loss on weight watchers     Special Diet Not Asked     Back Care Not Asked     Exercise Not Asked      Bike Helmet Not Asked     Seat Belt Not Asked     Self-Exams Not Asked     Parent/sibling w/ CABG, MI or angioplasty before 65F 55M? No   Social History Narrative    Living arrangements - the patient lives alone.         Family History:  Family History   Problem Relation Age of Onset     C.A.D. Father 92        CAGB 98     Obesity Father      Cancer Father         stomach Dx age 74     Lipids Father      Hypertension Father      Coronary Artery Disease Father      Cerebrovascular Disease Father      Other Cancer Father         Esophogeal     Diabetes Mother         Type 2     Allergies Mother      Obesity Mother      C.A.D. Mother         triple bypass surgery, 65     Lipids Mother      Hypertension Mother      Coronary Artery Disease Mother      Hyperlipidemia Mother      Colon Polyps Mother      Anesthesia Reaction Mother      Thyroid Disease Mother      C.A.D. Paternal Grandfather 58        fatal     Coronary Artery Disease Paternal Grandfather      Cancer - colorectal Maternal Grandmother 75     Cancer Maternal Grandmother         liver     Hypertension Maternal Grandmother      Colon Cancer Maternal Grandmother      Other Cancer Maternal Grandmother         liver, leaukeamia     Colon Polyps Maternal Grandmother      Cancer - colorectal Paternal Aunt      Allergies Brother      Cancer Paternal Grandmother         stomach     Obesity Paternal Grandmother      Diabetes Paternal Grandmother         Type 2     Asthma Paternal Grandmother      Obesity Brother      Hypertension Brother      Cancer - colorectal Other         cousin  from colon cancer in 30's     Obesity Brother      Coronary Artery Disease Maternal Grandfather      Hypertension Maternal Grandfather      Gallbladder disease: No    Review of Systems:  The 10 point Review of Systems is negative other than noted in the HPI and above.    Physical Exam:  /60 (BP Location: Right arm, Cuff Size: Adult Large)   Pulse 71   Resp 16   Ht 1.626 m  "(5' 4\")   Wt 127.9 kg (282 lb)   SpO2 98%   BMI 48.41 kg/m    General - Well developed, well nourished female in no apparent distress  HEENT:  Head normocephalic and atraumatic, pupils equal and round, conjunctivae clear, no scleral icterus, mucous membranes moist, external ears and nose normal  Neck: Supple without thyromegaly or masses  Lymphatic: No cervical, or supraclavicular lymphadenopathy  Lungs: normal respiratory effort  Abdomen:   soft, obese, non-distended with mild tenderness noted in the right upper quadrant . no masses palpated  Extremities: Warm without edema  Musculoskeletal:  Normal station and gait  Neurologic: nonfocal  Psychiatric: Mood and affect appropriate  Skin: Without lesions or rashes, or jaundice    Relevant labs:  Liver function panel- normal  WBC- normal  Lipase- normal    Imaging:  Ultrasound RUQ: positive cholelithiasis, negative gallbladder wall thickening, negative ductal dilatation, negative pericholecystic fluid, negative sonographic Patel's sign.    Assessment and Plan:  It is my impression that Ana has symptomatic gallstones.   I have offered her a laparoscopic cholecystectomy.  We have discussed the indication, risks and expected recovery.      We will schedule surgery at her convenience.    Maykel Calvin MD     Copy to PCP          "

## 2019-04-05 ENCOUNTER — OFFICE VISIT (OUTPATIENT)
Dept: PEDIATRICS | Facility: CLINIC | Age: 49
End: 2019-04-05
Payer: COMMERCIAL

## 2019-04-05 ENCOUNTER — TELEPHONE (OUTPATIENT)
Dept: PEDIATRICS | Facility: CLINIC | Age: 49
End: 2019-04-05

## 2019-04-05 VITALS
SYSTOLIC BLOOD PRESSURE: 100 MMHG | DIASTOLIC BLOOD PRESSURE: 60 MMHG | WEIGHT: 274 LBS | BODY MASS INDEX: 46.78 KG/M2 | HEIGHT: 64 IN | OXYGEN SATURATION: 97 % | HEART RATE: 65 BPM | TEMPERATURE: 98 F

## 2019-04-05 DIAGNOSIS — E66.01 MORBID OBESITY (H): ICD-10-CM

## 2019-04-05 DIAGNOSIS — G47.33 OSA ON CPAP: ICD-10-CM

## 2019-04-05 DIAGNOSIS — Z01.818 PREOP GENERAL PHYSICAL EXAM: Primary | ICD-10-CM

## 2019-04-05 DIAGNOSIS — R23.4 PEELING SKIN: ICD-10-CM

## 2019-04-05 DIAGNOSIS — K80.20 GALLSTONES: ICD-10-CM

## 2019-04-05 PROCEDURE — 99214 OFFICE O/P EST MOD 30 MIN: CPT | Performed by: PEDIATRICS

## 2019-04-05 ASSESSMENT — MIFFLIN-ST. JEOR: SCORE: 1847.86

## 2019-04-05 NOTE — TELEPHONE ENCOUNTER
Reason for Call:  Same Day Appointment, Requested Provider:  Kelly Bedoya MD    PCP: Kelly Bedoya    Reason for visit: Surgical F/U, results of diagnostic mammo/ US / possible biopsy for DOS 4/11/19 and breast diagnostic procedures to be performed on 4/12/19    Duration of symptoms:     Have you been treated for this in the past? Yes    Additional comments: Please work-in within a reasonable period post surgery to go over findings and call Ana back. Thanks!    Can we leave a detailed message on this number? YES    Phone number patient can be reached at: Cell number on file:    Telephone Information:   Mobile [phone]       Best Time: ASAP    Call taken on 4/5/2019 at 1:58 PM by Daily Sandy

## 2019-04-05 NOTE — TELEPHONE ENCOUNTER
OK to use a same day spot that works for patient.  Please also make sure she is aware that the radiologist will share her breast ultrasound and mammogram results with her before she leaves radiology on 4/12.  The surgeons will want to follow up with her after surgery and will arrange this appointment before she leaves the hospital.         Kelly Bedoya MD  Internal Medicine - Pediatrics

## 2019-04-05 NOTE — PATIENT INSTRUCTIONS
Pre-op Instructions:  -From now until surgery: NO aspirin or ibuprofen products (Advil, excerdrin, etc)  -OK to use tylenol for aches and pains    Stop taking your multivitamin today.    You will be called to schedule left breast mammogram and ultrasound.  If you don't hear from someone in 1-2 days, please call radiology department at [phone] to schedule your test at [facility].      Then make a follow up appointment with Dr. Bedoya in a few weeks (to review mammogram and do further evaluation if symptoms still present.  In the mean time use only plain vaseline or aquaphor.    Before Your Surgery      Call your surgeon if there is any change in your health. This includes signs of a cold or flu (such as a sore throat, runny nose, cough, rash or fever).    Do not smoke, drink alcohol or take over the counter medicine (unless your surgeon or primary care doctor tells you to) for the 24 hours before and after surgery.    If you take prescribed drugs: Follow your doctor s orders about which medicines to take and which to stop until after surgery.    Eating and drinking prior to surgery: follow the instructions from your surgeon    Take a shower or bath the night before surgery. Use the soap your surgeon gave you to gently clean your skin. If you do not have soap from your surgeon, use your regular soap. Do not shave or scrub the surgery site.  Wear clean pajamas and have clean sheets on your bed.

## 2019-04-05 NOTE — PROGRESS NOTES
[facility]  [address]  [address]  [place] [address]  [phone]  Dept: [phone]    PRE-OP EVALUATION:  Today's date: 2019    Ana Wyman (: 1970) presents for pre-operative evaluation assessment as requested by Dr. Calvin.  She requires evaluation and anesthesia risk assessment prior to undergoing surgery/procedure for treatment of gallstones .    Fax number for surgical facility: [facility] [phone] Same day surgery [phone]  Primary Physician: Kelly Bedoya  Type of Anesthesia Anticipated: General    Patient has a Health Care Directive or Living Will:  NO    Preop Questions 4/3/2019   Who is doing your surgery? Nikunj   What are you having done? Gallbladder removal   Date of Surgery/Procedure: 19   Facility or Hospital where procedure/surgery will be performed: [facility]   1.  Do you have a history of Heart attack, stroke, stent, coronary bypass surgery, or other heart surgery? No   2.  Do you ever have any pain or discomfort in your chest? No   3.  Do you have a history of  Heart Failure? No   4.   Are you troubled by shortness of breath when:  walking on a level surface, or up a slight hill, or at night? No   5.  Do you currently have a cold, bronchitis or other respiratory infection? No   6.  Do you have a cough, shortness of breath, or wheezing? No   7.  Do you sometimes get pains in the calves of your legs when you walk? No   8. Do you or anyone in your family have previous history of blood clots? No   9.  Do you or does anyone in your family have a serious bleeding problem such as prolonged bleeding following surgeries or cuts? No   10. Have you ever had problems with anemia or been told to take iron pills? No   11. Have you had any abnormal blood loss such as black, tarry or bloody stools, or abnormal vaginal bleeding? No   12. Have you ever had a blood transfusion? No   13. Have you or any of your relatives ever had  problems with anesthesia? YES - mom with 3 months of coma after bypass - thought to be secondary to anesthesia.  Dad with altered mental status after surgery.  No personal problems for patient with anesthesia.   14. Do you have sleep apnea, excessive snoring or daytime drowsiness? YES - CPAP   15. Do you have any prosthetic heart valves? No   16. Do you have prosthetic joints? No   17. Is there any chance that you may be pregnant? No         HPI:     HPI related to upcoming procedure: Patient seen previously in EMERGENCY DEPARTMENT for RUQ pain, ultimately found to have symptomatic gallstones.      See problem list for active medical problems.  Problems all longstanding and stable, except as noted/documented.  See ROS for pertinent symptoms related to these conditions.                                                                                                                                                          .    MEDICAL HISTORY:     Patient Active Problem List    Diagnosis Date Noted     Bariatric surgery status 02/05/2019     Priority: Medium     Postsurgical malabsorption 02/05/2019     Priority: Medium     Fatty liver 07/27/2018     Priority: Medium     Mild intermittent asthma without complication 06/11/2018     Priority: Medium     Lymphedema bilateral with mixed lipedema. 09/30/2015     Priority: Medium     Baker's cyst of knee 09/03/2015     Priority: Medium     Pre-diabetes 03/24/2015     Priority: Medium     Acanthosis nigricans 03/24/2015     Priority: Medium     Cutaneous skin tags 03/24/2015     Priority: Medium     Vitamin D deficiency 03/14/2015     Priority: Medium     Morbid obesity (H) 03/19/2013     Priority: Medium     MIGUELITO on CPAP 03/19/2013     Priority: Medium     Sleep study in 2004 and 2012         Uterine fibroid 08/02/2012     Priority: Medium     Lichen sclerosus 07/14/2011     Priority: Medium     Elevated blood pressure reading without diagnosis of hypertension 05/23/2011      Priority: Medium     Family history of malignant neoplasm of genital organ, other 06/11/2007     Priority: Medium     FAMILY HX GI MALIGNANCY 05/31/2007     Priority: Medium     Hypersomnia with sleep apnea 07/06/2005     Priority: Medium     Problem list name updated by automated process. Provider to review       Esophageal reflux 09/27/2004     Priority: Medium     Allergic state 09/27/2004     Priority: Medium     Problem list name updated by automated process. Provider to review        Past Medical History:   Diagnosis Date     Allergic rhinitis, cause unspecified      Cellulitis 2005    2 episodes, one with hospitalization [facility]     DUB (dysfunctional uterine bleeding)     Neg EMB 2012     Esophageal reflux     ongoing     Fibroids      Genital problems     Lichens Schlerosis     GERD (gastroesophageal reflux disease)      Hallux valgus (acquired)      History of steroid therapy     Inhalers, eye drops     Hypersomnia with sleep apnea, unspecified     using CPAP     Kidney stone May 2018    2 stones     Lichen sclerosus 7/14/2011     Lymph edema 2010- present     Lymphedema bilateral with mixed lipedema. 9/30/2015     Lymphedema bilateral with mixed lipedema. 9/30/2015     Morbid obesity (H) 3/19/2013     MIGUELITO on CPAP 3/19/2013    Sleep study in 2004 and 2012       Pneumonia     Years ago - a few times     Pre-diabetes      Sleep apnea      Tendonitis currently     Uncomplicated asthma     mild     Urinary tract infection     A few times in past 10 years     Vision disorder [id number]    Dry Eye     Vitamin D deficiency 3/14/2015     Past Surgical History:   Procedure Laterality Date     C NONSPECIFIC PROCEDURE  1994    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     C NONSPECIFIC PROCEDURE  1999,2001    Foot surgeries x 2 (bunionectomy)     C NONSPECIFIC PROCEDURE  2000    hammer toes     COLONOSCOPY  5/15/2013    Procedure: COLONOSCOPY;  Colonoscopy;  Surgeon: Kota Blanco MD;  Location:  GI     GYN  SURGERY  2016    Uterine Ablation     LAPAROSCOPIC BYPASS GASTRIC, CHOLECYSTECTOMY, COMBINED N/A 1/28/2019    Procedure: LAPAROSCOPIC GASTRIC BYPASS;  Surgeon: Maykel Calvin MD;  Location: [facility] OR     lichen sclerosis       ORTHOPEDIC SURGERY       VASCULAR SURGERY  2015    Vienous closure on both legs     vein closure  12/2016    to prevent lymphedema     Current Outpatient Medications   Medication Sig Dispense Refill     BIOTIN PO Take 5,000 mcg by mouth daily At noon       Calcium Citrate-Vitamin D (CALCIUM CITRATE CHEWY BITE PO) Take 500 mg by mouth 3 times daily Plus D,noon, supper, HS       childrens multivitamin w/iron (FLINTSTONES COMPLETE) 60 MG chewable tablet Take 2 chew tab by mouth daily With breakfast       Cholecalciferol (VITAMIN D) 2000 units CAPS Take 3 capsules by mouth At Bedtime        Hypromellose (ARTIFICIAL TEARS OP) Apply 1-2 drops to eye daily as needed       loratadine (CLARITIN) 10 MG tablet Take 1 tablet (10 mg) by mouth daily 7 tablet 0     omeprazole (PRILOSEC) 40 MG DR capsule Take 1 capsule (40 mg) by mouth daily 30 capsule 1     vitamin (B COMPLEX-C) tablet Take 1 tablet by mouth every morning       vitamin B-12 (CYANOCOBALAMIN) 2500 MCG sublingual tablet Take 1,250 mcg by mouth twice a week With breakfast       vitamin B-Complex Take 1 tablet by mouth daily With breakfast       albuterol (PROAIR HFA/PROVENTIL HFA/VENTOLIN HFA) 108 (90 Base) MCG/ACT inhaler Inhale 2 puffs into the lungs every 6 hours as needed for shortness of breath / dyspnea or wheezing (Patient not taking: Reported on 4/5/2019) 2 Inhaler 2     clobetasol (TEMOVATE) 0.05 % ointment Apply clobetasol  0.05 percent ointment, sparingly (a dot 3 mm wide) in a thin film.  Apply to affected area only, once or twice weekly for maintenance. (Patient not taking: Reported on 4/5/2019) 45 g 2     OTC products: None, except as noted above    Allergies   Allergen Reactions     Nsaids Other (See Comments)     Had gastric  "bypass - needs to avoid NSAIDS     Clindamycin Diarrhea     Codeine Nausea and Vomiting     Shellfish Allergy       Latex Allergy: NO    Social History     Tobacco Use     Smoking status: Never Smoker     Smokeless tobacco: Never Used   Substance Use Topics     Alcohol use: Yes     Alcohol/week: 0.0 oz     Comment: occasional     History   Drug Use No       REVIEW OF SYSTEMS:   Constitutional, HEENT, cardiovascular, pulmonary, gi and gu systems are negative, except as otherwise noted.    EXAM:   /60 (BP Location: Right arm, Cuff Size: Adult Large)   Pulse 65   Temp 98  F (36.7  C) (Oral)   Ht 1.626 m (5' 4\")   Wt 124.3 kg (274 lb)   SpO2 97%   BMI 47.03 kg/m      GENERAL APPEARANCE: healthy, alert and no distress     EYES: EOMI, PERRL     HENT: ear canals and TM's normal and nose and mouth without ulcers or lesions     NECK: no adenopathy, no asymmetry, masses, or scars and thyroid normal to palpation     RESP: lungs clear to auscultation - no rales, rhonchi or wheezes     BREAST: normal without masses, tenderness or nipple discharge and no palpable axillary masses or adenopathy     CV: regular rates and rhythm, normal S1 S2, no S3 or S4 and no murmur, click or rub     ABDOMEN:  soft, nontender, no HSM or masses and bowel sounds normal     MS: extremities normal- no gross deformities noted, no evidence of inflammation in joints, FROM in all extremities.     SKIN: no suspicious lesions or rashes     NEURO: Normal strength and tone, sensory exam grossly normal, mentation intact and speech normal     PSYCH: mentation appears normal. and affect normal/bright     LYMPHATICS: No cervical adenopathy    DIAGNOSTICS:   EKG: appears normal, NSR, normal axis, normal intervals, no acute ST/T changes c/w ischemia, no LVH by voltage criteria, unchanged from previous tracings    IMPRESSION:   Reason for surgery/procedure: gallstones    The proposed surgical procedure is considered INTERMEDIATE risk.    REVISED CARDIAC " RISK INDEX  The patient has the following serious cardiovascular risks for perioperative complications such as (MI, PE, VFib and 3  AV Block):  No serious cardiac risks  INTERPRETATION: 0 risks: Class I (very low risk - 0.4% complication rate)    The patient has the following additional risks for perioperative complications:  No identified additional risks  Morbid obesity      ICD-10-CM    1. Preop general physical exam Z01.818    2. Gallstones K80.20    3. Peeling skin - around left areola x 3 weeks, no nipple discharge - last mammo normal in 7/2018.  No FH breast cancer.  Patient concerned about Bechets. R23.4 MA Diagnostic Digital Left  Use vaseline or aquaphor to dry skin and follow up with PCP after mammogram is symptoms continue.     US Breast Left Complete 4 Quadrants   4. Morbid obesity (H) E66.01    5. MIGUELITO on CPAP G47.33     Z99.89        RECOMMENDATIONS:     --Consult hospital rounder / IM to assist post-op medical management    Obstructive Sleep Apnea (or suspected sleep apnea)  Patient is to bring their home CPAP with them on the day of surgery      --Patient is to take all scheduled medications on the day of surgery EXCEPT for modifications listed below - hold supplements    APPROVAL GIVEN to proceed with proposed procedure, without further diagnostic evaluation       Signed Electronically by: Vani Xie MD    Copy of this evaluation report is provided to requesting physician.    [facility] Preop Guidelines    Revised Cardiac Risk Index

## 2019-04-10 ENCOUNTER — ANESTHESIA EVENT (OUTPATIENT)
Dept: SURGERY | Facility: CLINIC | Age: 49
End: 2019-04-10
Payer: COMMERCIAL

## 2019-04-11 ENCOUNTER — HOSPITAL ENCOUNTER (OUTPATIENT)
Facility: CLINIC | Age: 49
Discharge: HOME | End: 2019-04-11
Attending: SURGERY | Admitting: SURGERY
Payer: COMMERCIAL

## 2019-04-11 ENCOUNTER — APPOINTMENT (OUTPATIENT)
Dept: SURGERY | Facility: PHYSICIAN GROUP | Age: 49
End: 2019-04-11
Payer: COMMERCIAL

## 2019-04-11 ENCOUNTER — ANESTHESIA (OUTPATIENT)
Dept: SURGERY | Facility: CLINIC | Age: 49
End: 2019-04-11
Payer: COMMERCIAL

## 2019-04-11 VITALS
TEMPERATURE: 99 F | OXYGEN SATURATION: 96 % | DIASTOLIC BLOOD PRESSURE: 72 MMHG | HEART RATE: 54 BPM | HEIGHT: 65 IN | SYSTOLIC BLOOD PRESSURE: 120 MMHG | WEIGHT: 271.8 LBS | RESPIRATION RATE: 16 BRPM | BODY MASS INDEX: 45.29 KG/M2

## 2019-04-11 DIAGNOSIS — K80.20 CALCULUS OF GALLBLADDER WITHOUT CHOLECYSTITIS WITHOUT OBSTRUCTION: Primary | ICD-10-CM

## 2019-04-11 DIAGNOSIS — R11.2 PONV (POSTOPERATIVE NAUSEA AND VOMITING): ICD-10-CM

## 2019-04-11 DIAGNOSIS — Z98.890 PONV (POSTOPERATIVE NAUSEA AND VOMITING): ICD-10-CM

## 2019-04-11 LAB — HCG UR QL: NEGATIVE

## 2019-04-11 PROCEDURE — 71000012 ZZH RECOVERY PHASE 1 LEVEL 1 FIRST HR: Performed by: SURGERY

## 2019-04-11 PROCEDURE — 25000125 ZZHC RX 250: Performed by: NURSE ANESTHETIST, CERTIFIED REGISTERED

## 2019-04-11 PROCEDURE — 36000058 ZZH SURGERY LEVEL 3 EA 15 ADDTL MIN: Performed by: SURGERY

## 2019-04-11 PROCEDURE — 47562 LAPAROSCOPIC CHOLECYSTECTOMY: CPT | Mod: AS | Performed by: PHYSICIAN ASSISTANT

## 2019-04-11 PROCEDURE — 25800030 ZZH RX IP 258 OP 636: Performed by: NURSE ANESTHETIST, CERTIFIED REGISTERED

## 2019-04-11 PROCEDURE — 71000027 ZZH RECOVERY PHASE 2 EACH 15 MINS: Performed by: SURGERY

## 2019-04-11 PROCEDURE — 25000125 ZZHC RX 250: Performed by: ANESTHESIOLOGY

## 2019-04-11 PROCEDURE — 27210794 ZZH OR GENERAL SUPPLY STERILE: Performed by: SURGERY

## 2019-04-11 PROCEDURE — 88304 TISSUE EXAM BY PATHOLOGIST: CPT | Performed by: SURGERY

## 2019-04-11 PROCEDURE — 25000566 ZZH SEVOFLURANE, EA 15 MIN: Performed by: SURGERY

## 2019-04-11 PROCEDURE — 37000009 ZZH ANESTHESIA TECHNICAL FEE, EACH ADDTL 15 MIN: Performed by: SURGERY

## 2019-04-11 PROCEDURE — 71000013 ZZH RECOVERY PHASE 1 LEVEL 1 EA ADDTL HR: Performed by: SURGERY

## 2019-04-11 PROCEDURE — 25000125 ZZHC RX 250: Performed by: SURGERY

## 2019-04-11 PROCEDURE — 36000056 ZZH SURGERY LEVEL 3 1ST 30 MIN: Performed by: SURGERY

## 2019-04-11 PROCEDURE — 25000128 H RX IP 250 OP 636: Performed by: NURSE ANESTHETIST, CERTIFIED REGISTERED

## 2019-04-11 PROCEDURE — 81025 URINE PREGNANCY TEST: CPT | Performed by: ANESTHESIOLOGY

## 2019-04-11 PROCEDURE — 37000008 ZZH ANESTHESIA TECHNICAL FEE, 1ST 30 MIN: Performed by: SURGERY

## 2019-04-11 PROCEDURE — 47562 LAPAROSCOPIC CHOLECYSTECTOMY: CPT | Mod: 78 | Performed by: SURGERY

## 2019-04-11 PROCEDURE — 40000170 ZZH STATISTIC PRE-PROCEDURE ASSESSMENT II: Performed by: SURGERY

## 2019-04-11 PROCEDURE — 25000128 H RX IP 250 OP 636: Performed by: ANESTHESIOLOGY

## 2019-04-11 RX ORDER — LIDOCAINE HYDROCHLORIDE 20 MG/ML
INJECTION, SOLUTION INFILTRATION; PERINEURAL PRN
Status: DISCONTINUED | OUTPATIENT
Start: 2019-04-11 | End: 2019-04-11

## 2019-04-11 RX ORDER — ONDANSETRON 4 MG/1
4 TABLET, ORALLY DISINTEGRATING ORAL EVERY 30 MIN PRN
Status: DISCONTINUED | OUTPATIENT
Start: 2019-04-11 | End: 2019-04-11 | Stop reason: HOSPADM

## 2019-04-11 RX ORDER — SODIUM CHLORIDE, SODIUM LACTATE, POTASSIUM CHLORIDE, CALCIUM CHLORIDE 600; 310; 30; 20 MG/100ML; MG/100ML; MG/100ML; MG/100ML
INJECTION, SOLUTION INTRAVENOUS CONTINUOUS PRN
Status: DISCONTINUED | OUTPATIENT
Start: 2019-04-11 | End: 2019-04-11

## 2019-04-11 RX ORDER — PROPOFOL 10 MG/ML
INJECTION, EMULSION INTRAVENOUS CONTINUOUS PRN
Status: DISCONTINUED | OUTPATIENT
Start: 2019-04-11 | End: 2019-04-11

## 2019-04-11 RX ORDER — NALOXONE HYDROCHLORIDE 0.4 MG/ML
.1-.4 INJECTION, SOLUTION INTRAMUSCULAR; INTRAVENOUS; SUBCUTANEOUS
Status: DISCONTINUED | OUTPATIENT
Start: 2019-04-11 | End: 2019-04-11 | Stop reason: HOSPADM

## 2019-04-11 RX ORDER — HYDROCODONE BITARTRATE AND ACETAMINOPHEN 5; 325 MG/1; MG/1
1 TABLET ORAL
Status: DISCONTINUED | OUTPATIENT
Start: 2019-04-11 | End: 2019-04-11 | Stop reason: HOSPADM

## 2019-04-11 RX ORDER — HYDROCODONE BITARTRATE AND ACETAMINOPHEN 5; 325 MG/1; MG/1
1-2 TABLET ORAL EVERY 4 HOURS PRN
Qty: 20 TABLET | Refills: 0 | Status: SHIPPED | OUTPATIENT
Start: 2019-04-11 | End: 2019-04-18

## 2019-04-11 RX ORDER — ONDANSETRON 2 MG/ML
4 INJECTION INTRAMUSCULAR; INTRAVENOUS EVERY 30 MIN PRN
Status: DISCONTINUED | OUTPATIENT
Start: 2019-04-11 | End: 2019-04-11 | Stop reason: HOSPADM

## 2019-04-11 RX ORDER — ONDANSETRON 4 MG/1
4 TABLET, ORALLY DISINTEGRATING ORAL EVERY 6 HOURS PRN
Qty: 10 TABLET | Refills: 0 | Status: SHIPPED | OUTPATIENT
Start: 2019-04-11 | End: 2019-04-18

## 2019-04-11 RX ORDER — FENTANYL CITRATE 50 UG/ML
INJECTION, SOLUTION INTRAMUSCULAR; INTRAVENOUS PRN
Status: DISCONTINUED | OUTPATIENT
Start: 2019-04-11 | End: 2019-04-11

## 2019-04-11 RX ORDER — ONDANSETRON 2 MG/ML
INJECTION INTRAMUSCULAR; INTRAVENOUS PRN
Status: DISCONTINUED | OUTPATIENT
Start: 2019-04-11 | End: 2019-04-11

## 2019-04-11 RX ORDER — SODIUM CHLORIDE, SODIUM LACTATE, POTASSIUM CHLORIDE, CALCIUM CHLORIDE 600; 310; 30; 20 MG/100ML; MG/100ML; MG/100ML; MG/100ML
INJECTION, SOLUTION INTRAVENOUS CONTINUOUS
Status: DISCONTINUED | OUTPATIENT
Start: 2019-04-11 | End: 2019-04-11 | Stop reason: HOSPADM

## 2019-04-11 RX ORDER — PROPOFOL 10 MG/ML
INJECTION, EMULSION INTRAVENOUS PRN
Status: DISCONTINUED | OUTPATIENT
Start: 2019-04-11 | End: 2019-04-11

## 2019-04-11 RX ORDER — FENTANYL CITRATE 50 UG/ML
25-50 INJECTION, SOLUTION INTRAMUSCULAR; INTRAVENOUS
Status: DISCONTINUED | OUTPATIENT
Start: 2019-04-11 | End: 2019-04-11 | Stop reason: HOSPADM

## 2019-04-11 RX ORDER — DEXAMETHASONE SODIUM PHOSPHATE 4 MG/ML
INJECTION, SOLUTION INTRA-ARTICULAR; INTRALESIONAL; INTRAMUSCULAR; INTRAVENOUS; SOFT TISSUE PRN
Status: DISCONTINUED | OUTPATIENT
Start: 2019-04-11 | End: 2019-04-11

## 2019-04-11 RX ORDER — HYDROMORPHONE HYDROCHLORIDE 1 MG/ML
.3-.5 INJECTION, SOLUTION INTRAMUSCULAR; INTRAVENOUS; SUBCUTANEOUS EVERY 5 MIN PRN
Status: DISCONTINUED | OUTPATIENT
Start: 2019-04-11 | End: 2019-04-11 | Stop reason: HOSPADM

## 2019-04-11 RX ORDER — SCOLOPAMINE TRANSDERMAL SYSTEM 1 MG/1
1 PATCH, EXTENDED RELEASE TRANSDERMAL
Status: DISCONTINUED | OUTPATIENT
Start: 2019-04-11 | End: 2019-04-11 | Stop reason: HOSPADM

## 2019-04-11 RX ADMIN — PROPOFOL 30 MCG/KG/MIN: 10 INJECTION, EMULSION INTRAVENOUS at 12:39

## 2019-04-11 RX ADMIN — ROCURONIUM BROMIDE 10 MG: 10 INJECTION INTRAVENOUS at 12:52

## 2019-04-11 RX ADMIN — SODIUM CHLORIDE, POTASSIUM CHLORIDE, SODIUM LACTATE AND CALCIUM CHLORIDE: 600; 310; 30; 20 INJECTION, SOLUTION INTRAVENOUS at 12:25

## 2019-04-11 RX ADMIN — ONDANSETRON 4 MG: 2 INJECTION INTRAMUSCULAR; INTRAVENOUS at 12:40

## 2019-04-11 RX ADMIN — FENTANYL CITRATE 50 MCG: 50 INJECTION, SOLUTION INTRAMUSCULAR; INTRAVENOUS at 13:00

## 2019-04-11 RX ADMIN — LIDOCAINE HYDROCHLORIDE 100 MG: 20 INJECTION, SOLUTION INFILTRATION; PERINEURAL at 12:33

## 2019-04-11 RX ADMIN — ROCURONIUM BROMIDE 10 MG: 10 INJECTION INTRAVENOUS at 12:40

## 2019-04-11 RX ADMIN — FENTANYL CITRATE 50 MCG: 50 INJECTION, SOLUTION INTRAMUSCULAR; INTRAVENOUS at 13:46

## 2019-04-11 RX ADMIN — ONDANSETRON 4 MG: 2 INJECTION INTRAMUSCULAR; INTRAVENOUS at 14:29

## 2019-04-11 RX ADMIN — FENTANYL CITRATE 50 MCG: 50 INJECTION, SOLUTION INTRAMUSCULAR; INTRAVENOUS at 14:03

## 2019-04-11 RX ADMIN — DEXMEDETOMIDINE HYDROCHLORIDE 12 MCG: 100 INJECTION, SOLUTION INTRAVENOUS at 13:00

## 2019-04-11 RX ADMIN — ROCURONIUM BROMIDE 50 MG: 10 INJECTION INTRAVENOUS at 12:33

## 2019-04-11 RX ADMIN — SUGAMMADEX 400 MG: 100 INJECTION, SOLUTION INTRAVENOUS at 13:40

## 2019-04-11 RX ADMIN — DEXMEDETOMIDINE HYDROCHLORIDE 8 MCG: 100 INJECTION, SOLUTION INTRAVENOUS at 13:03

## 2019-04-11 RX ADMIN — HYDROMORPHONE HYDROCHLORIDE 0.5 MG: 1 INJECTION, SOLUTION INTRAMUSCULAR; INTRAVENOUS; SUBCUTANEOUS at 14:52

## 2019-04-11 RX ADMIN — FENTANYL CITRATE 100 MCG: 50 INJECTION, SOLUTION INTRAMUSCULAR; INTRAVENOUS at 12:33

## 2019-04-11 RX ADMIN — HYDROMORPHONE HYDROCHLORIDE 0.5 MG: 1 INJECTION, SOLUTION INTRAMUSCULAR; INTRAVENOUS; SUBCUTANEOUS at 16:10

## 2019-04-11 RX ADMIN — HYDROMORPHONE HYDROCHLORIDE 0.5 MG: 1 INJECTION, SOLUTION INTRAMUSCULAR; INTRAVENOUS; SUBCUTANEOUS at 16:24

## 2019-04-11 RX ADMIN — MIDAZOLAM 2 MG: 1 INJECTION INTRAMUSCULAR; INTRAVENOUS at 12:25

## 2019-04-11 RX ADMIN — ROCURONIUM BROMIDE 10 MG: 10 INJECTION INTRAVENOUS at 12:43

## 2019-04-11 RX ADMIN — SCOPALAMINE 1 PATCH: 1 PATCH, EXTENDED RELEASE TRANSDERMAL at 11:37

## 2019-04-11 RX ADMIN — PROPOFOL 200 MG: 10 INJECTION, EMULSION INTRAVENOUS at 12:33

## 2019-04-11 RX ADMIN — DEXAMETHASONE SODIUM PHOSPHATE 4 MG: 4 INJECTION, SOLUTION INTRA-ARTICULAR; INTRALESIONAL; INTRAMUSCULAR; INTRAVENOUS; SOFT TISSUE at 12:40

## 2019-04-11 ASSESSMENT — MIFFLIN-ST. JEOR: SCORE: 1853.76

## 2019-04-11 NOTE — OR NURSING
Room air sao2 94-95%. States her pain is tolerable and requests to go home Ride called and will escort to door one via wheelchair with all personal belongings. Mother present,

## 2019-04-11 NOTE — OR NURSING
"Patient states her pain is down to a 4-5 on a 1-10 scale. Denies nausea at this time. States she is ready to transfer back to phase 2 and thinks she is ready to go home soon. States she will start her oral pain pills once she gets home to avoid getting 'car sick\"  "

## 2019-04-11 NOTE — OP NOTE
General Surgery Operative Note    PREOPERATIVE DIAGNOSIS:  GALLSTONES, morbid obesity with BMI 45.    POSTOPERATIVE DIAGNOSIS:  Same    PROCEDURE:   Procedure(s):  LAPAROSCOPIC CHOLECYSTECTOMY    ANESTHESIA:  General.      SURGEON:  Maykel Calvin MD    ASSISTANT:  Jerson Pagan PA-C. The physician assistant was medically necessary for their expertise in camera management, suctioning, and retraction    INDICATIONS:  The patient has abdominal pain and gallstones. The risks, including but not limited to bleeding, infection, bile duct or bowel injury, anesthesia, and the possible need for an open approach were reviewed. The patient appeared to understand and wished to proceed with operation.    PROCEDURE:  The patient was taken to the operating suite.  The operative area was prepped and draped in a sterile fashion.  Surgeon initiated timeout was acknowledged.      Under general anesthesia the abdomen was insufflated through a secondary right paramedian incision with a veress needle. Over 3 liters were place with low pressures. A 5mm trocar was placed. There was no injury seen when the camera was placed. Under direct vision a 5mm trocar was placed in the right upper quadrant and an 11mm trocar placed below the xiphoid. Later another 5mm trocar was placed in the left upper quadrant. The gallbladder was grasped and adhesions taken down with blunt dissection and cautery. The peritoneal surfaces of the critical angle were cauterized posteriorly and anteriorly. The critical angle was dissected out, until there were only three structures remaining. One appeared small and filmy. This was partially cut and had no blood or bile in it. I clipped it proximally and divided it. What appeared to be a pulsating artery was opened slightly, pulsatile blood was seen. I clipped it twice proximally, once distally and divided it.  The duct appeared quite large.  The gallbladder was dissected off its bed with cautery from top down. The duct  was secured with an 0 PDS endoloop and a clip, then divided. The gallbladder was set aside and hemostasis assured on the liver. Irrigation was used and suctioned out. The bed was dry and the clips were intact. The gallbladder was removed through the larger incision, which was enlarged as needed to permit passage of the gallbladder. We then irrigated again, and saw no sign of blood of bile leak. We checked for veress needle injury, there was none. The large trocar was removed and the fascia closed with 0 Vicryl. Marcaine was instilled. Gas was suctioned out. Trocars were removed. Sponge count was reported as correct. All incisions were closed with 4-0 Vicryl and steri-strips.            INTRAOPERATIVE FINDINGS:  Cholelithiasis, morbid obesity    Maykel Calvin

## 2019-04-11 NOTE — ANESTHESIA CARE TRANSFER NOTE
Patient: Ana Wyman    Procedure(s):  LAPAROSCOPIC CHOLECYSTECTOMY    Diagnosis: GALLSTONES  Diagnosis Additional Information: No value filed.    Anesthesia Type:   General, ETT     Note:  Airway :Face Mask  Patient transferred to:PACU  Comments: Pt to PACU with O2 via mask, airway patent, VSS.  Report to RN.Handoff Report: Identifed the Patient, Identified the Reponsible Provider, Reviewed the pertinent medical history, Discussed the surgical course, Reviewed Intra-OP anesthesia mangement and issues during anesthesia, Set expectations for post-procedure period and Allowed opportunity for questions and acknowledgement of understanding      Vitals: (Last set prior to Anesthesia Care Transfer)    CRNA VITALS  4/11/2019 1316 - 4/11/2019 1353      4/11/2019             Pulse:  99    SpO2:  96 %    Resp Rate (set):  10                Electronically Signed By: JEOVANNY Gonzalez CRNA  April 11, 2019  1:53 PM

## 2019-04-11 NOTE — ANESTHESIA PREPROCEDURE EVALUATION
Procedure: Procedure(s):  LAPAROSCOPIC CHOLECYSTECTOMY  Preop diagnosis: GALLSTONES    Allergies   Allergen Reactions     Nsaids Other (See Comments)     Had gastric bypass - needs to avoid NSAIDS     Clindamycin Diarrhea     Codeine Nausea and Vomiting     Shellfish Allergy      Past Medical History:   Diagnosis Date     Allergic rhinitis, cause unspecified      Cellulitis 2005    2 episodes, one with hospitalization [facility]     DUB (dysfunctional uterine bleeding)     Neg EMB 2012     Esophageal reflux     ongoing     Fibroids      Genital problems     Lichens Schlerosis     GERD (gastroesophageal reflux disease)      Hallux valgus (acquired)      History of steroid therapy     Inhalers, eye drops     Hypersomnia with sleep apnea, unspecified     using CPAP     Kidney stone May 2018    2 stones     Lichen sclerosus 7/14/2011     Lymph edema 2010- present     Lymphedema bilateral with mixed lipedema. 9/30/2015     Lymphedema bilateral with mixed lipedema. 9/30/2015     Morbid obesity (H) 3/19/2013     MIGUELITO on CPAP 3/19/2013    Sleep study in 2004 and 2012       Pneumonia     Years ago - a few times     Pre-diabetes      Sleep apnea      Tendonitis currently     Uncomplicated asthma     mild     Urinary tract infection     A few times in past 10 years     Vision disorder [id number]    Dry Eye     Vitamin D deficiency 3/14/2015     Past Surgical History:   Procedure Laterality Date     C NONSPECIFIC PROCEDURE  1994    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     C NONSPECIFIC PROCEDURE  1999,2001    Foot surgeries x 2 (bunionectomy)     C NONSPECIFIC PROCEDURE  2000    hammer toes     COLONOSCOPY  5/15/2013    Procedure: COLONOSCOPY;  Colonoscopy;  Surgeon: Kota Blanco MD;  Location:  GI     GYN SURGERY  2016    Uterine Ablation     LAPAROSCOPIC BYPASS GASTRIC, CHOLECYSTECTOMY, COMBINED N/A 1/28/2019    Procedure: LAPAROSCOPIC GASTRIC BYPASS;  Surgeon: Maykel Calvin MD;  Location: [facility] OR     lichen  sclerosis       ORTHOPEDIC SURGERY       VASCULAR SURGERY  2015    Vienous closure on both legs     vein closure  12/2016    to prevent lymphedema     Social History     Tobacco Use     Smoking status: Never Smoker     Smokeless tobacco: Never Used   Substance Use Topics     Alcohol use: Not Currently     Alcohol/week: 0.0 oz     Prior to Admission medications    Medication Sig Start Date End Date Taking? Authorizing Provider   Hypromellose (ARTIFICIAL TEARS OP) Apply 1-2 drops to eye daily as needed   Yes Reported, Patient   loratadine (CLARITIN) 10 MG tablet Take 1 tablet (10 mg) by mouth daily 10/27/17  Yes Nikhil Curtis MD   omeprazole (PRILOSEC) 40 MG DR capsule Take 1 capsule (40 mg) by mouth daily 3/18/19  Yes Sarah Stacy PA-C   albuterol (PROAIR HFA/PROVENTIL HFA/VENTOLIN HFA) 108 (90 Base) MCG/ACT inhaler Inhale 2 puffs into the lungs every 6 hours as needed for shortness of breath / dyspnea or wheezing  Patient not taking: Reported on 4/5/2019 11/23/18   Price Rae MD   BIOTIN PO Take 5,000 mcg by mouth daily At noon    Reported, Patient   Calcium Citrate-Vitamin D (CALCIUM CITRATE CHEWY BITE PO) Take 500 mg by mouth 3 times daily Plus D,noon, supper,     Reported, Patient   childrens multivitamin w/iron (FLINTSTONES COMPLETE) 60 MG chewable tablet Take 2 chew tab by mouth daily With breakfast    Reported, Patient   Cholecalciferol (VITAMIN D) 2000 units CAPS Take 3 capsules by mouth At Bedtime     Reported, Patient   clobetasol (TEMOVATE) 0.05 % ointment Apply clobetasol  0.05 percent ointment, sparingly (a dot 3 mm wide) in a thin film.  Apply to affected area only, once or twice weekly for maintenance.  Patient not taking: Reported on 4/5/2019 6/11/18   Kelly Bedoya MD   vitamin (B COMPLEX-C) tablet Take 1 tablet by mouth every morning    Reported, Patient   vitamin B-12 (CYANOCOBALAMIN) 2500 MCG sublingual tablet Take 1,250 mcg by mouth twice a week With  breakfast    Reported, Patient     Current Facility-Administered Medications Ordered in Epic   Medication Dose Route Frequency Last Rate Last Dose     PRE OP antibiotics NOT needed for this surgical procedure.  1 each As instructed Continuous         No current [facility]-ordered outpatient medications on file.       no pre procedure antibiotic needed       Wt Readings from Last 1 Encounters:   04/11/19 123.3 kg (271 lb 12.8 oz)     Temp Readings from Last 1 Encounters:   04/11/19 36.6  C (97.9  F) (Oral)     BP Readings from Last 6 Encounters:   04/11/19 102/56   04/05/19 100/60   03/29/19 110/60   03/15/19 127/82   02/13/19 105/77   02/05/19 112/70     Pulse Readings from Last 4 Encounters:   04/11/19 71   04/05/19 65   03/29/19 71   02/13/19 77     Resp Readings from Last 1 Encounters:   04/11/19 16     SpO2 Readings from Last 1 Encounters:   04/11/19 96%     Recent Labs   Lab Test 03/15/19  2142 01/29/19  0733 01/28/19  1505 08/22/18  1845    143  --  140   POTASSIUM 3.3* 3.8  --  3.9   CHLORIDE 109 109  --  106   CO2 25 28  --  26   ANIONGAP 7 6  --  8   GLC 90  --   --  132*   BUN 15  --   --  11   CR 0.78  --  0.63 0.70   SINGH 9.2  --   --  9.2     Recent Labs   Lab Test 03/15/19  2142 08/22/18  1845   AST 25 21   ALT 38 31   ALKPHOS 72 104   BILITOTAL 0.6 0.4   LIPASE 185 117     Recent Labs   Lab Test 03/15/19  2142 01/29/19  0733 01/28/19  1505  08/22/18  1845   WBC 7.6  --   --   --  11.5*   HGB 13.7 12.5  --    < > 14.5     --  253  --  362    < > = values in this interval not displayed.     Recent Labs   Lab Test 01/25/12  1705   ABO O   RH  Pos     Recent Labs   Lab Test 10/27/17  1803   INR 0.98      Recent Labs   Lab Test 03/15/19  2142 04/29/17  0103 02/21/12  2050   TROPI <0.015 <0.015  The 99th percentile for upper reference range is 0.045 ug/L.  Troponin values in   the range of 0.045 - 0.120 ug/L may be associated with risks of adverse   clinical events.   0.013     No results for  input(s): PH, PCO2, PO2, HCO3 in the last 03393 hours.  Recent Labs   Lab Test 04/11/19  1045   HCG Negative     Recent Results (from the past 744 hour(s))   CT Chest (PE) Abdomen Pelvis w Contrast    Narrative    CT CHEST, ABDOMEN AND PELVIS WITH CONTRAST  3/15/2019 10:43 PM     HISTORY: Status post laparoscopic gastric bypass on 1/29/2019. Right  lower chest pain and right upper quadrant pain. Elevated D-dimer.    COMPARISON: 8/22/2018 - CT abdomen and pelvis.    TECHNIQUE: Following the uneventful administration of 83 mL Isovue-370  intravenous contrast, helical sections were acquired through the lungs  according to the pulmonary embolism protocol. Helical sections were  acquired from the top of the diaphragm through the pubic symphysis  during portal venous phase. Coronal reconstructions were generated.  Radiation dose for this scan was reduced using automated exposure  control, adjustment of the mA and/or kV according to the patient's  size, or iterative reconstruction technique.    FINDINGS:  Chest: No convincing pulmonary embolus. The aorta is normal in caliber  without dissection. Subsegmental atelectasis scattered within both  lungs. The lungs are otherwise clear. No pleural or pericardial  effusion.    Abdomen: The liver, spleen, pancreas, adrenal glands and kidneys are  unremarkable. The gallbladder is present. Prior gastric surgery. No  enlarged lymph nodes or free fluid in the upper abdomen. A small  region of ill-defined regular-shaped hazy opacities in the  intraperitoneal fat of the anterior aspect of the left hemiabdomen  (for example, series 10 image 46), not present on 8/22/2018. Per  report, the patient had a laparoscopic gastric bypass on 1/29/2019 and  these therefore very likely represent resolving postsurgical changes  related to that surgery.    Pelvis: The small and large bowel are normal in caliber. The appendix  is unremarkable. No bowel wall thickening, pneumatosis or  free  intraperitoneal gas. 4 cm calcified uterine fibroid. No enlarged lymph  nodes or free fluid in the pelvis.      Impression    IMPRESSION:  1. No visualized pulmonary embolus or other evidence of active  pulmonary disease.  2. No convincing cause of acute pain identified in the abdomen or  pelvis.    PAUL MIKE MD   US Abdomen Limited    Narrative    ULTRASOUND ABDOMEN LIMITED  3/25/2019 9:33 AM     HISTORY: Right upper quadrant abdominal ultrasound--right upper  quadrant abdominal pain post bariatric gastric bypass surgery, CT  negative. Abdominal pain, right upper quadrant.    COMPARISON: 2018    FINDINGS: Mild gallbladder sludge. There are also findings consistent  with gallbladder adenomyomatosis. There may well be some tiny  nonshadowing gallstones. No gallbladder wall thickening. Negative  sonographic Patel's sign. No focal hepatic lesion or common bile duct  dilatation. The portions of the pancreas visualized are unremarkable.  The right kidney is unremarkable.  Findings consistent with fatty  liver infiltration.       Impression    IMPRESSION:   1. Gallbladder sludge and suspected tiny gallstones/gravel.  2. Fatty liver.    RAMESH THORPE MD       RECENT LABS:   ECG:   ECHO:   Anesthesia Pre-Procedure Evaluation    Patient: Ana Wyman   MRN: [id number] : 1970          Preoperative Diagnosis: GALLSTONES    Procedure(s):  LAPAROSCOPIC CHOLECYSTECTOMY    Past Medical History:   Diagnosis Date     Allergic rhinitis, cause unspecified      Cellulitis     2 episodes, one with hospitalization [facility]     DUB (dysfunctional uterine bleeding)     Neg EMB      Esophageal reflux     ongoing     Fibroids      Genital problems     Lichens Schlerosis     GERD (gastroesophageal reflux disease)      Hallux valgus (acquired)      History of steroid therapy     Inhalers, eye drops     Hypersomnia with sleep apnea, unspecified     using CPAP     Kidney stone May 2018    2 stones      Lichen sclerosus 7/14/2011     Lymph edema 2010- present     Lymphedema bilateral with mixed lipedema. 9/30/2015     Lymphedema bilateral with mixed lipedema. 9/30/2015     Morbid obesity (H) 3/19/2013     MIGUELITO on CPAP 3/19/2013    Sleep study in 2004 and 2012       Pneumonia     Years ago - a few times     Pre-diabetes      Sleep apnea      Tendonitis currently     Uncomplicated asthma     mild     Urinary tract infection     A few times in past 10 years     Vision disorder [id number]    Dry Eye     Vitamin D deficiency 3/14/2015     Past Surgical History:   Procedure Laterality Date     C NONSPECIFIC PROCEDURE  1994    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     C NONSPECIFIC PROCEDURE  1999,2001    Foot surgeries x 2 (bunionectomy)     C NONSPECIFIC PROCEDURE  2000    hammer toes     COLONOSCOPY  5/15/2013    Procedure: COLONOSCOPY;  Colonoscopy;  Surgeon: Kota Blanco MD;  Location:  GI     GYN SURGERY  2016    Uterine Ablation     LAPAROSCOPIC BYPASS GASTRIC, CHOLECYSTECTOMY, COMBINED N/A 1/28/2019    Procedure: LAPAROSCOPIC GASTRIC BYPASS;  Surgeon: Maykel Calvin MD;  Location: [facility] OR     lichen sclerosis       ORTHOPEDIC SURGERY       VASCULAR SURGERY  2015    Vienous closure on both legs     vein closure  12/2016    to prevent lymphedema       Anesthesia Evaluation     . Pt has had prior anesthetic.     - PONV        ROS/MED HX    ENT/Pulmonary:     (+)sleep apnea, asthma uses CPAP , recent URI . .    Neurologic:       Cardiovascular:         METS/Exercise Tolerance:     Hematologic:         Musculoskeletal:         GI/Hepatic: Comment: GREEN  S/p Gastric bypass    (+) GERD liver disease,       Renal/Genitourinary:     (+) Nephrolithiasis ,       Endo:     (+) Obesity, .      Psychiatric:         Infectious Disease:         Malignancy:         Other:                          Physical Exam  Normal systems: cardiovascular, pulmonary and dental    Airway   Mallampati: II  Neck ROM:  "limited    Dental     Cardiovascular       Pulmonary             Lab Results   Component Value Date    WBC 7.6 03/15/2019    HGB 13.7 03/15/2019    HCT 42.8 03/15/2019     03/15/2019    CRP 27.2 (H) 10/14/2016    SED 18 12/15/2017     03/15/2019    POTASSIUM 3.3 (L) 03/15/2019    CHLORIDE 109 03/15/2019    CO2 25 03/15/2019    BUN 15 03/15/2019    CR 0.78 03/15/2019    GLC 90 03/15/2019    SINGH 9.2 03/15/2019    ALBUMIN 3.8 03/15/2019    PROTTOTAL 7.7 03/15/2019    ALT 38 03/15/2019    AST 25 03/15/2019    ALKPHOS 72 03/15/2019    BILITOTAL 0.6 03/15/2019    LIPASE 185 03/15/2019    AMYLASE 56 03/22/2004    INR 0.98 10/27/2017    TSH 2.60 02/04/2016    T4 0.81 08/07/2014    HCG Negative 04/11/2019    HCGS Negative 09/26/2013       Preop Vitals  BP Readings from Last 3 Encounters:   04/11/19 102/56   04/05/19 100/60   03/29/19 110/60    Pulse Readings from Last 3 Encounters:   04/11/19 71   04/05/19 65   03/29/19 71      Resp Readings from Last 3 Encounters:   04/11/19 16   03/29/19 16   03/15/19 18    SpO2 Readings from Last 3 Encounters:   04/11/19 96%   04/05/19 97%   03/29/19 98%      Temp Readings from Last 1 Encounters:   04/11/19 36.6  C (97.9  F) (Oral)    Ht Readings from Last 1 Encounters:   04/11/19 1.651 m (5' 5\")      Wt Readings from Last 1 Encounters:   04/11/19 123.3 kg (271 lb 12.8 oz)    Estimated body mass index is 45.23 kg/m  as calculated from the following:    Height as of this encounter: 1.651 m (5' 5\").    Weight as of this encounter: 123.3 kg (271 lb 12.8 oz).       Anesthesia Plan      History & Physical Review  History and physical reviewed and following examination; no interval change.    ASA Status:  2 .    NPO Status:  > 8 hours    Plan for General and ETT with Intravenous induction. Maintenance will be Balanced.    PONV prophylaxis:  Ondansetron (or other 5HT-3), Scopolamine patch and Dexamethasone or Solumedrol  Additional equipment: Videolaryngoscope      Postoperative " Care  Postoperative pain management:  IV analgesics.      Consents  Anesthetic plan, risks, benefits and alternatives discussed with:  Patient..                 Avi Saab MD

## 2019-04-11 NOTE — ANESTHESIA POSTPROCEDURE EVALUATION
Patient: Ana Wyman    Procedure(s):  LAPAROSCOPIC CHOLECYSTECTOMY    Diagnosis:GALLSTONES  Diagnosis Additional Information: No value filed.    Anesthesia Type:  General, ETT    Note:  Anesthesia Post Evaluation    Patient location during evaluation: bedside  Patient participation: Able to fully participate in evaluation  Level of consciousness: awake and alert  Pain management: adequate  Airway patency: patent  Cardiovascular status: acceptable  Respiratory status: acceptable  Hydration status: acceptable  PONV: none and controlled     Anesthetic complications: None          Last vitals:  Vitals:    04/11/19 1116 04/11/19 1345 04/11/19 1400   BP:  130/65 135/72   Pulse:   84   Resp:  16 9   Temp:  36.3  C (97.4  F) 36.5  C (97.7  F)   SpO2: 96% 99% 99%         Electronically Signed By: Avi Saab MD  April 11, 2019  2:11 PM

## 2019-04-11 NOTE — BRIEF OP NOTE
[facility]    Brief Operative Note    Pre-operative diagnosis: GALLSTONES  Post-operative diagnosis Cholelithiasis  Procedure: Procedure(s):  LAPAROSCOPIC CHOLECYSTECTOMY  Surgeon: Surgeon(s) and Role:     * Maykel Calvin MD - Primary   Jerson Pagan PA-C - Assisting  Anesthesia: General   Estimated blood loss: 12 mL  Drains: None  Specimens:   ID Type Source Tests Collected by Time Destination   A : gallbladder and contents Tissue Gallbladder and Contents SURGICAL PATHOLOGY EXAM Maykel Calvin MD 4/11/2019  1:18 PM      Findings:   None.  Complications: None.  Implants: None  See Operative Report for full details.  No complications noted.  .

## 2019-04-11 NOTE — OR NURSING
Pt moved back to phase I to continue to manage pain and nausea. Tata MACIEL RN resumed cares for pt.

## 2019-04-11 NOTE — OR NURSING
Patient brought back to recovery and given 2 doses of IV Dilaudid(see MAR)  States nausea is much less and declines taking the Compazine at this time. VSS. Alert and calm. Continue to monitor

## 2019-04-11 NOTE — DISCHARGE INSTRUCTIONS
Information for Patients Discharging with a Transderm Scopolamine Patch       Dry mouth is a common side effect.    Drowsiness is another common side effect especially when combined with pain medication.  Please avoid activities that require mental alertness such as driving a car or making important legal decisions.    Since Scopolamine can cause temporary dilation of the pupils and blurred vision if it comes in contact with the eyes; be sure to wash your hands thoroughly with soap and water immediately after handling the patch.   When you remove your patch, please stick it to a tissue or paper towel for disposal.      Remove the patch immediately and contact a physician in the unlikely event that you experience symptoms of acute glaucoma (pain and reddening of the eyes, accompanied by dilated pupils).    Remove the patch if you develop any difficulties urinating.  If you cannot urinate after removing your patch, please notify your surgeon.    Remove the patch 24 hours after surgery.      Same Day Surgery Discharge Instructions for  Sedation and General Anesthesia       It's not unusual to feel dizzy, light-headed or faint for up to 24 hours after surgery or while taking pain medication.  If you have these symptoms: sit for a few minutes before standing and have someone assist you when you get up to walk or use the bathroom.      You should rest and relax for the next 24 hours. We recommend you make arrangements to have an adult stay with you for at least 24 hours after your discharge.  Avoid hazardous and strenuous activity.      DO NOT DRIVE any vehicle or operate mechanical equipment for 24 hours following the end of your surgery.  Even though you may feel normal, your reactions may be affected by the medication you have received.      Do not drink alcoholic beverages for 24 hours following surgery.       Slowly progress to your regular diet as you feel able. It's not unusual to feel nauseated and/or vomit  after receiving anesthesia.  If you develop these symptoms, drink clear liquids (apple juice, ginger ale, broth, 7-up, etc. ) until you feel better.  If your nausea and vomiting persists for 24 hours, please notify your surgeon.        All narcotic pain medications, along with inactivity and anesthesia, can cause constipation. Drinking plenty of liquids and increasing fiber intake will help.      For any questions of a medical nature, call your surgeon.      Do not make important decisions for 24 hours.      If you had general anesthesia, you may have a sore throat for a couple of days related to the breathing tube used during surgery.  You may use Cepacol lozenges to help with this discomfort.  If it worsens or if you develop a fever, contact your surgeon.       If you feel your pain is not well managed with the pain medications prescribed by your surgeon, please contact your surgeon's office to let them know so they can address your concerns.       **Because you had anesthesia today and your history of sleep apnea, it is extremely important that you use your CPAP machine for the next 24 hours while napping or sleeping.**      [facility]  Discharge Instructions: Post-Operative Laparoscopic Cholecystectomy    ACTIVITY    Expect to feel tired after your surgery.  This will gradually resolve.    Take frequent, short walks and increase your activity gradually.      Avoid strenuous physical activity or heavy lifting greater than 15-20 lbs. for 2-3 weeks.  You may climb stairs.    You may drive without restrictions when you are not using any prescription pain medication and comfortable in a car.    You may return to work/school when you are comfortable without any prescription pain medication.    WOUND CARE    You may remove your outer dressing or Band-Aids and shower 48 hours after the surgery.  Pat your incisions dry and leave open to air.  Re-apply dressing (Band-aid or  gauze/tape) as needed for comfort or drainage.    You may have steri-strips (looks like white tape) on your incision.  You may peel off the steri-strip 2 weeks after surgery if they have not peeled off on their own.    Do not soak your incisions in a tub or pool for 2 weeks.     Do not apply any lotions, creams, or ointments to your incisions.    A ridge under incisions is normal and will gradually resolve.    DIET    Start with liquids, then gradually resume your regular diet as tolerated.  Avoid heavy, spicy, and greasy meals for 2-3 days.    Drink plenty of liquids to stay hydrated.     It is not uncommon to experience some loose stools or diarrhea after surgery.  This is your body s way of adapting to the bile which will slowly drain into your intestine. A low fat diet may help with this.  This should improve over 1-2 months.     PAIN    Expect some tenderness and discomfort at the incision sites.  Use the prescribed pain medication at your discretion.  Expect gradual resolution of your pain over several days.    You may take ibuprofen with food (unless you have been told not to) instead of or in addition to your prescribed pain medication.  If you are taking Norco or Percocet, do not take any additional acetaminophen/APAP/Tylenol.    Do not drink alcohol or drive while you are taking pain medications.    You may apply ice to your incisions in 20 minute intervals as needed for the next 48 hours.  After that time, consider switching to heat if you prefer.    EXPECTATIONS    Pain medications can cause constipation.  Limit use when possible.  Take over the counter stool softener/stimulant, such as Colace or Senna, 1-2 times a day with plenty of water.  You may take a mild over the counter laxative, such as Miralax or a suppository, as needed.  You may discontinue these medications once you are having regular bowel movements and/or are no longer taking your narcotic pain medication.      You may have shoulder or  upper back discomfort due to the gas used in surgery.  This is temporary and should resolve in 48-72 hours.  Short, frequent walks may help with this.      FOLLOW UP    Our office will contact you approximately 2 weeks to check on your progress and answer any questions you may have.  If you are doing well, you will not need to return for a follow up appointment.  If any concerns are identified over the phone, we will help you make an appointment to see a provider.    CALL OUR OFFICE IF YOU HAVE:     Chills or fever above 101.5 F.    Increased redness or drainage at your incisions.    Significant bleeding.    Pain not relieved by your pain medication or rest.    Increasing pain after the first 48 hours.    Any other concerns or questions.      Revised January 2018

## 2019-04-12 ENCOUNTER — HOSPITAL ENCOUNTER (OUTPATIENT)
Dept: ULTRASOUND IMAGING | Facility: CLINIC | Age: 49
End: 2019-04-12
Attending: PEDIATRICS
Payer: COMMERCIAL

## 2019-04-12 ENCOUNTER — HOSPITAL ENCOUNTER (OUTPATIENT)
Dept: MAMMOGRAPHY | Facility: CLINIC | Age: 49
Discharge: HOME | End: 2019-04-12
Attending: PEDIATRICS | Admitting: PEDIATRICS
Payer: COMMERCIAL

## 2019-04-12 DIAGNOSIS — R23.4 PEELING SKIN: ICD-10-CM

## 2019-04-12 LAB — COPATH REPORT: NORMAL

## 2019-04-12 PROCEDURE — G0279 TOMOSYNTHESIS, MAMMO: HCPCS

## 2019-04-12 PROCEDURE — 77066 DX MAMMO INCL CAD BI: CPT

## 2019-04-12 PROCEDURE — 76642 ULTRASOUND BREAST LIMITED: CPT | Mod: LT

## 2019-04-17 ENCOUNTER — TELEPHONE (OUTPATIENT)
Dept: SURGERY | Facility: CLINIC | Age: 49
End: 2019-04-17

## 2019-04-17 NOTE — TELEPHONE ENCOUNTER
Left message providing patient with direct dial line.  Encouraged her to call and leave a detailed message with her present concerns and her call will be returned as soon as able.    Marianne Shaver RN

## 2019-04-17 NOTE — TELEPHONE ENCOUNTER
Patient had laparoscopic cholecystectomy 4/11/19 with Dr. Calvin.    She reports ongoing RUQ pain and swelling.  She is no longer utilizing the narcotic pain medication because it causes a drop in her O2 saturation levels.    She has just started having bowel movements.  She denies fever or any s/s of infection. No recent nausea or vomiting. She has tried ice packs and she is currently trying abdominal binder with no relief.    She reports the pain is primarily with movement, but there is a constant, dull pain even when utilizing pain medications (tylenol).    Encouraged her to try heat an alternate with ice.      She has a visit with her PCP tomorrow.  Scheduled post op with Dr. Calvin for Friday.  Will call patient with any further recommendations after discussing with PA in clinic today.    Marianne Shaver RN

## 2019-04-17 NOTE — TELEPHONE ENCOUNTER
Name of caller: Patient    Reason for Call:  Patient is having more pain than she thinks may be normal    Surgeon:  Dr. Calvin     Recent Surgery:  Yes.    If yes, when & what type:  4/11/19 gall bladder      Best phone number to reach pt at is: [phone]  Ok to leave a message with medical info? Yes.    Pharmacy preferred (if calling for a refill):

## 2019-04-18 ENCOUNTER — OFFICE VISIT (OUTPATIENT)
Dept: PEDIATRICS | Facility: CLINIC | Age: 49
End: 2019-04-18
Payer: COMMERCIAL

## 2019-04-18 VITALS
WEIGHT: 268.7 LBS | RESPIRATION RATE: 14 BRPM | DIASTOLIC BLOOD PRESSURE: 60 MMHG | HEART RATE: 78 BPM | TEMPERATURE: 97 F | OXYGEN SATURATION: 98 % | BODY MASS INDEX: 45.87 KG/M2 | HEIGHT: 64 IN | SYSTOLIC BLOOD PRESSURE: 100 MMHG

## 2019-04-18 DIAGNOSIS — N64.59 NIPPLE PROBLEM: ICD-10-CM

## 2019-04-18 DIAGNOSIS — G89.18 POSTOPERATIVE PAIN: Primary | ICD-10-CM

## 2019-04-18 DIAGNOSIS — Z90.49 S/P LAPAROSCOPIC CHOLECYSTECTOMY: ICD-10-CM

## 2019-04-18 PROCEDURE — 99213 OFFICE O/P EST LOW 20 MIN: CPT | Performed by: PEDIATRICS

## 2019-04-18 ASSESSMENT — MIFFLIN-ST. JEOR: SCORE: 1823.82

## 2019-04-18 NOTE — PROGRESS NOTES
"  SUBJECTIVE:   Ana Wyman is a 49 year old female who presents to clinic today for the following   health issues:      Patient presents today for a follow up on a laparoscopic cholecystectomy she had on 4/11. Patient states that she has been having pain at the incision site since her surgery. Patient also would like to discuss recent mammogram done.     HPI:    Increased pain right after gallbladder surgery 4/11.  Just went back to work yesterday and struggled due to pain.    Able to eat, but was constipated from medications.  Has tried laxative and finally had good BMs, most recently this morning.  Is passing gas.    No fevers and able to ambulate.      Pain is right where the trocar sites are, but feels deeper.  Using ice/heat, and tylenol every 4 hours.  This helps with pain.   Has not been using narcotic pain medications because she can't work/drive on these.       Her mother has been very ill and she has been helping her.    Pain worse with movement and when hasn't taken tylenol    Left nipple skin changes - increased redness - prompted recent diagnostic mammogram/US - which were normal.   Nipple redness/peeling has now resolved with use of vaseline.    Patient has had excellent weight loss after recent gastric bypass surgery - she is encouraged by her progress.      Additional history: as documented    Reviewed  and updated as needed this visit by clinical staff         Reviewed and updated as needed this visit by Provider           ROS:  Constitutional, breast, cardiovascular, gi and gu systems are negative, except as otherwise noted.    OBJECTIVE:     /60 (BP Location: Right arm, Patient Position: Sitting, Cuff Size: Adult Large)   Pulse 78   Temp 97  F (36.1  C) (Tympanic)   Resp 14   Ht 1.626 m (5' 4\")   Wt 121.9 kg (268 lb 11.2 oz)   SpO2 98%   BMI 46.12 kg/m    Body mass index is 46.12 kg/m .  GENERAL: alert and no distress  BREAST: left breast with normal nipple and surrounding " skin  ABDOMEN: obese, +BS, well healing trocar sites from recent surgeries, 2 trocar sites in right upper abdomen with resolving bruising and mild tenderness to palpation, no rebound or guarding, tolerates exam well.  PSYCH: mentation appears normal, affect normal/bright    Diagnostic Test Results:  Reviewed recent mammogram results    ASSESSMENT/PLAN:       ICD-10-CM    1. Postoperative pain G89.18 Now 1 week post lap bernabe.   Has had more pain than expected, but reassuring exam today, afebrile, stable vitals, able to eat, and now stooling.   Continue tylenol and current heat/ice regimen for pain control.   Continue to monitor and alert me/her surgical team with new concerns   2. Nipple problem N64.59 Reviewed recent normal diagnostic breast imaging results - nipple skin changes now resolved   3. S/P laparoscopic cholecystectomy Z90.49 See above       Patient Instructions   Keep up your tylenol, heat, and ice for pain - exam reassuring today    Breast exam much improved too    Try to rest as much as you can          Kelly Bedoya MD  [facility]

## 2019-04-18 NOTE — PATIENT INSTRUCTIONS
Keep up your tylenol, heat, and ice for pain - exam reassuring today    Breast exam much improved too    Try to rest as much as you can

## 2019-04-24 ENCOUNTER — TELEPHONE (OUTPATIENT)
Dept: SURGERY | Facility: CLINIC | Age: 49
End: 2019-04-24

## 2019-04-24 ENCOUNTER — OFFICE VISIT (OUTPATIENT)
Dept: SURGERY | Facility: CLINIC | Age: 49
End: 2019-04-24
Payer: COMMERCIAL

## 2019-04-24 VITALS — HEART RATE: 69 BPM | TEMPERATURE: 98.1 F

## 2019-04-24 DIAGNOSIS — Z09 SURGERY FOLLOW-UP EXAMINATION: Primary | ICD-10-CM

## 2019-04-24 PROCEDURE — 99024 POSTOP FOLLOW-UP VISIT: CPT | Performed by: SURGERY

## 2019-04-24 NOTE — TELEPHONE ENCOUNTER
Name of caller: Patient    Reason for Call:  questions    Surgeon:  Dr. Calvin     Recent Surgery:  Yes.    If yes, when & what type:  Lap bernabe  04/19/19      Best phone number to reach pt at is: [phone]  Ok to leave a message with medical info? Yes.    Pharmacy preferred (if calling for a refill): na

## 2019-04-24 NOTE — PROGRESS NOTES
Doing well after operation. Pathology reviewed.  New drainage from umbilicus today. Red, serous. No incision near there.  Operative Wounds OK, discussed restrictions, all questions answered.  Umbilicus cleaned with peroxide. Debris came out. Then cleaned with saline. Packed with dry gauze.  Needs deep cleaning daily with moist Q-tip. No surrounding erythema now, so no antibiotics.   Plan:  call PRN. She will work on cleaning umbilicus regularly.     Copy to PCP    Maykel Calvin MD

## 2019-04-24 NOTE — TELEPHONE ENCOUNTER
"Patient had laparoscopic cholecystectomy 4/11/19 with Dr. Calvin.    She is calling today reporting that she has been having ongoing pain that initially was present in the RUQ only, but has recently began to experience pain under and around her belly button. She reports that she woke up to bleeding and draining from her belly button.    She reports that there was \"quite a bit\" of bleeding and that there is a strong, foul odor to it. She also reports that there is a red line that travels down the center of her abdomen.    Scheduled her to see Dr. Calvin later this afternoon for post operative evaluation.    Marianne Shaver RN  "

## 2019-04-29 ENCOUNTER — OFFICE VISIT (OUTPATIENT)
Dept: SURGERY | Facility: CLINIC | Age: 49
End: 2019-04-29
Payer: COMMERCIAL

## 2019-04-29 ENCOUNTER — HOSPITAL ENCOUNTER (OUTPATIENT)
Dept: LAB | Facility: CLINIC | Age: 49
Discharge: HOME | End: 2019-04-29
Attending: PHYSICIAN ASSISTANT | Admitting: PHYSICIAN ASSISTANT
Payer: COMMERCIAL

## 2019-04-29 VITALS
BODY MASS INDEX: 44.68 KG/M2 | WEIGHT: 261.7 LBS | SYSTOLIC BLOOD PRESSURE: 114 MMHG | HEART RATE: 62 BPM | OXYGEN SATURATION: 97 % | HEIGHT: 64 IN | DIASTOLIC BLOOD PRESSURE: 67 MMHG

## 2019-04-29 VITALS — WEIGHT: 261.69 LBS | HEIGHT: 64 IN | BODY MASS INDEX: 44.68 KG/M2

## 2019-04-29 DIAGNOSIS — K59.01 SLOW TRANSIT CONSTIPATION: ICD-10-CM

## 2019-04-29 DIAGNOSIS — Z98.84 BARIATRIC SURGERY STATUS: ICD-10-CM

## 2019-04-29 DIAGNOSIS — E66.01 MORBID OBESITY (H): ICD-10-CM

## 2019-04-29 DIAGNOSIS — G47.33 OSA (OBSTRUCTIVE SLEEP APNEA): ICD-10-CM

## 2019-04-29 DIAGNOSIS — Z98.84 BARIATRIC SURGERY STATUS: Primary | ICD-10-CM

## 2019-04-29 DIAGNOSIS — K91.2 POSTSURGICAL MALABSORPTION: ICD-10-CM

## 2019-04-29 DIAGNOSIS — B95.8 STAPH SKIN INFECTION: ICD-10-CM

## 2019-04-29 DIAGNOSIS — L08.9 STAPH SKIN INFECTION: ICD-10-CM

## 2019-04-29 LAB
FERRITIN SERPL-MCNC: 253 NG/ML (ref 8–252)
IRON SATN MFR SERPL: 22 % (ref 15–46)
IRON SERPL-MCNC: 58 UG/DL (ref 35–180)
PTH-INTACT SERPL-MCNC: 33 PG/ML (ref 18–80)
TIBC SERPL-MCNC: 266 UG/DL (ref 240–430)
VIT B12 SERPL-MCNC: 656 PG/ML (ref 193–986)

## 2019-04-29 PROCEDURE — 36415 COLL VENOUS BLD VENIPUNCTURE: CPT | Performed by: PHYSICIAN ASSISTANT

## 2019-04-29 PROCEDURE — 99214 OFFICE O/P EST MOD 30 MIN: CPT | Mod: 24 | Performed by: PHYSICIAN ASSISTANT

## 2019-04-29 PROCEDURE — 97803 MED NUTRITION INDIV SUBSEQ: CPT | Performed by: DIETITIAN, REGISTERED

## 2019-04-29 PROCEDURE — 83970 ASSAY OF PARATHORMONE: CPT | Performed by: PHYSICIAN ASSISTANT

## 2019-04-29 PROCEDURE — 82607 VITAMIN B-12: CPT | Performed by: PHYSICIAN ASSISTANT

## 2019-04-29 PROCEDURE — 83540 ASSAY OF IRON: CPT | Performed by: PHYSICIAN ASSISTANT

## 2019-04-29 PROCEDURE — 82306 VITAMIN D 25 HYDROXY: CPT | Performed by: PHYSICIAN ASSISTANT

## 2019-04-29 PROCEDURE — 83550 IRON BINDING TEST: CPT | Performed by: PHYSICIAN ASSISTANT

## 2019-04-29 PROCEDURE — 82728 ASSAY OF FERRITIN: CPT | Performed by: PHYSICIAN ASSISTANT

## 2019-04-29 RX ORDER — POLYETHYLENE GLYCOL 3350 17 G/17G
1 POWDER, FOR SOLUTION ORAL DAILY
Qty: 90 PACKET | Refills: 3 | Status: SHIPPED | OUTPATIENT
Start: 2019-04-29 | End: 2024-08-11

## 2019-04-29 ASSESSMENT — MIFFLIN-ST. JEOR
SCORE: 1792.06
SCORE: 1792

## 2019-04-29 NOTE — PROGRESS NOTES
"NUTRITION POST OP APPOINTMENT  DATE OF VISIT: April 29, 2019    Ana Wyman  1970  female  [id number]  49 year old     ASSESSMENT:    REASON FOR VISIT:  Ana is a 49 year old year old female presents today for 3 month PO nutrition follow-up appointment. Patient is accompanied by mother, Yovana    DIAGNOSIS:  Status post gastric bypass surgery.  Obesity Grade III BMI >40     ANTHROPOMETRICS:  Initial Weight:  339 lb  Height: 162.6 cm (5' 4\")  Current Weight: 118.7 kg (261 lb 11 oz)   BMI: 44.92 kg/(m^2).    VITAMINS AND MINERALS:  2 Multivitamin with Minerals-HS  500 mg Calcium With Vitamin D TID with meals  3-04430 International units Vitamin D  1250 mcg Vitamin B-12 sublingual 2X per week  No Iron needed  1 vitamin B-complex    NUTRITION HISTORY:  Breakfast: oatmeal + PB2 or yogurt with [place] flakes or 1 egg + high fiber toast or 1 high protein waffle with PB2   Lunch: leftover ~ 2oz meat, veggies, fruit cup or yogurt  Supper: seafood or roast beef or turkey or chicken, asparagus tips, fresh berries or low sugar mandarin oranges  Snacks: protein drink  Fluids consumed: Water, Crystal Light, Protein Drink and herbal or decaf tea (40-70 oz per day)  Consuming liquid calories: Yes  Protein intake: 60-70 grams/day  Tolerate regular texture food: Yes  Any foods not tolerated details: Yes  If any food not tolerated: chicken or turkey  Portion size: 1/2-1 cup  Take 20-30 minutes to consume each meal: Yes   Eat protein foods first: Yes  Fluids and meals separate by at least 30 minutes: Yes  Chew foods thoroughly: Yes  Tolerating diet: Yes  Drinking high protein supplements: Yes  Consuming snacks per day: 0  Additional Information: Pt.had gall bladder surgery ~ 2 weeks ago. Struggling with constipation  (1 stool every 2-3 day with us of smooth moves tea). Will be starting Miralax steve Stacy PA-C. [id number] per week (one time after having a mint another time after possibly eating too " fast).        PHYSICAL ACTIVITY:  Type: none since gall gallbladdder surgery      DIAGNOSIS:  Previous Nutrition Diagnosis: Altered gastrointestinal function related to alteration in gastrointestinal structure as evidenced by history of gastric bypass surgery.- no change    Previous goals:  Start soft diet at day 28 post op.-met  Continue MVI, calcium with vitamin D, vitamin B12, vitamin D-met  Continue 48 to 64 oz of fluid per day.-not met        Current Nutrition Diagnosis: Altered gastrointestinal function related to alteration in gastrointestinal structure as evidenced by history of gastric bypass surgery.    INTERVENTION:   Nutrition Prescription: Eat 3 meals a day at regular intervals. Consume 60-90 grams of protein daily. Follow post-surgical vitamin and mineral protocol.  Assessed learning needs and learning preferences.    GOALS:  Increase fluid intake to 64 oz per day  Restart exercise as able  Gradually increase fiber intake to 10-15 grams of fiber per day       Implementation: Discussed progress toward previous goals; reinforced importance of following bariatric lifestyle changes.    NUTRITION MONITORING AND EVALUATION:  Anticipated compliance: good  Verbalized good understanding.    Follow up: Patient to follow up in 3 months or 6 month post-op    TIME SPENT WITH PATIENT:  25 minutes    Chapo Braun RD, LD  [facility]  [phone]

## 2019-04-29 NOTE — PROGRESS NOTES
2019      Return Bariatric Surgery Note    RE: Ana Wyman  MR#: [id number]  : 1970  VISIT DATE: 2019    Dear Elke, Kelly Curiel,    I had the pleasure of seeing your patient, Ana Wyman, in my post-bariatric surgery assessment clinic.    CHIEF COMPLAINT: Post-bariatric surgery follow-up    HISTORY OF PRESENT ILLNESS:  Questions Regarding Prior Weight Loss Surgery Reviewed With Patient 2019   I had the following weight loss procedure: Kay-en-y Gastric Bypass   What year was your surgery? 2019   How has your weight changed since your last visit? I have lost weight   Are you currently taking any weight loss medications? No   Do you currently have any of the following: Sleep Apnea   Have you been to the Emergency room since your last visit with us? Yes   Were you in the hospital since your last visit with us? Yes, had lap bernabe with Dr. Calvin on 2019.  Has had some post op RUQ pain and incision site with movement.    Do you have any concerns today? Still pain at gallbladder surgery site, calli infection-Pt called clinic and was started on Keflex.  Pain resolved since starting Keflex Friday.     Symptoms have improved since starting the Keflex.  She is still having a lot of pain at the site of the gallbladder incision site.      Weight History:     2019   What is your highest lifetime weight? 344   What is your lowest weight since surgery? (In pounds) 260     Initial Weight: 339 lb (153.8 kg)  Current Weight: Weight: 261 lb 11.2 oz (118.7 kg)  Cumulative weight loss (lbs): 77.3  Last Visits Weight: 294 lb (133.4 kg)    Questions Regarding Co-Morbidities and Health Concerns Reviewed With Patient 2019   Pre-diabetes: Gone away   Diabetes II: Never   High Blood Pressure: Gone Away   High cholesterol: Never   Heartburn/Reflux: Gone away   Are you taking daily medication for heartburn, acid reflux, or GERD (acid reflux disease)? -   Sleep apnea: Stayed the  same   Do you use a CPAP? Yes   PCOS: Never   Back pain: Gone away   Joint pain: Improved   Lower leg swelling: Improved       Eating Habits 4/26/2019   How many meals do you eat per day? 3   Do you snack between meals? Sometimes   How much food are you eating at each meal? 1/2 cup to 1 cup   Are you able to separate your meals and liquids by at least 30 minutes? Yes   Are you able to avoid liquid calories? Sometimes       Exercise Questions Reviewed With Patient 4/26/2019   How often do you exercise? 3 to 4 times per week   What is the duration of your exercise (in minutes)? 30 Minutes   What types of exercise do you do? walking   What keeps you from being more active?  I have recently been sick   Has been hard to exercise since getting her gallbladder removed.      Social History:      4/26/2019   Are you smoking? No   Are you drinking alcohol? No     Patient is taking the following bariatric postoperative vitamins:  2 Complete multivitamins with minerals (at different times than calcium)  6000 Int Units of Vitamin D daily   500 mg of Calcium TID   1250 mcg of Vitamin B12 sl twice weekly  B complex    Medications:  Current Outpatient Medications   Medication     albuterol (PROAIR HFA/PROVENTIL HFA/VENTOLIN HFA) 108 (90 Base) MCG/ACT inhaler     BIOTIN PO     Calcium Citrate-Vitamin D (CALCIUM CITRATE CHEWY BITE PO)     cephALEXin (KEFLEX) 500 MG capsule     childrens multivitamin w/iron (FLINTSTONES COMPLETE) 60 MG chewable tablet     Cholecalciferol (VITAMIN D) 2000 units CAPS     clobetasol (TEMOVATE) 0.05 % ointment     Hypromellose (ARTIFICIAL TEARS OP)     polyethylene glycol (MIRALAX/GLYCOLAX) packet     vitamin (B COMPLEX-C) tablet     vitamin B-12 (CYANOCOBALAMIN) 2500 MCG sublingual tablet     No current facility-administered medications for this visit.          4/26/2019   Do you avoid NSAIDs such as (Ibuprofen, Aleve, Naproxen, Advil)?   No       ROS:  GI:      4/26/2019   Vomiting: Yes, about once a  "week.     Diarrhea: Yes, just since gallbladder was removed.  Now improved.   Constipation: Yes, if she drinks smooth move tea she has BM every 2-3 days.     Swallowing trouble: No   Abdominal pain: Yes, below umbilicus incision s/p Connie. Resolved over weekend since on Keflex.     Heartburn: No   Rash in skin folds: No   Depression: No   Stress urinary incontinence No     Skin:   BAR RBS ROS - SKIN 4/26/2019   Rash in skin folds: No     Psych:      4/26/2019   Depression: No   Anxiety: No     Female Only:   BAR RBS ROS - FEMALE ONLY 4/26/2019   Female only: None of the above       LABS/IMAGING/MEDICAL RECORDS REVIEW:   Component      Latest Ref Rng & Units 4/29/2019   Iron      35 - 180 ug/dL 58   Iron Binding Cap      240 - 430 ug/dL 266   Iron Saturation Index      15 - 46 % 22   Vitamin B12      193 - 986 pg/mL 656   Ferritin      8 - 252 ng/mL 253 (H)   Parathyroid Hormone Intact      18 - 80 pg/mL 33   Vitamin D Deficiency screening      20 - 75 ug/L 55       PHYSICAL EXAMINATION:  /67 (BP Location: Left arm, Patient Position: Sitting, Cuff Size: Adult Large)   Pulse 62   Ht 5' 4\" (1.626 m)   Wt 261 lb 11.2 oz (118.7 kg)   SpO2 97%   BMI 44.92 kg/m     HEART: No JVD  LUNGS: Breathing unlabored.  ABD: Soft, NT, incisions healing well. C/D/I.  No warmth, erythema, drainage, or induration seen below umbilicus.     SKIN: No intertriginous irritation under pannus  MUSC: Gait normal  NEURO: Alert and oriented x3.    ASSESSMENT AND PLAN:      1. 3 months status gastric bypass   Discontinue Omeprazole.  2. Morbid Obesity  current BMI: Body mass index is 44.92 kg/m .  3. Post surgical malabsorption:   Labs ordered per protocol.   Follow food plan per dietitian recommendations.   Continue taking recommended post-op vitamins.  4. Constipation    Recommend starting Miralax. Can continue smooth move tea and taper after starting Miralax.  Discussed tapering to effect.  - polyethylene glycol (MIRALAX/GLYCOLAX) " packet; Take 17 g by mouth daily  Dispense: 90 packet; Refill: 3  5. MIGUELITO   Continue CPAP use.  Will consider repeat sleep study at 1 year post op.  Pt to contact sleep clinic if he experience abdominal pain, gas or distention in am after use.  This may indicate pressures/mask need adjusting.   6. Staph skin infection   Improving. Finish 7 day course of Keflex. . Return to clinic in 3 months.    Sincerely,    Sarah Stacy PA-C    I spent a total of 25 minutes face to face with Ana during today's office visit. Over 50% of this time was spent counseling the patient and/or coordinating care.

## 2019-04-30 LAB — DEPRECATED CALCIDIOL+CALCIFEROL SERPL-MC: 55 UG/L (ref 20–75)

## 2019-06-21 ENCOUNTER — TRANSFERRED RECORDS (OUTPATIENT)
Dept: HEALTH INFORMATION MANAGEMENT | Facility: CLINIC | Age: 49
End: 2019-06-21

## 2019-07-25 ENCOUNTER — TELEPHONE (OUTPATIENT)
Dept: SURGERY | Facility: CLINIC | Age: 49
End: 2019-07-25

## 2019-07-25 DIAGNOSIS — K91.2 POSTSURGICAL MALABSORPTION: ICD-10-CM

## 2019-07-25 DIAGNOSIS — Z98.84 BARIATRIC SURGERY STATUS: ICD-10-CM

## 2019-07-25 NOTE — TELEPHONE ENCOUNTER
Called pt back. Left detailed message as instructed in previous note.  Apologized for inconvenience, informed pt that orders are in and attempt was made for lab appointment at [place] (pt desired location) for tomorrow as requested in voicemail.  Unable to make appointment when pt is available before 1100.  Tentative appointment made for lab draw at [facility] (pt lives in [place]) at 1000 (only available time).  Let patient know that this can be changed if needed.  Awaiting return call.  Ne Morris RN on 7/25/2019 at 12:24 PM

## 2019-07-25 NOTE — TELEPHONE ENCOUNTER
VOLODYMYR Stacy patient:    Reason for call:  Other   Patient called regarding (reason for call): appointment  Additional comments: Patient is wondering why lab orders were not placed for her 6 month lab appointment today. She was told at her last appointment that she could go in to any [facility] and the labs would be placed. At her appointment fro today they said there were no labs placed, and that they had been trying to reach out for a week with no results. She would like a call back when the orders are placed so she can try to get them done before her upcoming appointment. --Did not call nurse line, because it was before 8 AM, and patient did not want to leave message--    Phone number to reach patient:  Cell number on file:    Telephone Information:   Mobile [phone]       Best Time:  Anytime     Can we leave a detailed message on this number?  YES

## 2019-07-25 NOTE — TELEPHONE ENCOUNTER
Pt called back. Further updates given.  Pt states she is able to go to scheduled appointment tomorrow.  No further needs at this time.  Ne Morris RN on 7/25/2019 at 3:49 PM

## 2019-07-26 DIAGNOSIS — Z98.84 BARIATRIC SURGERY STATUS: ICD-10-CM

## 2019-07-26 DIAGNOSIS — K91.2 POSTSURGICAL MALABSORPTION: ICD-10-CM

## 2019-07-26 LAB
ERYTHROCYTE [DISTWIDTH] IN BLOOD BY AUTOMATED COUNT: 13.7 % (ref 10–15)
FERRITIN SERPL-MCNC: 172 NG/ML (ref 8–252)
HCT VFR BLD AUTO: 44.1 % (ref 35–47)
HGB BLD-MCNC: 14.5 G/DL (ref 11.7–15.7)
IRON SATN MFR SERPL: 27 % (ref 15–46)
IRON SERPL-MCNC: 86 UG/DL (ref 35–180)
MCH RBC QN AUTO: 30.6 PG (ref 26.5–33)
MCHC RBC AUTO-ENTMCNC: 32.9 G/DL (ref 31.5–36.5)
MCV RBC AUTO: 93 FL (ref 78–100)
PLATELET # BLD AUTO: 295 10E9/L (ref 150–450)
PTH-INTACT SERPL-MCNC: 40 PG/ML (ref 18–80)
RBC # BLD AUTO: 4.74 10E12/L (ref 3.8–5.2)
TIBC SERPL-MCNC: 316 UG/DL (ref 240–430)
WBC # BLD AUTO: 6.8 10E9/L (ref 4–11)

## 2019-07-26 PROCEDURE — 36415 COLL VENOUS BLD VENIPUNCTURE: CPT | Performed by: PHYSICIAN ASSISTANT

## 2019-07-26 PROCEDURE — 83970 ASSAY OF PARATHORMONE: CPT | Performed by: PHYSICIAN ASSISTANT

## 2019-07-26 PROCEDURE — 82728 ASSAY OF FERRITIN: CPT | Performed by: PHYSICIAN ASSISTANT

## 2019-07-26 PROCEDURE — 85027 COMPLETE CBC AUTOMATED: CPT | Performed by: PHYSICIAN ASSISTANT

## 2019-07-26 PROCEDURE — 83540 ASSAY OF IRON: CPT | Performed by: PHYSICIAN ASSISTANT

## 2019-07-26 PROCEDURE — 82306 VITAMIN D 25 HYDROXY: CPT | Performed by: PHYSICIAN ASSISTANT

## 2019-07-26 PROCEDURE — 83550 IRON BINDING TEST: CPT | Performed by: PHYSICIAN ASSISTANT

## 2019-07-29 ENCOUNTER — OFFICE VISIT (OUTPATIENT)
Dept: SURGERY | Facility: CLINIC | Age: 49
End: 2019-07-29
Payer: COMMERCIAL

## 2019-07-29 VITALS
DIASTOLIC BLOOD PRESSURE: 58 MMHG | SYSTOLIC BLOOD PRESSURE: 97 MMHG | BODY MASS INDEX: 39.93 KG/M2 | WEIGHT: 233.9 LBS | HEIGHT: 64 IN | HEART RATE: 76 BPM | OXYGEN SATURATION: 96 %

## 2019-07-29 VITALS — BODY MASS INDEX: 39.93 KG/M2 | HEIGHT: 64 IN | WEIGHT: 233.91 LBS

## 2019-07-29 DIAGNOSIS — K59.01 SLOW TRANSIT CONSTIPATION: ICD-10-CM

## 2019-07-29 DIAGNOSIS — I95.1 POSTURAL HYPOTENSION: ICD-10-CM

## 2019-07-29 DIAGNOSIS — Z98.84 BARIATRIC SURGERY STATUS: Primary | ICD-10-CM

## 2019-07-29 DIAGNOSIS — L65.0 TELOGEN EFFLUVIUM: ICD-10-CM

## 2019-07-29 DIAGNOSIS — L30.4 INTERTRIGO: ICD-10-CM

## 2019-07-29 DIAGNOSIS — E66.01 MORBID OBESITY (H): ICD-10-CM

## 2019-07-29 DIAGNOSIS — R10.11 RIGHT UPPER QUADRANT ABDOMINAL PAIN: ICD-10-CM

## 2019-07-29 DIAGNOSIS — G47.33 OSA ON CPAP: ICD-10-CM

## 2019-07-29 DIAGNOSIS — K91.2 MALNUTRITION FOLLOWING GASTROINTESTINAL SURGERY: ICD-10-CM

## 2019-07-29 DIAGNOSIS — R73.03 PREDIABETES: ICD-10-CM

## 2019-07-29 PROCEDURE — 99214 OFFICE O/P EST MOD 30 MIN: CPT | Performed by: PHYSICIAN ASSISTANT

## 2019-07-29 PROCEDURE — 97803 MED NUTRITION INDIV SUBSEQ: CPT | Performed by: DIETITIAN, REGISTERED

## 2019-07-29 RX ORDER — TRIAMCINOLONE ACETONIDE 1 MG/G
CREAM TOPICAL 2 TIMES DAILY PRN
Qty: 30 G | Refills: 3 | Status: SHIPPED | OUTPATIENT
Start: 2019-07-29 | End: 2020-01-27

## 2019-07-29 RX ORDER — NYSTATIN 100000 U/G
CREAM TOPICAL 2 TIMES DAILY PRN
Qty: 30 G | Refills: 3 | Status: SHIPPED | OUTPATIENT
Start: 2019-07-29 | End: 2020-01-27

## 2019-07-29 ASSESSMENT — MIFFLIN-ST. JEOR
SCORE: 1665.96
SCORE: 1666

## 2019-07-29 NOTE — PROGRESS NOTES
"NUTRITION POST OP APPOINTMENT  DATE OF VISIT: July 29, 2019    Ana Wyman  1970  female  [id number]  49 year old     ASSESSMENT:    REASON FOR VISIT:  Ana is a 49 year old year old female presents today for 6 month PO nutrition follow-up appointment. Patient is accompanied by self.    DIAGNOSIS:  Status post gastric bypass surgery.  Obesity Grade III BMI >40     ANTHROPOMETRICS:  Initial Weight: 64\"   Height: 162.6 cm (5' 4\")  Current Weight: 106.1 kg (233 lb 14.5 oz)   BMI: 40.15 kg/(m^2).    VITAMINS AND MINERALS:  2  Multivitamin with Minerals  600 mg Calcium With Vitamin D BID  3-2000 International units Vitamin D  1250 mcg Vitamin B-12 sublingual 2X per week  No Iron needed per clinic guidelines  1 vitamin B complex      NUTRITION HISTORY:  Breakfast: Greek yogurt, fiber one flakes or  egg cup (frozen), 1 blue berry high protein pancakes, veggie sausage  Lunch: salad with cottage cheese or tuna, assorted raw veggies, blue berries, Hippies (chick pea chick peas)   Supper: meat or shrimp or fish, butter lettuce salad or green beans or black beans  Snacks: sugar free popsicles   Fluids consumed: Water, herbal tea, protein drink, enhance water, Fairlife milk  Consuming liquid calories: Yes  Protein intake: 40-50 grams/day  Tolerate regular texture food: Yes  Any foods not tolerated details: Yes  If any food not tolerated: watermelon  Portion size: 1 cup  Take 20-30 minutes to consume each meal: Yes   Eat protein foods first: Yes  Fluids and meals separate by at least 30 minutes: Yes  Chew foods thoroughly: Yes  Tolerating diet: Yes  Drinking high protein supplements: Yes-2 or 3 X per week  Consuming snacks per day: rare  Additional Information: Patient recently joined the [employer] and likes this option for exercise. Weight loss is \"slowed down last month,\" but no plateau yet. Patient focus on trying got select food that have protein and fiber (legume, legume based product). Encouraged pt to explore " bariatric cooking further (offered suggestions).    PHYSICAL ACTIVITY:  Type: [employer] (likes group fitness or cardio/ strength training) or walking  Frequency (days per week): 3  Duration (min): 30-60    DIAGNOSIS:  Previous Nutrition Diagnosis: Altered gastrointestinal function related to alteration in gastrointestinal structure as evidenced by history of gastric bypass surgery.- no change    Previous goals:  Increase fluid intake to 64 oz per day-met  Restart exercise as able-met  Gradually increase fiber intake to 10-15 grams of fiber per day-improving    Current Nutrition Diagnosis: Altered gastrointestinal function related to alteration in gastrointestinal structure as evidenced by history of gastric bypass surgery.    INTERVENTION:   Nutrition Prescription: Eat 3 meals a day at regular intervals. Consume 60-90 grams of protein daily. Follow post-surgical vitamin and mineral protocol.  Assessed learning needs and learning preferences.    GOALS:  Aim for 60-90 g protein per day  Continue to practice all post- bariatric diet guidelines  Drink 1 protein drink or up to 16 oz milk pr day      Implementation: Discussed progress toward previous goals; reinforced importance of following bariatric lifestyle changes.    NUTRITION MONITORING AND EVALUATION:  Anticipated compliance: good  Verbalized good understanding.    Follow up: Patient to follow up in 6 months.    TIME SPENT WITH PATIENT:  25 minutes    Chapo Braun RD, LD  [facility]  [phone]

## 2019-07-29 NOTE — LETTER
[facility]          [address]  [address]  [place] [address]                                         Tel:  [phone]  Fax: [phone]    2020    Ana Wyman  [address] [id number]          Dear Ana;     We have identified that you have outstanding lab tests that have . If you would like to have the  labs completed, please contact our clinic at [phone] to have them re-ordered. If you have any questions, please contact our office.  Sincerely,    Katiana FERNANDEZ MA

## 2019-07-29 NOTE — PROGRESS NOTES
Return Bariatric Surgery Note    RE: Ana Wyman  MR#: [id number]  : 1970  VISIT DATE: 2019    Dear Kelly Bedoya,    I had the pleasure of seeing your patient, Ana Wyman, in my post-bariatric surgery assessment clinic.    CHIEF COMPLAINT: Post-bariatric surgery follow-up    HISTORY OF PRESENT ILLNESS:  Questions Regarding Prior Weight Loss Surgery Reviewed With Patient 2019   I had the following weight loss procedure: Kay-en-y Gastric Bypass   What year was your surgery? 2019   How has your weight changed since your last visit? I have lost weight   Are you currently taking any weight loss medications? No   Do you currently have any of the following: Sleep Apnea   Have you been to the Emergency room since your last visit with us? Yes   Were you in the hospital since your last visit with us? Yes   Do you have any concerns today? Low blood pressure, side pain, a1c, sleep apnea, constioation       Weight History:     2019   What is your highest lifetime weight? 350   What is your lowest weight since surgery? (In pounds) 230     Initial Weight: 339 lb (153.8 kg)  Current Weight: Weight: 233 lb 14.4 oz (106.1 kg)  Cumulative weight loss (lbs): 105.1  Last Visits Weight: 261 lb 11 oz (118.7 kg)     Patient is taking the following bariatric postoperative vitamins:  2 Complete multivitamins with minerals (at different times than calcium)   6000 Int Units of Vitamin D daily   1200  mg of Calcium daily in divided doses  Biotin  B complex  2500 B12 twice weekly    Questions Regarding Co-Morbidities and Health Concerns Reviewed With Patient 2019   Pre-diabetes: Gone away   Diabetes II: Never   High Blood Pressure: Never   High cholesterol: Never   Heartburn/Reflux: Gone away   Are you taking daily medication for heartburn, acid reflux, or GERD (acid reflux disease)? -   Sleep apnea: Improved   Do you use a CPAP? Yes   PCOS: Never   Back pain: Improved   Joint pain:  Worsened   Lower leg swelling: Gone away       Eating Habits 7/29/2019   How many meals do you eat per day? 3   Do you snack between meals? Sometimes   How much food are you eating at each meal? 1/2 cup to 1 cup   Are you able to separate your meals and liquids by at least 30 minutes? Yes   Are you able to avoid liquid calories? Sometimes       Exercise Questions Reviewed With Patient 7/29/2019   How often do you exercise? 3 to 4 times per week   What is the duration of your exercise (in minutes)? 30 Minutes   What types of exercise do you do? gym membership at the [facility]   What keeps you from being more active?  Lack of Time       Social History:      7/29/2019   Are you smoking? No   Are you drinking alcohol? No       Medications:  Current Outpatient Medications   Medication     BIOTIN PO     Calcium Citrate-Vitamin D (CALCIUM CITRATE CHEWY BITE PO)     childrens multivitamin w/iron (FLINTSTONES COMPLETE) 60 MG chewable tablet     Cholecalciferol (VITAMIN D) 2000 units CAPS     clobetasol (TEMOVATE) 0.05 % ointment     Hypromellose (ARTIFICIAL TEARS OP)     polyethylene glycol (MIRALAX/GLYCOLAX) packet     vitamin (B COMPLEX-C) tablet     vitamin B-12 (CYANOCOBALAMIN) 2500 MCG sublingual tablet     albuterol (PROAIR HFA/PROVENTIL HFA/VENTOLIN HFA) 108 (90 Base) MCG/ACT inhaler     No current facility-administered medications for this visit.          7/29/2019   Do you avoid NSAIDs such as (Ibuprofen, Aleve, Naproxen, Advil)?   Yes       ROS:  GI:      7/29/2019   Vomiting: Yes   Diarrhea: Yes, watery diarrhea Friday and Saturday   Constipation: Yes, on Miralax daily.  Has BM most days   Swallowing trouble: No   Abdominal pain: Yes, Right sided dull pain for last week.     Heartburn: No   Rash in skin folds: Yes   Depression: No   Stress urinary incontinence No     Skin:   BAR RBS ROS - SKIN 7/29/2019   Rash in skin folds: Yes, under pannus.  Cleans and uses valaline.      Psych:      7/29/2019   Depression: No  "  Anxiety: No     Female Only:   BAR RBS ROS - FEMALE ONLY 7/29/2019   Female only: Loss of menstrual cycles       LABS/IMAGING/MEDICAL RECORDS REVIEW:       Component      Latest Ref Rng & Units 4/29/2019 7/26/2019   WBC      4.0 - 11.0 10e9/L  6.8   RBC Count      3.8 - 5.2 10e12/L  4.74   Hemoglobin      11.7 - 15.7 g/dL  14.5   Hematocrit      35.0 - 47.0 %  44.1   MCV      78 - 100 fl  93   MCH      26.5 - 33.0 pg  30.6   MCHC      31.5 - 36.5 g/dL  32.9   RDW      10.0 - 15.0 %  13.7   Platelet Count      150 - 450 10e9/L  295   Iron      35 - 180 ug/dL 58 86   Iron Binding Cap      240 - 430 ug/dL 266 316   Iron Saturation Index      15 - 46 % 22 27   Vitamin B12      193 - 986 pg/mL 656    Ferritin      8 - 252 ng/mL 253 (H) 172   Parathyroid Hormone Intact      18 - 80 pg/mL 33 40   Vitamin D Deficiency screening      20 - 75 ug/L 55        PHYSICAL EXAMINATION:  BP 97/58 (BP Location: Right arm, Patient Position: Sitting, Cuff Size: Adult Large)   Pulse 76   Ht 5' 4\" (1.626 m)   Wt 233 lb 14.4 oz (106.1 kg)   SpO2 96%   BMI 40.15 kg/m     GENERAL: Alert and oriented x3. NAD  HEART: No JVD  LUNGS: Breathing unlabored.  ABDOMEN: No hernias, Incisions well healed, soft and nontender  EXTREMITIES: No LE edema bilaterally.  Superficial Varicose veins medially on BLE  SKIN: Warm, moist, and erythematous under pannus.     ASSESSMENT AND PLAN:      1. 6 months status laparoscopic gastric bypass    2. Morbid Obesity current BMI: Body mass index is 40.15 kg/m .   Follow food plan per dietitian recommendations    3. Post surgical malabsorption:  Labs ordered per protocol.  - Vitamin B12; Future  - Parathyroid Hormone Intact; Future  - CBC with platelets; Future  - Vitamin D Screen; Future  - IRON; Future  - IRON AND IRON BINDING CAPACITY; Future  - FERRITIN; Future  Continue taking recommended post-op vitamins.    4. MIGUELITO  Continue using CPAP nightly  With weight loss you may need adjustments with you CPAP.  Call " your sleep clinic if mask becomes too loose or pressures seem high.    Will consider follow up sleep study at 1 year post op.    5. Postural Hypotension  Reviewed MOA for this. Most likely due to dehydration and low blood pressure. Discussed pushing fluids when this symptoms is present. Also discussed holding on to something to avoid falling.      6. Constipation  Continue Miralax daily    7 Rt sided abdominal Pain  - ranitidine (ZANTAC) 300 MG tablet; Take 1 tablet (300 mg) by mouth At Bedtime  Dispense: 30 tablet; Refill: 38. Prediabetes  - Hemoglobin A1c; Future    9. Intertrigo  Ordered  - nystatin (MYCOSTATIN) 080470 UNIT/GM external cream; Apply topically 2 times daily as needed for dry skin Apply as needed for rash.  Dispense: 30 g; Refill: 3  - triamcinolone (KENALOG) 0.1 % external cream; Apply topically 2 times daily as needed for irritation Apply twice daily PRN rash  Dispense: 30 g; Refill: 3    10. Telogen Effluvium  Discussed patient information on telogen effluvium and discussed normal course of hair loss.    Follow up: Return to clinic in 6 months.    Sincerely,    Sarah Stacy PA-C    I spent a total of 20 minutes face to face with Ana during today's office visit. Over 50% of this time was spent counseling the patient and/or coordinating care.

## 2019-07-30 LAB — DEPRECATED CALCIDIOL+CALCIFEROL SERPL-MC: 60 UG/L (ref 20–75)

## 2019-08-22 ENCOUNTER — MYC MEDICAL ADVICE (OUTPATIENT)
Dept: SURGERY | Facility: CLINIC | Age: 49
End: 2019-08-22

## 2019-08-23 NOTE — TELEPHONE ENCOUNTER
Pt been having dizzy spells with change in position and having consistent BP 80/50's. Patient reports she historically has had issues with this in highschool/college. More recently she thinks it could be due to weight loss. Encouraged patient to present to ER if symptoms or BP worsen. Patient states she feels comfortable waiting until Tuesday.     Per Dr. Bedoya okay to schedule    Next 5 appointments (look out 90 days)    Aug 27, 2019  3:25 PM CDT  Office Visit with Kelly Bedoya MD  [facility] [address] [place] [id number] [phone]

## 2019-08-23 NOTE — TELEPHONE ENCOUNTER
Forwarded to triage nursing to help patient schedule and review her symptoms today to make sure she doesn't need urgent appt today.      I could see her next Tuesday, 8/27 at 3:25 (spot saved in my schedule).   If that doesn't work, she could use a 20-25 minute GABE slot in September.         Kelly Bedoya MD  Internal Medicine - Pediatrics

## 2019-08-27 ENCOUNTER — OFFICE VISIT (OUTPATIENT)
Dept: PEDIATRICS | Facility: CLINIC | Age: 49
End: 2019-08-27
Attending: PEDIATRICS
Payer: COMMERCIAL

## 2019-08-27 ENCOUNTER — OFFICE VISIT (OUTPATIENT)
Dept: PEDIATRICS | Facility: CLINIC | Age: 49
End: 2019-08-27
Payer: COMMERCIAL

## 2019-08-27 VITALS
HEIGHT: 64 IN | TEMPERATURE: 98.5 F | DIASTOLIC BLOOD PRESSURE: 54 MMHG | SYSTOLIC BLOOD PRESSURE: 82 MMHG | BODY MASS INDEX: 39.95 KG/M2 | WEIGHT: 234 LBS | RESPIRATION RATE: 16 BRPM | OXYGEN SATURATION: 99 % | HEART RATE: 65 BPM

## 2019-08-27 VITALS
WEIGHT: 234 LBS | TEMPERATURE: 98.4 F | BODY MASS INDEX: 40.17 KG/M2 | DIASTOLIC BLOOD PRESSURE: 62 MMHG | SYSTOLIC BLOOD PRESSURE: 94 MMHG | RESPIRATION RATE: 16 BRPM | OXYGEN SATURATION: 99 % | HEART RATE: 59 BPM

## 2019-08-27 DIAGNOSIS — R42 DIZZINESS: ICD-10-CM

## 2019-08-27 DIAGNOSIS — I95.1 ORTHOSTATIC HYPOTENSION: ICD-10-CM

## 2019-08-27 DIAGNOSIS — R42 DIZZINESS: Primary | ICD-10-CM

## 2019-08-27 DIAGNOSIS — E86.0 DEHYDRATION: Primary | ICD-10-CM

## 2019-08-27 DIAGNOSIS — E86.0 DEHYDRATION: ICD-10-CM

## 2019-08-27 LAB
ALBUMIN SERPL-MCNC: 3.7 G/DL (ref 3.4–5)
ALBUMIN UR-MCNC: NEGATIVE MG/DL
ALP SERPL-CCNC: 86 U/L (ref 40–150)
ALT SERPL W P-5'-P-CCNC: 27 U/L (ref 0–50)
ANION GAP SERPL CALCULATED.3IONS-SCNC: 3 MMOL/L (ref 3–14)
APPEARANCE UR: CLEAR
AST SERPL W P-5'-P-CCNC: 16 U/L (ref 0–45)
BASOPHILS # BLD AUTO: 0 10E9/L (ref 0–0.2)
BASOPHILS NFR BLD AUTO: 0.6 %
BILIRUB SERPL-MCNC: 0.4 MG/DL (ref 0.2–1.3)
BILIRUB UR QL STRIP: NEGATIVE
BUN SERPL-MCNC: 13 MG/DL (ref 7–30)
CALCIUM SERPL-MCNC: 8.9 MG/DL (ref 8.5–10.1)
CHLORIDE SERPL-SCNC: 110 MMOL/L (ref 94–109)
CO2 SERPL-SCNC: 29 MMOL/L (ref 20–32)
COLOR UR AUTO: NORMAL
CREAT SERPL-MCNC: 0.66 MG/DL (ref 0.52–1.04)
DIFFERENTIAL METHOD BLD: NORMAL
EOSINOPHIL # BLD AUTO: 0.2 10E9/L (ref 0–0.7)
EOSINOPHIL NFR BLD AUTO: 3.6 %
ERYTHROCYTE [DISTWIDTH] IN BLOOD BY AUTOMATED COUNT: 12.7 % (ref 10–15)
GFR SERPL CREATININE-BSD FRML MDRD: >90 ML/MIN/{1.73_M2}
GLUCOSE SERPL-MCNC: 120 MG/DL (ref 70–99)
GLUCOSE UR STRIP-MCNC: NEGATIVE MG/DL
HCT VFR BLD AUTO: 42.5 % (ref 35–47)
HGB BLD-MCNC: 13.6 G/DL (ref 11.7–15.7)
HGB UR QL STRIP: NEGATIVE
IMM GRANULOCYTES # BLD: 0 10E9/L (ref 0–0.4)
IMM GRANULOCYTES NFR BLD: 0.1 %
KETONES UR STRIP-MCNC: NEGATIVE MG/DL
LEUKOCYTE ESTERASE UR QL STRIP: NEGATIVE
LYMPHOCYTES # BLD AUTO: 3.1 10E9/L (ref 0.8–5.3)
LYMPHOCYTES NFR BLD AUTO: 46 %
MCH RBC QN AUTO: 30.4 PG (ref 26.5–33)
MCHC RBC AUTO-ENTMCNC: 32 G/DL (ref 31.5–36.5)
MCV RBC AUTO: 95 FL (ref 78–100)
MONOCYTES # BLD AUTO: 0.4 10E9/L (ref 0–1.3)
MONOCYTES NFR BLD AUTO: 6.5 %
NEUTROPHILS # BLD AUTO: 2.9 10E9/L (ref 1.6–8.3)
NEUTROPHILS NFR BLD AUTO: 43.2 %
NITRATE UR QL: NEGATIVE
NRBC # BLD AUTO: 0 10*3/UL
NRBC BLD AUTO-RTO: 0 /100
PH UR STRIP: 6 PH (ref 5–7)
PLATELET # BLD AUTO: 286 10E9/L (ref 150–450)
POTASSIUM SERPL-SCNC: 3.5 MMOL/L (ref 3.4–5.3)
PROT SERPL-MCNC: 7.1 G/DL (ref 6.8–8.8)
RBC # BLD AUTO: 4.48 10E12/L (ref 3.8–5.2)
RBC #/AREA URNS AUTO: 1 /HPF (ref 0–2)
SODIUM SERPL-SCNC: 142 MMOL/L (ref 133–144)
SOURCE: NORMAL
SP GR UR STRIP: 1 (ref 1–1.03)
SQUAMOUS #/AREA URNS AUTO: <1 /HPF (ref 0–1)
UROBILINOGEN UR STRIP-MCNC: NORMAL MG/DL (ref 0–2)
WBC # BLD AUTO: 6.7 10E9/L (ref 4–11)
WBC #/AREA URNS AUTO: 1 /HPF (ref 0–5)

## 2019-08-27 PROCEDURE — 81001 URINALYSIS AUTO W/SCOPE: CPT | Performed by: PHYSICIAN ASSISTANT

## 2019-08-27 PROCEDURE — 96360 HYDRATION IV INFUSION INIT: CPT | Performed by: PHYSICIAN ASSISTANT

## 2019-08-27 PROCEDURE — 85025 COMPLETE CBC W/AUTO DIFF WBC: CPT | Performed by: PHYSICIAN ASSISTANT

## 2019-08-27 PROCEDURE — 96361 HYDRATE IV INFUSION ADD-ON: CPT | Performed by: PHYSICIAN ASSISTANT

## 2019-08-27 PROCEDURE — 99207 REFERRAL TO ACUTE AND DIAGNOSTIC SERVICES: CPT | Performed by: PEDIATRICS

## 2019-08-27 PROCEDURE — 80053 COMPREHEN METABOLIC PANEL: CPT | Performed by: PHYSICIAN ASSISTANT

## 2019-08-27 PROCEDURE — 36415 COLL VENOUS BLD VENIPUNCTURE: CPT | Performed by: PHYSICIAN ASSISTANT

## 2019-08-27 PROCEDURE — 99215 OFFICE O/P EST HI 40 MIN: CPT | Mod: 25 | Performed by: PHYSICIAN ASSISTANT

## 2019-08-27 RX ADMIN — Medication 1000 ML: at 18:38

## 2019-08-27 ASSESSMENT — MIFFLIN-ST. JEOR: SCORE: 1666.42

## 2019-08-27 NOTE — PATIENT INSTRUCTIONS
Go to the HUB for IV fluids tonight.  They will also do lab work and alert me if anything changes apart from our plan.   Please send me a message tomorrow with your blood pressure readings and how you are feeling so we can make a plan for the rest of the week and your vacation.

## 2019-08-27 NOTE — PROGRESS NOTES
Subjective     Ana Wyman is a 49 year old female who presents to clinic today for the following health issues:    HPI     Patient states that she has been checking her blood pressures every day and they have been pretty low since her surgery. She has them documented and brought them with.     Patient states that she has felt fatigued, cold and has not been able to go to the gym without feeling like she needs to pass out, which has been stalling her weight loss.         How many servings of fruits and vegetables do you eat daily?  4 or more    On average, how many sweetened beverages do you drink each day (soda, juice, sweet tea, etc)?   0    How many days per week do you miss taking your medication? 0      Low blood pressures started to become problematic 2 months ago.  Has had lab work with normal blood counts during that time period through bariatric clinic.    Blood pressure trends - 84/60, 90/72, 87/58, 88/57, 84/60 consistently at home.    Decreased urination - only voiding 2-3 times per day and dark urine.    Feels like energy is much lower - headaches are back, pain in right side.  Had been feeling great after her surgery, now struggling with energy and dizziness with standing/bending over.    Fluids intake during the day: 16 oz on the way to work with miralax, 2 cups of decaf tea at work (at least 16 ounces), 16 oz of water on the way home, tries to do 16 oz at home.   Shooting for 64 ounces daily.  Trying to increase salt.    In high school and college, had very low blood pressure.  Father had very low blood pressure.  Has had a history of passing out.   Believes that old patterns have been unmasked after tremendous weight loss post gastric bypass surgery.    Current diet:  Morning - yogurt/protein shake/scrmbled egg  Lunch - leftovers - crab and shrimp salad, blueberries  Dinner - taco salad/shrimp    Serving sizes - can tolerate about 3/4 cup.    Has been trying to exercise more as her weight has  "hit a plateau, but this has been difficult with her dizziness.       No recent infections.    Has to limit fluids around eating, complicating her ability to take in adequate fluids during the day.      Has also been having some RUQ pain.  Recent laparoscopic cholecystectomy.      Stressful time period - has had to take care of multiple family members with complicated care needs.  Mother currently living with her.      Reviewed and updated as needed this visit by Provider         Review of Systems   ROS COMP: Constitutional, HEENT, cardiovascular, pulmonary, gi and gu systems are negative, except as otherwise noted.      Objective    BP (!) 82/54 (BP Location: Right arm, Patient Position: Sitting, Cuff Size: Adult Large)   Pulse 65   Temp 98.5  F (36.9  C) (Tympanic)   Resp 16   Ht 1.626 m (5' 4\")   Wt 106.1 kg (234 lb)   SpO2 99%   BMI 40.17 kg/m    Body mass index is 40.17 kg/m .   Wt Readings from Last 4 Encounters:   08/27/19 106.1 kg (234 lb)   07/29/19 106.1 kg (233 lb 14.5 oz)   07/29/19 106.1 kg (233 lb 14.4 oz)   04/29/19 118.7 kg (261 lb 11 oz)       Physical Exam   GENERAL: alert, no distress, very pleasant and talkative  EYES: Eyes grossly normal to inspection, PERRL and conjunctivae and sclerae normal  RESP: no increased work of breathing, no cough, moving air easily  MS: rises unaided from seated position, normal gait, no gross joint deformities  PSYCH: mentation appears normal, affect normal/bright, judgement and insight intact and appearance well groomed    Diagnostic Test Results:  pending        Assessment & Plan       ICD-10-CM    1. Dizziness R42 Referral to Acute and Diagnostic Services (Day of diagnostic / First order acute)   2. Orthostatic hypotension I95.1 Referral to Acute and Diagnostic Services (Day of diagnostic / First order acute)   3. Dehydration E86.0        Patient with tremendous weight loss status post gastric bypass surgery 7 months ago.  Over the last 2 months, she has had " "increasing issues with hypotension, which was a significant problem for her in adolescence and young adulthood.  She has been struggling to keep on top of fluid needs, currently taking approximately 64 ounces of fluid per day.  Her urinary output has decreased significantly, currently only voiding 2-3 times per day.  Our plan today is to have her go to our first order acute diagnostic center to have 2 L of fluid administered.  She will also have laboratory evaluations done at the Center to include blood counts, urinalysis, and a complete metabolic panel.  She will follow-up with me about her symptoms via electronic update tomorrow, she also continue to follow her blood pressures at home.  Her blood pressure measures will be related to me tomorrow.  Pending her response to IV fluids, we can create a plan to help make sure she is maintaining adequate fluid intake after gastric bypass surgery.     BMI:   Estimated body mass index is 40.17 kg/m  as calculated from the following:    Height as of this encounter: 1.626 m (5' 4\").    Weight as of this encounter: 106.1 kg (234 lb).   Weight management plan: Patient referred to endocrine and/or weight management specialty        See Patient Instructions    Kelly Bedoya MD  [facility]    "

## 2019-08-27 NOTE — PROGRESS NOTES
No chief complaint on file.    SUBJECTIVE:   Ana Wyman is a 49 year old female referred to the Acute and Diagnostic Center from her PCP Dr. Bedoya for IV rehydration.      She has been unable to maintain good fluid intake since her bariatric procedure 1/28/19.  Suffers from intermittent dizziness and hypotension.   This makes it challenging for her to maintain an exercise program and is ultimately hindering her weight loss overall.    SH: she will be travelling to [facility] in 5 days to vacation with a friend.         Past Medical History:   Diagnosis Date     Allergic rhinitis, cause unspecified      Cellulitis 2005    2 episodes, one with hospitalization [facility]     DUB (dysfunctional uterine bleeding)     Neg EMB 2012     Esophageal reflux     ongoing     Fibroids      Genital problems     Lichens Schlerosis     GERD (gastroesophageal reflux disease)      Hallux valgus (acquired)      History of steroid therapy     Inhalers, eye drops     Hypersomnia with sleep apnea, unspecified     using CPAP     Kidney stone May 2018    2 stones     Lichen sclerosus 7/14/2011     Lymph edema 2010- present     Lymphedema bilateral with mixed lipedema. 9/30/2015     Lymphedema bilateral with mixed lipedema. 9/30/2015     Morbid obesity (H) 3/19/2013     MIGUELITO on CPAP 3/19/2013    Sleep study in 2004 and 2012       Pneumonia     Years ago - a few times     Pre-diabetes      Sleep apnea      Tendonitis currently     Uncomplicated asthma     mild     Urinary tract infection     A few times in past 10 years     Vision disorder [id number]    Dry Eye     Vitamin D deficiency 3/14/2015     Patient Active Problem List   Diagnosis     Esophageal reflux     Allergic state     Hypersomnia with sleep apnea     FAMILY HX GI MALIGNANCY     Family history of malignant neoplasm of genital organ, other     Lichen sclerosus     Uterine fibroid     MIGUELITO on CPAP     Pre-diabetes     Acanthosis nigricans     Cutaneous skin  tags     Baker's cyst of knee     Lymphedema bilateral with mixed lipedema.     Mild intermittent asthma without complication     Fatty liver     Bariatric surgery status     Postsurgical malabsorption     S/P laparoscopic cholecystectomy     Postural hypotension     Intertrigo     Morbid obesity (H)     Social History     Tobacco Use     Smoking status: Never Smoker     Smokeless tobacco: Never Used   Substance Use Topics     Alcohol use: Not Currently     Alcohol/week: 0.0 oz       ROS:  CONSTITUTIONAL:NEGATIVE for fever, chills, change in weight  INTEGUMENTARY/SKIN: NEGATIVE for worrisome rashes, moles or lesions  EYES: NEGATIVE for vision changes or irritation  ENT/MOUTH: NEGATIVE for ear, mouth and throat problems  RESP:NEGATIVE for significant cough or SOB  CV: NEGATIVE for chest pain, palpitations or peripheral edema  GI: NEGATIVE for nausea, abdominal pain, heartburn, or change in bowel habits  MUSCULOSKELETAL: NEGATIVE for significant arthralgias or myalgia  NEURO: as per HPI  Review of systems negative except as stated above.    OBJECTIVE  :BP 94/62 (BP Location: Left arm, Patient Position: Chair, Cuff Size: Adult Large)   Pulse 54   Temp 98.4  F (36.9  C) (Oral)   Resp 16   Wt 106.1 kg (234 lb)   SpO2 99%   BMI 40.17 kg/m    GENERAL APPEARANCE: healthy, alert and no distress  EYES: EOMI,  PERRL, conjunctiva clear  OP: moist oral mucosa  RESP: lungs clear to auscultation - no rales, rhonchi or wheezes  CV: regular rates and rhythm, normal S1 S2, no murmur noted  NEURO: Normal strength and tone, sensory exam grossly normal,  normal speech and mentation  SKIN: no suspicious lesions or rashes, no tenting    Results for orders placed or performed in visit on 08/27/19   CBC with platelets differential   Result Value Ref Range    WBC 6.7 4.0 - 11.0 10e9/L    RBC Count 4.48 3.8 - 5.2 10e12/L    Hemoglobin 13.6 11.7 - 15.7 g/dL    Hematocrit 42.5 35.0 - 47.0 %    MCV 95 78 - 100 fl    MCH 30.4 26.5 - 33.0 pg     MCHC 32.0 31.5 - 36.5 g/dL    RDW 12.7 10.0 - 15.0 %    Platelet Count 286 150 - 450 10e9/L    Diff Method Automated Method     % Neutrophils 43.2 %    % Lymphocytes 46.0 %    % Monocytes 6.5 %    % Eosinophils 3.6 %    % Basophils 0.6 %    % Immature Granulocytes 0.1 %    Nucleated RBCs 0 0 /100    Absolute Neutrophil 2.9 1.6 - 8.3 10e9/L    Absolute Lymphocytes 3.1 0.8 - 5.3 10e9/L    Absolute Monocytes 0.4 0.0 - 1.3 10e9/L    Absolute Eosinophils 0.2 0.0 - 0.7 10e9/L    Absolute Basophils 0.0 0.0 - 0.2 10e9/L    Abs Immature Granulocytes 0.0 0 - 0.4 10e9/L    Absolute Nucleated RBC 0.0    Comprehensive metabolic panel   Result Value Ref Range    Sodium 142 133 - 144 mmol/L    Potassium 3.5 3.4 - 5.3 mmol/L    Chloride 110 (H) 94 - 109 mmol/L    Carbon Dioxide 29 20 - 32 mmol/L    Anion Gap 3 3 - 14 mmol/L    Glucose 120 (H) 70 - 99 mg/dL    Urea Nitrogen 13 7 - 30 mg/dL    Creatinine 0.66 0.52 - 1.04 mg/dL    GFR Estimate >90 >60 mL/min/[1.73_m2]    GFR Estimate If Black >90 >60 mL/min/[1.73_m2]    Calcium 8.9 8.5 - 10.1 mg/dL    Bilirubin Total 0.4 0.2 - 1.3 mg/dL    Albumin 3.7 3.4 - 5.0 g/dL    Protein Total 7.1 6.8 - 8.8 g/dL    Alkaline Phosphatase 86 40 - 150 U/L    ALT 27 0 - 50 U/L    AST 16 0 - 45 U/L   UA with Microscopic reflex to Culture   Result Value Ref Range    Color Urine Straw     Appearance Urine Clear     Glucose Urine Negative NEG^Negative mg/dL    Bilirubin Urine Negative NEG^Negative    Ketones Urine Negative NEG^Negative mg/dL    Specific Gravity Urine 1.003 1.003 - 1.035    Blood Urine Negative NEG^Negative    pH Urine 6.0 5.0 - 7.0 pH    Protein Albumin Urine Negative NEG^Negative mg/dL    Urobilinogen mg/dL Normal 0.0 - 2.0 mg/dL    Nitrite Urine Negative NEG^Negative    Leukocyte Esterase Urine Negative NEG^Negative    Source Midstream Urine     WBC Urine 1 0 - 5 /HPF    RBC Urine 1 0 - 2 /HPF    Squamous Epithelial /HPF Urine <1 0 - 1 /HPF       (E86.0) Dehydration  (primary  encounter diagnosis)  Comment: symptoms of dizziness near resolved after fluids  Plan: IV access, sodium chloride (PF) 0.9% PF flush 3        mL, 0.9% sodium chloride BOLUS, CBC with         platelets differential, Comprehensive metabolic        panel, UA with Microscopic reflex to Culture            (R42) Dizziness  Comment: improved after IV fluids  Plan:  UA with Microscopic reflex to Culture            (I95.1) Orthostatic hypotension  Comment: Blood Pressure after IV fluids 94/62 pulse 59   Plan: Comprehensive metabolic panel          Discussed with Dr. Bedoya, patient to continue monitoring Blood pressure and contact Dr. Bedoya tomorrow with readings.

## 2019-08-27 NOTE — LETTER
My Asthma Action Plan    Name: Ana Wyman   YOB: 1970  Date: 8/27/2019   My doctor: Kelly Bedoya MD   [facility]        My Rescue Medicine:   Albuterol inhaler (Proair/Ventolin/Proventil HFA)  2-4 puffs EVERY 4 HOURS as needed. Use a spacer if recommended by your provider.   My Asthma Severity:   Intermittent / Exercise Induced  Know your asthma triggers: Patient is unaware of triggers             GREEN ZONE   Good Control    I feel good    No cough or wheeze    Can work, sleep and play without asthma symptoms       Take your asthma control medicine every day.     1. If exercise triggers your asthma, take your rescue medication    15 minutes before exercise or sports, and    During exercise if you have asthma symptoms  2. Spacer to use with inhaler: If you have a spacer, make sure to use it with your inhaler             YELLOW ZONE Getting Worse  I have ANY of these:    I do not feel good    Cough or wheeze    Chest feels tight    Wake up at night   1. Keep taking your Green Zone medications  2. Start taking your rescue medicine:    every 20 minutes for up to 1 hour. Then every 4 hours for 24-48 hours.  3. If you stay in the Yellow Zone for more than 12-24 hours, contact your doctor.  4. If you do not return to the Green Zone in 12-24 hours or you get worse, start taking your oral steroid medicine if prescribed by your provider.           RED ZONE Medical Alert - Get Help  I have ANY of these:    I feel awful    Medicine is not helping    Breathing getting harder    Trouble walking or talking    Nose opens wide to breathe       1. Take your rescue medicine NOW  2. If your provider has prescribed an oral steroid medicine, start taking it NOW  3. Call your doctor NOW  4. If you are still in the Red Zone after 20 minutes and you have not reached your doctor:    Take your rescue medicine again and    Call 911 or go to the emergency room right away    See your  regular doctor within 2 weeks of an Emergency Room or Urgent Care visit for follow-up treatment.          Annual Reminders:  Meet with Asthma Educator,  Flu Shot in the Fall, consider Pneumonia Vaccination for patients with asthma (aged 19 and older).    Pharmacy: [place], [place] - [address] [facility] [address]                        Asthma Triggers  How To Control Things That Make Your Asthma Worse    Triggers are things that make your asthma worse.  Look at the list below to help you find your triggers and   what you can do about them. You can help prevent asthma flare-ups by staying away from your triggers.      Trigger                                                          What you can do   Cigarette Smoke  Tobacco smoke can make asthma worse. Do not allow smoking in your home, car or around you.  Be sure no one smokes at a child s day care or school.  If you smoke, ask your health care provider for ways to help you quit.  Ask family members to quit too.  Ask your health care provider for a referral to Quit Plan to help you quit smoking, or call 1-361-289-PLAN.     Colds, Flu, Bronchitis  These are common triggers of asthma. Wash your hands often.  Don t touch your eyes, nose or mouth.  Get a flu shot every year.     Dust Mites  These are tiny bugs that live in cloth or carpet. They are too small to see. Wash sheets and blankets in hot water every week.   Encase pillows and mattress in dust mite proof covers.  Avoid having carpet if you can. If you have carpet, vacuum weekly.   Use a dust mask and HEPA vacuum.   Pollen and Outdoor Mold  Some people are allergic to trees, grass, or weed pollen, or molds. Try to keep your windows closed.  Limit time out doors when pollen count is high.   Ask you health care provider about taking medicine during allergy season.     Animal Dander  Some people are allergic to skin flakes, urine or saliva from pets with fur or feathers. Keep pets with fur or  feathers out of your home.    If you can t keep the pet outdoors, then keep the pet out of your bedroom.  Keep the bedroom door closed.  Keep pets off cloth furniture and away from stuffed toys.     Mice, Rats, and Cockroaches  Some people are allergic to the waste from these pests.   Cover food and garbage.  Clean up spills and food crumbs.  Store grease in the refrigerator.   Keep food out of the bedroom.   Indoor Mold  This can be a trigger if your home has high moisture. Fix leaking faucets, pipes, or other sources of water.   Clean moldy surfaces.  Dehumidify basement if it is damp and smelly.   Smoke, Strong Odors, and Sprays  These can reduce air quality. Stay away from strong odors and sprays, such as perfume, powder, hair spray, paints, smoke incense, paint, cleaning products, candles and new carpet.   Exercise or Sports  Some people with asthma have this trigger. Be active!  Ask your doctor about taking medicine before sports or exercise to prevent symptoms.    Warm up for 5-10 minutes before and after sports or exercise.     Other Triggers of Asthma  Cold air:  Cover your nose and mouth with a scarf.  Sometimes laughing or crying can be a trigger.  Some medicines and food can trigger asthma.

## 2019-08-28 ENCOUNTER — HOSPITAL ENCOUNTER (OUTPATIENT)
Dept: CARDIOLOGY | Facility: CLINIC | Age: 49
Discharge: HOME | End: 2019-08-28
Attending: PEDIATRICS | Admitting: PEDIATRICS
Payer: COMMERCIAL

## 2019-08-28 ENCOUNTER — MYC MEDICAL ADVICE (OUTPATIENT)
Dept: PEDIATRICS | Facility: CLINIC | Age: 49
End: 2019-08-28

## 2019-08-28 ENCOUNTER — OFFICE VISIT (OUTPATIENT)
Dept: PEDIATRICS | Facility: CLINIC | Age: 49
End: 2019-08-28
Payer: COMMERCIAL

## 2019-08-28 VITALS
BODY MASS INDEX: 40.32 KG/M2 | TEMPERATURE: 97.8 F | DIASTOLIC BLOOD PRESSURE: 60 MMHG | RESPIRATION RATE: 16 BRPM | WEIGHT: 234.9 LBS | SYSTOLIC BLOOD PRESSURE: 90 MMHG | OXYGEN SATURATION: 98 % | HEART RATE: 66 BPM

## 2019-08-28 DIAGNOSIS — I95.1 ORTHOSTATIC HYPOTENSION: ICD-10-CM

## 2019-08-28 DIAGNOSIS — R00.1 BRADYCARDIA: Primary | ICD-10-CM

## 2019-08-28 DIAGNOSIS — R10.11 RUQ ABDOMINAL PAIN: ICD-10-CM

## 2019-08-28 DIAGNOSIS — I95.9 HYPOTENSION, UNSPECIFIED HYPOTENSION TYPE: ICD-10-CM

## 2019-08-28 DIAGNOSIS — R00.1 BRADYCARDIA: ICD-10-CM

## 2019-08-28 DIAGNOSIS — R42 DIZZINESS: Primary | ICD-10-CM

## 2019-08-28 PROCEDURE — 99000 SPECIMEN HANDLING OFFICE-LAB: CPT | Performed by: PEDIATRICS

## 2019-08-28 PROCEDURE — 82024 ASSAY OF ACTH: CPT | Performed by: PEDIATRICS

## 2019-08-28 PROCEDURE — 99214 OFFICE O/P EST MOD 30 MIN: CPT | Performed by: PEDIATRICS

## 2019-08-28 PROCEDURE — 84244 ASSAY OF RENIN: CPT | Mod: 90 | Performed by: PEDIATRICS

## 2019-08-28 PROCEDURE — 84443 ASSAY THYROID STIM HORMONE: CPT | Performed by: PEDIATRICS

## 2019-08-28 PROCEDURE — 93000 ELECTROCARDIOGRAM COMPLETE: CPT | Performed by: PEDIATRICS

## 2019-08-28 PROCEDURE — 93226 XTRNL ECG REC<48 HR SCAN A/R: CPT

## 2019-08-28 PROCEDURE — 80053 COMPREHEN METABOLIC PANEL: CPT | Performed by: PEDIATRICS

## 2019-08-28 PROCEDURE — 93227 XTRNL ECG REC<48 HR R&I: CPT | Performed by: INTERNAL MEDICINE

## 2019-08-28 PROCEDURE — 36415 COLL VENOUS BLD VENIPUNCTURE: CPT | Performed by: PEDIATRICS

## 2019-08-28 PROCEDURE — 82533 TOTAL CORTISOL: CPT | Performed by: PEDIATRICS

## 2019-08-28 PROCEDURE — 82088 ASSAY OF ALDOSTERONE: CPT | Performed by: PEDIATRICS

## 2019-08-28 PROCEDURE — 40001081 ZZHCL STATISICAL ALDOSTERONE/RENIN RATIO: Performed by: PEDIATRICS

## 2019-08-28 NOTE — TELEPHONE ENCOUNTER
Called and spoke with patient to relay information in [employer] message - she plans to come in today at 2:15 to see me.  Will need an EKG right away.  Please add her to the schedule.  Please also help with the care coordination outlined below.    Thank you!!      Kelly Bedoya MD  Internal Medicine - Pediatrics

## 2019-08-28 NOTE — TELEPHONE ENCOUNTER
Called cardiology. Cardiologist appointment is booking out into October at both clinics. Representative states a Echo could be scheduled as early as 9/18 and Holter can be scheduled as soon as 9/4.    Huddled with Dr. Bedoya about next available appointments. At this time Dr. Bedoya is going to visit with patient and discuss plan with patient as well as speak to cardiology colleague to see if an appointment could be expedited.     Will wait for further directions from Dr. Bedoya.

## 2019-08-28 NOTE — PROGRESS NOTES
Subjective     Ana Wyman is a 49 year old female who presents to clinic today for the following health issues:    HPI     Patient states that her symptoms have not improved since yesterday. Patient has had her monitor placed.     Patient thinks that her symptoms are caused by something other than dehydration.     Patient is here today with her mother to follow-up visit yesterday in clinic and also visit to the hub for IV fluids.  Interestingly, she notes no change in her dizziness after receiving a large amount of IV fluid and hydrating to the point that her urine was clear.  Her lab work has remained unremarkable.  Reviewed her history today, including a history of hypotension and syncopal episodes in early adulthood that resolved with her weight gain.  Now that she has undergone massive weight loss after bariatric surgery, her issues with dizziness and hypotension have resurfaced.Her symptoms are orthostatic in nature, provoked by position changes and walking.    Our initial presumption yesterday had been that her symptoms were related to inadequate fluid intake in the setting of massive weight loss and limitations in ability to take and fluid related to her gastric bypass surgery.  I would not feel that those assumptions may have been an error as her symptoms have really not resolved with aggressive fluid rehydration.  She is continue to push fluids and has a new fluid goal of approximately 100 ounces daily.  She is not limiting salt in her diet.    Reviewed with patient today her low pulse, and went back in electronic medical record, also noticing that her pulse is unusually low.  She is on no medications that should be causing bradycardia or low blood pressure.  Her regimen now consists of her vitamin supplements after her weight loss surgery.  Even with standing, she is unable to make a heart rate related response.    She also notes over the last few days that she has had some right upper quadrant and  epigastric pain.  She was recently started on a PPI at her bariatric clinic follow-up, and has been taking that on a regular basis.  She is continued to be able to eat and drink normally.  She has had alternating diarrhea and constipation since her surgery.  No fevers.  No recent infection concerns.  Of note, patient is status post cholecystectomy earlier this year.       Reviewed and updated as needed this visit by Provider         Review of Systems   Full 10 point review of systems negative apart from those concerns outlined above.      Objective    BP 90/60 (BP Location: Right arm, Patient Position: Sitting, Cuff Size: Adult Large)   Pulse 66   Temp 97.8  F (36.6  C) (Oral)   Resp 16   Wt 106.5 kg (234 lb 14.4 oz)   SpO2 98%   BMI 40.32 kg/m    Body mass index is 40.32 kg/m .   Standing bp - 82/58, pulse 68  Physical Exam   GENERAL: alert and no distress  EYES: Eyes grossly normal to inspection, PERRL and conjunctivae and sclerae normal  HENT: ear canals and TM's normal, nose and mouth without ulcers or lesions  NECK: no adenopathy, no asymmetry, masses, or scars and thyroid normal to palpation  RESP: lungs clear to auscultation - no rales, rhonchi or wheezes  CV: bradycardia, normal S1 S2, no S3 or S4, no murmur, click or rub, peripheral pulses strong and no peripheral edema  ABDOMEN: Soft, bowel sounds positive, no rebound or guarding, mild tenderness to palpation in epigastric region and right upper quadrant  MS: no gross musculoskeletal defects noted, no edema  PSYCH: mentation appears normal, affect normal/bright    Diagnostic Test Results:  Repeat blood work pending labs from yesterday reviewed in epic, normal blood counts, CMP, dilute urine without evidence of infection    EKG -bradycardia, sinus rhythm, normal intervals, no ST or T wave changes to suggest ischemia        Assessment & Plan       ICD-10-CM    1. Dizziness R42 TSH with free T4 reflex     Adrenal corticotropin     Aldosterone     Renin  activity     Aldosterone Renin Ratio     Cortisol     Comprehensive metabolic panel   2. Bradycardia R00.1 EKG 12-lead complete w/read - Clinics   3. Hypotension, unspecified hypotension type I95.9 TSH with free T4 reflex     Adrenal corticotropin     Aldosterone     Renin activity     Aldosterone Renin Ratio     Cortisol   4. RUQ abdominal pain R10.11 CT Abdomen Pelvis w Contrast     Comprehensive metabolic panel       Patient with an unusual clinical picture.  Was evaluated yesterday in clinic and made a visit to the hub, where she received 2 L of IV fluids.  In addition to the IV fluid, she has been increasing her oral intake of fluids.  Despite these efforts, her dizziness and hypotension remain.  Her symptoms seem to be progressing more rapidly over the course of the last few days, and are now associated with some abdominal pain.  Reviewed her EKG and her case with cardiology in clinic today.  She has Holter monitor in place and plan to obtain formal cardiology consultation and echocardiogram in the near future.  Per cardiology recommendations today, will also have patient elevate her head of bed, continue aggressive oral rehydration, and maximize salt intake.  For abdominal pain in the setting of multiple recent abdominal surgeries, will obtain CT of the abdomen and pelvis.  Patient is continued to be able to eat and drink and have bowel movements, lower concern for small bowel obstruction.  To complicate the clinical picture, patient is leaving to go out of town in a few days time.  We plan to obtain repeat lab work, along with some additional lab work to evaluate her thyroid function and FIORDALIZA system.  She will continue to follow her blood pressure and her blood sugar in the outpatient setting.  She will update me with these values and with her clinical picture when she returns in a proximally 1 week from [place].  Reviewed indications to seek emergency medical attention.       BMI:  "  Estimated body mass index is 40.32 kg/m  as calculated from the following:    Height as of 8/27/19: 1.626 m (5' 4\").    Weight as of this encounter: 106.5 kg (234 lb 14.4 oz).   Weight management plan: Status post bariatric surgery January 2019        See Patient Instructions    Kelly Bedoya MD  [facility] [place]      "

## 2019-08-28 NOTE — LETTER
[facility]  [address]  [address]  [place] [address] [phone]          August 28, 2019    RE:  Ana Wyman                                                                                                                                                       [address] [address] [address]            To whom it may concern:    Ana Wyman is under my professional care.    Due to recent medical issues that are requiring urgent attention, I recommend that she work from home for the remainder of the week.    Sincerely,        Kelly Bedoya MD

## 2019-08-28 NOTE — LETTER
[facility]  [address] [address]                  [phone]   September 6, 2019    Ana Wyman  [address] [address] [address]      Dear Ana,    Here is a summary of your recent test results:    Please find your heart monitor report enclosed for your review and records.  I know that you will probably already have reviewed this with Dr Rondon by the time you receive this, but wanted you to have a copy for your records.  The results were reassuring with no dangerous arrhythmias identified.      Please contact me if you have any questions.           Thank you very much for choosing [facility]    Best regards,    Kelly Bedoya MD        Results for orders placed or performed in visit on 08/28/19   TSH with free T4 reflex   Result Value Ref Range    TSH 3.63 0.40 - 4.00 mU/L   Adrenal corticotropin   Result Value Ref Range    Adrenal Corticotropin 13 <47 pg/mL   Aldosterone   Result Value Ref Range    Aldosterone 3.6 0.0 - 31.0 ng/dL   Renin activity   Result Value Ref Range    Renin Activity 0.1 ng/mL/hr   Aldosterone Renin Ratio   Result Value Ref Range    Aldosterone Renin Ratio 36.0 (H) 0 - 25   Cortisol   Result Value Ref Range    Cortisol Serum 5.7 4 - 22 ug/dL   Comprehensive metabolic panel   Result Value Ref Range    Sodium 145 (H) 133 - 144 mmol/L    Potassium 4.1 3.4 - 5.3 mmol/L    Chloride 109 94 - 109 mmol/L    Carbon Dioxide 27 20 - 32 mmol/L    Anion Gap 9 3 - 14 mmol/L    Glucose 86 70 - 99 mg/dL    Urea Nitrogen 11 7 - 30 mg/dL    Creatinine 0.69 0.52 - 1.04 mg/dL    GFR Estimate >90 >60 mL/min/[1.73_m2]    GFR Estimate If Black >90 >60 mL/min/[1.73_m2]    Calcium 9.3 8.5 - 10.1 mg/dL    Bilirubin Total 0.4 0.2 - 1.3 mg/dL    Albumin 3.8 3.4 - 5.0 g/dL    Protein Total 7.2 6.8 - 8.8 g/dL    Alkaline Phosphatase 86 40 - 150 U/L    ALT 24 0 - 50 U/L    AST 17 0 - 45 U/L

## 2019-08-28 NOTE — PATIENT INSTRUCTIONS
Continue with omeprazole or zantac regularly    We'll help to schedule a CT scan later this week    Try to elevated the head of your bed about 30 degrees, keep pushing fluids and eat as much salt as you can tolerate    Keep your cardiology visit with Dr Rondon next week    Keep following your blood pressure a couple times daily while home and check your blood sugar when you feel dizzy.    Please send me an update message when you get back from your trip    I recommend working from home the next 2 days - letter written.

## 2019-08-28 NOTE — TELEPHONE ENCOUNTER
Forwarded to triage nursing - please help patient schedule heart echocardiogram, holter monitor, and visit with cardiology as soon as possible.       I'm also trying to get her worked in to my clinic schedule - either today at 2:30 (after my routine hours) or tomorrow at 3:40 (also after my routine hours).   Can you please help facilitate this appointment too?    Thanks so much,    Kelly Bedoya MD  Internal Medicine - Pediatrics

## 2019-08-29 LAB
ALBUMIN SERPL-MCNC: 3.8 G/DL (ref 3.4–5)
ALP SERPL-CCNC: 86 U/L (ref 40–150)
ALT SERPL W P-5'-P-CCNC: 24 U/L (ref 0–50)
ANION GAP SERPL CALCULATED.3IONS-SCNC: 9 MMOL/L (ref 3–14)
AST SERPL W P-5'-P-CCNC: 17 U/L (ref 0–45)
BILIRUB SERPL-MCNC: 0.4 MG/DL (ref 0.2–1.3)
BUN SERPL-MCNC: 11 MG/DL (ref 7–30)
CALCIUM SERPL-MCNC: 9.3 MG/DL (ref 8.5–10.1)
CHLORIDE SERPL-SCNC: 109 MMOL/L (ref 94–109)
CO2 SERPL-SCNC: 27 MMOL/L (ref 20–32)
CORTIS SERPL-MCNC: 5.7 UG/DL (ref 4–22)
CREAT SERPL-MCNC: 0.69 MG/DL (ref 0.52–1.04)
GFR SERPL CREATININE-BSD FRML MDRD: >90 ML/MIN/{1.73_M2}
GLUCOSE SERPL-MCNC: 86 MG/DL (ref 70–99)
POTASSIUM SERPL-SCNC: 4.1 MMOL/L (ref 3.4–5.3)
PROT SERPL-MCNC: 7.2 G/DL (ref 6.8–8.8)
RENIN PLAS-CCNC: 0.1 NG/ML/HR
SODIUM SERPL-SCNC: 145 MMOL/L (ref 133–144)
TSH SERPL DL<=0.005 MIU/L-ACNC: 3.63 MU/L (ref 0.4–4)

## 2019-08-30 ENCOUNTER — HOSPITAL ENCOUNTER (OUTPATIENT)
Dept: CT IMAGING | Facility: CLINIC | Age: 49
Discharge: HOME | End: 2019-08-30
Attending: PEDIATRICS | Admitting: PEDIATRICS
Payer: COMMERCIAL

## 2019-08-30 DIAGNOSIS — R10.11 RUQ ABDOMINAL PAIN: ICD-10-CM

## 2019-08-30 PROCEDURE — 74177 CT ABD & PELVIS W/CONTRAST: CPT

## 2019-08-30 PROCEDURE — 25000128 H RX IP 250 OP 636: Performed by: PEDIATRICS

## 2019-08-30 PROCEDURE — 25000125 ZZHC RX 250: Performed by: PEDIATRICS

## 2019-08-30 RX ORDER — IOPAMIDOL 755 MG/ML
116 INJECTION, SOLUTION INTRAVASCULAR ONCE
Status: COMPLETED | OUTPATIENT
Start: 2019-08-30 | End: 2019-08-30

## 2019-08-30 RX ADMIN — SODIUM CHLORIDE 73 ML: 9 INJECTION, SOLUTION INTRAVENOUS at 11:52

## 2019-08-30 RX ADMIN — IOPAMIDOL 116 ML: 755 INJECTION, SOLUTION INTRAVENOUS at 11:51

## 2019-09-03 ENCOUNTER — MYC MEDICAL ADVICE (OUTPATIENT)
Dept: PEDIATRICS | Facility: CLINIC | Age: 49
End: 2019-09-03

## 2019-09-03 LAB
ALDOST SERPL-MCNC: 3.6 NG/DL (ref 0–31)
ALDOSTERONE RENIN RATIO: 36 (ref 0–25)

## 2019-09-04 LAB — ACTH PLAS-MCNC: 13 PG/ML

## 2019-09-04 NOTE — TELEPHONE ENCOUNTER
Routed to PCP to review - patient was seen twice last week. She is doing everything that was suggested.     Rosaura Walters, CMA

## 2019-09-05 NOTE — PROGRESS NOTES
CARDIOLOGY CONSULT    REASON FOR CONSULT: orthostatic hypotension    PRIMARY CARE PHYSICIAN:  Kelly Bedoya    HISTORY OF PRESENT ILLNESS:  49-year-old female seen for follow-up of orthostatic hypotension.  She underwent gastric bypass in January 2019.    Coronary CTA in 2012 showed calcium score 0, normal coronary arteries.     Echo in 2015 showed EF 60%, normal RV, no valve disease.     Holter monitor August 2019 showed sinus rhythm, rare ectopy, no arrhythmias, 6 symptoms of lightheadedness and dizziness during sinus rhythm with rate between 70 and 100.    In the past 1 to 2 months she has had episodes of dizziness and lightheadedness.  This typically occurs when she changes position, symptoms will last 1 to 2 minutes.  She denies any recent syncopal episodes.  Blood pressure has been running 80-90/50-60 with heart rate in the 60s to 70s.  Historically blood pressure has been more in the low 100s.  She has been try to drink a lot more fluid intake and more salt.  This may have helped a little.  She denies any chest pain or palpitations.    She has been undergoing extensive evaluation for her lower blood pressure.  Recently adrenal hormones were normal, the renin to aldosterone ratio was slightly elevated.    PAST MEDICAL HISTORY:  Past Medical History:   Diagnosis Date     Allergic rhinitis, cause unspecified      Cellulitis 2005    2 episodes, one with hospitalization [facility]     DUB (dysfunctional uterine bleeding)     Neg EMB 2012     Esophageal reflux     ongoing     Fibroids      Genital problems     Lichens Schlerosis     GERD (gastroesophageal reflux disease)      Hallux valgus (acquired)      History of steroid therapy     Inhalers, eye drops     Hypersomnia with sleep apnea, unspecified     using CPAP     Kidney stone May 2018    2 stones     Lichen sclerosus 7/14/2011     Lymph edema 2010- present     Lymphedema bilateral with mixed lipedema. 9/30/2015     Lymphedema bilateral with mixed  lipedema. 9/30/2015     Morbid obesity (H) 3/19/2013     MIGUELITO on CPAP 3/19/2013    Sleep study in 2004 and 2012       Pneumonia     Years ago - a few times     Pre-diabetes      Sleep apnea      Tendonitis currently     Uncomplicated asthma     mild     Urinary tract infection     A few times in past 10 years     Vision disorder [id number]    Dry Eye     Vitamin D deficiency 3/14/2015       MEDICATIONS:  Current Outpatient Medications   Medication     albuterol (PROAIR HFA/PROVENTIL HFA/VENTOLIN HFA) 108 (90 Base) MCG/ACT inhaler     BIOTIN PO     Calcium Citrate-Vitamin D (CALCIUM CITRATE CHEWY BITE PO)     childrens multivitamin w/iron (FLINTSTONES COMPLETE) 60 MG chewable tablet     Cholecalciferol (VITAMIN D) 2000 units CAPS     clobetasol (TEMOVATE) 0.05 % ointment     Hypromellose (ARTIFICIAL TEARS OP)     nystatin (MYCOSTATIN) 631976 UNIT/GM external cream     polyethylene glycol (MIRALAX/GLYCOLAX) packet     ranitidine (ZANTAC) 300 MG tablet     triamcinolone (KENALOG) 0.1 % external cream     vitamin (B COMPLEX-C) tablet     vitamin B-12 (CYANOCOBALAMIN) 2500 MCG sublingual tablet     No current facility-administered medications for this visit.        ALLERGIES:  Allergies   Allergen Reactions     Nsaids Other (See Comments)     Had gastric bypass - needs to avoid NSAIDS     Clindamycin Diarrhea     Codeine Nausea and Vomiting     Shellfish Allergy        SOCIAL HISTORY:  I have reviewed this patient's social history and updated it with pertinent information if needed. Ana ORA Wyman  reports that she has never smoked. She has never used smokeless tobacco. She reports that she drank alcohol. She reports that she does not use drugs.    FAMILY HISTORY:  I have reviewed this patient's family history and updated it with pertinent information if needed.   Family History   Problem Relation Age of Onset     C.A.D. Father 92        CAGB 98     Obesity Father      Cancer Father         stomach Dx age 74     Lipids  Father      Hypertension Father      Coronary Artery Disease Father      Cerebrovascular Disease Father      Other Cancer Father         Esophogeal     Diabetes Mother         Type 2     Allergies Mother      Obesity Mother      C.A.D. Mother         triple bypass surgery, 65     Lipids Mother      Hypertension Mother      Coronary Artery Disease Mother      Hyperlipidemia Mother      Colon Polyps Mother      Anesthesia Reaction Mother      Thyroid Disease Mother      C.A.D. Paternal Grandfather 58        fatal     Coronary Artery Disease Paternal Grandfather      Cancer - colorectal Maternal Grandmother 75     Cancer Maternal Grandmother         liver     Hypertension Maternal Grandmother      Colon Cancer Maternal Grandmother      Other Cancer Maternal Grandmother         liver, leaukeamia     Colon Polyps Maternal Grandmother      Cancer - colorectal Paternal Aunt      Allergies Brother      Cancer Paternal Grandmother         stomach     Obesity Paternal Grandmother      Diabetes Paternal Grandmother         Type 2     Asthma Paternal Grandmother      Obesity Brother      Hypertension Brother      Cancer - colorectal Other         cousin  from colon cancer in 's     Obesity Brother      Coronary Artery Disease Maternal Grandfather      Hypertension Maternal Grandfather        REVIEW OF SYSTEMS:  Constitutional:  No weight loss, fever, chills, weakness or fatigue.  HEENT:  Eyes:  No visual loss, blurred vision, double vision or yellow sclerae. No hearing loss, sneezing, congestion, runny nose or sore throat.  Skin:  No rash or itching.  Cardiovascular: per HPI  Respiratory: per HPI  GI:  No anorexia, nausea, vomiting or diarrhea. No abdominal pain or blood.  :  No dysurea, hematuria  Neurologic:  No headache, dizziness, syncope, paralysis, ataxia, numbness or tingling in the extremities. No change in bowel or bladder control.  Musculoskeletal:  No muscle, back pain, joint pain or  "stiffness.  Hematologic:  No anemia, bleeding or bruising.  Lymphatics:  No enlarged nodes. No history of splenectomy.  Psychiatric:  No history of depression or anxiety.  Endocrine:  No reports of sweating, cold or heat intolerance. No polyuria or polydipsia.  Allergies:  No history of asthma, hives, eczema or rhinitis.    PHYSICAL EXAM:  Ht 1.626 m (5' 4\")   Wt 105.2 kg (232 lb)   BMI 39.82 kg/m    Orthostatic vitals: Lying 108/74, pulse 79, sitting 100/66, pulse 80, standing 91/64, pulse 80  Constitutional: awake, alert, no distress  Eyes: PERRL, sclera nonicteric  ENT: trachea midline  Respiratory: Lungs clear  Cardiovascular: Regular rate and rhythm, no murmurs  GI: nondistended, nontender, bowel sounds present  Lymph/Hematologic: no lymphadenopathy  Skin: dry, no rash  Musculoskeletal: good muscle tone, strength 5/5 in upper and lower extremities  Neurologic: no focal deficits  Neuropsychiatric: appropriate affact    DATA:  Labs:   Recent Labs   Lab Test 07/31/18  1022 03/12/15  0925 03/30/13  0917   CHOL 178 163 174   HDL 48* 65 54   LDL 92 66 84   TRIG 191* 158* 178*   CHOLHDLRATIO  --  2.5 3.2     EKG, August 28, 2019: Sinus bradycardia, rate 54.     ASSESSMENT:  49-year-old female seen for orthostatic symptoms.  She seems to run a lower blood pressure, which is a newer issue for her.  Her Holter monitor was normal, there is no significant bradycardia or tachycardia, POTS is unlikely.  She is having an echo in 1 week, suspect this will be normal.    She does not have adrenal insufficiency.  The renin aldosterone ratio was a little high, nephrology could comment on this, usually this is related to hypertension, not hypotension.    There could be some kind of autonomic issue treated with her 90 pound weight loss this year since surgery.  She has been drinking fluids and taking in more salt.    Doubt there is any obvious cardiac issue contributing to the symptoms.  A trial of Florinef or midodrine might be " reasonable.  She could see neurology also for potentially autonomic dysfunction.    RECOMMENDATIONS:  1.  Orthostatic symptoms there is no obvious cardiac cardiology, no obvious cardiac etiology, echo pending for next week  -Nephrology could comment on renin aldosterone ratio, although doubtful this would explain relative hypotension  -Consider neurology evaluation for autonomic dysfunction  -Encouraged fluids and salt intake  -Could try Florinef or midodrine empirically for a few months to see if this makes a difference    Follow-up with cardiology as needed.    Ren Rondon MD  Cardiology - [facility]  Pager:  [phone]  Text Page  September 5, 2019

## 2019-09-06 ENCOUNTER — OFFICE VISIT (OUTPATIENT)
Dept: CARDIOLOGY | Facility: CLINIC | Age: 49
End: 2019-09-06
Attending: PEDIATRICS
Payer: COMMERCIAL

## 2019-09-06 VITALS — WEIGHT: 232 LBS | HEIGHT: 64 IN | BODY MASS INDEX: 39.61 KG/M2

## 2019-09-06 DIAGNOSIS — I95.1 ORTHOSTATIC HYPOTENSION: Primary | ICD-10-CM

## 2019-09-06 PROCEDURE — 99204 OFFICE O/P NEW MOD 45 MIN: CPT | Performed by: INTERNAL MEDICINE

## 2019-09-06 ASSESSMENT — MIFFLIN-ST. JEOR: SCORE: 1657.35

## 2019-09-06 NOTE — LETTER
9/6/2019    Kelly Bedoya MD  [address] [place] [address]    RE: Ana Wyman       Dear Colleague,    I had the pleasure of seeing Ana Wyman in the [facility].    CARDIOLOGY CONSULT    REASON FOR CONSULT: orthostatic hypotension    PRIMARY CARE PHYSICIAN:  Kelly Bedoya    HISTORY OF PRESENT ILLNESS:  49-year-old female seen for follow-up of orthostatic hypotension.  She underwent gastric bypass in January 2019.    Coronary CTA in 2012 showed calcium score 0, normal coronary arteries.     Echo in 2015 showed EF 60%, normal RV, no valve disease.     Holter monitor August 2019 showed sinus rhythm, rare ectopy, no arrhythmias, 6 symptoms of lightheadedness and dizziness during sinus rhythm with rate between 70 and 100.    In the past 1 to 2 months she has had episodes of dizziness and lightheadedness.  This typically occurs when she changes position, symptoms will last 1 to 2 minutes.  She denies any recent syncopal episodes.  Blood pressure has been running 80-90/50-60 with heart rate in the 60s to 70s.  Historically blood pressure has been more in the low 100s.  She has been try to drink a lot more fluid intake and more salt.  This may have helped a little.  She denies any chest pain or palpitations.    She has been undergoing extensive evaluation for her lower blood pressure.  Recently adrenal hormones were normal, the renin to aldosterone ratio was slightly elevated.    PAST MEDICAL HISTORY:  Past Medical History:   Diagnosis Date     Allergic rhinitis, cause unspecified      Cellulitis 2005    2 episodes, one with hospitalization [facility]     DUB (dysfunctional uterine bleeding)     Neg EMB 2012     Esophageal reflux     ongoing     Fibroids      Genital problems     Lichens Schlerosis     GERD (gastroesophageal reflux disease)      Hallux valgus (acquired)      History of steroid therapy     Inhalers, eye drops     Hypersomnia with  sleep apnea, unspecified     using CPAP     Kidney stone May 2018    2 stones     Lichen sclerosus 7/14/2011     Lymph edema 2010- present     Lymphedema bilateral with mixed lipedema. 9/30/2015     Lymphedema bilateral with mixed lipedema. 9/30/2015     Morbid obesity (H) 3/19/2013     MIGUELITO on CPAP 3/19/2013    Sleep study in 2004 and 2012       Pneumonia     Years ago - a few times     Pre-diabetes      Sleep apnea      Tendonitis currently     Uncomplicated asthma     mild     Urinary tract infection     A few times in past 10 years     Vision disorder [id number]    Dry Eye     Vitamin D deficiency 3/14/2015       MEDICATIONS:  Current Outpatient Medications   Medication     albuterol (PROAIR HFA/PROVENTIL HFA/VENTOLIN HFA) 108 (90 Base) MCG/ACT inhaler     BIOTIN PO     Calcium Citrate-Vitamin D (CALCIUM CITRATE CHEWY BITE PO)     childrens multivitamin w/iron (FLINTSTONES COMPLETE) 60 MG chewable tablet     Cholecalciferol (VITAMIN D) 2000 units CAPS     clobetasol (TEMOVATE) 0.05 % ointment     Hypromellose (ARTIFICIAL TEARS OP)     nystatin (MYCOSTATIN) 875757 UNIT/GM external cream     polyethylene glycol (MIRALAX/GLYCOLAX) packet     ranitidine (ZANTAC) 300 MG tablet     triamcinolone (KENALOG) 0.1 % external cream     vitamin (B COMPLEX-C) tablet     vitamin B-12 (CYANOCOBALAMIN) 2500 MCG sublingual tablet     No current facility-administered medications for this visit.        ALLERGIES:  Allergies   Allergen Reactions     Nsaids Other (See Comments)     Had gastric bypass - needs to avoid NSAIDS     Clindamycin Diarrhea     Codeine Nausea and Vomiting     Shellfish Allergy        SOCIAL HISTORY:  I have reviewed this patient's social history and updated it with pertinent information if needed. Ana Wyman  reports that she has never smoked. She has never used smokeless tobacco. She reports that she drank alcohol. She reports that she does not use drugs.    FAMILY HISTORY:  I have reviewed this  patient's family history and updated it with pertinent information if needed.   Family History   Problem Relation Age of Onset     C.A.D. Father 92        CAGB 98     Obesity Father      Cancer Father         stomach Dx age 74     Lipids Father      Hypertension Father      Coronary Artery Disease Father      Cerebrovascular Disease Father      Other Cancer Father         Esophogeal     Diabetes Mother         Type 2     Allergies Mother      Obesity Mother      C.A.D. Mother         triple bypass surgery, 65     Lipids Mother      Hypertension Mother      Coronary Artery Disease Mother      Hyperlipidemia Mother      Colon Polyps Mother      Anesthesia Reaction Mother      Thyroid Disease Mother      C.A.D. Paternal Grandfather 58        fatal     Coronary Artery Disease Paternal Grandfather      Cancer - colorectal Maternal Grandmother 75     Cancer Maternal Grandmother         liver     Hypertension Maternal Grandmother      Colon Cancer Maternal Grandmother      Other Cancer Maternal Grandmother         liver, leaukeamia     Colon Polyps Maternal Grandmother      Cancer - colorectal Paternal Aunt      Allergies Brother      Cancer Paternal Grandmother         stomach     Obesity Paternal Grandmother      Diabetes Paternal Grandmother         Type 2     Asthma Paternal Grandmother      Obesity Brother      Hypertension Brother      Cancer - colorectal Other         cousin  from colon cancer in 30's     Obesity Brother      Coronary Artery Disease Maternal Grandfather      Hypertension Maternal Grandfather        REVIEW OF SYSTEMS:  Constitutional:  No weight loss, fever, chills, weakness or fatigue.  HEENT:  Eyes:  No visual loss, blurred vision, double vision or yellow sclerae. No hearing loss, sneezing, congestion, runny nose or sore throat.  Skin:  No rash or itching.  Cardiovascular: per HPI  Respiratory: per HPI  GI:  No anorexia, nausea, vomiting or diarrhea. No abdominal pain or blood.  :  No  "dysurea, hematuria  Neurologic:  No headache, dizziness, syncope, paralysis, ataxia, numbness or tingling in the extremities. No change in bowel or bladder control.  Musculoskeletal:  No muscle, back pain, joint pain or stiffness.  Hematologic:  No anemia, bleeding or bruising.  Lymphatics:  No enlarged nodes. No history of splenectomy.  Psychiatric:  No history of depression or anxiety.  Endocrine:  No reports of sweating, cold or heat intolerance. No polyuria or polydipsia.  Allergies:  No history of asthma, hives, eczema or rhinitis.    PHYSICAL EXAM:  Ht 1.626 m (5' 4\")   Wt 105.2 kg (232 lb)   BMI 39.82 kg/m     Orthostatic vitals: Lying 108/74, pulse 79, sitting 100/66, pulse 80, standing 91/64, pulse 80  Constitutional: awake, alert, no distress  Eyes: PERRL, sclera nonicteric  ENT: trachea midline  Respiratory: Lungs clear  Cardiovascular: Regular rate and rhythm, no murmurs  GI: nondistended, nontender, bowel sounds present  Lymph/Hematologic: no lymphadenopathy  Skin: dry, no rash  Musculoskeletal: good muscle tone, strength 5/5 in upper and lower extremities  Neurologic: no focal deficits  Neuropsychiatric: appropriate affact    DATA:  Labs:   Recent Labs   Lab Test 07/31/18  1022 03/12/15  0925 03/30/13  0917   CHOL 178 163 174   HDL 48* 65 54   LDL 92 66 84   TRIG 191* 158* 178*   CHOLHDLRATIO  --  2.5 3.2     EKG, August 28, 2019: Sinus bradycardia, rate 54.     ASSESSMENT:  49-year-old female seen for orthostatic symptoms.  She seems to run a lower blood pressure, which is a newer issue for her.  Her Holter monitor was normal, there is no significant bradycardia or tachycardia, POTS is unlikely.  She is having an echo in 1 week, suspect this will be normal.    She does not have adrenal insufficiency.  The renin aldosterone ratio was a little high, nephrology could comment on this, usually this is related to hypertension, not hypotension.    There could be some kind of autonomic issue treated with " her 90 pound weight loss this year since surgery.  She has been drinking fluids and taking in more salt.    Doubt there is any obvious cardiac issue contributing to the symptoms.  A trial of Florinef or midodrine might be reasonable.  She could see neurology also for potentially autonomic dysfunction.    RECOMMENDATIONS:  1.  Orthostatic symptoms there is no obvious cardiac cardiology, no obvious cardiac etiology, echo pending for next week  -Nephrology could comment on renin aldosterone ratio, although doubtful this would explain relative hypotension  -Consider neurology evaluation for autonomic dysfunction  -Encouraged fluids and salt intake  -Could try Florinef or midodrine empirically for a few months to see if this makes a difference    Follow-up with cardiology as needed.    Ren Rondon MD  Cardiology - [facility]  Pager:  [phone]  Text Page  September 5, 2019      Thank you for allowing me to participate in the care of your patient.    Sincerely,     Ren Rondon MD     [facility]

## 2019-09-13 ENCOUNTER — MYC MEDICAL ADVICE (OUTPATIENT)
Dept: PEDIATRICS | Facility: CLINIC | Age: 49
End: 2019-09-13

## 2019-09-18 ENCOUNTER — HOSPITAL ENCOUNTER (OUTPATIENT)
Dept: CARDIOLOGY | Facility: CLINIC | Age: 49
Discharge: HOME | End: 2019-09-18
Attending: PEDIATRICS | Admitting: PEDIATRICS
Payer: COMMERCIAL

## 2019-09-18 DIAGNOSIS — I95.1 ORTHOSTATIC HYPOTENSION: ICD-10-CM

## 2019-09-18 DIAGNOSIS — R00.1 BRADYCARDIA: ICD-10-CM

## 2019-09-18 PROCEDURE — 93306 TTE W/DOPPLER COMPLETE: CPT

## 2019-09-18 PROCEDURE — 93306 TTE W/DOPPLER COMPLETE: CPT | Mod: 26 | Performed by: INTERNAL MEDICINE

## 2019-10-09 ENCOUNTER — OFFICE VISIT (OUTPATIENT)
Dept: URGENT CARE | Facility: URGENT CARE | Age: 49
End: 2019-10-09
Payer: COMMERCIAL

## 2019-10-09 VITALS
OXYGEN SATURATION: 97 % | RESPIRATION RATE: 20 BRPM | BODY MASS INDEX: 39.82 KG/M2 | WEIGHT: 232 LBS | TEMPERATURE: 97.8 F | HEART RATE: 68 BPM | DIASTOLIC BLOOD PRESSURE: 68 MMHG | SYSTOLIC BLOOD PRESSURE: 110 MMHG

## 2019-10-09 DIAGNOSIS — J40 BRONCHITIS: ICD-10-CM

## 2019-10-09 DIAGNOSIS — R05.9 COUGH: ICD-10-CM

## 2019-10-09 DIAGNOSIS — J45.21 MILD INTERMITTENT REACTIVE AIRWAY DISEASE WITH ACUTE EXACERBATION: ICD-10-CM

## 2019-10-09 DIAGNOSIS — J02.9 SORE THROAT: Primary | ICD-10-CM

## 2019-10-09 LAB
DEPRECATED S PYO AG THROAT QL EIA: NORMAL
SPECIMEN SOURCE: NORMAL

## 2019-10-09 PROCEDURE — 87880 STREP A ASSAY W/OPTIC: CPT | Performed by: PHYSICIAN ASSISTANT

## 2019-10-09 PROCEDURE — 87081 CULTURE SCREEN ONLY: CPT | Performed by: PHYSICIAN ASSISTANT

## 2019-10-09 PROCEDURE — 99214 OFFICE O/P EST MOD 30 MIN: CPT | Performed by: PHYSICIAN ASSISTANT

## 2019-10-09 RX ORDER — BENZONATATE 200 MG/1
200 CAPSULE ORAL 3 TIMES DAILY PRN
Qty: 21 CAPSULE | Refills: 0 | Status: SHIPPED | OUTPATIENT
Start: 2019-10-09 | End: 2020-02-14

## 2019-10-09 RX ORDER — ALBUTEROL SULFATE 90 UG/1
2 AEROSOL, METERED RESPIRATORY (INHALATION) EVERY 4 HOURS PRN
Qty: 1 INHALER | Refills: 0 | Status: SHIPPED | OUTPATIENT
Start: 2019-10-09 | End: 2020-03-27

## 2019-10-09 NOTE — PROGRESS NOTES
SUBJECTIVE:  Ana Wyman is a 49 year old female who presents to the clinic today with a chief complaint of cough  for 1 week(s).  Her cough is described as productive of yellow sputum.    The patient's symptoms are moderate and worsening.  Associated symptoms include wheeze, cough, chills. The patient's symptoms are exacerbated by lying down  Patient has been using sudafed, quils  to improve symptoms.    Past Medical History:   Diagnosis Date     Allergic rhinitis, cause unspecified      Cellulitis 2005    2 episodes, one with hospitalization [facility]     DUB (dysfunctional uterine bleeding)     Neg EMB 2012     Esophageal reflux     ongoing     Fibroids      Genital problems     Lichens Schlerosis     GERD (gastroesophageal reflux disease)      Hallux valgus (acquired)      History of steroid therapy     Inhalers, eye drops     Hypersomnia with sleep apnea, unspecified     using CPAP     Kidney stone May 2018    2 stones     Lichen sclerosus 7/14/2011     Lymph edema 2010- present     Lymphedema bilateral with mixed lipedema. 9/30/2015     Lymphedema bilateral with mixed lipedema. 9/30/2015     Morbid obesity (H) 3/19/2013     MIGUELITO on CPAP 3/19/2013    Sleep study in 2004 and 2012       Pneumonia     Years ago - a few times     Pre-diabetes      Sleep apnea      Tendonitis currently     Uncomplicated asthma     mild     Urinary tract infection     A few times in past 10 years     Vision disorder [id number]    Dry Eye     Vitamin D deficiency 3/14/2015       Current Outpatient Medications   Medication Sig Dispense Refill     BIOTIN PO Take 5,000 mcg by mouth daily At noon       Calcium Citrate-Vitamin D (CALCIUM CITRATE CHEWY BITE PO) Take 500 mg by mouth 3 times daily Plus D,noon, supper, HS       childrens multivitamin w/iron (FLINTSTONES COMPLETE) 60 MG chewable tablet Take 2 chew tab by mouth daily With breakfast       Cholecalciferol (VITAMIN D) 2000 units CAPS Take 3 capsules by mouth At Bedtime         clobetasol (TEMOVATE) 0.05 % ointment Apply clobetasol  0.05 percent ointment, sparingly (a dot 3 mm wide) in a thin film.  Apply to affected area only, once or twice weekly for maintenance. 45 g 2     Hypromellose (ARTIFICIAL TEARS OP) Apply 1-2 drops to eye daily as needed       nystatin (MYCOSTATIN) 346388 UNIT/GM external cream Apply topically 2 times daily as needed for dry skin Apply as needed for rash. 30 g 3     polyethylene glycol (MIRALAX/GLYCOLAX) packet Take 17 g by mouth daily 90 packet 3     ranitidine (ZANTAC) 300 MG tablet Take 1 tablet (300 mg) by mouth At Bedtime 30 tablet 3     triamcinolone (KENALOG) 0.1 % external cream Apply topically 2 times daily as needed for irritation Apply twice daily PRN rash 30 g 3     vitamin (B COMPLEX-C) tablet Take 1 tablet by mouth every morning       vitamin B-12 (CYANOCOBALAMIN) 2500 MCG sublingual tablet Take 1,250 mcg by mouth twice a week With breakfast       albuterol (PROAIR HFA/PROVENTIL HFA/VENTOLIN HFA) 108 (90 Base) MCG/ACT inhaler Inhale 2 puffs into the lungs every 6 hours as needed for shortness of breath / dyspnea or wheezing (Patient not taking: Reported on 7/29/2019) 2 Inhaler 2       Social History     Tobacco Use     Smoking status: Never Smoker     Smokeless tobacco: Never Used   Substance Use Topics     Alcohol use: Not Currently     Alcohol/week: 0.0 standard drinks       ROS  10 point ROS negative except as listed above      OBJECTIVE:  /68   Pulse 68   Temp 97.8  F (36.6  C)   Resp 20   Wt 105.2 kg (232 lb)   SpO2 97%   BMI 39.82 kg/m    GENERAL APPEARANCE: healthy, alert and no distress  EYES: EOMI,  PERRL, conjunctiva clear  HENT: ear canals and TM's normal.  Nose and mouth without ulcers, erythema or lesions  NECK: supple, nontender, no lymphadenopathy  RESP: lungs clear to auscultation - no rales, rhonchi or wheezes  CV: regular rates and rhythm, normal S1 S2, no murmur noted  ABDOMEN:  soft, nontender, no HSM or masses and  bowel sounds normal  NEURO: Normal strength and tone, sensory exam grossly normal,  normal speech and mentation  SKIN: no suspicious lesions or rashes    Results for orders placed or performed in visit on 10/09/19   Rapid strep screen   Result Value Ref Range    Specimen Description Throat     Rapid Strep A Screen       NEGATIVE: No Group A streptococcal antigen detected by immunoassay, await culture report.         ASSESSMENT:  (J02.9) Sore throat  (primary encounter diagnosis)  Comment: no evidenc eof abscess or strep  Plan: Rapid strep screen, Beta strep group A culture      (J45.21) Mild intermittent reactive airway disease with acute exacerbation  Plan: albuterol (PROAIR HFA/PROVENTIL HFA/VENTOLIN         HFA) 108 (90 Base) MCG/ACT inhaler      (R05) Cough  (J40) Bronchitis  Comment: no evidence of pneumonia  Plan: albuterol (PROAIR HFA/PROVENTIL HFA/VENTOLIN         HFA) 108 (90 Base) MCG/ACT inhaler, benzonatate        (TESSALON) 200 MG capsule      Follow up with PCP if symptoms worsen or fail to improve      Patient Instructions         With distilled water 2-3x per day for a minimum 2-3 days  Mucinex, Robitussin DM at bedtime increased fluids, humidifier at night, steaming in the day  Cough drops and hot saltwater gargles as needed    Adult Self-Care for Colds  Colds are caused by viruses. They can't be cured with antibiotics. However, you can ease symptoms and support your body's efforts to heal itself.  No matter which symptoms you have, be sure to:    Drink plenty of fluids (water or clear soup)    Stop smoking and drinking alcohol    Get plenty of rest    Understand a fever    Take your temperature several times a day. If your fever is 100.4 F (38.0 C) for more than a day, call your healthcare provider.    Relax, lie down. Go to bed if you want. Just get off your feet and rest. Also, drink plenty of fluids to avoid dehydration.    Take acetaminophen or a nonsteroidal anti-inflammatory agent (NSAID),  such as ibuprofen.  Treat a troubled nose kindly    Breathe steam or heated humidified air to open blocked nasal passages.  a hot shower or use a vaporizer. Be careful not to get burned by the steam.    Saline nasal sprays and decongestant tablets help open a stuffy nose. Antihistamines can also help, but they can cause side effects such as drowsiness and drying of the eyes, nose, and mouth.  Soothe a sore throat and cough    Gargle every 2 hours with 1/4 teaspoon of salt dissolved in 1/2 cup of warm water. Suck on throat lozenges and cough drops to moisten your throat.    Cough medicines are available but it is unclear how well they actually work.    Take acetaminophen or an NSAID, such as ibuprofen, to ease throat pain  Ease digestive problems    Put fluids back into your body. Take frequent sips of clear liquids such as water or broth. Avoid drinks that have a lot of sugar in them, such as juices and sodas. These can make diarrhea worse. Older children and adults can drink sports drinks.    As your appetite returns, you can resume your normal diet. Ask your healthcare provider if there are any foods you should avoid.  When to seek medical care  When you first notice symptoms, ask your healthcare provider if antiviral medicines are appropriate. Antibiotics should not be taken for colds or flu. Also, call your healthcare provider if you have any of the following symptoms or if you aren't feeling better after 7 days:    Shortness of breath    Pain or pressure in the chest or belly (abdomen)    Worsening symptoms, especially after a period of improvement    Fever of 100.4 F  (38.0 C) or higher, or fever that doesn't go down with medicine    Sudden dizziness or confusion    Severe or continued vomiting    Signs of dehydration, including extreme thirst, dark urine, infrequent urination, dry mouth    Spotted, red, or very sore throat   Date Last Reviewed: 12/1/2016 2000-2017 [employer] [address]  [place] [address]. All rights reserved. This information is not intended as a substitute for professional medical care. Always follow your healthcare professional's instructions.      Patient Education     Viral or Bacterial Bronchitis with Wheezing (Adult)    Bronchitis is an infection of the air passages. It often occurs during a cold and is usually caused by a virus. Symptoms include cough with mucus (phlegm) and low-grade fever. This illness is contagious during the first few days and is spread through the air by coughing and sneezing, or by direct contact (touching the sick person and then touching your own eyes, nose, or mouth).  If there is a lot of inflammation, air flow is restricted. The air passages may also go into spasm, especially if you have asthma. This causes wheezing and difficulty breathing even in people who do not have asthma.  Bronchitis usually lasts 7 to 14 days. The wheezing should improve with treatment during the first week. An inhaler is often prescribed to relax the air passages and stop wheezing. Antibiotics will be prescribed if your doctor thinks there is also a secondary bacterial infection.  Home care    If symptoms are severe, rest at home for the first 2 to 3 days. When you go back to your usual activities, don't let yourself get too tired.    Dont s'moke. Also avoid being exposed to secondhand smoke.    You may use over-the-counter medicine to control fever or pain, unless another medicine was prescribed. Note: If you have chronic liver or kidney disease or have ever had a stomach ulcer or gastrointestinal bleeding, talk with your healthcare provider before using these medicines. Also talk to your provider if you are taking medicine to prevent blood clots.) Aspirin should never be given to anyone younger than 18 years of age who is ill with a viral infection or fever. It may cause severe liver or brain damage.    Your appetite may be poor, so a light diet is  fine. Stay well hydrated by drinking 6 to 8 glasses of fluids per day (such as water, soft drinks, sports drinks, juices, tea, or soup). Extra fluids will help loosen secretions in the nose and lungs.    Over-the-counter cough, cold, and sore-throat medicines will not shorten the length of the illness, but they may be helpful to reduce symptoms. (Note: Don't use decongestants if you have high blood pressure.)    If you were given an inhaler, use it exactly as directed. If you need to use it more often than prescribed, your condition may be worsening. If this happens, contact your healthcare provider.    If prescribed, finish all antibiotic medicine, even if you are feeling better after only a few days.  Follow-up care  Follow up with your healthcare provider, or as advised. If you had an X-ray or ECG (electrocardiogram), a specialist will review it. You will be notified of any new findings that may affect your care.  If you are age 65 or older, or if you have a chronic lung disease or condition that affects your immune system, or you smoke, ask your healthcare provider about getting a pneumococcal vaccine and a yearly flu shot (influenza vaccine).  When to seek medical advice  Call your healthcare provider right away if any of these occur:    Fever of 100.4 F (38 C) or higher, or as directed by your healthcare provider    Coughing up increasing amounts of colored sputum    Weakness, drowsiness, headache, facial pain, ear pain, or a stiff neck  Call 911  Call 911 if any of these occur.    Coughing up blood    Worsening weakness, drowsiness, headache, or stiff neck    Increased wheezing not helped with medication, shortness of breath, or pain with breathing  Date Last Reviewed: 6/1/2018 2000-2018 [employer]. [address] [place] [address], [place] [address]. All rights reserved. This information is not intended as a substitute for professional medical care. Always follow your healthcare professional's  instructions.

## 2019-10-09 NOTE — PATIENT INSTRUCTIONS
With distilled water 2-3x per day for a minimum 2-3 days  Mucinex, Robitussin DM at bedtime increased fluids, humidifier at night, steaming in the day  Cough drops and hot saltwater gargles as needed    Adult Self-Care for Colds  Colds are caused by viruses. They can't be cured with antibiotics. However, you can ease symptoms and support your body's efforts to heal itself.  No matter which symptoms you have, be sure to:    Drink plenty of fluids (water or clear soup)    Stop smoking and drinking alcohol    Get plenty of rest    Understand a fever    Take your temperature several times a day. If your fever is 100.4 F (38.0 C) for more than a day, call your healthcare provider.    Relax, lie down. Go to bed if you want. Just get off your feet and rest. Also, drink plenty of fluids to avoid dehydration.    Take acetaminophen or a nonsteroidal anti-inflammatory agent (NSAID), such as ibuprofen.  Treat a troubled nose kindly    Breathe steam or heated humidified air to open blocked nasal passages.  a hot shower or use a vaporizer. Be careful not to get burned by the steam.    Saline nasal sprays and decongestant tablets help open a stuffy nose. Antihistamines can also help, but they can cause side effects such as drowsiness and drying of the eyes, nose, and mouth.  Soothe a sore throat and cough    Gargle every 2 hours with 1/4 teaspoon of salt dissolved in 1/2 cup of warm water. Suck on throat lozenges and cough drops to moisten your throat.    Cough medicines are available but it is unclear how well they actually work.    Take acetaminophen or an NSAID, such as ibuprofen, to ease throat pain  Ease digestive problems    Put fluids back into your body. Take frequent sips of clear liquids such as water or broth. Avoid drinks that have a lot of sugar in them, such as juices and sodas. These can make diarrhea worse. Older children and adults can drink sports drinks.    As your appetite returns, you can resume  your normal diet. Ask your healthcare provider if there are any foods you should avoid.  When to seek medical care  When you first notice symptoms, ask your healthcare provider if antiviral medicines are appropriate. Antibiotics should not be taken for colds or flu. Also, call your healthcare provider if you have any of the following symptoms or if you aren't feeling better after 7 days:    Shortness of breath    Pain or pressure in the chest or belly (abdomen)    Worsening symptoms, especially after a period of improvement    Fever of 100.4 F  (38.0 C) or higher, or fever that doesn't go down with medicine    Sudden dizziness or confusion    Severe or continued vomiting    Signs of dehydration, including extreme thirst, dark urine, infrequent urination, dry mouth    Spotted, red, or very sore throat   Date Last Reviewed: 12/1/2016 2000-2017 [employer]. [address] [place] [address]. All rights reserved. This information is not intended as a substitute for professional medical care. Always follow your healthcare professional's instructions.      Patient Education     Viral or Bacterial Bronchitis with Wheezing (Adult)    Bronchitis is an infection of the air passages. It often occurs during a cold and is usually caused by a virus. Symptoms include cough with mucus (phlegm) and low-grade fever. This illness is contagious during the first few days and is spread through the air by coughing and sneezing, or by direct contact (touching the sick person and then touching your own eyes, nose, or mouth).  If there is a lot of inflammation, air flow is restricted. The air passages may also go into spasm, especially if you have asthma. This causes wheezing and difficulty breathing even in people who do not have asthma.  Bronchitis usually lasts 7 to 14 days. The wheezing should improve with treatment during the first week. An inhaler is often prescribed to relax the air passages and stop wheezing.  Antibiotics will be prescribed if your doctor thinks there is also a secondary bacterial infection.  Home care    If symptoms are severe, rest at home for the first 2 to 3 days. When you go back to your usual activities, don't let yourself get too tired.    Dont s'moke. Also avoid being exposed to secondhand smoke.    You may use over-the-counter medicine to control fever or pain, unless another medicine was prescribed. Note: If you have chronic liver or kidney disease or have ever had a stomach ulcer or gastrointestinal bleeding, talk with your healthcare provider before using these medicines. Also talk to your provider if you are taking medicine to prevent blood clots.) Aspirin should never be given to anyone younger than 18 years of age who is ill with a viral infection or fever. It may cause severe liver or brain damage.    Your appetite may be poor, so a light diet is fine. Stay well hydrated by drinking 6 to 8 glasses of fluids per day (such as water, soft drinks, sports drinks, juices, tea, or soup). Extra fluids will help loosen secretions in the nose and lungs.    Over-the-counter cough, cold, and sore-throat medicines will not shorten the length of the illness, but they may be helpful to reduce symptoms. (Note: Don't use decongestants if you have high blood pressure.)    If you were given an inhaler, use it exactly as directed. If you need to use it more often than prescribed, your condition may be worsening. If this happens, contact your healthcare provider.    If prescribed, finish all antibiotic medicine, even if you are feeling better after only a few days.  Follow-up care  Follow up with your healthcare provider, or as advised. If you had an X-ray or ECG (electrocardiogram), a specialist will review it. You will be notified of any new findings that may affect your care.  If you are age 65 or older, or if you have a chronic lung disease or condition that affects your immune system, or you smoke, ask  your healthcare provider about getting a pneumococcal vaccine and a yearly flu shot (influenza vaccine).  When to seek medical advice  Call your healthcare provider right away if any of these occur:    Fever of 100.4 F (38 C) or higher, or as directed by your healthcare provider    Coughing up increasing amounts of colored sputum    Weakness, drowsiness, headache, facial pain, ear pain, or a stiff neck  Call 911  Call 911 if any of these occur.    Coughing up blood    Worsening weakness, drowsiness, headache, or stiff neck    Increased wheezing not helped with medication, shortness of breath, or pain with breathing  Date Last Reviewed: 6/1/2018 2000-2018 [employer]. [address] [place] [address], [place] [address]. All rights reserved. This information is not intended as a substitute for professional medical care. Always follow your healthcare professional's instructions.

## 2019-10-10 LAB
BACTERIA SPEC CULT: NORMAL
SPECIMEN SOURCE: NORMAL

## 2019-10-14 ENCOUNTER — OFFICE VISIT (OUTPATIENT)
Dept: URGENT CARE | Facility: URGENT CARE | Age: 49
End: 2019-10-14
Payer: COMMERCIAL

## 2019-10-14 VITALS
OXYGEN SATURATION: 98 % | BODY MASS INDEX: 39.31 KG/M2 | SYSTOLIC BLOOD PRESSURE: 99 MMHG | RESPIRATION RATE: 16 BRPM | WEIGHT: 229 LBS | DIASTOLIC BLOOD PRESSURE: 65 MMHG | HEART RATE: 61 BPM | TEMPERATURE: 98.3 F

## 2019-10-14 DIAGNOSIS — R05.9 COUGH: Primary | ICD-10-CM

## 2019-10-14 PROCEDURE — 99214 OFFICE O/P EST MOD 30 MIN: CPT | Performed by: PHYSICIAN ASSISTANT

## 2019-10-14 RX ORDER — AZITHROMYCIN 250 MG/1
TABLET, FILM COATED ORAL
Qty: 6 TABLET | Refills: 0 | Status: SHIPPED | OUTPATIENT
Start: 2019-10-14 | End: 2020-02-14

## 2019-10-14 RX ORDER — BENZONATATE 200 MG/1
200 CAPSULE ORAL 3 TIMES DAILY PRN
Qty: 21 CAPSULE | Refills: 0 | Status: SHIPPED | OUTPATIENT
Start: 2019-10-14 | End: 2020-02-14

## 2019-10-14 RX ORDER — PREDNISONE 20 MG/1
40 TABLET ORAL DAILY
Qty: 10 TABLET | Refills: 0 | Status: SHIPPED | OUTPATIENT
Start: 2019-10-14 | End: 2020-02-14

## 2019-10-14 NOTE — PROGRESS NOTES
SUBJECTIVE:   Ana Wyman is a 49 year old female presenting with a chief complaint of cough and cold sx, chest congestion, sinus pain and pressure. Productive cough, ears itch and some chills but no fever. Was seen last week for same sx and given Tessalon and albuterol.  Not getting better and hx of bronchitis issues and pneumonia .  Requesting med and Prednisone.  .  Onset of symptoms was 2 week(s) ago.  Course of illness is worsening.    Severity moderate  Current and Associated symptoms: no GI sx, ST, rashes noted.  Mild fatigue   Treatment measures tried include Tylenol/Ibuprofen, OTC Cough med, Fluids, Rest and and rx from last visit .  Predisposing factors include as above.    Past Medical History:   Diagnosis Date     Allergic rhinitis, cause unspecified      Cellulitis 2005    2 episodes, one with hospitalization [facility]     DUB (dysfunctional uterine bleeding)     Neg EMB 2012     Esophageal reflux     ongoing     Fibroids      Genital problems     Lichens Schlerosis     GERD (gastroesophageal reflux disease)      Hallux valgus (acquired)      History of steroid therapy     Inhalers, eye drops     Hypersomnia with sleep apnea, unspecified     using CPAP     Kidney stone May 2018    2 stones     Lichen sclerosus 7/14/2011     Lymph edema 2010- present     Lymphedema bilateral with mixed lipedema. 9/30/2015     Lymphedema bilateral with mixed lipedema. 9/30/2015     Morbid obesity (H) 3/19/2013     MIGUELITO on CPAP 3/19/2013    Sleep study in 2004 and 2012       Pneumonia     Years ago - a few times     Pre-diabetes      Sleep apnea      Tendonitis currently     Uncomplicated asthma     mild     Urinary tract infection     A few times in past 10 years     Vision disorder [id number]    Dry Eye     Vitamin D deficiency 3/14/2015     Current Outpatient Medications   Medication Sig Dispense Refill     benzonatate (TESSALON) 200 MG capsule Take 1 capsule (200 mg) by mouth 3 times daily as needed for cough 21  capsule 0     BIOTIN PO Take 5,000 mcg by mouth daily At noon       Calcium Citrate-Vitamin D (CALCIUM CITRATE CHEWY BITE PO) Take 500 mg by mouth 3 times daily Plus D,noon, supper, HS       childrens multivitamin w/iron (FLINTSTONES COMPLETE) 60 MG chewable tablet Take 2 chew tab by mouth daily With breakfast       Cholecalciferol (VITAMIN D) 2000 units CAPS Take 3 capsules by mouth At Bedtime        Hypromellose (ARTIFICIAL TEARS OP) Apply 1-2 drops to eye daily as needed       nystatin (MYCOSTATIN) 707426 UNIT/GM external cream Apply topically 2 times daily as needed for dry skin Apply as needed for rash. 30 g 3     polyethylene glycol (MIRALAX/GLYCOLAX) packet Take 17 g by mouth daily 90 packet 3     ranitidine (ZANTAC) 300 MG tablet Take 1 tablet (300 mg) by mouth At Bedtime 30 tablet 3     triamcinolone (KENALOG) 0.1 % external cream Apply topically 2 times daily as needed for irritation Apply twice daily PRN rash 30 g 3     vitamin (B COMPLEX-C) tablet Take 1 tablet by mouth every morning       vitamin B-12 (CYANOCOBALAMIN) 2500 MCG sublingual tablet Take 1,250 mcg by mouth twice a week With breakfast       albuterol (PROAIR HFA/PROVENTIL HFA/VENTOLIN HFA) 108 (90 Base) MCG/ACT inhaler Inhale 2 puffs into the lungs every 4 hours as needed for shortness of breath / dyspnea or wheezing (Patient not taking: Reported on 10/14/2019) 1 Inhaler 0     albuterol (PROAIR HFA/PROVENTIL HFA/VENTOLIN HFA) 108 (90 Base) MCG/ACT inhaler Inhale 2 puffs into the lungs every 6 hours as needed for shortness of breath / dyspnea or wheezing (Patient not taking: Reported on 7/29/2019) 2 Inhaler 2     clobetasol (TEMOVATE) 0.05 % ointment Apply clobetasol  0.05 percent ointment, sparingly (a dot 3 mm wide) in a thin film.  Apply to affected area only, once or twice weekly for maintenance. (Patient not taking: Reported on 10/14/2019) 45 g 2     Social History     Tobacco Use     Smoking status: Never Smoker     Smokeless tobacco:  Never Used   Substance Use Topics     Alcohol use: Not Currently     Alcohol/week: 0.0 standard drinks       ROS:  Review of systems negative except as stated above.    OBJECTIVE:  There were no vitals taken for this visit.  GENERAL APPEARANCE: healthy, alert and no distress  EYES: EOMI,  PERRL, conjunctiva clear  HENT: ear canals and TM's normal.  Nose and mouth without ulcers, erythema or lesions  NECK: supple, nontender, no lymphadenopathy  RESP: scattered rhonchi and mild late expiratory wheezing noted  CV: regular rates and rhythm, normal S1 S2, no murmur noted  NEURO: Normal strength and tone, sensory exam grossly normal,  normal speech and mentation  SKIN: no suspicious lesions or rashes    assessment/plan:  (R05) Cough  (primary encounter diagnosis)  Comment:   Plan: predniSONE (DELTASONE) 20 MG tablet,         azithromycin (ZITHROMAX) 250 MG tablet,         benzonatate (TESSALON) 200 MG capsule          Med as directed and to continue with inhaler as needed.  OTC med for sx relief, increased fluids and rest.  No SOB or chest pain and to Follow-up with PCP if sx worsen and red flag signs discussed .   Declines chest x-ray

## 2019-10-21 ENCOUNTER — HOSPITAL ENCOUNTER (OUTPATIENT)
Facility: CLINIC | Age: 49
Discharge: HOME | End: 2019-10-21
Attending: INTERNAL MEDICINE | Admitting: INTERNAL MEDICINE
Payer: COMMERCIAL

## 2019-10-21 VITALS
SYSTOLIC BLOOD PRESSURE: 96 MMHG | RESPIRATION RATE: 18 BRPM | DIASTOLIC BLOOD PRESSURE: 69 MMHG | HEART RATE: 69 BPM | OXYGEN SATURATION: 95 %

## 2019-10-21 LAB — COLONOSCOPY: NORMAL

## 2019-10-21 PROCEDURE — 45380 COLONOSCOPY AND BIOPSY: CPT | Performed by: INTERNAL MEDICINE

## 2019-10-21 PROCEDURE — 25000128 H RX IP 250 OP 636: Performed by: INTERNAL MEDICINE

## 2019-10-21 PROCEDURE — 88305 TISSUE EXAM BY PATHOLOGIST: CPT | Mod: 26 | Performed by: INTERNAL MEDICINE

## 2019-10-21 PROCEDURE — 99153 MOD SED SAME PHYS/QHP EA: CPT | Performed by: INTERNAL MEDICINE

## 2019-10-21 PROCEDURE — 88305 TISSUE EXAM BY PATHOLOGIST: CPT | Performed by: INTERNAL MEDICINE

## 2019-10-21 PROCEDURE — G0500 MOD SEDAT ENDO SERVICE >5YRS: HCPCS | Performed by: INTERNAL MEDICINE

## 2019-10-21 RX ORDER — LIDOCAINE 40 MG/G
CREAM TOPICAL
Status: DISCONTINUED | OUTPATIENT
Start: 2019-10-21 | End: 2019-10-21 | Stop reason: HOSPADM

## 2019-10-21 RX ORDER — NALOXONE HYDROCHLORIDE 0.4 MG/ML
.1-.4 INJECTION, SOLUTION INTRAMUSCULAR; INTRAVENOUS; SUBCUTANEOUS
Status: DISCONTINUED | OUTPATIENT
Start: 2019-10-21 | End: 2019-10-21 | Stop reason: HOSPADM

## 2019-10-21 RX ORDER — FENTANYL CITRATE 50 UG/ML
INJECTION, SOLUTION INTRAMUSCULAR; INTRAVENOUS PRN
Status: DISCONTINUED | OUTPATIENT
Start: 2019-10-21 | End: 2019-10-21 | Stop reason: HOSPADM

## 2019-10-21 RX ORDER — FLUMAZENIL 0.1 MG/ML
0.2 INJECTION, SOLUTION INTRAVENOUS
Status: DISCONTINUED | OUTPATIENT
Start: 2019-10-21 | End: 2019-10-21 | Stop reason: HOSPADM

## 2019-10-21 RX ORDER — ONDANSETRON 2 MG/ML
4 INJECTION INTRAMUSCULAR; INTRAVENOUS
Status: DISCONTINUED | OUTPATIENT
Start: 2019-10-21 | End: 2019-10-21 | Stop reason: HOSPADM

## 2019-10-21 RX ORDER — ONDANSETRON 2 MG/ML
4 INJECTION INTRAMUSCULAR; INTRAVENOUS EVERY 6 HOURS PRN
Status: DISCONTINUED | OUTPATIENT
Start: 2019-10-21 | End: 2019-10-21 | Stop reason: HOSPADM

## 2019-10-21 RX ORDER — ONDANSETRON 4 MG/1
4 TABLET, ORALLY DISINTEGRATING ORAL EVERY 6 HOURS PRN
Status: DISCONTINUED | OUTPATIENT
Start: 2019-10-21 | End: 2019-10-21 | Stop reason: HOSPADM

## 2019-10-21 NOTE — PROCEDURES
PRE-PROCEDURE NOTE    CHIEF COMPLAINT / REASON FOR PROCEDURE:  screening        PRE-SEDATION ASSESSMENT:    Lung Exam:  normal  Heart Exam: normal  Mallampati Airway Classification: Class II (visualization of the soft palate, fauces, and uvula)  Previous reaction to anesthesia/sedation:   YES   Sedation plan based on assessment:Moderate (conscious) sedation    ASA Classification: 2 - Mild systemic disease    IMPRESSION:  colonoscopy    Lydia Vickers MD  [facility]  Office:  [phone]

## 2019-10-21 NOTE — LETTER
September 27, 2019      Ana Wyman  [address] [address] [facility] [address]        Dear Ana,         Thank you for choosing [facility]. You are scheduled for the following service(s).   Please be aware that coverage of these services is subject to the terms and limitations of your health insurance plan.  Call member services at your health plan with any benefit or coverage questions.    Date:  10-21-19             Procedure:  COLONOSCOPY  Doctor:        Rancho   Arrival Time:  0830  *Check in at Emergency/Endoscopy desk*  Procedure Time:  0900      Location:   [facility]        Endoscopy Department, First Floor (Enter through ER Doors) *        [address] [place], [place] [id number] [phone] or [phone] () to reschedule      MIRALAX -GATORADE  PREP  Colonoscopy is the most accurate test to detect colon polyps and colon cancer; and the only test where polyps can be removed. During this procedure, a doctor examines the lining of your large intestine and rectum through a flexible tube.   Transportation  You must arrange for a ride for the day of your procedure with a responsible adult. A taxi , Uber, etc, is not an option unless you are accompanied by a responsible adult. If you fail to arrange transportation with a responsible adult, your procedure will be cancelled and rescheduled.    Purchase the  following supplies at your local pharmacy:  - 2 (two) bisacodyl tablets: each tablet contains 5 mg.  (Dulcolax  laxative NOT Dulcolax  stool softener)   - 1 (one) 8.3 oz bottle of Polyethylene Glycol (PEG) 3350 Powder   (MiraLAX , Smooth LAX , ClearLAX  or equivalent)  - 64 oz Gatorade    Regular Gatorade, Gatorade G2 , Powerade , Powerade Zero  or Pedialyte  is acceptable. Red colored flavors are not allowed; all other colors (yellow, green, orange, purple and blue) are okay. It is also okay to buy two 2.12 oz packets of  powdered Gatorade that can be mixed with water to a total volume of 64 oz of liquid.  - 1 (one) 10 oz bottle of Magnesium Citrate (Red colored flavors are not allowed)  It is also okay for you to use a 0.5 oz package of powdered magnesium citrate (17 g) mixed with 10 oz of water.      PREPARATION FOR COLONOSCOPY    7 days before:    Discontinue fiber supplements and medications containing iron. This includes Metamucil  and Fibercon ; and multivitamins with iron.    3 days before:    Begin a low-fiber diet. A low-fiber diet helps making the cleanout more effective.     Examples of a low-fiber diet include (but are not limited to): white bread, white rice, pasta, crackers, fish, chicken, eggs, ground beef, creamy peanut butter, cooked/steamed/boiled vegetables, canned fruit, bananas, melons, milk, plain yogurt cheese, salad dressing and other condiments.     The following are not allowed on a low-fiber diet: seeds, nuts, popcorn, bran, whole wheat, corn, quinoa, raw fruits and vegetables, berries and dried fruit, beans and lentils.    For additional details on low-fiber diet, please refer to the table on the last page.    2 days before:    Continue the low-fiber diet.     Drink at least 8 glasses of water throughout the day.     Stop eating solid foods at 11:45 pm.  1.   2. 1 day before:    In the morning: begin a clear liquid diet (liquids you can see through).     Examples of a clear liquid diet include: water, clear broth or bouillon, Gatorade, Pedialyte or Powerade, carbonated and non-carbonated soft drinks (Sprite , 7-Up , ginger ale), strained fruit juices without pulp (apple, white grape, white cranberry), Jell-O  and popsicles.     The following are not allowed on a clear liquid diet: red liquids, alcoholic beverages, dairy products (milk, creamer, and yogurt), protein shakes, creamy broths, juice with pulp and chewing tobacco.    At noon: take 2 (two) bisacodyl tablets     At 4 (and no later than 6pm): start  drinking the Miralax-Gatorade preparation (8.3 oz of Miralax mixed with 64 oz of Gatorade in a large pitcher). Drink 1(one) 8 oz glass every 15 minutes thereafter, until the mixture is gone.    COLON CLEANSING TIPS: drink adequate amounts of fluids before and after your colon cleansing to prevent dehydration. Stay near a toilet because you will have diarrhea. Even if you are sitting on the toilet, continue to drink the cleansing solution every 15 minutes. If you feel nauseous or vomit, rinse your mouth with water, take a 15 to 30-minute-break and then continue drinking the solution. You will be uncomfortable until the stool has flushed from your colon (in about 2 to 4 hours). You may feel chilled.    Day of your procedure  You may take all of your morning medications including blood pressure medications, blood thinners (if you have not been instructed to stop these by our office), methadone, anti-seizure medications with sips of water 3 hours prior to your procedure or earlier. Do not take insulin or vitamins prior to your procedure. Continue the clear liquid diet.       4 hours prior: drink 10 oz of magnesium citrate. It may be easier to drink it with a straw.    STOP consuming all liquids after that.     Do not take anything by mouth during this time.     Allow extra time to travel to your procedure as you may need to stop and use a restroom along the way.    You are ready for the procedure, if you followed all instructions and your stool is no longer formed, but clear or yellow liquid. If you are unsure whether your colon is clean, please call our office at [phone] before you leave for your appointment.    Bring the following to your procedure:  - Insurance Card/Photo ID.   - List of current medications including over-the-counter medications and supplements.   - Your rescue inhaler if you currently use one to control asthma.      Canceling or rescheduling your appointment:   If you must cancel or reschedule  your appointment, please call [phone] as soon as possible.      COLONOSCOPY PRE-PROCEDURE CHECKLIST    If you have diabetes, ask your regular doctor for diet and medication restrictions.  If you take an anticoagulant or anti-platelet medication (such as Coumadin , Lovenox , Pradaxa , Xarelto , Eliquis , etc.), please call your primary doctor for advice on holding this medication.  If you take aspirin you may continue to do so.  If you are or may be pregnant, please discuss the risks and benefits of this procedure with your doctor.        What happens during a colonoscopy?    Plan to spend up to two hours, starting at registration time, at the endoscopy center the day of your procedure. The colonoscopy takes an average of 15 to 30 minutes. Recovery time is about 30 minutes.      Before the exam:    You will change into a gown.    Your medical history and medication list will be reviewed with you, unless that has been done over the phone prior to the procedure.     A nurse will insert an intravenous (IV) line into your hand or arm.    The doctor will meet with you and will give you a consent form to sign.  1. During the exam:     Medicine will be given through the IV line to help you relax.     Your heart rate and oxygen levels will be monitored. If your blood pressure is low, you may be given fluids through the IV line.     The doctor will insert a flexible hollow tube, called a colonoscope, into your rectum. The scope will be advanced slowly through the large intestine (colon).    You may have a feeling of fullness or pressure.     If an abnormal tissue or a polyp is found, the doctor may remove it through the endoscope for closer examination, or biopsy. Tissue removal is painless    After the exam:           Any tissue samples removed during the exam will be sent to a lab for evaluation. It may take 5-7 working days for you to be notified of the results.     A nurse will provide you with complete discharge  instructions before you leave the endoscopy center. Be sure to ask the nurse for specific instructions if you take blood thinners such as Aspirin, Coumadin or Plavix.     The doctor will prepare a full report for you and for the physician who referred you for the procedure.     Your doctor will talk with you about the initial results of your exam.      Medication given during the exam will prohibit you from driving for the rest of the day.     Following the exam, you may resume your normal diet. Your first meal should be light, no greasy foods. Avoid alcohol until the next day.     You may resume your regular activities the day after the procedure.         LOW-FIBER DIET    Foods RECOMMENDED Foods to AVOID   Breads, Cereal, Rice and Pasta:   White bread, rolls, biscuits, croissant and manju toast.   Waffles, Setswana toast and pancakes.   White rice, noodles, pasta, macaroni and peeled cooked potatoes.   Plain crackers and saltines.   Cooked cereals: farina, cream of rice.   Cold cereals: Puffed Rice , Rice Krispies , Corn Flakes  and Special K    Breads, Cereal, Rice and Pasta:   Breads or rolls with nuts, seeds or fruit.   Whole wheat, pumpernickel, rye breads and cornbread.   Potatoes with skin, brown or wild rice, and kasha (buckwheat).     Vegetables:   Tender cooked and canned vegetables without seeds: carrots, asparagus tips, green or wax beans, pumpkin, spinach, lima beans. Vegetables:   Raw or steamed vegetables.   Vegetables with seeds.   Sauerkraut.   Winter squash, peas, broccoli, Brussel sprouts, cabbage, onions, cauliflower, baked beans, peas and corn.   Fruits:   Strained fruit juice.   Canned fruit, except pineapple.   Ripe bananas and melon. Fruits:   Prunes and prune juice.   Raw fruits.   Dried fruits: figs, dates and raisins.   Milk/Dairy:   Milk: plain or flavored.   Yogurt, custard and ice cream.   Cheese and cottage cheese Milk/Dairy:     Meat and other proteins:   ground, well-cooked tender  beef, lamb, ham, veal, pork, fish, poultry and organ meats.   Eggs.   Peanut butter without nuts. Meat and other proteins:   Tough, fibrous meats with gristle.   Dry beans, peas and lentils.   Peanut butter with nuts.   Tofu.   Fats, Snack, Sweets, Condiments and Beverages:   Margarine, butter, oils, mayonnaise, sour cream and salad dressing, plain gravy.   Sugar, hard candy, clear jelly, honey and syrup.   Spices, cooked herbs, bouillon, broth and soups made with allowed vegetable, ketchup and mustard.   Coffee, tea and carbonated drinks.   Plain cakes, cookies and pretzels.   Gelatin, plain puddings, custard, ice cream, sherbet and popsicles. Fats, Snack, Sweets, Condiments and Beverages:   Nuts, seeds and coconut.   Jam, marmalade and preserves.   Pickles, olives, relish and horseradish.   All desserts containing nuts, seeds, dried fruit and coconut; or made from whole grains or bran.   Candy made with nuts or seeds.   Popcorn.                     DIRECTIONS TO THE ENDOSCOPY DEPARTMENT     From the north ([facility])  Take [address], exit on [address] [address]. Get into the left hand margarita, turn left (east), go one-half mile to [address] and turn left. Go north to the first stoplight, take a right on [address] and follow it to the Emergency entrance.    From the south ([facility])  Take [address] to the [address] split and exit on [address] [address]. On [address] [address], turn left (west) to [address]. Turn right (north) on [address]. Go north to the first stoplight, take a right on [address] and follow it to the Emergency entrance.    From the east via [address] ([facility])  Take [address] south to [address] [address] exit. Turn right on [address] [address]. Go west to [address]. Turn right (north) on [address]. Go to the first stoplight, take a right and follow on [address] to the Emergency entrance.    From the east via [address] ([facility])  [address]  to [address]. Turn left (south) on [address] to [address]. Turn left (east) on [address] and follow it to the Emergency entrance.    From the west via [address] ([place] [employer])  Take [address] east to [address]. Turn right (south) on [address] to [address]. Turn left (east) on [address] and follow it to the Emergency entrance.

## 2019-10-22 LAB — COPATH REPORT: NORMAL

## 2019-11-03 ENCOUNTER — HEALTH MAINTENANCE LETTER (OUTPATIENT)
Age: 49
End: 2019-11-03

## 2019-11-10 ENCOUNTER — NURSE TRIAGE (OUTPATIENT)
Dept: NURSING | Facility: CLINIC | Age: 49
End: 2019-11-10

## 2019-11-10 NOTE — TELEPHONE ENCOUNTER
"  Call from pt         CC:  Belly button infection ?      Redness and drainage - just within the belly button itself       No fever   Yes - is painful to the touch    Yes - some \"puss\" material and \"smelly\"      Cleaning with H2O2 at home       A/P:   > Agree with at home care   > clean and dry     > Appt for tomorrow as able       Donny Alexander RN           Reason for Disposition    [1] Looks infected (spreading redness, pus) AND [2] no fever    Additional Information    Negative: [1] Sudden onset of rash (within last 2 hours) AND [2] difficulty with breathing or swallowing    Negative: Sounds like a life-threatening emergency to the triager    Negative: Poison ivy, oak, or sumac contact suspected    Negative: Insect bite(s) suspected    Negative: Ringworm suspected (i.e., round pink patch, sometimes looks like ring, usually 1/2 to 1 inch [12-25 mm],  in size, slowly increasing in size)    Negative: Jock Itch suspected (i.e., itchy rash on inner thighs near genital area)    Negative: Wound infection suspected (i.e., pain, spreading redness, or pus; in a cut, puncture, scrape or sutured wound)    Negative: Athlete's Foot suspected (i.e., itchy rash between the toes)    Negative: Impetigo suspected  (i.e., painless infected superficial small sores, less than 1 inch or 2.5 cm, often covered by a soft, yellow-brown scab or crust; sometimes occurring near nasal openings)    Negative: Shingles suspected (i.e., painful rash, multiple small blisters grouped together in one area of body; dermatomal distribution)    Negative: Rash of external female genital area (vulva)    Negative: Rash of penis or scrotum    Negative: Small spot, skin growth, or mole    Negative: Sores or skin ulcer, not a rash    Negative: Localized lump (or swelling) without redness or rash    Negative: [1] Localized purple or blood-colored spots or dots AND [2] not from injury or friction AND [3] fever    Negative: Patient sounds very sick or weak to " the triager    Negative: [1] Red area or streak AND [2] fever    Negative: [1] Rash is painful to touch AND [2] fever    Negative: [1] Looks infected (spreading redness, pus) AND [2] large red area (> 2 in. or 5 cm)    Negative: [1] Looks infected (spreading redness, pus) AND [2] diabetes mellitus or weak immune system (e.g., HIV positive,  cancer chemotherapy, chronic steroid treatment, splenectomy)    Negative: [1] Localized purple or blood-colored spots or dots AND [2] not from injury or friction AND [3] no fever    Protocols used: RASH OR REDNESS - [id number]

## 2019-11-12 ENCOUNTER — OFFICE VISIT (OUTPATIENT)
Dept: PODIATRY | Facility: CLINIC | Age: 49
End: 2019-11-12
Payer: COMMERCIAL

## 2019-11-12 ENCOUNTER — TELEPHONE (OUTPATIENT)
Dept: PODIATRY | Facility: CLINIC | Age: 49
End: 2019-11-12

## 2019-11-12 ENCOUNTER — ANCILLARY PROCEDURE (OUTPATIENT)
Dept: GENERAL RADIOLOGY | Facility: CLINIC | Age: 49
End: 2019-11-12
Attending: PODIATRIST
Payer: COMMERCIAL

## 2019-11-12 VITALS
HEIGHT: 65 IN | SYSTOLIC BLOOD PRESSURE: 108 MMHG | BODY MASS INDEX: 38.15 KG/M2 | WEIGHT: 229 LBS | DIASTOLIC BLOOD PRESSURE: 60 MMHG

## 2019-11-12 DIAGNOSIS — M79.672 FOOT PAIN, BILATERAL: Primary | ICD-10-CM

## 2019-11-12 DIAGNOSIS — M19.071 ARTHRITIS OF BOTH FEET: ICD-10-CM

## 2019-11-12 DIAGNOSIS — M20.41 HAMMER TOES OF BOTH FEET: ICD-10-CM

## 2019-11-12 DIAGNOSIS — M79.671 FOOT PAIN, BILATERAL: ICD-10-CM

## 2019-11-12 DIAGNOSIS — M79.672 FOOT PAIN, BILATERAL: ICD-10-CM

## 2019-11-12 DIAGNOSIS — M19.072 ARTHRITIS OF BOTH FEET: ICD-10-CM

## 2019-11-12 DIAGNOSIS — M79.671 FOOT PAIN, BILATERAL: Primary | ICD-10-CM

## 2019-11-12 DIAGNOSIS — M20.42 HAMMER TOES OF BOTH FEET: ICD-10-CM

## 2019-11-12 PROCEDURE — 99214 OFFICE O/P EST MOD 30 MIN: CPT | Performed by: PODIATRIST

## 2019-11-12 PROCEDURE — 73630 X-RAY EXAM OF FOOT: CPT | Mod: RT

## 2019-11-12 ASSESSMENT — MIFFLIN-ST. JEOR: SCORE: 1659.62

## 2019-11-12 NOTE — PROGRESS NOTES
"PATIENT HISTORY:  Ana Wyman is a 49 year old female who presents to clinic for pain to both 2nd toes. Had bunion and hammertoe surgery 19 years ago. Notes that last 3 months, she has lost a lot of weight and notes \"bumps\" to the 2nd toes and has aching burning pain. Can be 9/10 at its worst. Will keep her up at night. Concerned a pin may be coming loose. Pressure makes it worse. Has tried soaking feet and nsaids with minimal relief. Wondering what is causing the pain and what can be done for them.     Review of Systems:  Patient denies fever, chills, rash, wound, stiffness, numbness, weakness, heart burn, blood in stool, chest pain with activity, calf pain when walking, shortness of breath with activity, chronic cough, easy bleeding/bruising, swelling of ankles, excessive thirst, fatigue, depression, anxiety.  Patient admits to limping at times.     PAST MEDICAL HISTORY:   Past Medical History:   Diagnosis Date     Allergic rhinitis, cause unspecified      Cellulitis 2005    2 episodes, one with hospitalization [facility]     DUB (dysfunctional uterine bleeding)     Neg EMB 2012     Esophageal reflux     ongoing     Fibroids      Genital problems     Lichens Schlerosis     GERD (gastroesophageal reflux disease)      Hallux valgus (acquired)      History of steroid therapy     Inhalers, eye drops     Hypersomnia with sleep apnea, unspecified     using CPAP     Kidney stone May 2018    2 stones     Lichen sclerosus 7/14/2011     Lymph edema 2010- present     Lymphedema bilateral with mixed lipedema. 9/30/2015     Lymphedema bilateral with mixed lipedema. 9/30/2015     Morbid obesity (H) 3/19/2013     MIGUELITO on CPAP 3/19/2013    Sleep study in 2004 and 2012       Pneumonia     Years ago - a few times     Pre-diabetes      Sleep apnea      Tendonitis currently     Uncomplicated asthma     mild     Urinary tract infection     A few times in past 10 years     Vision disorder [id number]    Dry Eye     Vitamin D " deficiency 3/14/2015        PAST SURGICAL HISTORY:   Past Surgical History:   Procedure Laterality Date     C NONSPECIFIC PROCEDURE  1994    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     C NONSPECIFIC PROCEDURE  1999,2001    Foot surgeries x 2 (bunionectomy)     C NONSPECIFIC PROCEDURE  2000    hammer toes     COLONOSCOPY  5/15/2013    Procedure: COLONOSCOPY;  Colonoscopy;  Surgeon: Kota Blanco MD;  Location:  GI     COLONOSCOPY N/A 10/21/2019    Procedure: COLONOSCOPY, with biopsies by cold forceps;  Surgeon: Lydia Vickers MD;  Location:  GI     GYN SURGERY  2016    Uterine Ablation     LAPAROSCOPIC BYPASS GASTRIC, CHOLECYSTECTOMY, COMBINED N/A 1/28/2019    Procedure: LAPAROSCOPIC GASTRIC BYPASS;  Surgeon: Maykel Calvin MD;  Location: [facility] OR     LAPAROSCOPIC CHOLECYSTECTOMY N/A 4/11/2019    Procedure: LAPAROSCOPIC CHOLECYSTECTOMY;  Surgeon: Maykel Calvin MD;  Location: [facility] OR     lichen sclerosis       ORTHOPEDIC SURGERY       VASCULAR SURGERY  2015    Vienous closure on both legs     vein closure  12/2016    to prevent lymphedema        MEDICATIONS:   Current Outpatient Medications:      albuterol (PROAIR HFA/PROVENTIL HFA/VENTOLIN HFA) 108 (90 Base) MCG/ACT inhaler, Inhale 2 puffs into the lungs every 4 hours as needed for shortness of breath / dyspnea or wheezing (Patient not taking: Reported on 10/14/2019), Disp: 1 Inhaler, Rfl: 0     albuterol (PROAIR HFA/PROVENTIL HFA/VENTOLIN HFA) 108 (90 Base) MCG/ACT inhaler, Inhale 2 puffs into the lungs every 6 hours as needed for shortness of breath / dyspnea or wheezing, Disp: 2 Inhaler, Rfl: 2     azithromycin (ZITHROMAX) 250 MG tablet, Two tablets first day, then one tablet daily for four days., Disp: 6 tablet, Rfl: 0     benzonatate (TESSALON) 200 MG capsule, Take 1 capsule (200 mg) by mouth 3 times daily as needed for cough, Disp: 21 capsule, Rfl: 0     benzonatate (TESSALON) 200 MG capsule, Take 1 capsule (200 mg) by mouth 3 times  daily as needed for cough, Disp: 21 capsule, Rfl: 0     BIOTIN PO, Take 5,000 mcg by mouth daily At noon, Disp: , Rfl:      Calcium Citrate-Vitamin D (CALCIUM CITRATE CHEWY BITE PO), Take 500 mg by mouth 3 times daily Plus D,noon, supper, HS, Disp: , Rfl:      childrens multivitamin w/iron (FLINTSTONES COMPLETE) 60 MG chewable tablet, Take 2 chew tab by mouth daily With breakfast, Disp: , Rfl:      Cholecalciferol (VITAMIN D) 2000 units CAPS, Take 3 capsules by mouth At Bedtime , Disp: , Rfl:      clobetasol (TEMOVATE) 0.05 % ointment, Apply clobetasol  0.05 percent ointment, sparingly (a dot 3 mm wide) in a thin film.  Apply to affected area only, once or twice weekly for maintenance. (Patient not taking: Reported on 10/14/2019), Disp: 45 g, Rfl: 2     Hypromellose (ARTIFICIAL TEARS OP), Apply 1-2 drops to eye daily as needed, Disp: , Rfl:      nystatin (MYCOSTATIN) 351361 UNIT/GM external cream, Apply topically 2 times daily as needed for dry skin Apply as needed for rash., Disp: 30 g, Rfl: 3     polyethylene glycol (MIRALAX/GLYCOLAX) packet, Take 17 g by mouth daily, Disp: 90 packet, Rfl: 3     triamcinolone (KENALOG) 0.1 % external cream, Apply topically 2 times daily as needed for irritation Apply twice daily PRN rash, Disp: 30 g, Rfl: 3     vitamin (B COMPLEX-C) tablet, Take 1 tablet by mouth every morning, Disp: , Rfl:      vitamin B-12 (CYANOCOBALAMIN) 2500 MCG sublingual tablet, Take 1,250 mcg by mouth twice a week With breakfast, Disp: , Rfl:      ALLERGIES:    Allergies   Allergen Reactions     Nsaids Other (See Comments)     Had gastric bypass - needs to avoid NSAIDS     Clindamycin Diarrhea     Codeine Nausea and Vomiting     Shellfish Allergy         SOCIAL HISTORY:   Social History     Socioeconomic History     Marital status: Single     Spouse name: Not on file     Number of children: 0     Years of education: 18     Highest education level: Not on file   Occupational History     Occupation: student  life     Employer: [employer]   Social Needs     Financial resource strain: Not on file     Food insecurity:     Worry: Not on file     Inability: Not on file     Transportation needs:     Medical: Not on file     Non-medical: Not on file   Tobacco Use     Smoking status: Never Smoker     Smokeless tobacco: Never Used   Substance and Sexual Activity     Alcohol use: Not Currently     Alcohol/week: 0.0 standard drinks     Drug use: No     Sexual activity: Never     Partners: Male     Birth control/protection: Pill   Lifestyle     Physical activity:     Days per week: Not on file     Minutes per session: Not on file     Stress: Not on file   Relationships     Social connections:     Talks on phone: Not on file     Gets together: Not on file     Attends Church service: Not on file     Active member of club or organization: Not on file     Attends meetings of clubs or organizations: Not on file     Relationship status: Not on file     Intimate partner violence:     Fear of current or ex partner: Not on file     Emotionally abused: Not on file     Physically abused: Not on file     Forced sexual activity: Not on file   Other Topics Concern      Service Not Asked     Blood Transfusions No     Caffeine Concern Not Asked     Occupational Exposure Not Asked     Hobby Hazards Not Asked     Sleep Concern Not Asked     Stress Concern Not Asked     Weight Concern Yes     Comment: 20 pound weight loss on weight watchers     Special Diet Not Asked     Back Care Not Asked     Exercise Not Asked     Bike Helmet Not Asked     Seat Belt Not Asked     Self-Exams Not Asked     Parent/sibling w/ CABG, MI or angioplasty before 65F 55M? No   Social History Narrative    Living arrangements - the patient lives alone.         FAMILY HISTORY:   Family History   Problem Relation Age of Onset     C.A.D. Father 92        CAGB 98     Obesity Father      Cancer Father         stomach Dx age 74     Lipids Father       "Hypertension Father      Coronary Artery Disease Father      Cerebrovascular Disease Father      Other Cancer Father         Esophogeal     Diabetes Mother         Type 2     Allergies Mother      Obesity Mother      C.A.D. Mother         triple bypass surgery, 65     Lipids Mother      Hypertension Mother      Coronary Artery Disease Mother      Hyperlipidemia Mother      Colon Polyps Mother      Anesthesia Reaction Mother      Thyroid Disease Mother      C.A.D. Paternal Grandfather 58        fatal     Coronary Artery Disease Paternal Grandfather      Cancer - colorectal Maternal Grandmother 75     Cancer Maternal Grandmother         liver     Hypertension Maternal Grandmother      Colon Cancer Maternal Grandmother      Other Cancer Maternal Grandmother         liver, [place]     Colon Polyps Maternal Grandmother      Cancer - colorectal Paternal Aunt      Allergies Brother      Cancer Paternal Grandmother         stomach     Obesity Paternal Grandmother      Diabetes Paternal Grandmother         Type 2     Asthma Paternal Grandmother      Obesity Brother      Hypertension Brother      Cancer - colorectal Other         cousin  from colon cancer in 's     Obesity Brother      Coronary Artery Disease Maternal Grandfather      Hypertension Maternal Grandfather         EXAM:Vitals:/60   Ht 1.651 m (5' 5\")   Wt 103.9 kg (229 lb)   BMI 38.11 kg/m        General appearance: Patient is alert and fully cooperative with history & exam.  No sign of distress is noted during the visit.     Psychiatric: Affect is pleasant & appropriate.  Patient appears motivated to improve health.     Respiratory: Breathing is regular & unlabored while sitting.     HEENT: Hearing is intact to spoken word.  Speech is clear.  No gross evidence of visual impairment that would impact ambulation.     Dermatologic: Skin is intact to both lower extremities without significant lesions, rash or abrasion.  No paronychia or evidence " of soft tissue infection is noted.     Vascular: DP & PT pulses are intact & regular bilaterally.  No significant edema or varicosities noted.  CFT and skin temperature is normal to both lower extremities.     Neurologic: Lower extremity sensation is intact to light touch.  No evidence of weakness or contracture in the lower extremities.  No evidence of neuropathy.     Musculoskeletal: Patient is ambulatory without assistive device or brace.  Prominent immobile bumps to medial 2nd proximal interphalangeal joints bilateral.     Radiographs:  Bilateral foot xrays - I personally reviewed the xrays. Arthritis to 2nd proximal interphalangeal joints bilateral. Staples in 1st proximal phalanx. No fractures noted.      ASSESSMENT:    Foot pain, bilateral  Arthritis of both feet  Hammer toes of both feet     PLAN:  Reviewed patient's chart in [facility]. Reviewed xrays with patient. Reviewed and discussed causes of hammertoes with patient.  Explained that this can be caused by an overpowering of muscles or by the way we walk.  Discussed conservative treatments such as orthotics, pads, shoe gear.  Explained that sometimes the flexor tendons can be cut to try and straighten the toe and reduce rubbing. This is normally done in office and patient is weight bearing in postop she for 1-2 weeks.  We also discussed surgical intervention to remove the joint and possibly fuse the toe.  Normally patient has a pin sticking out of the toe for about 6 weeks and can not get the foot wet. Patient would have to be minimal weight bearing in cam boot.      Talked about arthritis and treatments including orthotics, injections, icing, NSAIDS, bracing, physical therapy, compounding pain cream, or surgical intervention.    Talked about arthroplasties of the proximal interphalangeal joints to remove bone spurring and try to help with pain. Can do both feet at the same time. Talked about risks including infection, numbness, continued pain, recurrence,  need for further surgery, blood loss, blood clotting. You will scar.    She will call to schedule.        Maya Martin DPM, Podiatry/Foot and Ankle Surgery    Weight management plan: Patient was referred to their PCP to discuss a diet and exercise plan.

## 2019-11-12 NOTE — TELEPHONE ENCOUNTER
Patient called stating she would like to move forward with surgery.     We discussed possible dates in December.     Please place case request.  Thanks.       Jimbo Roque, Surgery Scheduler

## 2019-11-12 NOTE — PATIENT INSTRUCTIONS
Thank you for choosing [facility]!    DR. HALL'S CLINIC SCHEDULE  MONDAY AM - BA TUESDAY - [place]   [address] [address] [place] [address] [place] [address]   [phone] / [phone] / [phone]       WEDNESDAY - [place] FRIDAY AM - WOUND CENTER   [address]   [place] [address] [place] [address]   [phone] / [phone]       FRIDAY PM - [place] SCHEDULE SURGERY: [phone]   [address] BILLING QUESTIONS: [phone]   [place] [address] AFTER HOURS: [phone] / [phone] APPOINTMENTS: [phone]     Consumer Price Line (CPL) [phone]       HAMMERTOES  Hammertoe is a contracture (bending) of one or both joints of the second, third, fourth, or fifth (little) toes. This abnormal bending can put pressure on the toe when wearing shoes, causing problems to develop.  Hammertoes usually start out as mild deformities and get progressively worse over time. In the earlier stages, hammertoes are flexible and the symptoms can often be managed with noninvasive measures. But if left untreated, hammertoes can become more rigid and will not respond to non-surgical treatment.  Because of the progressive nature of hammertoes, they should receive early attention. Hammertoes never get better without some kind of intervention.  CAUSES  The most common cause of hammertoe is a muscle/tendon imbalance. This imbalance, which leads to a bending of the toe, results from mechanical (structural) changes in the foot that occur over time in some people.  Hammertoes may be aggravated by shoes that don t fit properly. A hammertoe may result if a toe is too long and is forced into a cramped position when a tight shoe is worn.  Occasionally, hammertoe is the result of an earlier trauma to the toe. In some people, hammertoes are inherited.  SYMPTOMS  Pain or  irritation of the affected toe when wearing shoes.   Corns and calluses (a buildup of skin) on the toe, between two toes, or on the ball of the foot. Corns are caused by constant friction against the shoe. They may be soft or hard, depending upon their location.   Inflammation, redness, or a burning sensation   Contracture of the toe   In more severe cases of hammertoe, open sores may form.   DIAGNOSIS  Although hammertoes are readily apparent, to arrive at a diagnosis the foot and ankle surgeon will obtain a thorough history of your symptoms and examine your foot. During the physical examination, the doctor may attempt to reproduce your symptoms by manipulating your foot and will study the contractures of the toes. In addition, the foot and ankle surgeon may take x-rays to determine the degree of the deformities and assess any changes that may have occurred.   Hammertoes are progressive - they don t go away by themselves and usually they will get worse over time. However, not all cases are alike - some hammertoes progress more rapidly than others. Once your foot and ankle surgeon has evaluated your hammertoes, a treatment plan can be developed that is suited to your needs.  NON-SURGICAL TREATMENT  There is a variety of treatment options for hammertoe. The treatment your foot and ankle surgeon selects will depend upon the severity of your hammertoe and other factors.  A number of non-surgical measures can be undertaken:  Padding corns and calluses. Your foot and ankle surgeon can provide or prescribe pads designed to shield corns from irritation. If you want to try over-the-counter pads, avoid the medicated types. Medicated pads are generally not recommended because they may contain a small amount of acid that can be harmful. Consult your surgeon about this option.   Changes in shoewear. Avoid shoes with pointed toes, shoes that are too short, or shoes with high heels - conditions that can force your toe against the  front of the shoe. Instead, choose comfortable shoes with a deep, roomy toe box and heels no higher than two inches.   Orthotic devices. A custom orthotic device placed in your shoe may help control the muscle/tendon imbalance.   Injection therapy. Corticosteroid injections are sometimes used to ease pain and inflammation caused by hammertoe.   Medications. Oral nonsteroidal anti-inflammatory drugs (NSAIDs), such as ibuprofen, may be recommended to reduce pain and inflammation.   Splinting/strapping. Splints or small straps may be applied by the surgeon to realign the bent toe.   Exercises:   1. The Toe Tap  Stand flat on the ground in your bare feet. Raise all of your toes up off the ground as high as you can. Then starting with the little toes, slowly press them down to the ground. After the big toes are on the ground, start over by raising all of them up off the ground again. This motion is similar to tapping your fingers on a desk. Repeat this ten times.     2. Interlocking your Fingers and Toes  Cross your right foot over your knee and place the fingers of your left hand between your toes. Squeeze your toes together, pinching your fingers between them. Spread the toes apart and squeeze them together again. Repeat this ten times then do the other foot. Like most exercises, this will get easier the more you do it. If you are having a lot of difficulty with this exercise, start with just your index finger between your first and second toe, then later add your middle finger between your second and third toes, and so on until you can fit all your fingers between your toes. Do this ten times on each foot. Eventually you will be able to spread your toes apart without using your fingers.    3. Gripping the Floor   the floor by pressing the pads of your toes (not the tips) into the floor without curling your toes. Relax and repeat this ten times. If your shoes have the proper amount of depth, you should be able to  do this with shoes on.    HAMMERTOE TOE SURGERY   Hammertoe surgery is complex. The surgical procedure is an attempt to help the toe lay in a better position. Nearly every structure in the toe will be cut including the tendons, ligaments, skin and bone. Hammertoes are a complex deformity and final toe position is difficult to predict. The only sure way to position a toe is to fuse all three digital joints. That will not happen as some degree of toe motion is needed for walking. The toe may not be completely reduced as the surrounding skin and other structures may not allow the toe to return to a normal position. The tendons on adjacent toes may need to be cut at the time of the original or subsequent surgeries, as interconnections exist between the toes. The toe may drift after surgery. Stiffness may develop leading to new areas of pressure.   Future shoe choices will be critical in allowing the surgery to provide comfort. The toes will still hurt if shoes rub. The original pain may also persist as other foot problems may be contributing to the current pain. The toe may or may not be pinned in place. External pins would require complete avoidance of water on the foot for six weeks. The pin would be removed about six weeks after the surgery. Strict attention to protection is critical. The pin could get bumped or loosen resulting in early removal. Removal might be necessary before the bone heals which would negatively affect the final surgical outcome and toe allignment.         POTENTIAL COMPLICATIONS OF FOOT & ANKLE SURGERY  Undergoing a surgical procedure involves a certain amount of risk. Risks of complications vary depending on the complexity of the surgery and how you take care of the surgical area during the healing process. Complications can range from minor infection to death. Some complications are temporary while others will be permanent.  Your surgeon weighs the risk of complications vs the potential  benefit of undergoing surgery. You need to consider your tolerance for unexpected problems as you decide whether to undergo surgery.    Foot and Ankle surgery involves cutting skin, bone, ligaments, blood vessels and joints.  These structures heal well but not without consequence. Any break to the skin can lead to infection. A deep infection involves bones or joints which can be devastating. Deep infection can lead to amputation or could spread to other parts of your body. Most infections are minor and easily treated with oral antibiotics. Infections are often times from bacteria already present on your skin. Proper care of the surgical site is an essential component of avoiding infection. Do not get the bandage wet and take proper care of external pins to avoid these problems.     Joint stiffness is inherent to any foot or ankle surgery. Joint surgery is a major component of reconstructive foot and ankle procedures. The ligaments and tissues around the joint are cut, and later repaired. Scare tissue limits joint mobility. This can be permanent but generally improves over the course of one year.    Surgery involves dissection around nerves. Visible nerves are moved out of the way while very small nerves are cut. Scar tissue develops around nerves and can lead to nerve pain, numbness, or neuromas. Nerve symptoms can be permanent. This can lead to numbness or sometimes hypersensitivity to touch and problems wearing shoes.    Bones do not always heal after surgery. Poor healing after a bone cut or joint fusion can lead to an extended period of casting or repeat surgery. Electronic bone stimulators are sometimes used to stimulate poor healing of bone. Nonunion is when joint fusion does not take.  This can occur as often as 10% of the time. Smoking doubles your risk of poor bone healing to 20%.    Bone grafting is sometimes necessary during the original or subsequent surgery. Bone is sometimes taken from other parts of  your body or freeze dried bone from a bone bank from a bone bank or synthetic bone material might be used.    A scar is always present after foot and ankle surgery. The scar will be visible and could be sensitive. Some people develop excessive scarring, which cannot be controlled by the surgeon. Scars can be unsightly and can restrict joint mobility.    Blood clots can develop in the calf after surgery. Foot and ankle surgery is a predisposing factor for blood clots. The blood clot could break and travel to your lung.  This condition can lead to death. Early warning signs could include calf swelling and pain, chest pain or shortness of breath. This is an emergency that requires immediate attention by a medical doctor!    Surgery will not necessarily create a pain-free foot. Even normal feet hurt. Crooked toes, bunions, neuromas, flat feet and arthritis should all be considered permanent conditions.  Ankle pain commonly requires multiple surgeries over a lifetime. Do not assume that having surgery will permanently fix your condition. You may need permanent alteration in shoes and activities to accommodate your foot and ankle problem.    Careful attention to post-operative recommendations will dramatically reduce your risk of complications. Proper dressing, wound care, elevation and rest will be essential to get the wound healed and minimize scarring. Strict attention to activity restrictions, such as non-weight bearing, or partial weight bearing is essential. Internal fixation devices may not resist the stress of walking. Some select surgeries allow the patient to walk, however this should be very minimal.    Despite these concerns, foot and ankle surgery leads to a high level of patient satisfaction. Your surgeon would not recommend surgery if he/she did not expect your foot to improve. Talk to your surgeon about any of the above issues.        GETTING READY FOR YOUR SURGERY  ONE-THREE WEEKS BEFORE  1. See your  Family Doctor or Primary Care Doctor for a History and Physical. If you do not, we may need to change the date of your surgery.  2. Please see pre-surgical medications below to which medications need to be stopped before surgery and when.    TEN OR MORE DAYS BEFORE    1. Elizabeth with the hospital. (For [facility])      By Phone: [phone].      By Internet: [email or web address]. [facility].      If you do not register by phone or online, we will call to help you register.    SAME DAY SURGERY PATIENTS  1. You will need a family member of friend to drive you home. If you do not have one the surgery will be cancelled or rescheduled.  2. You will need a responsible adult to stay with you that night after the surgery.       We will ask this person to listen to some instructions before you leave the hospital.  * If your child is having surgery, and you would like a tour of the hospital, please call:     [phone].      DAY BEFORE SURGERY  1. DO NOT EAT OR DRINK ANYTHING AFTER MIDNIGHT THE NIGHT             BEFORE YOUR SURGERY!   2. DO NOT DRINK ALCOHOL.  3. Do not take over the counter drugs.  4. Some people need to have blood tests at the hospital. If you need blood tests, we will tell you in advance.  5. Take medications as directed by your doctor. You may take these with a small                amount of water.  6. Do not chew gum, chew tobacco, or suck on hard candy the day of surgery.  7. Bring your insurance cards, a list of your medicines and co-pays you might need. Leave jewelry and other valuables at home.  8. If you received papers at your doctor's office, bring these with you to the surgery.    If you have questions about these instructions, please call: [phone]  Ask to speak with a pre-admitting nurse.    PRESURGICAL MEDICATIONS:  Certain prescription, over-the-counter, and herbal medications interfere with healing after an operation. The main concern relates to medications  that increase bleeding at the surgical site. Excess blood under the incision results in poor wound healing, excess pain, increased scarring, and a higher risk of infection.    Some medications slow the healing process of bone. Medications can also interfere with the anesthesia drugs that keep you asleep during the operation. It is important to ensure that these medications are out of your system prior to the operation. The list below details a number of medications that are of concern. Pay special attention to how long you should avoid these medications before your operation. Please note that this list is not complete. You should ask your surgeon or pharmacist if you are uncertain about other medications. Any herbal supplement not listed should be discontinued at least one week prior to surgery.    Aspirin: Hold for one week prior to surgery and restart the day after surgery. This over the counter medication promotes bleeding.    Motrin / Ibuprofen / Aleve / Advil / NSAIDS:  Stop one week prior to surgery. These medications affect bleeding and may cause delay in bone healing. Avoid taking these medications for six weeks after bone surgeries. Other procedures may allow you to restart sooner than 6 weeks after surgery.    Coumadin / Plavix: Your primary care provider will manage Coumadin in relation to surgery. Coumadin may result in excessive bleeding and may be adjusted before and after surgery.    Enbriel: Stop two weeks prior to surgery and restart two weeks after surgery. This medication can effect soft tissue healing and increases the risk of infection.    Remicade: Stop 8-12 weeks before surgery and restart two weeks after surgery. This medication can affect soft tissue healing and increases the risk of infection.    Humira: Stop 4 weeks before surgery and restart two weeks after surgery. This medication can affect soft tissue healing and increases the risk of infection.    Methotrexate: Stop one dose prior to  surgery. This medication will be restarted when the wound appears to be healing well. Please ask your surgeon about restarting this medication when you are being seen in the office for wound checks.    Kava: Stop at least one day prior to surgery and may restart one day after surgery. Kava may increase the sedative effect of anesthetics that are given during the operation. Kava can also increase bleeding at the surgical site.    Ephedra (ma jung): Stop at least one day prior to surgery and may restart one day after surgery.  Ephedra may increase the risk of heart attack and stroke. This medication can also increase bleeding at the surgical site.    Fortescue's Wort: Stop at least five days before surgery and may restart one day after surgery. Ethan's wort may diminish the effects of several drugs that are given during surgery.    Ginseng: Stop at least one week prior to surgery and may restart one day after surgery.  Ginseng lowers blood sugar and may increase bleeding at the surgical site.    Ginkgo: Stop 36 hours before surgery and may restart one day after surgery. Ginkgo may increase bleeding at the surgical site.    Garlic: Stop at least one week prior to surgery and may restart one day after. Garlic may increase bleeding at the surgery site.    Valerian: Do a slow steady decrease in your daily dose over a period of 2-3 weeks before surgery to decrease the chance of withdrawal symptoms. Valerian may increase the sedative effect of anesthetics given during the operation.    Echinacea: There is no data on stopping echinacea prior to surgery. This medication though can be associated with allergic reactions and suppression of your immune system.    Vitamin E, Omega-3, Flax, Fish Oil, Glucosamine and Chondroitin: Stop 2 weeks prior to surgery and may restart one day after. These herbal medications can increase risk of bleeding at surgical site.         BODY WEIGHT AND YOUR FEET  The following information is  included in the after visit summary for all patients. Body weight can be a sensitive issue to discuss in clinic, but we think the following information is very important. Although we focus on the feet and ankles, we do support the overall health of our patients.     Many things can cause foot and ankle problems. Foot structure, activity level, foot mechanics and injuries are common causes of pain. One very important issue that often goes unmentioned, is body weight. Extra weight can cause increased stress on muscles, ligaments, bones and tendons. Sometimes just a few extra pounds is all it takes to put one over her/his threshold. Without reducing that stress, it can be difficult to alleviate pain. As Foot & Ankle specialists, our job is addressing the lower extremity problem and possible causes. Regarding extra body weight, we encourage patients to discuss diet and weight management plans with their primary care doctors. It is this team approach that gives you the best opportunity for pain relief and getting you back on your feet.      [facility] has a Comprehensive Weight Management Program. This program includes counseling, education, non-surgical and surgical approaches to weight loss. If you are interested in learning more either talk to you primary care provider or call [phone].

## 2019-11-12 NOTE — LETTER
"    11/12/2019         RE: Ana Wyman  [id number] [facility] [address]        Dear Colleague,    Thank you for referring your patient, Ana Wyman, to the [facility]. Please see a copy of my visit note below.    PATIENT HISTORY:  Ana Wyman is a 49 year old female who presents to clinic for pain to both 2nd toes. Had bunion and hammertoe surgery 19 years ago. Notes that last 3 months, she has lost a lot of weight and notes \"bumps\" to the 2nd toes and has aching burning pain. Can be 9/10 at its worst. Will keep her up at night. Concerned a pin may be coming loose. Pressure makes it worse. Has tried soaking feet and nsaids with minimal relief. Wondering what is causing the pain and what can be done for them.     Review of Systems:  Patient denies fever, chills, rash, wound, stiffness, numbness, weakness, heart burn, blood in stool, chest pain with activity, calf pain when walking, shortness of breath with activity, chronic cough, easy bleeding/bruising, swelling of ankles, excessive thirst, fatigue, depression, anxiety.  Patient admits to limping at times.     PAST MEDICAL HISTORY:   Past Medical History:   Diagnosis Date     Allergic rhinitis, cause unspecified      Cellulitis 2005    2 episodes, one with hospitalization [facility]     DUB (dysfunctional uterine bleeding)     Neg EMB 2012     Esophageal reflux     ongoing     Fibroids      Genital problems     Lichens Schlerosis     GERD (gastroesophageal reflux disease)      Hallux valgus (acquired)      History of steroid therapy     Inhalers, eye drops     Hypersomnia with sleep apnea, unspecified     using CPAP     Kidney stone May 2018    2 stones     Lichen sclerosus 7/14/2011     Lymph edema 2010- present     Lymphedema bilateral with mixed lipedema. 9/30/2015     Lymphedema bilateral with mixed lipedema. 9/30/2015     Morbid obesity (H) 3/19/2013     MIGUELITO on CPAP 3/19/2013    Sleep study in 2004 and " 2012       Pneumonia     Years ago - a few times     Pre-diabetes      Sleep apnea      Tendonitis currently     Uncomplicated asthma     mild     Urinary tract infection     A few times in past 10 years     Vision disorder [id number]    Dry Eye     Vitamin D deficiency 3/14/2015        PAST SURGICAL HISTORY:   Past Surgical History:   Procedure Laterality Date     C NONSPECIFIC PROCEDURE  1994    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     C NONSPECIFIC PROCEDURE  1999,2001    Foot surgeries x 2 (bunionectomy)     C NONSPECIFIC PROCEDURE  2000    hammer toes     COLONOSCOPY  5/15/2013    Procedure: COLONOSCOPY;  Colonoscopy;  Surgeon: Kota Blanco MD;  Location:  GI     COLONOSCOPY N/A 10/21/2019    Procedure: COLONOSCOPY, with biopsies by cold forceps;  Surgeon: Lydia Vickers MD;  Location:  GI     GYN SURGERY  2016    Uterine Ablation     LAPAROSCOPIC BYPASS GASTRIC, CHOLECYSTECTOMY, COMBINED N/A 1/28/2019    Procedure: LAPAROSCOPIC GASTRIC BYPASS;  Surgeon: Maykel Calvin MD;  Location: [facility] OR     LAPAROSCOPIC CHOLECYSTECTOMY N/A 4/11/2019    Procedure: LAPAROSCOPIC CHOLECYSTECTOMY;  Surgeon: Maykel Calvin MD;  Location: [facility] OR     lichen sclerosis       ORTHOPEDIC SURGERY       VASCULAR SURGERY  2015    Vienous closure on both legs     vein closure  12/2016    to prevent lymphedema        MEDICATIONS:   Current Outpatient Medications:      albuterol (PROAIR HFA/PROVENTIL HFA/VENTOLIN HFA) 108 (90 Base) MCG/ACT inhaler, Inhale 2 puffs into the lungs every 4 hours as needed for shortness of breath / dyspnea or wheezing (Patient not taking: Reported on 10/14/2019), Disp: 1 Inhaler, Rfl: 0     albuterol (PROAIR HFA/PROVENTIL HFA/VENTOLIN HFA) 108 (90 Base) MCG/ACT inhaler, Inhale 2 puffs into the lungs every 6 hours as needed for shortness of breath / dyspnea or wheezing, Disp: 2 Inhaler, Rfl: 2     azithromycin (ZITHROMAX) 250 MG tablet, Two tablets first day, then one tablet daily for  four days., Disp: 6 tablet, Rfl: 0     benzonatate (TESSALON) 200 MG capsule, Take 1 capsule (200 mg) by mouth 3 times daily as needed for cough, Disp: 21 capsule, Rfl: 0     benzonatate (TESSALON) 200 MG capsule, Take 1 capsule (200 mg) by mouth 3 times daily as needed for cough, Disp: 21 capsule, Rfl: 0     BIOTIN PO, Take 5,000 mcg by mouth daily At noon, Disp: , Rfl:      Calcium Citrate-Vitamin D (CALCIUM CITRATE CHEWY BITE PO), Take 500 mg by mouth 3 times daily Plus D,noon, supper, HS, Disp: , Rfl:      childrens multivitamin w/iron (FLINTSTONES COMPLETE) 60 MG chewable tablet, Take 2 chew tab by mouth daily With breakfast, Disp: , Rfl:      Cholecalciferol (VITAMIN D) 2000 units CAPS, Take 3 capsules by mouth At Bedtime , Disp: , Rfl:      clobetasol (TEMOVATE) 0.05 % ointment, Apply clobetasol  0.05 percent ointment, sparingly (a dot 3 mm wide) in a thin film.  Apply to affected area only, once or twice weekly for maintenance. (Patient not taking: Reported on 10/14/2019), Disp: 45 g, Rfl: 2     Hypromellose (ARTIFICIAL TEARS OP), Apply 1-2 drops to eye daily as needed, Disp: , Rfl:      nystatin (MYCOSTATIN) 088007 UNIT/GM external cream, Apply topically 2 times daily as needed for dry skin Apply as needed for rash., Disp: 30 g, Rfl: 3     polyethylene glycol (MIRALAX/GLYCOLAX) packet, Take 17 g by mouth daily, Disp: 90 packet, Rfl: 3     triamcinolone (KENALOG) 0.1 % external cream, Apply topically 2 times daily as needed for irritation Apply twice daily PRN rash, Disp: 30 g, Rfl: 3     vitamin (B COMPLEX-C) tablet, Take 1 tablet by mouth every morning, Disp: , Rfl:      vitamin B-12 (CYANOCOBALAMIN) 2500 MCG sublingual tablet, Take 1,250 mcg by mouth twice a week With breakfast, Disp: , Rfl:      ALLERGIES:    Allergies   Allergen Reactions     Nsaids Other (See Comments)     Had gastric bypass - needs to avoid NSAIDS     Clindamycin Diarrhea     Codeine Nausea and Vomiting     Shellfish Allergy          SOCIAL HISTORY:   Social History     Socioeconomic History     Marital status: Single     Spouse name: Not on file     Number of children: 0     Years of education: 18     Highest education level: Not on file   Occupational History     Occupation: student life     Employer: [employer]   Social Needs     Financial resource strain: Not on file     Food insecurity:     Worry: Not on file     Inability: Not on file     Transportation needs:     Medical: Not on file     Non-medical: Not on file   Tobacco Use     Smoking status: Never Smoker     Smokeless tobacco: Never Used   Substance and Sexual Activity     Alcohol use: Not Currently     Alcohol/week: 0.0 standard drinks     Drug use: No     Sexual activity: Never     Partners: Male     Birth control/protection: Pill   Lifestyle     Physical activity:     Days per week: Not on file     Minutes per session: Not on file     Stress: Not on file   Relationships     Social connections:     Talks on phone: Not on file     Gets together: Not on file     Attends Yazidi service: Not on file     Active member of club or organization: Not on file     Attends meetings of clubs or organizations: Not on file     Relationship status: Not on file     Intimate partner violence:     Fear of current or ex partner: Not on file     Emotionally abused: Not on file     Physically abused: Not on file     Forced sexual activity: Not on file   Other Topics Concern      Service Not Asked     Blood Transfusions No     Caffeine Concern Not Asked     Occupational Exposure Not Asked     Hobby Hazards Not Asked     Sleep Concern Not Asked     Stress Concern Not Asked     Weight Concern Yes     Comment: 20 pound weight loss on weight watchers     Special Diet Not Asked     Back Care Not Asked     Exercise Not Asked     Bike Helmet Not Asked     Seat Belt Not Asked     Self-Exams Not Asked     Parent/sibling w/ CABG, MI or angioplasty before 65F 55M? No   Social History  "Narrative    Living arrangements - the patient lives alone.         FAMILY HISTORY:   Family History   Problem Relation Age of Onset     C.A.D. Father 92        CAGB 98     Obesity Father      Cancer Father         stomach Dx age 74     Lipids Father      Hypertension Father      Coronary Artery Disease Father      Cerebrovascular Disease Father      Other Cancer Father         Esophogeal     Diabetes Mother         Type 2     Allergies Mother      Obesity Mother      C.A.D. Mother         triple bypass surgery, 65     Lipids Mother      Hypertension Mother      Coronary Artery Disease Mother      Hyperlipidemia Mother      Colon Polyps Mother      Anesthesia Reaction Mother      Thyroid Disease Mother      C.A.D. Paternal Grandfather 58        fatal     Coronary Artery Disease Paternal Grandfather      Cancer - colorectal Maternal Grandmother 75     Cancer Maternal Grandmother         liver     Hypertension Maternal Grandmother      Colon Cancer Maternal Grandmother      Other Cancer Maternal Grandmother         liver, leaukeamia     Colon Polyps Maternal Grandmother      Cancer - colorectal Paternal Aunt      Allergies Brother      Cancer Paternal Grandmother         stomach     Obesity Paternal Grandmother      Diabetes Paternal Grandmother         Type 2     Asthma Paternal Grandmother      Obesity Brother      Hypertension Brother      Cancer - colorectal Other         cousin  from colon cancer in 's     Obesity Brother      Coronary Artery Disease Maternal Grandfather      Hypertension Maternal Grandfather         EXAM:Vitals:/60   Ht 1.651 m (5' 5\")   Wt 103.9 kg (229 lb)   BMI 38.11 kg/m         General appearance: Patient is alert and fully cooperative with history & exam.  No sign of distress is noted during the visit.     Psychiatric: Affect is pleasant & appropriate.  Patient appears motivated to improve health.     Respiratory: Breathing is regular & unlabored while sitting.   "   HEENT: Hearing is intact to spoken word.  Speech is clear.  No gross evidence of visual impairment that would impact ambulation.     Dermatologic: Skin is intact to both lower extremities without significant lesions, rash or abrasion.  No paronychia or evidence of soft tissue infection is noted.     Vascular: DP & PT pulses are intact & regular bilaterally.  No significant edema or varicosities noted.  CFT and skin temperature is normal to both lower extremities.     Neurologic: Lower extremity sensation is intact to light touch.  No evidence of weakness or contracture in the lower extremities.  No evidence of neuropathy.     Musculoskeletal: Patient is ambulatory without assistive device or brace.  Prominent immobile bumps to medial 2nd proximal interphalangeal joints bilateral.     Radiographs:  Bilateral foot xrays - I personally reviewed the xrays. Arthritis to 2nd proximal interphalangeal joints bilateral. Staples in 1st proximal phalanx. No fractures noted.      ASSESSMENT:    Foot pain, bilateral  Arthritis of both feet  Hammer toes of both feet     PLAN:  Reviewed patient's chart in [facility]. Reviewed xrays with patient. Reviewed and discussed causes of hammertoes with patient.  Explained that this can be caused by an overpowering of muscles or by the way we walk.  Discussed conservative treatments such as orthotics, pads, shoe gear.  Explained that sometimes the flexor tendons can be cut to try and straighten the toe and reduce rubbing. This is normally done in office and patient is weight bearing in postop she for 1-2 weeks.  We also discussed surgical intervention to remove the joint and possibly fuse the toe.  Normally patient has a pin sticking out of the toe for about 6 weeks and can not get the foot wet. Patient would have to be minimal weight bearing in cam boot.      Talked about arthritis and treatments including orthotics, injections, icing, NSAIDS, bracing, physical therapy, compounding pain cream,  or surgical intervention.    Talked about arthroplasties of the proximal interphalangeal joints to remove bone spurring and try to help with pain. Can do both feet at the same time. Talked about risks including infection, numbness, continued pain, recurrence, need for further surgery, blood loss, blood clotting. You will scar.    She will call to schedule.        Maya Martin DPM, Podiatry/Foot and Ankle Surgery    Weight management plan: Patient was referred to their PCP to discuss a diet and exercise plan.        Again, thank you for allowing me to participate in the care of your patient.        Sincerely,        Maya Martin DPM, Podiatry/Foot and Ankle Surgery

## 2019-11-14 NOTE — TELEPHONE ENCOUNTER
Spoke to patient about scheduling surgery. Patient had several questions, which I suggested she schedule a surgery consultation prior to setting a date. Patient will call to schedule surgery after surgery consultation with Dr. Martin.       Jimbo Roque, Surgery Scheduler

## 2019-11-18 ENCOUNTER — TRANSFERRED RECORDS (OUTPATIENT)
Dept: HEALTH INFORMATION MANAGEMENT | Facility: CLINIC | Age: 49
End: 2019-11-18

## 2019-11-27 ENCOUNTER — TRANSFERRED RECORDS (OUTPATIENT)
Dept: HEALTH INFORMATION MANAGEMENT | Facility: CLINIC | Age: 49
End: 2019-11-27

## 2019-12-02 ENCOUNTER — HOSPITAL LABORATORY (OUTPATIENT)
Dept: OTHER | Facility: CLINIC | Age: 49
End: 2019-12-02

## 2019-12-02 ENCOUNTER — TRANSFERRED RECORDS (OUTPATIENT)
Dept: HEALTH INFORMATION MANAGEMENT | Facility: CLINIC | Age: 49
End: 2019-12-02

## 2019-12-02 LAB
GRAM STN SPEC: ABNORMAL
GRAM STN SPEC: ABNORMAL
SPECIMEN SOURCE: ABNORMAL

## 2019-12-02 NOTE — TELEPHONE ENCOUNTER
Attempted to reach patient to follow up on surgery request. Left vmail.  Request patient to call back if still interested in surgery.   Closing encounter.       Jimbo Roque, Surgery Scheduler

## 2019-12-05 ENCOUNTER — TRANSFERRED RECORDS (OUTPATIENT)
Dept: HEALTH INFORMATION MANAGEMENT | Facility: CLINIC | Age: 49
End: 2019-12-05

## 2019-12-06 LAB
BACTERIA SPEC CULT: ABNORMAL
BACTERIA SPEC CULT: ABNORMAL
SPECIMEN SOURCE: ABNORMAL

## 2019-12-09 LAB
BACTERIA SPEC CULT: ABNORMAL
BACTERIA SPEC CULT: ABNORMAL
Lab: ABNORMAL
SPECIMEN SOURCE: ABNORMAL

## 2019-12-16 ENCOUNTER — TRANSFERRED RECORDS (OUTPATIENT)
Dept: HEALTH INFORMATION MANAGEMENT | Facility: CLINIC | Age: 49
End: 2019-12-16

## 2019-12-30 ENCOUNTER — TRANSFERRED RECORDS (OUTPATIENT)
Dept: HEALTH INFORMATION MANAGEMENT | Facility: CLINIC | Age: 49
End: 2019-12-30

## 2020-01-27 ENCOUNTER — OFFICE VISIT (OUTPATIENT)
Dept: SURGERY | Facility: CLINIC | Age: 50
End: 2020-01-27
Payer: COMMERCIAL

## 2020-01-27 ENCOUNTER — TRANSFERRED RECORDS (OUTPATIENT)
Dept: HEALTH INFORMATION MANAGEMENT | Facility: CLINIC | Age: 50
End: 2020-01-27

## 2020-01-27 VITALS
WEIGHT: 217.5 LBS | DIASTOLIC BLOOD PRESSURE: 60 MMHG | BODY MASS INDEX: 36.24 KG/M2 | SYSTOLIC BLOOD PRESSURE: 112 MMHG | HEIGHT: 65 IN | HEART RATE: 61 BPM | OXYGEN SATURATION: 98 %

## 2020-01-27 DIAGNOSIS — Z98.84 BARIATRIC SURGERY STATUS: ICD-10-CM

## 2020-01-27 DIAGNOSIS — K59.00 CONSTIPATION, UNSPECIFIED CONSTIPATION TYPE: ICD-10-CM

## 2020-01-27 DIAGNOSIS — K91.2 POSTSURGICAL MALABSORPTION: ICD-10-CM

## 2020-01-27 DIAGNOSIS — E66.812 CLASS 2 SEVERE OBESITY DUE TO EXCESS CALORIES WITH SERIOUS COMORBIDITY AND BODY MASS INDEX (BMI) OF 36.0 TO 36.9 IN ADULT (H): ICD-10-CM

## 2020-01-27 DIAGNOSIS — G47.33 OSA (OBSTRUCTIVE SLEEP APNEA): ICD-10-CM

## 2020-01-27 DIAGNOSIS — R73.03 PREDIABETES: ICD-10-CM

## 2020-01-27 DIAGNOSIS — E66.01 CLASS 2 SEVERE OBESITY DUE TO EXCESS CALORIES WITH SERIOUS COMORBIDITY AND BODY MASS INDEX (BMI) OF 36.0 TO 36.9 IN ADULT (H): ICD-10-CM

## 2020-01-27 DIAGNOSIS — K91.2 MALNUTRITION FOLLOWING GASTROINTESTINAL SURGERY: ICD-10-CM

## 2020-01-27 DIAGNOSIS — E65 ABDOMINAL PANNUS: ICD-10-CM

## 2020-01-27 DIAGNOSIS — L30.4 INTERTRIGO: ICD-10-CM

## 2020-01-27 DIAGNOSIS — L98.7 REDUNDANT SKIN OF ABDOMEN: Primary | ICD-10-CM

## 2020-01-27 LAB
ERYTHROCYTE [DISTWIDTH] IN BLOOD BY AUTOMATED COUNT: 13 % (ref 10–15)
HBA1C MFR BLD: 5.1 % (ref 0–5.6)
HCT VFR BLD AUTO: 41.1 % (ref 35–47)
HGB BLD-MCNC: 13.4 G/DL (ref 11.7–15.7)
MCH RBC QN AUTO: 30.1 PG (ref 26.5–33)
MCHC RBC AUTO-ENTMCNC: 32.6 G/DL (ref 31.5–36.5)
MCV RBC AUTO: 92 FL (ref 78–100)
PLATELET # BLD AUTO: 308 10E9/L (ref 150–450)
RBC # BLD AUTO: 4.45 10E12/L (ref 3.8–5.2)
WBC # BLD AUTO: 6.8 10E9/L (ref 4–11)

## 2020-01-27 PROCEDURE — 82306 VITAMIN D 25 HYDROXY: CPT | Performed by: PHYSICIAN ASSISTANT

## 2020-01-27 PROCEDURE — 83036 HEMOGLOBIN GLYCOSYLATED A1C: CPT | Performed by: PHYSICIAN ASSISTANT

## 2020-01-27 PROCEDURE — 83540 ASSAY OF IRON: CPT | Performed by: PHYSICIAN ASSISTANT

## 2020-01-27 PROCEDURE — 82728 ASSAY OF FERRITIN: CPT | Performed by: PHYSICIAN ASSISTANT

## 2020-01-27 PROCEDURE — 83970 ASSAY OF PARATHORMONE: CPT | Performed by: PHYSICIAN ASSISTANT

## 2020-01-27 PROCEDURE — 85027 COMPLETE CBC AUTOMATED: CPT | Performed by: PHYSICIAN ASSISTANT

## 2020-01-27 PROCEDURE — 99214 OFFICE O/P EST MOD 30 MIN: CPT | Performed by: PHYSICIAN ASSISTANT

## 2020-01-27 PROCEDURE — 82607 VITAMIN B-12: CPT | Performed by: PHYSICIAN ASSISTANT

## 2020-01-27 PROCEDURE — 36415 COLL VENOUS BLD VENIPUNCTURE: CPT | Performed by: PHYSICIAN ASSISTANT

## 2020-01-27 PROCEDURE — 83550 IRON BINDING TEST: CPT | Performed by: PHYSICIAN ASSISTANT

## 2020-01-27 PROCEDURE — 97803 MED NUTRITION INDIV SUBSEQ: CPT | Performed by: DIETITIAN, REGISTERED

## 2020-01-27 RX ORDER — TRIAMCINOLONE ACETONIDE 1 MG/G
CREAM TOPICAL 2 TIMES DAILY PRN
Qty: 30 G | Refills: 3 | Status: SHIPPED | OUTPATIENT
Start: 2020-01-27 | End: 2020-08-24

## 2020-01-27 RX ORDER — NYSTATIN 100000 U/G
CREAM TOPICAL 2 TIMES DAILY PRN
Qty: 30 G | Refills: 3 | Status: SHIPPED | OUTPATIENT
Start: 2020-01-27 | End: 2020-08-24

## 2020-01-27 ASSESSMENT — MIFFLIN-ST. JEOR: SCORE: 1607.45

## 2020-01-27 NOTE — PROGRESS NOTES
"NUTRITION POST OP APPOINTMENT  DATE OF VISIT: January 27, 2020    Ana Wyman  1970  female  [id number]  49 year old     ASSESSMENT:    REASON FOR VISIT:  Ana is a 49 year old year old female presents today for 1 year PO nutrition follow-up appointment. Patient is accompanied by self.    DIAGNOSIS:  Status post gastric bypass surgery.  Obesity Obesity Grade II BMI 35-39.9     ANTHROPOMETRICS:  Initial Weight: 339 lb   Height: 64\"   Current Weight:  217.5 lb  BMI: 36.19 kg/(m^2).    VITAMINS AND MINERALS:  2 Complete multivitamins with minerals- Flintstones Complete  6000 Int Units of Vitamin D daily   500 mg chew three times of Calcium daily- from [facility] by 2 hours  No iron needed  Biotin daily  B complex daily-too big and get stuck   2500 B12 sl twice weekly    NUTRITION HISTORY:  Breakfast: Greek yogurt + fiber one or protein oatmeal with 10 g protein + Fairlife milk or scrambled egg or egg whites with veggies, turkey real  Lunch: leftover from dinner-taco no shell or protein, non-starchy vegetable, sometimes mashed potatoes  Supper: 2-3 oz chicken or fish or beef, zucchini, lettuce salad, cooked carrots, beets, sometimes sweet potato, corn  Snacks: started over the holidays- baked goods or salty/ crunchy foods   Fluids consumed: Water, decaf coffee, decaf tea  Consuming liquid calories: No  Protein intake: 40-50 grams/day  Tolerate regular texture food: Yes  Any foods not tolerated details: Yes  If any food not tolerated: red bell pepper  Portion size:1/2 cup- 1 cup  Take 20-30 minutes to consume each meal: Yes   Eat protein foods first: Yes  Fluids and meals separate by at least 30 minutes: Yes  Chew foods thoroughly: Yes  Tolerating diet: Yes  Drinking high protein supplements: No  Consuming snacks per day: 1X per day  Additional Information: **Patient found holiday (traveled) eating was more challenging this year. Biggest issues have been with dizziness and toe infections per pt. Which " have limited her activity. She is feeling good about rejoining the [facility] and getting back on track with exercise. Patient would like to focus on getting her weight under 200 lb.    PHYSICAL ACTIVITY:  Type: none since December due to toe infections      DIAGNOSIS:  Previous Nutrition Diagnosis: Altered gastrointestinal function related to alteration in gastrointestinal structure as evidenced by history of gastric bypass surgery.- no change    Previous goals:  Increase fluid intake to 64 oz per day-met  Restart exercise as able-met, but had to stop again due to dizziness and problems with infected toes)  Gradually increase fiber intake to 10-15 grams of fiber per day    Current Nutrition Diagnosis: Altered gastrointestinal function related to alteration in gastrointestinal structure as evidenced by history of gastric bypass surgery.    INTERVENTION:   Nutrition Prescription: Eat 3 meals a day at regular intervals. Consume 60-90 grams of protein daily. Follow post-surgical vitamin and mineral protocol.  Assessed learning needs and learning preferences.    GOALS:  Restart exercise at [facility] 2-3X per week  Aim to eat a minimum of 60 g protein per day (sources of protein food list provided)  Replace snacks with: 16 oz milk or a protein drink (add sugar free gelatin/ popsicles as desired)  Switch to a separate thiamine and average 12 mg per day (offered suggestions for other dosing)        Implementation: Discussed progress toward previous goals; reinforced importance of following bariatric lifestyle changes. Congratulated patient on weight loss.    NUTRITION MONITORING AND EVALUATION:  Anticipated compliance: fair-good  Verbalized good understanding.    Follow up: Patient to follow up in 2 months.    TIME SPENT WITH PATIENT:  25 minutes  Chapo Braun RD, SARAHY  [facility]  [phone]

## 2020-01-27 NOTE — PATIENT INSTRUCTIONS
Can change to B complex to Thiamine 100 mg once a week.      Continue Miralax but try to be consistent with use.     Plastic surgery referral was ordered today.- Refilled Triamcinolone/nystatin cream    Call sleep clinic to get scheduled for appointment.    Follow up in 2 months with PA-C and diet if weight loss not continuing to discuss adding anti obesity medication to treatment plan.

## 2020-01-27 NOTE — PROGRESS NOTES
Return Bariatric Surgery Note    RE: Ana Wyman  MR#: [id number]  : 1970  VISIT DATE: 2020    Dear Elke, Kelly Curiel,    I had the pleasure of seeing your patient, Ana Wyman, in my post-bariatric surgery assessment clinic.    CHIEF COMPLAINT: Post-bariatric surgery follow-up    HISTORY OF PRESENT ILLNESS:  Questions Regarding Prior Weight Loss Surgery Reviewed With Patient 2020   I had the following weight loss procedure: Kay-en-y Gastric Bypass   What year was your surgery? 2019   How has your weight changed since your last visit? I have lost weight   Are you currently taking any weight loss medications? No   Do you currently have any of the following: Sleep Apnea, is still wearing CPAP.  She forgets to mere it.,  Gets incredible dry mouth,  Insurance will not pay for new sleep study yet.    Have you been to the Emergency room since your last visit with us? No   Were you in the hospital since your last visit with us? Yes   Do you have any concerns today? When do I do labs? Would like to review diet plan. What should my goal weight be?  Skin removal/irritation/infection.  Plan for exercise with toe issues.  Low BP/Heart Rate   Hypotension since last visit has improved.  Did have cardiac workup with Holter monitor and echo.  No cause found.  Symptoms improved.  Is not getting many symptoms now at all.  After her hypotension episodes her weight loss stalled. Pt had hypotension when weight was less prior to surgery as well.  Has a family hx of this.      Patient is taking the following bariatric postoperative vitamins:  2 Complete multivitamins with minerals- Flinstones Complete  6000 Int Units of Vitamin D daily   500 mg chew three times of Calcium daily  Biotin daily  B complex daily  2500 B12 sl twice weekly    Weight History:     2020   What is your highest lifetime weight? 350   What is your lowest weight since surgery? (In pounds) 214     Initial Weight: 339 lb 4.8 oz  (153.9 kg)  Current Weight: Weight: 217 lb 8 oz (98.7 kg)  Cumulative weight loss (lbs): 121.8  Last Visits Weight: 233 lb 14.4 oz (106.1 kg)    Questions Regarding Co-Morbidities and Health Concerns Reviewed With Patient 1/24/2020   Pre-diabetes: Gone away   Diabetes II: Never   High Blood Pressure: Never   High cholesterol: Never   Heartburn/Reflux: Gone away   Are you taking daily medication for heartburn, acid reflux, or GERD (acid reflux disease)? -   Sleep apnea: Improved   Do you use a CPAP? Yes   PCOS: Never   Back pain: Improved   Joint pain: Never   Lower leg swelling: Gone away       Eating Habits 1/24/2020   How many meals do you eat per day? 3   Do you snack between meals? Sometimes   How much food are you eating at each meal? 1/2 cup to 1 cup   Are you able to separate your meals and liquids by at least 30 minutes? Yes   Are you able to avoid liquid calories? Yes       Exercise Questions Reviewed With Patient 1/24/2020   How often do you exercise? 3 to 4 times per week   What is the duration of your exercise (in minutes)? 30 Minutes   What types of exercise do you do? walking, gym membership, climbing stairs at work, other- hs needed to suspend her gym membership.     What keeps you from being more active?  Pain, I have recently been sick, I have just had surgery on one or more of my joints   She has had a problem with her hammertoes.  She went on a short trip in November and had an inflamed toe/ infection and had surgery.  Due to this had to decrease exercise- is mainly just walking.     Social History:      1/24/2020   Are you smoking? No   Are you drinking alcohol? No       Medications:  Current Outpatient Medications   Medication     albuterol (PROAIR HFA/PROVENTIL HFA/VENTOLIN HFA) 108 (90 Base) MCG/ACT inhaler     albuterol (PROAIR HFA/PROVENTIL HFA/VENTOLIN HFA) 108 (90 Base) MCG/ACT inhaler     azithromycin (ZITHROMAX) 250 MG tablet     benzonatate (TESSALON) 200 MG capsule     benzonatate  (TESSALON) 200 MG capsule     BIOTIN PO     Calcium Citrate-Vitamin D (CALCIUM CITRATE CHEWY BITE PO)     childrens multivitamin w/iron (FLINTSTONES COMPLETE) 60 MG chewable tablet     Cholecalciferol (VITAMIN D) 2000 units CAPS     clobetasol (TEMOVATE) 0.05 % ointment     Hypromellose (ARTIFICIAL TEARS OP)     nystatin (MYCOSTATIN) 705693 UNIT/GM external cream     polyethylene glycol (MIRALAX/GLYCOLAX) packet     triamcinolone (KENALOG) 0.1 % external cream     vitamin (B COMPLEX-C) tablet     vitamin B-12 (CYANOCOBALAMIN) 2500 MCG sublingual tablet     No current facility-administered medications for this visit.          1/24/2020   Do you avoid NSAIDs such as (Ibuprofen, Aleve, Naproxen, Advil)?   No       ROS:  GI:      1/24/2020   Vomiting: Yes, just twice in the last 2 week   Diarrhea: Yes, Miralax induced occasionally    Constipation: Yes, Using Miralax a couple times a week.  Has BM almost daily.     Swallowing trouble: Yes, with the B complex.     Abdominal pain: No   Heartburn: No     Skin:   BAR RBS ROS - SKIN 1/24/2020   Rash in skin folds: Yes, recurrent umbilical infection.  Bleeds.  Uses peroxide, to keep dry.  Uses Triamcinolone/nystatin cream under pannus about every other week.  Needs to tuck excess pannus under clothes.  Has trouble jumping and exercising because excess pannus is in the way and inhibits high intensity exercise.      Psych:      1/24/2020   Depression: No   Anxiety: No     Female Only:   ELIZA RBS ROS - FEMALE ONLY 1/24/2020   Female only: Loss of menstrual cycles       LABS/IMAGING/MEDICAL RECORDS REVIEW:   LABS:  Vitamin D Deficiency screening   Date Value Ref Range Status   07/26/2019 60 20 - 75 ug/L Final     Comment:     Season, race, dietary intake, and treatment affect the concentration of   25-hydroxy-Vitamin D. Values may decrease during winter months and increase   during summer months. Values 20-29 ug/L may indicate Vitamin D insufficiency   and values <20 ug/L may  "indicate Vitamin D deficiency.  Vitamin D determination is routinely performed by an immunoassay specific for   25 hydroxyvitamin D3.  If an individual is on vitamin D2 (ergocalciferol)   supplementation, please specify 25 OH vitamin D2 and D3 level determination by   LCMSMS test [id number].       Parathyroid Hormone Intact   Date Value Ref Range Status   07/26/2019 40 18 - 80 pg/mL Final     Vitamin B12   Date Value Ref Range Status   04/29/2019 656 193 - 986 pg/mL Final     Hemoglobin   Date Value Ref Range Status   08/27/2019 13.6 11.7 - 15.7 g/dL Final     Ferritin   Date Value Ref Range Status   07/26/2019 172 8 - 252 ng/mL Final     Iron   Date Value Ref Range Status   07/26/2019 86 35 - 180 ug/dL Final     Iron Binding Cap   Date Value Ref Range Status   07/26/2019 316 240 - 430 ug/dL Final     Iron Saturation Index   Date Value Ref Range Status   07/26/2019 27 15 - 46 % Final   04/29/2019 22 15 - 46 % Final         PHYSICAL EXAMINATION:  /60 (BP Location: Right arm, Patient Position: Sitting, Cuff Size: Adult Large)   Pulse 61   Ht 5' 5\" (1.651 m)   Wt 217 lb 8 oz (98.7 kg)   SpO2 98%   BMI 36.19 kg/m     GENERAL: Alert and oriented x3. NAD  HEART: No murmurs, rubs or gallops, Regular rate and rhythm  LUNGS: Breathing unlabored. Lung sounds clear to auscultation bilaterally  ABDOMEN: No hernias, Incisions well healed, soft and nontender  EXTREMITIES: No LE edema bilaterally  SKIN: Warm and dry. No intertriginous irritation or rash      ASSESSMENT AND PLAN:         1 year status laparoscopic gastric bypass     Class 2 severe obesity due to excess calories with serious comorbidity and body mass index (BMI) of 36.0 to 36.9 in adult (H)  Follow food plan per dietitian recommendations.  Reincorporate exercise now that you have ok from foot doctor.   Follow up in 2 months with PA-C and diet if weight loss not continuing to discuss adding anti obesity medication to treatment plan.      Post surgical " malabsorption:  Continue taking recommended post-op vitamins.  Can change to B complex to Thiamine 100 mg once a week.    Labs ordered per protocol.  - CBC with platelets; Future  - Vitamin B12; Future  - Vitamin D Screen; Future  - Parathyroid Hormone Intact; Future  - Iron and Iron Binding Capacity; Future  - Ferritin; Future     MIGUELITO  Continue using CPAP nightly until you can have someone review card data.    Call  sleep clinic to get scheduled and established for follow up appointment.  - SLEEP EVALUATION & MANAGEMENT REFERRAL - ADULT -[facility] [place]  [phone] (Age 18 and up); Future     Constipation  Continue Miralax but try to be consistent with use.      Redundant skin of abdomen/ Intertrigo/Abdominal pannus  - PLASTIC SURGERY REFERRAL  Re-ordered  - nystatin (MYCOSTATIN) 338060 UNIT/GM external cream; Apply topically 2 times daily as needed for dry skin Apply as needed for rash.  Dispense: 30 g; Refill: 3  - triamcinolone (KENALOG) 0.1 % external cream; Apply topically 2 times daily as needed for irritation Apply twice daily PRN rash  Dispense: 30 g; Refill: 3    Follow up: Annually with PA-C and diet. (in 2 months with PA-C and diet if weight loss not continuing to discuss adding anti obesity medication to treatment plan)        Sincerely,    Sarah Stacy PA-C    I spent a total of 26 minutes face to face with Ana during today's office visit. Over 50% of this time was spent counseling the patient and/or coordinating care.

## 2020-01-28 LAB
DEPRECATED CALCIDIOL+CALCIFEROL SERPL-MC: 56 UG/L (ref 20–75)
FERRITIN SERPL-MCNC: 131 NG/ML (ref 8–252)
IRON SATN MFR SERPL: 19 % (ref 15–46)
IRON SERPL-MCNC: 62 UG/DL (ref 35–180)
PTH-INTACT SERPL-MCNC: 43 PG/ML (ref 18–80)
TIBC SERPL-MCNC: 324 UG/DL (ref 240–430)
VIT B12 SERPL-MCNC: 978 PG/ML (ref 193–986)

## 2020-02-10 ENCOUNTER — HEALTH MAINTENANCE LETTER (OUTPATIENT)
Age: 50
End: 2020-02-10

## 2020-02-14 ENCOUNTER — OFFICE VISIT (OUTPATIENT)
Dept: PEDIATRICS | Facility: CLINIC | Age: 50
End: 2020-02-14
Payer: COMMERCIAL

## 2020-02-14 VITALS
HEIGHT: 65 IN | SYSTOLIC BLOOD PRESSURE: 90 MMHG | BODY MASS INDEX: 36.52 KG/M2 | HEART RATE: 70 BPM | TEMPERATURE: 97.7 F | DIASTOLIC BLOOD PRESSURE: 54 MMHG | WEIGHT: 219.2 LBS | OXYGEN SATURATION: 98 %

## 2020-02-14 DIAGNOSIS — Z98.84 BARIATRIC SURGERY STATUS: ICD-10-CM

## 2020-02-14 DIAGNOSIS — M21.611 BUNION, RIGHT: ICD-10-CM

## 2020-02-14 DIAGNOSIS — M20.41 HAMMERTOE OF SECOND TOE OF RIGHT FOOT: ICD-10-CM

## 2020-02-14 DIAGNOSIS — Z01.818 PREOP GENERAL PHYSICAL EXAM: Primary | ICD-10-CM

## 2020-02-14 LAB
ANION GAP SERPL CALCULATED.3IONS-SCNC: 6 MMOL/L (ref 3–14)
BUN SERPL-MCNC: 13 MG/DL (ref 7–30)
CALCIUM SERPL-MCNC: 9.2 MG/DL (ref 8.5–10.1)
CHLORIDE SERPL-SCNC: 107 MMOL/L (ref 94–109)
CO2 SERPL-SCNC: 29 MMOL/L (ref 20–32)
CREAT SERPL-MCNC: 0.71 MG/DL (ref 0.52–1.04)
GFR SERPL CREATININE-BSD FRML MDRD: >90 ML/MIN/{1.73_M2}
GLUCOSE SERPL-MCNC: 82 MG/DL (ref 70–99)
POTASSIUM SERPL-SCNC: 3.9 MMOL/L (ref 3.4–5.3)
SODIUM SERPL-SCNC: 142 MMOL/L (ref 133–144)

## 2020-02-14 PROCEDURE — 99215 OFFICE O/P EST HI 40 MIN: CPT | Performed by: FAMILY MEDICINE

## 2020-02-14 PROCEDURE — 36415 COLL VENOUS BLD VENIPUNCTURE: CPT | Performed by: FAMILY MEDICINE

## 2020-02-14 PROCEDURE — 80048 BASIC METABOLIC PNL TOTAL CA: CPT | Performed by: FAMILY MEDICINE

## 2020-02-14 PROCEDURE — 93000 ELECTROCARDIOGRAM COMPLETE: CPT | Performed by: FAMILY MEDICINE

## 2020-02-14 SDOH — ECONOMIC STABILITY: INCOME INSECURITY: HOW HARD IS IT FOR YOU TO PAY FOR THE VERY BASICS LIKE FOOD, HOUSING, MEDICAL CARE, AND HEATING?: NOT VERY HARD

## 2020-02-14 SDOH — HEALTH STABILITY: MENTAL HEALTH: HOW MANY STANDARD DRINKS CONTAINING ALCOHOL DO YOU HAVE ON A TYPICAL DAY?: 1 OR 2

## 2020-02-14 SDOH — ECONOMIC STABILITY: FOOD INSECURITY: WITHIN THE PAST 12 MONTHS, THE FOOD YOU BOUGHT JUST DIDN'T LAST AND YOU DIDN'T HAVE MONEY TO GET MORE.: NEVER TRUE

## 2020-02-14 SDOH — HEALTH STABILITY: MENTAL HEALTH: HOW OFTEN DO YOU HAVE 6 OR MORE DRINKS ON ONE OCCASION?: NEVER

## 2020-02-14 SDOH — ECONOMIC STABILITY: TRANSPORTATION INSECURITY
IN THE PAST 12 MONTHS, HAS LACK OF TRANSPORTATION KEPT YOU FROM MEETINGS, WORK, OR FROM GETTING THINGS NEEDED FOR DAILY LIVING?: NO

## 2020-02-14 SDOH — SOCIAL STABILITY: SOCIAL NETWORK: HOW OFTEN DO YOU ATTENT MEETINGS OF THE CLUB OR ORGANIZATION YOU BELONG TO?: MORE THAN 4 TIMES PER YEAR

## 2020-02-14 SDOH — ECONOMIC STABILITY: TRANSPORTATION INSECURITY
IN THE PAST 12 MONTHS, HAS THE LACK OF TRANSPORTATION KEPT YOU FROM MEDICAL APPOINTMENTS OR FROM GETTING MEDICATIONS?: NO

## 2020-02-14 SDOH — SOCIAL STABILITY: SOCIAL NETWORK: HOW OFTEN DO YOU ATTEND CHURCH OR RELIGIOUS SERVICES?: 1 TO 4 TIMES PER YEAR

## 2020-02-14 SDOH — HEALTH STABILITY: PHYSICAL HEALTH: ON AVERAGE, HOW MANY MINUTES DO YOU ENGAGE IN EXERCISE AT THIS LEVEL?: 20 MIN

## 2020-02-14 SDOH — SOCIAL STABILITY: SOCIAL NETWORK
DO YOU BELONG TO ANY CLUBS OR ORGANIZATIONS SUCH AS CHURCH GROUPS UNIONS, FRATERNAL OR ATHLETIC GROUPS, OR SCHOOL GROUPS?: YES

## 2020-02-14 SDOH — HEALTH STABILITY: PHYSICAL HEALTH: ON AVERAGE, HOW MANY DAYS PER WEEK DO YOU ENGAGE IN MODERATE TO STRENUOUS EXERCISE (LIKE A BRISK WALK)?: 5 DAYS

## 2020-02-14 SDOH — HEALTH STABILITY: MENTAL HEALTH: HOW OFTEN DO YOU HAVE A DRINK CONTAINING ALCOHOL?: MONTHLY OR LESS

## 2020-02-14 SDOH — SOCIAL STABILITY: SOCIAL NETWORK
IN A TYPICAL WEEK, HOW MANY TIMES DO YOU TALK ON THE PHONE WITH FAMILY, FRIENDS, OR NEIGHBORS?: MORE THAN THREE TIMES A WEEK

## 2020-02-14 SDOH — HEALTH STABILITY: MENTAL HEALTH
STRESS IS WHEN SOMEONE FEELS TENSE, NERVOUS, ANXIOUS, OR CAN'T SLEEP AT NIGHT BECAUSE THEIR MIND IS TROUBLED. HOW STRESSED ARE YOU?: ONLY A LITTLE

## 2020-02-14 SDOH — SOCIAL STABILITY: SOCIAL NETWORK: ARE YOU MARRIED, WIDOWED, DIVORCED, SEPARATED, NEVER MARRIED, OR LIVING WITH A PARTNER?: NEVER MARRIED

## 2020-02-14 SDOH — ECONOMIC STABILITY: FOOD INSECURITY: WITHIN THE PAST 12 MONTHS, YOU WORRIED THAT YOUR FOOD WOULD RUN OUT BEFORE YOU GOT MONEY TO BUY MORE.: NEVER TRUE

## 2020-02-14 SDOH — SOCIAL STABILITY: SOCIAL NETWORK: HOW OFTEN DO YOU GET TOGETHER WITH FRIENDS OR RELATIVES?: TWICE A WEEK

## 2020-02-14 ASSESSMENT — MIFFLIN-ST. JEOR: SCORE: 1615.16

## 2020-02-14 NOTE — PROGRESS NOTES
[facility]  [address]  [address]  [place] [address] [phone]  Dept: [phone]    PRE-OP EVALUATION:  Today's date: 2020    Ana Wyman (: 1970) presents for pre-operative evaluation assessment as requested by Dr. Rdz.  She requires evaluation and anesthesia risk assessment prior to undergoing surgery/procedure for treatment of  Hammer toe and Bunion Surgery on R foot .    Fax number for surgical facility:   [facility] [phone].   Primary Physician: Kelly Bedoya  Type of Anesthesia Anticipated: to be determined    Patient has a Health Care Directive or Living Will:  NO    Preop Questions 2020   Who is doing your surgery? Dr Rdz   What are you having done? Hammertoe & bunion re-do   Date of Surgery/Procedure: 2020   Facility or Hospital where procedure/surgery will be performed: [facility]   1.  Do you have a history of Heart attack, stroke, stent, coronary bypass surgery, or other heart surgery? No   2.  Do you ever have any pain or discomfort in your chest? No   3.  Do you have a history of  Heart Failure? No   4.   Are you troubled by shortness of breath when:  walking on a level surface, or up a slight hill, or at night? No   5.  Do you currently have a cold, bronchitis or other respiratory infection? cold   6.  Do you have a cough, shortness of breath, or wheezing? No   7.  Do you sometimes get pains in the calves of your legs when you walk? No   8. Do you or anyone in your family have previous history of blood clots? No   9.  Do you or does anyone in your family have a serious bleeding problem such as prolonged bleeding following surgeries or cuts? No   10. Have you ever had problems with anemia or been told to take iron pills? No   11. Have you had any abnormal blood loss such as black, tarry or bloody stools, or abnormal vaginal bleeding? YES - nausea  Parents - complications after CABG.     12. Have you  ever had a blood transfusion? No   13. Have you or any of your relatives ever had problems with anesthesia? YES - parents - see above   14. Do you have sleep apnea, excessive snoring or daytime drowsiness? YES - MIGUELITO   15. Do you have any prosthetic heart valves? No   16. Do you have prosthetic joints? No   17. Is there any chance that you may be pregnant? No         HPI:     HPI related to upcoming procedure:     She is having redo of R bunion and 2nd toe hammertoe.        Anesthesia HX - post op nausea.    MEDICAL HISTORY:     Patient Active Problem List    Diagnosis Date Noted     Postural hypotension 07/29/2019     Priority: Medium     Intertrigo 07/29/2019     Priority: Medium     Morbid obesity (H) 07/29/2019     Priority: Medium     S/P laparoscopic cholecystectomy 04/18/2019     Priority: Medium     Bariatric surgery status 02/05/2019     Priority: Medium     Postsurgical malabsorption 02/05/2019     Priority: Medium     Fatty liver 07/27/2018     Priority: Medium     Mild intermittent asthma without complication 06/11/2018     Priority: Medium     Lymphedema bilateral with mixed lipedema. 09/30/2015     Priority: Medium     Baker's cyst of knee 09/03/2015     Priority: Medium     Acanthosis nigricans 03/24/2015     Priority: Medium     Cutaneous skin tags 03/24/2015     Priority: Medium     MIGUELITO on CPAP 03/19/2013     Priority: Medium     Sleep study in 2004 and 2012         Uterine fibroid 08/02/2012     Priority: Medium     Lichen sclerosus 07/14/2011     Priority: Medium     Family history of malignant neoplasm of genital organ, other 06/11/2007     Priority: Medium     FAMILY HX GI MALIGNANCY 05/31/2007     Priority: Medium     Hypersomnia with sleep apnea 07/06/2005     Priority: Medium     Problem list name updated by automated process. Provider to review       Esophageal reflux 09/27/2004     Priority: Medium     Allergic state 09/27/2004     Priority: Medium     Problem list name updated by automated  process. Provider to review        Past Medical History:   Diagnosis Date     Allergic rhinitis, cause unspecified      Cellulitis 2005    2 episodes, one with hospitalization [facility]     DUB (dysfunctional uterine bleeding)     Neg EMB 2012     Esophageal reflux     ongoing     Fibroids      Genital problems     Lichens Schlerosis     GERD (gastroesophageal reflux disease)      Hallux valgus (acquired)      History of steroid therapy     Inhalers, eye drops     Hypersomnia with sleep apnea, unspecified     using CPAP     Kidney stone May 2018    2 stones     Lichen sclerosus 7/14/2011     Lymph edema 2010- present     Lymphedema bilateral with mixed lipedema. 9/30/2015     Lymphedema bilateral with mixed lipedema. 9/30/2015     Morbid obesity (H) 3/19/2013     MIGUELITO on CPAP 3/19/2013    Sleep study in 2004 and 2012       Pneumonia     Years ago - a few times     Pre-diabetes      Sleep apnea      Tendonitis currently     Uncomplicated asthma     mild     Urinary tract infection     A few times in past 10 years     Vision disorder 3098-1168    Dry Eye     Vitamin D deficiency 3/14/2015     Past Surgical History:   Procedure Laterality Date     C NONSPECIFIC PROCEDURE  1994    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     C NONSPECIFIC PROCEDURE  1999,2001    Foot surgeries x 2 (bunionectomy)     C NONSPECIFIC PROCEDURE  2000    hammer toes     COLONOSCOPY  5/15/2013    Procedure: COLONOSCOPY;  Colonoscopy;  Surgeon: Kota Blanco MD;  Location:  GI     COLONOSCOPY N/A 10/21/2019    Procedure: COLONOSCOPY, with biopsies by cold forceps;  Surgeon: Lydia Vickers MD;  Location:  GI     GYN SURGERY  2016    Uterine Ablation     LAPAROSCOPIC BYPASS GASTRIC, CHOLECYSTECTOMY, COMBINED N/A 1/28/2019    Procedure: LAPAROSCOPIC GASTRIC BYPASS;  Surgeon: Maykel Calvin MD;  Location: [facility] OR     LAPAROSCOPIC CHOLECYSTECTOMY N/A 4/11/2019    Procedure: LAPAROSCOPIC CHOLECYSTECTOMY;  Surgeon: Maykel Calvin  MD Jovita;  Location: [facility] OR     lichen sclerosis       ORTHOPEDIC SURGERY       VASCULAR SURGERY  2015    Vienous closure on both legs     vein closure  12/2016    to prevent lymphedema     Current Outpatient Medications   Medication Sig Dispense Refill     albuterol (PROAIR HFA/PROVENTIL HFA/VENTOLIN HFA) 108 (90 Base) MCG/ACT inhaler Inhale 2 puffs into the lungs every 4 hours as needed for shortness of breath / dyspnea or wheezing 1 Inhaler 0     BIOTIN PO Take 5,000 mcg by mouth daily At noon       Calcium Citrate-Vitamin D (CALCIUM CITRATE CHEWY BITE PO) Take 500 mg by mouth 3 times daily Plus D,noon, supper, HS       childrens multivitamin w/iron (FLINTSTONES COMPLETE) 60 MG chewable tablet Take 2 chew tab by mouth daily With breakfast       Cholecalciferol (VITAMIN D) 2000 units CAPS Take 3 capsules by mouth At Bedtime        clobetasol (TEMOVATE) 0.05 % ointment Apply clobetasol  0.05 percent ointment, sparingly (a dot 3 mm wide) in a thin film.  Apply to affected area only, once or twice weekly for maintenance. 45 g 2     Hypromellose (ARTIFICIAL TEARS OP) Apply 1-2 drops to eye daily as needed       nystatin (MYCOSTATIN) 343963 UNIT/GM external cream Apply topically 2 times daily as needed for dry skin Apply as needed for rash. 30 g 3     polyethylene glycol (MIRALAX/GLYCOLAX) packet Take 17 g by mouth daily 90 packet 3     triamcinolone (KENALOG) 0.1 % external cream Apply topically 2 times daily as needed for irritation Apply twice daily PRN rash 30 g 3     vitamin (B COMPLEX-C) tablet Take 1 tablet by mouth every morning       vitamin B-12 (CYANOCOBALAMIN) 2500 MCG sublingual tablet Take 1,250 mcg by mouth twice a week With breakfast       OTC products: no recent use of OTC ASA, NSAIDS or Steroids    Allergies   Allergen Reactions     Nsaids Other (See Comments)     Had gastric bypass - needs to avoid NSAIDS     Clindamycin Diarrhea     Codeine Nausea and Vomiting     Shellfish Allergy       Latex  "Allergy: NO    Social History     Tobacco Use     Smoking status: Never Smoker     Smokeless tobacco: Never Used   Substance Use Topics     Alcohol use: Not Currently     Alcohol/week: 0.0 standard drinks     History   Drug Use No       REVIEW OF SYSTEMS:   Constitutional, neuro, endocrine, pulmonary, cardiac, gastrointestinal, genitourinary, musculoskeletal, integument and psychiatric systems are negative, except as otherwise noted.    ENT - mild URI SX.    EXAM:   BP 90/54 (BP Location: Right arm, Patient Position: Chair, Cuff Size: Adult Large)   Pulse 70   Temp 97.7  F (36.5  C) (Oral)   Ht 1.651 m (5' 5\")   Wt 99.4 kg (219 lb 3.2 oz)   SpO2 98%   BMI 36.48 kg/m      GENERAL APPEARANCE: healthy, alert and no distress     EYES: EOMI, PERRL     HENT:  mouth without ulcers or lesions     NECK: no adenopathy, no asymmetry, masses, or scars and thyroid normal to palpation     RESP: lungs clear to auscultation - no rales, rhonchi or wheezes     CV: regular rates and rhythm,  no murmur, click or rub     ABDOMEN:  soft, nontender, no HSM or masses      MS: extremities no edema     SKIN: no suspicious lesions or rashes     NEURO: Normal strength and tone, sensory exam grossly normal, mentation intact and speech normal     PSYCH: mentation appears normal. and affect normal/bright     LYMPHATICS: No cervical adenopathy    DIAGNOSTICS:       EKG:  RSR, rate 54.  Axis nl. Conduction nl.  ST segments and T waves nl.  No scar. No ectopy.  EKG shows sinus yuliana, o/w WNL.  Stable since 8-.      Results for orders placed or performed in visit on 02/14/20   Basic metabolic panel  (Ca, Cl, CO2, Creat, Gluc, K, Na, BUN)     Status: None   Result Value Ref Range    Sodium 142 133 - 144 mmol/L    Potassium 3.9 3.4 - 5.3 mmol/L    Chloride 107 94 - 109 mmol/L    Carbon Dioxide 29 20 - 32 mmol/L    Anion Gap 6 3 - 14 mmol/L    Glucose 82 70 - 99 mg/dL    Urea Nitrogen 13 7 - 30 mg/dL    Creatinine 0.71 0.52 - 1.04 mg/dL    " GFR Estimate >90 >60 mL/min/[1.73_m2]    GFR Estimate If Black >90 >60 mL/min/[1.73_m2]    Calcium 9.2 8.5 - 10.1 mg/dL         Recent Labs   Lab Test 01/27/20  1614 08/28/19  1523 08/27/19  1816  07/31/18  1022  10/27/17  1803   HGB 13.4  --  13.6   < > 13.7   < > 13.6     --  286   < > 294   < > 325   INR  --   --   --   --   --   --  0.98   NA  --  145* 142   < > 139   < > 139   POTASSIUM  --  4.1 3.5   < > 4.1   < > 3.7   CR  --  0.69 0.66   < > 0.78   < > 0.71   A1C 5.1  --   --   --  6.0*  --   --     < > = values in this interval not displayed.        IMPRESSION:   Diagnosis/reason for consult:       (Z01.818) Preop general physical exam  (primary encounter diagnosis)  Comment: satis  Plan: Basic metabolic panel  (Ca, Cl, CO2, Creat,         Gluc, K, Na, BUN), EKG 12-lead complete w/read         - Clinics            (M21.611) Bunion, right  Comment:   Plan:     (M20.41) Hammertoe of second toe of right foot  Comment:   Plan:     (Z98.84) Bariatric surgery status  Comment:   Plan:         The proposed surgical procedure is considered LOW risk.    REVISED CARDIAC RISK INDEX  The patient has the following serious cardiovascular risks for perioperative complications such as (MI, PE, VFib and 3  AV Block):  No serious cardiac risks  INTERPRETATION: 1 risks: Class II (low risk - 0.9% complication rate)    The patient has the following additional risks for perioperative complications:  No identified additional risks  Morbid obesity      ICD-10-CM    1. Preop general physical exam Z01.818 Basic metabolic panel  (Ca, Cl, CO2, Creat, Gluc, K, Na, BUN)     EKG 12-lead complete w/read - Clinics   2. Bunion, right M21.611    3. Hammertoe of second toe of right foot M20.41    4. Bariatric surgery status Z98.84        RECOMMENDATIONS:         APPROVAL GIVEN to proceed with proposed procedure, without further diagnostic evaluation       Signed Electronically by: Trey Huffman MD    Copy of this evaluation report  is provided to requesting physician.    [facility] Preop Guidelines    Revised Cardiac Risk Index

## 2020-02-24 ENCOUNTER — TRANSFERRED RECORDS (OUTPATIENT)
Dept: HEALTH INFORMATION MANAGEMENT | Facility: CLINIC | Age: 50
End: 2020-02-24

## 2020-03-01 ENCOUNTER — TRANSFERRED RECORDS (OUTPATIENT)
Dept: HEALTH INFORMATION MANAGEMENT | Facility: CLINIC | Age: 50
End: 2020-03-01

## 2020-03-11 ENCOUNTER — TRANSFERRED RECORDS (OUTPATIENT)
Dept: HEALTH INFORMATION MANAGEMENT | Facility: CLINIC | Age: 50
End: 2020-03-11

## 2020-03-26 ENCOUNTER — VIRTUAL VISIT (OUTPATIENT)
Dept: URGENT CARE | Facility: CLINIC | Age: 50
End: 2020-03-26
Payer: COMMERCIAL

## 2020-03-26 ENCOUNTER — VIRTUAL VISIT (OUTPATIENT)
Dept: FAMILY MEDICINE | Facility: OTHER | Age: 50
End: 2020-03-26

## 2020-03-26 DIAGNOSIS — J40 BRONCHITIS: ICD-10-CM

## 2020-03-26 DIAGNOSIS — R05.9 COUGH: Primary | ICD-10-CM

## 2020-03-26 DIAGNOSIS — J45.21 MILD INTERMITTENT REACTIVE AIRWAY DISEASE WITH ACUTE EXACERBATION: ICD-10-CM

## 2020-03-26 PROCEDURE — 99207 ZZC NO BILLABLE SERVICE THIS VISIT: CPT | Mod: TEL

## 2020-03-26 NOTE — Clinical Note
Dr. Bedoya, I left a message with her but did not get ahold. It looks like she was looing for testing which we don't do. Could you follow up with her regarding her asthma. Thanks  Marck Roque MD

## 2020-03-26 NOTE — TELEPHONE ENCOUNTER
"Called and spoke with patient who was schedule for 4:00 pm at Urgent Care. Patient was unaware that she was being scheduled to come to .     I talked with patient for over 15 minutes on the phone. Patient expressed mild SOB associated with chest congestion, \"raw\" throat, body aches and a headache that started this am. Patient stated some flank pain that started this am but has subsided after taking tylenol. Patient denied fever and said mild intermittent dry cough. Her primary concern talking on the phone was possible COVID and what she should do to limit her exposure because her elderly mother is living with her.      After talking with Dr. Mora ( provider). Dr. Mora strongly recommended rest, tylenol and rest. Recommended patient wear a mask while at home to limit exposure with her mother. Dr. Mora said that if patient is really concerned about strep, patient can come into clinic for  visit to check strep. Patient said she's not concerned about strep, but more so concerned with COVID and getting her elderly mother sick.     I relayed that we do not do COVID testing at  and COVID testing is extremely limited to a certain group (health care workers, hospitalizations). Pt verbalized understanding.    Patient says she will stay at home and try to connect with PCP again if no improvement.      Routing to PCP to follow up with this.     Patient would like a refill of her inhaler sent to [facility].     REJI Hermosillo  4:25 PM 3/26/2020     "

## 2020-03-26 NOTE — PROGRESS NOTES
"Date: 2020 13:52:21  Clinician: Xander Echols  Clinician NPI: [id number]  Patient: Ana Wyman  Patient : 1970  [address] [place] [address]  Patient Phone: [phone]  Visit Protocol: URI  Patient Summary:  Ana is a 50 year old ( : 1970 ) female who initiated a Visit for COVID-19 (Coronavirus) evaluation and screening. When asked the question \"Please sign me up to receive news, health information and promotions. \", Ana responded \"Yes\".    Ana states her symptoms started suddenly 3-6 days ago.   Her symptoms consist of rhinitis, a sore throat, a cough, malaise, enlarged lymph nodes, a headache, facial pain or pressure, myalgia, and chills. She is experiencing mild difficulty breathing with activities but can speak normally in full sentences.   Symptom details     Nasal secretions: The color of her mucus is clear.    Cough: Ana coughs a few times an hour and her cough is not more bothersome at night. Phlegm does not come into her throat when she coughs. She believes her cough is caused by post-nasal drip.     Sore throat: Ana reports having moderate throat pain (4-6 on a 10 point pain scale), does not have exudate on her tonsils, and can swallow liquids. The lymph nodes in her neck are enlarged. A rash has not appeared on the skin since the sore throat started.     Facial pain or pressure: The facial pain or pressure feels worse when bending over or leaning forward.     Headache: She states the headache is severe (7-9 on a 10 point pain scale).      Ana denies having ear pain, wheezing, nasal congestion, teeth pain, and fever. She also denies taking antibiotic medication for the symptoms, having recent facial or sinus surgery in the past 60 days, and double sickening (worsening symptoms after initial improvement).   Precipitating events  Ana is not sure if she has been exposed to someone with strep throat. She has not recently been exposed to " someone with influenza. Ana has been in close contact with the following high risk individuals: people with asthma, heart disease or diabetes and adults 65 or older.   Pertinent COVID-19 (Coronavirus) information  Ana has not traveled internationally or to the areas where COVID-19 (Coronavirus) is widespread, including cruise ship travel in the last 14 days before the start of her symptoms.   Ana has not had a close contact with a laboratory-confirmed COVID-19 patient within 14 days of symptom onset. She also has not had a close contact with a suspected COVID-19 patient within 14 days of symptom onset.   Ana is not a healthcare worker or a  and does not work in a healthcare facility. She does not live with a healthcare worker.   Pertinent medical history  Ana does not get yeast infections when she takes antibiotics.   Ana does not need a return to work/school note.   Weight: 213 lbs   Ana does not smoke or use smokeless tobacco.   Additional information as reported by the patient (free text): I have reactive airway and the tightness in my chest feels like when I have bronchitis. Generally,  amtibiotics and prednizone help with this,  sometimes a nebulizer.  I took my temp with a thermometer and I'm 98.6.   Weight: 213 lbs    MEDICATIONS: No current medications, ALLERGIES: codeine, shellfish derived  Clinician Response:  Dear Ana,   Based on the information you have provided, you do have symptoms that are consistent with Coronavirus (COVID-19).   The coronavirus causes mild to severe respiratory illness with the most common symptoms including fever, cough and difficulty breathing. Unfortunately, many viruses cause similar symptoms and it can be difficult to distinguish between viruses, especially in mild cases, so we are presuming that anyone with cough or fever has coronavirus at this time.  Coronavirus/COVID-19 has reached the point of community spread in [place], meaning that we  are finding the virus in people with no known exposure risk for ryland the virus. Given the increasing commonness of coronavirus in the community we are no longer testing patients who are not critically ill.  If you are a health care worker, you should refer to your employee health office for instructions about testing and returning to work.  For everyone else who has cough or fever, you should assume you are infected with coronavirus. Since you will not be tested but have symptoms that may be consistent with coronavirus, the CDC recommends you stay in self-isolation until these three things have happened:    You have had no fever for at least 72 hours (that is three full days of no fever without the use of medicine that reduces fevers)    AND   Other symptoms have improved (for example, when your cough or shortness of breath have improved)   AND   At least 7 days have passed since your symptoms first appeared.   How to Isolate:    Isolate yourself at home.   Do Not allow any visitors  Do Not go to work or school  Do Not go to [employer],  centers, shopping, or other public places.  Do Not shake hands.  Avoid close contact with others (hugging, kissing).   Protect Others:    Cover Your Mouth and Nose with a mask, disposable tissue or wash cloth to avoid spreading germs to others.  Wash your hands and face frequently with soap and water.   Managing Symptoms:    At this time, we primarily recommend Tylenol (Acetaminophen) for fever or pain. If you have liver or kidney problems, contact your primary care provider for instructions on use of tylenol. Adults can take 650 mg (two 325 mg pills) by mouth every 4-6 hours as needed OR 1,000 mg (two 500 mg pills) every 8 hours as needed. MAXIMUM DAILY DOSE: 3,000mg. For children, refer to dosing on bottle based on age or weight.   If you develop significant shortness of breath that prevents you from doing normal activities, please call 911 or proceed to the nearest  emergency room and alert them immediately that you have been in self-isolation for possible coronavirus.   For more information about COVID19 and options for caring for yourself at home, please visit the CDC website at https://www.cdc.gov/coronavirus/2019-ncov/about/steps-when-sick.htmlFor more options for care at [facility], please visit our website at [email or web address]      There is no indication for antibiotics at this time based on the information provided. &nbsp;I would recommend using your albuterol inhaler. &nbsp;You may want to contact your primary care provider if a refill is needed. &nbsp;    Diagnosis: Cough  Diagnosis ICD: R05

## 2020-03-27 RX ORDER — ALBUTEROL SULFATE 90 UG/1
2 AEROSOL, METERED RESPIRATORY (INHALATION) EVERY 4 HOURS PRN
Qty: 1 INHALER | Refills: 0 | Status: SHIPPED | OUTPATIENT
Start: 2020-03-27 | End: 2021-05-21

## 2020-03-27 NOTE — PROGRESS NOTES
Called kendall over 1/2 hour and no answer. jessica seen by [facility]. Not sure if  This was in error.     Will forward to her primary team.

## 2020-03-31 NOTE — PROGRESS NOTES
"Subjective     Ana Wyman is a 50 year old female who is being evaluated via a billable telephone visit.      The patient has been notified of following:     \"This telephone visit will be conducted via a call between you and your physician/provider. We have found that certain health care needs can be provided without the need for a physical exam.  This service lets us provide the care you need with a short phone conversation.  If a prescription is necessary we can send it directly to your pharmacy.  If lab work is needed we can place an order for that and you can then stop by our lab to have the test done at a later time.    If during the course of the call the physician/provider feels a telephone visit is not appropriate, you will not be charged for this service.\"     Patient has given verbal consent for Telephone visit?  Yes    Ana Wyman complains of   Chief Complaint   Patient presents with     RECHECK     return bariatric; PA/RD       ALLERGIES  Nsaids; Clindamycin; Codeine; and Shellfish allergy     Telephonic Medical Weight Management Note     Ana Wyman  MRN:  [id number]  :  1970      Dear Kelly Bedoya,    I had the pleasure of doing a telephonic visit with your patient Ana Wyman.  She is a 50 year old female who I am continuing to see for treatment of obesity related to:       2018   I have the following health issues associated with obesity: Pre-Diabetes, Sleep Apnea, Lower Extremity Edema, GERD (Reflux), Fatty Liver     She had gastric bypass surgery on 2019. I saw her in January for her annual appointment.     INTERVAL HISTORY:  Pt returns today because she is struggling to lose more weight.  She would like to discuss adding anti obesity medication to treatment plan.     Pt had a toe flare up again and had surgery on .  Has had limited ability to move for the 6 weeks following.  Plans on returning [facility] the [facility] when she can.      Has an appt " of Thursday with foot doctor.  Currently using a scooter for mobility.  Is hoping to get clearance to be able to walk on her foot more.  Not sure if she will be able to exercise yet.  She has been trying to work on some upper body strengthening.  Has small weights and has Wii fit.      Previous Medications:  Phentermine 2013- lower dose worked well in past.  Upper dose caused occasional fast HR with walking.  (Lost 20 lbs)  Metformin 2013- tolerated well.  Was on along with phentermine.  Belviq 2013 lost to follow up- started after phentermine.  Qysemia 2015    DIETARY HISTORY  See diet visit dated today    Struggling With:   Pt is noticing she is snacking in the afternoon or evening.  Is able to do her 3 meals a day and feels full after than however the fullness doesn't stay.  She then gets cravings.  She seems to crave sweets.  She tries to go for fruit but she will go towards candy if around.        CURRENT WEIGHT (PATIENT REPORTED):  216 lbs 1.6 oz    Wt Readings from Last 4 Encounters:   04/06/20 216 lb 1.6 oz (98 kg)   02/14/20 219 lb 3.2 oz (99.4 kg)   01/27/20 217 lb 8 oz (98.7 kg)   11/12/19 229 lb (103.9 kg)       BP Readings from Last 6 Encounters:   02/14/20 90/54   01/27/20 112/60   11/12/19 108/60   10/21/19 96/69   10/14/19 99/65   10/09/19 110/68     BARIATRIC METRICS:  Current Weight: 216 lb 1.6 oz (98 kg)(pt reported)  Body mass index is 37.09 kg/m .   Wt change since last visit (lbs): -1.41  Cumulative weight loss (lbs): 123.2    Exercise:   Has had limited ability to move since her surgery.  Using a scooter currently.  Plans on returning to the [facility] when she can.     Social Hx. Pt is working full time at home.  She works for the [employer]. Is not sure what her job will look like after April 15th.  (May not have one.)    ROS:  General  HEENT  Dry Mouth: yes, worse when using CPAP  Hx of glaucoma: none  Vision changes: none  Cardiovascular  Chest Pain with Exertion: none  Palpitations:  "none,  Just problems with hypotension  Hx of heart disease: none  Psychiatric  Moods: Stable  Neurologic:  Hx of seizures: none  Paresthesias: tingling in hands (daily)  Headaches: HX of migraines   GI  Vomiting: Yes, just twice in the last 2 week   Diarrhea: Yes, Miralax induced occasionally    Constipation: Yes, Using Miralax a couple times a week.  Has BM almost daily.     Swallowing trouble: Yes, with the B complex.     Abdominal pain: No   Heartburn: No        History of kidney stones: Yes, Calcium oxalate Last one was in summer of 2019. Large family history of kidney stones.   History of kidney disease: none    MEDICATIONS:   Current Outpatient Medications   Medication     albuterol (PROAIR HFA/PROVENTIL HFA/VENTOLIN HFA) 108 (90 Base) MCG/ACT inhaler     BIOTIN PO     Calcium Citrate-Vitamin D (CALCIUM CITRATE CHEWY BITE PO)     childrens multivitamin w/iron (FLINTSTONES COMPLETE) 60 MG chewable tablet     Cholecalciferol (VITAMIN D) 2000 units CAPS     clobetasol (TEMOVATE) 0.05 % ointment     Hypromellose (ARTIFICIAL TEARS OP)     nystatin (MYCOSTATIN) 759548 UNIT/GM external cream     polyethylene glycol (MIRALAX/GLYCOLAX) packet     triamcinolone (KENALOG) 0.1 % external cream     vitamin (B COMPLEX-C) tablet     vitamin B-12 (CYANOCOBALAMIN) 2500 MCG sublingual tablet     No current facility-administered medications for this visit.      Labs:  Component      Latest Ref Rng & Units 2/14/2020   Sodium      133 - 144 mmol/L 142   Potassium      3.4 - 5.3 mmol/L 3.9   Chloride      94 - 109 mmol/L 107   Carbon Dioxide      20 - 32 mmol/L 29   Anion Gap      3 - 14 mmol/L 6   Glucose      70 - 99 mg/dL 82   Urea Nitrogen      7 - 30 mg/dL 13   Creatinine      0.52 - 1.04 mg/dL 0.71   GFR Estimate      >60 mL/min/1.73:m2 >90   GFR Estimate If Black      >60 mL/min/1.73:m2 >90   Calcium      8.5 - 10.1 mg/dL 9.2       PE:  Ht 5' 4\" (1.626 m)   Wt 216 lb 1.6 oz (98 kg)   BMI 37.09 kg/m    GENERAL Pt sounds " in NAD      ASSESSMENT/PLAN:     1. Class 2 severe obesity due to excess calories with serious comorbidity and body mass index (BMI) of 37.0 to 37.9 in adult (H)    We discussed healthy habits to assist with weight loss. We discussed medication that may assist with weight loss. Written information was presented in AVS on mychart.    Pt not good candidate for Topiramate with hx of kidney stones and numbness in fingers.     Phentermine was prescribed.  We discussed the risks versus benefits of phentermine, including risk of elevated blood pressure and heart rate, risk of constipation, dry mouth, insomnia, anxiety or mood changes. All questions regarding medication were answered. The patient agrees to follow up regularly for monitoring of blood pressure, heart rate, and medication effectiveness.      - phentermine (ADIPEX-P) 15 MG capsule; Take 1 capsule daily for 7 days, if tolerating may increase to 2 tablets daily.  Dispense: 173 capsule; Refill: 0    We discussed insulin resistance and how it can interfere with weight loss efforts. We discussed dietary changes to reduce insulin resistance. We discussed the use of metformin off label for weight loss. GI side effects reviewed. Patient's questions were answered. A prescription was sent to pharmacy.     - metformin (GLUCOPHAGE-XR) 500 MG 24 hr tablet; 1 tablet by mouth daily with meal for 1 week, then 2 tablets daily with meal.  Dispense: 180 tablet; Refill: 0    2. Bariatric surgery status  1 year s/p gastric bypass surgery    Pt will have appt with dietician today.    Follow up: Return to clinic in 1 month for medication follow up.  If [facility] is [facility] BP, any side effects, and medication effectiveness.       Sarah Stacy PA-C      Phone call duration:  44 minutes

## 2020-04-03 NOTE — PROGRESS NOTES
"  Ana Wyman is a 50 year old female who is being evaluated via a billable telephone visit.      The patient has been notified of following:     \"This telephone visit will be conducted via a call between you and your physician/provider. We have found that certain health care needs can be provided without the need for a physical exam.  This service lets us provide the care you need with a short phone conversation.  If a prescription is necessary we can send it directly to your pharmacy.  If lab work is needed we can place an order for that and you can then stop by our lab to have the test done at a later time.    If during the course of the call the physician/provider feels a telephone visit is not appropriate, you will not be charged for this service.\"     Patient has given verbal consent for Telephone visit?  Yes ([place])        Phone call duration: 20 minutes      NUTRITION POST OP APPOINTMENT  DATE OF VISIT: April 3, 2020    Ana Wyman  1970  female  [id number]  50 year old     ASSESSMENT:    REASON FOR VISIT:  Ana is a 50 year old year old female presents today for 1 year, 2 months PO nutrition follow-up appointment. Patient is accompanied by self.    DIAGNOSIS:  Status post gastric bypass surgery.  Obesity Grade II BMI 35-39.9     ANTHROPOMETRICS:  Initial Weight:  339 lb  Height:  64\"  Current Weight: 216.1 lb (stated)    BMI:  37.09 kg/(m^2).    VITAMINS AND MINERALS:  2 Complete multivitamins with minerals- Rinku's Complete  6000 Int Units of Vitamin D daily   500 mg chew three times of Calcium daily- from [facility]VI by 2 hours  No iron needed  Biotin daily  1 tablet B complex (100 mg thiamine per tablet) -cut in 1/2 (takes 2X per week)  2500 B12 sl twice weekly    NUTRITION HISTORY:  Breakfast: high protein oatmeal or fiber one/ yogurt or high protein muffin  Lunch: small Folio wrap (cheese), chicken or turkey, lettuce  Supper: fish, green bens, broccoli or lettuce salad, red " quinoa or Power Plate meal  Snacks: fresh fruit or occasional sweets  Fluids consumed: Water, herbal decaf or herbal tea, milk ([employer])  Consuming liquid calories: Yes  Protein intake:50-60 grams/day  Tolerate regular texture food: Yes  Any foods not tolerated details: Yes  If any food not tolerated: steak, sometimes chicken  Portion size: 1/2-1 cup  Take 20-30 minutes to consume each meal: Yes   Eat protein foods first: Yes  Fluids and meals separate by at least 30 minutes: no  Chew foods thoroughly: Yes  Tolerating diet: Yes  Drinking high protein supplements: No  Consuming snacks per day: 0-1  Additional Information: More emotional eating due to stress/boredom (COVID-19)/possible furlough/ lay off at work. Will be starting a weight loss mediation, but is not sure which one (finishing visit with Sarah Stacy PA-C at 10 a.m. today). Patient reported she had a foot surgery on 2/27/20 and is wearing a boot.         PHYSICAL ACTIVITY:  Type: unit(s)-tube video with weights, Wii Fit  Frequency (days per week): 3-4  Duration (min): 15-30    DIAGNOSIS:  Previous Nutrition Diagnosis: Altered gastrointestinal function related to alteration in gastrointestinal structure as evidenced by history of gastric bypass surgery.- no change    Previous goals:  Restart exercise at [facility] 2-3X per week-not met due to foot surgery  Aim to eat a minimum of 60 g protein per day (sources of protein food list provided)-improving/ eating protein 1st at meals  Replace snacks with: 16 oz milk or a protein drink (add sugar free gelatin/ popsicles as desired)-not met  Switch to a separate thiamine and average 12 mg per day (offered suggestions for other dosing)- not met/ want to use up the b-complex first    Current Nutrition Diagnosis: Altered gastrointestinal function related to alteration in gastrointestinal structure as evidenced by history of gastric bypass surgery.    INTERVENTION:   Nutrition Prescription: Eat 3 meals a day at regular  intervals. Consume 60-90 grams of protein daily. Follow post-surgical vitamin and mineral protocol.  Assessed learning needs and learning preferences.    GOALS:  Practice alternatives to stress/boredom eating  Take 1/2 vitamin B complex 2X per week (total of 100 mg thiamine)      Implementation: Discussed progress toward previous goals; reinforced importance of following bariatric lifestyle changes.    NUTRITION MONITORING AND EVALUATION:  Anticipated compliance: fair-good  Verbalized good understanding.    Follow up: Patient to follow up in 2 months (will depend on insurance coverage)    TIME SPENT WITH PATIENT:  20 minutes  Chapo Braun RD, SARAHY  [facility]  [phone]

## 2020-04-06 ENCOUNTER — OFFICE VISIT (OUTPATIENT)
Dept: SURGERY | Facility: CLINIC | Age: 50
End: 2020-04-06
Payer: COMMERCIAL

## 2020-04-06 VITALS — HEIGHT: 64 IN | BODY MASS INDEX: 36.89 KG/M2 | WEIGHT: 216.1 LBS

## 2020-04-06 DIAGNOSIS — Z98.84 BARIATRIC SURGERY STATUS: ICD-10-CM

## 2020-04-06 DIAGNOSIS — E66.01 CLASS 2 SEVERE OBESITY DUE TO EXCESS CALORIES WITH SERIOUS COMORBIDITY AND BODY MASS INDEX (BMI) OF 37.0 TO 37.9 IN ADULT (H): Primary | ICD-10-CM

## 2020-04-06 DIAGNOSIS — E66.812 CLASS 2 SEVERE OBESITY DUE TO EXCESS CALORIES WITH SERIOUS COMORBIDITY AND BODY MASS INDEX (BMI) OF 37.0 TO 37.9 IN ADULT (H): Primary | ICD-10-CM

## 2020-04-06 PROCEDURE — 99214 OFFICE O/P EST MOD 30 MIN: CPT | Mod: TEL | Performed by: PHYSICIAN ASSISTANT

## 2020-04-06 PROCEDURE — 97803 MED NUTRITION INDIV SUBSEQ: CPT | Mod: TEL | Performed by: DIETITIAN, REGISTERED

## 2020-04-06 RX ORDER — METFORMIN HCL 500 MG
TABLET, EXTENDED RELEASE 24 HR ORAL
Qty: 180 TABLET | Refills: 0 | Status: SHIPPED | OUTPATIENT
Start: 2020-04-06 | End: 2020-07-06

## 2020-04-06 RX ORDER — PHENTERMINE HYDROCHLORIDE 15 MG/1
CAPSULE ORAL
Qty: 173 CAPSULE | Refills: 0 | Status: SHIPPED | OUTPATIENT
Start: 2020-04-06 | End: 2020-08-03

## 2020-04-06 ASSESSMENT — MIFFLIN-ST. JEOR: SCORE: 1580.22

## 2020-04-06 NOTE — PATIENT INSTRUCTIONS
MEDICATION STARTED AT THIS APPOINTMENT    We are starting Phentermine. Take one tablet in the morning.  Call the nurse at [phone] if you have any questions or concerns. (Do not stop taking it if you don't think it's working. For some people it works without them knowing it.)    Phentermine is being prescribed because you identified hunger as one of the main causes for your extra weight.      Our patients on Phentermine find that they:    >feel less hunger    >find it easier to push the plate away   >have an easier time eating less    For some of our patients, these feelings are very real and immediate. For other patients, the feelings are less obvious. They don't feel much of a change but find they've lost weight. Like all weight loss medications, Phentermine  works best when you help it work. This means:  1. Having less tempting high calorie (fattening) food around the house or office. (For people with strong cravings this is very important.)   2. Staying away from situations or people that may trigger your cravings .   3. Eating out only one time or less each week.  4. Eating your meals at a table with the TV or computer off.    Side-effects. Phentermine is generally well tolerated. The main side-effects we see are feelings of racing pulse or rapid heart beat. Some people can get an elevated blood pressure. Because of this we may have you come back within a week or so of starting the medication for a blood pressure check.       Insulin Resistance    Metformin helps your body become sensitive to insulin again.   It helps to increase glucose (sugar) production in the liver, decreases glucose absorption in the intestines and helps with increasing insulin sensitivity by increasing glucose uptake in the body and how then that is used.    Insulin is a hormone produced by the pancreas that helps to regulate your blood sugars and controls fat storage. One of insulin s jobs is to bring glucose(sugar) from your  meals, now circulating in the blood, and shift them to your muscles, fat and liver for fuel.     With insulin resistance your body has a hard time absorbing that sugar and utilizing it as fuel. Instead that glucose (sugar) get pushed to your fat cells and stored there. As you continue to eat when you insulin resistant, that fuel continually gets stored  adding to more in more fat cells.     Also, in insulin resistance your pancreas needs to work harder to pump out more insulin to get your blood sugars down. More insulin means more fat storage.     MEDICATION STARTED AT THIS APPOINTMENT  We are starting metformin.  Starting instructions:       Extended release tablet: Take 1 tablet by mouth daily with a meal for 1 week, then increase to 2 tablets daily with a meal or 1 tablet twice daily with meals.        Metformin is a medication that is used to assist with lowering blood sugars in patients that have Pre-Diabetes or Diabetes. It has also been found to help with weight loss.    Metformin helps to lower blood sugars by lowering the amount of sugar (glucose) your liver produces that would otherwise cause your blood sugar to increase. It also makes your body respond better to insulin (the product that lowers your blood sugar). It is unclear how metformin assists with weight loss.     Side-effects. Metformin is generally well tolerated. The main side-effects we see are diarrhea, nausea and stomach upset - this tends to subside over time as your body gets used to the medication. Taking this medications with food will lower these side effects.    Low blood sugar (hypoglycemia) is rare to occur with metformin, but can occur if you do not eat enough or are taking other medications that assist to lower blood sugar. The signs of low blood sugar are:  o Weakness  o Shaky   o Hungry  o Sweating  o Confusion      See below for ways to treat low blood sugar without adding in lots of extra calories.    Treating Low Blood  Sugar    If you have symptoms of low blood sugar (sweating, shaking, dizzy, confused) eat 15 grams of carbs and wait 15 minutes:      Glucose Tabs are best for sugars under 70 -  Dex4 or BD Glucose tablets are good, you will need to take 3-4 of these to equal 15 grams.       One small box of raisins    4 oz fruit juice box or   cup fruit juice    1 small apple    1 small banana      cup canned fruit in water      English muffin or a slice of bread with jelly     1 low fat frozen waffle with sugar-free syrup      cup cottage cheese with   cup frozen or fresh blueberries    1 cup skim or low-fat milk      cup whole grain cereal    4-6 crackers such as Triscuits    Please refer to the pharmacy insert for more information on side-effects. Please call the clinic should you have more questions regarding this medication.       In order to get refills of this or any medication we prescribe you must be seen in the medical weight mgmt clinic every 2-3 months.

## 2020-05-08 ENCOUNTER — TELEPHONE (OUTPATIENT)
Dept: SURGERY | Facility: CLINIC | Age: 50
End: 2020-05-08

## 2020-05-08 NOTE — TELEPHONE ENCOUNTER
1/2 bottle of mag citrate.  Then miralax daily to prevent it happening again.  Constipation due to phentermine. Common side effect.    ERWIN

## 2020-05-08 NOTE — TELEPHONE ENCOUNTER
Consulted DORIAN by phone with further questions.  After discussion, plan as follows:    -constipation is common side effect of phentermine  -pt should take 1/2-1 bottle of magnesium citrate today.  If she uses half, she can repeat the other half tomorrow if needed.  -pt should increase miralax to 1 cap twice a day.  She may also add 400-500 mg oral magnesium if needed as well.  -pt should monitor and notify clinic if N/V returns; may be related to constipation  -pt may need to stop phentermine if constipation is still an issue after these approaches.    Called pt and updated her. Pt agreeable to plan. RN to call next week to check on progress. Pt given after hours number for the weekend just in case.  Agrees to notify clinic if N/V returns.  Ne Morris RN on 5/8/2020 at 4:30 PM

## 2020-05-08 NOTE — TELEPHONE ENCOUNTER
"Voicemail received re: concerns about constipation x 1 month.  Pt requests call back.    Called pt per request.  Pt reports constipation x 1 month, severe constipation x 2 weeks.  Has no BM for several days, then has strong urge but can't go.  Has rectal pressure but not able to push stool out.  Has to manually remove stool with her fingers.      States having this impaction for the past 2 week consistently.  Increased one cap of miralax from every 3 days to daily, also eating prunes 1-2 times per day without relief.      Only new meds are metformin and phentermine; no other med changes.  No known cause.     Other s/s:  Is having bilateral lower abdominal cramping after several days of no BM, starts at 5-6/10 up to 8/10 as constipation worsens, then improving as stool is removed.      Also reports nausea and vomiting 2-3 times a week for the past few weeks that is new; has this after supper.  However, having NO nausea or vomiting this week at all so not sure what that was about.    Diet recall:    Wed:  B-yogurt with fiber one  L-turkey roll with pickle  S-spaghetti squash with meat sauce    Thus:  B-yogurt with fiber one  L- chicken and lettuce wrap with piece of fruit  S-taco salad    Today:  B-yogurt and fiber one  No lunch yet; doesn't feel like eating.    Fluids:  Drinking 2 large 16 oz teas and 1 glass of water daily; has not been measuring and thinks she is behind on fluids.    Stools:  Had moderate hard stool Monday that she had to manually remove.  Had a couple hard stools today she manually removed then had a diarrhea stool.  Still feels strong urge to defecate and states she can feel stool at rectum \"through my skin\" but unable to pass it.  States even when she does go she can't pass it all and feels like a large portion remains stuck.    States she has used mag citrate before but is unsure if she would be able to give herself an enema.   Wants to avoid going in to clinic if possible because she lives " with elderly mother.    Pt instructed to increase fluids to minimum of 64 oz every day and measure to be sure.  Agrees to do this.    Will consult PA, call pt back.  Ne Morris RN on 5/8/2020 at 1:45 PM

## 2020-07-03 ENCOUNTER — VIRTUAL VISIT (OUTPATIENT)
Dept: FAMILY MEDICINE | Facility: OTHER | Age: 50
End: 2020-07-03

## 2020-07-03 NOTE — PROGRESS NOTES
"Date: 2020 14:35:22  Clinician: Harper Bautista  Clinician NPI: [id number]  Patient: Ana Wyman  Patient : 1970  Patient Address:  [place] [address]  Patient Phone: [phone]  Visit Protocol: UTI  Patient Summary:  Ana is a 50 year old ( : 1970 ) female who initiated a Visit for a presumed bladder infection. When asked the question \"Please sign me up to receive news, health information and promotions. \", Ana responded \"No\".   Her symptoms started 4-6 days ago and consist of dysuria, foul-smelling urine, feeling as if the bladder is never empty, and vaginal discharge.   Symptom details     Urine color: The color of her urine is yellow.     Vaginal discharge: The discharge is clear in color and is watery. The discharge is typical for her.      Denied symptoms include nausea, flank pain, abdominal pain, urinary frequency, chills, urinary urgency, urinary incontinence, vomiting, and vaginal itching. She does not feel feverish.   Ana has not used any over-the-counter medications or home remedies to relieve her current symptoms.  Precipitating events  Ana denies having a sexually transmitted disease.  Pertinent medical history  Ana has had a bladder infection before but has not had any in the past 12 months. Her current symptoms are not similar to her previous bladder infection symptoms.   She is not sure what antibiotics have been effective in treating her past bladder infections.   She has a history of kidney stones. Her last episode was more than 6 months ago.   Ana has not been prescribed antibiotics to prevent frequent or repeated bladder infections in the past and does not get yeast infections when she takes antibiotics. She has not experienced problems or side effects with any of the common antibiotics used to treat bladder infections.   She has not used a catheter or been a patient in a hospital or nursing home in the past 2 weeks.   Ana does not smoke " or use smokeless tobacco.   Additional information as reported by the patient (free text): Gastric bypass 1/2019 clyndamycin allergy   Reason for repeat visit for the same protocol within 24 hours:  Had to sit in waiting room for more than an hour online said wait time 14 mins. I have a very vulnerable person at home and cant risk that kind of exposure  See the History of referred by protocol and completed visits section for additional details on the previous visit.    MEDICATIONS: No current medications, ALLERGIES: shellfish derived, codeine  Clinician Response:  Dear Ana,  Based on the information you have provided, you likely have an acute urinary tract infection, also called a bladder infection. Bladder infections occur when bacteria from the outside of the body enters the urinary tract. Any part of the urinary system can be infected, but the bladder is the most common.  Medication information  I am prescribing:     Nitrofurantoin monohyd/m-cryst (Macrobid) 100 mg oral capsule. Take 1 capsule by mouth every 12 hours for 5 days. Take this medication with food. There are no refills with this prescription.   The medication I prescribed for your bladder infection is an antibiotic. Continue taking the medication until it is gone even if you feel better.   Yeast infections can be a common side effect of antibiotics. The most common symptom of a yeast infection is itchiness in and around the vagina. Other signs and symptoms include burning, redness, or a thick, white vaginal discharge that looks like cottage cheese and does not have a bad smell.  Self care  Urination helps to flush bacteria from the urinary tract. For this reason, drinking water and urinating often helps relieve some urinary symptoms and can decrease your risk of getting bladder infections in the future.  Other steps you can take to prevent future bladder infections include:     Wipe front to back after using the bathroom    Urinate after sexual  intercourse    Avoid using deodorant sprays, douches, or powders in the vaginal area     When to seek care  Please make an appointment to be seen in a clinic or urgent care if any of the following occur:     You develop new symptoms or your symptoms become worse    You have medication side effects that make it difficult to take them as prescribed    Your symptoms do not improve within 1-2 days of starting treatment    You have symptoms of a bladder infection that return shortly after completing treatment     It is possible to have an allergic reaction to an antibiotic even if you have not had one in the past. If you notice a new rash, significant swelling, or difficulty breathing, stop taking this medication immediately and go to a clinic or urgent care.   Diagnosis: Acute uncomplicated bladder infection  Diagnosis ICD: N39.0  Prescription: nitrofurantoin monohyd/m-cryst (Macrobid) 100 mg oral capsule 10 capsule, 5 days supply. Take 1 capsule by mouth every 12 hours for 5 days. Refills: 0, Refill as needed: no, Allow substitutions: yes  Pharmacy: [facility] - [phone] - [address] [place] [address]

## 2020-07-05 NOTE — PROGRESS NOTES
"Ana Wyman is a 50 year old female who is being evaluated via a billable telephone visit.      The patient has been notified of following:     \"This telephone visit will be conducted via a call between you and your physician/provider. We have found that certain health care needs can be provided without the need for a physical exam.  This service lets us provide the care you need with a short phone conversation.  If a prescription is necessary we can send it directly to your pharmacy.  If lab work is needed we can place an order for that and you can then stop by our lab to have the test done at a later time.    Telephone visits are billed at different rates depending on your insurance coverage. During this emergency period, for some insurers they may be billed the same as an in-person visit.  Please reach out to your insurance provider with any questions.    If during the course of the call the physician/provider feels a telephone visit is not appropriate, you will not be charged for this service.\"    Patient has given verbal consent for Telephone visit?  Yes    What phone number would you like to be contacted at? [phone]    How would you like to obtain your AVS? [facility]    2020      Return Medical Weight Management Note     Ana Wyman  MRN:  [id number]  :  1970      Dear Elke, Kelly Curiel,    I had the pleasure of doing a virtual visit with your patient Ana Wyman.  She is a 50 year old female who I am continuing to see for treatment of obesity related to:       2018   I have the following health issues associated with obesity: Pre-Diabetes, Sleep Apnea, Lower Extremity Edema, GERD (Reflux), Fatty Liver     She had gastric bypass surgery on 2019. I saw her in January for her annual appointment.    INTERVAL HISTORY:    On 2020 we started phentermine 15 mg and metformin  mg to assist in weight loss.  More recently she has been having some mood swings. "           Pt had a toe flare up again and had surgery on Feb. 27.  Has had limited ability to move for the 6 weeks following. Currently using a scooter for mobility.    Plans on returning to the [facility] when she can.       She has been trying to work on some upper body strengthening.  Has small weights and has Wii fit.    Previous Medications:  Phentermine 2013- lower dose worked well in past.  Upper dose caused occasional fast HR with walking.  (Lost 20 lbs)  Metformin 2013- tolerated well.  Was on along with phentermine.  Belviq 2013 lost to follow up- started after phentermine.  Qysemia 2015    Pt not good candidate for Topiramate with hx of kidney stones and numbness in fingers.     CURRENT WEIGHT (PATIENT REPORTED):  215 lbs 6.4 oz    Wt Readings from Last 4 Encounters:   07/06/20 215 lb 6.4 oz (97.7 kg)   04/06/20 216 lb 1.6 oz (98 kg)   02/14/20 219 lb 3.2 oz (99.4 kg)   01/27/20 217 lb 8 oz (98.7 kg)       BARIATRIC METRICS:  Current Weight: 215 lb 6.4 oz (97.7 kg)(patient reported)  Body mass index is 36.97 kg/m .   Wt change since last visit (lbs): -0.7  Cumulative weight loss (lbs): 123.9    DIETARY HISTORY  Meals Per Day: 3  Food Diary:   B: Triple Zero Greek yogurt with Fiber one cereal in yogurt  L: Salad with chicken, and berries  D: Chicken, salad or veggies  Snacks Per Day: once daily or less Berries or   Typical Snack: Mixed no salt   Fluid Intake: Gets 60 oz daily.  Was low a few weeks ago.    Calorie Containing Beverages:   Typical Protein Food Choices: chicken, Yogurt, Ensure Max.  protein drink  Meals at Restaurant per week: seldom    Struggling With: Anxiety, mood swings.  Minimal weight loss    Exercise:   Feels she is doing well with this.  Is walking 3-4 times a week.  Is also biking and hiking.  Wii fit and treadmill.     ROS:  General  HEENT  Dry Mouth: yes, worse when using CPAP  Hx of glaucoma: none  Vision changes: none  Cardiovascular  Chest Pain with Exertion: none  Palpitations: none,   Just problems with hypotension  Hx of heart disease: none  Psychiatric  Moods: Stable  Neurologic:  Hx of seizures: none  Paresthesias: tingling in hands (daily)  Headaches: HX of migraines   GI  Denies diarrhea no metformin    History of kidney stones: Yes, Calcium oxalate Last one was in summer of 2019. Large family history of kidney stones.   History of kidney disease: none      MEDICATIONS:   Current Outpatient Medications   Medication     albuterol (PROAIR HFA/PROVENTIL HFA/VENTOLIN HFA) 108 (90 Base) MCG/ACT inhaler     BIOTIN PO     Calcium Citrate-Vitamin D (CALCIUM CITRATE CHEWY BITE PO)     childrens multivitamin w/iron (FLINTSTONES COMPLETE) 60 MG chewable tablet     Cholecalciferol (VITAMIN D) 2000 units CAPS     clobetasol (TEMOVATE) 0.05 % ointment     Hypromellose (ARTIFICIAL TEARS OP)     metFORMIN (GLUCOPHAGE-XR) 500 MG 24 hr tablet     nitroFURantoin macrocrystal-monohydrate (MACROBID) 100 MG capsule     nystatin (MYCOSTATIN) 032547 UNIT/GM external cream     phentermine (ADIPEX-P) 15 MG capsule     polyethylene glycol (MIRALAX/GLYCOLAX) packet     triamcinolone (KENALOG) 0.1 % external cream     vitamin (B COMPLEX-C) tablet     vitamin B-12 (CYANOCOBALAMIN) 2500 MCG sublingual tablet     No current facility-administered medications for this visit.        LABS:  Hemoglobin A1C   Date Value Ref Range Status   01/27/2020 5.1 0 - 5.6 % Final     Comment:     Normal <5.7% Prediabetes 5.7-6.4%  Diabetes 6.5% or higher - adopted from ADA   consensus guidelines.       Vitamin D Deficiency screening   Date Value Ref Range Status   01/27/2020 56 20 - 75 ug/L Final     Comment:     Season, race, dietary intake, and treatment affect the concentration of   25-hydroxy-Vitamin D. Values may decrease during winter months and increase   during summer months. Values 20-29 ug/L may indicate Vitamin D insufficiency   and values <20 ug/L may indicate Vitamin D deficiency.  Vitamin D determination is routinely  performed by an immunoassay specific for   25 hydroxyvitamin D3.  If an individual is on vitamin D2 (ergocalciferol)   supplementation, please specify 25 OH vitamin D2 and D3 level determination by   LCMSMS test [id number].       Sodium   Date Value Ref Range Status   02/14/2020 142 133 - 144 mmol/L Final     Potassium   Date Value Ref Range Status   02/14/2020 3.9 3.4 - 5.3 mmol/L Final     Chloride   Date Value Ref Range Status   02/14/2020 107 94 - 109 mmol/L Final     Carbon Dioxide   Date Value Ref Range Status   02/14/2020 29 20 - 32 mmol/L Final     Anion Gap   Date Value Ref Range Status   02/14/2020 6 3 - 14 mmol/L Final     Glucose   Date Value Ref Range Status   02/14/2020 82 70 - 99 mg/dL Final     Urea Nitrogen   Date Value Ref Range Status   02/14/2020 13 7 - 30 mg/dL Final     Creatinine   Date Value Ref Range Status   02/14/2020 0.71 0.52 - 1.04 mg/dL Final     GFR Estimate   Date Value Ref Range Status   02/14/2020 >90 >60 mL/min/[1.73_m2] Final     Comment:     Non  GFR Calc  Starting 12/18/2018, serum creatinine based estimated GFR (eGFR) will be   calculated using the Chronic Kidney Disease Epidemiology Collaboration   (CKD-EPI) equation.       Calcium   Date Value Ref Range Status   02/14/2020 9.2 8.5 - 10.1 mg/dL Final     Bilirubin Total   Date Value Ref Range Status   08/28/2019 0.4 0.2 - 1.3 mg/dL Final     Alkaline Phosphatase   Date Value Ref Range Status   08/28/2019 86 40 - 150 U/L Final     ALT   Date Value Ref Range Status   08/28/2019 24 0 - 50 U/L Final     AST   Date Value Ref Range Status   08/28/2019 17 0 - 45 U/L Final     Cholesterol   Date Value Ref Range Status   07/31/2018 178 <200 mg/dL Final     HDL Cholesterol   Date Value Ref Range Status   07/31/2018 48 (L) >49 mg/dL Final     LDL Cholesterol Calculated   Date Value Ref Range Status   07/31/2018 92 <100 mg/dL Final     Comment:     Desirable:       <100 mg/dl     Triglycerides   Date Value Ref Range Status  "  07/31/2018 191 (H) <150 mg/dL Final     Comment:     Borderline high:  150-199 mg/dl  High:             200-499 mg/dl  Very high:       >499 mg/dl  Fasting specimen          PE:  Ht 5' 4\" (1.626 m)   Wt 215 lb 6.4 oz (97.7 kg)   BMI 36.97 kg/m    GENERAL: Healthy, alert and no distress  EYES: Eyes grossly normal to inspection.  No discharge or erythema, or obvious scleral/conjunctival abnormalities.  RESP: No audible wheeze, cough, or visible cyanosis.  No visible retractions or increased work of breathing.    SKIN: Visible skin clear. No significant rash, abnormal pigmentation or lesions.  NEURO: Cranial nerves grossly intact.  Mentation and speech appropriate for age.  PSYCH: Mentation appears normal, affect normal/bright, judgement and insight intact, normal speech and appearance well-groomed.    ASSESSMENT AND PLAN:      There are no diagnoses linked to this encounter.     ASSESSMENT AND PLAN:      1. Class 2 severe obesity due to excess calories with serious comorbidity and body mass index (BMI) of 37.0 to 37.9 in adult (H)    Ana will stop phentermine since it is not working for weight loss and might be increasing her anxiety.  She will increase metformin to 1500 mg  daily.  - metFORMIN (GLUCOPHAGE-XR) 500 MG 24 hr tablet; Take 3 tablets (1,500 mg) by mouth daily  Dispense: 270 tablet; Refill: 0    She might be a candidate for a GLP 1 with her insulin resistance.  Briefly discussed this.  Will follow up next visit if necessary.      Discussed increasing exercise to 5 times a week to facilitate weight loss.     Asked pt to track calories with goal of getting 800-1000 juanita daily.  Recommended nutrition referral but pt deferred.    2. Insulin resistance  Will increase metformin to 3 tablets daily.  - metFORMIN (GLUCOPHAGE-XR) 500 MG 24 hr tablet; Take 3 tablets (1,500 mg) by mouth daily  Dispense: 270 tablet; Refill: 0    3. Anxious Mood  Will stop phentermine-might be increasing her anxiety.  Pt will call " EAP and schedule some visits.  If anxiety does not improve off medication and with EAP put will mychart and I will put in a mental health referral.      Follow up: Return to clinic in 1 month.    Phone call duration: 26 minutes    Sarah Stacy PA-C

## 2020-07-06 ENCOUNTER — TELEPHONE (OUTPATIENT)
Dept: SURGERY | Facility: CLINIC | Age: 50
End: 2020-07-06

## 2020-07-06 ENCOUNTER — VIRTUAL VISIT (OUTPATIENT)
Dept: SURGERY | Facility: CLINIC | Age: 50
End: 2020-07-06
Payer: COMMERCIAL

## 2020-07-06 VITALS — WEIGHT: 215.4 LBS | BODY MASS INDEX: 36.77 KG/M2 | HEIGHT: 64 IN

## 2020-07-06 DIAGNOSIS — E66.812 CLASS 2 SEVERE OBESITY DUE TO EXCESS CALORIES WITH SERIOUS COMORBIDITY AND BODY MASS INDEX (BMI) OF 37.0 TO 37.9 IN ADULT (H): ICD-10-CM

## 2020-07-06 DIAGNOSIS — F41.9 ANXIOUSNESS: ICD-10-CM

## 2020-07-06 DIAGNOSIS — E88.819 INSULIN RESISTANCE: Primary | ICD-10-CM

## 2020-07-06 DIAGNOSIS — E66.01 CLASS 2 SEVERE OBESITY DUE TO EXCESS CALORIES WITH SERIOUS COMORBIDITY AND BODY MASS INDEX (BMI) OF 37.0 TO 37.9 IN ADULT (H): ICD-10-CM

## 2020-07-06 PROCEDURE — 99214 OFFICE O/P EST MOD 30 MIN: CPT | Mod: 95 | Performed by: PHYSICIAN ASSISTANT

## 2020-07-06 RX ORDER — NITROFURANTOIN 25; 75 MG/1; MG/1
100 CAPSULE ORAL
COMMUNITY
End: 2020-08-03

## 2020-07-06 RX ORDER — METFORMIN HCL 500 MG
1500 TABLET, EXTENDED RELEASE 24 HR ORAL DAILY
Qty: 270 TABLET | Refills: 0 | Status: SHIPPED | OUTPATIENT
Start: 2020-07-06 | End: 2020-09-03

## 2020-07-06 RX ORDER — METFORMIN HCL 500 MG
1500 TABLET, EXTENDED RELEASE 24 HR ORAL DAILY
Qty: 270 TABLET | Refills: 0 | Status: SHIPPED | OUTPATIENT
Start: 2020-07-06 | End: 2020-07-06

## 2020-07-06 ASSESSMENT — MIFFLIN-ST. JEOR: SCORE: 1577.05

## 2020-07-06 NOTE — TELEPHONE ENCOUNTER
Previous metformin order was for 1 tab x 1 week then 2 tabs daily.  New order has those directions plus 3 tabs daily? Assuming want to increase to 3 tabs daily, please clarify / send new order, thanks!    Mayi Velasquez, PharmD    [facility]  Phone:  [phone]  Fax:  [phone]

## 2020-07-17 ENCOUNTER — HOSPITAL ENCOUNTER (OUTPATIENT)
Dept: MAMMOGRAPHY | Facility: CLINIC | Age: 50
Discharge: HOME | End: 2020-07-17
Attending: PEDIATRICS | Admitting: PEDIATRICS
Payer: COMMERCIAL

## 2020-07-17 DIAGNOSIS — Z12.31 VISIT FOR SCREENING MAMMOGRAM: ICD-10-CM

## 2020-07-17 PROCEDURE — 77067 SCR MAMMO BI INCL CAD: CPT

## 2020-07-20 ENCOUNTER — VIRTUAL VISIT (OUTPATIENT)
Dept: SLEEP MEDICINE | Facility: CLINIC | Age: 50
End: 2020-07-20
Attending: PHYSICIAN ASSISTANT
Payer: COMMERCIAL

## 2020-07-20 VITALS — BODY MASS INDEX: 36.88 KG/M2 | WEIGHT: 216 LBS | HEIGHT: 64 IN

## 2020-07-20 DIAGNOSIS — G47.00 INSOMNIA, UNSPECIFIED TYPE: ICD-10-CM

## 2020-07-20 DIAGNOSIS — G47.33 OSA (OBSTRUCTIVE SLEEP APNEA): Primary | ICD-10-CM

## 2020-07-20 PROCEDURE — 99204 OFFICE O/P NEW MOD 45 MIN: CPT | Mod: 95 | Performed by: PHYSICIAN ASSISTANT

## 2020-07-20 ASSESSMENT — MIFFLIN-ST. JEOR: SCORE: 1579.77

## 2020-07-20 NOTE — PROGRESS NOTES
"Ana Wyman is a 50 year old female who is being evaluated via a billable telephone visit.      The patient has been notified of following:     \"This telephone visit will be conducted via a call between you and your physician/provider. We have found that certain health care needs can be provided without the need for a physical exam.  This service lets us provide the care you need with a short phone conversation.  If a prescription is necessary we can send it directly to your pharmacy.  If lab work is needed we can place an order for that and you can then stop by our lab to have the test done at a later time.    Telephone visits are billed at different rates depending on your insurance coverage. During this emergency period, for some insurers they may be billed the same as an in-person visit.  Please reach out to your insurance provider with any questions.    If during the course of the call the physician/provider feels a telephone visit is not appropriate, you will not be charged for this service.\"    Patient has given verbal consent for Telephone visit?  Yes    What phone number would you like to be contacted at? [phone]    How would you like to obtain your AVS? MyChart        "

## 2020-07-20 NOTE — PATIENT INSTRUCTIONS
Your BMI is Body mass index is 37.08 kg/m .  Weight management is a personal decision.  If you are interested in exploring weight loss strategies, the following discussion covers the approaches that may be successful. Body mass index (BMI) is one way to tell whether you are at a healthy weight, overweight, or obese. It measures your weight in relation to your height.  A BMI of 18.5 to 24.9 is in the healthy range. A person with a BMI of 25 to 29.9 is considered overweight, and someone with a BMI of 30 or greater is considered obese. More than two-thirds of American adults are considered overweight or obese.  Being overweight or obese increases the risk for further weight gain. Excess weight may lead to heart disease and diabetes.  Creating and following plans for healthy eating and physical activity may help you improve your health.  Weight control is part of healthy lifestyle and includes exercise, emotional health, and healthy eating habits. Careful eating habits lifelong are the mainstay of weight control. Though there are significant health benefits from weight loss, long-term weight loss with diet alone may be very difficult to achieve- studies show long-term success with dietary management in less than 10% of people. Attaining a healthy weight may be especially difficult to achieve in those with severe obesity. In some cases, medications, devices and surgical management might be considered.  What can you do?  If you are overweight or obese and are interested in methods for weight loss, you should discuss this with your provider.     Consider reducing daily calorie intake by 500 calories.     Keep a food journal.     Avoiding skipping meals, consider cutting portions instead.    Diet combined with exercise helps maintain muscle while optimizing fat loss. Strength training is particularly important for building and maintaining muscle mass. Exercise helps reduce stress, increase energy, and improves fitness.  Increasing exercise without diet control, however, may not burn enough calories to loose weight.       Start walking three days a week 10-20 minutes at a time    Work towards walking thirty minutes five days a week     Eventually, increase the speed of your walking for 1-2 minutes at time    In addition, we recommend that you review healthy lifestyles and methods for weight loss available through the National Institutes of Health patient information sites:  http://win.niddk.nih.gov/publications/index.htm    And look into health and wellness programs that may be available through your health insurance provider, employer, local community center, or [employer] club.    Weight management plan: Patient was referred to their PCP to discuss a diet and exercise plan.

## 2020-07-20 NOTE — PROGRESS NOTES
"  Sleep Consultation:    Date on this visit: 7/20/2020    Ana Wyman is sent by Sarah Stacy for a sleep consultation regarding MIGUELITO.    Primary Physician: Kelly Bedoya     Ana Wyman presents for further evaluation of previously diagnosed MIGUELITO.  Her medical history is significant for s/p gastric bypass (1/28/2019).     She reports she was initially diagnosed in 2005. We do not have documentation from that study.    Ana had a sleep study at [facility] on 8/21/2009. Her AHI was 0.3/hr, RDI 16.4/hr, O2 maty 90%. CPAP was titrated to 9 cm, but her RDI was still 8.7/hr. Her weight was 317#.    She is on auto CPAP *** cm. She does not think she has been using it as much and wonders if she still needs it.     Ana goes to sleep at 11:00 PM during the week. She wakes up at 6:30 AM {WITH/WITHOUT:715916::\"with\"} an alarm. She falls asleep in 60 minutes.  Ana has difficulty falling asleep.  She wakes up 2-3 times a night for 30+ minutes before falling back to sleep.  Ana wakes up to go to the bathroom and uncertain reasons.  On weekends, Ana goes to sleep at 11:00 PM.  She wakes up at 8:00 AM {WITH/WITHOUT:412664::\"with\"} an alarm. She falls asleep in {SLEEP TIMES:651113} minutes.  Patient gets an average of 7-8 hours of sleep per night.     Patient {DOES AND DOES NOT:175270}.     Ana does not do shift work.  She works day shifts. She works as a  for the [employer].She used to work as the  at [employer]. She lives with her mother.    Ana {DOES/DOES NOT:314099::\"does not\"} snore {SNORE:068932}. Patient {DOES/NOT:879516} have a regular bed partner. There {IS/IS NOT:9024} report of {SLEEP BEHAVIOR:286903}.  She {DOES/DOES NOT:168885::\"does not\"} have witnessed apneas. They {OCCASIONALLY:630343} sleep separately.  Patient sleeps on her back and side. She {HAS/DENIES SLEEP DISTURBANCE :334759}. Ana {Parasomnia:409582}.    Ana " {HAS/DENIES ROS:598723}.      Ana has lost 100 pounds compared to her weight at her last sleep study (215# recently).  Patient describes themself as {MORNING/NIGHT:066520} person.  She would prefer to go to sleep at {TIMES OF DAY:124893} {AM/PM:697975} and wake up at {TIMES OF DAY:958971} {AM/PM:349145}.  Patient's Grundy Sleepiness score 6 inconsistent with daytime sleepiness.      Ana denies taking naps. She takes {NAPS:287857} inadvertant naps.  She {ADMIT/DENY:950151} {DOZIN} {DRIVIN}. The most recent episode was {NUMBERS 0-12:815980} {DAY:649498} ago.  Patient was counseled on the importance of driving while alert, to pull over if drowsy, or nap before getting into the vehicle if sleepy.  She uses {CAFFEINE INTAKE:050035}. Last caffeine intake is usually before ***.    Allergies:    Allergies   Allergen Reactions     Nsaids Other (See Comments)     Had gastric bypass - needs to avoid NSAIDS     Clindamycin Diarrhea     Codeine Nausea and Vomiting     Shellfish Allergy        Medications:    Current Outpatient Medications   Medication Sig Dispense Refill     albuterol (PROAIR HFA/PROVENTIL HFA/VENTOLIN HFA) 108 (90 Base) MCG/ACT inhaler Inhale 2 puffs into the lungs every 4 hours as needed for shortness of breath / dyspnea or wheezing 1 Inhaler 0     BIOTIN PO Take 5,000 mcg by mouth daily At noon       Calcium Citrate-Vitamin D (CALCIUM CITRATE CHEWY BITE PO) Take 500 mg by mouth 3 times daily Plus D,noon, supper, HS       childrens multivitamin w/iron (FLINTSTONES COMPLETE) 60 MG chewable tablet Take 2 chew tab by mouth daily With breakfast       Cholecalciferol (VITAMIN D) 2000 units CAPS Take 3 capsules by mouth At Bedtime        clobetasol (TEMOVATE) 0.05 % ointment Apply clobetasol  0.05 percent ointment, sparingly (a dot 3 mm wide) in a thin film.  Apply to affected area only, once or twice weekly for maintenance. 45 g 2     Hypromellose (ARTIFICIAL TEARS OP) Apply 1-2 drops to eye  daily as needed       metFORMIN (GLUCOPHAGE-XR) 500 MG 24 hr tablet Take 3 tablets (1,500 mg) by mouth daily 270 tablet 0     nitroFURantoin macrocrystal-monohydrate (MACROBID) 100 MG capsule Take 100 mg by mouth       nystatin (MYCOSTATIN) 405455 UNIT/GM external cream Apply topically 2 times daily as needed for dry skin Apply as needed for rash. 30 g 3     phentermine (ADIPEX-P) 15 MG capsule Take 1 capsule daily for 7 days, if tolerating may increase to 2 tablets daily. 173 capsule 0     polyethylene glycol (MIRALAX/GLYCOLAX) packet Take 17 g by mouth daily 90 packet 3     triamcinolone (KENALOG) 0.1 % external cream Apply topically 2 times daily as needed for irritation Apply twice daily PRN rash 30 g 3     vitamin (B COMPLEX-C) tablet Take 1 tablet by mouth every morning       vitamin B-12 (CYANOCOBALAMIN) 2500 MCG sublingual tablet Take 1,250 mcg by mouth twice a week With breakfast         Problem List:  Patient Active Problem List    Diagnosis Date Noted     Postural hypotension 07/29/2019     Priority: Medium     Intertrigo 07/29/2019     Priority: Medium     Morbid obesity (H) 07/29/2019     Priority: Medium     S/P laparoscopic cholecystectomy 04/18/2019     Priority: Medium     Bariatric surgery status 02/05/2019     Priority: Medium     Postsurgical malabsorption 02/05/2019     Priority: Medium     Fatty liver 07/27/2018     Priority: Medium     Mild intermittent asthma without complication 06/11/2018     Priority: Medium     Lymphedema bilateral with mixed lipedema. 09/30/2015     Priority: Medium     Baker's cyst of knee 09/03/2015     Priority: Medium     Acanthosis nigricans 03/24/2015     Priority: Medium     Cutaneous skin tags 03/24/2015     Priority: Medium     MIGUELITO on CPAP 03/19/2013     Priority: Medium     Sleep study in 2004 and 2012         Uterine fibroid 08/02/2012     Priority: Medium     Lichen sclerosus 07/14/2011     Priority: Medium     Family history of malignant neoplasm of genital  organ, other 06/11/2007     Priority: Medium     FAMILY HX GI MALIGNANCY 05/31/2007     Priority: Medium     Hypersomnia with sleep apnea 07/06/2005     Priority: Medium     Problem list name updated by automated process. Provider to review       Esophageal reflux 09/27/2004     Priority: Medium     Allergic state 09/27/2004     Priority: Medium     Problem list name updated by automated process. Provider to review          Past Medical/Surgical History:  Past Medical History:   Diagnosis Date     Allergic rhinitis, cause unspecified      Cellulitis 2005    2 episodes, one with hospitalization [facility]     DUB (dysfunctional uterine bleeding)     Neg EMB 2012     Esophageal reflux     ongoing     Fibroids      Genital problems     Lichens Schlerosis     GERD (gastroesophageal reflux disease)      Hallux valgus (acquired)      History of steroid therapy     Inhalers, eye drops     Hypersomnia with sleep apnea, unspecified     using CPAP     Kidney stone May 2018    2 stones     Lichen sclerosus 7/14/2011     Lymph edema 2010- present     Lymphedema bilateral with mixed lipedema. 9/30/2015     Lymphedema bilateral with mixed lipedema. 9/30/2015     Morbid obesity (H) 3/19/2013     MIGUELITO on CPAP 3/19/2013    Sleep study in 2004 and 2012       Pneumonia     Years ago - a few times     Pre-diabetes      Sleep apnea      Tendonitis currently     Uncomplicated asthma     mild     Urinary tract infection     A few times in past 10 years     Vision disorder [id number]    Dry Eye     Vitamin D deficiency 3/14/2015     Past Surgical History:   Procedure Laterality Date     C NONSPECIFIC PROCEDURE  1994    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     C NONSPECIFIC PROCEDURE  1999,2001    Foot surgeries x 2 (bunionectomy)     C NONSPECIFIC PROCEDURE  2000    hammer toes     COLONOSCOPY  5/15/2013    Procedure: COLONOSCOPY;  Colonoscopy;  Surgeon: Kota Blanco MD;  Location:  GI     COLONOSCOPY N/A 10/21/2019     Procedure: COLONOSCOPY, with biopsies by cold forceps;  Surgeon: Lydia Vickers MD;  Location:  GI     GYN SURGERY  2016    Uterine Ablation     LAPAROSCOPIC BYPASS GASTRIC, CHOLECYSTECTOMY, COMBINED N/A 1/28/2019    Procedure: LAPAROSCOPIC GASTRIC BYPASS;  Surgeon: Maykel Calvin MD;  Location: [facility] OR     LAPAROSCOPIC CHOLECYSTECTOMY N/A 4/11/2019    Procedure: LAPAROSCOPIC CHOLECYSTECTOMY;  Surgeon: Maykel Calvin MD;  Location: [facility] OR     lichen sclerosis       ORTHOPEDIC SURGERY       VASCULAR SURGERY  2015    Vienous closure on both legs     vein closure  12/2016    to prevent lymphedema       Social History:  Social History     Socioeconomic History     Marital status: Single     Spouse name: Not on file     Number of children: 0     Years of education: 18     Highest education level: Master's degree (e.g., MA, MS, Shannon, MEd, MSW, REA)   Occupational History     Occupation: student life     Employer: [employer]   Social Needs     Financial resource strain: Not very hard     Food insecurity     Worry: Never true     Inability: Never true     Transportation needs     Medical: No     Non-medical: No   Tobacco Use     Smoking status: Never Smoker     Smokeless tobacco: Never Used   Substance and Sexual Activity     Alcohol use: Not Currently     Alcohol/week: 0.0 standard drinks     Frequency: Monthly or less     Drinks per session: 1 or 2     Binge frequency: Never     Drug use: No     Sexual activity: Never     Partners: Male     Birth control/protection: Pill   Lifestyle     Physical activity     Days per week: 5 days     Minutes per session: 20 min     Stress: Only a little   Relationships     Social connections     Talks on phone: More than three times a week     Gets together: Twice a week     Attends Anabaptist service: 1 to 4 times per year     Active member of club or organization: Yes     Attends meetings of clubs or organizations: More than 4 times per year      Relationship status: Never      Intimate partner violence     Fear of current or ex partner: Not on file     Emotionally abused: Not on file     Physically abused: Not on file     Forced sexual activity: Not on file   Other Topics Concern      Service Not Asked     Blood Transfusions No     Caffeine Concern Not Asked     Occupational Exposure Not Asked     Hobby Hazards Not Asked     Sleep Concern Not Asked     Stress Concern Not Asked     Weight Concern Yes     Comment: 20 pound weight loss on weight watchers     Special Diet Not Asked     Back Care Not Asked     Exercise Not Asked     Bike Helmet Not Asked     Seat Belt Not Asked     Self-Exams Not Asked     Parent/sibling w/ CABG, MI or angioplasty before 65F 55M? No   Social History Narrative    Living arrangements - the patient lives alone.        Family History:  Family History   Problem Relation Age of Onset     C.A.D. Father 92        CAGB 98     Obesity Father      Cancer Father         stomach Dx age 74     Lipids Father      Hypertension Father      Coronary Artery Disease Father      Cerebrovascular Disease Father      Other Cancer Father         Esophogeal     Diabetes Mother         Type 2     Allergies Mother      Obesity Mother      C.A.D. Mother         triple bypass surgery, 65     Lipids Mother      Hypertension Mother      Coronary Artery Disease Mother      Hyperlipidemia Mother      Colon Polyps Mother      Anesthesia Reaction Mother      Thyroid Disease Mother      C.A.D. Paternal Grandfather 58        fatal     Coronary Artery Disease Paternal Grandfather      Cancer - colorectal Maternal Grandmother 75     Cancer Maternal Grandmother         liver     Hypertension Maternal Grandmother      Colon Cancer Maternal Grandmother      Other Cancer Maternal Grandmother         liver, leaukeamia     Colon Polyps Maternal Grandmother      Cancer - colorectal Paternal Aunt      Allergies Brother      Cancer Paternal Grandmother          stomach     Obesity Paternal Grandmother      Diabetes Paternal Grandmother         Type 2     Asthma Paternal Grandmother      Obesity Brother      Hypertension Brother      Cancer - colorectal Other         cousin  from colon cancer in 30's     Obesity Brother      Coronary Artery Disease Maternal Grandfather      Hypertension Maternal Grandfather        Review of Systems:  A complete review of systems reviewed by me is negative with the exeption of what has been mentioned in the history of present illness.  CONSTITUTIONAL: NEGATIVE for weight gain/loss, fever, chills, sweats or night sweats, drug allergies.  EYES: NEGATIVE for changes in vision, blind spots, double vision.  ENT: NEGATIVE for ear pain, sore throat, sinus pain, post-nasal drip, bloody nose  ENT:  POSITIVE for  runny nose  CARDIAC: NEGATIVE for fast heartbeats or fluttering in chest, chest pain or pressure, breathlessness when lying flat, swollen legs or swollen feet.  NEUROLOGIC: NEGATIVE weakness in the arms or legs.  NEUROLOGIC:  POSITIVE for  headaches and numbness in the arms or legs  DERMATOLOGIC: NEGATIVE for rashes, new moles or change in mole(s)  PULMONARY: NEGATIVE SOB at rest, SOB with activity, dry cough, productive cough, coughing up blood, wheezing or whistling when breathing.    GASTROINTESTINAL: NEGATIVE for nausea or vomitting, loose or watery stools, fat or grease in stools, abdominal pain, bowel movements black in color or blood noted.  GASTROINTESTINAL:  POSITIVE for  constipation  GENITOURINARY: NEGATIVE for pain during urination, blood in urine, urinating more frequently than usual, irregular menstrual periods.  MUSCULOSKELETAL: NEGATIVE for muscle pain, bone or joint pain, swollen joints.  ENDOCRINE: NEGATIVE for increased thirst or urination, diabetes.  LYMPHATIC: NEGATIVE for swollen lymph nodes, lumps or bumps in the breasts or nipple discharge.    Physical Examination:  Vitals: There were no vitals taken for this  visit.         No flowsheet data found.         {SHORT EXAM:775422}  Mallampati Class: {LEIGHANN NUMERAL I-IV:403069}.  Tonsillar Stage: {NUMBERS 1-4:933134}.    Impression/Plan:    ***  Literature provided regarding {SLEEP APNEA:442378}.      She will follow up with me in approximately two weeks after her sleep study has been competed to review the results and discuss plan of care.       Polysomnography reviewed.  Obstructive sleep apnea reviewed.  Complications of untreated sleep apnea were reviewed.    ***    CC: Sarah Stacy

## 2020-07-20 NOTE — PROGRESS NOTES
Sleep Consultation:    Date on this visit: 7/20/2020    Ana Wyman is sent by Sarah Stacy for a sleep consultation regarding MIGUELITO.    Primary Physician: Kelly Bedoya     Ana Wyman presents for further evaluation of previously diagnosed MIGUELITO.  Her medical history is significant for s/p gastric bypass (1/28/2019).     She was initially diagnosed at [facility] on 4/29/2005. Her AHI was 122.9/hr, .3/hr, O2 maty 77%. CPAP 10 cm was effective. Her wt was 300#.     She had a CPAP titration sleep study at [facility] on 8/21/2009. CPAP was titrated to 9 cm, but her RDI was still 8.7/hr (AHI was <1). Her weight was 317#.    She is on CPAP 9 cm. She has a Dreamstation (from 2009-unsure if it has a modem). She uses a nasal pillows mask. She has some dry eyes, which she thinks would benefit from not using CPAP.  After her initial diagnosis, she used CPAP every night and she felt much better with it. Since her weight loss, she does not think she has been using it as much and wonders if she still needs it. She sometimes forgets to put it on and sometimes removes it in the night. She does not really feel much different after those night. She does not feel CPAP is working for her. She travelled once (she thinks for a full week) and forgot the hose. She slept fine. She used to wake herself with her own snoring. She does not notice that when she does not have it on now. She denies morning headaches or confusion.     Ana goes to sleep at 11:00 PM during the week. She wakes up at 6:30 AM sometimes before her alarm (but also states that is usually when she is sleeping her best). She falls asleep in 60 minutes.  Ana has difficulty falling asleep since starting phentermine.  She never feels tired on that medication, even at night. She wakes up 2-3 times a night for 30+ minutes before falling back to sleep.  Ana wakes up to go to the bathroom and uncertain reasons. She may read or watch TV.  Sometimes she will get out of bed.  On weekends, Ana goes to sleep at 11:00 PM.  She wakes up at 8:00 AM without an alarm. She falls asleep in 60 minutes.  Patient gets an average of 7-8 hours of sleep per night.     Patient does watch TV in bed, worry in bed a little lately (concerned about furlough) and read in bed and does not use electronics in bed.     Ana does not do shift work.  She works day shifts. She works as a  for the [place] [employer]. She used to work as the  at [employer]. She lives with her mother.    Ana does not have a regular bed partner.  Patient sleeps on her back and side. She has occasional restless legs, which may be due to increased exercise. Her ferritin was 131 in January. Ana denies any sleep walking, sleep talking, dream enactment, sleep paralysis, cataplexy and hypnogogic/hypnopompic hallucinations. Her dentist felt she grinds her teeth, but she is not aware of it. They did not recommend a bite guard.     Ana denies difficulty breathing through her nose, claustrophobia, reflux at night and heartburn. She has a little depression/anxiety with the pandemic. She has not needed her albuterol since her gastric bypass.    Ana has lost 100 pounds compared to her weight at her last sleep study (216# recently).  Patient describes themself as both a morning or night person.  She would prefer to go to sleep at 12:00 AM and wake up at 7-8:00 AM.  Patient's [place] Sleepiness score 6/24 inconsistent with daytime sleepiness.  She did not feel tired in the daytime, even before starting phentermine. She will probably switch soon from phentermine to something else.    Ana denies taking naps. She takes some inadvertant naps watching TV after work.  She used to fall asleep driving before being on CPAP.  Patient was counseled on the importance of driving while alert, to pull over if drowsy, or nap before getting into the vehicle if  sleepy.  She uses no caffeine.     Allergies:    Allergies   Allergen Reactions     Nsaids Other (See Comments)     Had gastric bypass - needs to avoid NSAIDS     Clindamycin Diarrhea     Codeine Nausea and Vomiting     Shellfish Allergy        Medications:    Current Outpatient Medications   Medication Sig Dispense Refill     albuterol (PROAIR HFA/PROVENTIL HFA/VENTOLIN HFA) 108 (90 Base) MCG/ACT inhaler Inhale 2 puffs into the lungs every 4 hours as needed for shortness of breath / dyspnea or wheezing 1 Inhaler 0     BIOTIN PO Take 5,000 mcg by mouth daily At noon       Calcium Citrate-Vitamin D (CALCIUM CITRATE CHEWY BITE PO) Take 500 mg by mouth 3 times daily Plus D,noon, supper, HS       childrens multivitamin w/iron (FLINTSTONES COMPLETE) 60 MG chewable tablet Take 2 chew tab by mouth daily With breakfast       Cholecalciferol (VITAMIN D) 2000 units CAPS Take 3 capsules by mouth At Bedtime        clobetasol (TEMOVATE) 0.05 % ointment Apply clobetasol  0.05 percent ointment, sparingly (a dot 3 mm wide) in a thin film.  Apply to affected area only, once or twice weekly for maintenance. 45 g 2     Hypromellose (ARTIFICIAL TEARS OP) Apply 1-2 drops to eye daily as needed       metFORMIN (GLUCOPHAGE-XR) 500 MG 24 hr tablet Take 3 tablets (1,500 mg) by mouth daily 270 tablet 0     nitroFURantoin macrocrystal-monohydrate (MACROBID) 100 MG capsule Take 100 mg by mouth       nystatin (MYCOSTATIN) 429279 UNIT/GM external cream Apply topically 2 times daily as needed for dry skin Apply as needed for rash. 30 g 3     phentermine (ADIPEX-P) 15 MG capsule Take 1 capsule daily for 7 days, if tolerating may increase to 2 tablets daily. 173 capsule 0     polyethylene glycol (MIRALAX/GLYCOLAX) packet Take 17 g by mouth daily 90 packet 3     triamcinolone (KENALOG) 0.1 % external cream Apply topically 2 times daily as needed for irritation Apply twice daily PRN rash 30 g 3     vitamin (B COMPLEX-C) tablet Take 1 tablet by mouth  every morning       vitamin B-12 (CYANOCOBALAMIN) 2500 MCG sublingual tablet Take 1,250 mcg by mouth twice a week With breakfast         Problem List:  Patient Active Problem List    Diagnosis Date Noted     Postural hypotension 07/29/2019     Priority: Medium     Intertrigo 07/29/2019     Priority: Medium     Morbid obesity (H) 07/29/2019     Priority: Medium     S/P laparoscopic cholecystectomy 04/18/2019     Priority: Medium     Bariatric surgery status 02/05/2019     Priority: Medium     Postsurgical malabsorption 02/05/2019     Priority: Medium     Fatty liver 07/27/2018     Priority: Medium     Mild intermittent asthma without complication 06/11/2018     Priority: Medium     Lymphedema bilateral with mixed lipedema. 09/30/2015     Priority: Medium     Baker's cyst of knee 09/03/2015     Priority: Medium     Acanthosis nigricans 03/24/2015     Priority: Medium     Cutaneous skin tags 03/24/2015     Priority: Medium     MIGUELITO on CPAP 03/19/2013     Priority: Medium     Sleep study in 2004 and 2012         Uterine fibroid 08/02/2012     Priority: Medium     Lichen sclerosus 07/14/2011     Priority: Medium     Family history of malignant neoplasm of genital organ, other 06/11/2007     Priority: Medium     FAMILY HX GI MALIGNANCY 05/31/2007     Priority: Medium     Hypersomnia with sleep apnea 07/06/2005     Priority: Medium     Problem list name updated by automated process. Provider to review       Esophageal reflux 09/27/2004     Priority: Medium     Allergic state 09/27/2004     Priority: Medium     Problem list name updated by automated process. Provider to review          Past Medical/Surgical History:  Past Medical History:   Diagnosis Date     Allergic rhinitis, cause unspecified      Cellulitis 2005    2 episodes, one with hospitalization [facility]     DUB (dysfunctional uterine bleeding)     Neg EMB 2012     Esophageal reflux     ongoing     Fibroids      Genital problems     [place] Schlerosis     GERD  (gastroesophageal reflux disease)      Hallux valgus (acquired)      History of steroid therapy     Inhalers, eye drops     Hypersomnia with sleep apnea, unspecified     using CPAP     Kidney stone May 2018    2 stones     Lichen sclerosus 7/14/2011     Lymph edema 2010- present     Lymphedema bilateral with mixed lipedema. 9/30/2015     Lymphedema bilateral with mixed lipedema. 9/30/2015     Morbid obesity (H) 3/19/2013     MIGUELITO on CPAP 3/19/2013    Sleep study in 2004 and 2012       Pneumonia     Years ago - a few times     Pre-diabetes      Sleep apnea      Tendonitis currently     Uncomplicated asthma     mild     Urinary tract infection     A few times in past 10 years     Vision disorder [id number]    Dry Eye     Vitamin D deficiency 3/14/2015     Past Surgical History:   Procedure Laterality Date     C NONSPECIFIC PROCEDURE  1994    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     C NONSPECIFIC PROCEDURE  1999,2001    Foot surgeries x 2 (bunionectomy)     C NONSPECIFIC PROCEDURE  2000    hammer toes     COLONOSCOPY  5/15/2013    Procedure: COLONOSCOPY;  Colonoscopy;  Surgeon: Kota Blanco MD;  Location:  GI     COLONOSCOPY N/A 10/21/2019    Procedure: COLONOSCOPY, with biopsies by cold forceps;  Surgeon: Lydia Vickers MD;  Location:  GI     GYN SURGERY  2016    Uterine Ablation     LAPAROSCOPIC BYPASS GASTRIC, CHOLECYSTECTOMY, COMBINED N/A 1/28/2019    Procedure: LAPAROSCOPIC GASTRIC BYPASS;  Surgeon: Maykel Calvin MD;  Location:  OR     LAPAROSCOPIC CHOLECYSTECTOMY N/A 4/11/2019    Procedure: LAPAROSCOPIC CHOLECYSTECTOMY;  Surgeon: Maykel Calvin MD;  Location:  OR     lichen sclerosis       ORTHOPEDIC SURGERY       VASCULAR SURGERY  2015    Vienous closure on both legs     vein closure  12/2016    to prevent lymphedema       Social History:  Social History     Socioeconomic History     Marital status: Single     Spouse name: Not on file     Number of children: 0     Years of  education: 18     Highest education level: Master's degree (e.g., MA, MS, Shannon, MEd, MSW, REA)   Occupational History     Occupation: student life     Employer: [employer]   Social Needs     Financial resource strain: Not very hard     Food insecurity     Worry: Never true     Inability: Never true     Transportation needs     Medical: No     Non-medical: No   Tobacco Use     Smoking status: Never Smoker     Smokeless tobacco: Never Used   Substance and Sexual Activity     Alcohol use: Not Currently     Alcohol/week: 0.0 standard drinks     Frequency: Monthly or less     Drinks per session: 1 or 2     Binge frequency: Never     Drug use: No     Sexual activity: Never     Partners: Male     Birth control/protection: Pill   Lifestyle     Physical activity     Days per week: 5 days     Minutes per session: 20 min     Stress: Only a little   Relationships     Social connections     Talks on phone: More than three times a week     Gets together: Twice a week     Attends Mandaen service: 1 to 4 times per year     Active member of club or organization: Yes     Attends meetings of clubs or organizations: More than 4 times per year     Relationship status: Never      Intimate partner violence     Fear of current or ex partner: Not on file     Emotionally abused: Not on file     Physically abused: Not on file     Forced sexual activity: Not on file   Other Topics Concern      Service Not Asked     Blood Transfusions No     Caffeine Concern Not Asked     Occupational Exposure Not Asked     Hobby Hazards Not Asked     Sleep Concern Not Asked     Stress Concern Not Asked     Weight Concern Yes     Comment: 20 pound weight loss on weight watchers     Special Diet Not Asked     Back Care Not Asked     Exercise Not Asked     Bike Helmet Not Asked     Seat Belt Not Asked     Self-Exams Not Asked     Parent/sibling w/ CABG, MI or angioplasty before 65F 55M? No   Social History Narrative    Living  arrangements - the patient lives alone.        Family History:  Family History   Problem Relation Age of Onset     C.A.D. Father 92        CAGB 98     Obesity Father      Cancer Father         stomach Dx age 74     Lipids Father      Hypertension Father      Coronary Artery Disease Father      Cerebrovascular Disease Father      Other Cancer Father         Esophogeal     Diabetes Mother         Type 2     Allergies Mother      Obesity Mother      C.A.D. Mother         triple bypass surgery, 65     Lipids Mother      Hypertension Mother      Coronary Artery Disease Mother      Hyperlipidemia Mother      Colon Polyps Mother      Anesthesia Reaction Mother      Thyroid Disease Mother      Sleep Apnea Mother      Sleep Apnea Brother      Sleep Apnea Brother      C.A.D. Paternal Grandfather 58        fatal     Coronary Artery Disease Paternal Grandfather      Cancer - colorectal Maternal Grandmother 75     Cancer Maternal Grandmother         liver     Hypertension Maternal Grandmother      Colon Cancer Maternal Grandmother      Other Cancer Maternal Grandmother         liver, leaukeamia     Colon Polyps Maternal Grandmother      Cancer - colorectal Paternal Aunt      Allergies Brother      Cancer Paternal Grandmother         stomach     Obesity Paternal Grandmother      Diabetes Paternal Grandmother         Type 2     Asthma Paternal Grandmother      Obesity Brother      Hypertension Brother      Cancer - colorectal Other         cousin  from colon cancer in 's     Obesity Brother      Coronary Artery Disease Maternal Grandfather      Hypertension Maternal Grandfather        Review of Systems:  A complete review of systems reviewed by me is negative with the exeption of what has been mentioned in the history of present illness.  CONSTITUTIONAL: NEGATIVE for weight gain/loss, fever, chills, sweats or night sweats, drug allergies.  EYES: NEGATIVE for changes in vision, blind spots, double vision.  ENT: NEGATIVE  "for ear pain, sore throat, sinus pain, post-nasal drip, bloody nose  ENT:  POSITIVE for  runny nose  CARDIAC: NEGATIVE for fast heartbeats or fluttering in chest, chest pain or pressure, breathlessness when lying flat, swollen legs or swollen feet.  NEUROLOGIC: NEGATIVE weakness in the arms or legs.  NEUROLOGIC:  POSITIVE for  headaches and numbness in the arms or legs  DERMATOLOGIC: NEGATIVE for rashes, new moles or change in mole(s)  PULMONARY: NEGATIVE SOB at rest, SOB with activity, dry cough, productive cough, coughing up blood, wheezing or whistling when breathing.    GASTROINTESTINAL: NEGATIVE for nausea or vomitting, loose or watery stools, fat or grease in stools, abdominal pain, bowel movements black in color or blood noted.  GASTROINTESTINAL:  POSITIVE for  constipation  GENITOURINARY: NEGATIVE for pain during urination, blood in urine, urinating more frequently than usual, irregular menstrual periods.  MUSCULOSKELETAL: NEGATIVE for muscle pain, bone or joint pain, swollen joints.  ENDOCRINE: NEGATIVE for increased thirst or urination, diabetes.  LYMPHATIC: NEGATIVE for swollen lymph nodes, lumps or bumps in the breasts or nipple discharge.    Physical Examination:  Vitals: Ht 1.626 m (5' 4\")   Wt 98 kg (216 lb)   BMI 37.08 kg/m           [place] Total Score 7/20/2020   Total score - [place] 6              Impression/Plan:    (G47.33) MIGUELITO (obstructive sleep apnea)  (primary encounter diagnosis)  Comment: Ana presents for management of previously diagnosed severe MIGUELITO.  She was initially diagnosed in 2005.  Her AHI was 123/h CPAP 10 cm was effective.  Her weight was 300 pounds.  She had a titration study in 2009 and her pressure was turned down to 9 cm, despite her weight being up to 317 pounds.  She used CPAP religiously and benefited from it.  About 1.5 years ago, she had bariatric surgery.  Her weight is down to about 215 pounds.  She does not feel CPAP is working for her.  She removes it in the " night or sometimes forgets to put it on, and feels no different the next day.  She does not have a bed partner to know if she snores without CPAP. Her BMI is 37. She inquired about having a sleep study before the COVID pandemic and was told her BMI was not reduced enough. She does have dry eyes and thinks CPAP exacerbates that.  Plan:  I walked her through changing her machine to an auto trial mode for 2 weeks with pressures 4-20 cm.  I will see her back in a month to review a download and see if she tolerates the CPAP any better.  If the pressure is often at the lower limit, or if she is still removing CPAP in her sleep, we will repeat a sleep study.  I told her she could try to go a few days without CPAP to see if she notices any difference. An order was placed for new CPAP supplies. We discussed the process of transferring her care to [facility].      (G47.00) Insomnia, unspecified type  Comment: Ana has been having difficulty sleeping since starting phentermine.  It takes an hour for her to fall asleep.  She wakes 2-3 times per night for 30 minutes or more.  She goes to bed around 11 PM but may not fall asleep until midnight or later.  She is up for the day at 6:30 AM during the week and sleeps in until 7-8 AM on weekends.  She denies sleepiness in the daytime, but also says she sometimes dozes off for 10 minutes after work.  She has mild restless legs lately as well.  Her ferritin was 131 recently.  She has been more active lately, which could be contributing to her symptoms.  She does not have caffeine or alcohol.  Plan: She will be switching from phentermine to another weight loss medication fairly soon.  We will reevaluate her insomnia at her follow-up visit.  We will continue to monitor her restless legs for now.  It is possible that going off phentermine could help her restless legs.  I would prefer not to start gabapentin at this point, as it can contribute to weight gain.      She will  follow up with me in approximately 1 month.       Polysomnography reviewed.  Obstructive sleep apnea reviewed.  Complications of untreated sleep apnea were reviewed.  58 minutes (3:32 PM- 4:30 PM) was spent during this visit, over 50% in counseling and coordination of care.   Bennett Goltz, PA-C     CC: Sarah Stacy

## 2020-07-22 ENCOUNTER — TELEPHONE (OUTPATIENT)
Dept: SLEEP MEDICINE | Facility: CLINIC | Age: 50
End: 2020-07-22

## 2020-07-30 NOTE — PROGRESS NOTES
"Ana Wyman is a 50 year old female who is being evaluated via a billable telephone visit.      The patient has been notified of following:     \"This telephone visit will be conducted via a call between you and your physician/provider. We have found that certain health care needs can be provided without the need for a physical exam.  This service lets us provide the care you need with a short phone conversation.  If a prescription is necessary we can send it directly to your pharmacy.  If lab work is needed we can place an order for that and you can then stop by our lab to have the test done at a later time.    Telephone visits are billed at different rates depending on your insurance coverage. During this emergency period, for some insurers they may be billed the same as an in-person visit.  Please reach out to your insurance provider with any questions.    If during the course of the call the physician/provider feels a telephone visit is not appropriate, you will not be charged for this service.\"    Patient has given verbal consent for Telephone visit?  Yes    What phone number would you like to be contacted at? [phone]    How would you like to obtain your AV[facility]? [facility]     Phone call duration: 26 minutes    Sarah Stacy PA-C      August 3, 2020    Return Medical Weight Management Note     Ana Wyman  MRN:  [id number]  :  1970      Dear Elke, Kelly Curiel,    I had the pleasure of doing a virtual visit with your patient Ana Wyman.  She is a 50 year old female who I am continuing to see for treatment of obesity related to:       2018   I have the following health issues associated with obesity: Pre-Diabetes, Sleep Apnea, Lower Extremity Edema, GERD (Reflux), Fatty Liver     She had gastric bypass surgery on 2019. I saw her in January for her annual appointment.    INTERVAL HISTORY:  On 2020 we started phentermine 15 mg and metformin  mg to assist in " weight loss.  At her last visit Ana stopped phentermine since it is not working for weight loss and may have been increasing her anxiety Increased metformin XR 1500 mg daily.  Did noticed she had some trouble sleeping after stopping the phentermine.  Having some trouble falling asleep,  And staying asleep.  Takes 60-90 min.  Has TV in bedroom.  She falls asleep to this.      Pt's mother had MI about 10 days ago.  She recently came home from the hospital but pt has been spending a lot of time with her.  Exercise, sleep, and diet have suffered during this time.       Pt was reading this blog and heard about the 5 day pouch test.  Would like to know my thoughts on this.        Previous Medications:  Phentermine 2013- lower dose worked well in past.  Upper dose caused occasional fast HR with walking.  (Lost 20 lbs)  Metformin 2013- tolerated well.  Was on along with phentermine.  Belviq 2013 lost to follow up- started after phentermine.  Qysemia 2015  Phentermine 2020- anxiousness, minimal wt loss    Pt not good candidate for Topiramate with hx of kidney stones and numbness in fingers.   She might be a candidate for a GLP 1 with her insulin resistance.  Reviewed this today.    CURRENT WEIGHT (PATIENT REPORTED):  215 lbs 0 oz    Wt Readings from Last 4 Encounters:   08/03/20 215 lb (97.5 kg)   07/20/20 216 lb (98 kg)   07/06/20 215 lb 6.4 oz (97.7 kg)   04/06/20 216 lb 1.6 oz (98 kg)       BARIATRIC METRICS:  Current Weight: 215 lb (97.5 kg)(pt reported)  Body mass index is 36.9 kg/m .   Wt change since last visit (lbs): -0.4  Cumulative weight loss (lbs): 124.3      DIETARY HISTORY  Meals Per Day: 3  Food Diary:   B: Triple Zero Greek yogurt with Fiber one cereal in yogurt  L: Salad with chicken, and berries  D: Chicken, salad or veggies  Snacks Per Day: once daily or less Berries or   Typical Snack: Mixed no salt   Fluid Intake: Gets 60 oz daily.  Was low a few weeks ago.    Calorie Containing Beverages:   Typical  Protein Food Choices: chicken, Yogurt, Ensure Max.  protein drink  Meals at Restaurant per week: seldom    Struggling With: Anxiety, mood swings.  Minimal weight loss    Exercise:   Feels she is doing well with this. Was walking 3-4 times a week until 2 weeks ago when mother had MI and was in the hospital.  Is also biking and hiking.  Wii fit and treadmill.     ROS:  General  HEENT  Dry Mouth: yes, worse when using CPAP  Hx of glaucoma: none  Vision changes: none  Cardiovascular  Chest Pain with Exertion: none  Palpitations: none,  Just problems with hypotension  Hx of heart disease: none  Endo:  Denies hx of thyroid Cancer   Psychiatric  Moods: Stable  Neurologic:  Hx of seizures: none  Paresthesias: tingling in hands (daily)  Headaches: HX of migraines   GI  Denies diarrhea on metformin  Constipation:  Improved on metformin  Denies hx of pancreatitis  Denies hx of gastroparesis,   Denies hx of small bowel obstruction.     History of kidney stones: Yes, Calcium oxalate Last one was in summer of 2019. Large family history of kidney stones.   History of kidney disease: none      MEDICATIONS:   Current Outpatient Medications   Medication     BIOTIN PO     Calcium Citrate-Vitamin D (CALCIUM CITRATE CHEWY BITE PO)     childrens multivitamin w/iron (FLINTSTONES COMPLETE) 60 MG chewable tablet     Cholecalciferol (VITAMIN D) 2000 units CAPS     clobetasol (TEMOVATE) 0.05 % ointment     Hypromellose (ARTIFICIAL TEARS OP)     metFORMIN (GLUCOPHAGE-XR) 500 MG 24 hr tablet     nystatin (MYCOSTATIN) 868496 UNIT/GM external cream     polyethylene glycol (MIRALAX/GLYCOLAX) packet     triamcinolone (KENALOG) 0.1 % external cream     vitamin (B COMPLEX-C) tablet     vitamin B-12 (CYANOCOBALAMIN) 2500 MCG sublingual tablet     albuterol (PROAIR HFA/PROVENTIL HFA/VENTOLIN HFA) 108 (90 Base) MCG/ACT inhaler     No current facility-administered medications for this visit.        LABS:  Hemoglobin A1C   Date Value Ref Range Status    01/27/2020 5.1 0 - 5.6 % Final     Comment:     Normal <5.7% Prediabetes 5.7-6.4%  Diabetes 6.5% or higher - adopted from ADA   consensus guidelines.       Vitamin D Deficiency screening   Date Value Ref Range Status   01/27/2020 56 20 - 75 ug/L Final     Comment:     Season, race, dietary intake, and treatment affect the concentration of   25-hydroxy-Vitamin D. Values may decrease during winter months and increase   during summer months. Values 20-29 ug/L may indicate Vitamin D insufficiency   and values <20 ug/L may indicate Vitamin D deficiency.  Vitamin D determination is routinely performed by an immunoassay specific for   25 hydroxyvitamin D3.  If an individual is on vitamin D2 (ergocalciferol)   supplementation, please specify 25 OH vitamin D2 and D3 level determination by   LCMSMS test [id number].       Sodium   Date Value Ref Range Status   02/14/2020 142 133 - 144 mmol/L Final     Potassium   Date Value Ref Range Status   02/14/2020 3.9 3.4 - 5.3 mmol/L Final     Chloride   Date Value Ref Range Status   02/14/2020 107 94 - 109 mmol/L Final     Carbon Dioxide   Date Value Ref Range Status   02/14/2020 29 20 - 32 mmol/L Final     Anion Gap   Date Value Ref Range Status   02/14/2020 6 3 - 14 mmol/L Final     Glucose   Date Value Ref Range Status   02/14/2020 82 70 - 99 mg/dL Final     Urea Nitrogen   Date Value Ref Range Status   02/14/2020 13 7 - 30 mg/dL Final     Creatinine   Date Value Ref Range Status   02/14/2020 0.71 0.52 - 1.04 mg/dL Final     GFR Estimate   Date Value Ref Range Status   02/14/2020 >90 >60 mL/min/[1.73_m2] Final     Comment:     Non  GFR Calc  Starting 12/18/2018, serum creatinine based estimated GFR (eGFR) will be   calculated using the Chronic Kidney Disease Epidemiology Collaboration   (CKD-EPI) equation.       Calcium   Date Value Ref Range Status   02/14/2020 9.2 8.5 - 10.1 mg/dL Final     Bilirubin Total   Date Value Ref Range Status   08/28/2019 0.4 0.2 - 1.3  "mg/dL Final     Alkaline Phosphatase   Date Value Ref Range Status   08/28/2019 86 40 - 150 U/L Final     ALT   Date Value Ref Range Status   08/28/2019 24 0 - 50 U/L Final     AST   Date Value Ref Range Status   08/28/2019 17 0 - 45 U/L Final     Cholesterol   Date Value Ref Range Status   07/31/2018 178 <200 mg/dL Final     HDL Cholesterol   Date Value Ref Range Status   07/31/2018 48 (L) >49 mg/dL Final     LDL Cholesterol Calculated   Date Value Ref Range Status   07/31/2018 92 <100 mg/dL Final     Comment:     Desirable:       <100 mg/dl     Triglycerides   Date Value Ref Range Status   07/31/2018 191 (H) <150 mg/dL Final     Comment:     Borderline high:  150-199 mg/dl  High:             200-499 mg/dl  Very high:       >499 mg/dl  Fasting specimen          PE:  Ht 5' 4\" (1.626 m)   Wt 215 lb (97.5 kg)   BMI 36.90 kg/m    GENERAL: Pt sounds in NAD. A&Ox3    ASSESSMENT AND PLAN:        1. Class 2 severe obesity due to excess calories with serious comorbidity and body mass index (BMI) of 37.0 to 37.9 in adult (H)  Healthy habits to assist with further weight loss were discussed. Ana will continue the metformin for now.  She will also start VICTOZA. She will go online to Victoza website to sign up for the savings card.    - liraglutide (VICTOZA) 18 MG/3ML solution; Inject 0.6 mg Subcutaneous daily for 7 days, THEN 1.2 mg daily for 7 days, THEN 1.8 mg daily. If hunger controlled, may stay at 1.2 mg dose.  Dispense: 9 mL; Refill: 2  - insulin pen needle (31G X 6 MM) 31G X 6 MM miscellaneous; Use 1 pen needles daily  Dispense: 100 each; Refill: 11    - MED THERAPY MANAGE REFERRAL for virtual pen teaching before starting medication.  I sent staff message to Lauren Bloch, Pharm D to review metformin use in light of starting new victoza.  She will review with pt and decided if this should be continued or not.     Use sample bariatric menu to bring calories down to 800-1000 daily. Check out this blog from a " successful bariatric patient who posts recipes she's adapted to make them bariatric friendly. [email or web address]     2. Insulin resistance   Ana will continue the metformin for now.  Prescribed   - liraglutide (VICTOZA) 18 MG/3ML solution; Inject 0.6 mg Subcutaneous daily for 7 days, THEN 1.2 mg daily for 7 days, THEN 1.8 mg daily. If hunger controlled, may stay at 1.2 mg dose.  Dispense: 9 mL; Refill: 2  - insulin pen needle (31G X 6 MM) 31G X 6 MM miscellaneous; Use 1 pen needles daily  Dispense: 100 each; Refill: 11      3. Insomnia  Start 25-50 mg benadryl 1/2 hour before bedtime or 1 mg melatonin for help with sleep.     Put pad of paper near bed to write down things to do when mind racing.   Go to bed earlier 10-10:30 and keep bedtime routine.                Sarah Stacy PA-C

## 2020-08-03 ENCOUNTER — VIRTUAL VISIT (OUTPATIENT)
Dept: SURGERY | Facility: CLINIC | Age: 50
End: 2020-08-03
Payer: COMMERCIAL

## 2020-08-03 VITALS — HEIGHT: 64 IN | WEIGHT: 215 LBS | BODY MASS INDEX: 36.7 KG/M2

## 2020-08-03 DIAGNOSIS — E66.01 CLASS 2 SEVERE OBESITY DUE TO EXCESS CALORIES WITH SERIOUS COMORBIDITY AND BODY MASS INDEX (BMI) OF 37.0 TO 37.9 IN ADULT (H): Primary | ICD-10-CM

## 2020-08-03 DIAGNOSIS — E88.819 INSULIN RESISTANCE: ICD-10-CM

## 2020-08-03 DIAGNOSIS — E66.812 CLASS 2 SEVERE OBESITY DUE TO EXCESS CALORIES WITH SERIOUS COMORBIDITY AND BODY MASS INDEX (BMI) OF 37.0 TO 37.9 IN ADULT (H): Primary | ICD-10-CM

## 2020-08-03 PROCEDURE — 99214 OFFICE O/P EST MOD 30 MIN: CPT | Mod: TEL | Performed by: PHYSICIAN ASSISTANT

## 2020-08-03 RX ORDER — LIRAGLUTIDE 6 MG/ML
INJECTION SUBCUTANEOUS
Qty: 9 ML | Refills: 2 | Status: SHIPPED | OUTPATIENT
Start: 2020-08-03 | End: 2020-09-28

## 2020-08-03 ASSESSMENT — MIFFLIN-ST. JEOR: SCORE: 1575.23

## 2020-08-03 NOTE — Clinical Note
Pt s/p bariatric surgery with weight gain. She is to see you for virtual pen teaching if victoza covered. She is also on Metformin XR.  I told her she should talk to you about if she should continue metformin or not.  Thanks.    ERWIN

## 2020-08-03 NOTE — PATIENT INSTRUCTIONS
Instructions:     Start liraglutide (VICTOZA) 18 MG/3ML solution;   Inject 0.6 mg Subcutaneous daily for 7 days,   THEN 1.2 mg daily for 7 days,   THEN 1.8 mg daily.   If hunger controlled, may stay at 1.2 mg dose.      Go online to Victoza website to sign up for the savings card.     - MED THERAPY MANAGE REFERRAL ordered.  They should call you to schedule a virtual pen teaching before starting medication.  I sent staff message to Lauren Bloch, Pharm D to review metformin use in light of starting new victoza.  She will review with you to decided if this should be continued or not.     Use sample bariatric menu to bring calories down to 800-1000 daily.      Insomnia  Start 25-50 mg benadryl 1/2 hour before bedtime or 1 mg melatonin for help with sleep.     Put pad of paper near bed to write down things to do when mind racing.   Go to bed earlier 10-10:30 and keep bedtime routine.    Check out this blog from a successful bariatric patient who posts recipes she's adapted to make them bariatric friendly. [email or web address]    Sample Menu after Bariatric Surgery    You do NOT need to eat/drink the full portion sizes listed below  Always stop when you are satisfied    Breakfast   cup 1% cottage cheese     cup mixed berries   Lunch 2 oz lean roast beef on   [place] Thin with 1 tsp. light [place]    small tomato, chopped, mixed with 1 tsp. light vinaigrette dressing   Dinner 2 oz grilled salmon    cup salad greens with 1 tsp. light salad dressing and 1 tsp. ground flax seed    cup quinoa or brown rice     Breakfast   cup egg substitute with   cup sautéed chopped vegetables  2 light [place] Krisp crackers   Lunch Tuna Melt:   cup tuna mixed with 1 tsp. light anton over   [place] Thin. Top with 2-3 slices cucumber and 1 oz slice of low fat cheese   Dinner 3 oz  grilled, broiled, or baked seasoned skinless chicken breast    cup asparagus     Breakfast   cup plain oatmeal made with skim or 1% milk with 1  Tbsp. flavored/unflavored protein powder added  1 mozzarella string cheese   Lunch 2 oz deli turkey breast  1/3 cup salad with 1 tsp. light salad dressing, 1/8 of a whole avocado and 1 Tbsp. sunflower seeds   Dinner 3 oz. pork loin made in a crock pot, seasoned with a spice rub    cup cooked carrots     Breakfast 1 cup breakfast casserole made with egg substitute, turkey sausage,  and steamed, chopped bell peppers   Lunch 2 oz. teriyaki turkey    cup mashed sweet potato with 1-2 spritzes of spray butter (like [place])    cup fresh pineapple   Dinner 3 oz low fat meatloaf    cup roasted garlic zucchini     Breakfast   cup leftover breakfast casserole    cup no sugar added applesauce with 1 Tbsp. unflavored protein powder and a sprinkle of cinnamon    Lunch 3 oz shrimp with 1-2 Tbsp. low-sugar cocktail sauce for dipping    c. whole wheat pasta drizzled with   tsp. olive oil   Dinner Grilled, seasoned kebob with 2 oz lean beef and   cup vegetables     Breakfast Breakfast pizza:   [place] Thin spread with 1 Tbsp. low sugar spaghetti sauce,   cup shredded low fat cheese, melted and 1 slice of [place] real     cup fresh fruit mixed with chopped almonds   Lunch   cup black bean soup  4-5 whole grain crackers   Dinner 3 oz  tilapia with lemon pepper seasoning    cup stewed tomatoes     Breakfast 2 hard boiled eggs (discard 1 egg yolk)    whole wheat English Muffin with 1 tsp. low sugar jelly   Lunch   cup leftover black bean soup topped with 1-2 Tbsp. low fat cheese  2-3 light [place] Krisp crackers   Dinner 3 oz sirloin steak    cup steamed broccoli     Bariatric Post Op Guidelines  General:    To avoid marginal ulcers avoid all forms of tobacco, alcohol in excess, caffeine, and NSAIDS (unless indicated for cardioprotection or othewise and opposed by a PPI).    Exercise is key for continued weight loss and weight maintenance. Aim for 30-60 minutes of physical activity most days of the week. Include cardiovascular and  strength training.    Continue lifelong vitamins supplementation and annual lab follow up.  All  patients should supplement with the following bariatric postoperative vitamins:  2 Complete multivitamins with minerals (at different times than calcium)  Vitamin D 5000 Int Units/125 mg daily   Calcium 600 mg twice daily or 500 mg hree times daily   Vitamin B12: 500 mcg of sl daily or 1000 mcg Inj monthly  B complex daily or Thiamine 100 mg weekly  1 Iron/Vit C. Daily for females who menstruate and/or as directed    The bariatric team should be aware and potentially evaluate all adverse gastrointestinal symptoms which can be a sign of complication. Inability to tolerate textured solid food (chicken, steak, fish) may need to be evaluated by endoscopy.    There is a 10% increase of Alcohol Use Disorder in patients with bariatric surgery.   Most often occurring around 2 years post op.  Please call the clinic if you feel alcohol is interfering in your daily life.  We can help.     Follow up annually lifelong. Obesity is a chronic disease.  Weight gain can be expected. The goal of follow-up visits is to ensure adequate vitamin and protein absorption, evaluate food intake behavior, review exercise/activity level, and assist with weight regain.    Nutritional:  Eat 3 meals per day  (No snacks between meals.)  Do not skip meals.  This can cause overeating at the next meal and will prevent adequate protein and nutritional intake.    Aim for 60-80 grams of protein per day.  Always eat your protein first. This assists with optimal nutrition and helps you stay full longer.    Eat your protein first, and then follow with fiber.    Add fiber by including fruits, vegetables, whole grains, and beans.     Portions should be about 1 cup per meal. Use measuring cups to be accurate.  Continue to use saucer/salad plates, infant/toddler silverware to keep portion sizes small and take small bites.    Eat S-L-O-W-L-Y to make each meal last  20-30 minutes. Always stop eating when satisfied.    Aim for 64 oz. of calorie-free fluids daily.    Avoid drinking 30 min before, during, and 30 min after meal    Avoid high sugar and high fat foods to prevent high calorie intake. This will reduce your rate of weight loss and can cause weight regain.   Check nutrition labels for less than 10 grams of sugar and less than 10 grams of fat per serving.            MEDICATION STARTED AT THIS APPOINTMENT    We are starting a GLP-1 (Glucagon-like Peptide-1) medication. Examples of this medication include Byetta, Bydureon, or Victoza, etc. The medication chosen will depend on insurance coverage, and can be changed to the medication that is covered under your plan. These medications work the same, in that they can help you lose weight and control your blood sugar. One of the ways it works is by slowing down the rate that food leaves your stomach. You feel weinstein and will eat less.  It also helps regulate hormones that can help improve your blood sugars.    Usually short acting insulins or oral agents are stopped or decreased by the doctor at the appointment. Low blood sugars are rare but can happen if patients are on insulin or other oral agents. If you notice consistent low sugars or high sugars, your medication may need to be adjusted after your appointment. If this is the case, please call RN and provide her your blood sugar record from the last 3-4 days. The RN will get in touch with the doctor and call you back/[employer] message with recommendations. We tolerate high sugars for a bit, so if sugars are running 180-200, this is ok. As weight starts dropping the blood sugars should too. If readings are consistently over 200 for 1-2 weeks, then you should call the doctor/nurse.      Victoza: Starting dose is 0.6 mg for a week, then 1.2 mg for a week, and then 1.8 mg thereafter (if prescribed by doctor). This medication is given daily. Pen needles need to be ordered also.        Side effects of GLP- Medications include: The most common side effects are all GI related and consist of: nausea, constipation, diarrhea, burping, or gassiness. Patients are advised to eat slowly and less, and nausea typically passes if people can stick it out.     The risk of pancreatitis (inflammation of the pancreas) has been associated with this type of medication, but is very rare.  If you have had pancreatitis in the past, this medication may not be for you. Please let us know about any past history of pancreas problems.      Symptoms of pancreatitis include: Pain in your upper stomach area which  may travel to your back and be worse after eating. Your stomach area may be tender to the touch.  You may have vomiting or nausea and/or have a fever. If you should develop any of these symptoms, stop the medication and contact your primary care doctor. They will do a blood test to check for pancreatitis.         There is a small chance you may have some low blood sugar after taking the medication.   The signs of low blood sugar are:  o Weakness  o Shaky   o Hungry  o Sweating  o Confusion      See below for ways to treat low blood sugar without adding in lots of extra calories.      Treating Low Blood Sugar    If you have symptoms of low blood sugar (sweating, shaking, dizzy, confused) eat 15 grams of carbs and wait 15 minutes:      Glucose Tabs are best for sugars under 70 -  Dex4 or BD Glucose tablets are good, you will need to take 3-4 of these to equal 15 grams.       One small box of raisins    4 oz fruit juice box or   cup fruit juice    1 small apple    1 small banana      cup canned fruit in water      English muffin or a slice of bread with jelly     1 low fat frozen waffle with sugar-free syrup      cup cottage cheese with   cup frozen or fresh blueberries    1 cup skim or low-fat milk      cup whole grain cereal    4-6 crackers such as Triscuits      This medication is usually covered by insurance for  patients with a diagnosis of Type 2 Diabetes/prediabetes.  IT may be covered if you have insulin resistance as well.  Depending on insurance coverage, the medication may be changed to a different formulary, but they all work in the same way. Sometimes a prior authorization is required, which may take up to 1-2 weeks for an insurance company to make a decision if they will cover the medication. Please be patient, you will be notified either way.     Call the nurse at [phone] if you have any questions or concerns. (Do not stop taking it if you don't think it's working. For some people it works without them knowing it.)     In order to get refills of this or any medication we prescribe you must be seen in the medical weight mgmt clinic every 2-4 months.

## 2020-08-04 ENCOUNTER — TELEPHONE (OUTPATIENT)
Dept: SURGERY | Facility: CLINIC | Age: 50
End: 2020-08-04

## 2020-08-04 NOTE — TELEPHONE ENCOUNTER
Called pt as directed by DORIAN.  Per [facility] directions instructed pt to do her Victoza teaching with pharmacist.  Set up appointment for Thursday.  Instructed pt per PA directions to cut back on metformin to 1 tab per day after starting Victoza.  Pt states she has medication ready at home.  No further needs at this time.  Ne oMrris RN on 8/4/2020 at 11:27 AM

## 2020-08-06 ENCOUNTER — VIRTUAL VISIT (OUTPATIENT)
Dept: PHARMACY | Facility: CLINIC | Age: 50
End: 2020-08-06
Payer: COMMERCIAL

## 2020-08-06 DIAGNOSIS — E66.01 MORBID OBESITY (H): Primary | ICD-10-CM

## 2020-08-06 PROCEDURE — 99207 ZZC NO CHARGE LOS: CPT | Performed by: PHARMACIST

## 2020-08-06 NOTE — PROGRESS NOTES
Clinical Pharmacy Consult:                                                    Ana Wyman is a 50 year old female contacted via secure video for a clinical pharmacist consult.  She was referred to me from Sarah Stacy PA-C.     Video-Visit Details    Type of service:  Video Visit    Video Start Time: 9:30 AM  Video End Time (time video stopped): 10:00 AM    Originating Location (pt. Location): Home    Distant Location (provider location):  [facility]    Mode of Communication:  Video Conference via [employer]    Reason for Consult: Victoza teaching.    Discussion: Completed Victoza administration teaching. Discussed appropriate storage and stability. Discussed proper sharps disposal. Discussed Victoza mechanism of action, warnings, side effects and efficacy. Answered patient questions.     Plan:  1. Start Victoza as prescribed.     Lauren Bloch, PharmD  Medication Therapy Management Pharmacist   [facility]   Phone: [phone]

## 2020-08-23 DIAGNOSIS — L30.4 INTERTRIGO: ICD-10-CM

## 2020-08-24 ENCOUNTER — VIRTUAL VISIT (OUTPATIENT)
Dept: SLEEP MEDICINE | Facility: CLINIC | Age: 50
End: 2020-08-24
Payer: COMMERCIAL

## 2020-08-24 VITALS — BODY MASS INDEX: 36.19 KG/M2 | HEIGHT: 64 IN | WEIGHT: 212 LBS

## 2020-08-24 DIAGNOSIS — G47.10 HYPERSOMNIA WITH SLEEP APNEA: Primary | ICD-10-CM

## 2020-08-24 DIAGNOSIS — G47.30 HYPERSOMNIA WITH SLEEP APNEA: Primary | ICD-10-CM

## 2020-08-24 PROCEDURE — 99442 ZZC PHYSICIAN TELEPHONE EVALUATION 11-20 MIN: CPT | Performed by: PHYSICIAN ASSISTANT

## 2020-08-24 RX ORDER — TRIAMCINOLONE ACETONIDE 1 MG/G
CREAM TOPICAL
Qty: 30 G | Refills: 3 | Status: SHIPPED | OUTPATIENT
Start: 2020-08-24 | End: 2021-02-24

## 2020-08-24 RX ORDER — NYSTATIN 100000 U/G
CREAM TOPICAL
Qty: 30 G | Refills: 3 | Status: SHIPPED | OUTPATIENT
Start: 2020-08-24 | End: 2021-02-24

## 2020-08-24 ASSESSMENT — MIFFLIN-ST. JEOR: SCORE: 1561.63

## 2020-08-24 NOTE — PROGRESS NOTES
"Ana Wyman is a 50 year old female who is being evaluated via a billable telephone visit.      The patient has been notified of following:     \"This telephone visit will be conducted via a call between you and your physician/provider. We have found that certain health care needs can be provided without the need for a physical exam.  This service lets us provide the care you need with a short phone conversation.  If a prescription is necessary we can send it directly to your pharmacy.  If lab work is needed we can place an order for that and you can then stop by our lab to have the test done at a later time.    Telephone visits are billed at different rates depending on your insurance coverage. During this emergency period, for some insurers they may be billed the same as an in-person visit.  Please reach out to your insurance provider with any questions.    If during the course of the call the physician/provider feels a telephone visit is not appropriate, you will not be charged for this service.\"    Patient has given verbal consent for Telephone visit?  Yes    What phone number would you like to be contacted at? [phone]    How would you like to obtain your AVS? MyChart      "

## 2020-08-24 NOTE — TELEPHONE ENCOUNTER
Called patient to check if she is still in need of the triamcinolone and nystatin or if it was an auto pharmacy refill.    Patient stated she is still needing to use the drug combo for skin break down in the creases in her belly.  She uses it sometimes a couple of times weekly.  She may also increase to daily as needed until the skin break down is controlled.    Will update provider re: patient response.  Laura Paulino, MS, RD, RN

## 2020-08-24 NOTE — PROGRESS NOTES
CPAP Follow-Up Visit:    Date on this visit: 8/24/2020    Ana Wyman has a follow-up visit today for continued management of previously diagnosed MIGUELITO.  Her medical history is significant for s/p gastric bypass (1/28/2019).      She was initially diagnosed at [facility] on 4/29/2005. Her AHI was 122.9/hr, .3/hr, O2 maty 77%. CPAP 10 cm was effective. Her wt was 300#.      She had a CPAP titration sleep study at [facility] on 8/21/2009. CPAP was titrated to 9 cm, but her RDI was still 8.7/hr (AHI was <1). Her weight was 317#.    At her last visit she mentioned she had lost weight, from 300 pounds to 215 pounds (s/p gastric bypass). She was struggling with CPAP and did not really notice a benefit from using it. We changed her pressure from 9 cm to 4-20 cm. Her machine did an auto trial for 2 weeks and then set itself to the 90th% pressure, which it figured was 6 cm.    She has been using the CPAP regularly since the pressure change. She has been tolerating it well. She has been a little tired. She has had family staying with her because her mother has been ill. She is not aware of snoring with it. She sleeps on her sides.    PAP machine: Bramasol.     The compliance data shows that the patient used the CPAP for 29/30 nights, 86.7% of nights for >4 hours.  The 90th% pressure is 6 cm.  The average time in large leak is 1 min.  The average nightly usage is 5:53.  The average AHI is 1/hr.       Interface:  Mask: nasal pillows mask      Past medical/surgical history, family history, social history, medications and allergies were reviewed.      Problem List:  Patient Active Problem List    Diagnosis Date Noted     Postural hypotension 07/29/2019     Priority: Medium     Intertrigo 07/29/2019     Priority: Medium     Morbid obesity (H) 07/29/2019     Priority: Medium     S/P laparoscopic cholecystectomy 04/18/2019     Priority: Medium     Bariatric surgery status 02/05/2019     Priority: Medium      Postsurgical malabsorption 02/05/2019     Priority: Medium     Fatty liver 07/27/2018     Priority: Medium     Mild intermittent asthma without complication 06/11/2018     Priority: Medium     Lymphedema bilateral with mixed lipedema. 09/30/2015     Priority: Medium     Baker's cyst of knee 09/03/2015     Priority: Medium     Acanthosis nigricans 03/24/2015     Priority: Medium     Cutaneous skin tags 03/24/2015     Priority: Medium     MIGUELITO on CPAP 03/19/2013     Priority: Medium     Sleep study in 2004 and 2012         Uterine fibroid 08/02/2012     Priority: Medium     Lichen sclerosus 07/14/2011     Priority: Medium     Family history of malignant neoplasm of genital organ, other 06/11/2007     Priority: Medium     FAMILY HX GI MALIGNANCY 05/31/2007     Priority: Medium     Hypersomnia with sleep apnea 07/06/2005     Priority: Medium     Problem list name updated by automated process. Provider to review       Esophageal reflux 09/27/2004     Priority: Medium     Allergic state 09/27/2004     Priority: Medium     Problem list name updated by automated process. Provider to review          Impression/Plan:    (G47.10,  G47.30) Hypersomnia with sleep apnea  (primary encounter diagnosis)  Comment: The CPAP converted her to a pressure of 6 cm after an auto trial of 4-20 cm. She is tolerating it well, but not sure how she would sleep without it as she has not tried.  Plan:  She will go without CPAP for a few days and send me a MyChart note with a summary of her symptoms. If she does not notice a difference with or without the CPAP, we will consider repeating a sleep study.      Clinical follow up will be determined after she gets back to me via [employer].    13 minutes (11:00 AM- 11:13 AM) spent with patient, all of which were spent face-to-face counseling, consulting, coordinating plan of care.      Bennett Goltz, PA-C    CC: No ref. provider found

## 2020-08-24 NOTE — PATIENT INSTRUCTIONS
Your BMI is Body mass index is 36.39 kg/m .  Weight management is a personal decision.  If you are interested in exploring weight loss strategies, the following discussion covers the approaches that may be successful. Body mass index (BMI) is one way to tell whether you are at a healthy weight, overweight, or obese. It measures your weight in relation to your height.  A BMI of 18.5 to 24.9 is in the healthy range. A person with a BMI of 25 to 29.9 is considered overweight, and someone with a BMI of 30 or greater is considered obese. More than two-thirds of American adults are considered overweight or obese.  Being overweight or obese increases the risk for further weight gain. Excess weight may lead to heart disease and diabetes.  Creating and following plans for healthy eating and physical activity may help you improve your health.  Weight control is part of healthy lifestyle and includes exercise, emotional health, and healthy eating habits. Careful eating habits lifelong are the mainstay of weight control. Though there are significant health benefits from weight loss, long-term weight loss with diet alone may be very difficult to achieve- studies show long-term success with dietary management in less than 10% of people. Attaining a healthy weight may be especially difficult to achieve in those with severe obesity. In some cases, medications, devices and surgical management might be considered.  What can you do?  If you are overweight or obese and are interested in methods for weight loss, you should discuss this with your provider.     Consider reducing daily calorie intake by 500 calories.     Keep a food journal.     Avoiding skipping meals, consider cutting portions instead.    Diet combined with exercise helps maintain muscle while optimizing fat loss. Strength training is particularly important for building and maintaining muscle mass. Exercise helps reduce stress, increase energy, and improves fitness.  Increasing exercise without diet control, however, may not burn enough calories to loose weight.       Start walking three days a week 10-20 minutes at a time    Work towards walking thirty minutes five days a week     Eventually, increase the speed of your walking for 1-2 minutes at time    In addition, we recommend that you review healthy lifestyles and methods for weight loss available through the National Institutes of Health patient information sites:  http://win.niddk.nih.gov/publications/index.htm    And look into health and wellness programs that may be available through your health insurance provider, employer, local community center, or ezio club.    Weight management plan: Patient was referred to their PCP to discuss a diet and exercise plan.

## 2020-08-28 DIAGNOSIS — E66.812 CLASS 2 SEVERE OBESITY DUE TO EXCESS CALORIES WITH SERIOUS COMORBIDITY AND BODY MASS INDEX (BMI) OF 37.0 TO 37.9 IN ADULT (H): ICD-10-CM

## 2020-08-28 DIAGNOSIS — E88.819 INSULIN RESISTANCE: ICD-10-CM

## 2020-08-28 DIAGNOSIS — E66.01 CLASS 2 SEVERE OBESITY DUE TO EXCESS CALORIES WITH SERIOUS COMORBIDITY AND BODY MASS INDEX (BMI) OF 37.0 TO 37.9 IN ADULT (H): ICD-10-CM

## 2020-08-31 RX ORDER — METFORMIN HCL 500 MG
TABLET, EXTENDED RELEASE 24 HR ORAL
Qty: 270 TABLET | Refills: 0 | OUTPATIENT
Start: 2020-08-31

## 2020-08-31 NOTE — TELEPHONE ENCOUNTER
Taking 3 Metformin daily - received #[phone] on 7/6/2020.  Requesting too early.  She is to be seen by you mid September. I will send her a My Chart message. Juan RIVERA

## 2020-09-03 ENCOUNTER — VIRTUAL VISIT (OUTPATIENT)
Dept: SURGERY | Facility: CLINIC | Age: 50
End: 2020-09-03
Payer: COMMERCIAL

## 2020-09-03 VITALS — WEIGHT: 213 LBS | BODY MASS INDEX: 36.37 KG/M2 | HEIGHT: 64 IN

## 2020-09-03 DIAGNOSIS — L98.7 EXCESS SKIN OF ABDOMEN: ICD-10-CM

## 2020-09-03 DIAGNOSIS — E65 ABDOMINAL PANNUS: ICD-10-CM

## 2020-09-03 DIAGNOSIS — B37.2 CANDIDAL INTERTRIGO: Primary | ICD-10-CM

## 2020-09-03 DIAGNOSIS — E88.819 INSULIN RESISTANCE: ICD-10-CM

## 2020-09-03 DIAGNOSIS — E66.812 CLASS 2 SEVERE OBESITY DUE TO EXCESS CALORIES WITH SERIOUS COMORBIDITY AND BODY MASS INDEX (BMI) OF 37.0 TO 37.9 IN ADULT (H): ICD-10-CM

## 2020-09-03 DIAGNOSIS — E66.01 CLASS 2 SEVERE OBESITY DUE TO EXCESS CALORIES WITH SERIOUS COMORBIDITY AND BODY MASS INDEX (BMI) OF 37.0 TO 37.9 IN ADULT (H): ICD-10-CM

## 2020-09-03 PROCEDURE — 99213 OFFICE O/P EST LOW 20 MIN: CPT | Mod: 95 | Performed by: PHYSICIAN ASSISTANT

## 2020-09-03 RX ORDER — METFORMIN HCL 500 MG
1500 TABLET, EXTENDED RELEASE 24 HR ORAL DAILY
Qty: 270 TABLET | Refills: 1 | Status: SHIPPED | OUTPATIENT
Start: 2020-09-03 | End: 2021-02-24

## 2020-09-03 RX ORDER — LIRAGLUTIDE 6 MG/ML
1.2 INJECTION SUBCUTANEOUS DAILY
Qty: 27 ML | Refills: 0 | Status: SHIPPED | OUTPATIENT
Start: 2020-09-03 | End: 2020-11-06

## 2020-09-03 ASSESSMENT — MIFFLIN-ST. JEOR: SCORE: 1566.16

## 2020-09-03 NOTE — PATIENT INSTRUCTIONS
Continue on liraglutide (VICTOZA PEN) 18 MG/3ML solution; Inject 1.2 mg Subcutaneous daily Can increase to 1.8 mg if hunger increases.      Your provider has referred you to: [facility] [place] [phone]   [email or web address]     Maintain activity level for walking 5-6 days a week.  Continue measuring food and keeping portions to a cup.    Follow up at end of September.

## 2020-09-03 NOTE — PROGRESS NOTES
"Ana Wyman is a 50 year old female who is being evaluated via a billable video visit.      The patient has been notified of following:     \"This video visit will be conducted via a call between you and your physician/provider. We have found that certain health care needs can be provided without the need for an in-person physical exam.  This service lets us provide the care you need with a video conversation.  If a prescription is necessary we can send it directly to your pharmacy.  If lab work is needed we can place an order for that and you can then stop by our lab to have the test done at a later time.    Video visits are billed at different rates depending on your insurance coverage.  Please reach out to your insurance provider with any questions.    If during the course of the call the physician/provider feels a video visit is not appropriate, you will not be charged for this service.\"    Patient has given verbal consent for Video visit? Yes  How would you like to obtain your AVS? MyChart  If you are dropped from the video visit, the video invite should be resent to: Text to cell phone: [phone]  Will anyone else be joining your video visit? No        Video-Visit Details    Type of service:  Video Visit    Video Start Time: 8:29 AM  Video End Time: 8:58 AM    Originating Location (pt. Location): Home    Distant Location (provider location):  [facility]     Platform used for Video Visit: Pelon Stacy PA-C    September 3, 2020    Return Medical Weight Management Note     Ana Wyman  MRN:  [id number]  :  1970      Dear Kelly Bedoya,    I had the pleasure of doing a virtual visit with your patient Ana Wyman.  She is a 50 year old female s/p gastric bypass surgery on 2019. I saw her in January for her annual appointment and am continuing to see for treatment of obesity related to:       2018   I have the following " health issues associated with obesity: Pre-Diabetes, Sleep Apnea, Lower Extremity Edema, GERD (Reflux), Fatty Liver       INTERVAL HISTORY:  Pt was started on Victoza last month in addition to her metformin 1500 mg daily.  She is happy with the combination.  She is down 7 lbs since her high which occurred when she stopped phentermine.      Has been really good weighing food.  Feels like it has been easy to keep her portions and snacking in check. Pt had sleep follow up.  Changed pressures on machine. She tried without CPAP for a week but had significant daytime fatigue.  She is now back on it.        Pt is wondering what she should consider her goal weight? Originally wanted to lose an additional 50 lbs.  Has been relatively stable and continues to have trouble with abdominal pannus. Is wondering if now would be a good time to look at panniculectomy.      CURRENT WEIGHT (PATIENT REPORTED):  213 lbs 0 oz    Wt Readings from Last 4 Encounters:   09/03/20 213 lb (96.6 kg)   08/24/20 212 lb (96.2 kg)   08/03/20 215 lb (97.5 kg)   07/20/20 216 lb (98 kg)     BARIATRIC METRICS:    Current Weight: 213 lb (96.6 kg)(pt reported)  Body mass index is 36.56 kg/m .   Wt change since last visit (lbs): 1  Cumulative weight loss (lbs): 126.3    Positive Changes Since Last Visit:   Struggling With: stress-  Pt can see she is still a stress eater.  Urge was strong The first thing she wanted to do eat the night she got the call about her mother having cancer.  She resisted because she had been doing well on her food plan and didn't want to jeopardize this.    Stress management: high due to new diagnosis of cancer in mom and also job insecurity.  Pt works for [employer]. 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  Only has guaranteed work until October.     Pt has been using a paper towel in between pannus.  Is constantly concerned about an infections.  Uses antifungal cream several times a week to keep  skin breakdown and rash away.      Exercise:   Pt has been walking 3-5 miles generally a 5-6 days a week.        ROS:  General  Fatigue:   Insomnia: some related to family stress with mom's new cancer.  Cardiovascular  Chest Pain with Exertion: none  Palpitations: none,  Just problems with hypotension  Hx of heart disease: none  Endo:  Denies hx of thyroid Cancer   Psychiatric  Moods: Stable  Neurologic:  Hx of seizures: none  Paresthesias: tingling in hands (daily)  Headaches: HX of migraines   GI  Denies diarrhea on metformin  Constipation:  Improved on metformin  Denies hx of pancreatitis  Denies hx of gastroparesis,   Denies hx of small bowel obstruction.     History of kidney stones: Yes, Calcium oxalate Last one was in summer of 2019. Large family history of kidney stones.   History of kidney disease: none    MEDICATIONS:   Current Outpatient Medications   Medication     albuterol (PROAIR HFA/PROVENTIL HFA/VENTOLIN HFA) 108 (90 Base) MCG/ACT inhaler     BIOTIN PO     Calcium Citrate-Vitamin D (CALCIUM CITRATE CHEWY BITE PO)     childrens multivitamin w/iron (FLINTSTONES COMPLETE) 60 MG chewable tablet     Cholecalciferol (VITAMIN D) 2000 units CAPS     clobetasol (TEMOVATE) 0.05 % ointment     Hypromellose (ARTIFICIAL TEARS OP)     insulin pen needle (31G X 6 MM) 31G X 6 MM miscellaneous     liraglutide (VICTOZA) 18 MG/3ML solution     metFORMIN (GLUCOPHAGE-XR) 500 MG 24 hr tablet     nystatin (MYCOSTATIN) 484853 UNIT/GM external cream     polyethylene glycol (MIRALAX/GLYCOLAX) packet     triamcinolone (KENALOG) 0.1 % external cream     vitamin (B COMPLEX-C) tablet     vitamin B-12 (CYANOCOBALAMIN) 2500 MCG sublingual tablet     No current facility-administered medications for this visit.        LABS:  Hemoglobin A1C   Date Value Ref Range Status   01/27/2020 5.1 0 - 5.6 % Final     Comment:     Normal <5.7% Prediabetes 5.7-6.4%  Diabetes 6.5% or higher - adopted from ADA   consensus guidelines.        Vitamin D Deficiency screening   Date Value Ref Range Status   01/27/2020 56 20 - 75 ug/L Final     Comment:     Season, race, dietary intake, and treatment affect the concentration of   25-hydroxy-Vitamin D. Values may decrease during winter months and increase   during summer months. Values 20-29 ug/L may indicate Vitamin D insufficiency   and values <20 ug/L may indicate Vitamin D deficiency.  Vitamin D determination is routinely performed by an immunoassay specific for   25 hydroxyvitamin D3.  If an individual is on vitamin D2 (ergocalciferol)   supplementation, please specify 25 OH vitamin D2 and D3 level determination by   LCMSMS test [id number].       Sodium   Date Value Ref Range Status   02/14/2020 142 133 - 144 mmol/L Final     Potassium   Date Value Ref Range Status   02/14/2020 3.9 3.4 - 5.3 mmol/L Final     Chloride   Date Value Ref Range Status   02/14/2020 107 94 - 109 mmol/L Final     Carbon Dioxide   Date Value Ref Range Status   02/14/2020 29 20 - 32 mmol/L Final     Anion Gap   Date Value Ref Range Status   02/14/2020 6 3 - 14 mmol/L Final     Glucose   Date Value Ref Range Status   02/14/2020 82 70 - 99 mg/dL Final     Urea Nitrogen   Date Value Ref Range Status   02/14/2020 13 7 - 30 mg/dL Final     Creatinine   Date Value Ref Range Status   02/14/2020 0.71 0.52 - 1.04 mg/dL Final     GFR Estimate   Date Value Ref Range Status   02/14/2020 >90 >60 mL/min/[1.73_m2] Final     Comment:     Non  GFR Calc  Starting 12/18/2018, serum creatinine based estimated GFR (eGFR) will be   calculated using the Chronic Kidney Disease Epidemiology Collaboration   (CKD-EPI) equation.       Calcium   Date Value Ref Range Status   02/14/2020 9.2 8.5 - 10.1 mg/dL Final     Bilirubin Total   Date Value Ref Range Status   08/28/2019 0.4 0.2 - 1.3 mg/dL Final     Alkaline Phosphatase   Date Value Ref Range Status   08/28/2019 86 40 - 150 U/L Final     ALT   Date Value Ref Range Status   08/28/2019 24 0  "- 50 U/L Final     AST   Date Value Ref Range Status   08/28/2019 17 0 - 45 U/L Final     Cholesterol   Date Value Ref Range Status   07/31/2018 178 <200 mg/dL Final     HDL Cholesterol   Date Value Ref Range Status   07/31/2018 48 (L) >49 mg/dL Final     LDL Cholesterol Calculated   Date Value Ref Range Status   07/31/2018 92 <100 mg/dL Final     Comment:     Desirable:       <100 mg/dl     Triglycerides   Date Value Ref Range Status   07/31/2018 191 (H) <150 mg/dL Final     Comment:     Borderline high:  150-199 mg/dl  High:             200-499 mg/dl  Very high:       >499 mg/dl  Fasting specimen          PE:  Ht 5' 4\" (1.626 m)   Wt 213 lb (96.6 kg)   BMI 36.56 kg/m    GENERAL: Healthy, alert and no distress  EYES: Eyes grossly normal to inspection.  No discharge or erythema, or obvious scleral/conjunctival abnormalities.  RESP: No audible wheeze, cough, or visible cyanosis.  No visible retractions or increased work of breathing.    SKIN: Visible skin clear. No significant rash, abnormal pigmentation or lesions.  NEURO: Cranial nerves grossly intact.  Mentation and speech appropriate for age.  PSYCH: Mentation appears normal, affect normal/bright, judgement and insight intact, normal speech and appearance well-groomed.    ASSESSMENT AND PLAN:      1. Class 2 severe obesity due to excess calories with serious comorbidity and body mass index (BMI) of 37.0 to 37.9 in adult (H)  Patient was congratulated on weight loss success thus far. Healthy habits to assist with further weight loss were discussed. Ana will continue the liraglutide and metformin.  Discussed goal loss goal of 20 lbs in next year.  About 10% of excess weight.  - liraglutide (VICTOZA PEN) 18 MG/3ML solution; Inject 1.2 mg Subcutaneous daily Can increase to 1.8 mg if hunger increases.  Dispense: 27 mL; Refill: 0    2. Insulin resistance  - liraglutide (VICTOZA PEN) 18 MG/3ML solution; Inject 1.2 mg Subcutaneous daily Can increase to 1.8 mg if " hunger increases.  Dispense: 27 mL; Refill: 0    3. Candidal intertrigo/abdominal pannus/Excess skin of abdomen  Continue current daily treatment.  With weight relatively stable, plastic surgery referral was ordered to look at panniculectomy.   .weight  - PLASTIC SURGERY REFERRAL    FOLLOW-UP:  Return to clinic in 3 weeks. (end of Sept)

## 2020-09-08 ENCOUNTER — TRANSFERRED RECORDS (OUTPATIENT)
Dept: HEALTH INFORMATION MANAGEMENT | Facility: CLINIC | Age: 50
End: 2020-09-08

## 2020-09-08 ENCOUNTER — TELEPHONE (OUTPATIENT)
Dept: SURGERY | Facility: CLINIC | Age: 50
End: 2020-09-08

## 2020-09-08 NOTE — TELEPHONE ENCOUNTER
Was referred by ERWIN for surgery.  Requests letter of support for plastic surgery. After discussion pt plan is to sign DUDLEY at local  clinic and have this faxed to [facility].  Then [facility] can fax this letter to plastic surgeon.    Awaiting DUDLEY.  Ne Morris RN on 9/8/2020 at 11:11 AM

## 2020-09-08 NOTE — LETTER
[facility]          [address]  [address]  [place] [id number]                                         Tel:  [phone]  Fax: [phone]    September 8, 2020  Regarding:    Name: Ana Wyman    Address: [id number] [place] [id number]    YOB: 1970    I am writing to you on behalf of my patient, Ana Wyman. Ms. Wyman had laparoscopic Gastric Bypass surgery on 1/28/2019. Since that time, she has lost 126.3 pounds and has seen many improvements in her health.  Unfortunately, Ms. Wyman has suffered from candidal intertriginous infections in her pannus area as well as bleeding and infections around her umbilicus following surgery.      Ms. Wyman has used numerous types of applications to protect and heal the skin in her pannus area and umbilicus. She has used Vaseline, peroxide, paper towel to keep the area dry; and also applies triamcinolone/nystatin creams several times per week due to exacerbations of her symptoms. Ms. Wyman has also had to tuck her pannus into her clothing to keep it out of the way. She has had trouble with exercising and jumping as her excess pannus gets in the way. Conservative treatment does not seem to be working.    Many patients suffer with excess skinfolds under breasts, pannus, groin, and axilla that lead to skin irritations after bariatric surgery.  This is due to the inability to keep these areas dry, clean, and  from surrounding skin. The best resolution to cure these skin rashes is the removal of the excess skin tissue.     Ms. Wyman has been seen for regular follow up in clinic and has been very willing to try recommendations to help alleviate her pannus candidal intertriginous infections and bleeding infections around her umbilicus. If the excess pannus was removed, I believe she has the potential to be free of infections as well as lose more weight and stay  physically active.    I am supportive of Ms. Wyman s need for a panniculectomy following weight loss after bariatric surgery.         Sincerely,      Sarah Stacy, DORIAN HOOD/maria t

## 2020-09-14 ENCOUNTER — TELEPHONE (OUTPATIENT)
Dept: SURGERY | Facility: CLINIC | Age: 50
End: 2020-09-14

## 2020-09-22 NOTE — PATIENT INSTRUCTIONS
Plan:  Continue the liraglutide 1.8 mg and metformin [id number] mg daily.     Try changing the time you take the Victoza to late afternoon to see if this helps with nighttime snacking.    Try time restricting your eating to 10 hours a day by having a earlier supper and avoiding nighttime snacking.    FOLLOW-UP:  Return to clinic in 6 weeks     Bariatric Post Op Guidelines  General:    To avoid marginal ulcers avoid all forms of tobacco, alcohol in excess, caffeine, and NSAIDS (unless indicated for cardioprotection or othewise and opposed by a PPI).    Exercise is key for continued weight loss and weight maintenance. Aim for 30-60 minutes of physical activity most days of the week. Include cardiovascular and strength training.    Continue lifelong vitamins supplementation and annual lab follow up.  All  patients should supplement with the following bariatric postoperative vitamins:  2 Complete multivitamins with minerals (at different times than calcium)  Vitamin D 5000 Int Units/125 mg daily   Calcium 600 mg twice daily or 500 mg hree times daily   Vitamin B12: 500 mcg of sl daily or 1000 mcg Inj monthly  B complex daily or Thiamine 100 mg weekly  1 Iron/Vit C. Daily for females who menstruate and/or as directed    The bariatric team should be aware and potentially evaluate all adverse gastrointestinal symptoms which can be a sign of complication. Inability to tolerate textured solid food (chicken, steak, fish) may need to be evaluated by endoscopy.    There is a 10% increase of Alcohol Use Disorder in patients with bariatric surgery.   Most often occurring around 2 years post op.  Please call the clinic if you feel alcohol is interfering in your daily life.  We can help.     Follow up annually lifelong. Obesity is a chronic disease.  Weight gain can be expected. The goal of follow-up visits is to ensure adequate vitamin and protein absorption, evaluate food intake behavior, review exercise/activity level, and assist  with weight regain.    Nutritional:  Eat 3 meals per day  (No snacks between meals.)  Do not skip meals.  This can cause overeating at the next meal and will prevent adequate protein and nutritional intake.    Aim for 60-80 grams of protein per day.  Always eat your protein first. This assists with optimal nutrition and helps you stay full longer.    Eat your protein first, and then follow with fiber.    Add fiber by including fruits, vegetables, whole grains, and beans.     Portions should be about 1 cup per meal. Use measuring cups to be accurate.  Continue to use saucer/salad plates, infant/toddler silverware to keep portion sizes small and take small bites.    Eat S-[id number]-Y to make each meal last 20-30 minutes. Always stop eating when satisfied.    Aim for 64 oz. of calorie-free fluids daily.    Avoid drinking 30 min before, during, and 30 min after meal    Avoid high sugar and high fat foods to prevent high calorie intake. This will reduce your rate of weight loss and can cause weight regain.   Check nutrition labels for less than 10 grams of sugar and less than 10 grams of fat per serving.

## 2020-09-22 NOTE — PROGRESS NOTES
"Ana Wyman is a 50 year old female who is being evaluated via a billable video visit.      The patient has been notified of following:     \"This video visit will be conducted via a call between you and your physician/provider. We have found that certain health care needs can be provided without the need for an in-person physical exam.  This service lets us provide the care you need with a video conversation.  If a prescription is necessary we can send it directly to your pharmacy.  If lab work is needed we can place an order for that and you can then stop by our lab to have the test done at a later time.    Video visits are billed at different rates depending on your insurance coverage.  Please reach out to your insurance provider with any questions.    If during the course of the call the physician/provider feels a video visit is not appropriate, you will not be charged for this service.\"    Patient has given verbal consent for Video visit? Yes  How would you like to obtain your AVS? MyChart  If you are dropped from the video visit, the video invite should be resent to: Text to cell phone: [phone]  Will anyone else be joining your video visit? No        Video-Visit Details    Type of service:  Video Visit    Video Start Time: 4:07 PM  Video End Time: 4:30 PM    Originating Location (pt. Location): Home    Distant Location (provider location):  [facility]     Platform used for Video Visit: Pelon Stacy PA-C      Return Medical Weight Management Note     Ana Wyman  MRN:  [id number]  :  1970      Dear Kelly Bedoya,    I had the pleasure of doing a virtual visit with your patient Ana Wyman.  She is a 50 year old female s/p gastric bypass surgery on 2019. I saw her in January for her annual appointment and am continuing to see for treatment of obesity related to:       2018   I have the following health issues " associated with obesity: Pre-Diabetes, Sleep Apnea, Lower Extremity Edema, GERD (Reflux), Fatty Liver       INTERVAL HISTORY:  Pt continues on Victoza and metformin 1500 mg daily.  She is happy with the combination. Didn't lose any weight this month. Increase her Victoza to 1.8 mg for at least a week or week in a half.      Has been really good weighing food.  Feels like it has been easy to keep her portions and snacking in check. Occasionally needing to snack at night -snacking on pudding/protein powder 160 juanita.    Using CPAP nightly.       Gave referral for plastic surgery last month.  Sent letter of support. Pt just waiting for prior auth from insurance.     CURRENT WEIGHT (PATIENT REPORTED):  213 lbs 0 oz     BARIATRIC METRICS:  Current Weight: 213 lb (96.6 kg)(pt reported)  Body mass index is 35.45 kg/m .   Wt change since last visit (lbs): 0  Cumulative weight loss (lbs): 126.3    Weight increased right after last visit and then has come back down despite trying the 5 day reset program of calorie restricting and increased walking.     Wt Readings from Last 4 Encounters:   09/28/20 213 lb (96.6 kg)   09/03/20 213 lb (96.6 kg)   08/24/20 212 lb (96.2 kg)   08/03/20 215 lb (97.5 kg)       If eating earlier in the night for supper, she is getting hungry at night.  She is using Genpro for protein as a supplement in her soup, oatmeal, and pudding.  IT has 54 juanita and 30 grams of protein.     Positive Changes Since Last Visit: continuing with walking, started adding protein to meals.  Struggling With: stress- Still has hunger at night espicially if eating earlier Iin the evening.    Stress management: high due to new diagnosis of cancer in mom.  Mom has mastectomy tomorrow.Pt works for [employer]. Is now working 32 hrs a week.  Expects to keep job at least until the beginning of 2021.      Exercise:   Pt has been walking 3-5 miles generally a 5-6 days a week.      ROS:  General  Fatigue:   Insomnia: some related to  family stress with mom's new cancer.  Cardiovascular  Chest Pain with Exertion: none  Palpitations: none,  Just problems with hypotension  Hx of heart disease: none  Endo:  Denies hx of thyroid Cancer   Psychiatric  Moods: Stable  Neurologic:  Hx of seizures: none  Paresthesias: tingling in hands (daily)  Headaches: Hx of migraines   GI  Denies diarrhea on metformin  Constipation:  Improved on metformin  Denies hx of pancreatitis  Denies hx of gastroparesis,   Denies hx of small bowel obstruction.     History of kidney stones: Yes, Calcium oxalate Last one was in summer of 2019. Large family history of kidney stones.   History of kidney disease: none    MEDICATIONS:   Current Outpatient Medications   Medication     albuterol (PROAIR HFA/PROVENTIL HFA/VENTOLIN HFA) 108 (90 Base) MCG/ACT inhaler     BIOTIN PO     Calcium Citrate-Vitamin D (CALCIUM CITRATE CHEWY BITE PO)     childrens multivitamin w/iron (FLINTSTONES COMPLETE) 60 MG chewable tablet     Cholecalciferol (VITAMIN D) 2000 units CAPS     clobetasol (TEMOVATE) 0.05 % ointment     Hypromellose (ARTIFICIAL TEARS OP)     insulin pen needle (31G X 6 MM) 31G X 6 MM miscellaneous     liraglutide (VICTOZA PEN) 18 MG/3ML solution     liraglutide (VICTOZA) 18 MG/3ML solution     metFORMIN (GLUCOPHAGE-XR) 500 MG 24 hr tablet     nystatin (MYCOSTATIN) 909689 UNIT/GM external cream     polyethylene glycol (MIRALAX/GLYCOLAX) packet     triamcinolone (KENALOG) 0.1 % external cream     vitamin (B COMPLEX-C) tablet     vitamin B-12 (CYANOCOBALAMIN) 2500 MCG sublingual tablet     No current facility-administered medications for this visit.        LABS:  Hemoglobin A1C   Date Value Ref Range Status   01/27/2020 5.1 0 - 5.6 % Final     Comment:     Normal <5.7% Prediabetes 5.7-6.4%  Diabetes 6.5% or higher - adopted from ADA   consensus guidelines.       Vitamin D Deficiency screening   Date Value Ref Range Status   01/27/2020 56 20 - 75 ug/L Final     Comment:     Season,  race, dietary intake, and treatment affect the concentration of   25-hydroxy-Vitamin D. Values may decrease during winter months and increase   during summer months. Values 20-29 ug/L may indicate Vitamin D insufficiency   and values <20 ug/L may indicate Vitamin D deficiency.  Vitamin D determination is routinely performed by an immunoassay specific for   25 hydroxyvitamin D3.  If an individual is on vitamin D2 (ergocalciferol)   supplementation, please specify 25 OH vitamin D2 and D3 level determination by   LCMSMS test [id number].       Sodium   Date Value Ref Range Status   02/14/2020 142 133 - 144 mmol/L Final     Potassium   Date Value Ref Range Status   02/14/2020 3.9 3.4 - 5.3 mmol/L Final     Chloride   Date Value Ref Range Status   02/14/2020 107 94 - 109 mmol/L Final     Carbon Dioxide   Date Value Ref Range Status   02/14/2020 29 20 - 32 mmol/L Final     Anion Gap   Date Value Ref Range Status   02/14/2020 6 3 - 14 mmol/L Final     Glucose   Date Value Ref Range Status   02/14/2020 82 70 - 99 mg/dL Final     Urea Nitrogen   Date Value Ref Range Status   02/14/2020 13 7 - 30 mg/dL Final     Creatinine   Date Value Ref Range Status   02/14/2020 0.71 0.52 - 1.04 mg/dL Final     GFR Estimate   Date Value Ref Range Status   02/14/2020 >90 >60 mL/min/[1.73_m2] Final     Comment:     Non  GFR Calc  Starting 12/18/2018, serum creatinine based estimated GFR (eGFR) will be   calculated using the Chronic Kidney Disease Epidemiology Collaboration   (CKD-EPI) equation.       Calcium   Date Value Ref Range Status   02/14/2020 9.2 8.5 - 10.1 mg/dL Final     Bilirubin Total   Date Value Ref Range Status   08/28/2019 0.4 0.2 - 1.3 mg/dL Final     Alkaline Phosphatase   Date Value Ref Range Status   08/28/2019 86 40 - 150 U/L Final     ALT   Date Value Ref Range Status   08/28/2019 24 0 - 50 U/L Final     AST   Date Value Ref Range Status   08/28/2019 17 0 - 45 U/L Final     Cholesterol   Date Value Ref Range  "Status   07/31/2018 178 <200 mg/dL Final     HDL Cholesterol   Date Value Ref Range Status   07/31/2018 48 (L) >49 mg/dL Final     LDL Cholesterol Calculated   Date Value Ref Range Status   07/31/2018 92 <100 mg/dL Final     Comment:     Desirable:       <100 mg/dl     Triglycerides   Date Value Ref Range Status   07/31/2018 191 (H) <150 mg/dL Final     Comment:     Borderline high:  150-199 mg/dl  High:             200-499 mg/dl  Very high:       >499 mg/dl  Fasting specimen          PE:  Ht 5' 5\" (1.651 m)   Wt 213 lb (96.6 kg)   BMI 35.45 kg/m    GENERAL: Healthy, alert and no distress  EYES: Eyes grossly normal to inspection.  No discharge or erythema, or obvious scleral/conjunctival abnormalities.  RESP: No audible wheeze, cough, or visible cyanosis.  No visible retractions or increased work of breathing.    SKIN: Visible skin clear. No significant rash, abnormal pigmentation or lesions.  NEURO: Cranial nerves grossly intact.  Mentation and speech appropriate for age.  PSYCH: Mentation appears normal, affect normal/bright, judgement and insight intact, normal speech and appearance well-groomed.    ASSESSMENT AND PLAN:      1. Class 2 severe obesity due to excess calories with serious comorbidity   Patient was congratulated on weight loss success thus far. Healthy habits to assist with further weight loss were discussed. Ana will continue the liraglutide 1.8 mg and metformin [id number] mg.   - liraglutide (VICTOZA PEN) 18 MG/3ML solution; Inject 1.2 mg Subcutaneous daily Can increase to 1.8 mg if hunger increases.  Dispense: 27 mL; Refill: 0    She will try changing the time she takes the Victoza to late afternoon to see if this helps with nighttime snacking.    She will also try time restricting her eating to 10 hours a day by having a earlier supper with no snacking.    2. Insulin resistance  Continue liraglutide (VICTOZA PEN) 18 MG/3ML solution; Inject 1.8 mg Subcutaneous daily.    Time restricting eating my " help decrease her insulin levels and lead to weight loss.     3.  S/P gastric bypass 1/28/2019      FOLLOW-UP:  Return to clinic in 6 weeks

## 2020-09-28 ENCOUNTER — VIRTUAL VISIT (OUTPATIENT)
Dept: SURGERY | Facility: CLINIC | Age: 50
End: 2020-09-28
Payer: COMMERCIAL

## 2020-09-28 VITALS — WEIGHT: 213 LBS | BODY MASS INDEX: 35.49 KG/M2 | HEIGHT: 65 IN

## 2020-09-28 DIAGNOSIS — E66.812 CLASS 2 SEVERE OBESITY DUE TO EXCESS CALORIES WITH SERIOUS COMORBIDITY AND BODY MASS INDEX (BMI) OF 35.0 TO 35.9 IN ADULT (H): Primary | ICD-10-CM

## 2020-09-28 DIAGNOSIS — Z98.84 BARIATRIC SURGERY STATUS: ICD-10-CM

## 2020-09-28 DIAGNOSIS — E88.819 INSULIN RESISTANCE: ICD-10-CM

## 2020-09-28 DIAGNOSIS — E66.01 CLASS 2 SEVERE OBESITY DUE TO EXCESS CALORIES WITH SERIOUS COMORBIDITY AND BODY MASS INDEX (BMI) OF 35.0 TO 35.9 IN ADULT (H): Primary | ICD-10-CM

## 2020-09-28 PROCEDURE — 99213 OFFICE O/P EST LOW 20 MIN: CPT | Mod: 95 | Performed by: PHYSICIAN ASSISTANT

## 2020-09-28 ASSESSMENT — MIFFLIN-ST. JEOR: SCORE: 1582.04

## 2020-10-05 ENCOUNTER — OFFICE VISIT (OUTPATIENT)
Dept: OBGYN | Facility: CLINIC | Age: 50
End: 2020-10-05
Payer: COMMERCIAL

## 2020-10-05 VITALS
SYSTOLIC BLOOD PRESSURE: 102 MMHG | WEIGHT: 219 LBS | HEIGHT: 65 IN | BODY MASS INDEX: 36.49 KG/M2 | DIASTOLIC BLOOD PRESSURE: 68 MMHG

## 2020-10-05 DIAGNOSIS — N91.2 AMENORRHEA: ICD-10-CM

## 2020-10-05 DIAGNOSIS — Z00.01 ENCOUNTER FOR ROUTINE ADULT HEALTH EXAMINATION WITH ABNORMAL FINDINGS: Primary | ICD-10-CM

## 2020-10-05 DIAGNOSIS — Z80.3 FAMILY HISTORY OF MALIGNANT NEOPLASM OF BREAST: ICD-10-CM

## 2020-10-05 LAB
ESTRADIOL SERPL-MCNC: 79 PG/ML
FSH SERPL-ACNC: 55.9 IU/L

## 2020-10-05 PROCEDURE — 83001 ASSAY OF GONADOTROPIN (FSH): CPT | Performed by: OBSTETRICS & GYNECOLOGY

## 2020-10-05 PROCEDURE — 82670 ASSAY OF TOTAL ESTRADIOL: CPT | Performed by: OBSTETRICS & GYNECOLOGY

## 2020-10-05 PROCEDURE — 36415 COLL VENOUS BLD VENIPUNCTURE: CPT | Performed by: OBSTETRICS & GYNECOLOGY

## 2020-10-05 PROCEDURE — 99396 PREV VISIT EST AGE 40-64: CPT | Performed by: OBSTETRICS & GYNECOLOGY

## 2020-10-05 ASSESSMENT — MIFFLIN-ST. JEOR: SCORE: 1609.26

## 2020-10-05 NOTE — PROGRESS NOTES
Ana is a 50 year old  female who presents for annual exam.     Besides routine health maintenance,  she would like to discuss is she in menopause?.    HPI:  The patient's PCP is  Kelly Bedoya MD    H/o endometrial ablation in 2016.  Had several periods after, but nothing after that.  Not sexually active  Rare hot flashes  Mother with triple negative breast cancer this year, undergoing genetic counseling later this month.   Did gastric bypass in 2019, lost 140 pounds, currently trying to lose the last 20-30 pounds and then going to have a circumferential skin removal from her abdomen.  Used to have LS but symptoms improved after the gastric bypass and she hasn't used clobetasol for years.    GYNECOLOGIC HISTORY:    No LMP recorded. Patient has had an ablation.    Her current contraception method is: menopause?.  She  reports that she has never smoked. She has never used smokeless tobacco.    Patient is not sexually active.  STD testing offered?  Declined  Last PHQ-9 score on record =   PHQ-9 SCORE 10/16/2018   PHQ-9 Total Score 1     Last GAD7 score on record =   MACIEL-7 SCORE 10/16/2018   Total Score 1     Alcohol Score = 0    HEALTH MAINTENANCE:  Cholesterol: PCP does  Cholesterol   Date Value Ref Range Status   2018 178 <200 mg/dL Final   2015 163 <200 mg/dL Final     Comment:     LDL Cholesterol is the primary guide to therapy.   The NCEP recommends further evaluation of: patients with cholesterol greater   than 200 mg/dL if additional risk factors are present, cholesterol greater   than   240 mg/dL, triglycerides greater than 150 mg/dL, or HDL less than 40 mg/dL.     Last Mammo: , Result: Normal  Pap: , Neg Neg  Lab Results   Component Value Date    PAP NIL 10/29/2018    PAP NIL 05/15/2014    PAP NIL 2011   Colonoscopy:  2019, Result: Normal, Next Colonoscopy: 5 years.      Health maintenance updated:  yes    HISTORY:  OB History    Para Term  AB Living    0 0 0 0 0 0   SAB TAB Ectopic Multiple Live Births   0 0 0 0 0       Patient Active Problem List   Diagnosis     Esophageal reflux     Allergic state     Hypersomnia with sleep apnea     FAMILY HX GI MALIGNANCY     Family history of malignant neoplasm of genital organ, other     Lichen sclerosus     Uterine fibroid     MIGUELITO on CPAP     Acanthosis nigricans     Cutaneous skin tags     Baker's cyst of knee     Lymphedema bilateral with mixed lipedema.     Mild intermittent asthma without complication     Fatty liver     Bariatric surgery status     Postsurgical malabsorption     S/P laparoscopic cholecystectomy     Postural hypotension     Intertrigo     Class 2 severe obesity due to excess calories with serious comorbidity and body mass index (BMI) of 35.0 to 35.9 in adult (H)     Insulin resistance     Past Surgical History:   Procedure Laterality Date     COLONOSCOPY  5/15/2013    Procedure: COLONOSCOPY;  Colonoscopy;  Surgeon: Kota Blanco MD;  Location:  GI     COLONOSCOPY N/A 10/21/2019    Procedure: COLONOSCOPY, with biopsies by cold forceps;  Surgeon: Lydia Vickers MD;  Location:  GI     GYN SURGERY  2016    Uterine Ablation     LAPAROSCOPIC BYPASS GASTRIC, CHOLECYSTECTOMY, COMBINED N/A 1/28/2019    Procedure: LAPAROSCOPIC GASTRIC BYPASS;  Surgeon: Maykel Calvin MD;  Location: [facility]     LAPAROSCOPIC CHOLECYSTECTOMY N/A 4/11/2019    Procedure: LAPAROSCOPIC CHOLECYSTECTOMY;  Surgeon: Maykel Calvin MD;  Location: [facility] OR     lichen sclerosis       ORTHOPEDIC SURGERY       VASCULAR SURGERY  2015    Vienous closure on both legs     vein closure  12/2016    to prevent lymphedema     ZZC NONSPECIFIC PROCEDURE  1994    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     ZZC NONSPECIFIC PROCEDURE  1999,2001    Foot surgeries x 2 (bunionectomy)     ZZC NONSPECIFIC PROCEDURE  2000    hammer toes      Social History     Tobacco Use     Smoking status: Never Smoker     Smokeless tobacco: Never Used    Substance Use Topics     Alcohol use: Not Currently     Alcohol/week: 0.0 standard drinks     Frequency: Monthly or less     Drinks per session: 1 or 2     Binge frequency: Never      Problem (# of Occurrences) Relation (Name,Age of Onset)    Allergies (2) Mother (Lily), Brother    Anesthesia Reaction (1) Mother (Lily)    Asthma (1) Paternal Grandmother (Shanthi)    C.A.D. (3) Father (Faheem, 92): CAGB 98, Mother (Lily): triple bypass surgery, 65, Paternal Grandfather (Francis, 58): fatal    Cancer (3) Father (Faheem): stomach Dx age 74, Maternal Grandmother (Myrna): liver, Paternal Grandmother (Shanthi): stomach    Cancer - colorectal (3) Maternal Grandmother (Myrna, 75), Paternal Aunt, Other: cousin  from colon cancer in 30's    Cerebrovascular Disease (1) Father (Faheem)    Colon Cancer (1) Maternal Grandmother (Myrna)    Colon Polyps (2) Mother (Lily), Maternal Grandmother (Myrna)    Coronary Artery Disease (4) Father (Faheem), Mother (Lily), Paternal Grandfather (Francis), Maternal Grandfather (Bar)    Diabetes (2) Mother (Lily): Type 2, Paternal Grandmother (Shanthi): Type 2    Hyperlipidemia (1) Mother (Lily)    Hypertension (5) Father (Faheem), Mother (Lily), Maternal Grandmother (Myrna), Brother (Trey), Maternal Grandfather (Bar)    Lipids (2) Father (Faheem), Mother (Lily)    Obesity (5) Father (Faheem), Mother (Lily), Paternal Grandmother (Shanthi), Brother (Trey), Brother (Jayme)    Other Cancer (2) Father (Faheem): Esophogeal, Maternal Grandmother (Myrna): liver, leaukeamia    Sleep Apnea (3) Mother (Lily), Brother, Brother    Thyroid Disease (1) Mother (Lily)            Current Outpatient Medications   Medication Sig     BIOTIN PO Take 5,000 mcg by mouth daily At noon     Calcium Citrate-Vitamin D (CALCIUM CITRATE CHEWY BITE PO) Take 500 mg by mouth 3 times daily Plus D,noon, supper, HS     childrens multivitamin w/iron (FLINTSTONES COMPLETE) 60 MG  chewable tablet Take 2 chew tab by mouth daily With breakfast     Cholecalciferol (VITAMIN D) 2000 units CAPS Take 3 capsules by mouth At Bedtime      Hypromellose (ARTIFICIAL TEARS OP) Apply 1-2 drops to eye daily as needed     insulin pen needle (31G X 6 MM) 31G X 6 MM miscellaneous Use 1 pen needles daily     liraglutide (VICTOZA PEN) 18 MG/3ML solution Inject 1.2 mg Subcutaneous daily Can increase to 1.8 mg if hunger increases.     metFORMIN (GLUCOPHAGE-XR) 500 MG 24 hr tablet Take 3 tablets (1,500 mg) by mouth daily     nystatin (MYCOSTATIN) 237761 UNIT/GM external cream APPLY TOPICALLY TWO TIMES A DAY AS NEEDED FOR DRY SKIN/RASH     polyethylene glycol (MIRALAX/GLYCOLAX) packet Take 17 g by mouth daily     triamcinolone (KENALOG) 0.1 % external cream APPLY TO AFFECTED AREA(S) TOPICALLY TWO TIMES A DAY AS NEEDED FOR IRRITATION / RASH     vitamin (B COMPLEX-C) tablet Take 1 tablet by mouth every morning     vitamin B-12 (CYANOCOBALAMIN) 2500 MCG sublingual tablet Take 1,250 mcg by mouth twice a week With breakfast     albuterol (PROAIR HFA/PROVENTIL HFA/VENTOLIN HFA) 108 (90 Base) MCG/ACT inhaler Inhale 2 puffs into the lungs every 4 hours as needed for shortness of breath / dyspnea or wheezing (Patient not taking: Reported on 10/5/2020)     clobetasol (TEMOVATE) 0.05 % ointment Apply clobetasol  0.05 percent ointment, sparingly (a dot 3 mm wide) in a thin film.  Apply to affected area only, once or twice weekly for maintenance. (Patient not taking: Reported on 10/5/2020)     No current facility-administered medications for this visit.      Allergies   Allergen Reactions     Nsaids Other (See Comments)     Had gastric bypass - needs to avoid NSAIDS     Clindamycin Diarrhea     Codeine Nausea and Vomiting     Shellfish Allergy        Past medical, surgical, social and family histories were reviewed and updated in EPIC.    ROS:   12 point review of systems negative other than symptoms noted below or in the  "HPI.  No urinary frequency or dysuria, bladder or kidney problems, no periods.    EXAM:  /68 (BP Location: Right arm, Patient Position: Chair, Cuff Size: Adult Large)   Ht 1.651 m (5' 5\")   Wt 99.3 kg (219 lb)   Breastfeeding No   BMI 36.44 kg/m     BMI: Body mass index is 36.44 kg/m .    PHYSICAL EXAM:  Constitutional:   Appearance: Well nourished, well developed, alert, in no acute distress  Neck:  Lymph Nodes:  No lymphadenopathy present    Thyroid:  Gland size normal, nontender, no nodules or masses present  on palpation  Chest:  Respiratory Effort:  Breathing unlabored  Cardiovascular:    Heart: Auscultation:  Regular rate, normal rhythm, no murmurs present  Breasts: Palpation of Breasts and Axillae:  No masses present on palpation, no breast tenderness., Axillary Lymph Nodes:  No lymphadenopathy present. and No nodularity, asymmetry or nipple discharge bilaterally.  Gastrointestinal:   Abdominal Examination:  Abdomen nontender to palpation, tone normal without rigidity or guarding, no masses present, umbilicus without lesions   Liver and Spleen:  No hepatomegaly present, liver nontender to palpation    Hernias:  No hernias present  Lymphatic: Lymph Nodes:  No other lymphadenopathy present  Skin:  General Inspection:  No rashes present, no lesions present, no areas of  discoloration  Neurologic:    Mental Status:  Oriented X3.  Normal strength and tone, sensory exam                grossly normal, mentation intact and speech normal.    Psychiatric:   Mentation appears normal and affect normal/bright.         Pelvic Exam:  External Genitalia:     Normal appearance for age, no discharge present, no tenderness present, no inflammatory lesions present, color normal  Vagina:     Normal vaginal vault without central or paravaginal defects, no discharge present, no inflammatory lesions present, no masses present  Bladder:     Nontender to palpation  Urethra:   Urethral Body:  Urethra palpation normal, urethra " structural support normal   Urethral Meatus:  No erythema or lesions present  Cervix:     Appearance healthy, no lesions present, nontender to palpation, no bleeding present  Uterus:     Uterus: firm, normal sized and nontender, anteverted in position. - mobile, exam somewhat limited by habitus, but no masses other than a slightly enlarged uterus from known fibroids  Adnexa:     No adnexal tenderness present, no adnexal masses present  Perineum:     Perineum within normal limits, no evidence of trauma, no rashes or skin lesions present  Anus:     Anus within normal limits, no hemorrhoids present  Inguinal Lymph Nodes:     No lymphadenopathy present  Pubic Hair:     Normal pubic hair distribution for age  Genitalia and Groin:     No rashes present, no lesions present, no areas of discoloration, no masses present      COUNSELING:   Reviewed preventive health counseling, as reflected in patient instructions       (Rosalba)menopause management    BMI: Body mass index is 36.44 kg/m .  Weight management plan: in bariatric clinic    ASSESSMENT:  50 year old female with satisfactory annual exam.    PLAN:  1. Encounter for routine adult health examination with abnormal findings  Pap smear up to date.  Mammogram up to date.  Colonoscopy up to date.  Labs not due today (follows with bariatric clinic for the majority of her labs, will transition to her PCP at some point).  Immunizations up to date - considering getting the shingles vaccine at the pharmacy.  Discussed weight control, activity, and aging gracefully.  Gave accolades on her weight loss with bariatric clinic    2. Family history of malignant neoplasm of breast  - CANCER RISK MGMT/CANCER GENETIC COUNSELING REFERRAL    3. Amenorrhea  Discussed that results can be a true positive, a false positive, or be indicative that she is not yet in menopause.  Discussed that menopause is a clinical definition of no periods, but difficult to know in her since she has had an ablation.   Will send results when available.  - Follicle stimulating hormone  - Estradiol     Lita Villalba MD

## 2020-10-05 NOTE — NURSING NOTE
"Chief Complaint   Patient presents with     Physical       Initial /68 (BP Location: Right arm, Patient Position: Chair, Cuff Size: Adult Large)   Ht 1.651 m (5' 5\")   Wt 99.3 kg (219 lb)   Breastfeeding No   BMI 36.44 kg/m   Estimated body mass index is 36.44 kg/m  as calculated from the following:    Height as of this encounter: 1.651 m (5' 5\").    Weight as of this encounter: 99.3 kg (219 lb).  BP completed using cuff size: large    Questioned patient about current smoking habits.  Pt. has never smoked.          The following HM Due: NONE    Cele Rondon CMA    "

## 2020-10-06 ENCOUNTER — MYC MEDICAL ADVICE (OUTPATIENT)
Dept: OBGYN | Facility: CLINIC | Age: 50
End: 2020-10-06

## 2020-10-07 NOTE — TELEPHONE ENCOUNTER
Oncology/Surgical Oncology Referral Request:     Specialty Requested: Medical Oncology     Referring Provider: Lita Morillo MD    Referring Clinic/Organization: [facility]    Records location: [facility]     Requested Provider (if specified): Not Specified

## 2020-10-21 ENCOUNTER — MYC MEDICAL ADVICE (OUTPATIENT)
Dept: SURGERY | Facility: CLINIC | Age: 50
End: 2020-10-21

## 2020-10-21 DIAGNOSIS — B37.2 CANDIDAL INTERTRIGO: ICD-10-CM

## 2020-10-21 DIAGNOSIS — E65 ABDOMINAL PANNUS: ICD-10-CM

## 2020-10-21 DIAGNOSIS — L98.7 EXCESS SKIN OF ABDOMEN: ICD-10-CM

## 2020-10-21 DIAGNOSIS — Z98.84 BARIATRIC SURGERY STATUS: Primary | ICD-10-CM

## 2020-10-29 NOTE — TELEPHONE ENCOUNTER
Patient having problems with surgery process at current plastic surgery center.  Requested referral for another.     Referral for [facility]: per DORIAN Smith.  Order placed and faxed to [facility].  Verified went through with Right Fax at [phone].    Called patient and informed her re: above.  Patient will call [facility] tomorrow and inform our clinic if she needs a letter from us.  Is aware she will need another DUDLEY if letter needed.  Laura Paulino, MS, RD, RN

## 2020-10-30 ENCOUNTER — PRE VISIT (OUTPATIENT)
Dept: ONCOLOGY | Facility: CLINIC | Age: 50
End: 2020-10-30

## 2020-11-05 DIAGNOSIS — E88.819 INSULIN RESISTANCE: ICD-10-CM

## 2020-11-05 DIAGNOSIS — E66.01 CLASS 2 SEVERE OBESITY DUE TO EXCESS CALORIES WITH SERIOUS COMORBIDITY AND BODY MASS INDEX (BMI) OF 37.0 TO 37.9 IN ADULT (H): ICD-10-CM

## 2020-11-05 DIAGNOSIS — E66.812 CLASS 2 SEVERE OBESITY DUE TO EXCESS CALORIES WITH SERIOUS COMORBIDITY AND BODY MASS INDEX (BMI) OF 37.0 TO 37.9 IN ADULT (H): ICD-10-CM

## 2020-11-06 RX ORDER — LIRAGLUTIDE 6 MG/ML
INJECTION SUBCUTANEOUS
Qty: 9 ML | Refills: 1 | Status: SHIPPED | OUTPATIENT
Start: 2020-11-06 | End: 2021-05-07

## 2020-11-17 ENCOUNTER — TELEPHONE (OUTPATIENT)
Dept: SURGERY | Facility: CLINIC | Age: 50
End: 2020-11-17

## 2020-11-17 NOTE — TELEPHONE ENCOUNTER
Incoming call received from Carissa at Dr. Ca's office with [facility].  Carissa states that letter of support for panniculectomy is not enough for insurance; they also need all the visit notes that support this as well.  Wants records to be faxed to [phone] as soon as possible.      Reviewed pt chart.  Noted current DUDLEY for [facility] in chart.  This RN is aware of pt seeking alternative provider for plastic surgery. Called pt to check on this.  Pt states she is planning to proceed with [facility] at this time.  Approved RN to send over records covered in DUDLEY to [facility].    Pertinent records include:  Office visit note 7/29/19 with ALTHEA Stacy  Office visit note 1/27/2020 with ALTHEA Stacy  Refill note 8/23/2020 with ALTHEA Stacy  Office visit note 9/3/2020 with ALTHEA Stacy  Office visit note 9/28/2020 with ALTHEA Stacy    Called HIM at [facility] re: records request.  Requested fax to be sent with above records, dates given to HIM. HIM informed RN that fax will be sent today.  Ne Morris RN on 11/17/2020 at 11:48 AM

## 2020-12-07 ENCOUNTER — TELEPHONE (OUTPATIENT)
Dept: SURGERY | Facility: CLINIC | Age: 50
End: 2020-12-07

## 2020-12-07 ENCOUNTER — MYC MEDICAL ADVICE (OUTPATIENT)
Dept: SURGERY | Facility: CLINIC | Age: 50
End: 2020-12-07

## 2020-12-07 NOTE — LETTER
[facility]          [address]  [address]  [place] [id number]                                         Tel:  [phone]  Fax: [phone]    December 7, 2020    Regarding:    Name: Ana Wyman    Address: [id number] [place] [id number]    YOB: 1970    Reference# [id number]    I am writing to you on behalf of my patient, Ana Wyman. Ms. Wyman had laparoscopic Gastric Bypass surgery on 1/28/2019. Since that time, she has lost 126.3 pounds and has seen many improvements in her health. Her weight has also been quite stable with her weight 217.5# on 1/27/2020 and 213# on 9/28/2020.     Unfortunately, Ms. Wyman has suffered from candidal intertriginous infections in her pannus area as well as bleeding and infections around her umbilicus following surgery. This issue has been occurring since 4/24/2019 and has been resistant to medical treatment.    Ms. Wyman has used numerous types of applications to protect and heal the skin in her pannus area and umbilicus. She has used Vaseline, peroxide, paper towel to keep the area dry; and also applies triamcinolone/nystatin creams several times per week due to exacerbations of her symptoms. She also tried a product called [employer] Mo[employer]ure [employer] that has anti-microbial silver to help protect and heal her skin. She applied this barrier for 3 months from 8/2020 - 10/2020. She has had to tuck her pannus into her clothing to keep it out of the way. She has had trouble with exercising and jumping as her excess pannus gets in the way. Medical or conservative  treatment does not seem to be working.    The following sections taken directly from visit notes reflect the chronicity of her skin breakdown that has been resistant to medical treatment and the need for plastic surgery. They are noted with an asteric (*) below as was suggested in patient's denial letter.           1/27/2020:    Rash in skin folds Yes, recurrent umbilical infection.  Bleeds.  Uses peroxide, to keep dry.  Uses Triamcinolone/nystatin cream under pannus about every other week.  Needs to tuck excess pannus under clothes.  Has trouble jumping and exercising because excess pannus is in the way and inhibits high intensity exercise. *       9/3/2020:           Pt is wondering what she should consider her goal weight? Originally wanted to               lose an additional 50 lbs.  Has been relatively stable and continues to have trouble          with abdominal pannus. Is wondering if now would be a good time to look at                    Panniculectomy.           Pt has been using a paper towel in between pannus.  Is constantly concerned                  about an infections.  Uses antifungal cream several times a week to keep skin                 breakdown and rash away. *               9/28/2020:          Gave referral for plastic surgery last month.  Sent letter of support. Pt just waiting             for prior auth from insurance.*    The visits in September of 2020 were virtual visits that noted the following:   SKIN: Visible skin clear. No significant rash, abnormal pigmentation or lesions.  This statement is referring to the skin that could be seen on the video and didn't include a physical exam.    Many patients suffer with excess skinfolds under breasts, pannus, groin, and axilla that lead to skin irritations after bariatric surgery.  This is due to the inability to keep these areas dry, clean, and  from surrounding skin. The best resolution to cure these skin rashes is the removal of the excess skin tissue.     Ms. Wyman has been seen for regular follow up in clinic and has been very willing to try recommendations to help alleviate her pannus candidal intertriginous infections and bleeding infections around her umbilicus . If the excess pannus was removed, I believe she has the potential to be  free of infections as well as stay physically active.    I am supportive of Ms. Wyman s need for a panniculectomy following weight loss after bariatric surgery.         Sincerely,      DORIAN Kulkarni/maria t

## 2020-12-07 NOTE — TELEPHONE ENCOUNTER
"Patient called because she received her panniculectomy denial letter from [facility], and she wanted to discuss what she needed in a letter.    Called patient who stated she reviewed the information and coverage criteria  It should state that the treating physician show the patient had a pannus assoc. with chronic or recurring infection, warm moist skin inflammation, intreigo or skin necrosis and resistant to 3 months medical treatment.  Also requires the following:  Documentation of chronic skin ulcerations with skin breakdown that has been resistant to medical treatment.     Want * by areas in notes of skin breakdown. Visit dates and what said. 1/27, 9/2, 9/28/2020.  Discussed with patient that September notes were video and should add that when it says her skin \"SKIN: Visible skin clear. No significant rash, abnormal pigmentation or lesions. This was done.     Should also include that her weight was stable for 6 months.     Patient would like the following added to the letter: [employer] (has anti-microbial silver) tried for 3 months from 8-10/2020.*    Reference number [id number]    Will let us know where to send it.  Laura Paulino, MS, RD, RN    "

## 2020-12-15 NOTE — TELEPHONE ENCOUNTER
Patient had gastric bypass surgery 1/28/19.   Patient calls today because she will be advancing her diet to purees on 1/28/19 and is not being seen by an RD until 1/29/19.  She is wondering if she should advance her diet on 1/28?  Informed patient that it would be okay for her to advance her diet on 1/28/19 or wait until the 29th - which ever she feels comfortable doing.  Patient verbalized understanding and is agreeable to plan.  Laura Paulino, MS, RD, RN     
70

## 2020-12-28 ENCOUNTER — TELEPHONE (OUTPATIENT)
Dept: SURGERY | Facility: CLINIC | Age: 50
End: 2020-12-28

## 2020-12-28 NOTE — TELEPHONE ENCOUNTER
Patient hadn't received letter this clinic wrote for plastic surgery in My Chart and would like a copy of it before she speaks to the insurance company today.    Her plastic surgery was denied again, and she would like the letter when she speaks with them.    This writer took original letter that was done and cut and pasted it into a new letter that was then sent to patient in My Chart.  She was able to see this letter.    Also discussed that if she pays OOP for plastic surgery and has a complication, her PO complications being covered is insurance dependent.    Also discussed that her plastic surgery, if paid OOP, can't be used toward her deductible.     Discussed that usually the plastic surgeon does peer to peer and not bariatrics.  Patient said she would ask insurance this question because she was under the understanding that it would be bariatrics.   Patient to contact clinic for further needs   Laura Paulino, MS, RD, RN

## 2021-01-11 DIAGNOSIS — Z98.84 BARIATRIC SURGERY STATUS: ICD-10-CM

## 2021-01-11 DIAGNOSIS — K91.2 POSTSURGICAL MALABSORPTION: ICD-10-CM

## 2021-01-14 ENCOUNTER — TELEPHONE (OUTPATIENT)
Dept: SURGERY | Facility: CLINIC | Age: 51
End: 2021-01-14

## 2021-01-14 NOTE — TELEPHONE ENCOUNTER
MINA Ordoñez at [employer] at the [employer] service [place] her direct line - [phone].  Requested she contact our clinic re: peer to peer appointment to be scheduled with DORIAN Smith and the Doctor.  Sarah schedule is filling up.  Left clinic number.  Laura Paulino, MS, RD, RN

## 2021-01-15 ENCOUNTER — MYC MEDICAL ADVICE (OUTPATIENT)
Dept: PEDIATRICS | Facility: CLINIC | Age: 51
End: 2021-01-15

## 2021-01-18 ENCOUNTER — VIRTUAL VISIT (OUTPATIENT)
Dept: PEDIATRICS | Facility: CLINIC | Age: 51
End: 2021-01-18
Payer: COMMERCIAL

## 2021-01-18 DIAGNOSIS — K91.2 POSTSURGICAL MALABSORPTION: ICD-10-CM

## 2021-01-18 DIAGNOSIS — R68.89 COLD INTOLERANCE: ICD-10-CM

## 2021-01-18 DIAGNOSIS — E66.01 CLASS 2 SEVERE OBESITY DUE TO EXCESS CALORIES WITH SERIOUS COMORBIDITY AND BODY MASS INDEX (BMI) OF 35.0 TO 35.9 IN ADULT (H): ICD-10-CM

## 2021-01-18 DIAGNOSIS — I73.00 RAYNAUD'S DISEASE WITHOUT GANGRENE: Primary | ICD-10-CM

## 2021-01-18 DIAGNOSIS — Z98.84 BARIATRIC SURGERY STATUS: ICD-10-CM

## 2021-01-18 DIAGNOSIS — E66.812 CLASS 2 SEVERE OBESITY DUE TO EXCESS CALORIES WITH SERIOUS COMORBIDITY AND BODY MASS INDEX (BMI) OF 35.0 TO 35.9 IN ADULT (H): ICD-10-CM

## 2021-01-18 LAB
ERYTHROCYTE [DISTWIDTH] IN BLOOD BY AUTOMATED COUNT: 13.1 % (ref 10–15)
HCT VFR BLD AUTO: 41.3 % (ref 35–47)
HGB BLD-MCNC: 13.1 G/DL (ref 11.7–15.7)
MCH RBC QN AUTO: 30.4 PG (ref 26.5–33)
MCHC RBC AUTO-ENTMCNC: 31.7 G/DL (ref 31.5–36.5)
MCV RBC AUTO: 96 FL (ref 78–100)
PLATELET # BLD AUTO: 301 10E9/L (ref 150–450)
PTH-INTACT SERPL-MCNC: 46 PG/ML (ref 18–80)
RBC # BLD AUTO: 4.31 10E12/L (ref 3.8–5.2)
VIT B12 SERPL-MCNC: 1006 PG/ML (ref 193–986)
WBC # BLD AUTO: 7.4 10E9/L (ref 4–11)

## 2021-01-18 PROCEDURE — 99000 SPECIMEN HANDLING OFFICE-LAB: CPT | Performed by: PEDIATRICS

## 2021-01-18 PROCEDURE — 84443 ASSAY THYROID STIM HORMONE: CPT | Performed by: PEDIATRICS

## 2021-01-18 PROCEDURE — 82607 VITAMIN B-12: CPT | Performed by: PEDIATRICS

## 2021-01-18 PROCEDURE — 85027 COMPLETE CBC AUTOMATED: CPT | Performed by: PEDIATRICS

## 2021-01-18 PROCEDURE — 82306 VITAMIN D 25 HYDROXY: CPT | Performed by: PEDIATRICS

## 2021-01-18 PROCEDURE — 83970 ASSAY OF PARATHORMONE: CPT | Performed by: PEDIATRICS

## 2021-01-18 PROCEDURE — 36415 COLL VENOUS BLD VENIPUNCTURE: CPT | Performed by: PEDIATRICS

## 2021-01-18 PROCEDURE — 84590 ASSAY OF VITAMIN A: CPT | Mod: 90 | Performed by: PEDIATRICS

## 2021-01-18 PROCEDURE — 83550 IRON BINDING TEST: CPT | Performed by: PEDIATRICS

## 2021-01-18 PROCEDURE — 83540 ASSAY OF IRON: CPT | Performed by: PEDIATRICS

## 2021-01-18 PROCEDURE — 99214 OFFICE O/P EST MOD 30 MIN: CPT | Mod: 95 | Performed by: PEDIATRICS

## 2021-01-18 PROCEDURE — 82728 ASSAY OF FERRITIN: CPT | Performed by: PEDIATRICS

## 2021-01-18 NOTE — PROGRESS NOTES
Ana is a 50 year old who is being evaluated via a billable video visit.      How would you like to obtain your AVS? MyChart  If the video visit is dropped, the invitation should be resent by: Send to e-mail at: [email or web address]  Will anyone else be joining your video visit? No    Video Start Time: 11:27 AM  Assessment & Plan       ICD-10-CM    1. Raynaud's disease without gangrene  I73.00 nitroGLYcerin (NITRO-BID) 2 % OINT ointment    Concern for Raynaud's.  Has lab work planned today through bariatric surgery clinic that will check her blood counts and iron stores to make sure that these are not the culprit.  Plan trial of nitroglycerin ointment to affected fingers.  If this is not helpful, can trial a low dose of calcium channel blocker.  Patient is working to avoid cold exposures and has appropriate mittens/gloves/hand warmers.    To alert me with how she responds to the topical treatment.     2. Cold intolerance  R68.89 **TSH with free T4 reflex FUTURE anytime  Added thyroid function testing on to labs planned for later today to make sure that hypothyroidism is not contributing to her symptoms   3. Class 2 severe obesity due to excess calories with serious comorbidity and body mass index (BMI) of 35.0 to 35.9 in adult (H)  E66.01 2 years out from her gastric bypass surgery and doing well.  Has follow up lab work planned today.    Z68.35                       Return in about 4 weeks (around 2/15/2021), or if symptoms worsen or fail to improve.    Kelly Bedoya MD  [facility]    Liza Perez is a 50 year old who presents to clinic today for the following health issues:    HPI     Concern - Possible Raynaud's   Onset: Past few months.   Description: Experiencing extremely cold fingers/hands. Cant take anything out of the freezer without wearing gloves. Tingling. Numb. Has not noticed any color change.   Intensity: moderate  Progression of Symptoms:  constant  Therapies tried and  outcome: Gloves. Hand warmers. Avoiding cold weather and cold items.       Waiting to hear back from bariatric surgery clinic regarding having skin removed.  Waiting for next bariatric follow up for plastic surgery approval.  Has skin breakdown and issues with yeast infections under pannus.    New hand symptoms - this summer and into the fall, couldn't get anything out of the freezer without wearing leather gloves.  When going out for short walks this winter, fingers would be freezing by the time she got home 15-20 minutes later.    Tried to snow shoe for 30 minutes and even with hard warmers, had severe hand symptoms. Burning, tingling sensation.   Mittens tend to go better.  Fingers and toes get cold right away when outside.  This is now the second winter after her gastric bypass surgery and she didn't have these issues the first winter.  Has noticed some white/red mild skin changes, but no clearly demarcated changes and no blue/purple discoloration.  Hasn't looked at her hands while symptoms are worst because they are covered by gloves or mittens.    No skin changes or tightness of skin on the hands. No ulcerations of the fingertips      Review of Systems   Constitutional, endo noted.      Objective       Vitals:  No vitals were obtained today due to virtual visit.    Physical Exam   GENERAL: Healthy, alert and no distress  EYES: Eyes grossly normal to inspection.  No discharge or erythema, or obvious scleral/conjunctival abnormalities.  RESP: No audible wheeze, cough, or visible cyanosis.  No visible retractions or increased work of breathing.    SKIN: Visible skin of face and hands normal. No significant rash, abnormal pigmentation or lesions.  NEURO: Cranial nerves grossly intact.  Mentation and speech appropriate for age.  PSYCH: Mentation appears normal, affect normal/bright, judgement and insight intact, normal speech and appearance well-groomed.    Labs tests pending    Kelly Bedoya MD           Video-Visit Details    Type of service:  Video Visit    Video End Time:11:41 AM    Originating Location (pt. Location): Home    Distant Location (provider location):  [facility]     Platform used for Video Visit: [employer]

## 2021-01-18 NOTE — PROGRESS NOTES
"Ana is a 50 year old who is being evaluated via a billable video visit.    Video Start Time: 9:31    Video-Visit Details    Type of service:  Video Visit    Video End Time:10:02    Originating Location (pt. Location): Home    Distant Location (provider location):  [facility] [place]     Platform used for Video Visit: [facility]     NUTRITION POST OP APPOINTMENT  DATE OF VISIT: January 18, 2021    Ana Wyman  1970  female  [id number]  50 year old     ASSESSMENT:    REASON FOR VISIT:  Ana is a 50 year old year old female presents today for 2 year PO nutrition follow-up appointment. Patient is accompanied by self.    DIAGNOSIS:  Status post gastric bypass surgery.  Obesity Grade II BMI 35-39.9     ANTHROPOMETRICS:  Initial Weight: 339 lb.   Height: 64\"   Current Weight: 221 lb. Per pt.   BMI: 36.78 kg/(m^2).    VITAMINS AND MINERALS:  2 Complete multivitamins with minerals- Rinku's Complete  5000 Int Units of Vitamin D daily   500 mg chew three times of Calcium daily- from [facility] by 2 hours  No iron needed  Biotin daily  1/2 tablet B complex (100 mg thiamine per tablet) -2 times per week  2500 B12 sl twice weekly    NUTRITION HISTORY:  Breakfast: yogurt smoothie with protein powder or protein oatmeal + protein powder or yogurt + fiber one flakes  Lunch: thick split pea soup with ham or turkey roll ups or leftovers from dinner  Supper: shrimp, potatoes, brussel sprouts  or chicken with red rosado sauce in crock pot or meat, sweet potatoes, non-starchy veggies  Snacks: Healthy choice frozen fudge bar or fruit-1-2 snacks  Fluids consumed: Water, hot tea, occasional sugar free hot chocolate, sugar free apple cider, water + small amount of 1 oz cranberry or pineapple juice   Consuming liquid calories: Yes  Protein intake: 50-60 grams/day  Tolerate regular texture food: Yes  Any foods not tolerated details: No  Portion size: 1 cup  Take 20-30 minutes to consume each " meal: Yes   Eat protein foods first: Yes  Fluids and meals separate by at least 30 minutes: Yes  Chew foods thoroughly: Yes  Tolerating diet: Yes  Drinking high protein supplements: Yes  Consuming snacks per day: yes  Additional Information: Patient feels like the pandemic has been tough to keep her activity up due to her gym being closed.Snacking has been a concern for the patient after dinner.      PHYSICAL ACTIVITY:  Type: walking or snow shoeing or treadmill  Frequency (days per week): 3  Duration (min): 30    DIAGNOSIS:  Previous Nutrition Diagnosis: Altered gastrointestinal function related to alteration in gastrointestinal structure as evidenced by history of gastric bypass surgery.- no change    Previous goals:  Practice alternatives to stress/boredom eating-improving  Take 1/2 vitamin B complex 2 per week (total of 100 mg thiamine)-met    Current Nutrition Diagnosis: Altered gastrointestinal function related to alteration in gastrointestinal structure as evidenced by history of gastric bypass surgery.    INTERVENTION:   Nutrition Prescription: Eat 3 meals a day at regular intervals. Consume 60-90 grams of protein daily. Follow post-surgical vitamin and mineral protocol.  Assessed learning needs and learning preferences.    GOALS:  Verify that multivitamin/ mineral meets clinic guidelines  (offered suggestions to alternatives to [place] brand)  Limit snacking after dinner to 3 per week  Read nutrition labels for total fat and sugar (limit both to < 10 grams per serving)        Implementation: Discussed progress toward previous goals; reinforced importance of following bariatric lifestyle changes.    NUTRITION MONITORING AND EVALUATION:  Anticipated compliance: good  Verbalized good understanding.    Follow up: Patient to follow up in 12 months.    TIME SPENT WITH PATIENT:  31 minutes  Chapo Braun RD, LD  [facility], [place]  [phone]

## 2021-01-19 LAB
DEPRECATED CALCIDIOL+CALCIFEROL SERPL-MC: 46 UG/L (ref 20–75)
FERRITIN SERPL-MCNC: 100 NG/ML (ref 8–252)
IRON SATN MFR SERPL: 23 % (ref 15–46)
IRON SERPL-MCNC: 81 UG/DL (ref 35–180)
TIBC SERPL-MCNC: 356 UG/DL (ref 240–430)
TSH SERPL DL<=0.005 MIU/L-ACNC: 2.84 MU/L (ref 0.4–4)

## 2021-01-21 LAB
ANNOTATION COMMENT IMP: NORMAL
RETINYL PALMITATE SERPL-MCNC: 0.1 MG/L (ref 0–0.1)
VIT A SERPL-MCNC: 0.71 MG/L (ref 0.3–1.2)

## 2021-01-25 ENCOUNTER — MYC MEDICAL ADVICE (OUTPATIENT)
Dept: NUTRITION | Facility: CLINIC | Age: 51
End: 2021-01-25

## 2021-01-25 ENCOUNTER — VIRTUAL VISIT (OUTPATIENT)
Dept: SURGERY | Facility: CLINIC | Age: 51
End: 2021-01-25
Payer: COMMERCIAL

## 2021-01-25 VITALS — WEIGHT: 221 LBS | BODY MASS INDEX: 36.82 KG/M2 | HEIGHT: 65 IN

## 2021-01-25 DIAGNOSIS — E65 ABDOMINAL PANNUS: ICD-10-CM

## 2021-01-25 DIAGNOSIS — E66.01 CLASS 2 SEVERE OBESITY DUE TO EXCESS CALORIES WITH SERIOUS COMORBIDITY AND BODY MASS INDEX (BMI) OF 35.0 TO 35.9 IN ADULT (H): ICD-10-CM

## 2021-01-25 DIAGNOSIS — B37.2 CANDIDAL INTERTRIGO: ICD-10-CM

## 2021-01-25 DIAGNOSIS — E66.812 CLASS 2 SEVERE OBESITY DUE TO EXCESS CALORIES WITH SERIOUS COMORBIDITY AND BODY MASS INDEX (BMI) OF 36.0 TO 36.9 IN ADULT (H): ICD-10-CM

## 2021-01-25 DIAGNOSIS — E66.01 CLASS 2 SEVERE OBESITY DUE TO EXCESS CALORIES WITH SERIOUS COMORBIDITY AND BODY MASS INDEX (BMI) OF 36.0 TO 36.9 IN ADULT (H): ICD-10-CM

## 2021-01-25 DIAGNOSIS — Z98.84 BARIATRIC SURGERY STATUS: ICD-10-CM

## 2021-01-25 DIAGNOSIS — Z98.84 BARIATRIC SURGERY STATUS: Primary | ICD-10-CM

## 2021-01-25 DIAGNOSIS — K91.2 POSTSURGICAL MALABSORPTION: ICD-10-CM

## 2021-01-25 DIAGNOSIS — E66.812 CLASS 2 SEVERE OBESITY DUE TO EXCESS CALORIES WITH SERIOUS COMORBIDITY AND BODY MASS INDEX (BMI) OF 35.0 TO 35.9 IN ADULT (H): ICD-10-CM

## 2021-01-25 PROCEDURE — 99215 OFFICE O/P EST HI 40 MIN: CPT | Mod: 95 | Performed by: PHYSICIAN ASSISTANT

## 2021-01-25 PROCEDURE — 97803 MED NUTRITION INDIV SUBSEQ: CPT | Mod: 95 | Performed by: DIETITIAN, REGISTERED

## 2021-01-25 ASSESSMENT — MIFFLIN-ST. JEOR: SCORE: 1618.33

## 2021-01-25 NOTE — PATIENT INSTRUCTIONS
Bariatric Post Op Guidelines  General:    To avoid marginal ulcers avoid all forms of tobacco, alcohol in excess, caffeine, and NSAIDS (unless indicated for cardioprotection or othewise and opposed by a PPI).    Exercise is key for continued weight loss and weight maintenance. Aim for 30-60 minutes of physical activity most days of the week. Include cardiovascular and strength training.    Continue lifelong vitamins supplementation and annual lab follow up.  All  patients should supplement with the following bariatric postoperative vitamins:  2 Complete multivitamins with minerals (at different times than calcium)  Vitamin D 5000 Int Units/125 mg daily   Calcium 600 mg twice daily or 500 mg hree times daily   Vitamin B12: 500 mcg of sl daily or 1000 mcg Inj monthly  B complex daily or Thiamine 100 mg weekly  1 Iron/Vit C. Daily for females who menstruate and/or as directed    The bariatric team should be aware and potentially evaluate all adverse gastrointestinal symptoms which can be a sign of complication. Inability to tolerate textured solid food (chicken, steak, fish) may need to be evaluated by endoscopy.    There is a 10% increase of Alcohol Use Disorder in patients with bariatric surgery.   Most often occurring around 2 years post op.  Please call the clinic if you feel alcohol is interfering in your daily life.  We can help.     Follow up annually lifelong. Obesity is a chronic disease.  Weight gain can be expected. The goal of follow-up visits is to ensure adequate vitamin and protein absorption, evaluate food intake behavior, review exercise/activity level, and assist with weight regain.    Nutritional:  Eat 3 meals per day  (No snacks between meals.)  Do not skip meals.  This can cause overeating at the next meal and will prevent adequate protein and nutritional intake.    Aim for 60-80 grams of protein per day.  Always eat your protein first. This assists with optimal nutrition and helps you stay full  longer.    Eat your protein first, and then follow with fiber.    Add fiber by including fruits, vegetables, whole grains, and beans.     Portions should be about 1 cup per meal. Use measuring cups to be accurate.  Continue to use saucer/salad plates, infant/toddler silverware to keep portion sizes small and take small bites.    Eat S-L-O-W-L-Y to make each meal last 20-30 minutes. Always stop eating when satisfied.    Aim for 64 oz. of calorie-free fluids daily.    Avoid drinking 30 min before, during, and 30 min after meal    Avoid high sugar and high fat foods to prevent high calorie intake. This will reduce your rate of weight loss and can cause weight regain.   Check nutrition labels for less than 10 grams of sugar and less than 10 grams of fat per serving.

## 2021-01-25 NOTE — PROGRESS NOTES
Ana is a 50 year old who is being evaluated via a billable video visit.      If the video visit is dropped, the invitation should be resent by: Text to cell phone: [phone]  Will anyone else be joining your video visit? No      Video-Visit Details    Type of service:  Video Visit    Video Start Time: 9:07 AM    Video End Time:9:36 AM    Originating Location (pt. Location): Home    Distant Location (provider location):  [facility] [place]     Platform used for Video Visit: [facility]     BARIATRIC FOLLOW UP     January 25, 2021    HISTORY OF PRESENT ILLNESS: Pt presents today for her follow-up appointment status post laparoscopic gastric bypass.     Pt continues on Victoza and metformin 1500 mg daily.  She is not losing any weight but when she tried going off she started gaining.  She would like to stay on the current medications.  Feels this helps maintain her current weight.     The rejection of the panniculectomy has been the hardest part of her weight loss journey.  She is afraid she is going to get a major infection. Has trouble exercising due to it. Is still waiting for final decision per insurance.     221 lbs 0 oz    Wt Readings from Last 4 Encounters:   01/25/21 221 lb (100.2 kg)   10/05/20 219 lb (99.3 kg)   09/28/20 213 lb (96.6 kg)   09/03/20 213 lb (96.6 kg)      VITAMINS AND MINERALS:  2 Complete multivitamins with minerals- Riknu's Complete  5000 Int Units of Vitamin D daily   500 mg chew three times of Calcium daily- from MVI by 2 hours  No iron needed  Biotin daily  1 tablet B complex (100 mg thiamine per tablet) -cut in 1/2 (takes 2X per week)  5000 B12 sl twice weekly     OBESITY RELATED CONDITIONS:  MIGUELITO- Using CPAP nightly  GERD- improved  Back Pain- improved    PHYSICAL ACTIVITY:  Type: walking or snow shoeing or treadmill  Frequency (days per week): 3  Duration (min): 30     SOCIAL HISTORY:  Pt denies smoking.  Pt denies alcohol use.  Avoids  NSAIDS.      REVIEW OF SYSTEMS:     GI:  Nausea-none  Vomiting-none  Diarrhea-none  Constipation-none, using Miralax most days  Dysphagia-none  Abdominal Pain-none  Heartburn-at night recently.       SKIN:  Intertriginous irritation-Present daily. Puritic, beefy red rash under large pannus that causes open sires.  She uses barrier methods such as paper towels between the folds.  Also uses antifungal/steroid creams.     PSYCH:  Depression-Pt is down.  Feels defeated she was unable to get approved for panniculectomy despite continued infections under pannus.     LABS/IMAGING/MEDICAL RECORDS REVIEW:   Hemoglobin A1C   Date Value Ref Range Status   01/27/2020 5.1 0 - 5.6 % Final     Comment:     Normal <5.7% Prediabetes 5.7-6.4%  Diabetes 6.5% or higher - adopted from ADA   consensus guidelines.       Vitamin D Deficiency screening   Date Value Ref Range Status   01/18/2021 46 20 - 75 ug/L Final     Comment:     Season, race, dietary intake, and treatment affect the concentration of   25-hydroxy-Vitamin D. Values may decrease during winter months and increase   during summer months. Values 20-29 ug/L may indicate Vitamin D insufficiency   and values <20 ug/L may indicate Vitamin D deficiency.  Vitamin D determination is routinely performed by an immunoassay specific for   25 hydroxyvitamin D3.  If an individual is on vitamin D2 (ergocalciferol)   supplementation, please specify 25 OH vitamin D2 and D3 level determination by   LCMSMS test [id number].       Parathyroid Hormone Intact   Date Value Ref Range Status   01/18/2021 46 18 - 80 pg/mL Final     Vitamin B12   Date Value Ref Range Status   01/18/2021 1,006 (H) 193 - 986 pg/mL Final     Hemoglobin   Date Value Ref Range Status   01/18/2021 13.1 11.7 - 15.7 g/dL Final     Ferritin   Date Value Ref Range Status   01/18/2021 100 8 - 252 ng/mL Final     Iron   Date Value Ref Range Status   01/18/2021 81 35 - 180 ug/dL Final     Iron Binding Cap   Date Value Ref Range Status  "  01/18/2021 356 240 - 430 ug/dL Final     Iron Saturation Index   Date Value Ref Range Status   01/18/2021 23 15 - 46 % Final   01/27/2020 19 15 - 46 % Final   07/26/2019 27 15 - 46 % Final       PHYSICAL EXAMINATION:  Ht 5' 5\" (1.651 m)   Wt 221 lb (100.2 kg)   BMI 36.78 kg/m      GENERAL: Healthy, alert and no distress  EYES: Eyes grossly normal to inspection.  No discharge or erythema, or obvious scleral/conjunctival abnormalities.  RESP: No audible wheeze, cough, or visible cyanosis.  No visible retractions or increased work of breathing.    SKIN: Beefy red rash with maceration and open sores visible under large redundant pannus.    NEURO: Cranial nerves grossly intact.  Mentation and speech appropriate for age.  PSYCH: Mentation appears normal, affect variable-teary at times, judgement and insight intact, normal speech and appearance well-groomed.    ASSESSMENT AND PLAN:      Bariatric surgery status  RYGBS 1/282019 [place]  We reviewed the important post op bariatric recommendations:  -eating 3 meals daily  -eating protein first, getting >60gm protein daily  -eating slowly, chewing food well  -avoiding/limiting calorie containing beverages  -avoiding fluids 30 minutes before, during, and after meals  -limiting restaurant or cafeteria eating to twice a week or less    Ana was reminded that, to avoid marginal ulcers she should avoid tobacco at all, alcohol in excess, caffeine, and NSAIDS (unless indicated for cardioprotection or othewise and opposed by a PPI).    She was encouraged to establish and maintain a consistent physical activity routine, 6-8 hours of restorative sleep each night and optimal stress management.    Ana was reminded about the importance of life long vitamin supplementation and life long follow up.    Candidal intertrigo/Abdominal pannus  Pt will continue current antifungal and barrier regimen.  With continue to provider records to insurance of needed for panniculectomy consideration. "     Class 2 severe obesity due to excess calories with serious comorbidity and body mass index (BMI) of 36.0 to 36.9 in adult (H)  Healthy habits to assist with further weight loss were discussed. Ana will continue the metformin and Victoza.     Postsurgical malabsorption  Continue taking recommended post-op vitamins.  Labs reviewed today    Follow up: Return to clinic in 3 months for med recheck.      42 minutes spent on the date of the encounter doing chart review, interpretation of tests, patient visit and documentation

## 2021-01-26 NOTE — TELEPHONE ENCOUNTER
Noted that fax to [facility] Attn. Smita SAMUEL Was received as noted in Right Fax at 446 pm 1/25/21.  All pages went through to correct number.   Laura Paulino MS, RD, RN

## 2021-02-01 ENCOUNTER — MYC MEDICAL ADVICE (OUTPATIENT)
Dept: SURGERY | Facility: CLINIC | Age: 51
End: 2021-02-01

## 2021-02-10 ENCOUNTER — OFFICE VISIT (OUTPATIENT)
Dept: FAMILY MEDICINE | Facility: CLINIC | Age: 51
End: 2021-02-10
Payer: COMMERCIAL

## 2021-02-10 VITALS
OXYGEN SATURATION: 97 % | HEART RATE: 83 BPM | SYSTOLIC BLOOD PRESSURE: 91 MMHG | TEMPERATURE: 97.5 F | DIASTOLIC BLOOD PRESSURE: 63 MMHG | BODY MASS INDEX: 37.94 KG/M2 | WEIGHT: 228 LBS

## 2021-02-10 DIAGNOSIS — Z11.59 NEED FOR HEPATITIS C SCREENING TEST: ICD-10-CM

## 2021-02-10 DIAGNOSIS — G47.33 OSA ON CPAP: ICD-10-CM

## 2021-02-10 DIAGNOSIS — Z98.84 BARIATRIC SURGERY STATUS: ICD-10-CM

## 2021-02-10 DIAGNOSIS — E66.01 CLASS 2 SEVERE OBESITY DUE TO EXCESS CALORIES WITH SERIOUS COMORBIDITY AND BODY MASS INDEX (BMI) OF 35.0 TO 35.9 IN ADULT (H): ICD-10-CM

## 2021-02-10 DIAGNOSIS — E66.812 CLASS 2 SEVERE OBESITY DUE TO EXCESS CALORIES WITH SERIOUS COMORBIDITY AND BODY MASS INDEX (BMI) OF 35.0 TO 35.9 IN ADULT (H): ICD-10-CM

## 2021-02-10 DIAGNOSIS — J45.20 MILD INTERMITTENT ASTHMA WITHOUT COMPLICATION: ICD-10-CM

## 2021-02-10 DIAGNOSIS — L91.9 HYPERTROPHIC CONDITION OF SKIN: ICD-10-CM

## 2021-02-10 DIAGNOSIS — Z01.818 PREOP GENERAL PHYSICAL EXAM: Primary | ICD-10-CM

## 2021-02-10 LAB
ALBUMIN SERPL-MCNC: 3.6 G/DL (ref 3.4–5)
ALP SERPL-CCNC: 90 U/L (ref 40–150)
ALT SERPL W P-5'-P-CCNC: 28 U/L (ref 0–50)
ANION GAP SERPL CALCULATED.3IONS-SCNC: 8 MMOL/L (ref 3–14)
AST SERPL W P-5'-P-CCNC: 16 U/L (ref 0–45)
BILIRUB SERPL-MCNC: 0.4 MG/DL (ref 0.2–1.3)
BUN SERPL-MCNC: 12 MG/DL (ref 7–30)
CALCIUM SERPL-MCNC: 9.3 MG/DL (ref 8.5–10.1)
CHLORIDE SERPL-SCNC: 107 MMOL/L (ref 94–109)
CO2 SERPL-SCNC: 26 MMOL/L (ref 20–32)
CREAT SERPL-MCNC: 0.75 MG/DL (ref 0.52–1.04)
ERYTHROCYTE [DISTWIDTH] IN BLOOD BY AUTOMATED COUNT: 13.2 % (ref 10–15)
GFR SERPL CREATININE-BSD FRML MDRD: >90 ML/MIN/{1.73_M2}
GLUCOSE SERPL-MCNC: 79 MG/DL (ref 70–99)
HBA1C MFR BLD: 5.1 % (ref 0–5.6)
HCT VFR BLD AUTO: 40.3 % (ref 35–47)
HCV AB SERPL QL IA: NONREACTIVE
HGB BLD-MCNC: 13 G/DL (ref 11.7–15.7)
MCH RBC QN AUTO: 30.1 PG (ref 26.5–33)
MCHC RBC AUTO-ENTMCNC: 32.3 G/DL (ref 31.5–36.5)
MCV RBC AUTO: 93 FL (ref 78–100)
PLATELET # BLD AUTO: 305 10E9/L (ref 150–450)
POTASSIUM SERPL-SCNC: 3.7 MMOL/L (ref 3.4–5.3)
PROT SERPL-MCNC: 7.1 G/DL (ref 6.8–8.8)
RBC # BLD AUTO: 4.32 10E12/L (ref 3.8–5.2)
SODIUM SERPL-SCNC: 141 MMOL/L (ref 133–144)
WBC # BLD AUTO: 6 10E9/L (ref 4–11)

## 2021-02-10 PROCEDURE — 80053 COMPREHEN METABOLIC PANEL: CPT | Performed by: NURSE PRACTITIONER

## 2021-02-10 PROCEDURE — 99214 OFFICE O/P EST MOD 30 MIN: CPT | Performed by: NURSE PRACTITIONER

## 2021-02-10 PROCEDURE — 83036 HEMOGLOBIN GLYCOSYLATED A1C: CPT | Performed by: NURSE PRACTITIONER

## 2021-02-10 PROCEDURE — 36415 COLL VENOUS BLD VENIPUNCTURE: CPT | Performed by: NURSE PRACTITIONER

## 2021-02-10 PROCEDURE — 93000 ELECTROCARDIOGRAM COMPLETE: CPT | Performed by: NURSE PRACTITIONER

## 2021-02-10 PROCEDURE — 85027 COMPLETE CBC AUTOMATED: CPT | Performed by: NURSE PRACTITIONER

## 2021-02-10 PROCEDURE — 86803 HEPATITIS C AB TEST: CPT | Performed by: NURSE PRACTITIONER

## 2021-02-10 ASSESSMENT — ASTHMA QUESTIONNAIRES
QUESTION_2 LAST FOUR WEEKS HOW OFTEN HAVE YOU HAD SHORTNESS OF BREATH: NOT AT ALL
QUESTION_3 LAST FOUR WEEKS HOW OFTEN DID YOUR ASTHMA SYMPTOMS (WHEEZING, COUGHING, SHORTNESS OF BREATH, CHEST TIGHTNESS OR PAIN) WAKE YOU UP AT NIGHT OR EARLIER THAN USUAL IN THE MORNING: NOT AT ALL
QUESTION_5 LAST FOUR WEEKS HOW WOULD YOU RATE YOUR ASTHMA CONTROL: COMPLETELY CONTROLLED
QUESTION_1 LAST FOUR WEEKS HOW MUCH OF THE TIME DID YOUR ASTHMA KEEP YOU FROM GETTING AS MUCH DONE AT WORK, SCHOOL OR AT HOME: NONE OF THE TIME
ACT_TOTALSCORE: 25
QUESTION_4 LAST FOUR WEEKS HOW OFTEN HAVE YOU USED YOUR RESCUE INHALER OR NEBULIZER MEDICATION (SUCH AS ALBUTEROL): NOT AT ALL

## 2021-02-10 NOTE — H&P (VIEW-ONLY)
[facility]  [id number] [place] [id number]  Phone: [phone]  Primary Provider: Kelly Bedoya  Pre-op Performing Provider: RAI ESTEVES      PREOPERATIVE EVALUATION:  Today's date: 2/10/2021    Ana Wyman is a 50 year old female who presents for a preoperative evaluation.    Surgical Information:  Surgery/Procedure: PINEALECTOMY and bilateral cosmetic belt lipectomy   Surgery Location: [facility]  Surgeon: Wade Marquez MD  Surgery Date: 2/25/2021  Time of Surgery: 1000  Where patient plans to recover: At home with family  Fax number for surgical facility: Note does not need to be faxed, will be available electronically in Epic.    Type of Anesthesia Anticipated: General    Assessment & Plan     The proposed surgical procedure is considered INTERMEDIATE risk.    Preop general physical exam  Hypertrophic condition of skin  Clearance as below.     Class 2 severe obesity due to excess calories with serious comorbidity and body mass index (BMI) of 35.0 to 35.9 in adult (H)       Bariatric surgery status  - Comprehensive metabolic panel (BMP + Alb, Alk Phos, ALT, AST, Total. Bili, TP)  - CBC with platelets  - EKG 12-lead complete w/read - Clinics  - Hemoglobin A1c    MIGUELITO on CPAP  Controlled with CPAP    Mild intermittent asthma without complication  Controlled.     Need for hepatitis C screening test  Screening.   - Hepatitis C Screen Reflex to HCV RNA Quant and Genotype           Risks and Recommendations:  The patient has the following additional risks and recommendations for perioperative complications:   - Consult Hospitalist / IM to assist with post-op medical management    Medication Instructions:  Do not take metformin the morning of procedure.   Stop all supplements 7-10 days prior to procedure.   Stop Victoza 3-5 days prior to procedure.     RECOMMENDATION:  APPROVAL GIVEN to proceed with proposed procedure,  without further diagnostic evaluation.                      Subjective     HPI related to upcoming procedure: PINEALECTOMY and bilateral cosmetic belt lipectomy due to hypertrophic condition of skin s/p bariatric surgery.     Preop Questions 2/3/2021   1. Have you ever had a heart attack or stroke? No   2. Have you ever had surgery on your heart or blood vessels, such as a stent placement, a coronary artery bypass, or surgery on an artery in your head, neck, heart, or legs? No   3. Do you have chest pain with activity? No   4. Do you have a history of  heart failure? No   5. Do you currently have a cold, bronchitis or symptoms of other infection? No   6. Do you have a cough, shortness of breath, or wheezing? No   7. Do you or anyone in your family have previous history of blood clots? No   8. Do you or does anyone in your family have a serious bleeding problem such as prolonged bleeding following surgeries or cuts? No   9. Have you ever had problems with anemia or been told to take iron pills? No   10. Have you had any abnormal blood loss such as black, tarry or bloody stools, or abnormal vaginal bleeding? No   11. Have you ever had a blood transfusion? No   12. Are you willing to have a blood transfusion if it is medically needed before, during, or after your surgery? Yes   13. Have you or any of your relatives ever had problems with anesthesia? YES - mother with delayed waking, three month coma   14. Do you have sleep apnea, excessive snoring or daytime drowsiness? YES - MIGUELITO with CPAP use.    14a. Do you have a CPAP machine? Yes   15. Do you have any artifical heart valves or other implanted medical devices like a pacemaker, defibrillator, or continuous glucose monitor? No   16. Do you have artificial joints? No   17. Are you allergic to latex? No   18. Is there any chance that you may be pregnant? No       Health Care Directive:  Patient does not have a Health Care Directive or Living Will: Discussed advance care  planning with patient; information given to patient to review.    Preoperative Review of :   reviewed - controlled substances prescribed by other outside provider(s).      Status of Chronic Conditions:  See problem list for active medical problems.  Problems all longstanding and stable, except as noted/documented.  See ROS for pertinent symptoms related to these conditions.      Review of Systems  CONSTITUTIONAL: NEGATIVE for fever, chills, change in weight  INTEGUMENTARY/SKIN: NEGATIVE for worrisome rashes, moles or lesions  EYES: NEGATIVE for vision changes or irritation  ENT/MOUTH: NEGATIVE for ear, mouth and throat problems  RESP: NEGATIVE for significant cough or SOB  BREAST: NEGATIVE for masses, tenderness or discharge  CV: NEGATIVE for chest pain, palpitations or peripheral edema  GI: NEGATIVE for nausea, abdominal pain, heartburn, or change in bowel habits  : NEGATIVE for frequency, dysuria, or hematuria  MUSCULOSKELETAL: NEGATIVE for significant arthralgias or myalgia  NEURO: NEGATIVE for weakness, dizziness or paresthesias  ENDOCRINE: NEGATIVE for temperature intolerance, skin/hair changes  HEME: NEGATIVE for bleeding problems  PSYCHIATRIC: NEGATIVE for changes in mood or affect    Patient Active Problem List    Diagnosis Date Noted     Family history of malignant neoplasm of breast 10/05/2020     Priority: Medium     Insulin resistance 09/28/2020     Priority: Medium     Postural hypotension 07/29/2019     Priority: Medium     Intertrigo 07/29/2019     Priority: Medium     Class 2 severe obesity due to excess calories with serious comorbidity and body mass index (BMI) of 35.0 to 35.9 in adult (H) 07/29/2019     Priority: Medium     S/P laparoscopic cholecystectomy 04/18/2019     Priority: Medium     Bariatric surgery status 02/05/2019     Priority: Medium     Postsurgical malabsorption 02/05/2019     Priority: Medium     Fatty liver 07/27/2018     Priority: Medium     Mild intermittent asthma  without complication 06/11/2018     Priority: Medium     Lymphedema bilateral with mixed lipedema. 09/30/2015     Priority: Medium     Baker's cyst of knee 09/03/2015     Priority: Medium     Acanthosis nigricans 03/24/2015     Priority: Medium     Cutaneous skin tags 03/24/2015     Priority: Medium     MIGUELITO on CPAP 03/19/2013     Priority: Medium     Sleep study in 2004 and 2012         Uterine fibroid 08/02/2012     Priority: Medium     Lichen sclerosus 07/14/2011     Priority: Medium     Family history of malignant neoplasm of genital organ, other 06/11/2007     Priority: Medium     FAMILY HX GI MALIGNANCY 05/31/2007     Priority: Medium     Hypersomnia with sleep apnea 07/06/2005     Priority: Medium     Problem list name updated by automated process. Provider to review       Esophageal reflux 09/27/2004     Priority: Medium     Allergic state 09/27/2004     Priority: Medium     Problem list name updated by automated process. Provider to review        Past Medical History:   Diagnosis Date     Allergic rhinitis, cause unspecified      Cellulitis 2005    2 episodes, one with hospitalization [facility]     DUB (dysfunctional uterine bleeding)     Neg EMB 2012     Esophageal reflux     ongoing     Fibroids      Genital problems     Lichens Schlerosis     GERD (gastroesophageal reflux disease)      Hallux valgus (acquired)      History of steroid therapy     Inhalers, eye drops     Hypersomnia with sleep apnea, unspecified     using CPAP     Kidney stone May 2018    2 stones     Lichen sclerosus 7/14/2011     Lymph edema 2010- present     Lymphedema bilateral with mixed lipedema. 9/30/2015     Lymphedema bilateral with mixed lipedema. 9/30/2015     Morbid obesity (H) 3/19/2013     MIGUELITO on CPAP 3/19/2013    Sleep study in 2004 and 2012       Pneumonia     Years ago - a few times     Pre-diabetes      Sleep apnea      Tendonitis currently     Uncomplicated asthma     mild     Urinary tract infection     A few times in  past 10 years     Vision disorder [id number]    Dry Eye     Vitamin D deficiency 3/14/2015     Past Surgical History:   Procedure Laterality Date     COLONOSCOPY  05/15/2013    Procedure: COLONOSCOPY;  Colonoscopy;  Surgeon: Kota Blanco MD;  Location: [facility]     COLONOSCOPY N/A 10/21/2019    Procedure: COLONOSCOPY, with biopsies by cold forceps;  Surgeon: Lydia Vickers MD;  Location: [facility]     GASTRIC BYPASS       GYN SURGERY  2016    Uterine Ablation     LAPAROSCOPIC BYPASS GASTRIC, CHOLECYSTECTOMY, COMBINED N/A 01/28/2019    Procedure: LAPAROSCOPIC GASTRIC BYPASS;  Surgeon: Maykel Calvin MD;  Location: [facility]     LAPAROSCOPIC CHOLECYSTECTOMY N/A 04/11/2019    Procedure: LAPAROSCOPIC CHOLECYSTECTOMY;  Surgeon: Maykel Calvin MD;  Location: [facility]     lichen sclerosis       ORTHOPEDIC SURGERY       VASCULAR SURGERY  2015    Vienous closure on both legs     vein closure  12/2016    to prevent lymphedema     ZZC NONSPECIFIC PROCEDURE  1994    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     ZZC NONSPECIFIC PROCEDURE  1999,2001    Foot surgeries x 2 (bunionectomy)     ZZC NONSPECIFIC PROCEDURE  2000    hammer toes     Current Outpatient Medications   Medication Sig Dispense Refill     albuterol (PROAIR HFA/PROVENTIL HFA/VENTOLIN HFA) 108 (90 Base) MCG/ACT inhaler Inhale 2 puffs into the lungs every 4 hours as needed for shortness of breath / dyspnea or wheezing (Patient not taking: Reported on 10/5/2020) 1 Inhaler 0     BIOTIN PO Take 5,000 mcg by mouth daily At noon       Calcium Citrate-Vitamin D (CALCIUM CITRATE CHEWY BITE PO) Take 500 mg by mouth 3 times daily Plus D,noon, supper, HS       childrens multivitamin w/iron (FLINTSTONES COMPLETE) 60 MG chewable tablet Take 2 chew tab by mouth daily With breakfast       Cholecalciferol (VITAMIN D) 2000 units CAPS Take 3 capsules by mouth At Bedtime        Hypromellose (ARTIFICIAL TEARS OP) Apply 1-2 drops to eye daily as needed       insulin pen needle  (31G X 6 MM) 31G X 6 MM miscellaneous Use 1 pen needles daily 100 each 11     metFORMIN (GLUCOPHAGE-XR) 500 MG 24 hr tablet Take 3 tablets (1,500 mg) by mouth daily 270 tablet 1     nitroGLYcerin (NITRO-BID) 2 % OINT ointment Apply 0.5in to tips of fingers once daily prior to cold exposure 60 g 3     nystatin (MYCOSTATIN) 998444 UNIT/GM external cream APPLY TOPICALLY TWO TIMES A DAY AS NEEDED FOR DRY SKIN/RASH 30 g 3     polyethylene glycol (MIRALAX/GLYCOLAX) packet Take 17 g by mouth daily 90 packet 3     triamcinolone (KENALOG) 0.1 % external cream APPLY TO AFFECTED AREA(S) TOPICALLY TWO TIMES A DAY AS NEEDED FOR IRRITATION / RASH 30 g 3     VICTOZA PEN 18 MG/3ML soln INJECT 1.2MG UNDER THE SKIN DAILY. CAN INCREASE TO 1.8MG IF HUNGER INCREASES. 9 mL 1     vitamin (B COMPLEX-C) tablet Take 1 tablet by mouth every morning       vitamin B-12 (CYANOCOBALAMIN) 2500 MCG sublingual tablet Take 1,250 mcg by mouth twice a week With breakfast         Allergies   Allergen Reactions     Nsaids Other (See Comments)     Had gastric bypass - needs to avoid NSAIDS     Clindamycin Diarrhea     Codeine Nausea and Vomiting     Shellfish Allergy         Social History     Tobacco Use     Smoking status: Never Smoker     Smokeless tobacco: Never Used   Substance Use Topics     Alcohol use: Not Currently     Alcohol/week: 0.0 standard drinks     Frequency: Monthly or less     Drinks per session: 1 or 2     Binge frequency: Never     Family History   Problem Relation Age of Onset     C.A.D. Father 92        CAGB 98     Obesity Father      Cancer Father         stomach Dx age 74     Lipids Father      Hypertension Father      Coronary Artery Disease Father      Cerebrovascular Disease Father      Other Cancer Father         Esophogeal     Diabetes Mother         Type 2     Allergies Mother      Obesity Mother      C.A.D. Mother         triple bypass surgery, 65     Lipids Mother      Hypertension Mother      Coronary Artery Disease  Mother      Hyperlipidemia Mother      Colon Polyps Mother      Anesthesia Reaction Mother      Thyroid Disease Mother      Sleep Apnea Mother      Breast Cancer Mother      Sleep Apnea Brother      Sleep Apnea Brother      C.A.D. Paternal Grandfather 58        fatal     Coronary Artery Disease Paternal Grandfather      Cancer - colorectal Maternal Grandmother 75     Cancer Maternal Grandmother         liver     Hypertension Maternal Grandmother      Colon Cancer Maternal Grandmother      Other Cancer Maternal Grandmother         liver, leaukeamia     Colon Polyps Maternal Grandmother      Cancer - colorectal Paternal Aunt      Allergies Brother      Cancer Paternal Grandmother         stomach     Obesity Paternal Grandmother      Diabetes Paternal Grandmother         Type 2     Asthma Paternal Grandmother      Obesity Brother      Hypertension Brother      Cancer - colorectal Other         cousin  from colon cancer in 's     Obesity Brother      Coronary Artery Disease Maternal Grandfather      Hypertension Maternal Grandfather      History   Drug Use No         Objective     BP 91/63   Pulse 83   Temp 97.5  F (36.4  C) (Oral)   Wt 103.4 kg (228 lb)   SpO2 97%   BMI 37.94 kg/m      Physical Exam    GENERAL APPEARANCE: healthy, alert and no distress     EYES: EOMI, PERRL     HENT: ear canals and TM's normal and nose and mouth without ulcers or lesions     NECK: no adenopathy, no asymmetry, masses, or scars and thyroid normal to palpation     RESP: lungs clear to auscultation - no rales, rhonchi or wheezes     CV: regular rates and rhythm, normal S1 S2, no S3 or S4 and no murmur, click or rub     ABDOMEN:  soft, nontender, no HSM or masses and bowel sounds normal     ABDOMEN: obese     MS: extremities normal- no gross deformities noted, no evidence of inflammation in joints, FROM in all extremities.     SKIN: no suspicious lesions or rashes     NEURO: Normal strength and tone, sensory exam grossly  normal, mentation intact and speech normal     PSYCH: mentation appears normal. and affect normal/bright     LYMPHATICS: No cervical adenopathy    Recent Labs   Lab Test 01/18/21  1400 02/14/20  0916 01/27/20  1614 08/28/19  1523   HGB 13.1  --  13.4  --      --  308  --    NA  --  142  --  145*   POTASSIUM  --  3.9  --  4.1   CR  --  0.71  --  0.69   A1C  --   --  5.1  --         Diagnostics:  Labs pending at this time.  Results will be reviewed when available.   EKG: appears normal, NSR, normal axis, normal intervals, no acute ST/T changes c/w ischemia, no LVH by voltage criteria, unchanged from previous tracings    Revised Cardiac Risk Index (RCRI):  The patient has the following serious cardiovascular risks for perioperative complications:   - No serious cardiac risks = 0 points     RCRI Interpretation: 0 points: Class I (very low risk - 0.4% complication rate)      Do not take metformin the morning of procedure.   Stop all supplements 7-10 days prior to procedure.   Stop Victoza 3-5 days prior to procedure.            Signed Electronically by: JEOVANNY Topete CNP  Copy of this evaluation report is provided to requesting physician.    Preop [facility] Preop Guidelines    Revised Cardiac Risk Index

## 2021-02-10 NOTE — PROGRESS NOTES
[facility]  [id number] [place] [id number]  Phone: [phone]  Primary Provider: Kelly Bedoya  Pre-op Performing Provider: RAI ESTEVES      PREOPERATIVE EVALUATION:  Today's date: 2/10/2021    Ana Wyman is a 50 year old female who presents for a preoperative evaluation.    Surgical Information:  Surgery/Procedure: PINEALECTOMY and bilateral cosmetic belt lipectomy   Surgery Location: [facility]  Surgeon: Wade Marquez MD  Surgery Date: 2/25/2021  Time of Surgery: 1000  Where patient plans to recover: At home with family  Fax number for surgical facility: Note does not need to be faxed, will be available electronically in Epic.    Type of Anesthesia Anticipated: General    Assessment & Plan     The proposed surgical procedure is considered INTERMEDIATE risk.    Preop general physical exam  Hypertrophic condition of skin  Clearance as below.     Class 2 severe obesity due to excess calories with serious comorbidity and body mass index (BMI) of 35.0 to 35.9 in adult (H)       Bariatric surgery status  - Comprehensive metabolic panel (BMP + Alb, Alk Phos, ALT, AST, Total. Bili, TP)  - CBC with platelets  - EKG 12-lead complete w/read - Clinics  - Hemoglobin A1c    MIGUELITO on CPAP  Controlled with CPAP    Mild intermittent asthma without complication  Controlled.     Need for hepatitis C screening test  Screening.   - Hepatitis C Screen Reflex to HCV RNA Quant and Genotype           Risks and Recommendations:  The patient has the following additional risks and recommendations for perioperative complications:   - Consult Hospitalist / IM to assist with post-op medical management    Medication Instructions:  Do not take metformin the morning of procedure.   Stop all supplements 7-10 days prior to procedure.   Stop Victoza 3-5 days prior to procedure.     RECOMMENDATION:  APPROVAL GIVEN to proceed with proposed procedure,  without further diagnostic evaluation.                      Subjective     HPI related to upcoming procedure: PINEALECTOMY and bilateral cosmetic belt lipectomy due to hypertrophic condition of skin s/p bariatric surgery.     Preop Questions 2/3/2021   1. Have you ever had a heart attack or stroke? No   2. Have you ever had surgery on your heart or blood vessels, such as a stent placement, a coronary artery bypass, or surgery on an artery in your head, neck, heart, or legs? No   3. Do you have chest pain with activity? No   4. Do you have a history of  heart failure? No   5. Do you currently have a cold, bronchitis or symptoms of other infection? No   6. Do you have a cough, shortness of breath, or wheezing? No   7. Do you or anyone in your family have previous history of blood clots? No   8. Do you or does anyone in your family have a serious bleeding problem such as prolonged bleeding following surgeries or cuts? No   9. Have you ever had problems with anemia or been told to take iron pills? No   10. Have you had any abnormal blood loss such as black, tarry or bloody stools, or abnormal vaginal bleeding? No   11. Have you ever had a blood transfusion? No   12. Are you willing to have a blood transfusion if it is medically needed before, during, or after your surgery? Yes   13. Have you or any of your relatives ever had problems with anesthesia? YES - mother with delayed waking, three month coma   14. Do you have sleep apnea, excessive snoring or daytime drowsiness? YES - MIGUELITO with CPAP use.    14a. Do you have a CPAP machine? Yes   15. Do you have any artifical heart valves or other implanted medical devices like a pacemaker, defibrillator, or continuous glucose monitor? No   16. Do you have artificial joints? No   17. Are you allergic to latex? No   18. Is there any chance that you may be pregnant? No       Health Care Directive:  Patient does not have a Health Care Directive or Living Will: Discussed advance care  planning with patient; information given to patient to review.    Preoperative Review of :   reviewed - controlled substances prescribed by other outside provider(s).      Status of Chronic Conditions:  See problem list for active medical problems.  Problems all longstanding and stable, except as noted/documented.  See ROS for pertinent symptoms related to these conditions.      Review of Systems  CONSTITUTIONAL: NEGATIVE for fever, chills, change in weight  INTEGUMENTARY/SKIN: NEGATIVE for worrisome rashes, moles or lesions  EYES: NEGATIVE for vision changes or irritation  ENT/MOUTH: NEGATIVE for ear, mouth and throat problems  RESP: NEGATIVE for significant cough or SOB  BREAST: NEGATIVE for masses, tenderness or discharge  CV: NEGATIVE for chest pain, palpitations or peripheral edema  GI: NEGATIVE for nausea, abdominal pain, heartburn, or change in bowel habits  : NEGATIVE for frequency, dysuria, or hematuria  MUSCULOSKELETAL: NEGATIVE for significant arthralgias or myalgia  NEURO: NEGATIVE for weakness, dizziness or paresthesias  ENDOCRINE: NEGATIVE for temperature intolerance, skin/hair changes  HEME: NEGATIVE for bleeding problems  PSYCHIATRIC: NEGATIVE for changes in mood or affect    Patient Active Problem List    Diagnosis Date Noted     Family history of malignant neoplasm of breast 10/05/2020     Priority: Medium     Insulin resistance 09/28/2020     Priority: Medium     Postural hypotension 07/29/2019     Priority: Medium     Intertrigo 07/29/2019     Priority: Medium     Class 2 severe obesity due to excess calories with serious comorbidity and body mass index (BMI) of 35.0 to 35.9 in adult (H) 07/29/2019     Priority: Medium     S/P laparoscopic cholecystectomy 04/18/2019     Priority: Medium     Bariatric surgery status 02/05/2019     Priority: Medium     Postsurgical malabsorption 02/05/2019     Priority: Medium     Fatty liver 07/27/2018     Priority: Medium     Mild intermittent asthma  without complication 06/11/2018     Priority: Medium     Lymphedema bilateral with mixed lipedema. 09/30/2015     Priority: Medium     Baker's cyst of knee 09/03/2015     Priority: Medium     Acanthosis nigricans 03/24/2015     Priority: Medium     Cutaneous skin tags 03/24/2015     Priority: Medium     MIGUELITO on CPAP 03/19/2013     Priority: Medium     Sleep study in 2004 and 2012         Uterine fibroid 08/02/2012     Priority: Medium     Lichen sclerosus 07/14/2011     Priority: Medium     Family history of malignant neoplasm of genital organ, other 06/11/2007     Priority: Medium     FAMILY HX GI MALIGNANCY 05/31/2007     Priority: Medium     Hypersomnia with sleep apnea 07/06/2005     Priority: Medium     Problem list name updated by automated process. Provider to review       Esophageal reflux 09/27/2004     Priority: Medium     Allergic state 09/27/2004     Priority: Medium     Problem list name updated by automated process. Provider to review        Past Medical History:   Diagnosis Date     Allergic rhinitis, cause unspecified      Cellulitis 2005    2 episodes, one with hospitalization [facility]     DUB (dysfunctional uterine bleeding)     Neg EMB 2012     Esophageal reflux     ongoing     Fibroids      Genital problems     Lichens Schlerosis     GERD (gastroesophageal reflux disease)      Hallux valgus (acquired)      History of steroid therapy     Inhalers, eye drops     Hypersomnia with sleep apnea, unspecified     using CPAP     Kidney stone May 2018    2 stones     Lichen sclerosus 7/14/2011     Lymph edema 2010- present     Lymphedema bilateral with mixed lipedema. 9/30/2015     Lymphedema bilateral with mixed lipedema. 9/30/2015     Morbid obesity (H) 3/19/2013     MIGUELITO on CPAP 3/19/2013    Sleep study in 2004 and 2012       Pneumonia     Years ago - a few times     Pre-diabetes      Sleep apnea      Tendonitis currently     Uncomplicated asthma     mild     Urinary tract infection     A few times in  past 10 years     Vision disorder [id number]    Dry Eye     Vitamin D deficiency 3/14/2015     Past Surgical History:   Procedure Laterality Date     COLONOSCOPY  05/15/2013    Procedure: COLONOSCOPY;  Colonoscopy;  Surgeon: Kota Blanco MD;  Location: [facility]     COLONOSCOPY N/A 10/21/2019    Procedure: COLONOSCOPY, with biopsies by cold forceps;  Surgeon: Lydia Vickers MD;  Location: [facility]     GASTRIC BYPASS       GYN SURGERY  2016    Uterine Ablation     LAPAROSCOPIC BYPASS GASTRIC, CHOLECYSTECTOMY, COMBINED N/A 01/28/2019    Procedure: LAPAROSCOPIC GASTRIC BYPASS;  Surgeon: Maykel Calvin MD;  Location: [facility]     LAPAROSCOPIC CHOLECYSTECTOMY N/A 04/11/2019    Procedure: LAPAROSCOPIC CHOLECYSTECTOMY;  Surgeon: Maykel Calvin MD;  Location: [facility]     lichen sclerosis       ORTHOPEDIC SURGERY       VASCULAR SURGERY  2015    Vienous closure on both legs     vein closure  12/2016    to prevent lymphedema     ZZC NONSPECIFIC PROCEDURE  1994    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     ZZC NONSPECIFIC PROCEDURE  1999,2001    Foot surgeries x 2 (bunionectomy)     ZZC NONSPECIFIC PROCEDURE  2000    hammer toes     Current Outpatient Medications   Medication Sig Dispense Refill     albuterol (PROAIR HFA/PROVENTIL HFA/VENTOLIN HFA) 108 (90 Base) MCG/ACT inhaler Inhale 2 puffs into the lungs every 4 hours as needed for shortness of breath / dyspnea or wheezing (Patient not taking: Reported on 10/5/2020) 1 Inhaler 0     BIOTIN PO Take 5,000 mcg by mouth daily At noon       Calcium Citrate-Vitamin D (CALCIUM CITRATE CHEWY BITE PO) Take 500 mg by mouth 3 times daily Plus D,noon, supper, HS       childrens multivitamin w/iron (FLINTSTONES COMPLETE) 60 MG chewable tablet Take 2 chew tab by mouth daily With breakfast       Cholecalciferol (VITAMIN D) 2000 units CAPS Take 3 capsules by mouth At Bedtime        Hypromellose (ARTIFICIAL TEARS OP) Apply 1-2 drops to eye daily as needed       insulin pen needle  (31G X 6 MM) 31G X 6 MM miscellaneous Use 1 pen needles daily 100 each 11     metFORMIN (GLUCOPHAGE-XR) 500 MG 24 hr tablet Take 3 tablets (1,500 mg) by mouth daily 270 tablet 1     nitroGLYcerin (NITRO-BID) 2 % OINT ointment Apply 0.5in to tips of fingers once daily prior to cold exposure 60 g 3     nystatin (MYCOSTATIN) 517609 UNIT/GM external cream APPLY TOPICALLY TWO TIMES A DAY AS NEEDED FOR DRY SKIN/RASH 30 g 3     polyethylene glycol (MIRALAX/GLYCOLAX) packet Take 17 g by mouth daily 90 packet 3     triamcinolone (KENALOG) 0.1 % external cream APPLY TO AFFECTED AREA(S) TOPICALLY TWO TIMES A DAY AS NEEDED FOR IRRITATION / RASH 30 g 3     VICTOZA PEN 18 MG/3ML soln INJECT 1.2MG UNDER THE SKIN DAILY. CAN INCREASE TO 1.8MG IF HUNGER INCREASES. 9 mL 1     vitamin (B COMPLEX-C) tablet Take 1 tablet by mouth every morning       vitamin B-12 (CYANOCOBALAMIN) 2500 MCG sublingual tablet Take 1,250 mcg by mouth twice a week With breakfast         Allergies   Allergen Reactions     Nsaids Other (See Comments)     Had gastric bypass - needs to avoid NSAIDS     Clindamycin Diarrhea     Codeine Nausea and Vomiting     Shellfish Allergy         Social History     Tobacco Use     Smoking status: Never Smoker     Smokeless tobacco: Never Used   Substance Use Topics     Alcohol use: Not Currently     Alcohol/week: 0.0 standard drinks     Frequency: Monthly or less     Drinks per session: 1 or 2     Binge frequency: Never     Family History   Problem Relation Age of Onset     C.A.D. Father 92        CAGB 98     Obesity Father      Cancer Father         stomach Dx age 74     Lipids Father      Hypertension Father      Coronary Artery Disease Father      Cerebrovascular Disease Father      Other Cancer Father         Esophogeal     Diabetes Mother         Type 2     Allergies Mother      Obesity Mother      C.A.D. Mother         triple bypass surgery, 65     Lipids Mother      Hypertension Mother      Coronary Artery Disease  Mother      Hyperlipidemia Mother      Colon Polyps Mother      Anesthesia Reaction Mother      Thyroid Disease Mother      Sleep Apnea Mother      Breast Cancer Mother      Sleep Apnea Brother      Sleep Apnea Brother      C.A.D. Paternal Grandfather 58        fatal     Coronary Artery Disease Paternal Grandfather      Cancer - colorectal Maternal Grandmother 75     Cancer Maternal Grandmother         liver     Hypertension Maternal Grandmother      Colon Cancer Maternal Grandmother      Other Cancer Maternal Grandmother         liver, leaukeamia     Colon Polyps Maternal Grandmother      Cancer - colorectal Paternal Aunt      Allergies Brother      Cancer Paternal Grandmother         stomach     Obesity Paternal Grandmother      Diabetes Paternal Grandmother         Type 2     Asthma Paternal Grandmother      Obesity Brother      Hypertension Brother      Cancer - colorectal Other         cousin  from colon cancer in 's     Obesity Brother      Coronary Artery Disease Maternal Grandfather      Hypertension Maternal Grandfather      History   Drug Use No         Objective     BP 91/63   Pulse 83   Temp 97.5  F (36.4  C) (Oral)   Wt 103.4 kg (228 lb)   SpO2 97%   BMI 37.94 kg/m      Physical Exam    GENERAL APPEARANCE: healthy, alert and no distress     EYES: EOMI, PERRL     HENT: ear canals and TM's normal and nose and mouth without ulcers or lesions     NECK: no adenopathy, no asymmetry, masses, or scars and thyroid normal to palpation     RESP: lungs clear to auscultation - no rales, rhonchi or wheezes     CV: regular rates and rhythm, normal S1 S2, no S3 or S4 and no murmur, click or rub     ABDOMEN:  soft, nontender, no HSM or masses and bowel sounds normal     ABDOMEN: obese     MS: extremities normal- no gross deformities noted, no evidence of inflammation in joints, FROM in all extremities.     SKIN: no suspicious lesions or rashes     NEURO: Normal strength and tone, sensory exam grossly  normal, mentation intact and speech normal     PSYCH: mentation appears normal. and affect normal/bright     LYMPHATICS: No cervical adenopathy    Recent Labs   Lab Test 01/18/21  1400 02/14/20  0916 01/27/20  1614 08/28/19  1523   HGB 13.1  --  13.4  --      --  308  --    NA  --  142  --  145*   POTASSIUM  --  3.9  --  4.1   CR  --  0.71  --  0.69   A1C  --   --  5.1  --         Diagnostics:  Labs pending at this time.  Results will be reviewed when available.   EKG: appears normal, NSR, normal axis, normal intervals, no acute ST/T changes c/w ischemia, no LVH by voltage criteria, unchanged from previous tracings    Revised Cardiac Risk Index (RCRI):  The patient has the following serious cardiovascular risks for perioperative complications:   - No serious cardiac risks = 0 points     RCRI Interpretation: 0 points: Class I (very low risk - 0.4% complication rate)      Do not take metformin the morning of procedure.   Stop all supplements 7-10 days prior to procedure.   Stop Victoza 3-5 days prior to procedure.            Signed Electronically by: JEOVANNY Topete CNP  Copy of this evaluation report is provided to requesting physician.    Preop [facility] Preop Guidelines    Revised Cardiac Risk Index

## 2021-02-10 NOTE — PATIENT INSTRUCTIONS
Do not take metformin the morning of procedure.   Stop all supplements 7-10 days prior to procedure.   Stop Victoza 3-5 days prior to procedure.     Preparing for Your Surgery  Getting started  A nurse will call you to review your health history and instructions. They will give you an arrival time based on your scheduled surgery time.  Please be ready to share the following:    Your doctor's clinic name and phone number    Your medical, surgical and anesthesia history    A list of allergies and sensitivities    A list of medicines, including herbal treatments and over-the-counter drugs    Whether the patient has a legal guardian (ask how to send us the papers in advance)  If you have a child who's having surgery, please ask for a copy of Preparing for Your Child's Surgery.    Preparing for surgery    Within 30 days of surgery: Have a pre-op exam (sometimes called an H&P, or History and Physical). This can be done at a clinic or pre-operative center.  ? If you're having a , you may not need this exam. Talk to your care team    At your pre-op exam, talk to your care team about all medicines you take. If you need to stop any medicines before surgery, ask when to start taking them again.  ? We do this for your safety. Many medicines can make you bleed too much during surgery. Some change how well surgery (anesthesia) drugs work.    Call your insurance company to let them know you're having surgery. (If you don't have insurance, call [phone].)    Call your clinic if there's any change in your health. This includes signs of a cold or flu (sore throat, runny nose, cough, rash, fever). It also includes a scrape or scratch near the surgery site.    If you have questions on the day of surgery, call your hospital or surgery center.  Eating and drinking guidelines  For your safety: Unless your surgeon tells you otherwise, follow the guidelines below.    Eat and drink as usual until 8 hours before surgery. After  that, no food or milk.    Drink clear liquids until 2 hours before surgery. These are liquids you can see through, like water, Gatorade and Propel Water. You may also have black coffee and tea (no cream or milk).    Nothing by mouth within 2 hours of surgery. This includes gum, candy and breath mints.    If you drink, stop drinking alcohol the night before surgery.    If your care team tells you to take medicine on the morning of surgery, it's okay to take it with a sip of water.  Preventing infection    Shower or bathe the night before and morning of your surgery. Follow the instructions your clinic gave you. (If no instructions, use regular soap.)    Don't shave or clip hair near your surgery site. We'll remove the hair if needed.    Don't smoke or vape the morning of surgery. You may chew nicotine gum up to 2 hours before surgery. A nicotine patch is okay.  ? Note: Some surgeries require you to completely quit smoking and nicotine. Check with your surgeon.    Your care team will make every effort to keep you safe from infection. We will:  ? Clean our hands often with soap and water (or an alcohol-based hand rub).  ? Clean the skin at your surgery site with a special soap that kills germs.  ? Give you a special gown to keep you warm. (Cold raises the risk of infection.)  ? Wear special hair covers, masks, gowns and gloves during surgery.  ? Give antibiotic medicine, if prescribed. Not all surgeries need antibiotics.  What to bring on the day of surgery    Photo ID and insurance card    Copy of your health care directive, if you have one    Glasses and hearing aides (bring cases)  ? You can't wear contacts during surgery    Inhaler and eye drops, if you use them (tell us about these when you arrive)    CPAP machine or breathing device, if you use them    A few personal items, if spending the night    If you have . . .  ? A pacemaker or ICD (cardiac defibrillator): Bring the ID card.  ? An implanted stimulator:  Bring the remote control.  ? A legal guardian: Bring a copy of the certified (court-stamped) guardianship papers.  Please remove any jewelry, including body piercings. Leave jewelry and other valuables at home.  If you're going home the day of surgery  Important: If you don't follow the rules below, we must cancel your surgery.     Arrange for someone to drive you home after surgery. You may not drive, take a taxi or take public transportation by yourself (unless you'll have local anesthesia only).    Arrange for a responsible adult to stay with you overnight. If you don't, we may keep you in the hospital overnight, and you may need to pay the costs yourself.  Questions?   If you have any questions for your care team, list them here: _________________________________________________________________________________________________________________________________________________________________________________________________________________________________________________________________________________________________________________________  For informational purposes only. Not to replace the advice of your health care provider. Copyright   2003, 2019 [facility]. All rights reserved. Clinically reviewed by Lucy Chase MD. [place] [id number] - REV 4/20.

## 2021-02-11 ASSESSMENT — ASTHMA QUESTIONNAIRES: ACT_TOTALSCORE: 25

## 2021-02-12 DIAGNOSIS — Z11.59 ENCOUNTER FOR SCREENING FOR OTHER VIRAL DISEASES: Primary | ICD-10-CM

## 2021-02-17 NOTE — TELEPHONE ENCOUNTER
Patient:   ADIS GAN            MRN: [id number]            FIN: [id number]               Age:   22 years     Sex:  Female     :  1995   Associated Diagnoses:   None   Author:   Elizabeth Subramanian      Basic Information   Time seen: Date 2018.   History source: Patient.   Arrival mode: Private vehicle.   History limitation: None.      History of Present Illness   The patient presents with fever.  The onset was 1  days ago.  The course/duration of symptoms is fluctuating in intensity.  Associated symptoms: shortness of breath, cough and sore throat.  Temperature is 102  Fahrenheit.  Risk factors consist of none.  Prior episodes: occasional.  Therapy today: Acetaminophen and @ 10:30 AM.        Review of Systems   Constitutional symptoms:  Fever.   Skin symptoms:  No rash,    Eye symptoms:  No recent vision problems,    ENMT symptoms:  Sore throat.   Respiratory symptoms:  Cough, No shortness of breath,    Cardiovascular symptoms:  No chest pain,    Gastrointestinal symptoms:  No abdominal pain,    Genitourinary symptoms:  No dysuria,    Musculoskeletal symptoms:  No Joint pain,    Neurologic symptoms:  No headache,    Psychiatric symptoms:  Negative except as documented in HPI.   Endocrine symptoms:  Negative except as documented in HPI.   Hematologic/Lymphatic symptoms:  Negative except as documented in HPI.   Allergy/immunologic symptoms:  Negative except as documented in HPI.      Health Status   Allergies:    Allergic Reactions (Selected)  No Known Medication Allergies.   Medications:  (Selected)   Inpatient Medications  Ordered  Motrin 600 mg oral tablet: 600 mg, 1 tab, PO, q6hr, PRN: Pain.      Past Medical/ Family/ Social History      Medical history   Negative.   Surgical history: Negative.   Social history: Alcohol use: Denies, Tobacco use: Denies, Drug use: Denies.      Physical Examination               Vital Signs   Vital Signs   2018 20:28          Temperature Oral          102.9 F  Im Susan burgos never done PA's before.  Im not even sure what to do.      [facility]-Referrals  [phone]      HI                             Peripheral Pulse Rate     108 bpm  HI                             Respiratory Rate          18 br/min                             Systolic Blood Pressure   105 mmHg                             Diastolic Blood Pressure  64 mmHg                             Mean Arterial Pressure    78 mmHg                             Oxygen Saturation         100 %  .               Per nurse's notes.   Pulse Ox 100% on Room Air which is normal for this patient.   Vital Signs were reviewed.   General:  Alert, no acute distress.    Skin:  Warm, dry.    Head:  Normocephalic, atraumatic.    Neck:  Supple, trachea midline.    Eye:  Pupils are equal, round and reactive to light, extraocular movements are intact.    Ears, nose, mouth and throat:  Oral mucosa moist.   Cardiovascular:  Tachycardia, S1, S2.    Respiratory:  Lungs are clear to auscultation, respirations are non-labored, breath sounds are equal, Symmetrical chest wall expansion.    Gastrointestinal:  Soft, Nontender, Non distended.    Back:  Nontender, Normal range of motion, Normal alignment, no step-offs.    Musculoskeletal:  Normal ROM, normal strength, no tenderness, no swelling, no deformity.    Neurological:  Alert and oriented to person, place, time, and situation, No focal neurological deficit observed, CN II-XII intact, normal sensory observed, normal motor observed, normal speech observed, normal coordination observed.    Psychiatric:  Appropriate mood & affect.      Medical Decision Making   Differential Diagnosis:  Fever, viral syndrome, pneumonia, bronchitis, otitis media, upper respiratory infection, sinusitis, urinary tract infection, pharyngitis, influenza.    Results review:  Lab results : Lab View   1/22/2018 21:21          Influenz A                Presumptive Neg                             Influenz B                Presumptive Neg                             Influenz Commnt           See Note                             Rapid  Strep A             Negative  .   Radiology results:  X-ray, * Final Report *    Reason For Exam  fever, cough    Report  PROCEDURE: XR Chest PA + Lateral    COMPARISON: None Chest X-ray.    INDICATIONS: fever, cough    TECHNIQUES: FRONTAL AND LATERAL VIEWS OF CHEST WERE OBTAINED.    FINDINGS: The cardiomediastinal silhouette is within normal limits. The lungs are normally aerated and clear. No pneumothorax or pleural effusion is seen. The osseous structures are unremarkable.    IMPRESSION: No acute consolidations .   .    22-year-old female with no significant past medical history presents the emergency department with a complaint of fever, sore throat, dry cough since yesterday.  Patient states the highest temperature was 102 at home and has been treating with Tylenol as needed.  Last dose was at 10:30 AM.  Denies any sick contacts.  Patient states she has been otherwise tying fluids, eating, sleeping, urinating well.  Vitals and physical exam as noted above.  Patient is febrile and therefore given ibuprofen and Tylenol.  Pulmonary exam is benign, no wheezes, rhonchi, Rales.  Patient saturating 100% room air.  Throat exam is benign, no signs of peritonsillar abscess, tolerating secretions well, no signs of angioedema.  Swabs as noted above.  Chest x-ray as noted above.  Discussed all results and treatment with patient and family.  Fever management provided.  Follow-up with primary care physician 1 or 2 days.  Return for worsening symptoms.    work note provided      Impression and Plan   Diagnosis   Fever   Plan   Condition: Improved, Stable.    Disposition: Discharged: to home.    Prescriptions: Launch prescriptions   Pharmacy:  Azithromycin 5 Day Dose Pack 250 mg oral tablet (Prescribe): 1 packet, PO, Once, 6 tab, 0 Refill(s).    Patient was given the following educational materials: Fever, Adult (Custom), Cough, Adult, Easy-to-Read.    Follow up with: Follow up with primary care provider Treat fever alternate  between Tylenol Motrin every 4 6 hours  Take medications prescribed  Follow-up with primary care physician 1 or 2 days  Return for worsening symptoms.    Counseled: Patient, Family, Regarding diagnosis, Regarding diagnostic results, Regarding treatment plan, Regarding prescription, Patient indicated understanding of instructions.

## 2021-02-21 ENCOUNTER — HOSPITAL ENCOUNTER (OUTPATIENT)
Dept: LAB | Facility: CLINIC | Age: 51
Discharge: HOME | End: 2021-02-21
Attending: PLASTIC SURGERY | Admitting: PLASTIC SURGERY
Payer: COMMERCIAL

## 2021-02-21 DIAGNOSIS — Z11.59 ENCOUNTER FOR SCREENING FOR OTHER VIRAL DISEASES: ICD-10-CM

## 2021-02-21 LAB
LABORATORY COMMENT REPORT: NORMAL
SARS-COV-2 RNA RESP QL NAA+PROBE: NEGATIVE
SARS-COV-2 RNA RESP QL NAA+PROBE: NORMAL
SPECIMEN SOURCE: NORMAL
SPECIMEN SOURCE: NORMAL

## 2021-02-21 PROCEDURE — 87635 SARS-COV-2 COVID-19 AMP PRB: CPT | Performed by: PLASTIC SURGERY

## 2021-02-24 RX ORDER — METFORMIN HCL 500 MG
500 TABLET, EXTENDED RELEASE 24 HR ORAL EVERY MORNING
COMMUNITY
End: 2021-09-13

## 2021-02-24 RX ORDER — NITROGLYCERIN 20 MG/G
0.5 OINTMENT TOPICAL DAILY PRN
COMMUNITY
End: 2021-11-15

## 2021-02-24 RX ORDER — TRIAMCINOLONE ACETONIDE 1 MG/G
CREAM TOPICAL 2 TIMES DAILY PRN
COMMUNITY
End: 2021-12-22

## 2021-02-24 RX ORDER — NYSTATIN 100000 U/G
CREAM TOPICAL DAILY PRN
COMMUNITY
End: 2021-12-22

## 2021-02-24 RX ORDER — LIFITEGRAST 50 MG/ML
1 SOLUTION/ DROPS OPHTHALMIC DAILY
COMMUNITY
End: 2021-09-13

## 2021-02-24 NOTE — PROGRESS NOTES
PTA medications updated by Medication Scribe prior to surgery via phone call with patient        Comments:    Medication history sources: Patient, Surescripts and H&P  Medication history source reliability: Good  Adherence assessment: N/A Not Observed    Significant changes made to the medication list:  None      Additional medication history information:   None        Prior to Admission medications    Medication Sig Last Dose Taking? Auth Provider   albuterol (PROAIR HFA/PROVENTIL HFA/VENTOLIN HFA) 108 (90 Base) MCG/ACT inhaler Inhale 2 puffs into the lungs every 4 hours as needed for shortness of breath / dyspnea or wheezing MORE THAN 1 YEAR at PRN Yes Kelly Bedoya MD   BIOTIN PO Take 5,000 mcg by mouth daily At noon 1 WEEK AGO Yes Reported, Patient   Calcium Citrate-Vitamin D (CALCIUM CITRATE CHEWY BITE PO) Take 500 mg by mouth 3 times daily Plus D,noon, supper, HS 1 WEEK AGO Yes Reported, Patient   childrens multivitamin w/iron (FLINTSTONES COMPLETE) 60 MG chewable tablet Take 2 chew tab by mouth every evening  1 WEEK AGO Yes Reported, Patient   lifitegrast (XIIDRA) 5 % opthalmic solution Place 1 drop into both eyes daily  Yes Reported, Patient   metFORMIN (GLUCOPHAGE-XR) 500 MG 24 hr tablet Take 500 mg by mouth every morning 2/23/2021 at AM Yes Reported, Patient   nitroGLYcerin (NITRO-BID) 2 % OINT ointment Place 0.5 inches onto the skin daily as needed (Apply 0.5in to tips of fingers once daily prior to cold exposure) MORE THAN 1 WEEK at PRN Yes Reported, Patient   nystatin (MYCOSTATIN) 956732 UNIT/GM external cream Apply topically daily as needed for dry skin MORE THAN 1 MONTH at PRN Yes Reported, Patient   polyethylene glycol (MIRALAX/GLYCOLAX) packet Take 17 g by mouth daily  Yes Sarah Stacy PA-C   triamcinolone (KENALOG) 0.1 % external cream Apply topically 2 times daily as needed for irritation MORE THAN 1 MONTH at PRN Yes Reported, Patient   VICTOZA PEN 18 MG/3ML soln INJECT 1.2MG  UNDER THE SKIN DAILY. CAN INCREASE TO 1.8MG IF HUNGER INCREASES.  Patient taking differently: Inject 1.8 mg Subcutaneous daily  2/21/2021 Yes Sarah Stacy PA-C   vitamin (B COMPLEX-C) tablet Take 1 tablet by mouth every evening  1 WEEK AGO Yes Reported, Patient   vitamin B-12 (CYANOCOBALAMIN) 2500 MCG sublingual tablet Take 1,250 mcg by mouth twice a week  1 WEEK AGO Yes Reported, Patient   Vitamin D3 (CHOLECALCIFEROL) 25 mcg (1000 units) tablet Take 1 capsule by mouth every morning  1 WEEK AGO Yes Reported, Patient   insulin pen needle (31G X 6 MM) 31G X 6 MM miscellaneous Use 1 pen needles daily   Sarah Stacy PA-C

## 2021-02-25 ENCOUNTER — ANESTHESIA (OUTPATIENT)
Dept: SURGERY | Facility: CLINIC | Age: 51
End: 2021-02-25
Payer: COMMERCIAL

## 2021-02-25 ENCOUNTER — HOSPITAL ENCOUNTER (INPATIENT)
Facility: CLINIC | Age: 51
LOS: 3 days | Discharge: HOME | End: 2021-03-01
Attending: PLASTIC SURGERY | Admitting: PLASTIC SURGERY
Payer: COMMERCIAL

## 2021-02-25 ENCOUNTER — ANESTHESIA EVENT (OUTPATIENT)
Dept: SURGERY | Facility: CLINIC | Age: 51
End: 2021-02-25
Payer: COMMERCIAL

## 2021-02-25 ENCOUNTER — APPOINTMENT (OUTPATIENT)
Dept: GENERAL RADIOLOGY | Facility: CLINIC | Age: 51
End: 2021-02-25
Attending: NURSE PRACTITIONER
Payer: COMMERCIAL

## 2021-02-25 DIAGNOSIS — E65 SYMPTOMATIC ABDOMINAL PANNICULUS: Primary | ICD-10-CM

## 2021-02-25 LAB
ANION GAP SERPL CALCULATED.3IONS-SCNC: 4 MMOL/L (ref 3–14)
BUN SERPL-MCNC: 13 MG/DL (ref 7–30)
CALCIUM SERPL-MCNC: 8.4 MG/DL (ref 8.5–10.1)
CHLORIDE SERPL-SCNC: 109 MMOL/L (ref 94–109)
CO2 SERPL-SCNC: 27 MMOL/L (ref 20–32)
CREAT SERPL-MCNC: 0.91 MG/DL (ref 0.52–1.04)
ERYTHROCYTE [DISTWIDTH] IN BLOOD BY AUTOMATED COUNT: 12.5 % (ref 10–15)
GFR SERPL CREATININE-BSD FRML MDRD: 72 ML/MIN/{1.73_M2}
GLUCOSE BLDC GLUCOMTR-MCNC: 144 MG/DL (ref 70–99)
GLUCOSE BLDC GLUCOMTR-MCNC: 186 MG/DL (ref 70–99)
GLUCOSE BLDC GLUCOMTR-MCNC: 199 MG/DL (ref 70–99)
GLUCOSE SERPL-MCNC: 215 MG/DL (ref 70–99)
HCT VFR BLD AUTO: 33.2 % (ref 35–47)
HGB BLD-MCNC: 10.7 G/DL (ref 11.7–15.7)
LACTATE BLD-SCNC: 3.8 MMOL/L (ref 0.7–2)
MCH RBC QN AUTO: 30.1 PG (ref 26.5–33)
MCHC RBC AUTO-ENTMCNC: 32.2 G/DL (ref 31.5–36.5)
MCV RBC AUTO: 94 FL (ref 78–100)
PLATELET # BLD AUTO: 289 10E9/L (ref 150–450)
POTASSIUM SERPL-SCNC: 4.6 MMOL/L (ref 3.4–5.3)
RBC # BLD AUTO: 3.55 10E12/L (ref 3.8–5.2)
SODIUM SERPL-SCNC: 140 MMOL/L (ref 133–144)
WBC # BLD AUTO: 13.8 10E9/L (ref 4–11)

## 2021-02-25 PROCEDURE — 82962 GLUCOSE BLOOD TEST: CPT

## 2021-02-25 PROCEDURE — 250N000009 HC RX 250: Performed by: NURSE ANESTHETIST, CERTIFIED REGISTERED

## 2021-02-25 PROCEDURE — 999N000141 HC STATISTIC PRE-PROCEDURE NURSING ASSESSMENT: Performed by: PLASTIC SURGERY

## 2021-02-25 PROCEDURE — 250N000013 HC RX MED GY IP 250 OP 250 PS 637: Performed by: PLASTIC SURGERY

## 2021-02-25 PROCEDURE — 250N000011 HC RX IP 250 OP 636: Performed by: NURSE ANESTHETIST, CERTIFIED REGISTERED

## 2021-02-25 PROCEDURE — 258N000003 HC RX IP 258 OP 636: Performed by: PLASTIC SURGERY

## 2021-02-25 PROCEDURE — 85027 COMPLETE CBC AUTOMATED: CPT | Performed by: NURSE PRACTITIONER

## 2021-02-25 PROCEDURE — 258N000003 HC RX IP 258 OP 636: Performed by: NURSE ANESTHETIST, CERTIFIED REGISTERED

## 2021-02-25 PROCEDURE — 0KQK0ZZ REPAIR RIGHT ABDOMEN MUSCLE, OPEN APPROACH: ICD-10-PCS | Performed by: PLASTIC SURGERY

## 2021-02-25 PROCEDURE — 93005 ELECTROCARDIOGRAM TRACING: CPT

## 2021-02-25 PROCEDURE — 250N000011 HC RX IP 250 OP 636

## 2021-02-25 PROCEDURE — P9041 ALBUMIN (HUMAN),5%, 50ML: HCPCS | Performed by: NURSE ANESTHETIST, CERTIFIED REGISTERED

## 2021-02-25 PROCEDURE — 250N000011 HC RX IP 250 OP 636: Performed by: PLASTIC SURGERY

## 2021-02-25 PROCEDURE — 370N000017 HC ANESTHESIA TECHNICAL FEE, PER MIN: Performed by: PLASTIC SURGERY

## 2021-02-25 PROCEDURE — 86901 BLOOD TYPING SEROLOGIC RH(D): CPT | Performed by: NURSE PRACTITIONER

## 2021-02-25 PROCEDURE — 99291 CRITICAL CARE FIRST HOUR: CPT | Performed by: NURSE PRACTITIONER

## 2021-02-25 PROCEDURE — 71045 X-RAY EXAM CHEST 1 VIEW: CPT

## 2021-02-25 PROCEDURE — 360N000076 HC SURGERY LEVEL 3, PER MIN: Performed by: PLASTIC SURGERY

## 2021-02-25 PROCEDURE — 86923 COMPATIBILITY TEST ELECTRIC: CPT | Performed by: NURSE PRACTITIONER

## 2021-02-25 PROCEDURE — 710N000009 HC RECOVERY PHASE 1, LEVEL 1, PER MIN: Performed by: PLASTIC SURGERY

## 2021-02-25 PROCEDURE — 360N000075 HC SURGERY LEVEL 2, PER MIN: Performed by: PLASTIC SURGERY

## 2021-02-25 PROCEDURE — 80048 BASIC METABOLIC PNL TOTAL CA: CPT | Performed by: NURSE PRACTITIONER

## 2021-02-25 PROCEDURE — 272N000001 HC OR GENERAL SUPPLY STERILE: Performed by: PLASTIC SURGERY

## 2021-02-25 PROCEDURE — 87040 BLOOD CULTURE FOR BACTERIA: CPT | Performed by: NURSE PRACTITIONER

## 2021-02-25 PROCEDURE — 0J080ZZ ALTERATION OF ABDOMEN SUBCUTANEOUS TISSUE AND FASCIA, OPEN APPROACH: ICD-10-PCS | Performed by: PLASTIC SURGERY

## 2021-02-25 PROCEDURE — 250N000025 HC SEVOFLURANE, PER MIN: Performed by: PLASTIC SURGERY

## 2021-02-25 PROCEDURE — 83605 ASSAY OF LACTIC ACID: CPT | Performed by: NURSE PRACTITIONER

## 2021-02-25 PROCEDURE — 0KQL0ZZ REPAIR LEFT ABDOMEN MUSCLE, OPEN APPROACH: ICD-10-PCS | Performed by: PLASTIC SURGERY

## 2021-02-25 PROCEDURE — 0JC80ZZ EXTIRPATION OF MATTER FROM ABDOMEN SUBCUTANEOUS TISSUE AND FASCIA, OPEN APPROACH: ICD-10-PCS | Performed by: PLASTIC SURGERY

## 2021-02-25 PROCEDURE — 86900 BLOOD TYPING SEROLOGIC ABO: CPT | Performed by: NURSE PRACTITIONER

## 2021-02-25 PROCEDURE — 36415 COLL VENOUS BLD VENIPUNCTURE: CPT | Performed by: NURSE PRACTITIONER

## 2021-02-25 PROCEDURE — 258N000003 HC RX IP 258 OP 636: Performed by: NURSE PRACTITIONER

## 2021-02-25 PROCEDURE — 86850 RBC ANTIBODY SCREEN: CPT | Performed by: NURSE PRACTITIONER

## 2021-02-25 PROCEDURE — 99207 PR NO CHARGE LOS: CPT | Performed by: PHYSICIAN ASSISTANT

## 2021-02-25 PROCEDURE — 250N000009 HC RX 250: Performed by: ANESTHESIOLOGY

## 2021-02-25 PROCEDURE — 250N000009 HC RX 250: Performed by: PLASTIC SURGERY

## 2021-02-25 RX ORDER — ONDANSETRON 2 MG/ML
4 INJECTION INTRAMUSCULAR; INTRAVENOUS EVERY 30 MIN PRN
Status: DISCONTINUED | OUTPATIENT
Start: 2021-02-25 | End: 2021-02-25 | Stop reason: HOSPADM

## 2021-02-25 RX ORDER — DEXAMETHASONE SODIUM PHOSPHATE 4 MG/ML
INJECTION, SOLUTION INTRA-ARTICULAR; INTRALESIONAL; INTRAMUSCULAR; INTRAVENOUS; SOFT TISSUE PRN
Status: DISCONTINUED | OUTPATIENT
Start: 2021-02-25 | End: 2021-02-26

## 2021-02-25 RX ORDER — VECURONIUM BROMIDE 1 MG/ML
INJECTION, POWDER, LYOPHILIZED, FOR SOLUTION INTRAVENOUS PRN
Status: DISCONTINUED | OUTPATIENT
Start: 2021-02-25 | End: 2021-02-25

## 2021-02-25 RX ORDER — ONDANSETRON 4 MG/1
4 TABLET, ORALLY DISINTEGRATING ORAL EVERY 6 HOURS PRN
Status: DISCONTINUED | OUTPATIENT
Start: 2021-02-25 | End: 2021-03-01 | Stop reason: HOSPADM

## 2021-02-25 RX ORDER — MAGNESIUM HYDROXIDE 1200 MG/15ML
LIQUID ORAL PRN
Status: DISCONTINUED | OUTPATIENT
Start: 2021-02-25 | End: 2021-02-26

## 2021-02-25 RX ORDER — NALOXONE HYDROCHLORIDE 0.4 MG/ML
0.2 INJECTION, SOLUTION INTRAMUSCULAR; INTRAVENOUS; SUBCUTANEOUS
Status: DISCONTINUED | OUTPATIENT
Start: 2021-02-25 | End: 2021-03-01 | Stop reason: HOSPADM

## 2021-02-25 RX ORDER — PROPOFOL 10 MG/ML
INJECTION, EMULSION INTRAVENOUS PRN
Status: DISCONTINUED | OUTPATIENT
Start: 2021-02-25 | End: 2021-02-25

## 2021-02-25 RX ORDER — ALBUMIN, HUMAN INJ 5% 5 %
SOLUTION INTRAVENOUS CONTINUOUS PRN
Status: DISCONTINUED | OUTPATIENT
Start: 2021-02-25 | End: 2021-02-26

## 2021-02-25 RX ORDER — VITAMIN B COMPLEX
25 TABLET ORAL EVERY MORNING
Status: DISCONTINUED | OUTPATIENT
Start: 2021-02-26 | End: 2021-03-01 | Stop reason: HOSPADM

## 2021-02-25 RX ORDER — CEFAZOLIN SODIUM 2 G/100ML
2 INJECTION, SOLUTION INTRAVENOUS EVERY 8 HOURS
Status: DISCONTINUED | OUTPATIENT
Start: 2021-02-25 | End: 2021-02-26

## 2021-02-25 RX ORDER — METFORMIN HCL 500 MG
500 TABLET, EXTENDED RELEASE 24 HR ORAL EVERY MORNING
Status: DISCONTINUED | OUTPATIENT
Start: 2021-02-26 | End: 2021-02-25

## 2021-02-25 RX ORDER — CEFAZOLIN SODIUM 2 G/100ML
2 INJECTION, SOLUTION INTRAVENOUS SEE ADMIN INSTRUCTIONS
Status: DISCONTINUED | OUTPATIENT
Start: 2021-02-25 | End: 2021-02-26 | Stop reason: HOSPADM

## 2021-02-25 RX ORDER — LIDOCAINE HYDROCHLORIDE 20 MG/ML
INJECTION, SOLUTION INFILTRATION; PERINEURAL PRN
Status: DISCONTINUED | OUTPATIENT
Start: 2021-02-25 | End: 2021-02-25

## 2021-02-25 RX ORDER — EPINEPHRINE 1 MG/ML
INJECTION, SOLUTION INTRAMUSCULAR; SUBCUTANEOUS
Status: DISCONTINUED
Start: 2021-02-25 | End: 2021-02-25 | Stop reason: WASHOUT

## 2021-02-25 RX ORDER — NALOXONE HYDROCHLORIDE 0.4 MG/ML
0.4 INJECTION, SOLUTION INTRAMUSCULAR; INTRAVENOUS; SUBCUTANEOUS
Status: DISCONTINUED | OUTPATIENT
Start: 2021-02-25 | End: 2021-03-01 | Stop reason: HOSPADM

## 2021-02-25 RX ORDER — OXYCODONE HCL 10 MG/1
10 TABLET, FILM COATED, EXTENDED RELEASE ORAL ONCE
Status: COMPLETED | OUTPATIENT
Start: 2021-02-25 | End: 2021-02-25

## 2021-02-25 RX ORDER — POLYETHYLENE GLYCOL 3350 17 G/17G
17 POWDER, FOR SOLUTION ORAL DAILY
Status: DISCONTINUED | OUTPATIENT
Start: 2021-02-25 | End: 2021-03-01 | Stop reason: HOSPADM

## 2021-02-25 RX ORDER — NICOTINE POLACRILEX 4 MG
15-30 LOZENGE BUCCAL
Status: DISCONTINUED | OUTPATIENT
Start: 2021-02-25 | End: 2021-02-25

## 2021-02-25 RX ORDER — PROPOFOL 10 MG/ML
INJECTION, EMULSION INTRAVENOUS CONTINUOUS PRN
Status: DISCONTINUED | OUTPATIENT
Start: 2021-02-25 | End: 2021-02-25

## 2021-02-25 RX ORDER — FENTANYL CITRATE 50 UG/ML
INJECTION, SOLUTION INTRAMUSCULAR; INTRAVENOUS PRN
Status: DISCONTINUED | OUTPATIENT
Start: 2021-02-25 | End: 2021-02-25

## 2021-02-25 RX ORDER — ONDANSETRON 2 MG/ML
4 INJECTION INTRAMUSCULAR; INTRAVENOUS EVERY 6 HOURS PRN
Status: DISCONTINUED | OUTPATIENT
Start: 2021-02-25 | End: 2021-03-01 | Stop reason: HOSPADM

## 2021-02-25 RX ORDER — CEFAZOLIN SODIUM 2 G/100ML
2 INJECTION, SOLUTION INTRAVENOUS
Status: COMPLETED | OUTPATIENT
Start: 2021-02-25 | End: 2021-02-25

## 2021-02-25 RX ORDER — HEPARIN SODIUM 5000 [USP'U]/.5ML
5000 INJECTION, SOLUTION INTRAVENOUS; SUBCUTANEOUS
Status: COMPLETED | OUTPATIENT
Start: 2021-02-25 | End: 2021-02-25

## 2021-02-25 RX ORDER — ACETAMINOPHEN 325 MG/1
975 TABLET ORAL EVERY 8 HOURS
Status: DISCONTINUED | OUTPATIENT
Start: 2021-02-25 | End: 2021-02-26

## 2021-02-25 RX ORDER — FENTANYL CITRATE 50 UG/ML
25-50 INJECTION, SOLUTION INTRAMUSCULAR; INTRAVENOUS
Status: DISCONTINUED | OUTPATIENT
Start: 2021-02-25 | End: 2021-02-25 | Stop reason: HOSPADM

## 2021-02-25 RX ORDER — SODIUM CHLORIDE, SODIUM LACTATE, POTASSIUM CHLORIDE, CALCIUM CHLORIDE 600; 310; 30; 20 MG/100ML; MG/100ML; MG/100ML; MG/100ML
INJECTION, SOLUTION INTRAVENOUS CONTINUOUS
Status: DISCONTINUED | OUTPATIENT
Start: 2021-02-25 | End: 2021-03-01 | Stop reason: HOSPADM

## 2021-02-25 RX ORDER — HYDROMORPHONE HYDROCHLORIDE 1 MG/ML
.3-.5 INJECTION, SOLUTION INTRAMUSCULAR; INTRAVENOUS; SUBCUTANEOUS EVERY 5 MIN PRN
Status: DISCONTINUED | OUTPATIENT
Start: 2021-02-25 | End: 2021-02-25 | Stop reason: HOSPADM

## 2021-02-25 RX ORDER — PROPOFOL 10 MG/ML
INJECTION, EMULSION INTRAVENOUS PRN
Status: DISCONTINUED | OUTPATIENT
Start: 2021-02-25 | End: 2021-02-26

## 2021-02-25 RX ORDER — PROCHLORPERAZINE MALEATE 5 MG
10 TABLET ORAL EVERY 6 HOURS PRN
Status: DISCONTINUED | OUTPATIENT
Start: 2021-02-25 | End: 2021-03-01 | Stop reason: HOSPADM

## 2021-02-25 RX ORDER — DIPHENHYDRAMINE HYDROCHLORIDE 50 MG/ML
25 INJECTION INTRAMUSCULAR; INTRAVENOUS EVERY 6 HOURS PRN
Status: DISCONTINUED | OUTPATIENT
Start: 2021-02-25 | End: 2021-03-01 | Stop reason: HOSPADM

## 2021-02-25 RX ORDER — SODIUM CHLORIDE, SODIUM LACTATE, POTASSIUM CHLORIDE, CALCIUM CHLORIDE 600; 310; 30; 20 MG/100ML; MG/100ML; MG/100ML; MG/100ML
INJECTION, SOLUTION INTRAVENOUS CONTINUOUS PRN
Status: DISCONTINUED | OUTPATIENT
Start: 2021-02-25 | End: 2021-02-25

## 2021-02-25 RX ORDER — SODIUM CHLORIDE, SODIUM LACTATE, POTASSIUM CHLORIDE, CALCIUM CHLORIDE 600; 310; 30; 20 MG/100ML; MG/100ML; MG/100ML; MG/100ML
INJECTION, SOLUTION INTRAVENOUS CONTINUOUS PRN
Status: DISCONTINUED | OUTPATIENT
Start: 2021-02-25 | End: 2021-02-26

## 2021-02-25 RX ORDER — AMOXICILLIN 250 MG
1 CAPSULE ORAL 2 TIMES DAILY
Status: DISCONTINUED | OUTPATIENT
Start: 2021-02-25 | End: 2021-03-01 | Stop reason: HOSPADM

## 2021-02-25 RX ORDER — AMOXICILLIN 250 MG
2 CAPSULE ORAL 2 TIMES DAILY
Status: DISCONTINUED | OUTPATIENT
Start: 2021-02-25 | End: 2021-03-01 | Stop reason: HOSPADM

## 2021-02-25 RX ORDER — ONDANSETRON 2 MG/ML
INJECTION INTRAMUSCULAR; INTRAVENOUS PRN
Status: DISCONTINUED | OUTPATIENT
Start: 2021-02-25 | End: 2021-02-25

## 2021-02-25 RX ORDER — OXYCODONE HYDROCHLORIDE 5 MG/1
5-10 TABLET ORAL
Status: DISCONTINUED | OUTPATIENT
Start: 2021-02-25 | End: 2021-02-26

## 2021-02-25 RX ORDER — ACETAMINOPHEN 325 MG/1
650 TABLET ORAL EVERY 4 HOURS PRN
Status: DISCONTINUED | OUTPATIENT
Start: 2021-02-28 | End: 2021-03-01 | Stop reason: HOSPADM

## 2021-02-25 RX ORDER — GABAPENTIN 300 MG/1
300 CAPSULE ORAL
Status: COMPLETED | OUTPATIENT
Start: 2021-02-25 | End: 2021-02-25

## 2021-02-25 RX ORDER — HYDROMORPHONE HYDROCHLORIDE 1 MG/ML
.3-.5 INJECTION, SOLUTION INTRAMUSCULAR; INTRAVENOUS; SUBCUTANEOUS
Status: DISCONTINUED | OUTPATIENT
Start: 2021-02-25 | End: 2021-03-01 | Stop reason: HOSPADM

## 2021-02-25 RX ORDER — BUPIVACAINE HYDROCHLORIDE AND EPINEPHRINE 2.5; 5 MG/ML; UG/ML
INJECTION, SOLUTION EPIDURAL; INFILTRATION; INTRACAUDAL; PERINEURAL
Status: DISCONTINUED
Start: 2021-02-25 | End: 2021-02-25 | Stop reason: HOSPADM

## 2021-02-25 RX ORDER — BUPIVACAINE HYDROCHLORIDE AND EPINEPHRINE 2.5; 5 MG/ML; UG/ML
INJECTION, SOLUTION INFILTRATION; PERINEURAL PRN
Status: DISCONTINUED | OUTPATIENT
Start: 2021-02-25 | End: 2021-02-25 | Stop reason: HOSPADM

## 2021-02-25 RX ORDER — DIPHENHYDRAMINE HYDROCHLORIDE 50 MG/ML
INJECTION INTRAMUSCULAR; INTRAVENOUS PRN
Status: DISCONTINUED | OUTPATIENT
Start: 2021-02-25 | End: 2021-02-25

## 2021-02-25 RX ORDER — NALOXONE HYDROCHLORIDE 0.4 MG/ML
0.2 INJECTION, SOLUTION INTRAMUSCULAR; INTRAVENOUS; SUBCUTANEOUS
Status: DISCONTINUED | OUTPATIENT
Start: 2021-02-25 | End: 2021-02-25

## 2021-02-25 RX ORDER — ALBUTEROL SULFATE 90 UG/1
2 AEROSOL, METERED RESPIRATORY (INHALATION) EVERY 4 HOURS PRN
Status: DISCONTINUED | OUTPATIENT
Start: 2021-02-25 | End: 2021-03-01 | Stop reason: HOSPADM

## 2021-02-25 RX ORDER — DEXTROSE MONOHYDRATE 25 G/50ML
25-50 INJECTION, SOLUTION INTRAVENOUS
Status: DISCONTINUED | OUTPATIENT
Start: 2021-02-25 | End: 2021-02-25

## 2021-02-25 RX ORDER — SCOLOPAMINE TRANSDERMAL SYSTEM 1 MG/1
1 PATCH, EXTENDED RELEASE TRANSDERMAL
Status: DISCONTINUED | OUTPATIENT
Start: 2021-02-25 | End: 2021-02-25

## 2021-02-25 RX ORDER — NALOXONE HYDROCHLORIDE 0.4 MG/ML
0.4 INJECTION, SOLUTION INTRAMUSCULAR; INTRAVENOUS; SUBCUTANEOUS
Status: DISCONTINUED | OUTPATIENT
Start: 2021-02-25 | End: 2021-02-25

## 2021-02-25 RX ORDER — SODIUM CHLORIDE, SODIUM LACTATE, POTASSIUM CHLORIDE, CALCIUM CHLORIDE 600; 310; 30; 20 MG/100ML; MG/100ML; MG/100ML; MG/100ML
INJECTION, SOLUTION INTRAVENOUS CONTINUOUS
Status: DISCONTINUED | OUTPATIENT
Start: 2021-02-25 | End: 2021-02-25 | Stop reason: HOSPADM

## 2021-02-25 RX ORDER — DIPHENHYDRAMINE HCL 25 MG
25 CAPSULE ORAL EVERY 6 HOURS PRN
Status: DISCONTINUED | OUTPATIENT
Start: 2021-02-25 | End: 2021-03-01 | Stop reason: HOSPADM

## 2021-02-25 RX ORDER — GABAPENTIN 300 MG/1
300 CAPSULE ORAL 2 TIMES DAILY
Status: DISPENSED | OUTPATIENT
Start: 2021-02-25 | End: 2021-02-28

## 2021-02-25 RX ORDER — DEXAMETHASONE SODIUM PHOSPHATE 4 MG/ML
INJECTION, SOLUTION INTRA-ARTICULAR; INTRALESIONAL; INTRAMUSCULAR; INTRAVENOUS; SOFT TISSUE PRN
Status: DISCONTINUED | OUTPATIENT
Start: 2021-02-25 | End: 2021-02-25

## 2021-02-25 RX ORDER — LIDOCAINE HYDROCHLORIDE 20 MG/ML
INJECTION, SOLUTION INFILTRATION; PERINEURAL PRN
Status: DISCONTINUED | OUTPATIENT
Start: 2021-02-25 | End: 2021-02-26

## 2021-02-25 RX ORDER — LIRAGLUTIDE 6 MG/ML
1.8 INJECTION SUBCUTANEOUS DAILY
Status: DISCONTINUED | OUTPATIENT
Start: 2021-02-25 | End: 2021-02-25

## 2021-02-25 RX ORDER — CEFAZOLIN SODIUM 2 G/100ML
2 INJECTION, SOLUTION INTRAVENOUS SEE ADMIN INSTRUCTIONS
Status: DISCONTINUED | OUTPATIENT
Start: 2021-02-25 | End: 2021-02-25 | Stop reason: HOSPADM

## 2021-02-25 RX ORDER — METHOCARBAMOL 750 MG/1
750 TABLET, FILM COATED ORAL EVERY 6 HOURS PRN
Status: DISCONTINUED | OUTPATIENT
Start: 2021-02-25 | End: 2021-03-01 | Stop reason: HOSPADM

## 2021-02-25 RX ORDER — CEFAZOLIN SODIUM 2 G/100ML
2 INJECTION, SOLUTION INTRAVENOUS
Status: DISCONTINUED | OUTPATIENT
Start: 2021-02-25 | End: 2021-02-25 | Stop reason: HOSPADM

## 2021-02-25 RX ORDER — LIDOCAINE 40 MG/G
CREAM TOPICAL
Status: DISCONTINUED | OUTPATIENT
Start: 2021-02-25 | End: 2021-03-01 | Stop reason: HOSPADM

## 2021-02-25 RX ORDER — ONDANSETRON 4 MG/1
4 TABLET, ORALLY DISINTEGRATING ORAL EVERY 30 MIN PRN
Status: DISCONTINUED | OUTPATIENT
Start: 2021-02-25 | End: 2021-02-25 | Stop reason: HOSPADM

## 2021-02-25 RX ORDER — FENTANYL CITRATE 50 UG/ML
INJECTION, SOLUTION INTRAMUSCULAR; INTRAVENOUS PRN
Status: DISCONTINUED | OUTPATIENT
Start: 2021-02-25 | End: 2021-02-26

## 2021-02-25 RX ORDER — EPHEDRINE SULFATE 50 MG/ML
INJECTION, SOLUTION INTRAMUSCULAR; INTRAVENOUS; SUBCUTANEOUS PRN
Status: DISCONTINUED | OUTPATIENT
Start: 2021-02-25 | End: 2021-02-26

## 2021-02-25 RX ADMIN — VECURONIUM BROMIDE 4 MG: 1 INJECTION, POWDER, LYOPHILIZED, FOR SOLUTION INTRAVENOUS at 10:15

## 2021-02-25 RX ADMIN — PHENYLEPHRINE HYDROCHLORIDE 200 MCG: 10 INJECTION INTRAVENOUS at 23:19

## 2021-02-25 RX ADMIN — LIDOCAINE HYDROCHLORIDE 100 MG: 20 INJECTION, SOLUTION INFILTRATION; PERINEURAL at 23:00

## 2021-02-25 RX ADMIN — POLYETHYLENE GLYCOL 3350 17 G: 17 POWDER, FOR SOLUTION ORAL at 18:31

## 2021-02-25 RX ADMIN — DEXMEDETOMIDINE HYDROCHLORIDE 8 MCG: 100 INJECTION, SOLUTION INTRAVENOUS at 11:49

## 2021-02-25 RX ADMIN — FENTANYL CITRATE 50 MCG: 50 INJECTION, SOLUTION INTRAMUSCULAR; INTRAVENOUS at 23:00

## 2021-02-25 RX ADMIN — OXYCODONE HYDROCHLORIDE 10 MG: 5 TABLET ORAL at 18:20

## 2021-02-25 RX ADMIN — SUCCINYLCHOLINE CHLORIDE 100 MG: 20 INJECTION, SOLUTION INTRAMUSCULAR; INTRAVENOUS; PARENTERAL at 10:07

## 2021-02-25 RX ADMIN — VECURONIUM BROMIDE 2 MG: 1 INJECTION, POWDER, LYOPHILIZED, FOR SOLUTION INTRAVENOUS at 14:20

## 2021-02-25 RX ADMIN — DEXMEDETOMIDINE HYDROCHLORIDE 8 MCG: 100 INJECTION, SOLUTION INTRAVENOUS at 11:20

## 2021-02-25 RX ADMIN — Medication 10 MG: at 23:45

## 2021-02-25 RX ADMIN — NALOXONE HYDROCHLORIDE 0.2 MG: 0.4 INJECTION, SOLUTION INTRAMUSCULAR; INTRAVENOUS; SUBCUTANEOUS at 20:34

## 2021-02-25 RX ADMIN — CEFAZOLIN SODIUM 2 G: 2 INJECTION, SOLUTION INTRAVENOUS at 23:10

## 2021-02-25 RX ADMIN — CEFAZOLIN SODIUM 2 G: 2 INJECTION, SOLUTION INTRAVENOUS at 21:37

## 2021-02-25 RX ADMIN — DEXAMETHASONE SODIUM PHOSPHATE 4 MG: 4 INJECTION, SOLUTION INTRA-ARTICULAR; INTRALESIONAL; INTRAMUSCULAR; INTRAVENOUS; SOFT TISSUE at 23:10

## 2021-02-25 RX ADMIN — HEPARIN SODIUM 5000 UNITS: 5000 INJECTION, SOLUTION INTRAVENOUS; SUBCUTANEOUS at 08:49

## 2021-02-25 RX ADMIN — SODIUM CHLORIDE, POTASSIUM CHLORIDE, SODIUM LACTATE AND CALCIUM CHLORIDE: 600; 310; 30; 20 INJECTION, SOLUTION INTRAVENOUS at 13:30

## 2021-02-25 RX ADMIN — CEFAZOLIN SODIUM 1 G: 2 INJECTION, SOLUTION INTRAVENOUS at 12:10

## 2021-02-25 RX ADMIN — PHENYLEPHRINE HYDROCHLORIDE 200 MCG: 10 INJECTION INTRAVENOUS at 23:42

## 2021-02-25 RX ADMIN — DIPHENHYDRAMINE HYDROCHLORIDE 12.5 MG: 50 INJECTION, SOLUTION INTRAMUSCULAR; INTRAVENOUS at 10:23

## 2021-02-25 RX ADMIN — VECURONIUM BROMIDE 2 MG: 1 INJECTION, POWDER, LYOPHILIZED, FOR SOLUTION INTRAVENOUS at 13:30

## 2021-02-25 RX ADMIN — OXYCODONE HYDROCHLORIDE 10 MG: 10 TABLET, FILM COATED, EXTENDED RELEASE ORAL at 08:50

## 2021-02-25 RX ADMIN — CEFAZOLIN SODIUM 2 G: 2 INJECTION, SOLUTION INTRAVENOUS at 10:14

## 2021-02-25 RX ADMIN — VECURONIUM BROMIDE 2 MG: 1 INJECTION, POWDER, LYOPHILIZED, FOR SOLUTION INTRAVENOUS at 12:17

## 2021-02-25 RX ADMIN — SODIUM CHLORIDE, POTASSIUM CHLORIDE, SODIUM LACTATE AND CALCIUM CHLORIDE 75 ML/HR: 600; 310; 30; 20 INJECTION, SOLUTION INTRAVENOUS at 20:08

## 2021-02-25 RX ADMIN — ROCURONIUM BROMIDE 30 MG: 10 INJECTION INTRAVENOUS at 23:10

## 2021-02-25 RX ADMIN — CEFAZOLIN SODIUM 1 G: 2 INJECTION, SOLUTION INTRAVENOUS at 14:10

## 2021-02-25 RX ADMIN — SUGAMMADEX 200 MG: 100 INJECTION, SOLUTION INTRAVENOUS at 15:26

## 2021-02-25 RX ADMIN — ACETAMINOPHEN 975 MG: 325 TABLET, FILM COATED ORAL at 18:19

## 2021-02-25 RX ADMIN — FENTANYL CITRATE 50 MCG: 50 INJECTION, SOLUTION INTRAMUSCULAR; INTRAVENOUS at 10:05

## 2021-02-25 RX ADMIN — SODIUM CHLORIDE, SODIUM LACTATE, POTASSIUM CHLORIDE, CALCIUM CHLORIDE: 600; 310; 30; 20 INJECTION, SOLUTION INTRAVENOUS at 22:52

## 2021-02-25 RX ADMIN — SCOPOLAMINE 1 PATCH: 1 PATCH TRANSDERMAL at 08:49

## 2021-02-25 RX ADMIN — MIDAZOLAM 2 MG: 1 INJECTION INTRAMUSCULAR; INTRAVENOUS at 10:02

## 2021-02-25 RX ADMIN — DEXMEDETOMIDINE HYDROCHLORIDE 4 MCG: 100 INJECTION, SOLUTION INTRAVENOUS at 11:26

## 2021-02-25 RX ADMIN — Medication 15 MG: at 23:00

## 2021-02-25 RX ADMIN — PHENYLEPHRINE HYDROCHLORIDE 200 MCG: 10 INJECTION INTRAVENOUS at 23:16

## 2021-02-25 RX ADMIN — SODIUM CHLORIDE, POTASSIUM CHLORIDE, SODIUM LACTATE AND CALCIUM CHLORIDE 500 ML: 600; 310; 30; 20 INJECTION, SOLUTION INTRAVENOUS at 20:09

## 2021-02-25 RX ADMIN — VECURONIUM BROMIDE 3 MG: 1 INJECTION, POWDER, LYOPHILIZED, FOR SOLUTION INTRAVENOUS at 10:49

## 2021-02-25 RX ADMIN — ALBUMIN HUMAN: 0.05 INJECTION, SOLUTION INTRAVENOUS at 23:50

## 2021-02-25 RX ADMIN — ONDANSETRON 4 MG: 2 INJECTION INTRAMUSCULAR; INTRAVENOUS at 18:21

## 2021-02-25 RX ADMIN — MIDAZOLAM 2 MG: 1 INJECTION INTRAMUSCULAR; INTRAVENOUS at 22:52

## 2021-02-25 RX ADMIN — ROCURONIUM BROMIDE 20 MG: 10 INJECTION INTRAVENOUS at 23:15

## 2021-02-25 RX ADMIN — HYDROMORPHONE HYDROCHLORIDE 0.5 MG: 1 INJECTION, SOLUTION INTRAMUSCULAR; INTRAVENOUS; SUBCUTANEOUS at 11:58

## 2021-02-25 RX ADMIN — PHENYLEPHRINE HYDROCHLORIDE 100 MCG: 10 INJECTION INTRAVENOUS at 23:53

## 2021-02-25 RX ADMIN — PROPOFOL 200 MG: 10 INJECTION, EMULSION INTRAVENOUS at 10:07

## 2021-02-25 RX ADMIN — HYDROMORPHONE HYDROCHLORIDE 0.5 MG: 1 INJECTION, SOLUTION INTRAMUSCULAR; INTRAVENOUS; SUBCUTANEOUS at 10:56

## 2021-02-25 RX ADMIN — GABAPENTIN 300 MG: 300 CAPSULE ORAL at 08:50

## 2021-02-25 RX ADMIN — SUCCINYLCHOLINE CHLORIDE 100 MG: 20 INJECTION, SOLUTION INTRAMUSCULAR; INTRAVENOUS; PARENTERAL at 23:00

## 2021-02-25 RX ADMIN — DEXAMETHASONE SODIUM PHOSPHATE 4 MG: 4 INJECTION, SOLUTION INTRA-ARTICULAR; INTRALESIONAL; INTRAMUSCULAR; INTRAVENOUS; SOFT TISSUE at 10:25

## 2021-02-25 RX ADMIN — FENTANYL CITRATE 50 MCG: 50 INJECTION, SOLUTION INTRAMUSCULAR; INTRAVENOUS at 10:29

## 2021-02-25 RX ADMIN — LIDOCAINE HYDROCHLORIDE 100 MG: 20 INJECTION, SOLUTION INFILTRATION; PERINEURAL at 10:07

## 2021-02-25 RX ADMIN — SODIUM CHLORIDE, POTASSIUM CHLORIDE, SODIUM LACTATE AND CALCIUM CHLORIDE: 600; 310; 30; 20 INJECTION, SOLUTION INTRAVENOUS at 10:00

## 2021-02-25 RX ADMIN — ALBUMIN HUMAN: 0.05 INJECTION, SOLUTION INTRAVENOUS at 23:07

## 2021-02-25 RX ADMIN — ONDANSETRON 4 MG: 2 INJECTION INTRAMUSCULAR; INTRAVENOUS at 10:28

## 2021-02-25 RX ADMIN — PHENYLEPHRINE HYDROCHLORIDE 100 MCG: 10 INJECTION INTRAVENOUS at 22:52

## 2021-02-25 RX ADMIN — PROPOFOL 150 MCG/KG/MIN: 10 INJECTION, EMULSION INTRAVENOUS at 10:07

## 2021-02-25 RX ADMIN — PHENYLEPHRINE HYDROCHLORIDE 100 MCG: 10 INJECTION INTRAVENOUS at 23:00

## 2021-02-25 RX ADMIN — PROPOFOL 200 MG: 10 INJECTION, EMULSION INTRAVENOUS at 23:00

## 2021-02-25 RX ADMIN — SODIUM CHLORIDE, POTASSIUM CHLORIDE, SODIUM LACTATE AND CALCIUM CHLORIDE 1000 ML: 600; 310; 30; 20 INJECTION, SOLUTION INTRAVENOUS at 21:40

## 2021-02-25 RX ADMIN — DEXMEDETOMIDINE HYDROCHLORIDE 8 MCG: 100 INJECTION, SOLUTION INTRAVENOUS at 14:43

## 2021-02-25 RX ADMIN — PROPOFOL 30 MG: 10 INJECTION, EMULSION INTRAVENOUS at 12:36

## 2021-02-25 RX ADMIN — SODIUM CHLORIDE, POTASSIUM CHLORIDE, SODIUM LACTATE AND CALCIUM CHLORIDE: 600; 310; 30; 20 INJECTION, SOLUTION INTRAVENOUS at 11:17

## 2021-02-25 RX ADMIN — VECURONIUM BROMIDE 3 MG: 1 INJECTION, POWDER, LYOPHILIZED, FOR SOLUTION INTRAVENOUS at 11:13

## 2021-02-25 ASSESSMENT — LIFESTYLE VARIABLES
TOBACCO_USE: 0
TOBACCO_USE: 0

## 2021-02-25 ASSESSMENT — MIFFLIN-ST. JEOR: SCORE: 1647.36

## 2021-02-25 NOTE — CONSULTS
Ana Wyman is a 50 year old female with PMHx of obesity s/p gastric bypass (1/2019), MIGUELITO, and mild intermittent asthma admitted on 2/25/2021 for full belt lipectomy for symptomatic panniculus after 140 lb weight loss following bariatric surgery. Surgery performed by Dr. Marquez. Hospitalist service was consulted for diabetes management. Upon chart review, A1C has never been above 6.0 dating back to 2012. Upon interview, pt reports that she takes Victoza and Metformin for weight management. She has never carried the diagnosis of diabetes. Plan was to hold oral agents prior to surgery as directed by PCP and resume upon discharge. Placed order for CPAP. Vitals stable and pt has no acute concerns.     Given the above, will defer formal consult. Routine post-operative cares, IVF, DVT prophylaxis, and pain management to primary service. No changes to PTA medication regimen at discharge unless issues arise throughout stay.  Happy to be reconsulted in the future if necessary. Appreciate consult.

## 2021-02-25 NOTE — ANESTHESIA POSTPROCEDURE EVALUATION
Patient: Ana Wyman    Procedure(s):  PANNICULECTOMY  COSMETIC BILATERAL BELT LIPECTOMY    Diagnosis:Hypertrophic condition of skin [L91.9]  Diagnosis Additional Information: No value filed.    Anesthesia Type:  General    Note:  Disposition: Outpatient   Postop Pain Control: Uneventful            Sign Out: Well controlled pain   PONV: No   Neuro/Psych: Uneventful            Sign Out: Acceptable/Baseline neuro status   Airway/Respiratory: Uneventful            Sign Out: Acceptable/Baseline resp. status   CV/Hemodynamics: Uneventful            Sign Out: Acceptable CV status   Other NRE: NONE   DID A NON-ROUTINE EVENT OCCUR? No         Last vitals:  Vitals:    02/25/21 1540 02/25/21 1545 02/25/21 1600   BP: 129/79 138/84 125/80   Pulse: 78 84 78   Resp: 13 12 17   Temp: 36.6  C (97.8  F)  36.5  C (97.7  F)   SpO2: 93% 93% 94%       Last vitals prior to Anesthesia Care Transfer:  CRNA VITALS  2/25/2021 1508 - 2/25/2021 1608      2/25/2021             Pulse:  85          Electronically Signed By: Frank Murillo MD  February 25, 2021  4:12 PM

## 2021-02-25 NOTE — ANESTHESIA PROCEDURE NOTES
Airway   Date/Time: 2/25/2021 10:08 AM   Patient location during procedure: OR  Staff -   Anesthesiologist:  Wade Knight MD  CRNA: Saida Bryson APRN CRNA  Performed By: CRNA and YENNI    Consent for Airway   Urgency: elective    Indications and Patient Condition  Indications for airway management: benjamin-procedural  Induction type:intravenousMask difficulty assessment: 2 - vent by mask + OA or adjuvant +/- NMBA    Final Airway Details  Final airway type: endotracheal airway  Successful airway:ETT - single  Endotracheal Airway Details   ETT size (mm): 6.5  Cuffed: yes  Successful intubation technique: video laryngoscopy  Grade View of Cords: 1  Adjucts: stylet  Measured from: gums/teeth  Secured at (cm): 24  Secured with: pink tape  Bite block used: None    Post intubation assessment   Placement verified by: capnometry, equal breath sounds and chest rise   Number of other approaches attempted: 0  Secured with:pink tape  Ease of procedure: easy  Dentition: Intact and Unchanged

## 2021-02-25 NOTE — OR NURSING
PNDS criteria met.  Order received per Dr. Murillo to transfer to [facility] for continued recovery.  Hand-off report given to RN.  To OBS-6 per bed with all belongings.  Will transport with HOB up 20-30 degrees and legs raised 15-20 degrees to take pressure off of abdominal incisions.  Will transport with Capnography monitoring.

## 2021-02-25 NOTE — ANESTHESIA CARE TRANSFER NOTE
Patient: Ana Wyman    Procedure(s):  PANNICULECTOMY  COSMETIC BILATERAL BELT LIPECTOMY    Diagnosis: Hypertrophic condition of skin [L91.9]  Diagnosis Additional Information: No value filed.    Anesthesia Type:   General     Note:    Oropharynx: oral airway in place  Level of Consciousness: drowsy  Oxygen Supplementation: face mask  Level of Supplemental Oxygen (L/min / FiO2): 6  Independent Airway: airway patency satisfactory and stable  Dentition: dentition unchanged  Vital Signs Stable: post-procedure vital signs reviewed and stable  Report to RN Given: handoff report given  Patient transferred to: PACU    Handoff Report: Identifed the Patient, Identified the Reponsible Provider, Reviewed the pertinent medical history, Discussed the surgical course, Reviewed Intra-OP anesthesia mangement and issues during anesthesia, Set expectations for post-procedure period and Allowed opportunity for questions and acknowledgement of understanding      Vitals: (Last set prior to Anesthesia Care Transfer)  CRNA VITALS  2/25/2021 1505 - 2/25/2021 1547      2/25/2021             Resp Rate (set):  10        Electronically Signed By: JEOVANNY Helms CRNA  February 25, 2021  3:47 PM

## 2021-02-25 NOTE — ANESTHESIA PREPROCEDURE EVALUATION
Anesthesia Pre-Procedure Evaluation    Patient: Ana Wyman   MRN: [id number] : 1970        Preoperative Diagnosis: Hypertrophic condition of skin [L91.9]   Procedure : Procedure(s):  PANNICULECTOMY  COSMETIC BILATERAL BELT LIPECTOMY     Past Medical History:   Diagnosis Date     Allergic rhinitis, cause unspecified      Cellulitis     2 episodes, one with hospitalization [facility]     Complication of anesthesia     MOTHER HAS HISTORY     DUB (dysfunctional uterine bleeding)     Neg EMB 2012     Esophageal reflux     ongoing     Fibroids      Genital problems     Lichens Schlerosis     GERD (gastroesophageal reflux disease)      Hallux valgus (acquired)      History of steroid therapy     Inhalers, eye drops     Hypersomnia with sleep apnea, unspecified     using CPAP     Kidney stone May 2018    2 stones     Lichen sclerosus 2011     Lymph edema 2010- present     Lymphedema bilateral with mixed lipedema. 2015     Lymphedema bilateral with mixed lipedema. 2015     Morbid obesity (H) 3/19/2013     MIGUELITO on CPAP 3/19/2013    Sleep study in  and 2012       Pneumonia     Years ago - a few times     Pre-diabetes      Sleep apnea      Tendonitis currently     Uncomplicated asthma     mild     Urinary tract infection     A few times in past 10 years     Vision disorder [id number]    Dry Eye     Vitamin D deficiency 3/14/2015      Past Surgical History:   Procedure Laterality Date     COLONOSCOPY  05/15/2013    Procedure: COLONOSCOPY;  Colonoscopy;  Surgeon: Kota Blanco MD;  Location:  GI     COLONOSCOPY N/A 10/21/2019    Procedure: COLONOSCOPY, with biopsies by cold forceps;  Surgeon: Lydia Vickers MD;  Location:  GI     GASTRIC BYPASS       GYN SURGERY  2016    Uterine Ablation     LAPAROSCOPIC BYPASS GASTRIC, CHOLECYSTECTOMY, COMBINED N/A 2019    Procedure: LAPAROSCOPIC GASTRIC BYPASS;  Surgeon: Maykel Calvin MD;  Location:  OR     LAPAROSCOPIC  CHOLECYSTECTOMY N/A 04/11/2019    Procedure: LAPAROSCOPIC CHOLECYSTECTOMY;  Surgeon: Maykel Calvin MD;  Location: [facility] OR     lichen sclerosis       ORTHOPEDIC SURGERY       VASCULAR SURGERY  2015    Vienous closure on both legs     vein closure  12/2016    to prevent lymphedema     ZZC NONSPECIFIC PROCEDURE  1994    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     ZZC NONSPECIFIC PROCEDURE  1999,2001    Foot surgeries x 2 (bunionectomy)     ZZC NONSPECIFIC PROCEDURE  2000    hammer toes      Allergies   Allergen Reactions     Nsaids Other (See Comments)     Had gastric bypass - needs to avoid NSAIDS     Clindamycin Diarrhea     Codeine Nausea and Vomiting     Shellfish Allergy       Social History     Tobacco Use     Smoking status: Never Smoker     Smokeless tobacco: Never Used   Substance Use Topics     Alcohol use: Not Currently     Alcohol/week: 0.0 standard drinks     Frequency: Monthly or less     Drinks per session: 1 or 2     Binge frequency: Never      Wt Readings from Last 1 Encounters:   02/10/21 103.4 kg (228 lb)        Anesthesia Evaluation   Pt has had prior anesthetic. Type: General.    History of anesthetic complications  - PONV.      ROS/MED HX  ENT/Pulmonary:     (+) sleep apnea, uses CPAP, allergic rhinitis, asthma  (-) tobacco use   Neurologic:       Cardiovascular:     (+) -----valvular problems/murmurs type: MR Trace MR; mild TR. Previous cardiac testing   Echo: Date: 9/18/19 Results:  Left Ventricle  The left ventricle is normal in size. There is normal left ventricular wall thickness. Left ventricular systolic function is normal. The visual ejection fraction is estimated at 55-60%. Diastolic Doppler findings (E/E' ratio and/or other parameters) suggest left ventricular filling pressures are indeterminate. No regional wall motion abnormalities noted.    Right Ventricle  The right ventricle is normal size. The right ventricular systolic function is normal.    Atria  Normal left atrial  size. Right atrial size is normal.    Mitral Valve  There is trace mitral regurgitation.    Tricuspid Valve  There is mild (1+) tricuspid regurgitation. The right ventricular systolic pressure is approximated at 16.2 mmHg plus the right atrial pressure. IVC diameter <2.1 cm collapsing >50% with sniff suggests a normal RA pressure of 3 mmHg.    Aortic Valve  There is mild trileaflet aortic sclerosis. No aortic stenosis is present.    Pulmonic Valve  The pulmonic valve is not well visualized.    Vessels  The aortic root is normal size.    Pericardium  There is no pericardial effusion.    Rhythm  Sinus rhythm was noted.  Stress Test: Date: Results:    ECG Reviewed: Date: 2/10/21 Results:  NSR  Cath: Date: Results:   : Lymphedema.   METS/Exercise Tolerance:     Hematologic:       Musculoskeletal:       GI/Hepatic: Comment: S/p gastric bypass    (+) GERD, liver disease (Hepatic steatosis),     Renal/Genitourinary:       Endo:     (+) Obesity (Class 2),  Type II DM: Pre-diabetes.   Psychiatric/Substance Use:       Infectious Disease:       Malignancy:       Other:               OUTSIDE LABS:  CBC:   Lab Results   Component Value Date    WBC 6.0 02/10/2021    WBC 7.4 01/18/2021    HGB 13.0 02/10/2021    HGB 13.1 01/18/2021    HCT 40.3 02/10/2021    HCT 41.3 01/18/2021     02/10/2021     01/18/2021     BMP:   Lab Results   Component Value Date     02/10/2021     02/14/2020    POTASSIUM 3.7 02/10/2021    POTASSIUM 3.9 02/14/2020    CHLORIDE 107 02/10/2021    CHLORIDE 107 02/14/2020    CO2 26 02/10/2021    CO2 29 02/14/2020    BUN 12 02/10/2021    BUN 13 02/14/2020    CR 0.75 02/10/2021    CR 0.71 02/14/2020    GLC 79 02/10/2021    GLC 82 02/14/2020     COAGS:   Lab Results   Component Value Date    INR 0.98 10/27/2017     POC:   Lab Results   Component Value Date    HCG Negative 04/11/2019    HCGS Negative 09/26/2013     HEPATIC:   Lab Results   Component Value Date    ALBUMIN 3.6 02/10/2021     PROTTOTAL 7.1 02/10/2021    ALT 28 02/10/2021    AST 16 02/10/2021    ALKPHOS 90 02/10/2021    BILITOTAL 0.4 02/10/2021     OTHER:   Lab Results   Component Value Date    LACT 1.8 04/29/2017    A1C 5.1 02/10/2021    SINGH 9.3 02/10/2021    LIPASE 185 03/15/2019    AMYLASE 56 03/22/2004    TSH 2.84 01/18/2021    T4 0.81 08/07/2014    CRP 27.2 (H) 10/14/2016    SED 18 12/15/2017       Anesthesia Plan    ASA Status:  3      Anesthesia Type: General.     - Airway: ETT   Induction: Intravenous.   Maintenance: TIVA.        Consents    Anesthesia Plan(s) and associated risks, benefits, and realistic alternatives discussed. Questions answered and patient/representative(s) expressed understanding.     - Discussed with:  Patient         Postoperative Care    Pain management: IV analgesics, Multi-modal analgesia.   PONV prophylaxis: Ondansetron (or other 5HT-3), Dexamethasone or Solumedrol     Comments:    Case canceled            Wade Knight MD

## 2021-02-25 NOTE — OP NOTE
Date of Service: 02/25/21    PREOPERATIVE DIAGNOSES:   Symptomatic panniculus  Hypertrophic disorder of the skin status post massive weight loss    POSTOPERATIVE DIAGNOSES:   same    PROCEDURES:     Panniculectomy  Upgrade to full belt lipectomy    SURGEON: Wade Marquez MD     ANESTHESIA: General endotracheal anesthesia    COMPLICATIONS: None.     ESTIMATED BLOOD LOSS: 50 cc    DISPOSITION: To the Post Anesthesia Care Unit in stable condition.    TUBES AND DRAINS: Anuj x4    COUNTS: Correct     SPECIMENS: None; 7380 g was removed and discarded    IMPLANTS:     None    INDICATIONS:    This is a 50-year-old female who has lost 140 pounds following bariatric surgery.  She has a symptomatic panniculus and has been approved for panniculectomy.  She has elected to upgrade this to a full belt lipectomy.  We discussed details, risks, benefits, and alternatives.  Limitations and risks include bleeding, hematoma, seroma, numbness, scarring, asymmetry, wound dehiscence, skin necrosis, umbilical necrosis, deep vein thrombosis, or pulmonary embolus.  She voiced understanding, signed consent, elected to proceed.    DESCRIPTION OF PROCEDURE:    She was marked preoperatively in the upright position.  The midline anterior and posterior was marked.  The panniculus was marked.  I made registration marks every 10 cm circumferentially and estimated the excess skin.  She was given pain medications and heparin prophylactically preoperatively.    She was taken to the operating room.  Pressure points were padded and sequential compression devices were placed.  She was given general anesthesia and IV antibiotics by the anesthesia staff.  A Grace catheter was placed.  Landmarks were reinforced with methylene blue.  She was prepped in the standard fashion and a timeout was performed to ensure the correct orientation and procedure.       I injected the proposed incision with 1/4% Marcaine with epinephrine.  I made the incision on the  lower abdomen and elevated the skin flap of the muscle layer.  Large vessels were encountered which were controlled with 3-0 Vicryl ties.  I continued central elevation up to the umbilicus and then incised around the umbilical stalk which was quite long.  It measured approximately 8 cm.  I preserved a fibrofatty layer for perfusion.  Above the umbilicus, I elevated just in a central tunnel over the rectus diastases up to the level of the xiphoid process.    I performed a TAP block using 40 cc of 1/4% Marcaine with epinephrine at the level of the umbilicus over the linea semilunaris.  Half of the local anesthetic was injected superiorly and half aimed inferiorly.    No hernia was encountered, but she did have significant myofascial laxity and rectus diastases.  This was repaired from the xiphoid process down to the pubic symphysis after marking it with methylene blue.  The first layer was repaired with buried, figure-of-eight #1 Nurolon.  This was then reinforced with a looped 0 PDS suture.  Care was taken to avoid any strangulation of the umbilical base.    At this point the bed was reflexed.  Superior tension quilting sutures were placed from the rectus fascia to the Lopez's fascia with interrupted 0 PDS above the umbilicus.    The excess skin was then tailored out.  Anteriorly I removed over 12 pounds of tissue.  I ensured hemostasis and irrigated copiously with normal saline.  I placed a 15 Irish round channel drain on each side and secured with a 2-0 silk.  The umbilicus was inset into a small vertical ellipse with deep dermal 3-0 Monocryl and running 4-0 Monocryl subcuticular stitching.  I did trim several centimeters off of the extended umbilicus.    The lower abdominal wound was closed with interrupted 0 PDS interspersed with 0 Nurolon and the deep Lopez layer.  The subcutaneous layer was closed with in sorb staples and buried 3-0 Monocryl.  The final layer was closed with a running 4-0 Monocryl  subcuticular stitch.  The temporary dogears laterally were stapled closed and covered with Tegaderms.  The anterior incision was covered with Dermabond Prineo equivalent.    The patient was then placed in the right lateral decubitus position.  She was reprepped and draped.  I injected 1/4% Marcaine with epinephrine into the proposed incision.  At this point I made an incision on the upper marking and extended this to the midline in the back.  I elevated the skin inferiorly onto the lateral thigh and buttock.  This was done to my preoperative markings.  I then estimated the excess skin, marked it, and excised it.  Hemostasis was achieved with electrocautery and 3-0 Vicryl ties.  I irrigated with normal saline and placed a 15 Egyptian round channel drain.  The incision was closed with interrupted 0 PDS interspersed with 0 Nurolon in Lopez's fascia.  The subcutaneous layer was closed with buried 3-0 Monocryl and in sorb staples.  The final layer was closed with a running 4-0 Monocryl subcuticular stitch.  Dogear across the midline was stapled temporarily and covered with a Tegaderm.  The incision was dressed with Dermabond Prineo equivalent.    The patient was then placed in the left lateral decubitus position.  I injected 1/4% Marcaine with epinephrine into the proposed incision.  I then made the incision on the upper marking and extended this to the midline meeting up with the previous incision.  I elevated the skin inferiorly onto the lateral thigh and buttock.  The extent of the undermining was done to the preoperative markings.  I estimated the excess skin, marked, and excised it.  The total amount resected anteriorly and posteriorly was 7380 g which was over 16 pounds.  Hemostasis on the side was achieved with electrocautery and 3-0 Vicryl ties.  I placed a fourth 15 Egyptian round channel drain and secured laterally with 2-0 silk.  The wound was closed with interrupted 0 PDS interspersed with interrupted 2-0  Landy in Lopez's fascia.  Subcutaneous layer was closed with buried 3-0 Monocryl and in sorb staples.  The final layer was closed with a running 4-0 Monocryl subcuticular stitch.  The incision was dressed with Dermabond Prineo equivalent.      The patient was then transferred to her hospital bed in the reflex position and abdominal binder was placed with ABD pads.  She was awoken from anesthesia and taken to the PACU in stable condition.

## 2021-02-26 LAB
GLUCOSE BLDC GLUCOMTR-MCNC: 117 MG/DL (ref 70–99)
GLUCOSE BLDC GLUCOMTR-MCNC: 127 MG/DL (ref 70–99)
HGB BLD-MCNC: 8.1 G/DL (ref 11.7–15.7)
HGB BLD-MCNC: 8.6 G/DL (ref 11.7–15.7)

## 2021-02-26 PROCEDURE — 250N000013 HC RX MED GY IP 250 OP 250 PS 637: Performed by: PLASTIC SURGERY

## 2021-02-26 PROCEDURE — 250N000011 HC RX IP 250 OP 636: Performed by: ANESTHESIOLOGY

## 2021-02-26 PROCEDURE — 258N000003 HC RX IP 258 OP 636: Performed by: HOSPITALIST

## 2021-02-26 PROCEDURE — 999N001017 HC STATISTIC GLUCOSE BY METER IP

## 2021-02-26 PROCEDURE — 120N000001 HC R&B MED SURG/OB

## 2021-02-26 PROCEDURE — 250N000011 HC RX IP 250 OP 636: Performed by: NURSE ANESTHETIST, CERTIFIED REGISTERED

## 2021-02-26 PROCEDURE — 36415 COLL VENOUS BLD VENIPUNCTURE: CPT | Performed by: PLASTIC SURGERY

## 2021-02-26 PROCEDURE — 99233 SBSQ HOSP IP/OBS HIGH 50: CPT | Performed by: HOSPITALIST

## 2021-02-26 PROCEDURE — 250N000009 HC RX 250: Performed by: NURSE ANESTHETIST, CERTIFIED REGISTERED

## 2021-02-26 PROCEDURE — 85018 HEMOGLOBIN: CPT | Performed by: ANESTHESIOLOGY

## 2021-02-26 PROCEDURE — 258N000003 HC RX IP 258 OP 636: Performed by: PLASTIC SURGERY

## 2021-02-26 PROCEDURE — 36415 COLL VENOUS BLD VENIPUNCTURE: CPT | Performed by: ANESTHESIOLOGY

## 2021-02-26 PROCEDURE — 258N000003 HC RX IP 258 OP 636: Performed by: NURSE ANESTHETIST, CERTIFIED REGISTERED

## 2021-02-26 PROCEDURE — 85018 HEMOGLOBIN: CPT | Performed by: PLASTIC SURGERY

## 2021-02-26 PROCEDURE — 250N000011 HC RX IP 250 OP 636: Performed by: PLASTIC SURGERY

## 2021-02-26 RX ORDER — SODIUM CHLORIDE, SODIUM LACTATE, POTASSIUM CHLORIDE, CALCIUM CHLORIDE 600; 310; 30; 20 MG/100ML; MG/100ML; MG/100ML; MG/100ML
INJECTION, SOLUTION INTRAVENOUS CONTINUOUS
Status: DISCONTINUED | OUTPATIENT
Start: 2021-02-26 | End: 2021-02-26

## 2021-02-26 RX ORDER — HYDROCODONE BITARTRATE AND ACETAMINOPHEN 5; 325 MG/1; MG/1
1-2 TABLET ORAL EVERY 4 HOURS PRN
Status: DISCONTINUED | OUTPATIENT
Start: 2021-02-26 | End: 2021-02-28

## 2021-02-26 RX ORDER — FENTANYL CITRATE 50 UG/ML
25-50 INJECTION, SOLUTION INTRAMUSCULAR; INTRAVENOUS
Status: DISCONTINUED | OUTPATIENT
Start: 2021-02-26 | End: 2021-02-26

## 2021-02-26 RX ORDER — HYDROMORPHONE HYDROCHLORIDE 1 MG/ML
.3-.5 INJECTION, SOLUTION INTRAMUSCULAR; INTRAVENOUS; SUBCUTANEOUS EVERY 5 MIN PRN
Status: DISCONTINUED | OUTPATIENT
Start: 2021-02-26 | End: 2021-02-26

## 2021-02-26 RX ORDER — ONDANSETRON 2 MG/ML
4 INJECTION INTRAMUSCULAR; INTRAVENOUS EVERY 30 MIN PRN
Status: DISCONTINUED | OUTPATIENT
Start: 2021-02-26 | End: 2021-02-26

## 2021-02-26 RX ORDER — NALOXONE HYDROCHLORIDE 0.4 MG/ML
0.2 INJECTION, SOLUTION INTRAMUSCULAR; INTRAVENOUS; SUBCUTANEOUS
Status: DISCONTINUED | OUTPATIENT
Start: 2021-02-26 | End: 2021-02-26

## 2021-02-26 RX ORDER — NALOXONE HYDROCHLORIDE 0.4 MG/ML
0.4 INJECTION, SOLUTION INTRAMUSCULAR; INTRAVENOUS; SUBCUTANEOUS
Status: DISCONTINUED | OUTPATIENT
Start: 2021-02-26 | End: 2021-02-26

## 2021-02-26 RX ORDER — ONDANSETRON 2 MG/ML
INJECTION INTRAMUSCULAR; INTRAVENOUS PRN
Status: DISCONTINUED | OUTPATIENT
Start: 2021-02-26 | End: 2021-02-26

## 2021-02-26 RX ORDER — ONDANSETRON 4 MG/1
4 TABLET, ORALLY DISINTEGRATING ORAL EVERY 30 MIN PRN
Status: DISCONTINUED | OUTPATIENT
Start: 2021-02-26 | End: 2021-02-26

## 2021-02-26 RX ORDER — MEPERIDINE HYDROCHLORIDE 25 MG/ML
12.5 INJECTION INTRAMUSCULAR; INTRAVENOUS; SUBCUTANEOUS EVERY 5 MIN PRN
Status: DISCONTINUED | OUTPATIENT
Start: 2021-02-26 | End: 2021-02-26

## 2021-02-26 RX ADMIN — DOCUSATE SODIUM 50 MG AND SENNOSIDES 8.6 MG 1 TABLET: 8.6; 5 TABLET, FILM COATED ORAL at 09:46

## 2021-02-26 RX ADMIN — ONDANSETRON 4 MG: 2 INJECTION INTRAMUSCULAR; INTRAVENOUS at 00:00

## 2021-02-26 RX ADMIN — POLYETHYLENE GLYCOL 3350 17 G: 17 POWDER, FOR SOLUTION ORAL at 09:44

## 2021-02-26 RX ADMIN — METHOCARBAMOL TABLETS 750 MG: 750 TABLET, COATED ORAL at 12:01

## 2021-02-26 RX ADMIN — SODIUM CHLORIDE, POTASSIUM CHLORIDE, SODIUM LACTATE AND CALCIUM CHLORIDE: 600; 310; 30; 20 INJECTION, SOLUTION INTRAVENOUS at 02:17

## 2021-02-26 RX ADMIN — B-COMPLEX W/ C & FOLIC ACID TAB 1 TABLET: TAB at 20:15

## 2021-02-26 RX ADMIN — SUGAMMADEX 200 MG: 100 INJECTION, SOLUTION INTRAVENOUS at 00:14

## 2021-02-26 RX ADMIN — FENTANYL CITRATE 50 MCG: 50 INJECTION, SOLUTION INTRAMUSCULAR; INTRAVENOUS at 00:15

## 2021-02-26 RX ADMIN — SODIUM CHLORIDE, POTASSIUM CHLORIDE, SODIUM LACTATE AND CALCIUM CHLORIDE: 600; 310; 30; 20 INJECTION, SOLUTION INTRAVENOUS at 02:09

## 2021-02-26 RX ADMIN — HYDROCODONE BITARTRATE AND ACETAMINOPHEN 1 TABLET: 5; 325 TABLET ORAL at 20:15

## 2021-02-26 RX ADMIN — DOCUSATE SODIUM 50 MG AND SENNOSIDES 8.6 MG 2 TABLET: 8.6; 5 TABLET, FILM COATED ORAL at 20:15

## 2021-02-26 RX ADMIN — SODIUM CHLORIDE, SODIUM LACTATE, POTASSIUM CHLORIDE, CALCIUM CHLORIDE: 600; 310; 30; 20 INJECTION, SOLUTION INTRAVENOUS at 00:14

## 2021-02-26 RX ADMIN — ACETAMINOPHEN 975 MG: 325 TABLET, FILM COATED ORAL at 09:45

## 2021-02-26 RX ADMIN — ACETAMINOPHEN 975 MG: 325 TABLET, FILM COATED ORAL at 17:59

## 2021-02-26 RX ADMIN — SODIUM CHLORIDE, POTASSIUM CHLORIDE, SODIUM LACTATE AND CALCIUM CHLORIDE 500 ML: 600; 310; 30; 20 INJECTION, SOLUTION INTRAVENOUS at 11:53

## 2021-02-26 RX ADMIN — GABAPENTIN 300 MG: 300 CAPSULE ORAL at 09:50

## 2021-02-26 RX ADMIN — HYDROMORPHONE HYDROCHLORIDE 0.5 MG: 1 INJECTION, SOLUTION INTRAMUSCULAR; INTRAVENOUS; SUBCUTANEOUS at 03:08

## 2021-02-26 RX ADMIN — ACETAMINOPHEN 975 MG: 325 TABLET, FILM COATED ORAL at 02:15

## 2021-02-26 RX ADMIN — Medication 25 MCG: at 09:46

## 2021-02-26 RX ADMIN — GABAPENTIN 300 MG: 300 CAPSULE ORAL at 20:15

## 2021-02-26 RX ADMIN — SODIUM CHLORIDE, POTASSIUM CHLORIDE, SODIUM LACTATE AND CALCIUM CHLORIDE 500 ML: 600; 310; 30; 20 INJECTION, SOLUTION INTRAVENOUS at 16:08

## 2021-02-26 RX ADMIN — HYDROMORPHONE HYDROCHLORIDE 0.3 MG: 1 INJECTION, SOLUTION INTRAMUSCULAR; INTRAVENOUS; SUBCUTANEOUS at 09:57

## 2021-02-26 RX ADMIN — HYDROMORPHONE HYDROCHLORIDE 0.5 MG: 1 INJECTION, SOLUTION INTRAMUSCULAR; INTRAVENOUS; SUBCUTANEOUS at 00:37

## 2021-02-26 ASSESSMENT — ACTIVITIES OF DAILY LIVING (ADL)
ADLS_ACUITY_SCORE: 15
ADLS_ACUITY_SCORE: 15
ADLS_ACUITY_SCORE: 16
ADLS_ACUITY_SCORE: 15

## 2021-02-26 NOTE — ANESTHESIA PROCEDURE NOTES
Airway   Date/Time: 2/25/2021 11:02 PM      Staff -   CRNA: Yola Dunlap APRN CRNA  Performed By: CRNA    Consent for Airway   Urgency: elective    Indications and Patient Condition  Indications for airway management: benjamin-procedural  Induction type:RSIMask difficulty assessment: 0 - not attempted    Final Airway Details  Final airway type: endotracheal airway  Successful airway:ETT - single  Endotracheal Airway Details   ETT size (mm): 7.0  Cuffed: yes  Successful intubation technique: video laryngoscopy  Grade View of Cords: 1  Adjucts: stylet  Measured from: lips  Secured at (cm): 22  Secured with: pink tape  Bite block used: None    Post intubation assessment   Placement verified by: capnometry, equal breath sounds and chest rise   Number of attempts at approach: 1  Secured with:pink tape  Ease of procedure: easy  Dentition: Intact and Unchanged

## 2021-02-26 NOTE — ANESTHESIA PREPROCEDURE EVALUATION
Anesthesia Pre-Procedure Evaluation    Patient: Ana Wyman   MRN: [id number] : 1970        Preoperative Diagnosis: Hematoma [T14.8XXA]   Procedure : Procedure(s):  EVACUATION OF HEMATOMA     Past Medical History:   Diagnosis Date     Allergic rhinitis, cause unspecified      Cellulitis     2 episodes, one with hospitalization [facility]     Complication of anesthesia     MOTHER HAS HISTORY     DUB (dysfunctional uterine bleeding)     Neg EMB      Esophageal reflux     ongoing     Fibroids      Genital problems     Lichens Schlerosis     GERD (gastroesophageal reflux disease)      Hallux valgus (acquired)      History of steroid therapy     Inhalers, eye drops     Hypersomnia with sleep apnea, unspecified     using CPAP     Kidney stone May 2018    2 stones     Lichen sclerosus 2011     Lymph edema 2010- present     Lymphedema bilateral with mixed lipedema. 2015     Lymphedema bilateral with mixed lipedema. 2015     Morbid obesity (H) 3/19/2013     MIGUELITO on CPAP 3/19/2013    Sleep study in  and        Pneumonia     Years ago - a few times     Pre-diabetes      Sleep apnea      Tendonitis currently     Uncomplicated asthma     mild     Urinary tract infection     A few times in past 10 years     Vision disorder [id number]    Dry Eye     Vitamin D deficiency 3/14/2015      Past Surgical History:   Procedure Laterality Date     COLONOSCOPY  05/15/2013    Procedure: COLONOSCOPY;  Colonoscopy;  Surgeon: Kota Blanco MD;  Location:  GI     COLONOSCOPY N/A 10/21/2019    Procedure: COLONOSCOPY, with biopsies by cold forceps;  Surgeon: Lydia Vickers MD;  Location:  GI     GASTRIC BYPASS       GYN SURGERY  2016    Uterine Ablation     LAPAROSCOPIC BYPASS GASTRIC, CHOLECYSTECTOMY, COMBINED N/A 2019    Procedure: LAPAROSCOPIC GASTRIC BYPASS;  Surgeon: Maykel Calvin MD;  Location:  OR     LAPAROSCOPIC CHOLECYSTECTOMY N/A 2019    Procedure: LAPAROSCOPIC  CHOLECYSTECTOMY;  Surgeon: Maykel Calvin MD;  Location: [facility] OR     lichen sclerosis       ORTHOPEDIC SURGERY       VASCULAR SURGERY  2015    Vienous closure on both legs     vein closure  12/2016    to prevent lymphedema     [facility] NONSPECIFIC PROCEDURE  1994    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     Z NONSPECIFIC PROCEDURE  1999,2001    Foot surgeries x 2 (bunionectomy)     ZZ NONSPECIFIC PROCEDURE  2000    hammer toes      Allergies   Allergen Reactions     Nsaids Other (See Comments)     Had gastric bypass - needs to avoid NSAIDS     Clindamycin Diarrhea     Codeine Nausea and Vomiting     Shellfish Allergy       Social History     Tobacco Use     Smoking status: Never Smoker     Smokeless tobacco: Never Used   Substance Use Topics     Alcohol use: Not Currently     Alcohol/week: 0.0 standard drinks     Frequency: Monthly or less     Drinks per session: 1 or 2     Binge frequency: Never      Wt Readings from Last 1 Encounters:   02/25/21 103.1 kg (227 lb 6.4 oz)        Prior to Admission medications    Medication Sig Start Date End Date Taking? Authorizing Provider   albuterol (PROAIR HFA/PROVENTIL HFA/VENTOLIN HFA) 108 (90 Base) MCG/ACT inhaler Inhale 2 puffs into the lungs every 4 hours as needed for shortness of breath / dyspnea or wheezing 3/27/20  Yes Kelly Bedoya MD   BIOTIN PO Take 5,000 mcg by mouth daily At noon   Yes Reported, Patient   Calcium Citrate-Vitamin D (CALCIUM CITRATE CHEWY BITE PO) Take 500 mg by mouth 3 times daily Plus D,noon, supper, HS   Yes Reported, Patient   childrens multivitamin w/iron (FLINTSTONES COMPLETE) 60 MG chewable tablet Take 2 chew tab by mouth every evening    Yes Reported, Patient   lifitegrast (XIIDRA) 5 % opthalmic solution Place 1 drop into both eyes daily   Yes Reported, Patient   metFORMIN (GLUCOPHAGE-XR) 500 MG 24 hr tablet Take 500 mg by mouth every morning   Yes Reported, Patient   nitroGLYcerin (NITRO-BID) 2 % OINT ointment Place 0.5  inches onto the skin daily as needed (Apply 0.5in to tips of fingers once daily prior to cold exposure)   Yes Reported, Patient   nystatin (MYCOSTATIN) 920279 UNIT/GM external cream Apply topically daily as needed for dry skin   Yes Reported, Patient   polyethylene glycol (MIRALAX/GLYCOLAX) packet Take 17 g by mouth daily 4/29/19  Yes Sarah Stacy PA-C   triamcinolone (KENALOG) 0.1 % external cream Apply topically 2 times daily as needed for irritation   Yes Reported, Patient   VICTOZA PEN 18 MG/3ML soln INJECT 1.2MG UNDER THE SKIN DAILY. CAN INCREASE TO 1.8MG IF HUNGER INCREASES.  Patient taking differently: Inject 1.8 mg Subcutaneous daily  11/6/20  Yes Sarah Stacy PA-C   vitamin (B COMPLEX-C) tablet Take 1 tablet by mouth every evening    Yes Reported, Patient   vitamin B-12 (CYANOCOBALAMIN) 2500 MCG sublingual tablet Take 1,250 mcg by mouth twice a week    Yes Reported, Patient   Vitamin D3 (CHOLECALCIFEROL) 25 mcg (1000 units) tablet Take 1 capsule by mouth every morning    Yes Reported, Patient   insulin pen needle (31G X 6 MM) 31G X 6 MM miscellaneous Use 1 pen needles daily 8/3/20   Sarah Stacy PA-C     Current Facility-Administered Medications Ordered in Epic   Medication Dose Route Frequency Last Rate Last Admin     [Auto Hold] acetaminophen (TYLENOL) tablet 650 mg  650 mg Oral Q4H PRN         [Auto Hold] acetaminophen (TYLENOL) tablet 975 mg  975 mg Oral Q8H   975 mg at 02/25/21 1819     [Auto Hold] albuterol (PROAIR HFA/PROVENTIL HFA/VENTOLIN HFA) 108 (90 Base) MCG/ACT inhaler 2 puff  2 puff Inhalation Q4H PRN         [Auto Hold] ceFAZolin (ANCEF) intermittent infusion 2 g in 100 mL dextrose PRE-MIX  2 g Intravenous Q8H 200 mL/hr at 02/25/21 2137 2 g at 02/25/21 2137     ceFAZolin (ANCEF) intermittent infusion 2 g in 100 mL dextrose PRE-MIX  2 g Intravenous Pre-Op/Pre-procedure x 1 dose         ceFAZolin (ANCEF) intermittent infusion 2 g in 100 mL dextrose PRE-MIX   2 g Intravenous See Admin Instructions         [Auto Hold] diphenhydrAMINE (BENADRYL) capsule 25 mg  25 mg Oral Q6H PRN        Or     [Auto Hold] diphenhydrAMINE (BENADRYL) injection 25 mg  25 mg Intravenous Q6H PRN         [Auto Hold] enoxaparin ANTICOAGULANT (LOVENOX) injection 40 mg  40 mg Subcutaneous Q24H         [Auto Hold] gabapentin (NEURONTIN) capsule 300 mg  300 mg Oral BID         [Auto Hold] HYDROmorphone (PF) (DILAUDID) injection 0.3-0.5 mg  0.3-0.5 mg Intravenous Q2H PRN         lactated ringers infusion   Intravenous Continuous 75 mL/hr at 02/25/21 2008 75 mL/hr at 02/25/21 2008     [Auto Hold] lidocaine (LMX4) cream   Topical Q1H PRN         [Auto Hold] lidocaine 1 % 0.1-1 mL  0.1-1 mL Other Q1H PRN         [Auto Hold] lifitegrast (XIIDRA) 5 % opthalmic solution 1 drop  1 drop Both Eyes Daily         [Auto Hold] methocarbamol (ROBAXIN) tablet 750 mg  750 mg Oral Q6H PRN         [Auto Hold] naloxone (NARCAN) injection 0.2 mg  0.2 mg Intravenous Q2 Min PRN   0.2 mg at 02/25/21 2034    Or     [Auto Hold] naloxone (NARCAN) injection 0.4 mg  0.4 mg Intravenous Q2 Min PRN        Or     [Auto Hold] naloxone (NARCAN) injection 0.2 mg  0.2 mg Intramuscular Q2 Min PRN        Or     [Auto Hold] naloxone (NARCAN) injection 0.4 mg  0.4 mg Intramuscular Q2 Min PRN         No Medication Sleep Aids for this Patient   Does not apply Continuous PRN         [Auto Hold] ondansetron (ZOFRAN-ODT) ODT tab 4 mg  4 mg Oral Q6H PRN        Or     [Auto Hold] ondansetron (ZOFRAN) injection 4 mg  4 mg Intravenous Q6H PRN   4 mg at 02/25/21 1821     [Auto Hold] oxyCODONE (ROXICODONE) tablet 5-10 mg  5-10 mg Oral Q3H PRN   10 mg at 02/25/21 1820     [Auto Hold] polyethylene glycol (MIRALAX) Packet 17 g  17 g Oral Daily   17 g at 02/25/21 1831     [Auto Hold] prochlorperazine (COMPAZINE) injection 10 mg  10 mg Intravenous Q6H PRN        Or     [Auto Hold] prochlorperazine (COMPAZINE) tablet 10 mg  10 mg Oral Q6H PRN         [Auto  Hold] senna-docusate (SENOKOT-S/PERICOLACE) 8.6-50 MG per tablet 1 tablet  1 tablet Oral BID        Or     [Auto Hold] senna-docusate (SENOKOT-S/PERICOLACE) 8.6-50 MG per tablet 2 tablet  2 tablet Oral BID         [Auto Hold] sodium chloride (PF) 0.9% PF flush 3 mL  3 mL Intracatheter Q8H PRN         [Auto Hold] sodium chloride (PF) 0.9% PF flush 3 mL  3 mL Intracatheter q1 min prn         [Auto Hold] vitamin B complex with vitamin C (STRESS TAB) tablet 1 tablet  1 tablet Oral QPM         [Auto Hold] Vitamin D3 (CHOLECALCIFEROL) tablet 25 mcg  25 mcg Oral QAM         No current [facility]-ordered outpatient medications on file.       lactated ringers 75 mL/hr (02/25/21 2008)     No Medication Sleep Aids for this Patient       Recent Labs   Lab Test 02/25/21 2049   ABO O   RH Pos     Recent Labs   Lab Test 04/11/19  1045   HCG Negative     Recent Results (from the past 744 hour(s))   XR Chest Port 1 View    Narrative    CHEST ONE VIEW  2/25/2021 9:05 PM     HISTORY: Hypotension. Rapid response.? sepsis    COMPARISON: CT dated 3/15/2019. Radiograph dated 12/3/2019.      Impression    IMPRESSION: No pleural fluid or pneumothorax. A linear opacity in the  left hilar region is favored to represent atelectasis. No confluent  airspace disease. No interstitial edema. Normal size of the heart.    LESTER J FAHRNER, MD     Anesthesia Evaluation   Pt has had prior anesthetic. Type: General.    History of anesthetic complications  - PONV.      ROS/MED HX  ENT/Pulmonary:     (+) sleep apnea, uses CPAP, allergic rhinitis, asthma  (-) tobacco use   Neurologic:       Cardiovascular:     (+) -----valvular problems/murmurs type: MR Trace MR; mild TR. Previous cardiac testing   Echo: Date: 9/18/19 Results:  Left Ventricle  The left ventricle is normal in size. There is normal left ventricular wall thickness. Left ventricular systolic function is normal. The visual ejection fraction is estimated at 55-60%. Diastolic Doppler findings (E/E'  ratio and/or other parameters) suggest left ventricular filling pressures are indeterminate. No regional wall motion abnormalities noted.    Right Ventricle  The right ventricle is normal size. The right ventricular systolic function is normal.    Atria  Normal left atrial size. Right atrial size is normal.    Mitral Valve  There is trace mitral regurgitation.    Tricuspid Valve  There is mild (1+) tricuspid regurgitation. The right ventricular systolic pressure is approximated at 16.2 mmHg plus the right atrial pressure. IVC diameter <2.1 cm collapsing >50% with sniff suggests a normal RA pressure of 3 mmHg.    Aortic Valve  There is mild trileaflet aortic sclerosis. No aortic stenosis is present.    Pulmonic Valve  The pulmonic valve is not well visualized.    Vessels  The aortic root is normal size.    Pericardium  There is no pericardial effusion.    Rhythm  Sinus rhythm was noted.  Stress Test: Date: Results:    ECG Reviewed: Date: 2/10/21 Results:  NSR  Cath: Date: Results:   (-) murmur: Lymphedema.   METS/Exercise Tolerance:     Hematologic:     (+) anemia,     Musculoskeletal:       GI/Hepatic: Comment: S/p gastric bypass    (+) GERD, liver disease (Hepatic steatosis),     Renal/Genitourinary:     (+) Nephrolithiasis ,     Endo:     (+) Obesity (Class 2),  Type II DM: Pre-diabetes.   Psychiatric/Substance Use:       Infectious Disease:       Malignancy:       Other:            Physical Exam    Airway        Mallampati: II   TM distance: > 3 FB   Neck ROM: full   Mouth opening: > 3 cm    Respiratory Devices and Support         Dental           Cardiovascular          Rhythm and rate: regular and normal (-) no murmur    Pulmonary           breath sounds clear to auscultation           OUTSIDE LABS:  CBC:   Lab Results   Component Value Date    WBC 13.8 (H) 02/25/2021    WBC 6.0 02/10/2021    HGB 10.7 (L) 02/25/2021    HGB 13.0 02/10/2021    HCT 33.2 (L) 02/25/2021    HCT 40.3 02/10/2021     02/25/2021      02/10/2021     BMP:   Lab Results   Component Value Date     02/25/2021     02/10/2021    POTASSIUM 4.6 02/25/2021    POTASSIUM 3.7 02/10/2021    CHLORIDE 109 02/25/2021    CHLORIDE 107 02/10/2021    CO2 27 02/25/2021    CO2 26 02/10/2021    BUN 13 02/25/2021    BUN 12 02/10/2021    CR 0.91 02/25/2021    CR 0.75 02/10/2021     (H) 02/25/2021    GLC 79 02/10/2021     COAGS:   Lab Results   Component Value Date    INR 0.98 10/27/2017     POC:   Lab Results   Component Value Date     (H) 02/25/2021    HCG Negative 04/11/2019    HCGS Negative 09/26/2013     HEPATIC:   Lab Results   Component Value Date    ALBUMIN 3.6 02/10/2021    PROTTOTAL 7.1 02/10/2021    ALT 28 02/10/2021    AST 16 02/10/2021    ALKPHOS 90 02/10/2021    BILITOTAL 0.4 02/10/2021     OTHER:   Lab Results   Component Value Date    LACT 3.8 (H) 02/25/2021    A1C 5.1 02/10/2021    SINGH 8.4 (L) 02/25/2021    LIPASE 185 03/15/2019    AMYLASE 56 03/22/2004    TSH 2.84 01/18/2021    T4 0.81 08/07/2014    CRP 27.2 (H) 10/14/2016    SED 18 12/15/2017       Anesthesia Plan    ASA Status:  3, emergent    NPO Status:  ELEVATED Aspiration Risk/Unknown    Anesthesia Type: General.     - Airway: ETT   Induction: Intravenous, Propofol, RSI.   Maintenance: TIVA.   Techniques and Equipment:     - AVOID: Avoid NG/OG       - Blood: T&C     Consents    Anesthesia Plan(s) and associated risks, benefits, and realistic alternatives discussed. Questions answered and patient/representative(s) expressed understanding.     - Discussed with:  Patient         Postoperative Care    Pain management: IV analgesics, Multi-modal analgesia.   PONV prophylaxis: Ondansetron (or other 5HT-3), Dexamethasone or Solumedrol     Comments:    Albumin for volume repletion - possible need for blood            Rupesh Rondon MD

## 2021-02-26 NOTE — OP NOTE
Date of Service: 02/26/21    PREOPERATIVE DIAGNOSES:     Postoperative abdominal wall expanding hematoma     POSTOPERATIVE DIAGNOSES:   same    PROCEDURES:     Exploration and evacuation of abdominal wall hematoma    SURGEON: Wade Marquez MD     ANESTHESIA: General endotracheal anesthesia    COMPLICATIONS: None.     ESTIMATED BLOOD LOSS: 500 cc hematoma    DISPOSITION: To the Post Anesthesia Care Unit in stable condition.    TUBES AND DRAINS: No new drains    COUNTS: Correct     SPECIMENS: None    IMPLANTS:     None  INDICATIONS:    This is a 50-year-old female who earlier today underwent a panniculectomy and belt lipectomy.  On the floor, she was noted to have some hypotension and swelling on the left side of her abdomen.  She was taken to the operating room urgently to evacuate what appeared to be an expanding hematoma.  We discussed details, risks, benefits, and alternatives.  Limitations and risks include recurrent bleeding, seroma, numbness, scarring, infection, or the need for additional exploration.  She signed a consent and elected to proceed.    DESCRIPTION OF PROCEDURE:    She was marked preoperatively.  She was taken the operating room.  She was given general anesthesia and IV antibiotics.  A Grace catheter had been placed.  She was placed in the lateral decubitus position on a beanbag with an axillary roll and pressure points were padded.  Sequential compression devices had been on.  Dressings were removed.    She was prepped in the standard fashion and a timeout was performed to ensure the correct orientation and procedure.  I opened the incision laterally on the abdomen and encountered a hematoma.  This was evacuated and it was approximately 500 cc.  I irrigated copiously with warm normal saline.  There was some oozing, but no pulsatile bleeding.  I searched extensively for source of the bleeding but did not find any obvious source.  The wound was reclosed with interrupted 0 PDS in Lopez's  fascia.  The subcutaneous layer was closed with buried 3-0 Monocryl and in sorb staples.  The skin was closed with a running 4-0 Monocryl subcuticular stitch.  Exophin Fusion was applied on the skin as a dressing.  ABDs and an abdominal binder were placed.  She was transferred to the bed in the reflex position.  She was taken to the PACU in stable condition.

## 2021-02-26 NOTE — PROGRESS NOTES
MD Notification    Notified Person: MD    Notified Person Name: Dr. Marquez    Notification Date/Time: 2/26/20 @ 5315    Notification Interaction: page sent    Purpose of Notification: change with oral pain med    Orders Received: called back at 1557, T.O received.     Comments:

## 2021-02-26 NOTE — PROGRESS NOTES
Pt alert but very sleepy. Drifts off and stating in appropriate blood pressure soft and slowly decreasing  incsion intact with felix's   sentences. RRT called

## 2021-02-26 NOTE — ANESTHESIA POSTPROCEDURE EVALUATION
Patient: Ana Wyman    Procedure(s):  EVACUATION OF HEMATOMA    Diagnosis:Hematoma [T14.8XXA]  Diagnosis Additional Information: No value filed.    Anesthesia Type:  General    Note:  Disposition: Inpatient   Postop Pain Control: Uneventful            Sign Out: Well controlled pain   PONV: No   Neuro/Psych: Uneventful            Sign Out: Acceptable/Baseline neuro status   Airway/Respiratory: Uneventful            Sign Out: Acceptable/Baseline resp. status   CV/Hemodynamics: Uneventful            Sign Out: Acceptable CV status   Other NRE: NONE   DID A NON-ROUTINE EVENT OCCUR? No    Event details/Postop Comments:  Stable and doing well prior to transfer to floor.             Last vitals:  Vitals:    02/26/21 0050 02/26/21 0100 02/26/21 0109   BP: 96/79 90/66 103/59   Pulse: 102 98 97   Resp:  17 16   Temp:   36.8  C (98.2  F)   SpO2: 97%  95%       Last vitals prior to Anesthesia Care Transfer:  CRNA VITALS  2/25/2021 2352 - 2/26/2021 0052      2/26/2021             Resp Rate (set):  10          Electronically Signed By: Rupesh Rondon MD  February 26, 2021  1:21 AM

## 2021-02-26 NOTE — CONSULTS
Re-consult received. Please refer to RRT note by Ivett Javier CNP. Hospitalist service will follow.

## 2021-02-26 NOTE — PROGRESS NOTES
Pt arrived from PACU at around 1720hrs. A/OX4. Soft BP's high 80's, pt states her baseline is 90's, however pt is dizzy when sitting at the edge of the bed,monitor. Capno in place w/ o2 2L nc.Pt rating incisional pain 10/10,writer started pt w/ 10mg of oxycodone. Abd incision w/ scanty drainage. 4 MISTY's w/ small bloody drainage. Abdominal binder in place. Monitor BP and bleeding closely. Due to void.

## 2021-02-26 NOTE — ANESTHESIA CARE TRANSFER NOTE
Patient: Ana Wyman    Procedure(s):  EVACUATION OF HEMATOMA    Diagnosis: Hematoma [T14.8XXA]  Diagnosis Additional Information: No value filed.    Anesthesia Type:   General     Note:    Oropharynx: oropharynx clear of all foreign objects  Level of Consciousness: awake  Oxygen Supplementation: face mask  Level of Supplemental Oxygen (L/min / FiO2): 6  Independent Airway: airway patency satisfactory and stable  Dentition: dentition unchanged  Vital Signs Stable: post-procedure vital signs reviewed and stable  Report to RN Given: handoff report given  Patient transferred to: PACU    Handoff Report: Identifed the Patient, Identified the Reponsible Provider, Reviewed the pertinent medical history, Discussed the surgical course, Reviewed Intra-OP anesthesia mangement and issues during anesthesia, Set expectations for post-procedure period and Allowed opportunity for questions and acknowledgement of understanding      Vitals: (Last set prior to Anesthesia Care Transfer)  CRNA VITALS  2/25/2021 2352 - 2/26/2021 0029      2/26/2021             Resp Rate (set):  10        Electronically Signed By: JEOVANNY Velasquez CRNA  February 26, 2021  12:29 AM

## 2021-02-26 NOTE — PROGRESS NOTES
[facility]    Medicine Progress Note - Hospitalist Service       Date of Admission:  2/25/2021  Assessment & Plan       Ana Wyman is a 50 year old female who was admitted on 2/25/21 following panniculectomy and upgrade to full belt lipectomy on 2/25/21 by Dr. Marquez. She became hypotensive on the surgical unit after surgery and went back to the OR early in the AM of 2/26/21 for evacuation of an abdominal wall hematoma. The hospitalist service was contacted to assist with medical management.    Obesity  S/p panniculectomy and upgrade to full belt lipectomy on 2/25/21 by Dr. Marquez  S/p exploration and evacuation of abdominal wall hematoma on 2/26/21 by Dr. Marquez  Acute blood loss anemia    Had hypotension and developed an abdominal wall hematoma post-operatively, was taken back to the OR for evacuation of abdominal wall hematoma.    Pain fairly well controlled this morning.    Post-op management as directed by primary service.    Baseline hemoglobin was about 13 earlier this morning. Hemoglobin down to 8.6 this morning, recheck ordered at noon.    Takes metformin and Victoza as an outpatient for weight loss. Denies any diagnosis of diabetes. Hemoglobin A1c was 5.1 on 2/10/21.    Hypotension    Baseline blood pressure is  systolic in the clinic over the past year.    Was hypotensive last evening likely due to blood loss from surgery and hematoma, improved with IV fluids.    Blood pressure now less than 90 systolic again, will give another bolus of LR this morning and monitor blood pressure closely.    MIGUELITO    Continue CPAP per home settings.    Mild intermittent asthma without complication    Currently asymptomatic.     Diet: Moderate Consistent CHO Diet    DVT Prophylaxis: Pneumatic Compression Devices and Defer to primary service  Grace Catheter: in place, indication: /GI/GYN Pelvic Procedure  Code Status: Full Code           Disposition Plan   Expected discharge:  defer to primary service  Entered: Carlos Conrad MD 02/26/2021, 11:29 AM       The patient's care was discussed with the Bedside Nurse, Patient and Patient's Family.    Carlos Conrad MD  Hospitalist Service  [facility]  Contact information available via [facility] Paging/Directory    ______________________________________________________________________    Interval History   Ana Wyman feels a little better this morning. Still having some abdominal discomfort, more on the left than the right. Some lightheadedness and nausea. Denies fever, chest pain, shortness of breath. Chest felt a little 'tight' earlier, improved after using incentive spirometer. Grace catheter in place.    Data reviewed today: I reviewed all medications, new labs and imaging results over the last 24 hours. I personally reviewed no images or EKG's today.    Physical Exam   Vital Signs: Temp: 98.2  F (36.8  C) Temp src: Oral BP: (!) 84/45 Pulse: 75   Resp: 17 SpO2: 99 % O2 Device: Nasal cannula Oxygen Delivery: 3 LPM  Weight: 227 lbs 6.4 oz  Constitutional: awake, alert, cooperative, no apparent distress, laying in bed  Respiratory: clear to auscultation bilaterally, no crackles or wheezing  Cardiovascular: regular rate and rhythm, normal S1 and S2, no murmur noted  GI: normal bowel sounds, soft, appropriate tenderness, MISTY drains in place  Genitounirinary: Grace catheter in place with clear yellow urine  Skin: warm, dry  Musculoskeletal: no lower extremity pitting edema present  Neurologic: awake, alert, oriented to name, place and time    Data   Recent Labs   Lab 02/26/21  0031 02/25/21 2051   WBC  --  13.8*   HGB 8.6* 10.7*   MCV  --  94   PLT  --  289   NA  --  140   POTASSIUM  --  4.6   CHLORIDE  --  109   CO2  --  27   BUN  --  13   CR  --  0.91   ANIONGAP  --  4   SINGH  --  8.4*   GLC  --  215*     Medications     lactated ringers 75 mL/hr at 02/26/21 0217     No Medication Sleep Aids for this Patient          acetaminophen  975 mg Oral Q8H     gabapentin  300 mg Oral BID     lactated ringers  500 mL Intravenous Once     lifitegrast  1 drop Both Eyes Daily     polyethylene glycol  17 g Oral Daily     senna-docusate  1 tablet Oral BID    Or     senna-docusate  2 tablet Oral BID     vitamin B complex with vitamin C  1 tablet Oral QPM     Vitamin D3  25 mcg Oral QAM

## 2021-02-26 NOTE — PROGRESS NOTES
Plastic Surgery    Patient developing expanding hematoma left abdomen.  Hypotensive, but no tachycardia.    To OR for urgent exploration.  Patient understands risks include continued bleeding, infection, numbness, scarring and the need for transfusion.

## 2021-02-26 NOTE — PROGRESS NOTES
MD Notification    Notified Person: MD    Notified Person Name:  Anamaria    Notification Date/Time: 2/26/20 @ 3255    Notification Interaction: page sent    Purpose of Notification: update regarding BP after bolus and after patient dangled and up on chair.    Orders Received: will order another 500 bolus.    Comments:

## 2021-02-26 NOTE — PLAN OF CARE
Patient arrived to our unit at 0145. A&OX4. VSS, Soft BP's high 80's and low 90's, patient states her baseline is 90's. Capno in place w/ o2 3L nasal canula.Pt rating incisional pain 4/10, PRN Dilaudid seemed to help. Abd incision CDI. 4 Blade's with bloody drainage. Abdominal binder in place. Monitor BP and bleeding closely. Grace in place, draining adequate amounts of clear/yellow urine. Patient received IS teaching and was able to verbalize her understanding with proper demonstration.

## 2021-02-26 NOTE — PROGRESS NOTES
"Plastic Surgery POD #1    Sore on sides, left more than right. Nausea better  BP 91/43 (BP Location: Right arm)   Pulse 64   Temp 98.3  F (36.8  C) (Oral)   Resp 18   Ht 1.651 m (5' 5\")   Wt 103.1 kg (227 lb 6.4 oz)   LMP 10/01/2016 (Approximate)   SpO2 97%   BMI 37.84 kg/m    JPs serosanguinous, volumes ok  Flaps soft, no visible hematoma  Skin flaps appear healthy    Hgb 8.6 down from 10.7    Saline lock and advance diet  Re check Hgb at noon  Strip drains q 4 hours  Hold anticoagulation given bleeding  Change to inpatient status -possible discharge tomorrow.  Remove Grace catheter this afternoon if stable.  Appreciate hospitalist input    "

## 2021-02-26 NOTE — CODE/RAPID RESPONSE
[facility]    RRT Note  2/25/2021   Time Called: 2016    RRT called for: hypotension    Assessment & Plan     Hypotension suspected acute blood loss in the post operative setting  DD: volume deficit in postoperative state vs blood loss vs medication effect vs sepsis vs ACS   I was called to assess the patient for hypotension, BP 70/44.  Upon my arrival, the patient is drowsy but easily arouses and able to maintain conversation. She does not appear to be acutely distressed however, she is markedly pale and slightly diaphoretic. She reports feeling very sleepy with left lateral incisional pain that does wrap around to her back. On assessment, the patient's incision is well approximated without erythema, ecchymosis is present. The incision done feel firm along the entire length of the incision but far left lateral side of the incision has an area of ecchymosis with firmness that wraps to her back. The patient reports 10/10 pain to this area with the slightest touch.   CXR indicates possible mild atelectasis otherwise unremarkable.  EKG is nonischemic and unchanged from EKG on 2/14/2021.   Narcan was administered without any change in mentation or blood pressure.  Upon further assessment of the patient's back, it does appear that she has a large hematoma extending from the lateral aspect of the incision to her gluteal area which is approximately the diameter of a volleyball. The entire area is very tender. Hgb is 10.7 and lactic acid is elevated at 3.8 which I suspect is secondary to acute blood loss. She is volume responsive during IVF bolus with SBP returning to the patients baseline 90-100s. She is not tachycardic, in fact borderline bradycardic with the hypotension which is concerning being it does not appear the patient is on a beta blocker.   I discussed the findings with Dr. Ca from plastics. He does not request any additional imaging at this time and plans on taking the patient  back to the OR.     INTERVENTIONS:  -CBC, BMP, LA, BC x2 now   -EKG now  -CXR now  -1L LR bolus  -Blood glucose-->199  -Narcan 0.2mg IV now  -Strict Intake and output  -Type and screen stat  -Bladder scan -->230ml    At the end of the RRT transfer to the OR, plan to transfer to station 33 post operatively per Dr. Ca.    Discussed with and defer further cares to Dr. Ca (plastics), flying squad, and bedside R    Interval History     Ana Wyman is a 50 year old female who was admitted on 2/25/2021 for panniculectomy.    Medical history significant for:   Past Medical History:   Diagnosis Date     Allergic rhinitis, cause unspecified      Cellulitis 2005    2 episodes, one with hospitalization [facility]     Complication of anesthesia     MOTHER HAS HISTORY     DUB (dysfunctional uterine bleeding)     Neg EMB 2012     Esophageal reflux     ongoing     Fibroids      Genital problems     Lichens Schlerosis     GERD (gastroesophageal reflux disease)      Hallux valgus (acquired)      History of steroid therapy     Inhalers, eye drops     Hypersomnia with sleep apnea, unspecified     using CPAP     Kidney stone May 2018    2 stones     Lichen sclerosus 7/14/2011     Lymph edema 2010- present     Lymphedema bilateral with mixed lipedema. 9/30/2015     Lymphedema bilateral with mixed lipedema. 9/30/2015     Morbid obesity (H) 3/19/2013     MIGUELITO on CPAP 3/19/2013    Sleep study in 2004 and 2012       Pneumonia     Years ago - a few times     Pre-diabetes      Sleep apnea      Tendonitis currently     Uncomplicated asthma     mild     Urinary tract infection     A few times in past 10 years     Vision disorder [id number]    Dry Eye     Vitamin D deficiency 3/14/2015     Past Surgical History:   Procedure Laterality Date     COLONOSCOPY  05/15/2013    Procedure: COLONOSCOPY;  Colonoscopy;  Surgeon: Kota Blanco MD;  Location:  GI     COLONOSCOPY N/A 10/21/2019    Procedure: COLONOSCOPY, with biopsies by cold  forceps;  Surgeon: Lydia Vickers MD;  Location: [facility] GI     GASTRIC BYPASS       GYN SURGERY  2016    Uterine Ablation     LAPAROSCOPIC BYPASS GASTRIC, CHOLECYSTECTOMY, COMBINED N/A 01/28/2019    Procedure: LAPAROSCOPIC GASTRIC BYPASS;  Surgeon: Maykel Calvin MD;  Location: [facility] OR     LAPAROSCOPIC CHOLECYSTECTOMY N/A 04/11/2019    Procedure: LAPAROSCOPIC CHOLECYSTECTOMY;  Surgeon: Maykel Calvin MD;  Location: [facility] OR     lichen sclerosis       ORTHOPEDIC SURGERY       VASCULAR SURGERY  2015    Vienous closure on both legs     vein closure  12/2016    to prevent lymphedema     ZZ NONSPECIFIC PROCEDURE  1994    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     ZZC NONSPECIFIC PROCEDURE  1999,2001    Foot surgeries x 2 (bunionectomy)     ZZC NONSPECIFIC PROCEDURE  2000    hammer toes       Code Status: Full Code    Allergies   Allergies   Allergen Reactions     Nsaids Other (See Comments)     Had gastric bypass - needs to avoid NSAIDS     Clindamycin Diarrhea     Codeine Nausea and Vomiting     Shellfish Allergy        Physical Exam   Vital Signs with Ranges:  Temp:  [95.6  F (35.3  C)-97.8  F (36.6  C)] 96.2  F (35.7  C)  Pulse:  [64-84] 76  Resp:  [12-21] 18  BP: ()/(44-84) 70/44  SpO2:  [93 %-100 %] 100 %  I/O last 3 completed shifts:  In: 2000 [I.V.:2000]  Out: 400 [Urine:400]    Constitutional: drowsy but easily awakens, not acutely distress, pale, slightly diaphoretic  ENT: mucus membranes dry  Neck: supple, ROM intact  Pulmonary: clear throughout, nonlabored  Cardiovascular: S1, S2, no murmur, borderline bradycardic  GI: soft  Skin/Integumen: incisional ecchymosis, left lateral incision with hematoma which wraps to gluteal area approximately size of volleyball  Neuro: alert and oriented x4, non focal  Psych:  calm  Extremities: no peripheral edema    Data     EKG:  Interpreted by JEOVANNY Glover CNP  Time reviewed: 2110  Symptoms at time of EKG: hypotension   Rhythm: normal sinus   Rate:  Normal  Axis: Normal  Ectopy: none  Conduction: normal  ST Segments/ T Waves: No acute ischemic changes  Q Waves: none  Comparison to prior: Unchanged    Clinical Impression: no acute changes    ABG:  -No lab results found in last 7 days.    Troponin:    Recent Labs   Lab Test 03/15/19  2142   TROPI <0.015       IMAGING: (X-ray/CT/MRI)   Recent Results (from the past 24 hour(s))   XR Chest Port 1 View    Narrative    CHEST ONE VIEW  2/25/2021 9:05 PM     HISTORY: Hypotension. Rapid response.? sepsis    COMPARISON: CT dated 3/15/2019. Radiograph dated 12/3/2019.      Impression    IMPRESSION: No pleural fluid or pneumothorax. A linear opacity in the  left hilar region is favored to represent atelectasis. No confluent  airspace disease. No interstitial edema. Normal size of the heart.    LESTER J FAHRNER, MD       CBC with Diff:  Recent Labs   Lab Test 02/25/21  2051 10/27/17  1803 10/27/17  1803   WBC 13.8*   < > 10.4   HGB 10.7*   < > 13.6   MCV 94   < > 89      < > 325   INR  --   --  0.98    < > = values in this interval not displayed.        Lactic Acid:    Lab Results   Component Value Date    LACT 1.8 04/29/2017           Comprehensive Metabolic Panel:  No lab results found in last 7 days.    INR:    Recent Labs   Lab Test 10/27/17  1803   INR 0.98       D-DIMER:  No results found for: DIMER    BNP:  No results found for: BNP    UA:  No results for input(s): COLOR, APPEARANCE, URINEGLC, URINEBILI, URINEKETONE, SG, UBLD, URINEPH, PROTEIN, UROBILINOGEN, NITRITE, LEUKEST, RBCU, WBCU in the last 168 hours.      Time Spent on this Encounter   I spent 60 minutes of critical care time on the unit/floor managing the care of Ana Wyman. Upon evaluation, this patient had a high probability of imminent or life-threatening deterioration due to hypotension suspected acute blood loss, which required my direct attention, intervention, and personal management. 100% of my time was spent at the bedside counseling  the patient and/or coordinating care regarding services listed in this note.    JEOVANNY Glover CNP

## 2021-02-26 NOTE — PROVIDER NOTIFICATION
MD Notification    Notified Person: MD Dr Marquez    Notified Person Name:    Notification Date/Time:2/25/2021 1945    Notification Interaction:telephone    Purpose of Notification:low blood presurre    Orders Received:oz dick    Comments:

## 2021-02-27 LAB
ABO + RH BLD: NORMAL
ABO + RH BLD: NORMAL
ANION GAP SERPL CALCULATED.3IONS-SCNC: 5 MMOL/L (ref 3–14)
BLD GP AB SCN SERPL QL: NORMAL
BLD PROD TYP BPU: NORMAL
BLD UNIT ID BPU: 0
BLD UNIT ID BPU: 0
BLOOD BANK CMNT PATIENT-IMP: NORMAL
BLOOD PRODUCT CODE: NORMAL
BLOOD PRODUCT CODE: NORMAL
BPU ID: NORMAL
BPU ID: NORMAL
BUN SERPL-MCNC: 12 MG/DL (ref 7–30)
CALCIUM SERPL-MCNC: 8.1 MG/DL (ref 8.5–10.1)
CHLORIDE SERPL-SCNC: 110 MMOL/L (ref 94–109)
CO2 SERPL-SCNC: 29 MMOL/L (ref 20–32)
CREAT SERPL-MCNC: 0.79 MG/DL (ref 0.52–1.04)
GFR SERPL CREATININE-BSD FRML MDRD: 86 ML/MIN/{1.73_M2}
GLUCOSE SERPL-MCNC: 94 MG/DL (ref 70–99)
HGB BLD-MCNC: 7.5 G/DL (ref 11.7–15.7)
HGB BLD-MCNC: 7.5 G/DL (ref 11.7–15.7)
NUM BPU REQUESTED: 2
POTASSIUM SERPL-SCNC: 3.6 MMOL/L (ref 3.4–5.3)
SODIUM SERPL-SCNC: 144 MMOL/L (ref 133–144)
SPECIMEN EXP DATE BLD: NORMAL
TRANSFUSION STATUS PATIENT QL: NORMAL

## 2021-02-27 PROCEDURE — 85018 HEMOGLOBIN: CPT | Performed by: PLASTIC SURGERY

## 2021-02-27 PROCEDURE — 85018 HEMOGLOBIN: CPT | Performed by: INTERNAL MEDICINE

## 2021-02-27 PROCEDURE — 250N000013 HC RX MED GY IP 250 OP 250 PS 637: Performed by: INTERNAL MEDICINE

## 2021-02-27 PROCEDURE — 80048 BASIC METABOLIC PNL TOTAL CA: CPT | Performed by: PLASTIC SURGERY

## 2021-02-27 PROCEDURE — 250N000011 HC RX IP 250 OP 636: Performed by: PLASTIC SURGERY

## 2021-02-27 PROCEDURE — P9016 RBC LEUKOCYTES REDUCED: HCPCS | Performed by: NURSE PRACTITIONER

## 2021-02-27 PROCEDURE — 36415 COLL VENOUS BLD VENIPUNCTURE: CPT | Performed by: PLASTIC SURGERY

## 2021-02-27 PROCEDURE — 82947 ASSAY GLUCOSE BLOOD QUANT: CPT | Performed by: PLASTIC SURGERY

## 2021-02-27 PROCEDURE — 99233 SBSQ HOSP IP/OBS HIGH 50: CPT | Performed by: INTERNAL MEDICINE

## 2021-02-27 PROCEDURE — 258N000003 HC RX IP 258 OP 636: Performed by: INTERNAL MEDICINE

## 2021-02-27 PROCEDURE — 36415 COLL VENOUS BLD VENIPUNCTURE: CPT | Performed by: INTERNAL MEDICINE

## 2021-02-27 PROCEDURE — 120N000001 HC R&B MED SURG/OB

## 2021-02-27 PROCEDURE — 250N000013 HC RX MED GY IP 250 OP 250 PS 637: Performed by: PLASTIC SURGERY

## 2021-02-27 PROCEDURE — 99207 PR NO CHARGE LOS: CPT | Performed by: INTERNAL MEDICINE

## 2021-02-27 RX ADMIN — DOCUSATE SODIUM 50 MG AND SENNOSIDES 8.6 MG 2 TABLET: 8.6; 5 TABLET, FILM COATED ORAL at 20:39

## 2021-02-27 RX ADMIN — Medication 1 SPRAY: at 17:59

## 2021-02-27 RX ADMIN — HYDROCODONE BITARTRATE AND ACETAMINOPHEN 1 TABLET: 5; 325 TABLET ORAL at 21:39

## 2021-02-27 RX ADMIN — POLYETHYLENE GLYCOL 3350 17 G: 17 POWDER, FOR SOLUTION ORAL at 08:32

## 2021-02-27 RX ADMIN — HYDROCODONE BITARTRATE AND ACETAMINOPHEN 1 TABLET: 5; 325 TABLET ORAL at 14:58

## 2021-02-27 RX ADMIN — Medication 1 SPRAY: at 18:38

## 2021-02-27 RX ADMIN — HYDROCODONE BITARTRATE AND ACETAMINOPHEN 1 TABLET: 5; 325 TABLET ORAL at 08:32

## 2021-02-27 RX ADMIN — Medication 1 LOZENGE: at 05:11

## 2021-02-27 RX ADMIN — Medication 25 MCG: at 08:32

## 2021-02-27 RX ADMIN — METHOCARBAMOL TABLETS 750 MG: 750 TABLET, COATED ORAL at 11:32

## 2021-02-27 RX ADMIN — B-COMPLEX W/ C & FOLIC ACID TAB 1 TABLET: TAB at 20:39

## 2021-02-27 RX ADMIN — DOCUSATE SODIUM 50 MG AND SENNOSIDES 8.6 MG 2 TABLET: 8.6; 5 TABLET, FILM COATED ORAL at 08:32

## 2021-02-27 RX ADMIN — ONDANSETRON 4 MG: 4 TABLET, ORALLY DISINTEGRATING ORAL at 09:33

## 2021-02-27 RX ADMIN — GABAPENTIN 300 MG: 300 CAPSULE ORAL at 08:32

## 2021-02-27 RX ADMIN — METHOCARBAMOL TABLETS 750 MG: 750 TABLET, COATED ORAL at 20:39

## 2021-02-27 RX ADMIN — SODIUM CHLORIDE 1000 ML: 9 INJECTION, SOLUTION INTRAVENOUS at 05:36

## 2021-02-27 RX ADMIN — Medication 1 LOZENGE: at 21:44

## 2021-02-27 RX ADMIN — GABAPENTIN 300 MG: 300 CAPSULE ORAL at 20:40

## 2021-02-27 ASSESSMENT — ACTIVITIES OF DAILY LIVING (ADL)
ADLS_ACUITY_SCORE: 15

## 2021-02-27 NOTE — PLAN OF CARE
A&Ox4.  Afebrile, on RA.  BP remain on low side, total of 1l LR bolus given this shift.  Pain managed with PRN & scheduled meds.  LS clear.  IS done independently.  BS active, passing gas, tolerating oral intake.  Incision with liquid bandage, covered with ABD and secured with binder.  2 MISTY's on each side, stripped q4, stie CDI.  Indwelling urinary catheter removed voided w/o difficulty.  Tolerated out of bed to chair and toilet with some lightheadedness, up with 1 assist.

## 2021-02-27 NOTE — PROGRESS NOTES
"Plastic Surgery POD #2    Fatigued. Some nausea. Pain controlled with norco. Grace out. Urinating ok  BP 90/58 (BP Location: Right arm)   Pulse 86   Temp 97.9  F (36.6  C) (Oral)   Resp 16   Ht 1.651 m (5' 5\")   Wt 103.1 kg (227 lb 6.4 oz)   LMP 10/01/2016 (Approximate)   SpO2 96%   BMI 37.84 kg/m    JPs - #1-3 normal.  Still higher output on MISTY #4 (left back), but more serous.  Skin soft, no evidence of hematoma  Alert  Hgb 7.5.  Down from 8.1 yesterday.  >2L fluids since that time    No evidence of active bleeding, but patient fatigued and still with some downward drift in Hgb.  Plan 2 U PRBC transfusion today  Continue to monitor closely  Hold anticoagulation  Ambulate and SCDs for venous thromboembolism prophylaxis.  Appreciate hospitalist assistance  "

## 2021-02-27 NOTE — PLAN OF CARE
Pt A&Ox4. VSS on RA ex soft BP. Complained about dizziness. Notified JEFFRY RANGEL bolus running currently. Lungs clear. Complained about sore throat. Prn Lozenge. Up with 1. Taking Norco for pain.  Voiding in the BR. Abdominal incision with liquid bandage, covered ABD and abdominal binder. Four MISTY in place, 2 of each side. Stripped q4. MISTY # 4 has increased output.

## 2021-02-27 NOTE — PROGRESS NOTES
.MD Notification    Notified Person: MD    Notified Person Name: Sarah Ni    Notification Date/Time: 2/27/21, @455    Notification Interaction: page sent    Purpose of Notification: low blood pressure and Increase MISTY output.    Orders Received:  Comments: waiting for call back.

## 2021-02-27 NOTE — PROVIDER NOTIFICATION
MD Notification    Notified Person: MD    Notified Person Name: Dr. Elliott (hospitalist)    Notification Date/Time: 2/27/21 @ 0804    Notification Interaction: page sent    Purpose of Notification: Hgb 7.5 from 8.1 yesterday AM    Orders Received:    Comments:

## 2021-02-27 NOTE — PROGRESS NOTES
[facility]  Hospitalist Progress Note  Name: Ana Wyman    MRN: [id number]  Physician:  Nuno Elliott DO, FHM (Text Page)    Summary of Stay:  Ana Wyman is a 50 year old female who was admitted on 2/25/21 following panniculectomy and upgrade to full belt lipectomy on 2/25/21 by Dr. Marquez. She became hypotensive on the surgical unit after surgery and went back to the OR early in the AM of 2/26/21 for evacuation of an abdominal wall hematoma. The hospitalist service was consulted to assist with post-op medical management.    Assessment & Plan    Obesity  S/p panniculectomy and upgrade to full belt lipectomy on 2/25/21 by Dr. Marquez  S/p exploration and evacuation of abdominal wall hematoma on 2/26/21 by Dr. Marquez  Acute blood loss anemia related to surgery:    Had hypotension and developed an abdominal wall hematoma post-operatively, was taken back to the OR for evacuation of abdominal wall hematoma.  She has multiple drains currently, one with fairly significant output still.  Will defer drain management to surgical service.  I did ask her RN to let her surgical team know this AM about the remaining drain output given the ongoing anemia/BP issues.    Pain fairly well controlled this morning.    Post-op site/activity restriction management otherwise as directed by primary surgical service.    Takes metformin and Victoza as an outpatient for weight loss. Denies any diagnosis of diabetes. Hemoglobin A1c was 5.1 on 2/10/21.  I would not restart these now.    See BP/anemia discussion below.     Hypotension:    Baseline blood pressure is  systolic in the clinic over the past year.    Has been mildly hypotensive at times post-op.  Usually this responds to IVF and again this AM BP increased back to 90+ systolic range after 1 liter IVF bolus.  She has ongoing fluid losses partly related to drain output.      Hgb trending down but no increased more overt bleeding.  She  remains tired/mildly dizzy at times.  I will recheck a hgb later this AM.  If still trending down will transfuse a unit.    (ADDENDUM:  Hgb not worsening from earlier AM on recheck but still low and patient continues to feel ill/more symptomatic today.  Surgical service has seen the patient since my initial evaluation and feels she would benefit from a transfusion and has ordered one.  I suspect this will help her dizziness/fatigue)     MIGUELITO    Continue CPAP per home settings.     Mild intermittent asthma without complication    Currently asymptomatic.        Diet: Moderate Consistent CHO Diet    DVT Prophylaxis: Pneumatic Compression Devices and Defer to primary service  Grace Catheter: in place, indication: /GI/GYN Pelvic Procedure  Code Status: Full Code    Disposition:  Needs to remain in the hospital for now given recent BP issues/drain output.            COVID Status:  COVID-19 PCR Results    COVID-19 PCR Results 11/7/20 2/21/21 2/21/21     0913 0913   COVID-19 Virus PCR to [facility] - Result  Test received-See reflex to IDDL test SARS CoV2 (COVID-19) Virus RT-PCR    COVID-19 Virus PCR to [facility] - Source  Nasopharyngeal    COVID-19 Virus by PCR (External Result) Undetected     SARS-CoV-2 Virus Specimen Source   Nasopharyngeal   SARS-CoV-2 PCR Result   NEGATIVE      Comments are available for some flowsheets but are not being displayed.         COVID-19 Antibody Results, Testing for Immunity    COVID-19 Antibody Results, Testing for Immunity   No data to display.            Interval History   Assumed consultative care, history reviewed.  Ms. Wyman reports feeling a little more tired/dizzy this AM.  She globally feels everything is a little worse with more fatigue/mild SOB when up.  She is eating but appetite she feels isn't her normal.      -Data reviewed today: I reviewed all new labs and imaging reports over the last 24 hours. I personally reviewed no images or EKG's today.    Physical Exam   Temp: 97.9   F (36.6  C) Temp src: Oral BP: 90/58 Pulse: 86   Resp: 16 SpO2: 96 % O2 Device: None (Room air)    Vitals:    02/25/21 0838   Weight: 103.1 kg (227 lb 6.4 oz)     Vital Signs with Ranges  Temp:  [97.6  F (36.4  C)-98.6  F (37  C)] 97.9  F (36.6  C)  Pulse:  [82-94] 86  Resp:  [16-18] 16  BP: (75-91)/(45-59) 90/58  SpO2:  [91 %-96 %] 96 %  I/O last 3 completed shifts:  In: 1503 [P.O.:500; I.V.:3; IV Piggyback:1000]  Out: 4200 [Urine:3300; Drains:900]    GEN:  Alert, oriented x 3, appears comfortable up in the chair finishing breakfast when I saw her..  HEENT:  Normocephalic/atraumatic, no scleral icterus, no nasal discharge, mouth moist.  CV:  Regular rate and rhythm, distant.  No loud murmur/rub.  LUNGS:  Clear to auscultation bilaterally without rales/rhonchi/wheezing/retractions.  Mildly decreased breath sounds bases.  Symmetric chest rise on inhalation noted.  ABD:  Active bowel sounds, detailed surgical site exam deferred to surgical service.  Drains with serosanguinous thin clear to red appearing liquid.  EXT:  Trace peripheral edema.  No cyanosis.  No acute joint synovitis noted.  SKIN:  Dry to touch, no exanthems noted in the visualized areas.    Medications     lactated ringers 75 mL/hr at 02/26/21 0217     No Medication Sleep Aids for this Patient         gabapentin  300 mg Oral BID     lifitegrast  1 drop Both Eyes Daily     polyethylene glycol  17 g Oral Daily     senna-docusate  1 tablet Oral BID    Or     senna-docusate  2 tablet Oral BID     vitamin B complex with vitamin C  1 tablet Oral QPM     Vitamin D3  25 mcg Oral QAM     Data     Recent Labs   Lab 02/27/21  0656 02/26/21  1211 02/26/21  0031 02/25/21  2051   WBC  --   --   --  13.8*   HGB 7.5* 8.1* 8.6* 10.7*   HCT  --   --   --  33.2*   MCV  --   --   --  94   PLT  --   --   --  289     Recent Labs   Lab 02/27/21  0656 02/25/21  2051    140   POTASSIUM 3.6 4.6   CHLORIDE 110* 109   CO2 29 27   ANIONGAP 5 4   GLC 94 215*   BUN 12 13   CR  0.79 0.91   GFRESTIMATED 86 72   GFRESTBLACK >90 84   SINGH 8.1* 8.4*       No results found for this or any previous visit (from the past 24 hour(s)).

## 2021-02-27 NOTE — PROGRESS NOTES
"Hospitalist Cross Cover  2/27/2021    Notified of low blood pressures and complaint of dizziness. Chart reviewed. Problem list includes postural hypotension. Had acute blood loss anemia after surgery so may be more susceptible to changes in blood pressure.    BP (!) 84/50 (BP Location: Right arm)   Pulse 84   Temp 98  F (36.7  C) (Oral)   Resp 16   Ht 1.651 m (5' 5\")   Wt 103.1 kg (227 lb 6.4 oz)   LMP 10/01/2016 (Approximate)   SpO2 95%   BMI 37.84 kg/m      Plan: Give 1 L NS bolus over 2 hours.    Defer information or updates on MISTY drain output to surgeon.     Sarah Ni MD        "

## 2021-02-27 NOTE — PROVIDER NOTIFICATION
MD Notification    Notified Person: MD    Notified Person Name: Dr. Marquez    Notification Date/Time: 2/27/21 @ 0902    Notification Interaction: page sent    Purpose of Notification: hgb 7.5    Orders Received: T.O. type & cross, and transfuse 2 units PRBC.    Comments:

## 2021-02-28 LAB
ANION GAP SERPL CALCULATED.3IONS-SCNC: 7 MMOL/L (ref 3–14)
BUN SERPL-MCNC: 13 MG/DL (ref 7–30)
CALCIUM SERPL-MCNC: 8.4 MG/DL (ref 8.5–10.1)
CHLORIDE SERPL-SCNC: 110 MMOL/L (ref 94–109)
CO2 SERPL-SCNC: 27 MMOL/L (ref 20–32)
CREAT SERPL-MCNC: 0.77 MG/DL (ref 0.52–1.04)
ERYTHROCYTE [DISTWIDTH] IN BLOOD BY AUTOMATED COUNT: 13.5 % (ref 10–15)
GFR SERPL CREATININE-BSD FRML MDRD: 89 ML/MIN/{1.73_M2}
GLUCOSE BLDC GLUCOMTR-MCNC: 111 MG/DL (ref 70–99)
GLUCOSE BLDC GLUCOMTR-MCNC: 127 MG/DL (ref 70–99)
GLUCOSE SERPL-MCNC: 126 MG/DL (ref 70–99)
GLUCOSE SERPL-MCNC: 94 MG/DL (ref 70–99)
HCT VFR BLD AUTO: 28.6 % (ref 35–47)
HGB BLD-MCNC: 9.3 G/DL (ref 11.7–15.7)
HGB BLD-MCNC: 9.3 G/DL (ref 11.7–15.7)
MCH RBC QN AUTO: 30.2 PG (ref 26.5–33)
MCHC RBC AUTO-ENTMCNC: 32.5 G/DL (ref 31.5–36.5)
MCV RBC AUTO: 93 FL (ref 78–100)
PLATELET # BLD AUTO: 230 10E9/L (ref 150–450)
POTASSIUM SERPL-SCNC: 3.7 MMOL/L (ref 3.4–5.3)
RBC # BLD AUTO: 3.08 10E12/L (ref 3.8–5.2)
SODIUM SERPL-SCNC: 144 MMOL/L (ref 133–144)
WBC # BLD AUTO: 7.3 10E9/L (ref 4–11)

## 2021-02-28 PROCEDURE — 120N000001 HC R&B MED SURG/OB

## 2021-02-28 PROCEDURE — 36415 COLL VENOUS BLD VENIPUNCTURE: CPT | Performed by: INTERNAL MEDICINE

## 2021-02-28 PROCEDURE — 999N001017 HC STATISTIC GLUCOSE BY METER IP

## 2021-02-28 PROCEDURE — 250N000013 HC RX MED GY IP 250 OP 250 PS 637: Performed by: PLASTIC SURGERY

## 2021-02-28 PROCEDURE — 80048 BASIC METABOLIC PNL TOTAL CA: CPT | Performed by: INTERNAL MEDICINE

## 2021-02-28 PROCEDURE — 250N000011 HC RX IP 250 OP 636: Performed by: PLASTIC SURGERY

## 2021-02-28 PROCEDURE — 85018 HEMOGLOBIN: CPT | Performed by: PLASTIC SURGERY

## 2021-02-28 PROCEDURE — 36415 COLL VENOUS BLD VENIPUNCTURE: CPT | Performed by: PLASTIC SURGERY

## 2021-02-28 PROCEDURE — 85027 COMPLETE CBC AUTOMATED: CPT | Performed by: INTERNAL MEDICINE

## 2021-02-28 PROCEDURE — 82947 ASSAY GLUCOSE BLOOD QUANT: CPT | Performed by: PLASTIC SURGERY

## 2021-02-28 PROCEDURE — 99232 SBSQ HOSP IP/OBS MODERATE 35: CPT | Performed by: INTERNAL MEDICINE

## 2021-02-28 RX ORDER — HYDROMORPHONE HYDROCHLORIDE 2 MG/1
2-4 TABLET ORAL
Status: DISCONTINUED | OUTPATIENT
Start: 2021-02-28 | End: 2021-03-01 | Stop reason: HOSPADM

## 2021-02-28 RX ORDER — CALCIUM CARBONATE 500 MG/1
1000 TABLET, CHEWABLE ORAL EVERY 4 HOURS PRN
Status: DISCONTINUED | OUTPATIENT
Start: 2021-02-28 | End: 2021-03-01 | Stop reason: HOSPADM

## 2021-02-28 RX ADMIN — Medication 1 SPRAY: at 09:02

## 2021-02-28 RX ADMIN — HYDROCODONE BITARTRATE AND ACETAMINOPHEN 1 TABLET: 5; 325 TABLET ORAL at 06:53

## 2021-02-28 RX ADMIN — ACETAMINOPHEN 650 MG: 325 TABLET, FILM COATED ORAL at 20:19

## 2021-02-28 RX ADMIN — HYDROCODONE BITARTRATE AND ACETAMINOPHEN 1 TABLET: 5; 325 TABLET ORAL at 01:24

## 2021-02-28 RX ADMIN — POLYETHYLENE GLYCOL 3350 17 G: 17 POWDER, FOR SOLUTION ORAL at 08:59

## 2021-02-28 RX ADMIN — B-COMPLEX W/ C & FOLIC ACID TAB 1 TABLET: TAB at 20:20

## 2021-02-28 RX ADMIN — DIPHENHYDRAMINE HYDROCHLORIDE 25 MG: 25 CAPSULE ORAL at 20:20

## 2021-02-28 RX ADMIN — HYDROMORPHONE HYDROCHLORIDE 0.3 MG: 1 INJECTION, SOLUTION INTRAMUSCULAR; INTRAVENOUS; SUBCUTANEOUS at 22:53

## 2021-02-28 RX ADMIN — DOCUSATE SODIUM 50 MG AND SENNOSIDES 8.6 MG 2 TABLET: 8.6; 5 TABLET, FILM COATED ORAL at 09:01

## 2021-02-28 RX ADMIN — CALCIUM CARBONATE (ANTACID) CHEW TAB 500 MG 1000 MG: 500 CHEW TAB at 01:53

## 2021-02-28 RX ADMIN — DOCUSATE SODIUM 50 MG AND SENNOSIDES 8.6 MG 1 TABLET: 8.6; 5 TABLET, FILM COATED ORAL at 20:19

## 2021-02-28 RX ADMIN — GABAPENTIN 300 MG: 300 CAPSULE ORAL at 09:01

## 2021-02-28 RX ADMIN — HYDROCODONE BITARTRATE AND ACETAMINOPHEN 1 TABLET: 5; 325 TABLET ORAL at 13:10

## 2021-02-28 RX ADMIN — Medication 25 MCG: at 09:01

## 2021-02-28 ASSESSMENT — ACTIVITIES OF DAILY LIVING (ADL)
ADLS_ACUITY_SCORE: 15

## 2021-02-28 NOTE — PLAN OF CARE
A&Ox4.  Afebrile, on RA.  BP stable at 80-90's/50's.  Hgb 7.5, 2 units PRBC transfused.  Pain managed with PRN Norco and Robaxin.  Pulmonary hygiene encouraged.  LS clear.  BS active, passing gas, tolerating oral intake.  Incisions CDI, ABD & binder in place.  MISTY x 4, serosanguineous output, stripped.  Voiding w/o difficulty.  Ambulate in the vega with 1 assist, walker & GB.

## 2021-02-28 NOTE — PLAN OF CARE
[id number]: A&O x4. VSS on RA, ex soft BP. Used home CPAP when sleeping. Pain controlled by Oxycodone and Robaxin. CMS intact. LS clear. BS+, BM+, flatus+. Incisions CDI, some bruising, abdominal binder in place. MISTY x4 to bulb suction, stripped q8hrs. Tolerating mod carb diet. Denies N/V. Voiding adequately. Up with assist x1. One nose bleed overnight.

## 2021-02-28 NOTE — PROGRESS NOTES
"Plastic Surgery POD # 3    Feeling better today.  2 U PRBCs transfulsed.  Hgb to 9.3 from 7.5. Ambulating, +BM,  Urinating  BP 98/54 (BP Location: Left arm)   Pulse 71   Temp 99  F (37.2  C) (Oral)   Resp 16   Ht 1.651 m (5' 5\")   Wt 103.1 kg (227 lb 6.4 oz)   LMP 10/01/2016 (Approximate)   SpO2 95%   BMI 37.84 kg/m    Alert  No appreciable hematoma on exam  Skin healthy  Left drain still with high output    Appropriate increase in Hgb with transfusion and improved symptoms  Higher output in area of hematoma is not unusual given inflammatory nature of blood, but would like to see this decrease and become thinner before discharge  Continue ambulation  Will advance to regular diet per request   "

## 2021-02-28 NOTE — PROGRESS NOTES
[facility]  Hospitalist Progress Note  Name: Ana Wyman    MRN: [id number]  Physician:  Nuno Elliott DO, FHM (Text Page)    Summary of Stay:  Ana Wyman is a 50 year old female who was admitted on 2/25/21 following panniculectomy and upgrade to full belt lipectomy on 2/25/21 by Dr. Marquez. She became hypotensive on the surgical unit after surgery and went back to the OR early in the AM of 2/26/21 for evacuation of an abdominal wall hematoma. The hospitalist service was consulted to assist with post-op medical management.    Assessment & Plan    Obesity  S/p panniculectomy and upgrade to full belt lipectomy on 2/25/21 by Dr. Marquez  S/p exploration and evacuation of abdominal wall hematoma on 2/26/21 by Dr. Marquez  Acute blood loss anemia related to surgery:    Had hypotension and developed an abdominal wall hematoma post-operatively, was taken back to the OR for evacuation of abdominal wall hematoma.  She has multiple drains currently, one with fairly significant output still.  Will defer drain management to surgical service.      Hgb improved to level as expected with 2 units PRBC's yesterday transfused.  She feels much better.      Pain fairly well controlled this morning.    Post-op site/activity restriction management otherwise as directed by primary surgical service.    Takes metformin and Victoza as an outpatient for weight loss. Denies any diagnosis of diabetes. Hemoglobin A1c was 5.1 on 2/10/21.  I would not restart these now.    Diet being advanced to regular per surgical service     Hypotension in setting of chronic low normal range BP:    Baseline blood pressure is  systolic in the clinic over the past year.    Has been mildly hypotensive at times post-op.  Usually this responds to IVF.  She has ongoing fluid losses partly related to drain output (more serous, not felt acute bleeding).  She received 2 units blood yesterday with good rise in  hgb.     MIGUELITO    Continue CPAP per home settings.     Mild intermittent asthma without complication    Currently asymptomatic.        Diet: Regular  DVT Prophylaxis: Pneumatic Compression Devices and Defer to primary service  Code Status: Full Code    Disposition:  Needs to remain in the hospital for now given drain output.  Possible discharge tomorrow, will defer to surgical team unless new medical issues arise.            COVID Status:  COVID-19 PCR Results    COVID-19 PCR Results 11/7/20 2/21/21 2/21/21     0913 0913   COVID-19 Virus PCR to [facility] - Result  Test received-See reflex to IDDL test SARS CoV2 (COVID-19) Virus RT-PCR    COVID-19 Virus PCR to [facility] - Source  Nasopharyngeal    COVID-19 Virus by PCR (External Result) Undetected     SARS-CoV-2 Virus Specimen Source   Nasopharyngeal   SARS-CoV-2 PCR Result   NEGATIVE      Comments are available for some flowsheets but are not being displayed.         COVID-19 Antibody Results, Testing for Immunity    COVID-19 Antibody Results, Testing for Immunity   No data to display.            Interval History   Ms. Wyman reports feeling much better today after 2 units PRBC yestarday.  Dizziness resolved.  Appetite improved along with energy level.  She went on a couple walks late yesterday and felt they went well.    -Data reviewed today: I reviewed all new labs and imaging reports over the last 24 hours. I personally reviewed no images or EKG's today.    Physical Exam   Temp: 99  F (37.2  C) Temp src: Oral BP: 98/54 Pulse: 71   Resp: 16 SpO2: 95 % O2 Device: None (Room air)    Vitals:    02/25/21 0838   Weight: 103.1 kg (227 lb 6.4 oz)     Vital Signs with Ranges  Temp:  [98.4  F (36.9  C)-99.4  F (37.4  C)] 99  F (37.2  C)  Pulse:  [71-95] 71  Resp:  [16-18] 16  BP: (83-98)/(47-65) 98/54  SpO2:  [91 %-96 %] 95 %  I/O last 3 completed shifts:  In: 1510 [P.O.:990]  Out: 3085 [Urine:2250; Drains:835]    GEN:  Alert, oriented x 3, appears comfortable up in the  chair.  HEENT:  Normocephalic/atraumatic, no scleral icterus, no nasal discharge, mouth moist.  CV:  Regular rate and rhythm, distant.  No loud murmur/rub.  LUNGS:  Clear to auscultation bilaterally without rales/rhonchi/wheezing/retractions.  Mildly decreased breath sounds bases.  Symmetric chest rise on inhalation noted.  ABD:  Active bowel sounds, detailed surgical site exam deferred to surgical service.  EXT:  Trace peripheral edema.  No cyanosis.  No acute joint synovitis noted.  SKIN:  Dry to touch, no exanthems noted in the visualized areas.    Medications     lactated ringers 75 mL/hr at 02/26/21 0217     No Medication Sleep Aids for this Patient         gabapentin  300 mg Oral BID     lifitegrast  1 drop Both Eyes Daily     polyethylene glycol  17 g Oral Daily     senna-docusate  1 tablet Oral BID    Or     senna-docusate  2 tablet Oral BID     vitamin B complex with vitamin C  1 tablet Oral QPM     Vitamin D3  25 mcg Oral QAM     Data     Recent Labs   Lab 02/28/21  0705 02/27/21  0942 02/27/21  0656 02/25/21 2051 02/25/21 2051   WBC 7.3  --   --   --  13.8*   HGB 9.3* 7.5* 7.5*   < > 10.7*   HCT 28.6*  --   --   --  33.2*   MCV 93  --   --   --  94     --   --   --  289    < > = values in this interval not displayed.     Recent Labs   Lab 02/28/21  0705 02/27/21  0656 02/25/21 2051    144 140   POTASSIUM 3.7 3.6 4.6   CHLORIDE 110* 110* 109   CO2 27 29 27   ANIONGAP 7 5 4   GLC 94 94 215*   BUN 13 12 13   CR 0.77 0.79 0.91   GFRESTIMATED 89 86 72   GFRESTBLACK >90 >90 84   SINGH 8.4* 8.1* 8.4*       No results found for this or any previous visit (from the past 24 hour(s)).

## 2021-03-01 VITALS
DIASTOLIC BLOOD PRESSURE: 58 MMHG | HEIGHT: 65 IN | SYSTOLIC BLOOD PRESSURE: 91 MMHG | RESPIRATION RATE: 16 BRPM | BODY MASS INDEX: 37.89 KG/M2 | TEMPERATURE: 98 F | HEART RATE: 69 BPM | WEIGHT: 227.4 LBS | OXYGEN SATURATION: 98 %

## 2021-03-01 LAB
GLUCOSE SERPL-MCNC: 93 MG/DL (ref 70–99)
HGB BLD-MCNC: 9 G/DL (ref 11.7–15.7)

## 2021-03-01 PROCEDURE — 250N000013 HC RX MED GY IP 250 OP 250 PS 637: Performed by: INTERNAL MEDICINE

## 2021-03-01 PROCEDURE — 250N000013 HC RX MED GY IP 250 OP 250 PS 637: Performed by: PLASTIC SURGERY

## 2021-03-01 PROCEDURE — 82947 ASSAY GLUCOSE BLOOD QUANT: CPT | Performed by: PLASTIC SURGERY

## 2021-03-01 PROCEDURE — 85018 HEMOGLOBIN: CPT | Performed by: PLASTIC SURGERY

## 2021-03-01 PROCEDURE — 36415 COLL VENOUS BLD VENIPUNCTURE: CPT | Performed by: PLASTIC SURGERY

## 2021-03-01 RX ORDER — METHOCARBAMOL 750 MG/1
750 TABLET, FILM COATED ORAL EVERY 6 HOURS PRN
Qty: 50 TABLET | Refills: 1 | Status: SHIPPED | OUTPATIENT
Start: 2021-03-01 | End: 2021-05-21

## 2021-03-01 RX ORDER — HYDROMORPHONE HYDROCHLORIDE 2 MG/1
2 TABLET ORAL
Qty: 30 TABLET | Refills: 0 | Status: SHIPPED | OUTPATIENT
Start: 2021-03-01 | End: 2021-05-21

## 2021-03-01 RX ADMIN — Medication 25 MCG: at 08:20

## 2021-03-01 RX ADMIN — DIPHENHYDRAMINE HYDROCHLORIDE 25 MG: 25 CAPSULE ORAL at 04:17

## 2021-03-01 RX ADMIN — POLYETHYLENE GLYCOL 3350 17 G: 17 POWDER, FOR SOLUTION ORAL at 08:20

## 2021-03-01 RX ADMIN — HYDROMORPHONE HYDROCHLORIDE 2 MG: 2 TABLET ORAL at 12:55

## 2021-03-01 RX ADMIN — HYDROMORPHONE HYDROCHLORIDE 2 MG: 2 TABLET ORAL at 09:35

## 2021-03-01 RX ADMIN — DIPHENHYDRAMINE HYDROCHLORIDE 25 MG: 25 CAPSULE ORAL at 11:47

## 2021-03-01 RX ADMIN — ACETAMINOPHEN 650 MG: 325 TABLET, FILM COATED ORAL at 04:17

## 2021-03-01 RX ADMIN — DOCUSATE SODIUM 50 MG AND SENNOSIDES 8.6 MG 1 TABLET: 8.6; 5 TABLET, FILM COATED ORAL at 08:20

## 2021-03-01 ASSESSMENT — ACTIVITIES OF DAILY LIVING (ADL)
ADLS_ACUITY_SCORE: 15
ADLS_ACUITY_SCORE: 13
ADLS_ACUITY_SCORE: 15
ADLS_ACUITY_SCORE: 15

## 2021-03-01 NOTE — PROGRESS NOTES
A&O x 4. VSS on RA. Clear bilateral lungs. Tolerates diet. Voiding adequate, BS active +flatus. BM this AM. Transverse abdominal lower incision, liquid bandage. 4 MISTY's, #4 sangeinous & #3 JPs serosanguineous. Pedal pulses palpable, sensation intact. No edema on BLE and upper extremities. IV saline locked in forearm. Dilaudid given 2mg PO at 0930. Pt. Up SBA to BR. Discharge paperwork gone through w/ patient.  Pt. Given discharge medications.  Pt. Discharged to home w/ mother.

## 2021-03-01 NOTE — PROGRESS NOTES
Plastic Surgery    Hgb taken at 12 hours and hgb stable at 9.3 suggesting high drain output is mostly inflammatory serous fluid  Will check again at 6 am.

## 2021-03-01 NOTE — PLAN OF CARE
Alert & oriented x4. VSS. O2Sat 96% on RA. Clear bilateral breath sound. IS 1500ml x 10 reps. Tolerating regular diet, (-) nv. Voiding adequately, BS x4 quadrant, +flatus. SBA. Transverse incision on lower abdomen, liquid bandage & [place]. 4 MISTY's, #4 sangueinous & 3 MISTY's serosanguineous. Pedal pulses palpable, sensation intact and no edema on BLE. IV saline locked on R forearm.     Itchy rash Rash on L upper back   & L lateral thigh near to MISTY sites. BenadryL prn PO 2x. Pain 5/10 tylenol. Patient concern of allergic reaction to norco. MD notified at 2300h. Ordered dilaudid oral PRN for pain.    0423h: BP 82/54 mmHg, denies light headedness, -bleeding noted from surgical site, legs elevated & encouraged fluid intake  0515h: BP 90/51 mmHg

## 2021-03-01 NOTE — DISCHARGE SUMMARY
"Discharge Summary    Ana Wymna MRN# [id number]   YOB: 1970 Age: 51 year old     Date of Admission:  2/25/2021  Date of Discharge:  3/1/2021  Admitting Physician:  Wade Marquez MD  Discharge Physician:  Wade Marquez MD  Discharging Service:  Plastic and Reconstructive Surgery     Primary Provider: Kelly Bedoya          Admission Diagnoses:   Hypertrophic condition of skin [L91.9]  Symptomatic abdominal panniculus [E65]          Discharge Diagnosis:     same             Discharge Disposition:     Discharged to home             Condition on Discharge:     Discharge condition: Stable   Discharge vitals: Blood pressure 91/58, pulse 69, temperature 98  F (36.7  C), temperature source Oral, resp. rate 16, height 1.651 m (5' 5\"), weight 103.1 kg (227 lb 6.4 oz), last menstrual period 10/01/2016, SpO2 98 %, not currently breastfeeding.   Code status on discharge: Full Code           Procedures / Labs / Imaging:   Panniculectomy upgraded to belt lipectomy; evacuation of heamtoma          Medications Prior to Admission:     Medications Prior to Admission   Medication Sig Dispense Refill Last Dose     albuterol (PROAIR HFA/PROVENTIL HFA/VENTOLIN HFA) 108 (90 Base) MCG/ACT inhaler Inhale 2 puffs into the lungs every 4 hours as needed for shortness of breath / dyspnea or wheezing 1 Inhaler 0 MORE THAN 1 YEAR at PRN     BIOTIN PO Take 5,000 mcg by mouth daily At noon   1 WEEK AGO     Calcium Citrate-Vitamin D (CALCIUM CITRATE CHEWY BITE PO) Take 500 mg by mouth 3 times daily Plus D,noon, supper, HS   1 WEEK AGO     childrens multivitamin w/iron (FLINTSTONES COMPLETE) 60 MG chewable tablet Take 2 chew tab by mouth every evening    1 WEEK AGO     lifitegrast (XIIDRA) 5 % opthalmic solution Place 1 drop into both eyes daily        metFORMIN (GLUCOPHAGE-XR) 500 MG 24 hr tablet Take 500 mg by mouth every morning   2/23/2021 at AM     nitroGLYcerin (NITRO-BID) 2 % OINT ointment Place 0.5 " inches onto the skin daily as needed (Apply 0.5in to tips of fingers once daily prior to cold exposure)   MORE THAN 1 WEEK at PRN     nystatin (MYCOSTATIN) 832554 UNIT/GM external cream Apply topically daily as needed for dry skin   MORE THAN 1 MONTH at PRN     polyethylene glycol (MIRALAX/GLYCOLAX) packet Take 17 g by mouth daily 90 packet 3 2/24/2021 at Unknown time     triamcinolone (KENALOG) 0.1 % external cream Apply topically 2 times daily as needed for irritation   MORE THAN 1 MONTH at PRN     VICTOZA PEN 18 MG/3ML soln INJECT 1.2MG UNDER THE SKIN DAILY. CAN INCREASE TO 1.8MG IF HUNGER INCREASES. (Patient taking differently: Inject 1.8 mg Subcutaneous daily ) 9 mL 1 2/21/2021     vitamin (B COMPLEX-C) tablet Take 1 tablet by mouth every evening    1 WEEK AGO     vitamin B-12 (CYANOCOBALAMIN) 2500 MCG sublingual tablet Take 1,250 mcg by mouth twice a week    1 WEEK AGO     Vitamin D3 (CHOLECALCIFEROL) 25 mcg (1000 units) tablet Take 1 capsule by mouth every morning    1 WEEK AGO     insulin pen needle (31G X 6 MM) 31G X 6 MM miscellaneous Use 1 pen needles daily 100 each 11              Discharge Medications:     Current Discharge Medication List      START taking these medications    Details   HYDROmorphone (DILAUDID) 2 MG tablet Take 1 tablet (2 mg) by mouth every 3 hours as needed for moderate to severe pain  Qty: 30 tablet, Refills: 0    Associated Diagnoses: Symptomatic abdominal panniculus      methocarbamol (ROBAXIN) 750 MG tablet Take 1 tablet (750 mg) by mouth every 6 hours as needed for muscle spasms  Qty: 50 tablet, Refills: 1    Associated Diagnoses: Symptomatic abdominal panniculus         CONTINUE these medications which have NOT CHANGED    Details   albuterol (PROAIR HFA/PROVENTIL HFA/VENTOLIN HFA) 108 (90 Base) MCG/ACT inhaler Inhale 2 puffs into the lungs every 4 hours as needed for shortness of breath / dyspnea or wheezing  Qty: 1 Inhaler, Refills: 0    Comments: Pharmacy may dispense brand  covered by insurance (Proair, or proventil or ventolin or generic albuterol inhaler)  Associated Diagnoses: Mild intermittent reactive airway disease with acute exacerbation; Bronchitis      BIOTIN PO Take 5,000 mcg by mouth daily At noon      Calcium Citrate-Vitamin D (CALCIUM CITRATE CHEWY BITE PO) Take 500 mg by mouth 3 times daily Plus D,noon, supper, HS      childrens multivitamin w/iron (FLINTSTONES COMPLETE) 60 MG chewable tablet Take 2 chew tab by mouth every evening       lifitegrast (XIIDRA) 5 % opthalmic solution Place 1 drop into both eyes daily      metFORMIN (GLUCOPHAGE-XR) 500 MG 24 hr tablet Take 500 mg by mouth every morning      nitroGLYcerin (NITRO-BID) 2 % OINT ointment Place 0.5 inches onto the skin daily as needed (Apply 0.5in to tips of fingers once daily prior to cold exposure)      nystatin (MYCOSTATIN) 083998 UNIT/GM external cream Apply topically daily as needed for dry skin      polyethylene glycol (MIRALAX/GLYCOLAX) packet Take 17 g by mouth daily  Qty: 90 packet, Refills: 3    Associated Diagnoses: Slow transit constipation      triamcinolone (KENALOG) 0.1 % external cream Apply topically 2 times daily as needed for irritation      VICTOZA PEN 18 MG/3ML soln INJECT 1.2MG UNDER THE SKIN DAILY. CAN INCREASE TO 1.8MG IF HUNGER INCREASES.  Qty: 9 mL, Refills: 1    Associated Diagnoses: Class 2 severe obesity due to excess calories with serious comorbidity and body mass index (BMI) of 37.0 to 37.9 in adult (H); Insulin resistance      vitamin (B COMPLEX-C) tablet Take 1 tablet by mouth every evening       vitamin B-12 (CYANOCOBALAMIN) 2500 MCG sublingual tablet Take 1,250 mcg by mouth twice a week       Vitamin D3 (CHOLECALCIFEROL) 25 mcg (1000 units) tablet Take 1 capsule by mouth every morning       insulin pen needle (31G X 6 MM) 31G X 6 MM miscellaneous Use 1 pen needles daily  Qty: 100 each, Refills: 11    Associated Diagnoses: Class 2 severe obesity due to excess calories with serious  comorbidity and body mass index (BMI) of 37.0 to 37.9 in adult (H); Insulin resistance                   Consultations:     Hospitalist              Hospital Course:     This is a 50 yo female x/p significant weight loss and a symptomatic panniculus.  She underwent a panniculectomy and upgraded this to a full belt lipectomy. Over 16 lbs of skin was removed.  Postoperatively, she had an expanding hematoma on the left hip requiring surgical evacuation.  Her drain output remained high there which is typical and her hgb drifted down with hydration.  This stabliized after 2 U PRBCs at 9.0.  Her drain output began trending down and turned more serous without evidence of ongoing bleeding.               Significant Results:     None             Pending Results:     None           Discharge Instructions and Follow-Up:     See discharge orders.

## 2021-03-01 NOTE — PROGRESS NOTES
"Plastic Surgery POD #4    Feeling ok.  BP 90/51 (BP Location: Left arm)   Pulse 74   Temp 98.1  F (36.7  C) (Oral)   Resp 16   Ht 1.651 m (5' 5\")   Wt 103.1 kg (227 lb 6.4 oz)   LMP 10/01/2016 (Approximate)   SpO2 97%   BMI 37.84 kg/m    JPs trending down.  #4 more serous today  Alert  No hematoma  Skin healthy  Calves soft and nontender    Hgb pending this am, but last 2 have been stable and drain output improving  Anticipate discharge later today  Light activity, but ambulate several times a day  Ok to shower  "

## 2021-03-01 NOTE — PLAN OF CARE
A&Ox4.  BP stable at 90's systolic.  Afebrile, on RA.  Pain managed with Norco and Robaxin.  LS clear.  Pulmonary hygiene done independently.   Appetite fair/good.  BS active passing gas, no BM this shift.  Voiding w/o difficulty.  Incision with liquid bandage, CDI, ABD pad with binder in place.  MISTY x 4, stripped per order.  MISTY #4 requires emptying more frequent than the other 3.  Up with 1 assist, GB & walker.  Hgb stable.

## 2021-03-02 ENCOUNTER — TELEPHONE (OUTPATIENT)
Dept: PEDIATRICS | Facility: CLINIC | Age: 51
End: 2021-03-02

## 2021-03-02 LAB — INTERPRETATION ECG - MUSE: NORMAL

## 2021-03-02 NOTE — TELEPHONE ENCOUNTER
"ED / Discharge Outreach Protocol    Patient Contact    Attempt # 1    Was call answered?  Yes.  \"May I please speak with Ana\"  Is patient available?   Yes    Hospital/TCU/ED for chronic condition Discharge Protocol    \"Hi, my name is Britney Simental RN, a registered nurse, and I am calling from [facility] [place].  I am calling to follow up and see how things are going for you after your recent emergency visit/hospital/TCU stay.\"    Tell me how you are doing now that you are home?\" Doing well since arriving home. Pain is well controlled. Taking PO Dilaudid which is keeping pain in a tolerable range. Taking Miralax and Dulcolax to keep bowels moving. Had two BM's yesterday -- were somewhat loose. No BM's today. Changed dressing this morning. Incision looks good. Minimal amounts of drainage. No signs of infection. Has 4 MISTY's, all draining normally. #4 has the highest output -- bloody drainage. 1-3 are all serosanguinous, small to moderate output. No signs of infection at drain insertion site. The Pt is doing drain cares, is stripping the drains and has them set to bulb suction as advised. Had a sharp pain in her upper thing muscle, only lasted a few minutes. Muscle relaxer's are helping. Went over signs and symptoms of DVT and what to look out for.       Discharge Instructions    \"Let's review your discharge instructions.  What is/are the follow-up recommendations?  Pt. Response: Will follow up with surgeons office on Friday.     \"Has an appointment with your primary care provider been scheduled?\"   Seeing surgeon on Friday.     \"When you see the provider, I would recommend that you bring your medications with you.\"    Medications    \"Tell me what changed about your medicines when you discharged?\"    Changes to chronic meds?    0-1    \"What questions do you have about your medications?\"    None     New diagnoses of heart failure, COPD, diabetes, or MI?    No              Post Discharge Medication " "Reconciliation Status: discharge medications reconciled, continue medications without change.    Was MTM referral placed (*Make sure to put transitions as reason for referral)?   No    Call Summary    \"What questions or concerns do you have about your recent visit and your follow-up care?\"     none    \"If you have questions or things don't continue to improve, we encourage you contact us through the main clinic number (give number).  Even if the clinic is not open, triage nurses are available 24/7 to help you.     We would like you to know that our clinic has extended hours (provide information).  We also have urgent care (provide details on closest location and hours/contact info)\"      \"Thank you for your time and take care!\"    Britney Simental RN   [facility] -- Triage Nurse               "

## 2021-03-02 NOTE — TELEPHONE ENCOUNTER
Please contact patient for In-patient follow up.  [phone] (home) [phone] (work)    Visit date: 030121  Diagnosis listed: Symptomatic Abdominal Panniculus, Symptomatic Abdominal Panniculus, Hypertrophic Condition Of Skin  Number of visits in past 12 months: 0 ED / 1 IP

## 2021-03-02 NOTE — TELEPHONE ENCOUNTER
Dr. Bedoya:    No concerns about the Pt's recovery, however she is scheduled to get her Covid vaccine next Tuesday and she is wondering if this would be ok with her recovering from surgery. I advised her to keep her appointment for her vaccine until she hears back from us.     Britney Simental, RN   [facility] -- Triage Nurse

## 2021-03-02 NOTE — TELEPHONE ENCOUNTER
I would recommend following through with her vaccine as planned.    Kelly Bedoya MD  Internal Medicine - Pediatrics

## 2021-03-03 LAB
BACTERIA SPEC CULT: NO GROWTH
BACTERIA SPEC CULT: NO GROWTH
SPECIMEN SOURCE: NORMAL
SPECIMEN SOURCE: NORMAL

## 2021-03-05 ENCOUNTER — APPOINTMENT (OUTPATIENT)
Dept: ULTRASOUND IMAGING | Facility: CLINIC | Age: 51
End: 2021-03-05
Attending: EMERGENCY MEDICINE
Payer: COMMERCIAL

## 2021-03-05 ENCOUNTER — HOSPITAL ENCOUNTER (EMERGENCY)
Facility: CLINIC | Age: 51
Discharge: HOME | End: 2021-03-05
Attending: EMERGENCY MEDICINE | Admitting: EMERGENCY MEDICINE
Payer: COMMERCIAL

## 2021-03-05 VITALS
HEART RATE: 89 BPM | TEMPERATURE: 97.9 F | SYSTOLIC BLOOD PRESSURE: 102 MMHG | OXYGEN SATURATION: 96 % | RESPIRATION RATE: 18 BRPM | DIASTOLIC BLOOD PRESSURE: 66 MMHG

## 2021-03-05 DIAGNOSIS — R10.32 LEFT GROIN PAIN: ICD-10-CM

## 2021-03-05 LAB
ABO + RH BLD: NORMAL
ABO + RH BLD: NORMAL
ANION GAP SERPL CALCULATED.3IONS-SCNC: 3 MMOL/L (ref 3–14)
BASOPHILS # BLD AUTO: 0 10E9/L (ref 0–0.2)
BASOPHILS NFR BLD AUTO: 0.2 %
BLD GP AB SCN SERPL QL: NORMAL
BLOOD BANK CMNT PATIENT-IMP: NORMAL
BUN SERPL-MCNC: 15 MG/DL (ref 7–30)
CALCIUM SERPL-MCNC: 8.7 MG/DL (ref 8.5–10.1)
CHLORIDE SERPL-SCNC: 111 MMOL/L (ref 94–109)
CO2 SERPL-SCNC: 29 MMOL/L (ref 20–32)
CREAT SERPL-MCNC: 0.72 MG/DL (ref 0.52–1.04)
DIFFERENTIAL METHOD BLD: ABNORMAL
EOSINOPHIL # BLD AUTO: 0.4 10E9/L (ref 0–0.7)
EOSINOPHIL NFR BLD AUTO: 5.1 %
ERYTHROCYTE [DISTWIDTH] IN BLOOD BY AUTOMATED COUNT: 13.7 % (ref 10–15)
GFR SERPL CREATININE-BSD FRML MDRD: >90 ML/MIN/{1.73_M2}
GLUCOSE SERPL-MCNC: 101 MG/DL (ref 70–99)
HCT VFR BLD AUTO: 33 % (ref 35–47)
HGB BLD-MCNC: 10.2 G/DL (ref 11.7–15.7)
IMM GRANULOCYTES # BLD: 0 10E9/L (ref 0–0.4)
IMM GRANULOCYTES NFR BLD: 0.2 %
LYMPHOCYTES # BLD AUTO: 1.5 10E9/L (ref 0.8–5.3)
LYMPHOCYTES NFR BLD AUTO: 18.3 %
MCH RBC QN AUTO: 30.3 PG (ref 26.5–33)
MCHC RBC AUTO-ENTMCNC: 30.9 G/DL (ref 31.5–36.5)
MCV RBC AUTO: 98 FL (ref 78–100)
MONOCYTES # BLD AUTO: 0.6 10E9/L (ref 0–1.3)
MONOCYTES NFR BLD AUTO: 7.1 %
NEUTROPHILS # BLD AUTO: 5.6 10E9/L (ref 1.6–8.3)
NEUTROPHILS NFR BLD AUTO: 69.1 %
NRBC # BLD AUTO: 0 10*3/UL
NRBC BLD AUTO-RTO: 0 /100
PLATELET # BLD AUTO: 423 10E9/L (ref 150–450)
POTASSIUM SERPL-SCNC: 3.9 MMOL/L (ref 3.4–5.3)
RBC # BLD AUTO: 3.37 10E12/L (ref 3.8–5.2)
SODIUM SERPL-SCNC: 143 MMOL/L (ref 133–144)
SPECIMEN EXP DATE BLD: NORMAL
WBC # BLD AUTO: 8.1 10E9/L (ref 4–11)

## 2021-03-05 PROCEDURE — 86850 RBC ANTIBODY SCREEN: CPT | Performed by: EMERGENCY MEDICINE

## 2021-03-05 PROCEDURE — 80048 BASIC METABOLIC PNL TOTAL CA: CPT | Performed by: EMERGENCY MEDICINE

## 2021-03-05 PROCEDURE — 86900 BLOOD TYPING SEROLOGIC ABO: CPT | Performed by: EMERGENCY MEDICINE

## 2021-03-05 PROCEDURE — 96360 HYDRATION IV INFUSION INIT: CPT

## 2021-03-05 PROCEDURE — 258N000003 HC RX IP 258 OP 636: Performed by: EMERGENCY MEDICINE

## 2021-03-05 PROCEDURE — 93005 ELECTROCARDIOGRAM TRACING: CPT

## 2021-03-05 PROCEDURE — 85025 COMPLETE CBC W/AUTO DIFF WBC: CPT | Performed by: EMERGENCY MEDICINE

## 2021-03-05 PROCEDURE — 99285 EMERGENCY DEPT VISIT HI MDM: CPT | Mod: 25

## 2021-03-05 PROCEDURE — 86901 BLOOD TYPING SEROLOGIC RH(D): CPT | Performed by: EMERGENCY MEDICINE

## 2021-03-05 PROCEDURE — 93971 EXTREMITY STUDY: CPT | Mod: LT

## 2021-03-05 RX ADMIN — SODIUM CHLORIDE, POTASSIUM CHLORIDE, SODIUM LACTATE AND CALCIUM CHLORIDE 500 ML: 600; 310; 30; 20 INJECTION, SOLUTION INTRAVENOUS at 12:57

## 2021-03-05 ASSESSMENT — ENCOUNTER SYMPTOMS
WOUND: 1
LIGHT-HEADEDNESS: 1
DIZZINESS: 1
MYALGIAS: 1

## 2021-03-05 NOTE — ED PROVIDER NOTES
History   Chief Complaint:  Post-op Problem       HPI   Ana Wyman is a 51 year old female with recent history of panniculectomy on 2/25 with subsequent left trunk hematoma evacuation with drain placement on 2/25 who presents with post op problem. The patient states that today she has been experiencing increases left thigh and groin pain. She notes a decreased output of drainage on her left sided drain. She also notes some lightheadedness and dizziness. She has not taken any medication for pain today.     Review of Systems   Musculoskeletal: Positive for myalgias.   Skin: Positive for wound.   Neurological: Positive for dizziness and light-headedness.   All other systems reviewed and are negative.    Allergies:  Nsaids  Clindamycin  Codeine  Shellfish Allergy    Medications:  Albuterol   Dilaudid  Metformin   Robaxin   Nitrostat    Past Medical History:    Dysfunctional uterine bleeding  Fibroids   GERD  kidney stone  Morbid obesity   asthma     Past Surgical History:    Gastric bypass   I&D trunk   Cholecystectomy   Orthopedic surgery X5   Panniculectomy   Vein surgery      Family History:    CAD- father, mother   Obesity- father   Stomach cancer- father   Hyperlipidemia- father, mother, brother   Hypertension- father, mother, brother    CVD- father   Diabetes- mother   Breast cancer- mother     Social History:       Physical Exam     Patient Vitals for the past 24 hrs:   BP Temp Temp src Pulse Resp SpO2   03/05/21 1226 91/63 97.9  F (36.6  C) Temporal 90 18 98 %       Physical Exam      Eyes: periorbital tissue and sclera normal  Neck: supple  CV: ppi, regular   Resp: speaking in full sentences without any resp distress  Abd: abdomen is soft without significant tenderness, masses, organomegaly or guarding     - lower abd horizontal incision c/d/i, extends to midline L spine, MISTY drain sites LLQ and RLQ minimal surrounding erythema, no purulent drainage     Ext: peripheral edema present:  No  Skin:  warm dry well perfused  Neuro: Alert, no gross motor or sensory deficits,    Emergency Department Course   ECG  ECG taken at 1234, ECG read at 1246  Normal sinus rhythm      Normal EKG as compared to prior, dated 2021.  Rate 81 bpm. MO interval 148 ms. QRS duration 90 ms. QT/QTc 360/418 ms. P-R-T axes 53 -20 -7.     Imaging:    US lower extremity venous duplex left:  No DVT demonstrated   Reading per radiology    Laboratory:    CBC: WBC 8.1, HGB 10.2(L),      BMP: Cl 111 (H) glucose 101 (H)  o/w WNL (Creatinine 0.72)     ABO RH Type and Screen: O, antibody +    Procedures    Emergency Department Course:    Reviewed:  I reviewed nursing notes, vitals, past medical history and care everywhere    Assessments:  1235 I obtained history and examined the patient as noted above.     1253 I rechecked the patient and discussed consult and plan of care.      1403 I returned to check on patient.  I updated patient on findings prior to discharge.     Consults:   1252 I spoke with Dr. Marquez of plastic surgery regarding patient's presentation, findings, and plan of care.     Interventions:  1257  mL IV     Disposition:  The patient was discharged to home.     Impression & Plan     CMS Diagnoses: None    Medical Decision Makin-year-old female who recently had panniculectomy here with left groin pain and when I speak with her plastic surgeon primary concern is for a left leg DVT.  Has indwelling drains though sites do not appear to be infected she continues to have serosanguineous drainage from these.  Her hemoglobin is actually higher than last checked on  no indication for transfusion.  DVT ultrasound was negative.  Her abdomen overall is soft her incision looks to be healing appropriately.  Safe and stable for discharge home.  She understands there is no DVT no concern for wound infection or significant hematoma at this time.    Covid-19  Ana Wyman was evaluated during a global COVID-19  pandemic, which necessitated consideration that the patient might be at risk for infection with the SARS-CoV-2 virus that causes COVID-19.   Applicable protocols for evaluation were followed during the patient's care.   COVID-19 was considered as part of the patient's evaluation.     Diagnosis:    ICD-10-CM    1. Left groin pain  R10.32        Discharge Medications:  New Prescriptions    No medications on file       Scribe Disclosure:  I, Arcelia Walker, am serving as a scribe at 12:35 PM on 3/5/2021 to document services personally performed by Stanley Shaw MD based on my observations and the provider's statements to me.            Stanley Shaw MD  03/05/21 5881

## 2021-03-05 NOTE — ED NOTES
Patient discharged to home. Patient received discharge instructions and has no other questions at this time.

## 2021-03-05 NOTE — ED TRIAGE NOTES
Patient presents with complaints of left groin pain which has been ongoing for the past few days. Patient has a abdominal Panniculus done one 2/25. She states she has also been having hemoglobin as well. Patient is also dizzy and lightheaded as well. ABC intact without need for intervention at this time.

## 2021-03-05 NOTE — DISCHARGE INSTRUCTIONS
Return to ER immediately if you develop: worsening swelling or pain, Fever > 101, persistent nausea or vomiting OR you have any other concerns about your health.

## 2021-03-07 LAB — INTERPRETATION ECG - MUSE: NORMAL

## 2021-03-08 ENCOUNTER — TELEPHONE (OUTPATIENT)
Dept: PEDIATRICS | Facility: CLINIC | Age: 51
End: 2021-03-08

## 2021-03-08 NOTE — LETTER
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   As a result of the [place] [employer] s designation of unpaid health care workers as part of phase 1a, third priority group for COVID-19 vaccination in our [facility], this person is eligible to receive COVID-19 vaccine.     Because there is no employer to speak for them, I am providing this letter to attest that they are currently providing unpaid home health care services and therefore are eligible to vaccination as part of [place] s phase 1a.     If you have any questions, please contact me at [phone].         Sincerely,      Juliann Henson MD

## 2021-03-08 NOTE — TELEPHONE ENCOUNTER
Patient is unpaid caregiver for elderly parent. Patient is scheduled for covid vaccine. Letter sent.       LU MONROE MA on 3/8/2021 at 11:35 AM

## 2021-03-09 ENCOUNTER — IMMUNIZATION (OUTPATIENT)
Dept: NURSING | Facility: CLINIC | Age: 51
End: 2021-03-09
Payer: COMMERCIAL

## 2021-03-09 PROCEDURE — 0001A PR COVID VAC PFIZER DIL RECON 30 MCG/0.3 ML IM: CPT

## 2021-03-09 PROCEDURE — 91300 PR COVID VAC PFIZER DIL RECON 30 MCG/0.3 ML IM: CPT

## 2021-03-10 ENCOUNTER — MYC MEDICAL ADVICE (OUTPATIENT)
Dept: PEDIATRICS | Facility: CLINIC | Age: 51
End: 2021-03-10

## 2021-03-16 ENCOUNTER — MYC MEDICAL ADVICE (OUTPATIENT)
Dept: PEDIATRICS | Facility: CLINIC | Age: 51
End: 2021-03-16

## 2021-03-16 ENCOUNTER — APPOINTMENT (OUTPATIENT)
Dept: ULTRASOUND IMAGING | Facility: CLINIC | Age: 51
End: 2021-03-16
Attending: EMERGENCY MEDICINE
Payer: COMMERCIAL

## 2021-03-16 ENCOUNTER — HOSPITAL ENCOUNTER (EMERGENCY)
Facility: CLINIC | Age: 51
Discharge: HOME | End: 2021-03-16
Attending: EMERGENCY MEDICINE | Admitting: EMERGENCY MEDICINE
Payer: COMMERCIAL

## 2021-03-16 ENCOUNTER — APPOINTMENT (OUTPATIENT)
Dept: CT IMAGING | Facility: CLINIC | Age: 51
End: 2021-03-16
Attending: EMERGENCY MEDICINE
Payer: COMMERCIAL

## 2021-03-16 VITALS
DIASTOLIC BLOOD PRESSURE: 64 MMHG | TEMPERATURE: 97.9 F | SYSTOLIC BLOOD PRESSURE: 106 MMHG | OXYGEN SATURATION: 98 % | RESPIRATION RATE: 16 BRPM | HEART RATE: 86 BPM

## 2021-03-16 DIAGNOSIS — R05.9 COUGH: ICD-10-CM

## 2021-03-16 DIAGNOSIS — Z98.890 HISTORY OF ABDOMINAL SURGERY: ICD-10-CM

## 2021-03-16 DIAGNOSIS — Z20.822 SUSPECTED COVID-19 VIRUS INFECTION: Primary | ICD-10-CM

## 2021-03-16 DIAGNOSIS — R06.02 SOB (SHORTNESS OF BREATH): ICD-10-CM

## 2021-03-16 LAB
ALBUMIN SERPL-MCNC: 2.7 G/DL (ref 3.4–5)
ALBUMIN UR-MCNC: NEGATIVE MG/DL
ALP SERPL-CCNC: 85 U/L (ref 40–150)
ALT SERPL W P-5'-P-CCNC: 20 U/L (ref 0–50)
ANION GAP SERPL CALCULATED.3IONS-SCNC: 8 MMOL/L (ref 3–14)
APPEARANCE UR: CLEAR
AST SERPL W P-5'-P-CCNC: 14 U/L (ref 0–45)
BASOPHILS # BLD AUTO: 0.1 10E9/L (ref 0–0.2)
BASOPHILS NFR BLD AUTO: 0.7 %
BILIRUB SERPL-MCNC: 0.2 MG/DL (ref 0.2–1.3)
BILIRUB UR QL STRIP: NEGATIVE
BUN SERPL-MCNC: 11 MG/DL (ref 7–30)
CALCIUM SERPL-MCNC: 8.6 MG/DL (ref 8.5–10.1)
CHLORIDE SERPL-SCNC: 105 MMOL/L (ref 94–109)
CO2 SERPL-SCNC: 27 MMOL/L (ref 20–32)
COLOR UR AUTO: NORMAL
CREAT SERPL-MCNC: 0.73 MG/DL (ref 0.52–1.04)
DIFFERENTIAL METHOD BLD: ABNORMAL
EOSINOPHIL # BLD AUTO: 0.5 10E9/L (ref 0–0.7)
EOSINOPHIL NFR BLD AUTO: 5.4 %
ERYTHROCYTE [DISTWIDTH] IN BLOOD BY AUTOMATED COUNT: 13.4 % (ref 10–15)
FLUAV RNA RESP QL NAA+PROBE: NEGATIVE
FLUBV RNA RESP QL NAA+PROBE: NEGATIVE
GFR SERPL CREATININE-BSD FRML MDRD: >90 ML/MIN/{1.73_M2}
GLUCOSE SERPL-MCNC: 137 MG/DL (ref 70–99)
GLUCOSE UR STRIP-MCNC: NEGATIVE MG/DL
HCT VFR BLD AUTO: 36.6 % (ref 35–47)
HGB BLD-MCNC: 11 G/DL (ref 11.7–15.7)
HGB UR QL STRIP: NEGATIVE
IMM GRANULOCYTES # BLD: 0 10E9/L (ref 0–0.4)
IMM GRANULOCYTES NFR BLD: 0.3 %
KETONES UR STRIP-MCNC: NEGATIVE MG/DL
LABORATORY COMMENT REPORT: NORMAL
LACTATE BLD-SCNC: 1.4 MMOL/L (ref 0.7–2)
LEUKOCYTE ESTERASE UR QL STRIP: NEGATIVE
LIPASE SERPL-CCNC: 232 U/L (ref 73–393)
LYMPHOCYTES # BLD AUTO: 2.2 10E9/L (ref 0.8–5.3)
LYMPHOCYTES NFR BLD AUTO: 24.4 %
MCH RBC QN AUTO: 29.6 PG (ref 26.5–33)
MCHC RBC AUTO-ENTMCNC: 30.1 G/DL (ref 31.5–36.5)
MCV RBC AUTO: 98 FL (ref 78–100)
MONOCYTES # BLD AUTO: 0.6 10E9/L (ref 0–1.3)
MONOCYTES NFR BLD AUTO: 6.8 %
NEUTROPHILS # BLD AUTO: 5.7 10E9/L (ref 1.6–8.3)
NEUTROPHILS NFR BLD AUTO: 62.4 %
NITRATE UR QL: NEGATIVE
NRBC # BLD AUTO: 0 10*3/UL
NRBC BLD AUTO-RTO: 0 /100
NT-PROBNP SERPL-MCNC: 119 PG/ML (ref 0–900)
PH UR STRIP: 6 PH (ref 5–7)
PLATELET # BLD AUTO: 411 10E9/L (ref 150–450)
POTASSIUM SERPL-SCNC: 3.8 MMOL/L (ref 3.4–5.3)
PROT SERPL-MCNC: 6.5 G/DL (ref 6.8–8.8)
RBC # BLD AUTO: 3.72 10E12/L (ref 3.8–5.2)
RBC #/AREA URNS AUTO: 0 /HPF (ref 0–2)
RSV RNA SPEC QL NAA+PROBE: NORMAL
SARS-COV-2 RNA RESP QL NAA+PROBE: NEGATIVE
SODIUM SERPL-SCNC: 140 MMOL/L (ref 133–144)
SOURCE: NORMAL
SP GR UR STRIP: 1 (ref 1–1.03)
SPECIMEN SOURCE: NORMAL
TROPONIN I SERPL-MCNC: <0.015 UG/L (ref 0–0.04)
UROBILINOGEN UR STRIP-MCNC: NORMAL MG/DL (ref 0–2)
WBC # BLD AUTO: 9.1 10E9/L (ref 4–11)
WBC #/AREA URNS AUTO: 2 /HPF (ref 0–5)

## 2021-03-16 PROCEDURE — 84484 ASSAY OF TROPONIN QUANT: CPT | Performed by: EMERGENCY MEDICINE

## 2021-03-16 PROCEDURE — 96361 HYDRATE IV INFUSION ADD-ON: CPT

## 2021-03-16 PROCEDURE — 93971 EXTREMITY STUDY: CPT | Mod: RT

## 2021-03-16 PROCEDURE — 81001 URINALYSIS AUTO W/SCOPE: CPT | Performed by: EMERGENCY MEDICINE

## 2021-03-16 PROCEDURE — 83880 ASSAY OF NATRIURETIC PEPTIDE: CPT | Performed by: EMERGENCY MEDICINE

## 2021-03-16 PROCEDURE — 85025 COMPLETE CBC W/AUTO DIFF WBC: CPT | Performed by: EMERGENCY MEDICINE

## 2021-03-16 PROCEDURE — 99285 EMERGENCY DEPT VISIT HI MDM: CPT | Mod: 25

## 2021-03-16 PROCEDURE — 250N000011 HC RX IP 250 OP 636: Performed by: EMERGENCY MEDICINE

## 2021-03-16 PROCEDURE — 83690 ASSAY OF LIPASE: CPT | Performed by: EMERGENCY MEDICINE

## 2021-03-16 PROCEDURE — 71275 CT ANGIOGRAPHY CHEST: CPT

## 2021-03-16 PROCEDURE — 96360 HYDRATION IV INFUSION INIT: CPT

## 2021-03-16 PROCEDURE — 250N000009 HC RX 250: Performed by: EMERGENCY MEDICINE

## 2021-03-16 PROCEDURE — 83605 ASSAY OF LACTIC ACID: CPT | Performed by: EMERGENCY MEDICINE

## 2021-03-16 PROCEDURE — 80053 COMPREHEN METABOLIC PANEL: CPT | Performed by: EMERGENCY MEDICINE

## 2021-03-16 PROCEDURE — 258N000003 HC RX IP 258 OP 636: Performed by: EMERGENCY MEDICINE

## 2021-03-16 PROCEDURE — 87636 SARSCOV2 & INF A&B AMP PRB: CPT | Performed by: EMERGENCY MEDICINE

## 2021-03-16 RX ORDER — IOPAMIDOL 755 MG/ML
500 INJECTION, SOLUTION INTRAVASCULAR ONCE
Status: COMPLETED | OUTPATIENT
Start: 2021-03-16 | End: 2021-03-16

## 2021-03-16 RX ADMIN — IOPAMIDOL 72 ML: 755 INJECTION, SOLUTION INTRAVENOUS at 19:46

## 2021-03-16 RX ADMIN — SODIUM CHLORIDE 1000 ML: 9 INJECTION, SOLUTION INTRAVENOUS at 19:29

## 2021-03-16 RX ADMIN — SODIUM CHLORIDE 90 ML: 9 INJECTION, SOLUTION INTRAVENOUS at 19:46

## 2021-03-16 ASSESSMENT — ENCOUNTER SYMPTOMS
COUGH: 1
MYALGIAS: 1
ABDOMINAL DISTENTION: 1
DIARRHEA: 0
FEVER: 1
ANAL BLEEDING: 0
UNEXPECTED WEIGHT CHANGE: 1
BLOOD IN STOOL: 0
NAUSEA: 0
VOMITING: 0
SHORTNESS OF BREATH: 1

## 2021-03-16 NOTE — ED PROVIDER NOTES
History   Chief Complaint:  Shortness of Breath, Cough, & Abdominal Swelling    The history is provided by the patient and medical records.     Ana Wyman is a 51 year old female who presents for multiple concerns, notably shortness of breath, cough, and increased abdominal swelling. She underwent a bilateral belt lipectomy and panniculectomy on 2/125/21 which was performed by Dr. Marquez of [facility]. This procedure was complicated by an abdominal hematoma which required evacuation. Due to the hematoma, she reports not being started on anticoagulation after surgery. She also had four drains placed at her surgical site and has had one removed since the surgery. Initially, she was feeling well post-operatively and had no new physical symptoms. Unfortunately, two days ago, she reports developing increasing abdominal swelling and leakage from the incision site. She also reports developing fevers with a T-max of 101 F, maintained at 99 F with regular Tylenol, two days ago along with a dry cough, body aches, and chest heaviness/congestion. Today, she met with her surgeon in clinic regarding the abdominal swelling and drain site leakage primarily and he started her on Keflex with concern for perhaps a possible infection; she has taken one dose of antibiotics thus far. She denies any increased output in the drains..     Regarding her respiratory symptoms, her surgeon told her to follow-up with either her PCP or go to the ED. She opted to message her PCP through [employer] and, based on her symptoms, her PCP referred her to the ED with concern for COVID-19 or a pulmonary embolism.     Here, the patient reports feeling the chest congestion and tightness, along with the shortness of breath and cough. She reports being sent home from surgery with an incentive spirometer and recently has only been able to do half of what she could do previously. She also reports gaining 6 pounds in the last few days which  she believes is all in the abdominal swelling. She denies any recent black or bloody stools, nor has she had any significant abdominal pain, nausea, vomiting, or diarrhea. She further denies personal history of blood clots or cancer, and she is not on hormone therapy.     Patient was evaluated in the ED on 3/5 for L. Groin pain and had a formal duplex ultrasound at that time which was negative.    Allergies:  Nsaids  Clindamycin  Codeine    Medications:    Albuterol inhaler   Dilaudid PRN (post op)  Robaxin PRN (post op)  Miralax  Victoza   Metformin     Past Medical History:    Allergic rhinitis  Esophageal reflux  Fibroids   Hallux valgus   Kidney stone   Lichen sclerosus   Bilateral lymphedema   Obesity   MIGUELITO on CPAP   Prediabetes   Asthma   Insulin resistance     Past Surgical History:    Colonoscopy - multiple   Cosmetic bilateral belt lipectomy, panniculectomy   Gastric bypass, cholecystectomy   Uterine ablation   Evacuation of hematoma    Hammertoe repair   Bunionectomy   Right hand surgery   Venous closure, bilateral legs    Family History:    Father: CAD/CABG, Cancer, Obesity, Hyperlipidemia, Hypertension  Mother: Type II diabetes, Obesity, CAD/CABG, Hyperlipidemia, Hypertension, Colon polyps, Thyroid disease, Sleep apnea     Social History:  Presents unaccompanied to the ED.   Denies tobacco use.     Review of Systems   Constitutional: Positive for fever and unexpected weight change (6 pounds in a couple days).   Respiratory: Positive for cough and shortness of breath.    Cardiovascular: Positive for chest pain.   Gastrointestinal: Positive for abdominal distention. Negative for anal bleeding, blood in stool, diarrhea, nausea and vomiting.   Musculoskeletal: Positive for myalgias.   All other systems reviewed and are negative.    Physical Exam     Patient Vitals for the past 24 hrs:   BP Temp Temp src Pulse Resp SpO2   03/16/21 2130 -- -- -- -- -- 99 %   03/16/21 2115 102/47 -- -- -- -- 98 %   03/16/21  2100 111/70 -- -- 87 -- 99 %   03/16/21 2045 -- -- -- -- -- 99 %   03/16/21 2040 110/73 -- -- 83 -- 98 %   03/16/21 2030 -- -- -- -- -- 99 %   03/16/21 2020 116/76 -- -- 88 -- 100 %   03/16/21 2015 -- -- -- -- -- 99 %   03/16/21 2000 102/61 -- -- 85 -- --   03/16/21 1930 -- -- -- -- -- 100 %   03/16/21 1915 109/65 -- -- -- -- 98 %   03/16/21 1900 105/64 -- -- 89 -- 99 %   03/16/21 1845 93/68 -- -- -- -- 98 %   03/16/21 1830 97/63 -- -- 79 -- --   03/16/21 1812 102/75 97.9  F (36.6  C) Temporal 89 16 99 %        Physical Exam  Nursing note and vitals reviewed.  Constitutional: Well nourished.   Eyes: Conjunctiva normal.  Pupils are equal, round, and reactive to light.   ENT: Nose normal. Mucous membranes pink and moist.    Neck: Normal range of motion.  CVS: Normal rate, regular rhythm.  Normal heart sounds.  No murmur. 2/2 DP pulses   Pulmonary: Lungs clear to auscultation bilaterally. No wheezes/rales/rhonchi.  GI: Obese. Abdominal binder in place and removed.  Abdomen soft. Nontender, nondistended. No rigidity or guarding.  Lower abdominal horizontal incision c/d/i, extends to midline L spine, MISTY drain sites LLQ x 2 and RLQ x1, no significant surrounding erythema/warmth. No fluctuance/purulent drainage.   MSK: No calf tenderness or swelling. R. Inguinal region with mild TTP, no overlying warmth/erythema. No inguinal hernia appreciated.   Neuro: Alert. Follows simple commands.  Skin: Skin is warm and dry. No rash noted.   Psychiatric: Normal affect.     Emergency Department Course     ECG  ECG taken at 1824  Normal sinus rhythm. Normal ECG.  Rate 83 bpm. WI interval 150 ms. QRS duration 92 ms. QT/QTc 352/413 ms. P-R-T axes 67 -18 12.     Imaging:  CT Chest (PE) Abdomen Pelvis w Contrast:  1.  Negative for pulmonary embolism.  2.  Clear lungs.  3.  No bowel obstruction or free air.  4.  Postoperative change in the anterior abdominal wall with some residual fluid along the lower abdominopelvic wall in the region of  the patient's surgical drain.  Reading per radiology.      US Lower Extremity Venous Duplex Right:  No deep venous thrombosis in the right lower extremity. Reading per radiology.      Laboratory:  CBC: WBC 9.1, HGB: 11.0 (L), PLT: 411  CMP: Glucose: 137 (H), Albumin: 2.7 (L), Protein total: 6.5 (L), o/w WNL (Creatinine: 0.73)    Troponin (Collected at 1838): <0.015   Lactic acid (Resulted at 1904): 1.4    Lipase: 232    BNP: 119    UA with microscopic: All negative    Symptomatic Influenza A/B & COVID-19 PCR Multiplex: All Negative    Emergency Department Course:    Reviewed:  I reviewed the patient's nursing notes, vitals, past medical records, and Care Everywhere.     Assessments:  1820: I performed an exam of the patient, as documented above. History obtained and plan for ED work up discussed as well.   2043: I reassessed the patient and discussed the results of the work up. She also reports feeling occasional pain in her right groin, so I offered to perform an ultrasound of the area.   2212: I updated the patient on the ultrasound results and reaffirmed discharge instructions.     Interventions:  1929: NS 1L IV bolus     Disposition:  Patient was discharged to home.     Impression & Plan      Medical Decision Making:   Ana Wyman is a 51 year old female who presents with a myriad of complaints predominately SOB/cough/chest pain and abdominal swelling. She recently underwent an abdominal procedure at the end of February. She recently was started on outpatient antibiotics, Keflex, today for possible incisional site infection. She is nontoxic on arrival and in no significant distress. She underwent an extensive work up during her time in the ED. EKG without focal ischemia or underlying arrhythmia. Screening troponin negative. She denies any active chest pain, and I have a lower suspicion for ACS. Given patient's recent surgery, she is at moderate risk for pulmonary embolism so she did undergo a formal CT  which fortunately is without evidence of pulmonary embolism, pneumonia, fluid overload, or pneumothorax. CT abdomen does note postoperative changes in the anterior abdominal wall with some residual fluid along the lower abdominal pelvic wall. Her labs are reassuring without evidence to suggest underlying infection. H/H stable. UA without infection. She was tested for COVID-19 in light of her symptoms. This resulted negative. Patient was made aware that result may be falsely negative if tested early in symptom course. I recommended to continue to monitor her symptomatology.  On repeat evaluation, patient did complain of right inguinal pain. There are no overlying signs of cellulitis. I did discuss with patient that I cannot exclude DVT and she is requesting formal ultrasound today. This was negative for DVT. Discussed with her plan for close outpatient follow-up. I did discuss that patient can continue her antibiotics as prescribed by her surgeon earlier today, however I have lower clinical suspicion for surgical site infection at this time. Regarding her dyspnea, she is not hypoxic or in significant respiratory distress, I discussed no indication for further emergent work up at this point in time, though to closely monitor her work of breathing and should her symptoms persist, she may need additional outpatient testing, including but not limited to stress echo.     Diagnosis:     ICD-10-CM    1. SOB (shortness of breath)  R06.02    2. Cough  R05    3. History of abdominal surgery  Z98.890        Scribe Disclosure:  I, Kallie Issa, am serving as a scribe on 3/16/2021 at 9:43 PM to personally document services performed by Giovanna Parada DO based on my observations and the provider's statements to me.      3/16/2021   EMERGENCY DEPARTMENT     Giovanna Parada DO  03/17/21 0728

## 2021-03-16 NOTE — ED TRIAGE NOTES
Pt presents with SOB with associated fever and cough, pt had tummy tuck on 2/25 and now having increase pain and drainage. Pt advised to also get a COVID test, did receive first dose of covid vaccine. Pt alert, oriented x3 ABCs intact

## 2021-03-17 ENCOUNTER — TELEPHONE (OUTPATIENT)
Dept: CARE COORDINATION | Facility: CLINIC | Age: 51
End: 2021-03-17

## 2021-03-17 ENCOUNTER — PATIENT OUTREACH (OUTPATIENT)
Dept: NURSING | Facility: CLINIC | Age: 51
End: 2021-03-17
Payer: COMMERCIAL

## 2021-03-17 DIAGNOSIS — Z71.89 OTHER SPECIFIED COUNSELING: ICD-10-CM

## 2021-03-17 LAB — INTERPRETATION ECG - MUSE: NORMAL

## 2021-03-17 NOTE — PROGRESS NOTES
Clinic Care Coordination Contact  Community Health Worker Initial Outreach  Spoke with patient     Chart Review: Referral from discharge report. In ED for SOB, cough, and abdominal swelling. Discharged to home, follow up with PCP in 2 days.    CHW Initial Information Gathering:  Referral Source: IP Report  Preferred Hospital: [facility]  [phone]  Current living arrangement:: Not Assessed  CHW Additional Questions  If ED/Hospital discharge, follow-up appointment scheduled as recommended?: No  Patient agreeable to assistance with scheduling?: Yes  Medication changes made following ED/Hospital discharge?: No  MyChart active?: Yes  Patient sent Social Determinants of Health questionnaire?: Yes    CHW introduced self and role with CC  Patient states that she is feeling a little better, still coughing and having congestion, fever has been regular today, body aches and tightness from fluid retention, pain upper right leg and mid section.   Patient feels she is mostly concerned about the chest tightness and coughing when talking and the fluid retention. Patient states that she just remembered she has a history of reactive airway disease, hasn't needed an inhaler for years, since surgery hasn't needed it.   She feels she has two things going on and not sure if they were connected.   Overall she feels her flu symptoms are getting better, not coughing as much, really wanting to know how to get rid of extra fluids.   ER said it was due to the surgery, surgeon said that it would drain on its own from drains placed. Patient states she gained 6 pounds over night.   She explains that ED recommended her to get another COVID test on Friday, also has appointment with surgeon on Friday at 9:45.   Planning on going back to work Monday, was feeling so good before this, started on Sunday.   CHW inquired CC support, offered communication to PCP. Patient agrees to both and wishes to see PCP on Friday if  "possible. Patient states she really just wants direction on what to do, feels she is in \"limbo\" not knowing.   CHW will send message to PCP and triage nurse team.      Patient accepts CC: Yes. Patient scheduled for assessment with RNCC on 3/23 at 10:00am. Patient noted desire to discuss care coordination support with multiple providers, understanding symptoms, etc.     FELICIANO Marshall, B.S. [facility] Care Coordination  [facility]:  [place], [place]  Phone: [phone]      "

## 2021-03-17 NOTE — DISCHARGE INSTRUCTIONS
You can continue taking antibiotics prescribed by your surgeon. YOU TESTED NEGATIVE FOR COVID 19 TODAY. PLEASE CONTINUE TO MONITOR YOUR SYMPTOMS, YOU MAY NEED REPEAT TESTING.

## 2021-03-17 NOTE — TELEPHONE ENCOUNTER
Huddled with Dr. Bedoya, recommended visit. Patient agreeable.     Next 5 appointments (look out 90 days)    Mar 18, 2021  9:00 AM  (Arrive by 8:40 AM)  Office Visit with Kelly Bedoya MD  [facility] ([facility] ) [address] [place] [id number] [phone]

## 2021-03-17 NOTE — TELEPHONE ENCOUNTER
Dr. Bedoya,     FELICIANO spoke with patient for CC outreach. Patient states that she is still coughing and having congestion, body aches and tightness from fluid retention, pain in upper right leg and mid section. Fever has been regular today.  Patient states that she just remembered she has a history of reactive airway disease, hasn't needed an inhaler for years, could possibly be a contributing factor?  She is also very confused by being told two different things from the ED and her surgeon regarding the fluid rentention, looking for direction.   Patient is interested in scheduling appointment with PCP, available Friday (3/19) after surgeon appointment at 9:45am. Please contact.    Thank you  FELICIANO Marshall, B.S. [facility] Care Coordination  [facility]:  [place], [place] and [place]  Phone: [phone]

## 2021-03-18 ENCOUNTER — OFFICE VISIT (OUTPATIENT)
Dept: PEDIATRICS | Facility: CLINIC | Age: 51
End: 2021-03-18
Payer: COMMERCIAL

## 2021-03-18 ENCOUNTER — MYC MEDICAL ADVICE (OUTPATIENT)
Dept: PEDIATRICS | Facility: CLINIC | Age: 51
End: 2021-03-18

## 2021-03-18 VITALS
SYSTOLIC BLOOD PRESSURE: 100 MMHG | WEIGHT: 220 LBS | HEIGHT: 64 IN | OXYGEN SATURATION: 100 % | TEMPERATURE: 97.9 F | HEART RATE: 76 BPM | BODY MASS INDEX: 37.56 KG/M2 | DIASTOLIC BLOOD PRESSURE: 60 MMHG | RESPIRATION RATE: 16 BRPM

## 2021-03-18 DIAGNOSIS — I89.0 LYMPHEDEMA: Primary | ICD-10-CM

## 2021-03-18 DIAGNOSIS — Z98.84 BARIATRIC SURGERY STATUS: ICD-10-CM

## 2021-03-18 DIAGNOSIS — R06.00 DYSPNEA, UNSPECIFIED TYPE: ICD-10-CM

## 2021-03-18 DIAGNOSIS — R05.9 COUGH: ICD-10-CM

## 2021-03-18 DIAGNOSIS — J45.20 MILD INTERMITTENT ASTHMA WITHOUT COMPLICATION: ICD-10-CM

## 2021-03-18 PROCEDURE — 99215 OFFICE O/P EST HI 40 MIN: CPT | Performed by: PEDIATRICS

## 2021-03-18 RX ORDER — ALBUTEROL SULFATE 90 UG/1
2 AEROSOL, METERED RESPIRATORY (INHALATION) EVERY 6 HOURS
Qty: 8 G | Refills: 3 | Status: SHIPPED | OUTPATIENT
Start: 2021-03-18 | End: 2021-12-22

## 2021-03-18 RX ORDER — BENZONATATE 200 MG/1
200 CAPSULE ORAL 3 TIMES DAILY PRN
Qty: 30 CAPSULE | Refills: 1 | Status: SHIPPED | OUTPATIENT
Start: 2021-03-18 | End: 2021-05-21

## 2021-03-18 ASSESSMENT — MIFFLIN-ST. JEOR: SCORE: 1592.91

## 2021-03-18 NOTE — PATIENT INSTRUCTIONS
Start albuterol for wheeze - we can always add a short prednisone burst    Continue keflex    Follow up tomorrow with surgeons    Expect a call to schedule with Natasha in lymphedema therapy    Send me an update later today or tomorrow    [facility]    Keep working on nutrition - the fluid will mobilize over the next few weeks

## 2021-03-18 NOTE — LETTER
[facility] [place]  [address]  [address]  [place] [address] [phone]          March 18, 2021    RE:  Ana Wyman                                                                                                                                                       [address] [address] [facility] [address]            To whom it may concern:    Ana Wyman is under my professional care.    After her recent surgery and complications, I recommend that she start back to work on a part time basis (half time recommended) for the week of 3/22.   Should that week go well and her recovery progress, she can return to work full time the following week.    Sincerely,        Kelly Bedoya MD

## 2021-03-19 NOTE — TELEPHONE ENCOUNTER
Routing to  as a FYI.    Yusra, RN  Patient Advocate Liason (PAL)  [facility]

## 2021-03-23 ENCOUNTER — PATIENT OUTREACH (OUTPATIENT)
Dept: NURSING | Facility: CLINIC | Age: 51
End: 2021-03-23
Attending: PEDIATRICS
Payer: COMMERCIAL

## 2021-03-23 ASSESSMENT — ACTIVITIES OF DAILY LIVING (ADL): DEPENDENT_IADLS:: INDEPENDENT

## 2021-03-23 NOTE — LETTER
[facility] COORDIN[facility]  [address]   [place] [address]    March 23, 2021    Ana PAYAN Jaimejc  [address] [facility] [address]      Dear Ana,    I am a clinic care coordinator who works with Kelly Bedoya MD at [facility]. I wanted to thank you for spending the time to talk with me.  Below is a description of clinic care coordination and how I can further assist you.      The clinic care coordination team is made up of a registered nurse,  and community health worker who understand the health care system. The goal of clinic care coordination is to help you manage your health and improve access to the health care system in the most efficient manner. The team can assist you in meeting your health care goals by providing education, coordinating services, strengthening the communication among your providers and supporting you with any resource needs.    Please feel free to contact me with any questions or concerns. We are focused on providing you with the highest-quality healthcare experience possible and that all starts with you.     Sincerely,     Melody Cunha RN Care Coordinator  [facility]  Email: [email or web address]  Phone: [phone]        Enclosed: I have enclosed a copy of the Complex Care Plan. This has helpful information and goals that we have talked about. Please keep this in an easy to access place to use as needed.

## 2021-03-23 NOTE — LETTER
Pending sale to [facility]  Complex Care Plan  About Me:    Patient Name:  Ana Blandon    YOB: 1970  Age:         51 year old   Kelsey MRN:    [id number] Telephone Information:  Home Phone [phone]   Mobile [phone]       Address:  [address] [facility] [address] Email address:  [email or web address]      Emergency Contact(s)    Name Relationship Lgl Grd Work Phone Home Phone Mobile Phone   1. CAITLIN BLANDON* Mother No NONE [phone]   2. RAFFI BLANDON Brother No [phone]   3. MIN BLANDON* Brother No   [phone]           Primary language:  English     needed? No   [employer]:  [phone] op. 1  Other communication barriers: None  Preferred Method of Communication:  Mail  Current living arrangement: I live alone  Mobility Status/ Medical Equipment: Independent    Health Maintenance  Health Maintenance Reviewed: Up to date    My Access Plan  Medical Emergency 911   Primary Clinic Line [facility] - [phone]   24 Hour Appointment Line [phone] or  [phone]) (toll-free)   24 Hour Nurse Line [phone] (toll-free)   Preferred Urgent Care     Preferred Hospital [facility]  [phone]   Preferred Pharmacy [address]     Behavioral Health Crisis Line The National Suicide Prevention Lifeline at 1-622.746.2549 or 911             My Care Team Members  Patient Care Team       Relationship Specialty Notifications Start End    Kelly Bedoya MD PCP - General Internal Medicine  9/30/13     Phone: [phone] Fax: [phone] [address] [place] [address]    Kelly Bedoya MD Assigned PCP   6/17/18     Phone: [phone] Fax: [phone] [address] [place] [address]    Bloch, Lauren Turner, [facility] Pharmacist  Pharmacist  8/6/20     Phone: [phone] [address] [address]    Ren Rondon MD Assigned Heart and Vascular Provider   10/23/20     Pager: [phone]         Lita Villalba MD Assigned OBGYN Provider   10/23/20     Phone: [phone] Fax: [phone] [address] [facility] [address]    Maya Martin DPM, Podiatry/Foot and Ankle Surgery Assigned Musculoskeletal Provider   10/23/20     Phone: [phone] Pager: [phone] Fax: [phone]        [address] [facility] [address]    Luisa Leonard, RN Personal Advocate & Liaison (PAL) Nurse  3/17/21     Shannan Cunha, RN Lead Care Coordinator  Admissions 3/23/21             My Care Plans  Self Management and Treatment Plan  Goals and (Comments)  Goals        General    1. Improvement of Lymphedema (pt-stated)     Notes - Note created  3/23/2021 10:40 AM by Shannan Cunha, RN    Goal Statement: I would like to see my lymphedema improve.   Date Goal set: 3/23/21  Barriers: Multiple health concerns  Strengths: Patient is motivated and engaged in care coordination  Date to Achieve By: 8/1/21  Patient expressed understanding of goal: Yes.   Action steps to achieve this goal:  1. I will follow up with OT for lymphedema evaluation 3/24/21  2. I will follow up with my surgeon's nurse on 3/26/21  3. I will continue to work with care coordination for any additional resources and support               Action Plans on File:   Asthma                   Advance Care Plans/Directives Type:        My Medical and Care Information  Problem List   Patient Active Problem List   Diagnosis     Esophageal reflux     Allergic state     Hypersomnia with sleep apnea     FAMILY HX GI MALIGNANCY     Family history of malignant neoplasm of genital organ, other     Lichen sclerosus     Uterine fibroid     MIGUELITO on CPAP     Acanthosis nigricans     Cutaneous skin tags     Baker's cyst of knee      Lymphedema bilateral with mixed lipedema.     Mild intermittent asthma without complication     Fatty liver     Bariatric surgery status     Postsurgical malabsorption     S/P laparoscopic cholecystectomy     Postural hypotension     Intertrigo     Class 2 severe obesity due to excess calories with serious comorbidity and body mass index (BMI) of 35.0 to 35.9 in adult (H)     Insulin resistance     Family history of malignant neoplasm of breast     Symptomatic abdominal panniculus      Current Medications and Allergies:    Current Outpatient Medications   Medication Instructions     albuterol (PROAIR HFA/PROVENTIL HFA/VENTOLIN HFA) 108 (90 Base) MCG/ACT inhaler 2 puffs, Inhalation, EVERY 4 HOURS PRN     albuterol (PROAIR HFA/PROVENTIL HFA/VENTOLIN HFA) 108 (90 Base) MCG/ACT inhaler 2 puffs, Inhalation, EVERY 6 HOURS     benzonatate (TESSALON) 200 mg, Oral, 3 TIMES DAILY PRN     BIOTIN PO 5,000 mcg, Oral, DAILY, At noon     Calcium Citrate-Vitamin D (CALCIUM CITRATE CHEWY BITE PO) 500 mg, Oral, 3 TIMES DAILY, Plus D,noon, supper, HS     childrens multivitamin w/iron (FLINTSTONES COMPLETE) 60 MG chewable tablet 2 chew tab, Oral, EVERY EVENING     HYDROmorphone (DILAUDID) 2 mg, Oral, EVERY 3 HOURS PRN     insulin pen needle (31G X 6 MM) 31G X 6 MM miscellaneous Use 1 pen needles daily     lifitegrast (XIIDRA) 5 % opthalmic solution 1 drop, Both Eyes, DAILY     metFORMIN (GLUCOPHAGE-XR) 500 mg, Oral, EVERY MORNING     methocarbamol (ROBAXIN) 750 mg, Oral, EVERY 6 HOURS PRN     nitroGLYcerin (NITRO-BID) 2 % OINT ointment 0.5 inches, Transdermal, DAILY PRN     nystatin (MYCOSTATIN) 803021 UNIT/GM external cream Topical, DAILY PRN     polyethylene glycol (MIRALAX) 17 g, Oral, DAILY     triamcinolone (KENALOG) 0.1 % external cream Topical, 2 TIMES DAILY PRN     VICTOZA PEN 18 MG/3ML soln INJECT 1.2MG UNDER THE SKIN DAILY. CAN INCREASE TO 1.8MG IF HUNGER INCREASES.     vitamin (B COMPLEX-C) tablet 1 tablet, Oral, EVERY  EVENING     vitamin B-12 (CYANOCOBALAMIN) 1,250 mcg, Oral, TWICE WEEKLY     Vitamin D3 (CHOLECALCIFEROL) 25 mcg (1000 units) tablet 1 capsule, Oral, EVERY MORNING         Care Coordination Start Date: 3/23/2021   Frequency of Care Coordination: 2 weeks   Form Last Updated: 03/23/2021

## 2021-03-23 NOTE — PROGRESS NOTES
Clinic Care Coordination Contact    Clinic Care Coordination Contact  OUTREACH    Referral Information:  Referral Source: IP Report    Primary Diagnosis: Other (include Comment box)(Lymphedema)    Chief Complaint   Patient presents with     Clinic Care Coordination - Initial        Universal Utilization: [facility] ED 3/16/21 for Shortness of Breath, Cough, & Abdominal Swelling  Clinic Utilization  Difficulty keeping appointments:: No  Compliance Concerns: No  No-Show Concerns: No  No PCP office visit in Past Year: No  Utilization    Last refreshed: 3/23/2021  9:26 AM: Hospital Admissions 1           Last refreshed: 3/23/2021  9:26 AM: ED Visits 2           Last refreshed: 3/23/2021  9:26 AM: No Show Count (past year) 0              Current as of: 3/23/2021  9:26 AM              Clinical Concerns:  Current Medical Concerns:    Patient Active Problem List   Diagnosis     Esophageal reflux     Allergic state     Hypersomnia with sleep apnea     FAMILY HX GI MALIGNANCY     Family history of malignant neoplasm of genital organ, other     Lichen sclerosus     Uterine fibroid     MIGUELITO on CPAP     Acanthosis nigricans     Cutaneous skin tags     Baker's cyst of knee     Lymphedema bilateral with mixed lipedema.     Mild intermittent asthma without complication     Fatty liver     Bariatric surgery status     Postsurgical malabsorption     S/P laparoscopic cholecystectomy     Postural hypotension     Intertrigo     Class 2 severe obesity due to excess calories with serious comorbidity and body mass index (BMI) of 35.0 to 35.9 in adult (H)     Insulin resistance     Family history of malignant neoplasm of breast     Symptomatic abdominal panniculus     Current Behavioral Concerns: None noted    Education Provided to patient: RN CC role and reason for call   Pain  Pain (GOAL):: No  Health Maintenance Reviewed: Up to date  Clinical Pathway: None    Medication Management:  Current Outpatient Medications   Medication Instructions      albuterol (PROAIR HFA/PROVENTIL HFA/VENTOLIN HFA) 108 (90 Base) MCG/ACT inhaler 2 puffs, Inhalation, EVERY 4 HOURS PRN     albuterol (PROAIR HFA/PROVENTIL HFA/VENTOLIN HFA) 108 (90 Base) MCG/ACT inhaler 2 puffs, Inhalation, EVERY 6 HOURS     benzonatate (TESSALON) 200 mg, Oral, 3 TIMES DAILY PRN     BIOTIN PO 5,000 mcg, Oral, DAILY, At noon     Calcium Citrate-Vitamin D (CALCIUM CITRATE CHEWY BITE PO) 500 mg, Oral, 3 TIMES DAILY, Plus D,noon, supper, HS     childrens multivitamin w/iron (FLINTSTONES COMPLETE) 60 MG chewable tablet 2 chew tab, Oral, EVERY EVENING     HYDROmorphone (DILAUDID) 2 mg, Oral, EVERY 3 HOURS PRN     insulin pen needle (31G X 6 MM) 31G X 6 MM miscellaneous Use 1 pen needles daily     lifitegrast (XIIDRA) 5 % opthalmic solution 1 drop, Both Eyes, DAILY     metFORMIN (GLUCOPHAGE-XR) 500 mg, Oral, EVERY MORNING     methocarbamol (ROBAXIN) 750 mg, Oral, EVERY 6 HOURS PRN     nitroGLYcerin (NITRO-BID) 2 % OINT ointment 0.5 inches, Transdermal, DAILY PRN     nystatin (MYCOSTATIN) 990238 UNIT/GM external cream Topical, DAILY PRN     polyethylene glycol (MIRALAX) 17 g, Oral, DAILY     triamcinolone (KENALOG) 0.1 % external cream Topical, 2 TIMES DAILY PRN     VICTOZA PEN 18 MG/3ML soln INJECT 1.2MG UNDER THE SKIN DAILY. CAN INCREASE TO 1.8MG IF HUNGER INCREASES.     vitamin (B COMPLEX-C) tablet 1 tablet, Oral, EVERY EVENING     vitamin B-12 (CYANOCOBALAMIN) 1,250 mcg, Oral, TWICE WEEKLY     Vitamin D3 (CHOLECALCIFEROL) 25 mcg (1000 units) tablet 1 capsule, Oral, EVERY MORNING       Functional Status:  Dependent ADLs:: Independent  Dependent IADLs:: Independent  Bed or wheelchair confined:: No  Mobility Status: Independent  Fallen 2 or more times in the past year?: No  Any fall with injury in the past year?: No    Living Situation:  Current living arrangement:: I live alone  Type of residence:: Town home    Lifestyle & Psychosocial Needs:  Lifestyle     Physical activity     Days per week: 5 days      Minutes per session: 20 min     Stress: Only a little     Social Needs     Financial resource strain: Not very hard     Food insecurity     Worry: Never true     Inability: Never true     Transportation needs     Medical: No     Non-medical: No     Diet:: Regular  Inadequate nutrition (GOAL):: No  Tube Feeding: No  Inadequate activity/exercise (GOAL):: No  Significant changes in sleep pattern (GOAL): No  Transportation means:: Regular car     Oriental orthodox or spiritual beliefs that impact treatment:: No  Mental health DX:: No  Mental health management concern (GOAL):: No  Chemical Dependency Status: No Current Concerns  Informal Support system:: Family   Socioeconomic History     Marital status: Single     Spouse name: Not on file     Number of children: 0     Years of education: 18     Highest education level: Master's degree (e.g., MA, MS, Shannon, MEd, MSW, REA)   Occupational History     Occupation: student life     Employer: [email or web address]   Relationships     Social connections     Talks on phone: More than three times a week     Gets together: Twice a week     Attends Baptism service: 1 to 4 times per year     Active member of club or organization: Yes     Attends meetings of clubs or organizations: More than 4 times per year     Relationship status: Never      Intimate partner violence     Fear of current or ex partner: Not on file     Emotionally abused: Not on file     Physically abused: Not on file     Forced sexual activity: Not on file     Tobacco Use     Smoking status: Never Smoker     Smokeless tobacco: Never Used   Substance and Sexual Activity     Alcohol use: Not Currently     Alcohol/week: 0.0 standard drinks     Frequency: Monthly or less     Drinks per session: 1 or 2     Binge frequency: Never     Drug use: No     Sexual activity: Not Currently     Partners: Male     Birth control/protection: Other     Comment: ablation years ago     Patient states that her flu like symptoms  have mostly resolved, still complains of cough that is controled with inhaler. Denies any chest pain or SOB. Patient states that she is still experiencing painful abdominal edema, however today is the first day she is actually down 1.5 lbs.     Patient is scheduled to meet with OT for lymphedema evaluation tomorrow, and her surgeon's nurse on Friday 3/26/21. Patient requests RN CC follow up in two weeks and assess for further need for resources and support.       Resources and Interventions:  Current Resources:      Community Resources: None  Supplies used at home:: None  Equipment Currently Used at Home: none  Employment Status: employed full-time)   )    Advance Care Plan/Directive  Advanced Care Plans/Directives on file:: No  Advanced Care Plan/Directive Status: Considering Options    Referrals Placed: None     Goals:   Goals        General    1. Improvement of Lymphedema (pt-stated)     Notes - Note created  3/23/2021 10:40 AM by Shannan Cunha RN    Goal Statement: I would like to see my lymphedema improve.   Date Goal set: 3/23/21  Barriers: Multiple health concerns  Strengths: Patient is motivated and engaged in care coordination  Date to Achieve By: 8/1/21  Patient expressed understanding of goal: Yes.   Action steps to achieve this goal:  1. I will follow up with OT for lymphedema evaluation 3/24/21  2. I will follow up with my surgeon's nurse on 3/26/21  3. I will continue to work with care coordination for any additional resources and support              Patient/Caregiver understanding: Patient reports understanding and denies any additional questions or concerns at this times. RN CC engaged in AIDET communication during encounter.      Outreach Frequency: 2 weeks  Future Appointments              Tomorrow Stacy Erwin OT [facility] RID    In 1 week OX XERXES COVID VAC 21 DAY PFIZER [facility]          Plan:  Patient will follow up with OT 3/24, and Surgeon 3/26/21. RN CC will send introduction to care coordination and complex care plan via [employer]. RN CC will follow up with two weeks.     Melody Cunha RN Care Coordinator  [facility]  Email: [email or web address]  Phone: [phone]

## 2021-03-24 ENCOUNTER — HOSPITAL ENCOUNTER (OUTPATIENT)
Dept: OCCUPATIONAL THERAPY | Facility: CLINIC | Age: 51
Setting detail: THERAPIES SERIES
End: 2021-03-24
Attending: PEDIATRICS
Payer: COMMERCIAL

## 2021-03-24 DIAGNOSIS — I89.0 LYMPHEDEMA: ICD-10-CM

## 2021-03-24 PROCEDURE — 97140 MANUAL THERAPY 1/> REGIONS: CPT | Mod: GO | Performed by: OCCUPATIONAL THERAPIST

## 2021-03-24 PROCEDURE — 97166 OT EVAL MOD COMPLEX 45 MIN: CPT | Mod: GO | Performed by: OCCUPATIONAL THERAPIST

## 2021-03-24 NOTE — PROGRESS NOTES
03/24/21 1100   Rehab Discipline   Discipline OT   Type of Visit   Type of visit Initial Edema Evaluation       present No   General Information   Start of care 03/24/21   Referring physician Dr. Kelly Bedoya   Orders Evaluate and treat as indicated   Order date 03/18/21   Medical diagnosis Lymphedema post op edema   Onset of illness / date of surgery 02/25/21   Edema onset 02/25/21   Affected body parts LLE;RLE;Trunk   Edema etiology   (surgery, previous obesity related lymphedema)   Edema etiology comments started in abdomen  after surgery   Pertinent history of current problem (PT: include personal factors and/or comorbidities that impact the POC; OT: include additional occupational profile info) Pt has hx of bariatric surgery., panniculectomy - belt type.  vein and cellulitis issues 7-8 yrs ago., 5/19 gall bladder, 11/19 gastric bypass   Surgical / medical history reviewed Yes   Prior level of functional mobility Pt lives independently.  She currently has drains that keep her from walking more than 5,000 steps per day.  She is not yet lying flat or on her side.  She watches trunk mobility   Prior treatment Complete decongestive therapy   Community support Family / friend caregiver   Patient role / employment history Employed  (at home 4x/week)   Psychosocial concerns Impaired coping   Living environment House / [facility]   Living environment comments 2 levels - stairs are difficult   Assistive device comments none   Fall Risk Screen   Fall screen completed by OT   Have you fallen 2 or more times in the past year? No   Have you fallen and had an injury in the past year? No   Is patient a fall risk? No   Abuse Screen (yes response referral indicated)   Feels Unsafe at Home or Work/School no   Feels Threatened by Someone no   Does Anyone Try to Keep You From Having Contact with Others or Doing Things Outside Your Home? no   Physical Signs of Abuse Present no   System Outcome Measures    Outcome Measures Lymphedema   Lymphedema Life Impact Scale (score range 0-72). A higher score indicates greater impairment. 36   Subjective Report   Patient report of symptoms Pt notes difficulty bending to reach to feet or  items from floor. She has tightness in the abdomen and feels it as she climbs steps.  Her drains are late to be removed and are leaking around the drain L rather than into the drain.   Precautions   Precautions comments none   Patient / Family Goals   Patient / family goals statement Pt wishes to have less pain with walking, bending, donning socks and shoes, climbing stairs   Pain   Patient currently in pain Yes   Pain location tightness in abdomen on bending/stairs   Pain rating 8-9/10   Pain description   (tightness)   Cognitive Status   Orientation Orientation to person, place and time   Level of consciousness Alert   Follows commands and answers questions 100% of the time   Personal safety and judgement Intact   Memory Intact   Edema Exam / Assessment   Skin condition Non-pitting   Skin condition comments Pt has a watery feel above the scar L>R in fullness.  She has fibrosis on the suprapubic area R is smaller but firmer, L is full with firmness under the fullness.   Scar Yes   Location Goes around the whole trunk   Mobility fragile - still healing.   Capillary refill Symmetrical   Stemmer sign comments no assessed   Ulceration No   Ulceration comments Pt has drainage around drain tube.   Girth Measurements   Girth Measurements Refer to separate girth measurement flowsheet   Volume LE   Trunk volume comments 99 ribs, 109 back, 122 umbilicus   Range of Motion   ROM Other   ROM comments reaching to feet is very difficult.  climbing stairs and picking up items from floor   Posture   Posture Forward head position   Posture comments Pt is protecting the abdomen and not able to lie flat   Palpation   Palpation Not palpated due to freshness of scar   Activities of Daily Living   Activities  of Daily Living Pt is able to dress self, but has pain with donning socks/shoes. She does not wear underwear with drains and scars.  She has pain on climbing and descending stairs.   Bed Mobility   Bed mobility Extra pillows for head not to lie flat   Transfers   Transfers Pt is independent with transfers   Gait / Locomotion   Gait / Locomotion Pt can walk 5000 steps, but is tire and sore at end of day if she does.   Sensory   Sensory perception Light touch   Light touch Impaired   Sensory perception comments Pt is numb around the scar   Vascular Assessment   Vascular Assessment Comments no concerns   Coordination   Coordination Gross motor coordination appropriate   Muscle Tone   Muscle tone No deficits were identified   Planned Edema Interventions   Planned edema interventions Manual lymph drainage;Fit for compression garment;Exercises;Precautions to prevent infection / exacerbation;Education;Manual therapy;Skin care / precautions;Scar mobilization;Home management program development   Planned edema intervention comments swell spot   Clinical Impression   Criteria for skilled therapeutic intervention met Yes   Therapy diagnosis Lymphedema   Influenced by the following impairments / conditions Edema;Stage 2   Assessment of Occupational Performance 5 or more Performance Deficits   Identified Performance Deficits stairs, bending for foot care, socks, shoes, bending to floor,    Clinical Decision Making (Complexity) Moderate complexity   Treatment Frequency 1x/week   Treatment duration 3 weeks, then 2x/week for 3 weeks   Patient / family and/or staff in agreement with plan of care Yes   Risks and benefits of therapy have been explained Yes   Education Assessment   Preferred learning style Listening;Reading;Demonstration;Pictures / video   Barriers to learning No barriers   Goals   Edema Eval Goals 1;2   Goal 1   Goal identifier 1   Goal description Pt will be independent with lymphedema home program modifications like  compression, swell spot, MLD, ex   Goal 2   Goal identifier 2   Goal description Pt will reduce pain in donning/doffing shoes to 2/10 with less swelling on abdomen.   Target date 05/07/21   Total Evaluation Time   OT Eval, Moderate Complexity Minutes (03814) 45

## 2021-03-26 ENCOUNTER — HOSPITAL ENCOUNTER (OUTPATIENT)
Dept: OCCUPATIONAL THERAPY | Facility: CLINIC | Age: 51
Setting detail: THERAPIES SERIES
End: 2021-03-26
Attending: PEDIATRICS
Payer: COMMERCIAL

## 2021-03-26 PROCEDURE — 97140 MANUAL THERAPY 1/> REGIONS: CPT | Mod: GO | Performed by: OCCUPATIONAL THERAPIST

## 2021-03-30 ENCOUNTER — HOSPITAL ENCOUNTER (OUTPATIENT)
Dept: OCCUPATIONAL THERAPY | Facility: CLINIC | Age: 51
Setting detail: THERAPIES SERIES
End: 2021-03-30
Attending: PEDIATRICS
Payer: COMMERCIAL

## 2021-03-30 ENCOUNTER — IMMUNIZATION (OUTPATIENT)
Dept: NURSING | Facility: CLINIC | Age: 51
End: 2021-03-30
Attending: INTERNAL MEDICINE
Payer: COMMERCIAL

## 2021-03-30 PROCEDURE — 97140 MANUAL THERAPY 1/> REGIONS: CPT | Mod: GO

## 2021-03-30 PROCEDURE — 0002A PR COVID VAC PFIZER DIL RECON 30 MCG/0.3 ML IM: CPT

## 2021-03-30 PROCEDURE — 91300 PR COVID VAC PFIZER DIL RECON 30 MCG/0.3 ML IM: CPT

## 2021-03-31 ENCOUNTER — NURSE TRIAGE (OUTPATIENT)
Dept: NURSING | Facility: CLINIC | Age: 51
End: 2021-03-31

## 2021-03-31 NOTE — TELEPHONE ENCOUNTER
FNA triage call : Received covid 2nd vaccine 3/30/21 .   Presenting problem : Pt called . Fever up to 101.9 forehead and bodyaches 7/10 on painscale .  Currently : 1&0 . Eating and activity are all fine .     Guideline used : Covid vaccine ? And reaction A AH.   Disposition and recommendations : COVID 19 Nurse Triage Plan/Patient Instructions    Please be aware that novel coronavirus (COVID-19) may be circulating in the community. If you develop symptoms such as fever, cough, or SOB or if you have concerns about the presence of another infection including coronavirus (COVID-19), please contact your health care provider or visit [email or web address].     Disposition/Instructions / Pt will watch symptom and call back if needed ( see care advice ) .  FNA Advised could use tylenol for bodyaches.     Home care recommended. Follow home care protocol based instructions.    Thank you for taking steps to prevent the spread of this virus.  o Limit your contact with others.  o Wear a simple mask to cover your cough.  o Wash your hands well and often.    Caller verbalizes understanding and denies further questions and will call back if further symptoms to triage or questions  . Kasia Sierra RN  - [place] Nurse Advisor     Reason for call; Recommendation / teaching ;     Caller Verbalizes understanding and denies further questions and will call back if further symptoms to triage or questions  .  Kasia Sierra RN  - [place] Nurse Advisor                  Reason for Disposition    COVID-19 vaccine, systemic reactions (e.g., fatigue, fever, muscle aches), questions about    Additional Information    Negative: [1] Difficulty breathing or swallowing AND [2] starts within 2 hours after injection    Negative: Sounds like a life-threatening emergency to the triager    Negative: [1] COVID-19 exposure AND [2] no symptoms    Negative: [1] Typical COVID-19 symptoms AND [2] symptoms that are NOT expected from vaccine (e.g.,  cough, difficulty breathing, loss of taste or smell, runny nose, sore throat)    Negative: [1] Typical COVID-19 symptoms AND [2] started > 3 days after getting vaccine    Negative: Fever > 104 F (40 C)    Negative: Sounds like a severe, unusual reaction to the triager    Negative: [1] Redness or red streak around the injection site AND [2] started > 48 hours after getting vaccine AND [3] fever    Negative: [1] Fever > 101 F (38.3 C) AND [2] age > 60 AND [3] started > 48 hours after getting vaccine    Negative: [1] Fever > 100.0 F (37.8 C) AND [2] bedridden (e.g., nursing home patient, CVA, chronic illness, recovering from surgery) AND [3] started > 48 hours after getting vaccine    Negative: [1] Fever > 100.0 F (37.8 C) AND [2] diabetes mellitus or weak immune system (e.g., HIV positive, cancer chemo, splenectomy, organ transplant, chronic steroids) AND [3] started > 48 hours after getting vaccine    Negative: [1] Redness or red streak around the injection site AND [2] started > 48 hours after getting vaccine AND [3] no fever  (Exception: red area < 1 inch or 2.5 cm wide)    Negative: [1] Pain, tenderness, or swelling at the injection site AND [2] over 3 days (72 hours) since vaccine AND [3] getting worse    Negative: Fever > 100.0 F (37.8 C) present > 3 days (72 hours)    Negative: [1] Fever > 100.0 F (37.8 C) AND [2] healthcare worker    Negative: [1] Pain, tenderness, or swelling at the injection site AND [2] lasts > 7 days    Negative: [1] Requesting COVID-19 vaccine AND [2] healthcare worker (e.g., EMS first responders, doctors, nurses)    Negative: [1] Requesting COVID-19 vaccine AND [2] resident of a long-term care facility (e.g., nursing home)    Negative: [1] Requesting COVID-19 vaccine AND [2] vaccine available in the community for this patient group    Negative: COVID-19 vaccine, injection site reaction (e.g., pain, redness, swelling), question about    Protocols used: CORONAVIRUS (COVID-19) VACCINE  QUESTIONS AND [id number]-T-BR 1.3

## 2021-04-05 ENCOUNTER — HOSPITAL ENCOUNTER (OUTPATIENT)
Dept: OCCUPATIONAL THERAPY | Facility: CLINIC | Age: 51
Setting detail: THERAPIES SERIES
End: 2021-04-05
Attending: PEDIATRICS
Payer: COMMERCIAL

## 2021-04-05 PROCEDURE — 97140 MANUAL THERAPY 1/> REGIONS: CPT | Mod: GO

## 2021-04-06 ENCOUNTER — PATIENT OUTREACH (OUTPATIENT)
Dept: NURSING | Facility: CLINIC | Age: 51
End: 2021-04-06
Payer: COMMERCIAL

## 2021-04-06 NOTE — PROGRESS NOTES
Clinic Care Coordination Contact    Assessment:  Patient states that her edema is improving daily, especially now that her drains are out. Patient denies any pain. Patient is confident in her treatment plan and follow up appointments. Patient requests to graduate care coordination at this time.  Patient has continued to follow the plan of care and assessment is negative for any new needs or concerns.    Enrollment status: Graduated.      Plan: No further outreaches at this time.  Patient will continue to follow the plan of care.  If new needs arise a new Care Coordination referral may be placed.  FYI to PCP    Melody Cunha RN Care Coordinator  [facility]  Email: [email or web address]  Phone: [phone]

## 2021-04-06 NOTE — LETTER
[facility] COORDINATION  [address] [place] [address] [address] [place] [address]    April 6, 2021    Ana Wyman  [address]  [facility] [address]    Dear Ana,  Your Care Team congratulates you on your journey to maintain wellness. This document will help guide you on your journey to maintain a healthy lifestyle.  You can use this to help you overcome any barriers you may encounter.  If you should have any questions or concerns, you can contact the members of your Care Team or contact your Primary Care Clinic for assistance.     Health Maintenance  Health Maintenance Reviewed:      My Access Plan  Medical Emergency 911   Primary Clinic Line [facility] - [phone]   24 Hour Appointment Line [phone] or  [phone]) (toll-free)   24 Hour Nurse Line [phone] (toll-free)   Preferred Urgent Care     Preferred Hospital     Preferred Pharmacy [facility] [address] [facility]     Behavioral Health Crisis Line The National Suicide Prevention Lifeline at 1-546.769.9337 or 911     My Care Team Members  Patient Care Team       Relationship Specialty Notifications Start End    Kelly Bedoya MD PCP - General Internal Medicine  9/30/13     Phone: [phone] Fax: [phone]         [address] [place] [address] [address] [place] [address]    Kelly Bedoya MD Assigned PCP   6/17/18     Phone: [phone] Fax: [phone]         [address] [place] [address] [address] [address]    Bloch, Lauren Turner, [facility] Pharmacist Pharmacist  8/6/20     Phone: [phone]          [address] [facility] [address]    Lita Villalba MD Assigned OBGYN Provider   10/23/20     Phone: [phone] Fax: [phone]         [address] [facility] [address]    Maya Martin DPM, Podiatry/Foot and Ankle Surgery Assigned Musculoskeletal Provider   10/23/20     Phone: [phone] Pager: [phone] Fax: [phone]         [address] [address]  [address] [facility] [address]    Luisa Leonard, RN Personal Advocate & Liaison (PAL) Nurse  3/17/21     Goltz, Bennett Ezra, PA-C Assigned Sleep Provider   3/17/21     Phone: [phone] Fax: [phone] [address] [place] [address]    Goltz, Bennett Ezra, PA-C Assigned Neuroscience Provider   3/17/21     Phone: [phone] Fax: [phone] [address] [place] [address]    Sarah Stacy PA-C Assigned Surgical Provider   3/17/21     Phone: [phone] Fax: [phone] [address] [facility] [address]    Shannan Cunha RN Lead Care Coordinator  Admissions 3/23/21               Goals       COMPLETED: 1. Improvement of Lymphedema (pt-stated)      Goal Statement: I would like to see my lymphedema improve.   Date Goal set: 3/23/21  Barriers: Multiple health concerns  Strengths: Patient is motivated and engaged in care coordination  Date to Achieve By: 8/1/21  Patient expressed understanding of goal: Yes.   Action steps to achieve this goal:  1. I will follow up with OT for lymphedema evaluation 3/24/21  2. I will follow up with my surgeon's nurse on 3/26/21  3. I will continue to work with care coordination for any additional resources and support                 It has been your Clinic Care Team's pleasure to work with you on your goals.    Regards,  Your Clinic Care Team

## 2021-04-13 ENCOUNTER — HOSPITAL ENCOUNTER (OUTPATIENT)
Dept: OCCUPATIONAL THERAPY | Facility: CLINIC | Age: 51
Setting detail: THERAPIES SERIES
End: 2021-04-13
Attending: PEDIATRICS
Payer: COMMERCIAL

## 2021-04-13 DIAGNOSIS — I89.0 LYMPHEDEMA: Primary | ICD-10-CM

## 2021-04-13 PROCEDURE — 97140 MANUAL THERAPY 1/> REGIONS: CPT | Mod: GO

## 2021-04-15 ENCOUNTER — HOSPITAL ENCOUNTER (OUTPATIENT)
Dept: OCCUPATIONAL THERAPY | Facility: CLINIC | Age: 51
Setting detail: THERAPIES SERIES
End: 2021-04-15
Attending: PEDIATRICS
Payer: COMMERCIAL

## 2021-04-15 PROCEDURE — 97140 MANUAL THERAPY 1/> REGIONS: CPT | Mod: GO

## 2021-04-20 ENCOUNTER — HOSPITAL ENCOUNTER (OUTPATIENT)
Dept: OCCUPATIONAL THERAPY | Facility: CLINIC | Age: 51
Setting detail: THERAPIES SERIES
End: 2021-04-20
Attending: PEDIATRICS
Payer: COMMERCIAL

## 2021-04-20 PROCEDURE — 97140 MANUAL THERAPY 1/> REGIONS: CPT | Mod: GO

## 2021-04-23 ENCOUNTER — HOSPITAL ENCOUNTER (OUTPATIENT)
Dept: OCCUPATIONAL THERAPY | Facility: CLINIC | Age: 51
Setting detail: THERAPIES SERIES
End: 2021-04-23
Attending: PEDIATRICS
Payer: COMMERCIAL

## 2021-04-23 PROCEDURE — 97140 MANUAL THERAPY 1/> REGIONS: CPT | Mod: GO

## 2021-04-30 ENCOUNTER — HOSPITAL ENCOUNTER (OUTPATIENT)
Dept: OCCUPATIONAL THERAPY | Facility: CLINIC | Age: 51
Setting detail: THERAPIES SERIES
End: 2021-04-30
Attending: PEDIATRICS
Payer: COMMERCIAL

## 2021-04-30 PROCEDURE — 97140 MANUAL THERAPY 1/> REGIONS: CPT | Mod: GO

## 2021-05-05 ENCOUNTER — MYC MEDICAL ADVICE (OUTPATIENT)
Dept: SURGERY | Facility: CLINIC | Age: 51
End: 2021-05-05

## 2021-05-05 DIAGNOSIS — E66.812 CLASS 2 SEVERE OBESITY DUE TO EXCESS CALORIES WITH SERIOUS COMORBIDITY AND BODY MASS INDEX (BMI) OF 37.0 TO 37.9 IN ADULT (H): ICD-10-CM

## 2021-05-05 DIAGNOSIS — E88.819 INSULIN RESISTANCE: ICD-10-CM

## 2021-05-05 DIAGNOSIS — E66.01 CLASS 2 SEVERE OBESITY DUE TO EXCESS CALORIES WITH SERIOUS COMORBIDITY AND BODY MASS INDEX (BMI) OF 37.0 TO 37.9 IN ADULT (H): ICD-10-CM

## 2021-05-07 ENCOUNTER — HOSPITAL ENCOUNTER (OUTPATIENT)
Dept: OCCUPATIONAL THERAPY | Facility: CLINIC | Age: 51
Setting detail: THERAPIES SERIES
End: 2021-05-07
Attending: PEDIATRICS
Payer: COMMERCIAL

## 2021-05-07 DIAGNOSIS — I89.0 LYMPHEDEMA: Primary | ICD-10-CM

## 2021-05-07 PROCEDURE — 97140 MANUAL THERAPY 1/> REGIONS: CPT | Mod: GO

## 2021-05-07 RX ORDER — LIRAGLUTIDE 6 MG/ML
INJECTION SUBCUTANEOUS
Qty: 27 ML | Refills: 0 | Status: SHIPPED | OUTPATIENT
Start: 2021-05-07 | End: 2021-05-21

## 2021-05-07 NOTE — TELEPHONE ENCOUNTER
ASSESSMENT AND PLAN:        Class 2 severe obesity due to excess calories with serious comorbidity and body mass index (BMI) of 37.0 to 37.9 in adult (H)    Sent to  pharmacy.    - liraglutide (VICTOZA PEN) 18 MG/3ML solution; Inject 0.6 mg Subcutaneous daily for 3 days, THEN 1.2 mg daily for 3 days, THEN 1.8 mg daily.     (2) potential problem

## 2021-05-10 ENCOUNTER — TELEPHONE (OUTPATIENT)
Dept: SURGERY | Facility: CLINIC | Age: 51
End: 2021-05-10

## 2021-05-10 NOTE — TELEPHONE ENCOUNTER
Prior Authorization Retail Medication Request    Medication/Dose: victoza 18 mg/3 ml  ICD code (if different than what is on RX):    Previously Tried and Failed:   Rationale:      Insurance Name:  [facility]  Insurance ID:  [id number]       Pharmacy Information (if different than what is on RX)  Name:  gabriela arguello   Phone:  [phone]

## 2021-05-12 NOTE — TELEPHONE ENCOUNTER
PA Initiation    Medication: victoza 18 mg/3 ml  Insurance Company: [employer] [place] - Phone [phone] Fax [phone]  Pharmacy Filling the Rx: [place], [place] - [address] [facility]  Filling Pharmacy Phone: [phone]  Filling Pharmacy Fax: [phone]  Start Date: 5/12/2021

## 2021-05-13 NOTE — TELEPHONE ENCOUNTER
PRIOR AUTHORIZATION DENIED    Medication: victoza 18 mg/3 ml    Denial Date: 5/13/2021    Denial Rationale: Patient does not meet criteria-      Appeal Information: If provider would like to appeal this decision we will need a detailed letter of medical necessity to start the process. Then re-route this request back to the PA pool.

## 2021-05-18 NOTE — TELEPHONE ENCOUNTER
Called pt re: VOLODYMYR wilkerosn and Sarah would like to discuss options at next appt.    Spoke with pt and assisted with rescheduling appt to 5/21 at 10:00 Am to get in sooner.     Pt is going to call insurance re: VOLODYMYR [place] RN on 5/18/2021 at 8:26 AM

## 2021-05-21 ENCOUNTER — VIRTUAL VISIT (OUTPATIENT)
Dept: SURGERY | Facility: CLINIC | Age: 51
End: 2021-05-21
Payer: COMMERCIAL

## 2021-05-21 VITALS — HEIGHT: 64 IN | WEIGHT: 220 LBS | BODY MASS INDEX: 37.56 KG/M2

## 2021-05-21 DIAGNOSIS — Z98.84 BARIATRIC SURGERY STATUS: ICD-10-CM

## 2021-05-21 DIAGNOSIS — E66.01 CLASS 2 SEVERE OBESITY DUE TO EXCESS CALORIES WITH SERIOUS COMORBIDITY AND BODY MASS INDEX (BMI) OF 37.0 TO 37.9 IN ADULT (H): Primary | ICD-10-CM

## 2021-05-21 DIAGNOSIS — K91.2 POSTSURGICAL MALABSORPTION: ICD-10-CM

## 2021-05-21 DIAGNOSIS — E66.812 CLASS 2 SEVERE OBESITY DUE TO EXCESS CALORIES WITH SERIOUS COMORBIDITY AND BODY MASS INDEX (BMI) OF 37.0 TO 37.9 IN ADULT (H): Primary | ICD-10-CM

## 2021-05-21 PROCEDURE — 99215 OFFICE O/P EST HI 40 MIN: CPT | Mod: 95 | Performed by: PHYSICIAN ASSISTANT

## 2021-05-21 RX ORDER — NALTREXONE HYDROCHLORIDE 50 MG/1
TABLET, FILM COATED ORAL
Qty: 60 TABLET | Refills: 1 | Status: SHIPPED | OUTPATIENT
Start: 2021-05-21 | End: 2021-10-28

## 2021-05-21 RX ORDER — BUPROPION HYDROCHLORIDE 100 MG/1
TABLET, EXTENDED RELEASE ORAL
Qty: 727 TABLET | Refills: 0 | Status: SHIPPED | OUTPATIENT
Start: 2021-05-21 | End: 2021-10-28

## 2021-05-21 RX ORDER — NALTREXONE HYDROCHLORIDE AND BUPROPION HYDROCHLORIDE 8; 90 MG/1; MG/1
TABLET, EXTENDED RELEASE ORAL
Qty: 120 TABLET | Refills: 0 | Status: SHIPPED | OUTPATIENT
Start: 2021-05-21 | End: 2021-09-13

## 2021-05-21 ASSESSMENT — MIFFLIN-ST. JEOR: SCORE: 1592.91

## 2021-05-21 NOTE — PROGRESS NOTES
I ordered bupropion and naltrexone separately.  Can you please let her she can do this, if the Contrave is too expensive.  Thanks.    MKMADAN    ASSESSMENT AND PLAN:        Class 2 severe obesity due to excess calories with serious comorbidity and body mass index (BMI) of 37.0 to 37.9 in adult (H)    - buPROPion (WELLBUTRIN SR) 100 MG 12 hr tablet; Take 1 tablet (100 mg) by mouth every morning for 7 days, THEN 1 tablet (100 mg) 2 times daily.    - naltrexone (DEPADE/REVIA) 50 MG tablet; Take 1/2 tablet in afternoon x 1 week, then increase to 1/2 tablet twice daily.      Follow up: Return to clinic in 6 wks

## 2021-05-21 NOTE — PROGRESS NOTES
Ana is a 51 year old who is being evaluated via a billable video visit.      If the video visit is dropped, the invitation should be resent by: Text to cell phone: [phone]  Will anyone else be joining your video visit? No      Video-Visit Details    Type of service:  Video Visit    Video Start Time: 10:05 AM    Video End Time:10:45 AM      Originating Location (pt. Location): Home    Distant Location (provider location):  [facility] [place]     Platform used for Video Visit: Pelon Perez is a 50 year old who is being evaluated via a billable video visit.      If the video visit is dropped, the invitation should be resent by: Text to cell phone: [phone]  Will anyone else be joining your video visit? No          BARIATRIC FOLLOW UP         HISTORY OF PRESENT ILLNESS: Pt presents today for her follow-up appointment status post laparoscopic gastric bypass. Was last seen in January.  Had panniculectomy on 2/25/2021 with  hematoma evacuation.  Has been a tough recovery. For the first 5 weeks she didn't do any activity. Weight was stable.  Then she gained 12 lbs in a few days weeks following surgery.  This has not decreased. Has a lot of third space fluids.    Seeing lymphedema OT for her abdomen.  Is getting fitted for garments.  Lymphedema is fluctuating in her lower abdomen, thighs and groin.  It is now mainly in her thighs.Will get OT for legs next.          Pt discontinued Victoza before plastic surgery.  We reordered it following surgery but insurance has denied coverage now.  She would like to look at other options.    She is still struggling with nighttime snacking and cravings.  Feels her 3 meals a day are solid.  Sometimes at night when she eats her protein she gets a stuck feeling that inhibits her from eating more. Then she doesn't get her whole cup of food in.  It is these times that she gets hungry shortly after and needs to snack through the rest of the night  due to true hunger.      Has been snacking less in the last week because of gardening in the evening.  She is also doing a home remodel.  Has started shopping at night for this project.  Expects this to  this spring/summer.  This should help decrease her boredom snacking.      AOM Trials  Phentermine- palpations  Qsymia- palpations  Metformin- not effective on own  Victoza- effective but not covered by insurance  Topiramate: not tried but pt has numbness in fingers already and hx of nephrolithiasis    220 lbs 0 oz    Wt Readings from Last 4 Encounters:   05/21/21 220 lb (99.8 kg)   03/18/21 220 lb (99.8 kg)   02/25/21 227 lb 6.4 oz (103.1 kg)   02/10/21 228 lb (103.4 kg)      VITAMINS AND MINERALS:  2 Complete multivitamins with minerals- Rinku's Complete  5000 Int Units of Vitamin D daily   500 mg chew three times of Calcium daily- from MVI by 2 hours  No iron needed  Biotin daily  1 tablet B complex (100 mg thiamine per tablet) -cut in 1/2 (takes 2X per week)  5000 B12 sl twice weekly     OBESITY RELATED CONDITIONS:  MIGUELITO- Using CPAP nightly  GERD- improved  Back Pain- improved    PHYSICAL ACTIVITY:  Type: walking   She has been exercising more.  Just got the ok to do core exercises. Purchased a bean chair to help with abdominal crunches. Is getting 10,000 steps in daily consistently now.  Duration (min): 30     SOCIAL HISTORY:  Pt denies smoking.  Pt denies alcohol use.  Avoids NSAIDS.          LABS/IMAGING/MEDICAL RECORDS REVIEW:   Hemoglobin A1C   Date Value Ref Range Status   02/10/2021 5.1 0 - 5.6 % Final     Comment:     Normal <5.7% Prediabetes 5.7-6.4%  Diabetes 6.5% or higher - adopted from ADA   consensus guidelines.       Vitamin D Deficiency screening   Date Value Ref Range Status   01/18/2021 46 20 - 75 ug/L Final     Comment:     Season, race, dietary intake, and treatment affect the concentration of   25-hydroxy-Vitamin D. Values may decrease during winter months and increase    during summer months. Values 20-29 ug/L may indicate Vitamin D insufficiency   and values <20 ug/L may indicate Vitamin D deficiency.  Vitamin D determination is routinely performed by an immunoassay specific for   25 hydroxyvitamin D3.  If an individual is on vitamin D2 (ergocalciferol)   supplementation, please specify 25 OH vitamin D2 and D3 level determination by   LCMSMS test [id number].       Parathyroid Hormone Intact   Date Value Ref Range Status   01/18/2021 46 18 - 80 pg/mL Final     Vitamin B12   Date Value Ref Range Status   01/18/2021 1,006 (H) 193 - 986 pg/mL Final     Hemoglobin   Date Value Ref Range Status   03/16/2021 11.0 (L) 11.7 - 15.7 g/dL Final     Ferritin   Date Value Ref Range Status   01/18/2021 100 8 - 252 ng/mL Final     Iron   Date Value Ref Range Status   01/18/2021 81 35 - 180 ug/dL Final     Iron Binding Cap   Date Value Ref Range Status   01/18/2021 356 240 - 430 ug/dL Final     Iron Saturation Index   Date Value Ref Range Status   01/18/2021 23 15 - 46 % Final   01/27/2020 19 15 - 46 % Final   07/26/2019 27 15 - 46 % Final     Current Outpatient Medications   Medication     albuterol (PROAIR HFA/PROVENTIL HFA/VENTOLIN HFA) 108 (90 Base) MCG/ACT inhaler     BIOTIN PO     Calcium Citrate-Vitamin D (CALCIUM CITRATE CHEWY BITE PO)     childrens multivitamin w/iron (FLINTSTONES COMPLETE) 60 MG chewable tablet     lifitegrast (XIIDRA) 5 % opthalmic solution     metFORMIN (GLUCOPHAGE-XR) 500 MG 24 hr tablet     naltrexone-bupropion (CONTRAVE) 8-90 MG per 12 hr tablet     nitroGLYcerin (NITRO-BID) 2 % OINT ointment     nystatin (MYCOSTATIN) 226907 UNIT/GM external cream     polyethylene glycol (MIRALAX/GLYCOLAX) packet     triamcinolone (KENALOG) 0.1 % external cream     vitamin (B COMPLEX-C) tablet     vitamin B-12 (CYANOCOBALAMIN) 2500 MCG sublingual tablet     Vitamin D3 (CHOLECALCIFEROL) 25 mcg (1000 units) tablet     No current facility-administered medications for this visit.   "      PHYSICAL EXAMINATION:  Ht 5' 4\" (1.626 m)   Wt 220 lb (99.8 kg)   LMP 10/01/2016 (Approximate)   BMI 37.76 kg/m    GENERAL: Healthy, alert and no distress  EYES: Eyes grossly normal to inspection.  No discharge or erythema, or obvious scleral/conjunctival abnormalities.  RESP: No audible wheeze, cough, or visible cyanosis.  No visible retractions or increased work of breathing.    SKIN: Visible skin clear. No significant rash, abnormal pigmentation or lesions.  NEURO: Cranial nerves grossly intact.  Mentation and speech appropriate for age.  PSYCH: Mentation appears normal, affect normal/bright, judgement and insight intact, normal speech and appearance well-groomed.      ASSESSMENT AND PLAN:      Bariatric surgery status  RYGBS 1/282019 [place]    Class 2 severe obesity due to excess calories with serious comorbidity and body mass index (BMI) of 37.0 to 37.9 in adult (H)  We discussed healthy habits to assist with weight loss. We discussed other medications  that may assist with weight loss. Contrave was prescribed. Risks/ benefits and possible side effects were discussed and questions were answered. Written information given in AVS. IF this is not affordable will do generic Wellbutrin and Naltrexone separate.      - naltrexone-bupropion (CONTRAVE) 8-90 MG per 12 hr tablet; Wk 1: 1 tab in AM daily, Wk 2: 1 tab in AM and 1 tab PM, Wk 3: 2 tabs in AM and 1 tab in PM, Wk 4 and on: 2 tabs in AM and 2 tabs PM.    Postsurgical malabsorption  Continue taking recommended post-op vitamins.      Follow up: Return to clinic in 2 months for med recheck.     50 minutes spent on the date of the encounter doing chart review, interpretation of tests, patient visit and documentation           "

## 2021-05-21 NOTE — Clinical Note
I ordered bupropion and naltrexone separately.  Can you please let her she can do this, if the Contrave is too expensive.  Thanks.

## 2021-05-21 NOTE — PATIENT INSTRUCTIONS
Visit [email or web address] and sign up for the Pharmacy savings program and receive the medication for $60-70 per month for 12 months if eligible.      MEDICATION STARTED AT THIS APPOINTMENT    We are starting Contrave. Contrave is specifically prescribed for obesity. It is a combination of two medications, Naltrexone and Bupropione (Wellbutrin). The Bupropion helps lessen appetite and the Naltrexone works by blocking certain receptors in the brain and curbing cravings.      Dosing as follows:  Week 1- 1 tablet in the morning  Week 2- 1 tablet in the morning and 1 tablet at bedtime  Week 3- 2 tablets in the morning and 1 tablet at bedtime  Week 4 and thereafter- 2 tablets in the morning and 2 tablets at bedtime    For some of our patients the medication works right away. Other patients don't feel much of a change but find they've lost weight. Like all weight loss medications, Contrave works best when you help it work. This means:  1. Having less tempting high calorie (fattening) food around the house or office. (For people with strong cravings this is very important.)   2. Staying away from situations or people that may trigger your cravings .   3. Eating out only one time or less each week.  4. Eating your meals at a table with the TV or computer off.    WARNING: This medication blocks the action of opioid type pain medications. If you routinely take any medication like Codeine, Oxycontin, Percocet, Morphine, Dilaudid or Methodone, do not take this until you have talked with weight management staff. If you are planning surgery you should stop Naltrexone 4 days prior to the surgery, and should not restart it until 4 days after your last dose of narcotics. If you have an injury that requires pain medication, make sure the health care staff knows you take Naltrexone.     Side-effects: The most common side effect include: nausea; constipation; headache; vomiting; dizziness; diarrhea; trouble sleeping; and dry mouth.      This medication typically isn t covered by insurance and will require a prior authorization, which can take 1-2 weeks to review. Patients can visit [email or web address] and sign up for the Pharmacy savings program and receive the medication for $60-70 per month for 12 months if eligible.    Call the nurse at [phone] if you have any questions or concerns. (Do not stop taking it if you don't think it's working. For some people it works without them knowing it.)          Bariatric Post Op Guidelines  General:    To avoid marginal ulcers avoid all forms of tobacco, alcohol in excess, caffeine, and NSAIDS (unless indicated for cardioprotection or othewise and opposed by a PPI).    Exercise is key for continued weight loss and weight maintenance. Aim for 30-60 minutes of physical activity most days of the week. Include cardiovascular and strength training.    Continue lifelong vitamins supplementation and annual lab follow up.  All  patients should supplement with the following bariatric postoperative vitamins:  2 Complete multivitamins with minerals (at different times than calcium)  Vitamin D 5000 Int Units/125 mg daily   Calcium 600 mg twice daily or 500 mg hree times daily   Vitamin B12: 500 mcg of sl daily or 1000 mcg Inj monthly  B complex daily or Thiamine 100 mg weekly  1 Iron/Vit C. Daily for females who menstruate and/or as directed    The bariatric team should be aware and potentially evaluate all adverse gastrointestinal symptoms which can be a sign of complication. Inability to tolerate textured solid food (chicken, steak, fish) may need to be evaluated by endoscopy.    There is a 10% increase of Alcohol Use Disorder in patients with bariatric surgery.   Most often occurring around 2 years post op.  Please call the clinic if you feel alcohol is interfering in your daily life.  We can help.     Follow up annually lifelong. Obesity is a chronic disease.  Weight gain can be expected. The goal of follow-up  visits is to ensure adequate vitamin and protein absorption, evaluate food intake behavior, review exercise/activity level, and assist with weight regain.    Nutritional:  Eat 3 meals per day  (No snacks between meals.)  Do not skip meals.  This can cause overeating at the next meal and will prevent adequate protein and nutritional intake.    Aim for 60-80 grams of protein per day.  Always eat your protein first. This assists with optimal nutrition and helps you stay full longer.    Eat your protein first, and then follow with fiber.    Add fiber by including fruits, vegetables, whole grains, and beans.     Portions should be about 1 cup per meal. Use measuring cups to be accurate.  Continue to use saucer/salad plates, infant/toddler silverware to keep portion sizes small and take small bites.    Eat S-L-O-W-L-Y to make each meal last 20-30 minutes. Always stop eating when satisfied.    Aim for 64 oz. of calorie-free fluids daily.    Avoid drinking 30 min before, during, and 30 min after meal    Avoid high sugar and high fat foods to prevent high calorie intake. This will reduce your rate of weight loss and can cause weight regain.   Check nutrition labels for less than 10 grams of sugar and less than 10 grams of fat per serving.

## 2021-06-17 ENCOUNTER — TELEPHONE (OUTPATIENT)
Dept: SLEEP MEDICINE | Facility: CLINIC | Age: 51
End: 2021-06-17

## 2021-06-17 NOTE — TELEPHONE ENCOUNTER
Reason for call:  Other   Patient called regarding (reason for call): CPAP recall   Additional comments: Patient want to know if she needs to stop using the machine and what to do until replacement. Pt also want to know if she qualified for new cpap    Phone number to reach patient:  Cell number on file:    Telephone Information:   Mobile [phone]       Best Time:  any    Can we leave a detailed message on this number?  YES    Travel screening: Not Applicable

## 2021-07-06 NOTE — PLAN OF CARE
"  History     Chief Complaint   Patient presents with     Epistaxis     HPI  Duane H Telecky is a 78 year old male presently on Plavix and Xarelto who presents to the emergency room with bleeding from the left side of his nasal bridge.  He stated that he had a sunburn on his nose and he rubbed it to get the sunburn off and he started to bleed.  He has not been able to get it stopped and came into be evaluated.  He denied any other symptoms.  Not feeling lightheaded or dizzy.    Allergies:  Allergies   Allergen Reactions     Lisinopril      angioedema     Furosemide Rash     Rash and itchy     Oxycodone Visual Disturbance     \"I could see people trying to kill me\"     Amlodipine Unknown     Chlorthalidone      Dose over 12.5 mg daily causes creatinine to increase       Problem List:    Patient Active Problem List    Diagnosis Date Noted     Dysuria      Priority: Medium     Gross hematuria      Priority: Medium     Atrial fibrillation (H)      Priority: Medium     Atrial flutter (H)      Priority: Medium     Acute deep vein thrombosis (DVT) of left upper extremity, unspecified vein (H)      Priority: Medium     S/P CABG (coronary artery bypass graft)      Priority: Medium     ASCVD (arteriosclerotic cardiovascular disease)      Priority: Medium     Subclavian artery stenosis, left (H)      Priority: Medium     Stable angina (H)      Priority: Medium     Ascending aortic aneurysm (H)      Priority: Medium     Microalbuminuria      Priority: Medium     Chronic midline low back pain without sciatica      Priority: Medium     Type 2 diabetes mellitus (H)      Priority: Medium     Chronic kidney disease (CKD), stage III (moderate) 03/08/2018     Priority: Medium     Fasting hyperglycemia 03/08/2018     Priority: Medium     Hyperlipidemia 03/08/2018     Priority: Medium     Hypertension 03/08/2018     Priority: Medium     Obesity 03/08/2018     Priority: Medium     Onychocryptosis 03/08/2018     Priority: Medium     Sleep " A+O AVSS on CPAP overnight. LS clear, IS to 1500. BS hypo, flatus-. Lap sites w/ steri strips and dried drainage. Denies nausea. Dilaudid for pain. Up SBA. Voiding adequately.   apnea 2018     Priority: Medium     Overview:   Well treated with CPAP       Gout 12/15/2015     Priority: Medium     Atypical chest pain 2013     Priority: Medium     Diverticulosis of sigmoid colon 10/08/2013     Priority: Medium     H/O adenomatous polyp of colon 10/08/2013     Priority: Medium     Hemorrhoid prolapse 10/08/2013     Priority: Medium     Rectal bleeding 10/07/2013     Priority: Medium     Allergic rhinitis 2011     Priority: Medium     Onychomycosis 2010     Priority: Medium        Past Medical History:    Past Medical History:   Diagnosis Date     Acute kidney failure (H)      Acute tubulo-interstitial nephritis      Angina pectoris (H)      Benign neoplasm of colon      Chronic kidney disease, stage III (moderate)      Essential (primary) hypertension      Gout      Hyperlipidemia      Impaired fasting glucose      Obesity      Obesity      Other chest pain      Sleep apnea        Past Surgical History:    Past Surgical History:   Procedure Laterality Date     APPENDECTOMY OPEN      No Comments Provided     COLONOSCOPY      ,,F/U      EXTRACAPSULAR CATARACT EXTRATION WITH INTRAOCULAR LENS IMPLANT      No Comments Provided     OTHER SURGICAL HISTORY      10/8/13,185686,OTHER       Family History:    Family History   Problem Relation Age of Onset     Diabetes Mother         Diabetes     Diabetes Father         Diabetes     Diabetes Brother         Diabetes     Diabetes Brother         Diabetes       Social History:  Marital Status:  Single [1]  Social History     Tobacco Use     Smoking status: Former Smoker     Packs/day: 1.00     Years: 25.00     Pack years: 25.00     Types: Cigarettes     Quit date: 1990     Years since quittin.5     Smokeless tobacco: Never Used   Substance Use Topics     Alcohol use: Yes     Alcohol/week: 5.0 standard drinks     Frequency: 2-4 times a month     Drinks per session: 1 or 2     Comment: Alcoholic Drinks/day: on  "occasion     Drug use: Unknown     Types: Other     Comment: Drug use: No        Medications:    amLODIPine (NORVASC) 10 MG tablet  aspirin (ASA) 81 MG chewable tablet  atorvastatin (LIPITOR) 40 MG tablet  carvedilol (COREG) 25 MG tablet  clopidogrel (PLAVIX) 75 MG tablet  furosemide (LASIX) 20 MG tablet  rivaroxaban ANTICOAGULANT (XARELTO) 15 MG TABS tablet  allopurinol (ZYLOPRIM) 300 MG tablet  amiodarone (PACERONE) 200 MG tablet  blood glucose (ACCU-CHEK MAIDA PLUS) test strip  blood glucose monitoring (SOFTCLIX) lancets  magnesium oxide (MAG-OX) 400 (241.3 Mg) MG tablet  metoprolol tartrate (LOPRESSOR) 25 MG tablet  nitroGLYcerin (NITROSTAT) 0.4 MG sublingual tablet  ondansetron (ZOFRAN) 4 MG tablet  potassium chloride ER (K-TAB/KLOR-CON) 10 MEQ CR tablet  Vitamin D, Cholecalciferol, 25 MCG (1000 UT) CAPS          Review of Systems   HENT:        Bleeding from skin of nasal bridge   All other systems reviewed and are negative.      Physical Exam   BP: (!) 178/88  Pulse: 66  Temp: 97.5  F (36.4  C)  Resp: 18  Height: 167.6 cm (5' 6\")  Weight: 101.5 kg (223 lb 12.3 oz)  SpO2: 97 %      Physical Exam  Vitals signs and nursing note reviewed.   Constitutional:       Appearance: Normal appearance. He is normal weight.   HENT:      Nose: Nose normal.      Comments: Brisk bleeding from the left side of the nasal bridge.Not stopping with pressure.   Neck:      Musculoskeletal: Normal range of motion and neck supple.   Neurological:      Mental Status: He is alert.       ED Course   Patient seen and examined.  Silver nitrate applied to the area of bleeding and it did not appear that it was going to stop.  We prepared for possible use of Xarelto or skin hemostatic pad, but we also applied additional pressure to that area using a nasal clamp.  Patient observed for about half hour and we gently removed the nasal clamp as well as the pad and there was no obvious bleeding.  We observe the patient for another 15 minutes and he " did not have any bleeding even after eating or drinking.  Patient was comfortable with discharge at this time.  He was told that it still may return bleeding.  And if he has a recurrence he should return to the ER.  He was comfortable with the plan.  Patient did not have any other questions.     Procedures    No results found for this or any previous visit (from the past 24 hour(s)).    Medications   tranexamic acid (CYKLOKAPRON) TOPICAL (injection used topically) (has no administration in time range)   silver nitrate (ARZOL) Misc ( Topical Given by Other Clinician 7/6/21 4180)       Assessments & Plan (with Medical Decision Making)     I have reviewed the nursing notes.    I have reviewed the findings, diagnosis, plan and need for follow up with the patient.    Discharge Medication List as of 7/6/2021  9:50 AM          Final diagnoses:   Bleeding from the nose   Adverse drug effect, initial encounter       7/6/2021   [facility]  07/06/21 [id number]

## 2021-07-09 ENCOUNTER — HOSPITAL ENCOUNTER (OUTPATIENT)
Dept: OCCUPATIONAL THERAPY | Facility: CLINIC | Age: 51
Setting detail: THERAPIES SERIES
End: 2021-07-09
Attending: PEDIATRICS
Payer: COMMERCIAL

## 2021-07-09 PROCEDURE — 97140 MANUAL THERAPY 1/> REGIONS: CPT | Mod: GO

## 2021-07-09 NOTE — PROGRESS NOTES
Outpatient Occupational Therapy Discharge Note     Patient: Ana Wyman  : 1970    Beginning/End Dates of Reporting Period:  3/24/21 to 2021    Referring Provider: Dr. Kelly Bedoya    Therapy Diagnosis: lymphedema    Client Self Report:       Objective Measurements: see flowsheet                                                        Outcome Measures (most recent score):  Lymphedema Life Impact Scale (score range 0-72). A higher score indicates greater impairment.: 14 post score from 36 pre score indicates benefit of serivces    Goals:   Goal Identifier 1   Goal Description Pt will be independent with lymphedema home program modifications like compression, swell spot, MLD, ex   Target Date 21   Date Met  21   Progress:     Goal Identifier 2   Goal Description Pt will reduce pain in donning/doffing shoes to 2/10 with less swelling on abdomen.   Target Date 21   Date Met  21   Progress:     Goal Identifier     Goal Description     Target Date     Date Met      Progress:     Goal Identifier     Goal Description     Target Date     Date Met      Progress:     Goal Identifier     Goal Description     Target Date     Date Met      Progress:     Goal Identifier     Goal Description     Target Date     Date Met      Progress:     Goal Identifier     Goal Description     Target Date     Date Met      Progress:     Goal Identifier     Goal Description     Target Date     Date Met      Progress:     Progress Toward Goals:   Progress this reporting period: Pt has built I with use self MLD, ex, and compression to aide trunkal and LB lymphedema management. Pt reports less pain and better movement and motion and has built skills with scar tissue massage and remodeling. Pt I with cares and home management; no further expectation progress. Pt to be discharged.      Plan:  Discharge from therapy.    Discharge:    Reason for Discharge: Patient has met all goals.  No further expectation of  progress.    Equipment Issued: trunkal and LB compression    Discharge Plan: Patient to continue home program.

## 2021-08-23 ENCOUNTER — HOSPITAL ENCOUNTER (OUTPATIENT)
Dept: MAMMOGRAPHY | Facility: CLINIC | Age: 51
Discharge: HOME | End: 2021-08-23
Attending: PEDIATRICS | Admitting: PEDIATRICS
Payer: COMMERCIAL

## 2021-08-23 DIAGNOSIS — Z12.31 VISIT FOR SCREENING MAMMOGRAM: ICD-10-CM

## 2021-08-23 PROCEDURE — 77063 BREAST TOMOSYNTHESIS BI: CPT

## 2021-09-06 ENCOUNTER — NURSE TRIAGE (OUTPATIENT)
Dept: NURSING | Facility: CLINIC | Age: 51
End: 2021-09-06

## 2021-09-06 NOTE — TELEPHONE ENCOUNTER
Pt called, sx reviewed.  Nauseated at first, now subsided.    Main sx are head pain, headache, difficulty sleeping.     Tylenol for pain.    No OTC meds tried for sleep.     Recommend:  Tylenol PM for sleep  Tylenol during the day  (4,000 mg total acetaminophen limit per 24 hours).    Discussed sx for which she should be seen urgently    If sx are not starting to improve, call clinic early tomorrow to get an appointment scheduled.

## 2021-09-06 NOTE — TELEPHONE ENCOUNTER
TRIAGE CALL thinks she has a concussion -    She was hiking on Saturday,  hit head on the rock on the right side and hurt her elbow and arm as well.  Since then she has been feeling off has headaches and she is unable to sleep. She also reports:   Right side ear pain    Right side head ache   Righ side pain behind eye ( temple )     She goes to clinic in [place] has a PCP Kelly Bedoya MD- PCP - General      Symptoms/concern started Saturday night   today she took tylenol 10:30 pain 7/10  Pain 6/10 right now  Location and description of pain elbow, sholder, right ear and  right head     Medications taken today tylenol 2 tab 500 every 6 hrs  and lidocaine cream on shoulder     Patient denies difficulty with breathing, chest pain, fever, nausea, vomiting.     Per protocol, disposition to 2LT     RN Paged Rupesh Mendoza on call  at 12:37     Care advise given, patients questions were answered  Reminded we will be here 24/7 and can call back if symptoms worsen    Reason for Disposition    [1] SEVERE headache AND [2] not improved 2 hours after pain medicine/ice packs    Additional Information    Negative: [1] Diagnosed with concussion AND [2] within last 14 days    Negative: [1] Traumatic brain injury (mTBI; concussion) AND [2] more than 14 days since head injury    Negative: [1] ACUTE NEURO SYMPTOM AND [2] present now  (DEFINITION: difficult to awaken OR confused thinking and talking OR slurred speech OR weakness of arms OR unsteady walking)    Negative: Knocked out (unconscious) > 1 minute    Negative: Seizure (convulsion) occurred  (Exception: prior history of seizures and now alert and without Acute Neuro Symptoms)    Negative: Penetrating head injury (e.g., knife, gun shot wound, metal object)    Negative: [1] Major bleeding (e.g., actively dripping or spurting) AND [2] can't be stopped    Negative: [1] Dangerous mechanism of injury (e.g., MVA, diving, trampoline, contact sports, fall > 10 feet or 3 meters) AND  "[2] NECK pain AND [3] began < 1 hour after injury    Negative: Sounds like a life-threatening emergency to the triager    Negative: Can't remember what happened (amnesia)    Negative: Vomiting once or more    Negative: [1] Loss of vision or double vision AND [2] present now    Negative: Watery or blood-tinged fluid dripping from the NOSE or EARS now  (Exception: tears from crying or nosebleed from nasal trauma)    Negative: [1] One or two \"black eyes\" (bruising, purple color of eyelids) AND [2] onset within 24 hours of head injury    Negative: Large swelling or bruise > 2 inches (5 cm)    Negative: Skin is split open or gaping  (or length > 1/2 inch or 12 mm)    Negative: [1] Bleeding AND [2] won't stop after 10 minutes of direct pressure (using correct technique)    Negative: Sounds like a serious injury to the triager    Negative: [1] ACUTE NEURO SYMPTOM AND [2] now fine  (DEFINITION: difficult to awaken OR confused thinking and talking OR slurred speech OR weakness of arms OR unsteady walking)    Negative: [1] Knocked out (unconscious) < 1 minute AND [2] now fine    Protocols used: HEAD INJURY-A-AH      "

## 2021-09-13 NOTE — PROGRESS NOTES
Ana is a 51 year old who is being evaluated via a billable video visit.      How would you like to obtain your AVS? MyChart  If the video visit is dropped, the invitation should be resent by: Text to cell phone: [phone]  Will anyone else be joining your video visit? No      Video Start Time: 10:02 AM  Video-Visit Details    Type of service:  Video Visit    Video End Time:10:43 AM    Originating Location (pt. Location): Home    Distant Location (provider location):  [facility] SLEEP [facility]     Platform used for Video Visit: Proenza Schouer    CPAP Follow-Up Visit:    Date on this visit: 9/14/2021    Ana Wyman has a follow-up visit today for continued management of previously diagnosed MIGUELITO.  Her medical history is significant for s/p gastric bypass (1/28/2019).      She was initially diagnosed at [facility] on 4/29/2005. Her AHI was 122.9/hr, .3/hr, O2 maty 77%. CPAP 10 cm was effective. Her wt was 300#.      She had a CPAP titration sleep study at [facility] on 8/21/2009. CPAP was titrated to 9 cm, but her RDI was still 8.7/hr (AHI was <1). Her weight was 317#.    She was using her regular CPAP until finding out about the recall. She switched back to an older machine not on the recall. She did register her machine with [employer].     PAP machine: [employer]. Pressure settings: 6 cm    In Care , her last use was 8/17/2020.    Her dentist notices grinding and wants her to use a bite guard.     Her machine is set to 9 cm.  We got some numbers off of her machine, but it is unclear if they are representative of her current usage.  The compliance data shows that the patient used the CPAP for 23/30 nights.  The 90th% pressure is 9 cm.  The average time in large leak is 45.7 lpm.  The average nightly usage is 5.54 hrs.  The average AHI is 0.8/hr.       Interface:  Mask: nasal pillows mask  Chin strap: No  Leak: Yes-into her right eye  Using Humidifier: Yes, unless  travelling  Condensation in hose or mask: No     Difficulties with therapy:    [-] Snoring with CPAP:  [-] Difficulty tolerating the pressure:  [-] Epistaxis/dry nose:   [-] Nasal congestion:  [-] Dry mouth: yes, especially when not using the humidifier. Often has to wake to get some water.  [+] Mouth breathing: likely  [-] Pain/skin breakdown:      Improvements noted with CPAP: she does sleep pretty well without it. She had tried going a few days without it and noticed being a little more tired.   [+] waking up more refreshed  [+] sleeping better with less arousals  [+] improved energy level during the day    Weight change since sleep study: stable in the last year around 215 lbs    Bedtime: 10:30-11 PM.  Wake time: 7 AM. Falls asleep in variable, but can be 60-90 minutes. She has been falling asleep faster since having a concussion, but has been waking more. Wakes 3 times per night for 5 to 60 minutes. Reason for waking: dry mouth, uncertain  Naps: only since the concussion.       Past medical/surgical history, family history, social history, medications and allergies were reviewed.      Problem List:  Patient Active Problem List    Diagnosis Date Noted     Symptomatic abdominal panniculus 02/25/2021     Priority: Medium     Family history of malignant neoplasm of breast 10/05/2020     Priority: Medium     Insulin resistance 09/28/2020     Priority: Medium     Postural hypotension 07/29/2019     Priority: Medium     Intertrigo 07/29/2019     Priority: Medium     Class 2 severe obesity due to excess calories with serious comorbidity and body mass index (BMI) of 35.0 to 35.9 in adult (H) 07/29/2019     Priority: Medium     S/P laparoscopic cholecystectomy 04/18/2019     Priority: Medium     Bariatric surgery status 02/05/2019     Priority: Medium     Postsurgical malabsorption 02/05/2019     Priority: Medium     Fatty liver 07/27/2018     Priority: Medium     Mild intermittent asthma without complication 06/11/2018      Priority: Medium     Lymphedema bilateral with mixed lipedema. 09/30/2015     Priority: Medium     Baker's cyst of knee 09/03/2015     Priority: Medium     Acanthosis nigricans 03/24/2015     Priority: Medium     Cutaneous skin tags 03/24/2015     Priority: Medium     MIGUELITO on CPAP 03/19/2013     Priority: Medium     Sleep study in 2004 and 2012         Uterine fibroid 08/02/2012     Priority: Medium     Lichen sclerosus 07/14/2011     Priority: Medium     Family history of malignant neoplasm of genital organ, other 06/11/2007     Priority: Medium     FAMILY HX GI MALIGNANCY 05/31/2007     Priority: Medium     Hypersomnia with sleep apnea 07/06/2005     Priority: Medium     Problem list name updated by automated process. Provider to review       Esophageal reflux 09/27/2004     Priority: Medium     Allergic state 09/27/2004     Priority: Medium     Problem list name updated by automated process. Provider to review          Impression/Plan:    (G47.33,  Z99.89) MIGUELITO on CPAP  (primary encounter diagnosis)  Comment: Ana presents with questions about the recall and about her machines. She has been using an old machine set at 9 cm, when her regular machine was set at 6 cm. She has been waking with more dry mouth and sometimes removes the mask. Her pressure may be too high. She also notices mask leak into her eye. She would like new supplies. She also was advised to get a bite guard for bruxism.  Plan: Comprehensive DME         I walked her through setting her machine to 8 cm. If still waking with dry mouth, she can turn her pressures down further. She was shown how to track her AHI and make pressure adjustments. I advised her to let me know if she makes further adjustments. An order was placed for new supplies. She was given a link to a [employer] video reviewing use of the IronPearl 2, when she gets that replacement machine. I placed an order for a home study to reassess her apnea baseline to see if she is a  candidate for a dental appliance for MIGUELITO since she was advised to get a bite guard. She was advised to go off of CPAP for 3 days prior to the test.     She will follow up with me in about 2 week(s) after the test.     43 minutes were spent on the date of the encounter doing chart review, history and exam, documentation and further activities as noted above.     Bennett Goltz, PA-C    CC: No ref. provider found

## 2021-09-14 ENCOUNTER — VIRTUAL VISIT (OUTPATIENT)
Dept: SLEEP MEDICINE | Facility: CLINIC | Age: 51
End: 2021-09-14
Payer: COMMERCIAL

## 2021-09-14 DIAGNOSIS — G47.33 OSA ON CPAP: Primary | ICD-10-CM

## 2021-09-14 PROCEDURE — 99215 OFFICE O/P EST HI 40 MIN: CPT | Mod: 95 | Performed by: PHYSICIAN ASSISTANT

## 2021-09-14 NOTE — PATIENT INSTRUCTIONS
Watch the following tutorial on how to use the Dreamstation 2 when you get it.  https://youtu.be/[email or web address]

## 2021-09-18 ENCOUNTER — HEALTH MAINTENANCE LETTER (OUTPATIENT)
Age: 51
End: 2021-09-18

## 2021-09-23 ENCOUNTER — TELEPHONE (OUTPATIENT)
Dept: SLEEP MEDICINE | Facility: CLINIC | Age: 51
End: 2021-09-23

## 2021-09-27 ENCOUNTER — TELEPHONE (OUTPATIENT)
Dept: SLEEP MEDICINE | Facility: CLINIC | Age: 51
End: 2021-09-27

## 2021-09-27 DIAGNOSIS — G47.33 OSA (OBSTRUCTIVE SLEEP APNEA): Primary | ICD-10-CM

## 2021-09-27 NOTE — TELEPHONE ENCOUNTER
Patient came to virtual set up via telephone visit for mask fitting appointment on September 27, 2021. Patient requested to switch masks because poor seal/leak. Discussed the following masks: DREAMWEAR SILICONE PILLOWS Patient selected a [employer] Mask name: DREAMWEAR SILICONE PILLOWS Pillow mask size Standard

## 2021-10-11 ENCOUNTER — DOCUMENTATION ONLY (OUTPATIENT)
Dept: SURGERY | Facility: CLINIC | Age: 51
End: 2021-10-11

## 2021-10-18 ENCOUNTER — TELEPHONE (OUTPATIENT)
Dept: SURGERY | Facility: CLINIC | Age: 51
End: 2021-10-18

## 2021-10-18 DIAGNOSIS — E66.01 CLASS 2 SEVERE OBESITY DUE TO EXCESS CALORIES WITH SERIOUS COMORBIDITY AND BODY MASS INDEX (BMI) OF 37.0 TO 37.9 IN ADULT (H): ICD-10-CM

## 2021-10-18 DIAGNOSIS — E66.812 CLASS 2 SEVERE OBESITY DUE TO EXCESS CALORIES WITH SERIOUS COMORBIDITY AND BODY MASS INDEX (BMI) OF 37.0 TO 37.9 IN ADULT (H): ICD-10-CM

## 2021-10-18 NOTE — TELEPHONE ENCOUNTER
Prior Authorization Retail Medication Request    Medication/Dose: victoza 18 mg/ 3 ml   ICD code (if different than what is on RX):    Previously Tried and Failed:    Rationale:      Insurance Name:  [facility]  Insurance ID:  [id number]       Pharmacy Information (if different than what is on RX)  Name:  Pharmacy name wasn't listed  Phone:  [phone]

## 2021-10-20 NOTE — TELEPHONE ENCOUNTER
Central Prior Authorization Team   Phone: [phone]      PA Initiation    Medication: victoza 18 mg/ 3 ml - INITIATED  Insurance Company: [employer] [place] - Phone [phone] Fax [phone]  Pharmacy Filling the Rx: [place], [place] - [address] [facility]  Filling Pharmacy Phone: [phone]  Filling Pharmacy Fax: [phone]  Start Date: 10/20/2021

## 2021-10-22 NOTE — TELEPHONE ENCOUNTER
PRIOR AUTHORIZATION DENIED    Medication: victoza 18 mg/ 3 ml - DENIED    Denial Date: 10/21/2021    Denial Rational: CRITERIA FOR COVERAGE NOT MET = SEE MORE BELOW       Appeal Information:

## 2021-10-25 ENCOUNTER — LAB (OUTPATIENT)
Dept: LAB | Facility: CLINIC | Age: 51
End: 2021-10-25
Payer: COMMERCIAL

## 2021-10-25 DIAGNOSIS — E88.819 INSULIN RESISTANCE: ICD-10-CM

## 2021-10-25 LAB
ALBUMIN SERPL-MCNC: 3.6 G/DL (ref 3.4–5)
ALP SERPL-CCNC: 100 U/L (ref 40–150)
ALT SERPL W P-5'-P-CCNC: 29 U/L (ref 0–50)
ANION GAP SERPL CALCULATED.3IONS-SCNC: 6 MMOL/L (ref 3–14)
AST SERPL W P-5'-P-CCNC: 19 U/L (ref 0–45)
BILIRUB SERPL-MCNC: 0.4 MG/DL (ref 0.2–1.3)
BUN SERPL-MCNC: 20 MG/DL (ref 7–30)
CALCIUM SERPL-MCNC: 9.3 MG/DL (ref 8.5–10.1)
CHLORIDE BLD-SCNC: 108 MMOL/L (ref 94–109)
CO2 SERPL-SCNC: 28 MMOL/L (ref 20–32)
CREAT SERPL-MCNC: 0.81 MG/DL (ref 0.52–1.04)
GFR SERPL CREATININE-BSD FRML MDRD: 84 ML/MIN/1.73M2
GLUCOSE BLD-MCNC: 111 MG/DL (ref 70–99)
POTASSIUM BLD-SCNC: 3.9 MMOL/L (ref 3.4–5.3)
PROT SERPL-MCNC: 7 G/DL (ref 6.8–8.8)
SODIUM SERPL-SCNC: 142 MMOL/L (ref 133–144)

## 2021-10-25 PROCEDURE — 82040 ASSAY OF SERUM ALBUMIN: CPT

## 2021-10-25 PROCEDURE — 36415 COLL VENOUS BLD VENIPUNCTURE: CPT

## 2021-10-28 RX ORDER — NALTREXONE HYDROCHLORIDE 50 MG/1
TABLET, FILM COATED ORAL
Qty: 180 TABLET | Refills: 0 | Status: SHIPPED | OUTPATIENT
Start: 2021-10-28 | End: 2022-02-07

## 2021-10-28 RX ORDER — BUPROPION HYDROCHLORIDE 150 MG/1
150 TABLET, EXTENDED RELEASE ORAL 2 TIMES DAILY
Qty: 180 TABLET | Refills: 0 | Status: SHIPPED | OUTPATIENT
Start: 2021-10-28 | End: 2022-04-19

## 2021-11-13 ENCOUNTER — HEALTH MAINTENANCE LETTER (OUTPATIENT)
Age: 51
End: 2021-11-13

## 2021-12-08 ENCOUNTER — OFFICE VISIT (OUTPATIENT)
Dept: SLEEP MEDICINE | Facility: CLINIC | Age: 51
End: 2021-12-08
Payer: COMMERCIAL

## 2021-12-08 DIAGNOSIS — G47.33 OSA ON CPAP: ICD-10-CM

## 2021-12-08 PROCEDURE — G0399 HOME SLEEP TEST/TYPE 3 PORTA: HCPCS | Performed by: INTERNAL MEDICINE

## 2021-12-09 ENCOUNTER — DOCUMENTATION ONLY (OUTPATIENT)
Dept: SLEEP MEDICINE | Facility: CLINIC | Age: 51
End: 2021-12-09
Payer: COMMERCIAL

## 2021-12-09 NOTE — PROGRESS NOTES
HST POST-STUDY QUESTIONNAIRE    1. What time did you go to bed?  10:45 p.m..  2. How long do you think it took to fall asleep?  30 min  3. What time did you wake up to start the day?  6:50 a.m.  4. Did you get up during the night at all?  yes  5. If you woke up, do you remember approximately what time(s)? 2:10 a.m., 4:40 a.m., 6:15 a.m.  6. Did you have any difficulty with the equipment?  No  7. Did you us any type of treatment with this study?  None  8. Was the head of the bed elevated? Yes - I have an adjustable bed and keep head up a little bit.  9. Did you sleep in a recliner?  No  10. Did you stop using CPAP at least 3 days before this test?  No 2 Days  11. Any other information you'd like us to know?

## 2021-12-13 ENCOUNTER — OFFICE VISIT (OUTPATIENT)
Dept: SLEEP MEDICINE | Facility: CLINIC | Age: 51
End: 2021-12-13
Payer: COMMERCIAL

## 2021-12-13 DIAGNOSIS — G47.33 OSA ON CPAP: Primary | ICD-10-CM

## 2021-12-14 ENCOUNTER — DOCUMENTATION ONLY (OUTPATIENT)
Dept: SLEEP MEDICINE | Facility: CLINIC | Age: 51
End: 2021-12-14
Payer: COMMERCIAL

## 2021-12-15 NOTE — PROCEDURES
"HOME SLEEP STUDY INTERPRETATION    Patient: Ana Wyman  MRN: [id number]  YOB: 1970  Study Date: 12/13/2021  PCP/Referring Provider: Kelly Bedoya;   Ordering Provider: Bennett Goltz, PA-C     Indications for Home Study: Ana Wyman is a 51 year old female with a previous diagnosis of severe MIGUELITO, s/p weight loss.     Estimated body mass index is 37.93 kg/m  as calculated from the following:    Height as of 10/15/21: 1.626 m (5' 4\").    Weight as of 10/15/21: 100.2 kg (221 lb).  Total score - [place]: 6 (9/13/2021  9:02 AM)    Data: A full night home sleep study was performed recording the standard physiologic parameters including body position, movement, sound, nasal pressure, thermal oral airflow, chest and abdominal movements with respiratory inductance plethysmography, and oxygen saturation by pulse oximetry. Pulse rate was estimated by oximetry recording. This study was considered adequate based on > 4 hours of quality oximetry and respiratory recording. As specified by the AASM Manual for the Scoring of Sleep and Associated events, version 2.3, Rule VIII.D 1B, 4% oxygen desaturation scoring for hypopneas is used as a standard of care on all home sleep apnea testing.    Analysis Time:  468 minutes    Respiration:   Sleep Associated Hypoxemia: sustained hypoxemia was not present. Baseline oxygen saturation was 94%.  Time with saturation less than or equal to 88% was 0 minutes. The lowest oxygen saturation was 88%.   Snoring: Snoring was present.  Respiratory events: The home study revealed a presence of 1 obstructive apneas and 1 mixed and central apneas. There were 5 hypopneas resulting in a combined apnea/hypopnea index [AHI] of 0.9 events per hour.  AHI was 1.6 per hour supine, - per hour prone, 1 per hour on left side, and 0.5 per hour on right side.   Pattern: Excluding events noted above, respiratory rate and pattern was Normal.    Position: Percent of time spent: supine " - 23.7%, prone - 0%, on left - 24.8%, on right - 50%.    Heart Rate: By pulse oximetry normal rate was noted.     Assessment:   Negative for obstructive sleep apnea.  Sleep associated hypoxemia was not present.    Recommendations:    If clinical concern for sleep apnea remains, consider in lab PSG for assessment.   Suggest optimizing sleep hygiene and avoiding sleep deprivation.  Weight management.    Diagnosis Code(s): Snoring R06.83    Oziel Cleaning MD, December 15, 2021   Diplomate, American Board of Psychiatry and Neurology, Sleep Medicine

## 2021-12-16 ASSESSMENT — SLEEP AND FATIGUE QUESTIONNAIRES
HOW LIKELY ARE YOU TO NOD OFF OR FALL ASLEEP WHILE WATCHING TV: MODERATE CHANCE OF DOZING
HOW LIKELY ARE YOU TO NOD OFF OR FALL ASLEEP WHILE SITTING AND TALKING TO SOMEONE: WOULD NEVER DOZE
HOW LIKELY ARE YOU TO NOD OFF OR FALL ASLEEP IN A CAR, WHILE STOPPED FOR A FEW MINUTES IN TRAFFIC: WOULD NEVER DOZE
HOW LIKELY ARE YOU TO NOD OFF OR FALL ASLEEP WHILE SITTING INACTIVE IN A PUBLIC PLACE: WOULD NEVER DOZE
HOW LIKELY ARE YOU TO NOD OFF OR FALL ASLEEP WHILE LYING DOWN TO REST IN THE AFTERNOON WHEN CIRCUMSTANCES PERMIT: MODERATE CHANCE OF DOZING
HOW LIKELY ARE YOU TO NOD OFF OR FALL ASLEEP WHILE SITTING AND READING: MODERATE CHANCE OF DOZING
HOW LIKELY ARE YOU TO NOD OFF OR FALL ASLEEP WHILE SITTING QUIETLY AFTER LUNCH WITHOUT ALCOHOL: WOULD NEVER DOZE
HOW LIKELY ARE YOU TO NOD OFF OR FALL ASLEEP WHEN YOU ARE A PASSENGER IN A CAR FOR AN HOUR WITHOUT A BREAK: SLIGHT CHANCE OF DOZING

## 2021-12-17 ENCOUNTER — OFFICE VISIT (OUTPATIENT)
Dept: DERMATOLOGY | Facility: CLINIC | Age: 51
End: 2021-12-17
Payer: COMMERCIAL

## 2021-12-17 VITALS — SYSTOLIC BLOOD PRESSURE: 92 MMHG | DIASTOLIC BLOOD PRESSURE: 66 MMHG | OXYGEN SATURATION: 97 % | HEART RATE: 79 BPM

## 2021-12-17 DIAGNOSIS — L92.0 GRANULOMA ANNULARE: Primary | ICD-10-CM

## 2021-12-17 PROCEDURE — 11900 INJECT SKIN LESIONS </W 7: CPT | Performed by: PHYSICIAN ASSISTANT

## 2021-12-17 PROCEDURE — 99203 OFFICE O/P NEW LOW 30 MIN: CPT | Mod: 25 | Performed by: PHYSICIAN ASSISTANT

## 2021-12-17 RX ORDER — BETAMETHASONE DIPROPIONATE 0.5 MG/G
CREAM TOPICAL
Qty: 50 G | Refills: 2 | Status: SHIPPED | OUTPATIENT
Start: 2021-12-17 | End: 2023-10-12

## 2021-12-17 NOTE — LETTER
12/17/2021         RE: Ana Wyman  [address] [facility] [address]        Dear Colleague,    Thank you for referring your patient, Ana Wyman, to the [facility]. Please see a copy of my visit note below.       HPI:   Chief complaints: Ana Wyman is a pleasant 51 year old female who presents for evaluation of a growth on the top of the right hand. It has been present for several months. Occasionally it is itchy but is asymptomatic for the most part. She tried a wart treatment on the area but this did not help.       PHYSICAL EXAM:    BP 92/66   Pulse 79   LMP 10/01/2016 (Approximate)   SpO2 97%   Skin exam performed as follows: Type 2 skin. Mood appropriate  Alert and Oriented X 3. Well developed, well nourished in no distress.  General appearance: Normal  Head including face: Normal  Eyes: conjunctiva and lids: Normal  Mouth: Lips, teeth, gums: Normal  Neck: Normal  Cardiovascular: Exam of peripheral vascular system by observation for swelling, varicosities, edema: Normal  Right upper extremity: Normal  Left upper extremity: Normal  Right lower extremity: Normal  Left lower extremity: Normal  Skin: Scalp and body hair: See below    Annular plaque on the right dorsal hand     ASSESSMENT/PLAN:     1. 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 Discussed that lesions can be persistent and/or recurrent.   --Kenalog 5 mg/cc injected to right dorsal hand. Total of 0.5 cc's used.  Advised on risk of atrophy and failure to respond. Advised on aftercare.   --Start betamethasone cream BID if not resolved after ILK x 1-2 weeks then PRN    Lot: [id number]  Exp: 1/2023    Follow-up: PRN  CC:   Scribed By: Susannah Ayala, MS, DORIAN          Again, thank you for allowing me to participate in the care of your patient.        Sincerely,        Susannah Ayala PA-C

## 2021-12-17 NOTE — PROGRESS NOTES
HPI:   Chief complaints: Ana Wyman is a pleasant 51 year old female who presents for evaluation of a growth on the top of the right hand. It has been present for several months. Occasionally it is itchy but is asymptomatic for the most part. She tried a wart treatment on the area but this did not help.       PHYSICAL EXAM:    BP 92/66   Pulse 79   LMP 10/01/2016 (Approximate)   SpO2 97%   Skin exam performed as follows: Type 2 skin. Mood appropriate  Alert and Oriented X 3. Well developed, well nourished in no distress.  General appearance: Normal  Head including face: Normal  Eyes: conjunctiva and lids: Normal  Mouth: Lips, teeth, gums: Normal  Neck: Normal  Cardiovascular: Exam of peripheral vascular system by observation for swelling, varicosities, edema: Normal  Right upper extremity: Normal  Left upper extremity: Normal  Right lower extremity: Normal  Left lower extremity: Normal  Skin: Scalp and body hair: See below    Annular plaque on the right dorsal hand     ASSESSMENT/PLAN:     1. 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 Discussed that lesions can be persistent and/or recurrent.   --Kenalog 5 mg/cc injected to right dorsal hand. Total of 0.5 cc's used.  Advised on risk of atrophy and failure to respond. Advised on aftercare.   --Start betamethasone cream BID if not resolved after ILK x 1-2 weeks then PRN    Lot: [id number]  Exp: 1/2023    Follow-up: PRN  CC:   Scribed By: Susannah Ayala, MS, PAAbdullahiC

## 2021-12-19 ENCOUNTER — MYC MEDICAL ADVICE (OUTPATIENT)
Dept: PEDIATRICS | Facility: CLINIC | Age: 51
End: 2021-12-19
Payer: COMMERCIAL

## 2021-12-19 DIAGNOSIS — S91.209S AVULSION OF TOENAIL, SEQUELA: Primary | ICD-10-CM

## 2021-12-19 NOTE — PROGRESS NOTES
Ana is a 51 year old who is being evaluated via a billable video visit.      How would you like to obtain your AVS? MyChart  If the video visit is dropped, the invitation should be resent by: Send to e-mail at: [email or web address]  Will anyone else be joining your video visit? No      Video Start Time: 9:37 AM  Video-Visit Details    Type of service:  Video Visit    Video End Time:10:03 AM    Originating Location (pt. Location): Home    Distant Location (provider location):  [facility] SLEEP [facility]     Platform used for Video Visit: [facility]   Sleep Follow-Up Visit:    Date on this visit: 12/20/2021    Ana Wyman comes in today for follow-up of her sleep study done on 12/13/21.  Her medical history is significant for s/p gastric bypass (1/28/2019).      She was initially diagnosed at [facility] on 4/29/2005. Her AHI was 122.9/hr, .3/hr, O2 maty 77%. CPAP 10 cm was effective. Her wt was 300#.      She had a CPAP titration sleep study at [facility] on 8/21/2009. CPAP was titrated to 9 cm, but her RDI was still 8.7/hr (AHI was <1). Her weight was 317#.    This study was performed to re-evaluate her apnea baseline after a weight loss of about 80 pounds.  HST Results:  Weight: 221 pounds  Analysis Time:  468 minutes     Respiration:   Sleep Associated Hypoxemia: sustained hypoxemia was not present. Baseline oxygen saturation was 94%.  Time with saturation less than or equal to 88% was 0 minutes. The lowest oxygen saturation was 88%.   Snoring: Snoring was present.  Respiratory events: The home study revealed a presence of 1 obstructive apneas and 1 mixed and central apneas. There were 5 hypopneas resulting in a combined apnea/hypopnea index [AHI] of 0.9 events per hour.  AHI was 1.6 per hour supine, - per hour prone, 1 per hour on left side, and 0.5 per hour on right side.   Pattern: Excluding events noted above, respiratory rate and pattern was Normal.     Position: Percent of time spent:  supine - 23.7%, prone - 0%, on left - 24.8%, on right - 50%.     Heart Rate: By pulse oximetry normal rate was noted.    She was off of CPAP for 2 days prior to the study. She has stopped using her CPAP since the sleep study. She does not notice a difference being off of it. She does feel a little tired, but that is not new.   She does not know if she snores without CPAP. She does not notice waking with her own snoring anymore.   She does sometimes have trouble sleeping.   Bedtime: 11 PM. Wake time:7-7:30 AM. Falls asleep: 5 min to 2 hours. She had difficulty falling asleep maybe 2-3 times per week, but it can vary. She thinks it may relate to work stress or home remodel. She may have some trouble turning her brain off. On weekends, her sleep schedule is later because she works later with the [employer] on those nights. She often has to eat after she gets home because she can't eat at work since COVID. She still wakes by 8 AM. She very rarely naps, dozing in front of the TV in the evening.     Past medical/surgical history, family history, social history, medications and allergies were reviewed.      Problem List:  Patient Active Problem List    Diagnosis Date Noted     Symptomatic abdominal panniculus 02/25/2021     Priority: Medium     Family history of malignant neoplasm of breast 10/05/2020     Priority: Medium     Insulin resistance 09/28/2020     Priority: Medium     Postural hypotension 07/29/2019     Priority: Medium     Intertrigo 07/29/2019     Priority: Medium     Class 2 severe obesity due to excess calories with serious comorbidity and body mass index (BMI) of 35.0 to 35.9 in adult (H) 07/29/2019     Priority: Medium     S/P laparoscopic cholecystectomy 04/18/2019     Priority: Medium     Bariatric surgery status 02/05/2019     Priority: Medium     Postsurgical malabsorption 02/05/2019     Priority: Medium     Fatty liver 07/27/2018     Priority: Medium     Mild intermittent asthma without  complication 06/11/2018     Priority: Medium     Lymphedema bilateral with mixed lipedema. 09/30/2015     Priority: Medium     Baker's cyst of knee 09/03/2015     Priority: Medium     Acanthosis nigricans 03/24/2015     Priority: Medium     Cutaneous skin tags 03/24/2015     Priority: Medium     MIGUELITO on CPAP 03/19/2013     Priority: Medium     Sleep study in 2004 and 2012         Uterine fibroid 08/02/2012     Priority: Medium     Lichen sclerosus 07/14/2011     Priority: Medium     Family history of malignant neoplasm of genital organ, other 06/11/2007     Priority: Medium     FAMILY HX GI MALIGNANCY 05/31/2007     Priority: Medium     Hypersomnia with sleep apnea 07/06/2005     Priority: Medium     Problem list name updated by automated process. Provider to review       Esophageal reflux 09/27/2004     Priority: Medium     Allergic state 09/27/2004     Priority: Medium     Problem list name updated by automated process. Provider to review          Impression/Plan:    (G47.33,  Z99.89) MIGUELITO on CPAP  (primary encounter diagnosis)  Comment: This HST showed no significant apnea. It did not show significant hypoxemia either. However, there were periods where her O2 baseline was sitting right at 89-90%. She has lost a lot of weight (80#), but we would both like to verify with a more sensitive test that her apnea has resolved as her AHI was previously 122/hr. I would also like to reassess hypoxemia. She has not really noticed a significant difference in her sleep quality with or without CPAP. She is not observed while sleeping to get feedback about her breathing.  Plan: Comprehensive Sleep Study        In lab PSG    (F51.04) Chronic insomnia  Comment: She sometimes has trouble falling asleep, which she attributes to difficulty turning her brain off. She is just wrapping up a home remodel and has work stress as well. She works late on weekends and has dinner after she gets home. Her sleep schedule does not appear  particularly excessive.  Plan: We talked about setting aside worry time in the early evening and using white noise or guided imagery to distract her mind at bedtime.     She will follow up with me in about 2 week(s) after her study.     32 minutes were spent on the date of the encounter doing chart review, history and exam, documentation and further activities as noted above.      Bennett Goltz, PA-C    CC: No ref. provider found       normal (ped)...

## 2021-12-20 ENCOUNTER — VIRTUAL VISIT (OUTPATIENT)
Dept: SLEEP MEDICINE | Facility: CLINIC | Age: 51
End: 2021-12-20
Payer: COMMERCIAL

## 2021-12-20 DIAGNOSIS — K91.2 MALNUTRITION FOLLOWING GASTROINTESTINAL SURGERY: Primary | ICD-10-CM

## 2021-12-20 DIAGNOSIS — G47.33 OSA ON CPAP: Primary | ICD-10-CM

## 2021-12-20 DIAGNOSIS — F51.04 CHRONIC INSOMNIA: ICD-10-CM

## 2021-12-20 DIAGNOSIS — L60.4 BEAU'S LINE: ICD-10-CM

## 2021-12-20 PROCEDURE — 99214 OFFICE O/P EST MOD 30 MIN: CPT | Mod: 95 | Performed by: PHYSICIAN ASSISTANT

## 2021-12-20 NOTE — PATIENT INSTRUCTIONS
General recommendations for sleep problems (Insomnia)  Allow 2-4 weeks to see results     Establish a regular sleep schedule    Most people only need 7-8 hours of sleep.  Don't be in bed longer than you need     to sleep or you will end up spending more time awake in bed. This trains your    brain to think of the bed as a place to not sleep.  Go to bed at same time each night   Get up at same time each day - Set an alarm everyday (even weekends). This is one of    the most important tips. It prevents you from relying on your insomnia to get you    up on time for your day. That actually reinforces insomnia. It also will help your    body get into a pattern where you start feeling tired at a consistent time each    night.  The body functions best when you keep a consistent routine.  Avoid sleeping-in and napping. Anytime you sleep during the day, you will be less tired at    night. You may be tired enough to fall asleep, but you will wake more in the    middle of the night because you will have met your sleep need before the night is    done.   Cut down time in bed (if not asleep, get up)- Use your bed only for sleep and sex    Anytime you spend time in bed doing activities other than sleep (reading,    watching TV, working, playing on the computer or phone, or even just laying in    bed trying to sleep), you are training  your brain to think of the bed as a place to    do activities other than sleep. If you are not falling asleep within 20-30 minutes,    get out of bed. While out of bed, avoid bright lights. Avoid work or chores. Being    productive in the middle of the night reinforces waking up at night. Find relaxing,    not particularly entertaining activities like reading, listening to music, or relaxation    exercises. Go back to bed if you start feeling groggy, or after about 30 minutes,    even if not feeling very tired. Sometimes, just getting out of bed stops the pattern    of getting frustrated about  laying in bed not sleeping, and that can help you fall    asleep.   Avoid trying to force yourself to sleep- sleep is not like everything else. The harder you    work at most things, the more you can accomplish. The harder you work at    sleep, the less you will sleep.     Make the bedroom comfortable - quiet, dark and cool are better. Consider ear plugs    (silicon). Use dark blinds or wear an eye mask if needed     Make a relaxing routine prior to bedtime  Relaxation exercises:   Progressive muscle relaxation: Relax each muscle group individually    Begin with your feet, flex, then relax. Try to imagine your feet feeling heavy and sinking into the bed. Move to your calves, do the same thing. Work through each muscle group toward your head.    Relaxing Mental Imagery: Try to imagine a trip that you took and found relaxing, or imagine a day at the beach. Try to walk yourself through the day in your mind as if you were dreaming it. Try to imagine sensing the different experiences, such as feeling sand between your toes, the heat of the sun on your skin, seeing the waves crashing the shore, the smell of the salt water, etc.     Deal with your worries before bedtime    Set aside a worry time around dinner time for 10-15 minutes. Write down the    things that are on your mind. Plan time in the coming days to address those    issues. Brainstorm ideas on how you will deal with them. Try to identify issues    that are out of your control, and try to let those issues go.  Listen to relaxation tapes   Classical Music or Nature sounds   Back Massage   Get regular exercise each day (at least 1-2 hours before bedtime)   Take medications only as directed   Eat a light bedtime snack or warm drink   Warm milk   Warm herbal tea (non-caffeinated)       Things to avoid   No overstimulating activities just before bed   No competitive games before bedtime   No exciting television programs before bedtime   Avoid caffeine after lunchtime    Avoid chocolate   Do not use alcohol to induce sleep (worsens Insomnia)   Do not take someone else's sleeping pills   Do not look at the clock when awakening   Do not turn on light when getting up to use bathroom, use a nightlight     Online Programs     www.SHUTi.me (pronounced shut eye). There is a fee for this program. Enter the code  [facility]  if you decide to enroll in this program.      www.sleepIO.com (pronounced sleep ee oh). There is a fee for this program. Enter the code  [facility]  if you decide to enroll in this program.     Suggested Resources  Insomnia Treatment Books     Overcoming Insomnia by Zeb Irizarry and Anastasiya Zavala (2008)    No More Sleepless Nights by Leo Canales and Taryn Hall (1996)    Say [place] to Insomnia by Ventura Bass (2009)    The Insomnia Workbook by Mamie Garcia and Sreekanth Gutiérrez (2009)    The Insomnia Answer by Maykel Garrison and Ed Soni (2006)      Stress Management and Relaxation Books    The Relaxation and Stress Reduction Workbook by Coretta Mc, Trena Bonilla and Xander Nunez (2008)    Stress Management Workbook: Techniques and Self-Assessment Procedures by Jessica Mohan and Clive Max (1997)    A Mindfulness-Based Stress Reduction Workbook by Jamil Davies and Catrachita Justice (2010)    The Complete Stress Management Workbook by Silvestre Shirley, Christofer Polo and Luisito Mcwilliams (1996)    Assert Yourself by Lesly Menjivar and Black Menjivar (1977)    Relaxation Resources for Computer Download   These websites offer resources to help you relax. This list is for information only. [facility] is not responsible for the quality of services or the actions of any person or organization.  Progressive Muscle Relaxation (PMR):     http://www.[employer]ak[employer]studio.com/progressive-muscle-relaxation-exercise.html     http://studentsupport.[facility]/counseling/resources/self-help/relaxation-and-stress-management/   Deep Breathing  Exercises:    http://www.zoidu/breathing-awareness.html     Meditation:     www.[employer] BITEX    www.theMerLion Pharmaceuticalsguided-meditation-site.com You may have to pay for some of these resources.    Guided Imagery:    http://www.zoidu/guided-imagery-scripts.html     http://[employer]/library/jdztabelkr-odvcef-phqtacr/   Consider phone apps such as: Calm, Headspace or Insight Timer.    Counseling / Behavioral Health  [facility]  Visit [email or web address] or call [phone] to find a clinic close to you.  Or call [phone] for [facility].

## 2021-12-29 ENCOUNTER — LAB (OUTPATIENT)
Dept: LAB | Facility: CLINIC | Age: 51
End: 2021-12-29
Payer: COMMERCIAL

## 2021-12-29 ENCOUNTER — OFFICE VISIT (OUTPATIENT)
Dept: PODIATRY | Facility: CLINIC | Age: 51
End: 2021-12-29
Payer: COMMERCIAL

## 2021-12-29 VITALS
HEIGHT: 64 IN | DIASTOLIC BLOOD PRESSURE: 60 MMHG | BODY MASS INDEX: 38.76 KG/M2 | SYSTOLIC BLOOD PRESSURE: 90 MMHG | WEIGHT: 227 LBS

## 2021-12-29 DIAGNOSIS — S90.222A TOENAIL BRUISE, LEFT, INITIAL ENCOUNTER: Primary | ICD-10-CM

## 2021-12-29 DIAGNOSIS — L60.4 BEAU'S LINE: ICD-10-CM

## 2021-12-29 DIAGNOSIS — S90.222A SUBUNGUAL HEMATOMA OF FOOT, LEFT, INITIAL ENCOUNTER: ICD-10-CM

## 2021-12-29 DIAGNOSIS — K91.2 MALNUTRITION FOLLOWING GASTROINTESTINAL SURGERY: ICD-10-CM

## 2021-12-29 PROCEDURE — 84630 ASSAY OF ZINC: CPT

## 2021-12-29 PROCEDURE — 99213 OFFICE O/P EST LOW 20 MIN: CPT | Performed by: PODIATRIST

## 2021-12-29 PROCEDURE — 36415 COLL VENOUS BLD VENIPUNCTURE: CPT

## 2021-12-29 ASSESSMENT — MIFFLIN-ST. JEOR: SCORE: 1624.67

## 2021-12-29 NOTE — PROGRESS NOTES
[facility]  CLINIC NOTE    CHIEF COMPLAINT:  B/L feet    PATIENT HISTORY:  Ana Wyman is a 51 year old female  who presents for b/l feet. She has concerns because her second digits on each foot have had changes to the toenails. She states that her right foot 2nd digit nail fell off and a new nail started growing in. Her left foot 2nd digit nails has a dark spot underneath the nail. She also states she had dark nail polish on for a long time and so doesn't know when the color change happened. She states she was walking a lot in October and November, that has decreased now. She also had a Belt Lipectomy at her gastric bypass site in February of 2021.        Review of Systems:  A 10 point review of systems was performed and is positive for that noted above in the patient history.  All other areas are negative.     PAST MEDICAL HISTORY:   Past Medical History:   Diagnosis Date     Allergic rhinitis, cause unspecified      Cellulitis 2005    2 episodes, one with hospitalization [facility]     Complication of anesthesia     MOTHER HAS HISTORY     DUB (dysfunctional uterine bleeding)     Neg EMB 2012     Esophageal reflux     ongoing     Fibroids      Genital problems     Lichens Schlerosis     GERD (gastroesophageal reflux disease)      Hallux valgus (acquired)      History of steroid therapy     Inhalers, eye drops     Hypersomnia with sleep apnea, unspecified     using CPAP     Kidney stone May 2018    2 stones     Lichen sclerosus 7/14/2011     Lymph edema 2010- present     Lymphedema bilateral with mixed lipedema. 9/30/2015     Lymphedema bilateral with mixed lipedema. 9/30/2015     Morbid obesity (H) 3/19/2013     MIGUELITO on CPAP 3/19/2013    Sleep study in 2004 and 2012       Pneumonia     Years ago - a few times     Pre-diabetes      Sleep apnea      Tendonitis currently     Uncomplicated asthma     mild     Urinary tract infection     A few times in past 10 years     Vision disorder  [id number]    Dry Eye     Vitamin D deficiency 3/14/2015        PAST SURGICAL HISTORY:   Past Surgical History:   Procedure Laterality Date     COLONOSCOPY  05/15/2013    Procedure: COLONOSCOPY;  Colonoscopy;  Surgeon: Kota Blanco MD;  Location: [facility] GI     COLONOSCOPY N/A 10/21/2019    Procedure: COLONOSCOPY, with biopsies by cold forceps;  Surgeon: Lydia Vickers MD;  Location:  GI     COSMETIC ABDOMINOPLASTY Bilateral 2/25/2021    Procedure: COSMETIC BILATERAL BELT LIPECTOMY;  Surgeon: Wade Marquez MD;  Location: [facility] OR     GASTRIC BYPASS       GYN SURGERY  2016    Uterine Ablation     INCISION AND DRAINAGE TRUNK, COMBINED Left 2/25/2021    Procedure: EVACUATION OF HEMATOMA;  Surgeon: Wade Marquez MD;  Location: [facility] OR     LAPAROSCOPIC BYPASS GASTRIC, CHOLECYSTECTOMY, COMBINED N/A 01/28/2019    Procedure: LAPAROSCOPIC GASTRIC BYPASS;  Surgeon: Maykel Calvin MD;  Location:  OR     LAPAROSCOPIC CHOLECYSTECTOMY N/A 04/11/2019    Procedure: LAPAROSCOPIC CHOLECYSTECTOMY;  Surgeon: Maykel Calvin MD;  Location:  OR     lichen sclerosis       ORTHOPEDIC SURGERY       PANNICULECTOMY N/A 2/25/2021    Procedure: PANNICULECTOMY;  Surgeon: Wade Marquez MD;  Location:  OR     VASCULAR SURGERY  2015    Vienous closure on both legs     vein closure  12/2016    to prevent lymphedema     ZZ NONSPECIFIC PROCEDURE  1994    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     ZZC NONSPECIFIC PROCEDURE  1999,2001    Foot surgeries x 2 (bunionectomy)     ZZC NONSPECIFIC PROCEDURE  2000    hammer toes        MEDICATIONS:  Reviewed in Epic.     ALLERGIES:    Allergies   Allergen Reactions     Nsaids Other (See Comments)     Had gastric bypass - needs to avoid NSAIDS     Clindamycin Diarrhea     Codeine Nausea and Vomiting     Shellfish Allergy         SOCIAL HISTORY:   Social History     Socioeconomic History     Marital status: Single     Spouse name: Not on file     Number of children: 0      Years of education: 18     Highest education level: Master's degree (e.g., MA, MS, Shannon, MEd, MSW, REA)   Occupational History     Occupation: student life     Employer: [employer]   Tobacco Use     Smoking status: Never Smoker     Smokeless tobacco: Never Used   Substance and Sexual Activity     Alcohol use: Not Currently     Alcohol/week: 0.0 standard drinks     Drug use: No     Sexual activity: Not Currently     Partners: Male     Birth control/protection: Other     Comment: ablation years ago   Other Topics Concern      Service Not Asked     Blood Transfusions No     Caffeine Concern Not Asked     Occupational Exposure Not Asked     Hobby Hazards Not Asked     Sleep Concern Not Asked     Stress Concern Not Asked     Weight Concern Yes     Comment: 20 pound weight loss on weight watchers     Special Diet Not Asked     Back Care Not Asked     Exercise Not Asked     Bike Helmet Not Asked     Seat Belt Not Asked     Self-Exams Not Asked     Parent/sibling w/ CABG, MI or angioplasty before 65F 55M? No   Social History Narrative    Living arrangements - the patient lives alone.      Social Determinants of Health     Financial Resource Strain: Low Risk      Difficulty of Paying Living Expenses: Not very hard   Food Insecurity: No Food Insecurity     Worried About Running Out of Food in the Last Year: Never true     Ran Out of Food in the Last Year: Never true   Transportation Needs: No Transportation Needs     Lack of Transportation (Medical): No     Lack of Transportation (Non-Medical): No   Physical Activity: Not on file   Stress: Not on file   Social Connections: Not on file   Intimate Partner Violence: Not on file   Housing Stability: Not on file        FAMILY HISTORY:   Family History   Problem Relation Age of Onset     C.A.D. Father 92        CAGB 98     Obesity Father      Cancer Father         stomach Dx age 74     Lipids Father      Hypertension Father      Coronary Artery Disease  "Father      Cerebrovascular Disease Father      Other Cancer Father         Esophogeal     Diabetes Mother         Type 2     Allergies Mother      Obesity Mother      C.A.D. Mother         triple bypass surgery, 65     Lipids Mother      Hypertension Mother      Coronary Artery Disease Mother      Hyperlipidemia Mother      Colon Polyps Mother      Anesthesia Reaction Mother      Thyroid Disease Mother      Sleep Apnea Mother      Breast Cancer Mother      Sleep Apnea Brother      Sleep Apnea Brother      C.A.D. Paternal Grandfather 58        fatal     Coronary Artery Disease Paternal Grandfather      Cancer - colorectal Maternal Grandmother 75     Cancer Maternal Grandmother         liver     Hypertension Maternal Grandmother      Colon Cancer Maternal Grandmother      Other Cancer Maternal Grandmother         liver, [place]     Colon Polyps Maternal Grandmother      Cancer - colorectal Paternal Aunt      Allergies Brother      Cancer Paternal Grandmother         stomach     Obesity Paternal Grandmother      Diabetes Paternal Grandmother         Type 2     Asthma Paternal Grandmother      Obesity Brother      Hypertension Brother      Cancer - colorectal Other         cousin  from colon cancer in      Obesity Brother      Coronary Artery Disease Maternal Grandfather      Hypertension Maternal Grandfather         EXAM:Vitals: BP 90/60 (BP Location: Right arm, Patient Position: Chair, Cuff Size: Adult Large)   Ht 1.626 m (5' 4\")   Wt 103 kg (227 lb)   LMP 10/01/2016 (Approximate)   BMI 38.96 kg/m    BMI= Body mass index is 38.96 kg/m .      General appearance: Patient is alert and fully cooperative with history & exam.  No sign of distress is noted during the visit.     Respiratory: Breathing is regular & unlabored while sitting.      HEENT: Hearing is intact to spoken word.  Speech is clear.  No gross evidence of visual impairment that would impact ambulation.      Dermatologic: B/L feet " examined. Left 2nd digit with hemorraghic changes to subungual site. Noted proximal clearing. No bailey's sign or destruction to cuticle or nail fold. Nail is thickened, loose and dystrophic. 2nd digit on right foot has new nail growing in. All sites nontender. No oozing or drainage.     Vascular: Dorsalis pedis and posterior tibial pulses are intact & regular bilaterally.  CFT and skin temperature is normal to both lower extremities.       Neurologic: Lower extremity sensation is intact, bilateral foot, to light touch.  No evidence of neurological-based weakness or contracture in the lower extremities.       Musculoskeletal: Patient is ambulatory without an assistive device or brace.  No gross foot or ankle deformity noted.       Psychiatric: Affect is pleasant & appropriate.    ASSESSMENT:  1. Left foot 2nd digit subungual hematoma with onycholysis        MEDICAL DECISION MAKING:   -Discussed at length. Site is nontender and shows proximal clearing. Nail is slightly loose, but also intact and nonpainful. Discussed to monitor site and that hematoma should continue to grow out over next few months.   -Discussed things to watch for, such as color change to adjacent skin, site enlarging or new pain or drainage.   -All questions answered.   -F/u as needed.         Penny Lynn DPM     [place] Department of Podiatry/Foot & Ankle Surgery

## 2021-12-29 NOTE — LETTER
12/29/2021         RE: Ana Wyman  [address] [facility] [id number]        Dear Colleague,    Thank you for referring your patient, Ana Wyman, to the [facility]. Please see a copy of my visit note below.    [facility]  CLINIC NOTE    CHIEF COMPLAINT:  B/L feet    PATIENT HISTORY:  Ana Wyman is a 51 year old female  who presents for b/l feet. She has concerns because her second digits on each foot have had changes to the toenails. She states that her right foot 2nd digit nail fell off and a new nail started growing in. Her left foot 2nd digit nails has a dark spot underneath the nail. She also states she had dark nail polish on for a long time and so doesn't know when the color change happened. She states she was walking a lot in October and November, that has decreased now. She also had a Belt Lipectomy at her gastric bypass site in February of 2021.        Review of Systems:  A 10 point review of systems was performed and is positive for that noted above in the patient history.  All other areas are negative.     PAST MEDICAL HISTORY:   Past Medical History:   Diagnosis Date     Allergic rhinitis, cause unspecified      Cellulitis 2005    2 episodes, one with hospitalization [facility]     Complication of anesthesia     MOTHER HAS HISTORY     DUB (dysfunctional uterine bleeding)     Neg EMB 2012     Esophageal reflux     ongoing     Fibroids      Genital problems     Lichens Schlerosis     GERD (gastroesophageal reflux disease)      Hallux valgus (acquired)      History of steroid therapy     Inhalers, eye drops     Hypersomnia with sleep apnea, unspecified     using CPAP     Kidney stone May 2018    2 stones     Lichen sclerosus 7/14/2011     Lymph edema 2010- present     Lymphedema bilateral with mixed lipedema. 9/30/2015     Lymphedema bilateral with mixed lipedema. 9/30/2015     Morbid obesity (H) 3/19/2013      MIGUELITO on CPAP 3/19/2013    Sleep study in 2004 and 2012       Pneumonia     Years ago - a few times     Pre-diabetes      Sleep apnea      Tendonitis currently     Uncomplicated asthma     mild     Urinary tract infection     A few times in past 10 years     Vision disorder [id number]    Dry Eye     Vitamin D deficiency 3/14/2015        PAST SURGICAL HISTORY:   Past Surgical History:   Procedure Laterality Date     COLONOSCOPY  05/15/2013    Procedure: COLONOSCOPY;  Colonoscopy;  Surgeon: Kota Blanco MD;  Location:  GI     COLONOSCOPY N/A 10/21/2019    Procedure: COLONOSCOPY, with biopsies by cold forceps;  Surgeon: Lydia Vickers MD;  Location:  GI     COSMETIC ABDOMINOPLASTY Bilateral 2/25/2021    Procedure: COSMETIC BILATERAL BELT LIPECTOMY;  Surgeon: Wade Marquez MD;  Location:  OR     GASTRIC BYPASS       GYN SURGERY  2016    Uterine Ablation     INCISION AND DRAINAGE TRUNK, COMBINED Left 2/25/2021    Procedure: EVACUATION OF HEMATOMA;  Surgeon: Wade Marquez MD;  Location:  OR     LAPAROSCOPIC BYPASS GASTRIC, CHOLECYSTECTOMY, COMBINED N/A 01/28/2019    Procedure: LAPAROSCOPIC GASTRIC BYPASS;  Surgeon: Maykel Calvin MD;  Location:  OR     LAPAROSCOPIC CHOLECYSTECTOMY N/A 04/11/2019    Procedure: LAPAROSCOPIC CHOLECYSTECTOMY;  Surgeon: Maykel Calvin MD;  Location:  OR     lichen sclerosis       ORTHOPEDIC SURGERY       PANNICULECTOMY N/A 2/25/2021    Procedure: PANNICULECTOMY;  Surgeon: Wade Marquez MD;  Location:  OR     VASCULAR SURGERY  2015    Vienous closure on both legs     vein closure  12/2016    to prevent lymphedema     ZZ NONSPECIFIC PROCEDURE  1994    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     ZZC NONSPECIFIC PROCEDURE  1999,2001    Foot surgeries x 2 (bunionectomy)     ZZC NONSPECIFIC PROCEDURE  2000    hammer toes        MEDICATIONS:  Reviewed in Epic.     ALLERGIES:    Allergies   Allergen Reactions     Nsaids Other (See Comments)      Had gastric bypass - needs to avoid NSAIDS     Clindamycin Diarrhea     Codeine Nausea and Vomiting     Shellfish Allergy         SOCIAL HISTORY:   Social History     Socioeconomic History     Marital status: Single     Spouse name: Not on file     Number of children: 0     Years of education: 18     Highest education level: Master's degree (e.g., MA, MS, Shannon, MEd, MSW, REA)   Occupational History     Occupation: student life     Employer: [employer]   Tobacco Use     Smoking status: Never Smoker     Smokeless tobacco: Never Used   Substance and Sexual Activity     Alcohol use: Not Currently     Alcohol/week: 0.0 standard drinks     Drug use: No     Sexual activity: Not Currently     Partners: Male     Birth control/protection: Other     Comment: ablation years ago   Other Topics Concern      Service Not Asked     Blood Transfusions No     Caffeine Concern Not Asked     Occupational Exposure Not Asked     Hobby Hazards Not Asked     Sleep Concern Not Asked     Stress Concern Not Asked     Weight Concern Yes     Comment: 20 pound weight loss on weight watchers     Special Diet Not Asked     Back Care Not Asked     Exercise Not Asked     Bike Helmet Not Asked     Seat Belt Not Asked     Self-Exams Not Asked     Parent/sibling w/ CABG, MI or angioplasty before 65F 55M? No   Social History Narrative    Living arrangements - the patient lives alone.      Social Determinants of Health     Financial Resource Strain: Low Risk      Difficulty of Paying Living Expenses: Not very hard   Food Insecurity: No Food Insecurity     Worried About Running Out of Food in the Last Year: Never true     Ran Out of Food in the Last Year: Never true   Transportation Needs: No Transportation Needs     Lack of Transportation (Medical): No     Lack of Transportation (Non-Medical): No   Physical Activity: Not on file   Stress: Not on file   Social Connections: Not on file   Intimate Partner Violence: Not on file  "  Housing Stability: Not on file        FAMILY HISTORY:   Family History   Problem Relation Age of Onset     C.A.D. Father 92        CAGB 98     Obesity Father      Cancer Father         stomach Dx age 74     Lipids Father      Hypertension Father      Coronary Artery Disease Father      Cerebrovascular Disease Father      Other Cancer Father         Esophogeal     Diabetes Mother         Type 2     Allergies Mother      Obesity Mother      C.A.D. Mother         triple bypass surgery, 65     Lipids Mother      Hypertension Mother      Coronary Artery Disease Mother      Hyperlipidemia Mother      Colon Polyps Mother      Anesthesia Reaction Mother      Thyroid Disease Mother      Sleep Apnea Mother      Breast Cancer Mother      Sleep Apnea Brother      Sleep Apnea Brother      C.A.D. Paternal Grandfather 58        fatal     Coronary Artery Disease Paternal Grandfather      Cancer - colorectal Maternal Grandmother 75     Cancer Maternal Grandmother         liver     Hypertension Maternal Grandmother      Colon Cancer Maternal Grandmother      Other Cancer Maternal Grandmother         liver, leaukeamia     Colon Polyps Maternal Grandmother      Cancer - colorectal Paternal Aunt      Allergies Brother      Cancer Paternal Grandmother         stomach     Obesity Paternal Grandmother      Diabetes Paternal Grandmother         Type 2     Asthma Paternal Grandmother      Obesity Brother      Hypertension Brother      Cancer - colorectal Other         cousin  from colon cancer in 's     Obesity Brother      Coronary Artery Disease Maternal Grandfather      Hypertension Maternal Grandfather         EXAM:Vitals: BP 90/60 (BP Location: Right arm, Patient Position: Chair, Cuff Size: Adult Large)   Ht 1.626 m (5' 4\")   Wt 103 kg (227 lb)   LMP 10/01/2016 (Approximate)   BMI 38.96 kg/m    BMI= Body mass index is 38.96 kg/m .      General appearance: Patient is alert and fully cooperative with history & exam.  No " sign of distress is noted during the visit.     Respiratory: Breathing is regular & unlabored while sitting.      HEENT: Hearing is intact to spoken word.  Speech is clear.  No gross evidence of visual impairment that would impact ambulation.      Dermatologic: B/L feet examined. Left 2nd digit with hemorraghic changes to subungual site. Noted proximal clearing. No bailey's sign or destruction to cuticle or nail fold. Nail is thickened, loose and dystrophic. 2nd digit on right foot has new nail growing in. All sites nontender. No oozing or drainage.     Vascular: Dorsalis pedis and posterior tibial pulses are intact & regular bilaterally.  CFT and skin temperature is normal to both lower extremities.       Neurologic: Lower extremity sensation is intact, bilateral foot, to light touch.  No evidence of neurological-based weakness or contracture in the lower extremities.       Musculoskeletal: Patient is ambulatory without an assistive device or brace.  No gross foot or ankle deformity noted.       Psychiatric: Affect is pleasant & appropriate.    ASSESSMENT:  1. Left foot 2nd digit subungual hematoma with onycholysis        MEDICAL DECISION MAKING:   -Discussed at length. Site is nontender and shows proximal clearing. Nail is slightly loose, but also intact and nonpainful. Discussed to monitor site and that hematoma should continue to grow out over next few months.   -Discussed things to watch for, such as color change to adjacent skin, site enlarging or new pain or drainage.   -All questions answered.   -F/u as needed.         Penny Lynn DPM     [place] [facility] Ankle [facility]                  Again, thank you for allowing me to participate in the care of your patient.        Sincerely,        Penny Lynn DPM

## 2022-01-02 LAB — ZINC SERPL-MCNC: 57.3 UG/DL

## 2022-01-03 ENCOUNTER — MYC MEDICAL ADVICE (OUTPATIENT)
Dept: SURGERY | Facility: CLINIC | Age: 52
End: 2022-01-03
Payer: COMMERCIAL

## 2022-01-03 DIAGNOSIS — K91.2 POSTSURGICAL MALABSORPTION: ICD-10-CM

## 2022-01-03 DIAGNOSIS — Z98.84 BARIATRIC SURGERY STATUS: ICD-10-CM

## 2022-01-03 DIAGNOSIS — E60 ZINC DEFICIENCY: Primary | ICD-10-CM

## 2022-01-03 RX ORDER — UREA 10 %
1 LOTION (ML) TOPICAL DAILY
Qty: 30 TABLET | Refills: 1 | Status: SHIPPED | OUTPATIENT
Start: 2022-01-03 | End: 2022-04-19

## 2022-01-03 NOTE — PROGRESS NOTES
Pt has slight zinc deficiency.  Suspect this is not why toenail fell off however, we can treat with zinc supplement.  Ordered Zinc supplement.  Can also be gotten OTC.      Please let her know.    ERWIN

## 2022-01-04 ENCOUNTER — MYC MEDICAL ADVICE (OUTPATIENT)
Dept: SURGERY | Facility: CLINIC | Age: 52
End: 2022-01-04
Payer: COMMERCIAL

## 2022-01-04 DIAGNOSIS — E60 ZINC DEFICIENCY: Primary | ICD-10-CM

## 2022-01-10 DIAGNOSIS — Z98.84 BARIATRIC SURGERY STATUS: ICD-10-CM

## 2022-01-10 DIAGNOSIS — K91.2 POSTSURGICAL MALABSORPTION: ICD-10-CM

## 2022-01-10 DIAGNOSIS — E60 ZINC DEFICIENCY: ICD-10-CM

## 2022-01-10 LAB
ERYTHROCYTE [DISTWIDTH] IN BLOOD BY AUTOMATED COUNT: 13.3 % (ref 10–15)
FERRITIN SERPL-MCNC: 39 NG/ML (ref 8–252)
HCT VFR BLD AUTO: 44 % (ref 35–47)
HGB BLD-MCNC: 14.3 G/DL (ref 11.7–15.7)
IRON SATN MFR SERPL: 13 % (ref 15–46)
IRON SERPL-MCNC: 47 UG/DL (ref 35–180)
MCH RBC QN AUTO: 30.1 PG (ref 26.5–33)
MCHC RBC AUTO-ENTMCNC: 32.5 G/DL (ref 31.5–36.5)
MCV RBC AUTO: 93 FL (ref 78–100)
PLATELET # BLD AUTO: 343 10E3/UL (ref 150–450)
PTH-INTACT SERPL-MCNC: 47 PG/ML (ref 18–80)
RBC # BLD AUTO: 4.75 10E6/UL (ref 3.8–5.2)
TIBC SERPL-MCNC: 375 UG/DL (ref 240–430)
VIT B12 SERPL-MCNC: 918 PG/ML (ref 193–986)
WBC # BLD AUTO: 7.4 10E3/UL (ref 4–11)

## 2022-01-10 PROCEDURE — 82306 VITAMIN D 25 HYDROXY: CPT

## 2022-01-10 PROCEDURE — 83970 ASSAY OF PARATHORMONE: CPT

## 2022-01-10 PROCEDURE — 84590 ASSAY OF VITAMIN A: CPT | Mod: 90

## 2022-01-10 PROCEDURE — 99000 SPECIMEN HANDLING OFFICE-LAB: CPT

## 2022-01-10 PROCEDURE — 85027 COMPLETE CBC AUTOMATED: CPT

## 2022-01-10 PROCEDURE — 83550 IRON BINDING TEST: CPT

## 2022-01-10 PROCEDURE — 82607 VITAMIN B-12: CPT

## 2022-01-10 PROCEDURE — 82728 ASSAY OF FERRITIN: CPT

## 2022-01-10 PROCEDURE — 36415 COLL VENOUS BLD VENIPUNCTURE: CPT

## 2022-01-11 LAB — DEPRECATED CALCIDIOL+CALCIFEROL SERPL-MC: 40 UG/L (ref 20–75)

## 2022-01-14 LAB
ANNOTATION COMMENT IMP: NORMAL
RETINYL PALMITATE SERPL-MCNC: 0.03 MG/L
VIT A SERPL-MCNC: 0.8 MG/L

## 2022-02-01 ENCOUNTER — LAB (OUTPATIENT)
Dept: LAB | Facility: CLINIC | Age: 52
End: 2022-02-01
Payer: COMMERCIAL

## 2022-02-01 DIAGNOSIS — E60 ZINC DEFICIENCY: ICD-10-CM

## 2022-02-01 PROCEDURE — 36415 COLL VENOUS BLD VENIPUNCTURE: CPT

## 2022-02-01 PROCEDURE — 84630 ASSAY OF ZINC: CPT

## 2022-02-01 NOTE — TELEPHONE ENCOUNTER
Zinc ordered for patient to recheck beginning of this month.  This was a MAXIMINO Smith PA-C.    Patient notified via  message.  Laura Paulino MS, RD, RN

## 2022-02-05 LAB — ZINC SERPL-MCNC: 74.2 UG/DL

## 2022-02-06 ENCOUNTER — DOCUMENTATION ONLY (OUTPATIENT)
Dept: SLEEP MEDICINE | Facility: CLINIC | Age: 52
End: 2022-02-06

## 2022-02-06 ENCOUNTER — THERAPY VISIT (OUTPATIENT)
Dept: SLEEP MEDICINE | Facility: CLINIC | Age: 52
End: 2022-02-06
Payer: COMMERCIAL

## 2022-02-06 DIAGNOSIS — G47.33 OSA ON CPAP: ICD-10-CM

## 2022-02-06 PROCEDURE — 95810 POLYSOM 6/> YRS 4/> PARAM: CPT | Performed by: INTERNAL MEDICINE

## 2022-02-07 ENCOUNTER — VIRTUAL VISIT (OUTPATIENT)
Dept: SURGERY | Facility: CLINIC | Age: 52
End: 2022-02-07
Payer: COMMERCIAL

## 2022-02-07 ENCOUNTER — MYC MEDICAL ADVICE (OUTPATIENT)
Dept: SURGERY | Facility: CLINIC | Age: 52
End: 2022-02-07

## 2022-02-07 VITALS — BODY MASS INDEX: 37.39 KG/M2 | HEIGHT: 64 IN | WEIGHT: 219 LBS

## 2022-02-07 DIAGNOSIS — E66.812 CLASS 2 SEVERE OBESITY DUE TO EXCESS CALORIES WITH SERIOUS COMORBIDITY AND BODY MASS INDEX (BMI) OF 37.0 TO 37.9 IN ADULT (H): ICD-10-CM

## 2022-02-07 DIAGNOSIS — K91.2 POSTSURGICAL MALABSORPTION: ICD-10-CM

## 2022-02-07 DIAGNOSIS — Z98.84 BARIATRIC SURGERY STATUS: Primary | ICD-10-CM

## 2022-02-07 DIAGNOSIS — E66.01 CLASS 2 SEVERE OBESITY DUE TO EXCESS CALORIES WITH SERIOUS COMORBIDITY AND BODY MASS INDEX (BMI) OF 37.0 TO 37.9 IN ADULT (H): ICD-10-CM

## 2022-02-07 PROCEDURE — 99215 OFFICE O/P EST HI 40 MIN: CPT | Mod: 95 | Performed by: PHYSICIAN ASSISTANT

## 2022-02-07 RX ORDER — NALTREXONE HYDROCHLORIDE 50 MG/1
TABLET, FILM COATED ORAL
Qty: 180 TABLET | Refills: 0 | Status: SHIPPED | OUTPATIENT
Start: 2022-02-07 | End: 2022-03-27

## 2022-02-07 RX ORDER — BUPROPION HYDROCHLORIDE 100 MG/1
100 TABLET, EXTENDED RELEASE ORAL 2 TIMES DAILY
Qty: 180 TABLET | Refills: 1 | Status: SHIPPED | OUTPATIENT
Start: 2022-02-07 | End: 2022-07-18

## 2022-02-07 ASSESSMENT — MIFFLIN-ST. JEOR: SCORE: 1588.38

## 2022-02-07 NOTE — PATIENT INSTRUCTIONS
Nice to talk with you today.  Below is our plan we discussed.-  DORIAN Smith    Plan:    Continue the wellbutrin and switch naltrexone to 50 mg at supper.  If younotices an increase in cravings in the daytime, you can will add 50 mg of naltrexone in the am.     Today, we dicussed possibility of adding metformin in the future for further weight loss if needed.     Iron levels are starting to drop please start either;   A. Ferrous Sulfate 325mg per day as well as vitamin C 500mg per day to help iron absorption or  B. Vitron C one tablet per day (iron and vitamin C in one tablet)    See Lauren Bloch, MTM Pharm D. for a med check in 1 month to see how naltrexone is doing with cravings. She can also review your hx and give additonal anti-obesity medication recommendations.      FOLLOW-UP:    Please call [phone] to schedule with medical therapy management with Lauren Bloch, MTM Pharmacist in 1 month.     Please call [phone] to schedule your next visit with Sarah Stacy PA-C in  May and with the dietician now for annual diet visit.

## 2022-02-07 NOTE — PROGRESS NOTES
Ana is a 51 year old who is being evaluated via a billable video visit.      If the video visit is dropped, the invitation should be resent by: Text to cell phone: [phone]  Will anyone else be joining your video visit? No      Video-Visit Details    Type of service:  Video Visit    Video Start Time: 3:33 PM    Video End Time:4:05 PM    Originating Location (pt. Location): Home    Distant Location (provider location):  [facility] [place]     Platform used for Video Visit: Pelon Perez is a 51 year old who is being evaluated via a billable video visit.      If the video visit is dropped, the invitation should be resent by: Text to cell phone: [phone]  Will anyone else be joining your video visit? No        BARIATRIC /MEDICAL WEIGHT MANAGEMENT FOLLOW UP VISIT    HISTORY OF PRESENT ILLNESS: Pt presents today for her annual follow-up appointment status post laparoscopic gastric bypass.  She has maintained her weight. She is on naltrexone and wellbutrin..We had increased Wellbutrin to 150 mg BID but this caused loose stools so we backed down to 100 mg BID. She is tolerating this well. When she backed down on her wellbutrin she mistakenly decreased her naltrexone to a 1/2 tablet twice daily instead of the full tablet twice daily.  Just realized this over the weekend.  Has had some cravings at night.  Thinks the increase in naltrexone will help. (Not sure if she needs the naltrexone in the morning).  Feels her hunger is under control.  She has some cravings at night.  Is trying to have suger free pudding at night with protein powder.    Wt Readings from Last 10 Encounters:   02/07/22 219 lb (99.3 kg)   12/29/21 227 lb (103 kg)   10/15/21 221 lb (100.2 kg)   05/21/21 220 lb (99.8 kg)   03/18/21 220 lb (99.8 kg)   02/25/21 227 lb 6.4 oz (103.1 kg)   02/10/21 228 lb (103.4 kg)   01/25/21 221 lb (100.2 kg)   10/05/20 219 lb (99.3 kg)   09/28/20 213 lb (96.6 kg)        AOM  Trials  Phentermine- palpations  Qsymia- palpations  Metformin- not effective on own  Victoza- effective but not covered by insurance  Topiramate: not tried but pt has numbness in fingers already and hx of nephrolithiasis  Naltrexone- worked initially   Wellbutrin-worked initially, improved mood       VITAMINS AND MINERALS:  2 Complete multivitamins with minerals- Rinku's Complete  5000 Int Units of Vitamin D daily   500 mg chew three times of Calcium daily- from MVI by 2 hours  No iron needed  Biotin daily  1 tablet B complex (100 mg thiamine per tablet) -cut in 1/2 (takes 2X per week)  5000 B12 sl twice weekly     OBESITY RELATED CONDITIONS:  MIGUELITO- Using CPAP nightly  GERD- improved  Back Pain- improved    PHYSICAL ACTIVITY:  Type: walking   Is trying to get 10,000 steps when weather is not too cold.  Is able to hit this when she goes to the office.  Duration (min): 30  Would like to get back to her video exercise routines now that her kitchen and office remodel is complete.         SOCIAL HISTORY:  Pt denies smoking.  Pt denies alcohol use.  Avoids NSAIDS.      REVIEW OF SYSTEMS:     GI:  Nausea-None  Vomiting-None  Diarrhea-none  Constipation-None  Dysphagia-None  Abdominal Pain-None  Heartburn-Occas. around holidays.     SKIN:  Intertriginous irritation-none, s/p panniculectomy.       LABS/IMAGING/MEDICAL RECORDS REVIEW:   Hemoglobin A1C POCT   Date Value Ref Range Status   02/10/2021 5.1 0 - 5.6 % Final     Comment:     Normal <5.7% Prediabetes 5.7-6.4%  Diabetes 6.5% or higher - adopted from ADA   consensus guidelines.       Vitamin D, Total (25-Hydroxy)   Date Value Ref Range Status   01/10/2022 40 20 - 75 ug/L Final     Parathyroid Hormone Intact   Date Value Ref Range Status   01/10/2022 47 18 - 80 pg/mL Final     Vitamin B12   Date Value Ref Range Status   01/10/2022 918 193 - 986 pg/mL Final     Hemoglobin   Date Value Ref Range Status   01/10/2022 14.3 11.7 - 15.7 g/dL Final     Ferritin  "  Date Value Ref Range Status   01/10/2022 39 8 - 252 ng/mL Final     Iron   Date Value Ref Range Status   01/10/2022 47 35 - 180 ug/dL Final     Iron Binding Capacity   Date Value Ref Range Status   01/10/2022 375 240 - 430 ug/dL Final     Iron Saturation Index   Date Value Ref Range Status   01/18/2021 23 15 - 46 % Final   01/27/2020 19 15 - 46 % Final     Iron Sat Index   Date Value Ref Range Status   01/10/2022 13 (L) 15 - 46 % Final     Current Outpatient Medications   Medication     augmented betamethasone dipropionate (DIPROLENE-AF) 0.05 % external cream     BIOTIN PO     buPROPion (WELLBUTRIN SR) 150 MG 12 hr tablet     Calcium Citrate-Vitamin D (CALCIUM CITRATE CHEWY BITE PO)     childrens multivitamin w/iron (FLINTSTONES COMPLETE) 60 MG chewable tablet     naltrexone (DEPADE/REVIA) 50 MG tablet     nitroGLYcerin (NITRO-BID) 2 % OINT ointment     polyethylene glycol (MIRALAX/GLYCOLAX) packet     vitamin (B COMPLEX-C) tablet     vitamin B-12 (CYANOCOBALAMIN) 2500 MCG sublingual tablet     Vitamin D3 (CHOLECALCIFEROL) 25 mcg (1000 units) tablet     Zinc Sulfate 220 (50 Zn) MG TABS     No current facility-administered medications for this visit.       PHYSICAL EXAMINATION:  Ht 5' 4\" (1.626 m)   Wt 219 lb (99.3 kg)   LMP 10/01/2016 (Approximate)   BMI 37.59 kg/m    GENERAL: Healthy, alert and no distress  EYES: Eyes grossly normal to inspection.  No discharge or erythema, or obvious scleral/conjunctival abnormalities.  RESP: No audible wheeze, cough, or visible cyanosis.  No visible retractions or increased work of breathing.    SKIN: Visible skin clear. No significant rash, abnormal pigmentation or lesions.  NEURO: Cranial nerves grossly intact.  Mentation and speech appropriate for age.  PSYCH: Mentation appears normal, affect normal/bright, judgement and insight intact, normal speech and appearance well-groomed.      ASSESSMENT AND PLAN:      Bariatric surgery status  RYGBS [id number] [place]    Class 2 severe " obesity due to excess calories with serious comorbidity and body mass index (BMI) of 37.0 to 37.9 in adult (H)  Patient was congratulated on maintainng her weight.Healthy habits to assist with further weight loss were discussed.  She will continue the wellbutrin and switch her naltrexone to 50 mg at supper.  If she notices an increase in cravings in the daytime, she will add 50 mg of naltrexone in the am.  She will see Lauren Bloch for a med check in 1 month to see how naltrexone is doing with cravings.  Pt would like to continue losing weight, will have Glenys review pt hx and give additonal AOM recommendations.     Insulin resistance  Dicussed possibility of adding metformin in the future for further weight loss if needed.     Postsurgical malabsorption  Continue taking recommended post-op vitamins  Iron levels are starting to drop please start either;   A. Ferrous Sulfate 325mg per day as well as vitamin C 500mg per day to help iron absorption or  B. Vitron C one tablet per day (iron and vitamin C in one tablet)    Sincerely,      Sarah Stacy PA-C    FOLLOW-UP:    Please call [phone] to schedule with medical therapy management with Lauren Bloch, MTM Pharmacist in 1 month.   Please call [phone] to schedule your next visit with Sarah Stacy PA-C in  May and with the dietician now for annual diet visit.       45 minutes spent on the date of the encounter doing chart review, interpretation of tests, patient visit and documentation

## 2022-02-09 NOTE — TELEPHONE ENCOUNTER
Spoke with patient to verify zinc in MVI- patient taking 2 MVI with 5 mg each = 10 mg total.  This meets ASMBS guidelines.    Also reviewed other MVI that would be available to meet her needs.  Laura Paulino MS, RD, RN

## 2022-02-19 NOTE — PROCEDURES
" SLEEP STUDY INTERPRETATION  DIAGNOSTIC POLYSOMNOGRAPHY REPORT      Patient: ROGER BLANDON  YOB: 1970  Study Date: 2/6/2022  MRN: [id number]  Referring Provider: MD Bedoya Christina  Ordering Provider: Goltz, PA-C, Bennett    Indications for Polysomnography: The patient is a 51-year-old Female who is 5' 4\" and weighs 227.0 lbs. Her BMI is 39.2, [place] sleepiness scale 7 and neck circumference is 39 cm.  Patient was previously diagnosed with very severe obstructive sleep apnea and lost significant weight post bariatric surgery.  Recently obtained HST was negative for MIGUELITO. A diagnostic polysomnogram was performed to re-evaluate for sleep apnea/ hypoxemia.    Polysomnogram Data: A full night polysomnogram recorded the standard physiologic parameters including EEG, EOG, EMG, ECG, nasal and oral airflow. Respiratory parameters of chest and abdominal movements were recorded with respiratory inductance plethysmography. Oxygen saturation was recorded by pulse oximetry. Hypopnea scoring rule used: 1B 4%.    Sleep Architecture: Sleep efficiency was normal. Reduced initial sleep latency and reduced REM latency, suggest possible sleep deprivation.  All sleep stages were observed.  The total recording time of the polysomnogram was 500.2 minutes. The total sleep time was 458.5 minutes. Sleep latency was decreased at 6.8 minutes without the use of a sleep aid. REM latency was 73.5 minutes. Arousal index was normal at 17.5 arousals per hour. Sleep efficiency was normal at 91.7%. Wake after sleep onset was 34.0 minutes. The patient spent 5.3% of total sleep time in Stage N1, 57.0% in Stage N2, 18.8% in Stage N3, and 18.9% in REM. Time in REM supine was 57.0 minutes.    Respiration: Mild intermittent snoring was reported, but there was no evidence of clinically significant obstructive sleep apnea.    Events ? The polysomnogram revealed a presence of - obstructive, - central, and - mixed apneas resulting in an " apnea index of - events per hour. There were 11 obstructive hypopneas and - central hypopneas resulting in an obstructive hypopnea index of 1.4 and central hypopnea index of - events per hour. The combined apnea/hypopnea index was 1.4 events per hour (central apnea/hypopnea index was - events per hour). The REM AHI was 4.9 events per hour. The supine AHI was 3.0 events per hour. The RERA index was 4.3 events per hour.  The RDI was 5.8 events per hour.    Snoring - was reported as mild and intermittent.    Respiratory rate and pattern - was notable for normal respiratory rate and pattern.    Sustained Sleep Associated Hypoventilation - Transcutaneous carbon dioxide monitoring was not used, however significant hypoventilation was not suggested by oximetry.    Sleep Associated Hypoxemia - (Greater than 5 minutes O2 sat at or below 88%) was not present. Baseline oxygen saturation was 93.5%. Lowest oxygen saturation was 88.8%. Time spent less than or equal to 88% was 0 minutes. Time spent less than or equal to 89% was 0.1 minutes.    Movement Activity: There were no significant limb movements during the study.  Periodic Limb Activity - There were 59 PLMs during the entire study. The PLM index was 7.7 movements per hour. The PLM Arousal Index was 3.5 per hour.    REM EMG Activity - Excessive transient/sustained muscle activity was not present.    Nocturnal Behavior - Abnormal sleep related behaviors were not noted during/arising out of NREM / REM sleep.    Bruxism - None apparent.    Cardiac Summary: Normal sinus rhythm was noted  The average pulse rate was 70.0 bpm. The minimum pulse rate was 53.9 bpm while the maximum pulse rate was 99.1 bpm.  Artifact noted during first part of study as one of the EKG leads was unplugged. Arrhythmias were not noted.    Assessment:     Sleep efficiency was normal. Reduced initial sleep latency and reduced REM latency, suggest possible sleep deprivation.  All sleep stages were  observed.    Mild intermittent snoring was reported, but there was no evidence of clinically significant obstructive sleep apnea.      There were no significant limb movements during the study.    Normal sinus rhythm was noted    Recommendations:    Patient may be a candidate for dental appliance through referral to Sleep Dentistry if snoring is of concern.    Suggest optimizing sleep schedule and avoiding sleep deprivation.    Advice regarding the risks of drowsy driving.    Weight management (if BMI > 30).    Pharmacologic therapy should be used for management of restless legs syndrome only if present and clinically indicated and not based on the presence of periodic limb movements alone.    Diagnostic Codes:   Unspecified Sleep Disturbance G47.9    2/6/2022 [place] [facility] Sleep Study (227.0 lbs) - AHI 1.4, RDI 5.8, Supine AHI 3.0, REM AHI 4.9, Low O2 88.8%, Time Spent ?88% 0 minutes / Time Spent ?89% 0.1 minutes.     _____________________________________   Electronically Signed By: (Cindi Suggs MD), 2/19/22

## 2022-02-22 LAB — SLPCOMP: NORMAL

## 2022-02-25 ENCOUNTER — VIRTUAL VISIT (OUTPATIENT)
Dept: SLEEP MEDICINE | Facility: CLINIC | Age: 52
End: 2022-02-25
Payer: COMMERCIAL

## 2022-02-25 VITALS — HEIGHT: 64 IN | WEIGHT: 219 LBS | BODY MASS INDEX: 37.39 KG/M2

## 2022-02-25 DIAGNOSIS — F51.01 PRIMARY INSOMNIA: Primary | ICD-10-CM

## 2022-02-25 PROCEDURE — 99214 OFFICE O/P EST MOD 30 MIN: CPT | Mod: 95 | Performed by: PHYSICIAN ASSISTANT

## 2022-02-25 ASSESSMENT — SLEEP AND FATIGUE QUESTIONNAIRES
HOW LIKELY ARE YOU TO NOD OFF OR FALL ASLEEP WHILE SITTING INACTIVE IN A PUBLIC PLACE: WOULD NEVER DOZE
HOW LIKELY ARE YOU TO NOD OFF OR FALL ASLEEP WHILE WATCHING TV: SLIGHT CHANCE OF DOZING
HOW LIKELY ARE YOU TO NOD OFF OR FALL ASLEEP WHILE SITTING AND TALKING TO SOMEONE: WOULD NEVER DOZE
HOW LIKELY ARE YOU TO NOD OFF OR FALL ASLEEP WHEN YOU ARE A PASSENGER IN A CAR FOR AN HOUR WITHOUT A BREAK: WOULD NEVER DOZE
HOW LIKELY ARE YOU TO NOD OFF OR FALL ASLEEP IN A CAR, WHILE STOPPED FOR A FEW MINUTES IN TRAFFIC: WOULD NEVER DOZE
HOW LIKELY ARE YOU TO NOD OFF OR FALL ASLEEP WHILE LYING DOWN TO REST IN THE AFTERNOON WHEN CIRCUMSTANCES PERMIT: MODERATE CHANCE OF DOZING
HOW LIKELY ARE YOU TO NOD OFF OR FALL ASLEEP WHILE SITTING QUIETLY AFTER LUNCH WITHOUT ALCOHOL: WOULD NEVER DOZE
HOW LIKELY ARE YOU TO NOD OFF OR FALL ASLEEP WHILE SITTING AND READING: SLIGHT CHANCE OF DOZING

## 2022-02-25 NOTE — PROGRESS NOTES
Sleep Follow-Up Visit:    Date on this visit: 2/25/2022    Ana Wyman comes in today for follow-up of her sleep study done on 2/6/22.   Her medical history is significant for s/p gastric bypass (1/28/2019).      She was initially diagnosed at [facility] on 4/29/2005. Her AHI was 122.9/hr, .3/hr, O2 maty 77%. CPAP 10 cm was effective. Her wt was 300#.      She had a CPAP titration sleep study at [facility] on 8/21/2009. CPAP was titrated to 9 cm, but her RDI was still 8.7/hr (AHI was <1). Her weight was 317#.     A home study was performed to re-evaluate her apnea baseline after a weight loss of about 80 pounds.  HST 12/13/21 Results:  Weight: 221 pounds  AHI 0.9/hr, O2 maty 88%. There were periods where her O2 baseline was sitting right at 89-90%, but she did not show hypoxemia on this study.    We opted to perform an in lab study to verify that her apnea and hypoxemia were truly resolved with weight loss.    PSG Results 2/6/22:  Weight: 227 pounds  Sleep Architecture: Sleep efficiency was normal. Reduced initial sleep latency and reduced REM latency, suggest possible sleep deprivation.  All sleep stages were observed.  The total recording time of the polysomnogram was 500.2 minutes. The total sleep time was 458.5 minutes. Sleep latency was decreased at 6.8 minutes without the use of a sleep aid. REM latency was 73.5 minutes. Arousal index was normal at 17.5 arousals per hour. Sleep efficiency was normal at 91.7%. Wake after sleep onset was 34.0 minutes. The patient spent 5.3% of total sleep time in Stage N1, 57.0% in Stage N2, 18.8% in Stage N3, and 18.9% in REM. Time in REM supine was 57.0 minutes.     Respiration: Mild intermittent snoring was reported, but there was no evidence of clinically significant obstructive sleep apnea.    Events ? The polysomnogram revealed a presence of - obstructive, - central, and - mixed apneas resulting in an apnea index of - events per hour. There were 11 obstructive  hypopneas and - central hypopneas resulting in an obstructive hypopnea index of 1.4 and central hypopnea index of - events per hour. The combined apnea/hypopnea index was 1.4 events per hour (central apnea/hypopnea index was - events per hour). The REM AHI was 4.9 events per hour. The supine AHI was 3.0 events per hour. The RERA index was 4.3 events per hour.  The RDI was 5.8 events per hour.    Snoring - was reported as mild and intermittent.    Respiratory rate and pattern - was notable for normal respiratory rate and pattern.    Sustained Sleep Associated Hypoventilation - Transcutaneous carbon dioxide monitoring was not used, however significant hypoventilation was not suggested by oximetry.    Sleep Associated Hypoxemia - (Greater than 5 minutes O2 sat at or below 88%) was not present. Baseline oxygen saturation was 93.5%. Lowest oxygen saturation was 88.8%. Time spent less than or equal to 88% was 0 minutes. Time spent less than or equal to 89% was 0.1 minutes.     Movement Activity: There were no significant limb movements during the study.  Periodic Limb Activity - There were 59 PLMs during the entire study. The PLM index was 7.7 movements per hour. The PLM Arousal Index was 3.5 per hour.    REM EMG Activity - Excessive transient/sustained muscle activity was not present.    Nocturnal Behavior - Abnormal sleep related behaviors were not noted during/arising out of NREM / REM sleep.    Bruxism - None apparent.     Cardiac Summary: Normal sinus rhythm was noted  The average pulse rate was 70.0 bpm. The minimum pulse rate was 53.9 bpm while the maximum pulse rate was 99.1 bpm.  Artifact noted during first part of study as one of the EKG leads was unplugged. Arrhythmias were not noted.    She had been waking around 3 AM and having some difficulty getting back to sleep. Now, she wakes at 5 AM and is not awake as long. That happened last week. She is more likely to wake early on days she has to get up earlier.    Bedtime: 10:30-11 PM. Wake time: 6:30-7 AM. No naps.  She is a little more tired with getting back to sleep around 5 AM. That happens at least 3 times per week. She feels her stress is not bad. She is not sure why she wakes. When she wakes, she tries using the restroom, getting a drink and if she can't fall back to sleep, she will get up. She gets a walk a few times per week. She is working at "[employer] on weekends, so works late. She may get home 10-11 PM and go to sleep 12-1 AM. She still gets up about 8 AM on weekends.  On weekdays, she has to get up earlier.     Past medical/surgical history, family history, social history, medications and allergies were reviewed.      Problem List:  Patient Active Problem List    Diagnosis Date Noted     Symptomatic abdominal panniculus 02/25/2021     Priority: Medium     Family history of malignant neoplasm of breast 10/05/2020     Priority: Medium     Insulin resistance 09/28/2020     Priority: Medium     Postural hypotension 07/29/2019     Priority: Medium     Intertrigo 07/29/2019     Priority: Medium     Class 2 severe obesity due to excess calories with serious comorbidity and body mass index (BMI) of 35.0 to 35.9 in adult (H) 07/29/2019     Priority: Medium     S/P laparoscopic cholecystectomy 04/18/2019     Priority: Medium     Bariatric surgery status 02/05/2019     Priority: Medium     Postsurgical malabsorption 02/05/2019     Priority: Medium     Fatty liver 07/27/2018     Priority: Medium     Mild intermittent asthma without complication 06/11/2018     Priority: Medium     Lymphedema bilateral with mixed lipedema. 09/30/2015     Priority: Medium     Baker's cyst of knee 09/03/2015     Priority: Medium     Acanthosis nigricans 03/24/2015     Priority: Medium     Cutaneous skin tags 03/24/2015     Priority: Medium     MIGUELITO on CPAP 03/19/2013     Priority: Medium     Sleep study in 2004 and 2012         Uterine fibroid 08/02/2012     Priority: Medium     Lichen  sclerosus 07/14/2011     Priority: Medium     Family history of malignant neoplasm of genital organ, other 06/11/2007     Priority: Medium     FAMILY HX GI MALIGNANCY 05/31/2007     Priority: Medium     Hypersomnia with sleep apnea 07/06/2005     Priority: Medium     Problem list name updated by automated process. Provider to review       Esophageal reflux 09/27/2004     Priority: Medium     Allergic state 09/27/2004     Priority: Medium     Problem list name updated by automated process. Provider to review          Impression/Plan:    (F51.01) Primary insomnia  (primary encounter diagnosis)  Comment: This study shows apnea is resolved. Waking at 5 AM and trouble getting back to sleep.  It may relate to working later in the evening on weekends.  Plan:  Try limiting time in bed to 7.5 hours. Try to be as consistent as possible about sleep schedule on all days. Try 1 mg melatonin about 10:30 PM. Avoid sleeping in past 7:30 AM on Sundays.     She will follow up with me in the future as needed.     35 minutes were spent on the date of the encounter doing chart review, history and exam, documentation and further activities as noted above.      Bennett Goltz, PA-C    CC: No ref. provider found

## 2022-02-25 NOTE — PATIENT INSTRUCTIONS
Tips for Insomnia:  Try limiting time in bed to 7.5 hours. Try to be as consistent as possible about sleep schedule on all days. Try 1 mg melatonin about 10:30 PM. Avoid sleeping in past 7:30 AM on Sundays.    General recommendations for sleep problems (Insomnia)  Allow 2-4 weeks to see results     Establish a regular sleep schedule    Most people only need 7-8 hours of sleep.  Don't be in bed longer than you need     to sleep or you will end up spending more time awake in bed. This trains your    brain to think of the bed as a place to not sleep.  Go to bed at same time each night   Get up at same time each day - Set an alarm everyday (even weekends). This is one of    the most important tips. It prevents you from relying on your insomnia to get you    up on time for your day. That actually reinforces insomnia. It also will help your    body get into a pattern where you start feeling tired at a consistent time each    night.  The body functions best when you keep a consistent routine.  Avoid sleeping-in and napping. Anytime you sleep during the day, you will be less tired at    night. You may be tired enough to fall asleep, but you will wake more in the    middle of the night because you will have met your sleep need before the night is    done.   Cut down time in bed (if not asleep, get up)- Use your bed only for sleep and sex    Anytime you spend time in bed doing activities other than sleep (reading,    watching TV, working, playing on the computer or phone, or even just laying in    bed trying to sleep), you are training  your brain to think of the bed as a place to    do activities other than sleep. If you are not falling asleep within 20-30 minutes,    get out of bed. While out of bed, avoid bright lights. Avoid work or chores. Being    productive in the middle of the night reinforces waking up at night. Find relaxing,    not particularly entertaining activities like reading, listening to music, or relaxation     exercises. Go back to bed if you start feeling groggy, or after about 30 minutes,    even if not feeling very tired. Sometimes, just getting out of bed stops the pattern    of getting frustrated about laying in bed not sleeping, and that can help you fall    asleep.   Avoid trying to force yourself to sleep- sleep is not like everything else. The harder you    work at most things, the more you can accomplish. The harder you work at    sleep, the less you will sleep.     Make the bedroom comfortable - quiet, dark and cool are better. Consider ear plugs    (silicon). Use dark blinds or wear an eye mask if needed     Make a relaxing routine prior to bedtime  Relaxation exercises:   Progressive muscle relaxation: Relax each muscle group individually    Begin with your feet, flex, then relax. Try to imagine your feet feeling heavy and sinking into the bed. Move to your calves, do the same thing. Work through each muscle group toward your head.    Relaxing Mental Imagery: Try to imagine a trip that you took and found relaxing, or imagine a day at the beach. Try to walk yourself through the day in your mind as if you were dreaming it. Try to imagine sensing the different experiences, such as feeling sand between your toes, the heat of the sun on your skin, seeing the waves crashing the shore, the smell of the salt water, etc.     Deal with your worries before bedtime    Set aside a worry time around dinner time for 10-15 minutes. Write down the    things that are on your mind. Plan time in the coming days to address those    issues. Brainstorm ideas on how you will deal with them. Try to identify issues    that are out of your control, and try to let those issues go.  Listen to relaxation tapes   Classical Music or Nature sounds   Back Massage   Get regular exercise each day (at least 1-2 hours before bedtime)   Take medications only as directed   Eat a light bedtime snack or warm drink   Warm milk   Warm herbal tea  (non-caffeinated)       Things to avoid   No overstimulating activities just before bed   No competitive games before bedtime   No exciting television programs before bedtime   Avoid caffeine after lunchtime   Avoid chocolate   Do not use alcohol to induce sleep (worsens Insomnia)   Do not take someone else's sleeping pills   Do not look at the clock when awakening   Do not turn on light when getting up to use bathroom, use a nightlight     Online Programs     www.[employer] (pronounced shut eye). There is a fee for this program. Enter the code  [facility]  if you decide to enroll in this program.      www.sleepIO.com (pronounced sleep ee oh). There is a fee for this program. Enter the code  [facility]  if you decide to enroll in this program.     Suggested Resources  Insomnia Treatment Books     Overcoming Insomnia by Zeb Irizarry and Anastasiya Zavala (2008)    No More Sleepless Nights by Leo Canales and Taryn Hall (1996)    Say [place] to Insomnia by Ventura Bass (2009)    The Insomnia Workbook by Mamie Garcia and Sreekanth Gutiérrez (2009)    The Insomnia Answer by Maykel Garrison and Ed Soni (2006)      Stress Management and Relaxation Books    The Relaxation and Stress Reduction Workbook by Coretta Mc, Trena Bonilla and Xander Nunez (2008)    Stress Management Workbook: Techniques and Self-Assessment Procedures by Jessica Mohan and Clive Max (1997)    A Mindfulness-Based Stress Reduction Workbook by Jamil Davies and Catrachita Justice (2010)    The Complete Stress Management Workbook by Silvestre Shirley, Christofer Polo and Luisito Mcwilliams (1996)    Assert Yourself by Lesly Menjivar and Black Menjivar (1977)    Relaxation Resources for Computer Download   These websites offer resources to help you relax. This list is for information only. [facility] is not responsible for the quality of services or the actions of any person or organization.  Progressive Muscle Relaxation (PMR):      http://www.[employer]/progressive-muscle-relaxation-exercise.html     http://studentsupport.[facility]/counseling/resources/self-help/relaxation-and-stress-management/   Deep Breathing Exercises:    http://www.[employer]/breathing-awareness.html     Meditation:     www[employer]    www.[employer] TechnologiesguidedHealthiNationmeditation-site.[employer] You may have to pay for some of these resources.    Guided Imagery:    http://www.[employer]/guided-imagery-scripts.html     http://[employer]/library/nnehqkpqkk-vjnsus-xxqjpdp/   Consider phone apps such as: Calm, Headspace or Insight Timer.    Counseling / Behavioral Health  [facility]  Visit www.[email or web address].org or call [phone] to find a clinic close to you.  Or call [phone] for [facility].

## 2022-02-25 NOTE — PROGRESS NOTES
Ana is a 51 year old who is being evaluated via a billable video visit.      How would you like to obtain your AVS? MyChart  If the video visit is dropped, the invitation should be resent by: Send to e-mail at: [email or web address]  Will anyone else be joining your video visit? No      Video Start Time: 10:13 AM  Video-Visit Details    Type of service:  Video Visit    Video End Time:10:39 AM    Originating Location (pt. Location): Home    Distant Location (provider location):  [facility] SLEEP [facility]     Platform used for Video Visit: [employer]

## 2022-03-27 ENCOUNTER — MYC REFILL (OUTPATIENT)
Dept: SURGERY | Facility: CLINIC | Age: 52
End: 2022-03-27
Payer: COMMERCIAL

## 2022-03-27 DIAGNOSIS — E66.01 CLASS 2 SEVERE OBESITY DUE TO EXCESS CALORIES WITH SERIOUS COMORBIDITY AND BODY MASS INDEX (BMI) OF 37.0 TO 37.9 IN ADULT (H): ICD-10-CM

## 2022-03-27 DIAGNOSIS — E66.812 CLASS 2 SEVERE OBESITY DUE TO EXCESS CALORIES WITH SERIOUS COMORBIDITY AND BODY MASS INDEX (BMI) OF 37.0 TO 37.9 IN ADULT (H): ICD-10-CM

## 2022-03-28 RX ORDER — NALTREXONE HYDROCHLORIDE 50 MG/1
TABLET, FILM COATED ORAL
Qty: 180 TABLET | Refills: 0 | Status: SHIPPED | OUTPATIENT
Start: 2022-03-28 | End: 2022-07-18

## 2022-04-12 SDOH — HEALTH STABILITY: PHYSICAL HEALTH: ON AVERAGE, HOW MANY DAYS PER WEEK DO YOU ENGAGE IN MODERATE TO STRENUOUS EXERCISE (LIKE A BRISK WALK)?: 3 DAYS

## 2022-04-12 SDOH — ECONOMIC STABILITY: INCOME INSECURITY: HOW HARD IS IT FOR YOU TO PAY FOR THE VERY BASICS LIKE FOOD, HOUSING, MEDICAL CARE, AND HEATING?: NOT VERY HARD

## 2022-04-12 SDOH — ECONOMIC STABILITY: FOOD INSECURITY: WITHIN THE PAST 12 MONTHS, YOU WORRIED THAT YOUR FOOD WOULD RUN OUT BEFORE YOU GOT MONEY TO BUY MORE.: NEVER TRUE

## 2022-04-12 SDOH — HEALTH STABILITY: PHYSICAL HEALTH: ON AVERAGE, HOW MANY MINUTES DO YOU ENGAGE IN EXERCISE AT THIS LEVEL?: 60 MIN

## 2022-04-12 SDOH — ECONOMIC STABILITY: INCOME INSECURITY: IN THE LAST 12 MONTHS, WAS THERE A TIME WHEN YOU WERE NOT ABLE TO PAY THE MORTGAGE OR RENT ON TIME?: NO

## 2022-04-12 SDOH — ECONOMIC STABILITY: FOOD INSECURITY: WITHIN THE PAST 12 MONTHS, THE FOOD YOU BOUGHT JUST DIDN'T LAST AND YOU DIDN'T HAVE MONEY TO GET MORE.: NEVER TRUE

## 2022-04-12 ASSESSMENT — ENCOUNTER SYMPTOMS
HEARTBURN: 1
FREQUENCY: 0
NERVOUS/ANXIOUS: 0
PARESTHESIAS: 0
BREAST MASS: 0
COUGH: 0
HEMATOCHEZIA: 0
ABDOMINAL PAIN: 0
DIZZINESS: 1
HEMATURIA: 0
NAUSEA: 0
ARTHRALGIAS: 0
WEAKNESS: 0
FEVER: 0
HEADACHES: 1
PALPITATIONS: 0
SHORTNESS OF BREATH: 0
CONSTIPATION: 0
EYE PAIN: 0
SORE THROAT: 0
CHILLS: 0
MYALGIAS: 0
DYSURIA: 0
JOINT SWELLING: 0
DIARRHEA: 0

## 2022-04-12 ASSESSMENT — SOCIAL DETERMINANTS OF HEALTH (SDOH)
HOW OFTEN DO YOU GET TOGETHER WITH FRIENDS OR RELATIVES?: ONCE A WEEK
DO YOU BELONG TO ANY CLUBS OR ORGANIZATIONS SUCH AS CHURCH GROUPS UNIONS, FRATERNAL OR ATHLETIC GROUPS, OR SCHOOL GROUPS?: NO
IN A TYPICAL WEEK, HOW MANY TIMES DO YOU TALK ON THE PHONE WITH FAMILY, FRIENDS, OR NEIGHBORS?: TWICE A WEEK
ARE YOU MARRIED, WIDOWED, DIVORCED, SEPARATED, NEVER MARRIED, OR LIVING WITH A PARTNER?: NEVER MARRIED
HOW OFTEN DO YOU ATTEND CHURCH OR RELIGIOUS SERVICES?: 1 TO 4 TIMES PER YEAR

## 2022-04-12 ASSESSMENT — LIFESTYLE VARIABLES
HOW OFTEN DO YOU HAVE SIX OR MORE DRINKS ON ONE OCCASION: NEVER
HOW OFTEN DO YOU HAVE A DRINK CONTAINING ALCOHOL: MONTHLY OR LESS
AUDIT-C TOTAL SCORE: 1
HOW MANY STANDARD DRINKS CONTAINING ALCOHOL DO YOU HAVE ON A TYPICAL DAY: 1 OR 2
SKIP TO QUESTIONS 9-10: 1

## 2022-04-14 ENCOUNTER — LAB (OUTPATIENT)
Dept: LAB | Facility: CLINIC | Age: 52
End: 2022-04-14
Payer: COMMERCIAL

## 2022-04-14 DIAGNOSIS — Z98.84 BARIATRIC SURGERY STATUS: ICD-10-CM

## 2022-04-14 DIAGNOSIS — E66.812 CLASS 2 SEVERE OBESITY DUE TO EXCESS CALORIES WITH SERIOUS COMORBIDITY AND BODY MASS INDEX (BMI) OF 35.0 TO 35.9 IN ADULT (H): ICD-10-CM

## 2022-04-14 DIAGNOSIS — E66.01 CLASS 2 SEVERE OBESITY DUE TO EXCESS CALORIES WITH SERIOUS COMORBIDITY AND BODY MASS INDEX (BMI) OF 35.0 TO 35.9 IN ADULT (H): ICD-10-CM

## 2022-04-14 DIAGNOSIS — E88.819 INSULIN RESISTANCE: ICD-10-CM

## 2022-04-14 DIAGNOSIS — Z00.00 ROUTINE HISTORY AND PHYSICAL EXAMINATION OF ADULT: ICD-10-CM

## 2022-04-14 LAB
ALBUMIN SERPL-MCNC: 3.8 G/DL (ref 3.4–5)
ALP SERPL-CCNC: 97 U/L (ref 40–150)
ALT SERPL W P-5'-P-CCNC: 26 U/L (ref 0–50)
ANION GAP SERPL CALCULATED.3IONS-SCNC: 2 MMOL/L (ref 3–14)
AST SERPL W P-5'-P-CCNC: 18 U/L (ref 0–45)
BILIRUB SERPL-MCNC: 0.7 MG/DL (ref 0.2–1.3)
BUN SERPL-MCNC: 12 MG/DL (ref 7–30)
CALCIUM SERPL-MCNC: 9.1 MG/DL (ref 8.5–10.1)
CHLORIDE BLD-SCNC: 107 MMOL/L (ref 94–109)
CHOLEST SERPL-MCNC: 183 MG/DL
CO2 SERPL-SCNC: 30 MMOL/L (ref 20–32)
CREAT SERPL-MCNC: 0.8 MG/DL (ref 0.52–1.04)
FASTING STATUS PATIENT QL REPORTED: YES
GFR SERPL CREATININE-BSD FRML MDRD: 88 ML/MIN/1.73M2
GLUCOSE BLD-MCNC: 101 MG/DL (ref 70–99)
HBA1C MFR BLD: 5.4 % (ref 0–5.6)
HDLC SERPL-MCNC: 65 MG/DL
LDLC SERPL CALC-MCNC: 80 MG/DL
NONHDLC SERPL-MCNC: 118 MG/DL
POTASSIUM BLD-SCNC: 3.9 MMOL/L (ref 3.4–5.3)
PROT SERPL-MCNC: 7.1 G/DL (ref 6.8–8.8)
SODIUM SERPL-SCNC: 139 MMOL/L (ref 133–144)
TRIGL SERPL-MCNC: 191 MG/DL

## 2022-04-14 PROCEDURE — 36415 COLL VENOUS BLD VENIPUNCTURE: CPT

## 2022-04-14 PROCEDURE — 80061 LIPID PANEL: CPT

## 2022-04-14 PROCEDURE — 80053 COMPREHEN METABOLIC PANEL: CPT

## 2022-04-14 PROCEDURE — 83036 HEMOGLOBIN GLYCOSYLATED A1C: CPT

## 2022-04-19 ENCOUNTER — OFFICE VISIT (OUTPATIENT)
Dept: PEDIATRICS | Facility: CLINIC | Age: 52
End: 2022-04-19
Payer: COMMERCIAL

## 2022-04-19 VITALS
TEMPERATURE: 97.2 F | OXYGEN SATURATION: 97 % | BODY MASS INDEX: 39.11 KG/M2 | HEART RATE: 72 BPM | HEIGHT: 64 IN | SYSTOLIC BLOOD PRESSURE: 90 MMHG | RESPIRATION RATE: 18 BRPM | WEIGHT: 229.1 LBS | DIASTOLIC BLOOD PRESSURE: 50 MMHG

## 2022-04-19 DIAGNOSIS — K21.00 GASTROESOPHAGEAL REFLUX DISEASE WITH ESOPHAGITIS WITHOUT HEMORRHAGE: ICD-10-CM

## 2022-04-19 DIAGNOSIS — Z23 NEED FOR TDAP VACCINATION: ICD-10-CM

## 2022-04-19 DIAGNOSIS — N62 MACROMASTIA: ICD-10-CM

## 2022-04-19 DIAGNOSIS — Z98.84 BARIATRIC SURGERY STATUS: ICD-10-CM

## 2022-04-19 DIAGNOSIS — Z00.00 ROUTINE HISTORY AND PHYSICAL EXAMINATION OF ADULT: Primary | ICD-10-CM

## 2022-04-19 DIAGNOSIS — L98.7 EXCESS SKIN: ICD-10-CM

## 2022-04-19 DIAGNOSIS — J45.20 MILD INTERMITTENT ASTHMA WITHOUT COMPLICATION: ICD-10-CM

## 2022-04-19 DIAGNOSIS — Z12.31 VISIT FOR SCREENING MAMMOGRAM: ICD-10-CM

## 2022-04-19 DIAGNOSIS — R94.6 ABNORMAL FINDING ON THYROID FUNCTION TEST: ICD-10-CM

## 2022-04-19 LAB
ERYTHROCYTE [DISTWIDTH] IN BLOOD BY AUTOMATED COUNT: 13.4 % (ref 10–15)
FERRITIN SERPL-MCNC: 39 NG/ML (ref 8–252)
HCT VFR BLD AUTO: 42.2 % (ref 35–47)
HGB BLD-MCNC: 13.8 G/DL (ref 11.7–15.7)
IRON SATN MFR SERPL: 24 % (ref 15–46)
IRON SERPL-MCNC: 86 UG/DL (ref 35–180)
MCH RBC QN AUTO: 30.2 PG (ref 26.5–33)
MCHC RBC AUTO-ENTMCNC: 32.7 G/DL (ref 31.5–36.5)
MCV RBC AUTO: 92 FL (ref 78–100)
PLATELET # BLD AUTO: 324 10E3/UL (ref 150–450)
RBC # BLD AUTO: 4.57 10E6/UL (ref 3.8–5.2)
T4 FREE SERPL-MCNC: 0.84 NG/DL (ref 0.76–1.46)
TIBC SERPL-MCNC: 360 UG/DL (ref 240–430)
TSH SERPL DL<=0.005 MIU/L-ACNC: 4.3 MU/L (ref 0.4–4)
WBC # BLD AUTO: 6.6 10E3/UL (ref 4–11)

## 2022-04-19 PROCEDURE — 36415 COLL VENOUS BLD VENIPUNCTURE: CPT | Performed by: PEDIATRICS

## 2022-04-19 PROCEDURE — 90715 TDAP VACCINE 7 YRS/> IM: CPT | Performed by: PEDIATRICS

## 2022-04-19 PROCEDURE — 83550 IRON BINDING TEST: CPT | Performed by: PEDIATRICS

## 2022-04-19 PROCEDURE — 85027 COMPLETE CBC AUTOMATED: CPT | Performed by: PEDIATRICS

## 2022-04-19 PROCEDURE — 84439 ASSAY OF FREE THYROXINE: CPT | Performed by: PEDIATRICS

## 2022-04-19 PROCEDURE — 99396 PREV VISIT EST AGE 40-64: CPT | Mod: 25 | Performed by: PEDIATRICS

## 2022-04-19 PROCEDURE — 82728 ASSAY OF FERRITIN: CPT | Performed by: PEDIATRICS

## 2022-04-19 PROCEDURE — 84443 ASSAY THYROID STIM HORMONE: CPT | Performed by: PEDIATRICS

## 2022-04-19 PROCEDURE — 90471 IMMUNIZATION ADMIN: CPT | Performed by: PEDIATRICS

## 2022-04-19 RX ORDER — ALBUTEROL SULFATE 90 UG/1
2 AEROSOL, METERED RESPIRATORY (INHALATION) EVERY 6 HOURS
Qty: 18 G | Refills: 0 | Status: SHIPPED | OUTPATIENT
Start: 2022-04-19 | End: 2023-07-18

## 2022-04-19 ASSESSMENT — ENCOUNTER SYMPTOMS
ARTHRALGIAS: 0
CONSTIPATION: 0
PALPITATIONS: 0
NAUSEA: 0
SORE THROAT: 0
SHORTNESS OF BREATH: 0
CHILLS: 0
HEMATOCHEZIA: 0
WEAKNESS: 0
DIZZINESS: 1
BREAST MASS: 0
NERVOUS/ANXIOUS: 0
EYE PAIN: 0
COUGH: 0
MYALGIAS: 0
PARESTHESIAS: 0
HEARTBURN: 1
DYSURIA: 0
FREQUENCY: 0
HEADACHES: 1
HEMATURIA: 0
FEVER: 0
DIARRHEA: 0
JOINT SWELLING: 0
ABDOMINAL PAIN: 0

## 2022-04-19 ASSESSMENT — ASTHMA QUESTIONNAIRES: ACT_TOTALSCORE: 25

## 2022-04-19 NOTE — LETTER
My Asthma Action Plan    Name: Ana Wyman   YOB: 1970  Date: 4/19/2022   My doctor: Kelly Bedoya MD   My clinic: [facility]        My Rescue Medicine:   Albuterol inhaler (Proair/Ventolin/Proventil HFA)  2-4 puffs EVERY 4 HOURS as needed. Use a spacer if recommended by your provider.   My Asthma Severity:   Intermittent / Exercise Induced  Know your asthma triggers: upper respiratory infections             GREEN ZONE   Good Control    I feel good    No cough or wheeze    Can work, sleep and play without asthma symptoms       Take your asthma control medicine every day.     1. If exercise triggers your asthma, take your rescue medication    15 minutes before exercise or sports, and    During exercise if you have asthma symptoms  2. Spacer to use with inhaler: If you have a spacer, make sure to use it with your inhaler             YELLOW ZONE Getting Worse  I have ANY of these:    I do not feel good    Cough or wheeze    Chest feels tight    Wake up at night   1. Keep taking your Green Zone medications  2. Start taking your rescue medicine:    every 20 minutes for up to 1 hour. Then every 4 hours for 24-48 hours.  3. If you stay in the Yellow Zone for more than 12-24 hours, contact your doctor.  4. If you do not return to the Green Zone in 12-24 hours or you get worse, start taking your oral steroid medicine if prescribed by your provider.           RED ZONE Medical Alert - Get Help  I have ANY of these:    I feel awful    Medicine is not helping    Breathing getting harder    Trouble walking or talking    Nose opens wide to breathe       1. Take your rescue medicine NOW  2. If your provider has prescribed an oral steroid medicine, start taking it NOW  3. Call your doctor NOW  4. If you are still in the Red Zone after 20 minutes and you have not reached your doctor:    Take your rescue medicine again and    Call 911 or go to the emergency room right away    See your  regular doctor within 2 weeks of an Emergency Room or Urgent Care visit for follow-up treatment.          Annual Reminders:  Meet with Asthma Educator,  Flu Shot in the Fall, consider Pneumonia Vaccination for patients with asthma (aged 19 and older).    Pharmacy: [facility] [place] [id number]    Electronically signed by Kelly Bedoya MD   Date: 04/19/22                    Asthma Triggers  How To Control Things That Make Your Asthma Worse    Triggers are things that make your asthma worse.  Look at the list below to help you find your triggers and   what you can do about them. You can help prevent asthma flare-ups by staying away from your triggers.      Trigger                                                          What you can do   Cigarette Smoke  Tobacco smoke can make asthma worse. Do not allow smoking in your home, car or around you.  Be sure no one smokes at a child s day care or school.  If you smoke, ask your health care provider for ways to help you quit.  Ask family members to quit too.  Ask your health care provider for a referral to Quit Plan to help you quit smoking, or call 4-164-549-PLAN.     Colds, Flu, Bronchitis  These are common triggers of asthma. Wash your hands often.  Don t touch your eyes, nose or mouth.  Get a flu shot every year.     Dust Mites  These are tiny bugs that live in cloth or carpet. They are too small to see. Wash sheets and blankets in hot water every week.   Encase pillows and mattress in dust mite proof covers.  Avoid having carpet if you can. If you have carpet, vacuum weekly.   Use a dust mask and HEPA vacuum.   Pollen and Outdoor Mold  Some people are allergic to trees, grass, or weed pollen, or molds. Try to keep your windows closed.  Limit time out doors when pollen count is high.   Ask you health care provider about taking medicine during allergy season.     Animal Dander  Some people are allergic to skin flakes,  urine or saliva from pets with fur or feathers. Keep pets with fur or feathers out of your home.    If you can t keep the pet outdoors, then keep the pet out of your bedroom.  Keep the bedroom door closed.  Keep pets off cloth furniture and away from stuffed toys.     Mice, Rats, and Cockroaches  Some people are allergic to the waste from these pests.   Cover food and garbage.  Clean up spills and food crumbs.  Store grease in the refrigerator.   Keep food out of the bedroom.   Indoor Mold  This can be a trigger if your home has high moisture. Fix leaking faucets, pipes, or other sources of water.   Clean moldy surfaces.  Dehumidify basement if it is damp and smelly.   Smoke, Strong Odors, and Sprays  These can reduce air quality. Stay away from strong odors and sprays, such as perfume, powder, hair spray, paints, smoke incense, paint, cleaning products, candles and new carpet.   Exercise or Sports  Some people with asthma have this trigger. Be active!  Ask your doctor about taking medicine before sports or exercise to prevent symptoms.    Warm up for 5-10 minutes before and after sports or exercise.     Other Triggers of Asthma  Cold air:  Cover your nose and mouth with a scarf.  Sometimes laughing or crying can be a trigger.  Some medicines and food can trigger asthma.

## 2022-04-19 NOTE — PROGRESS NOTES
SUBJECTIVE:   CC: Ana Wyman is an 52 year old woman who presents for preventive health visit.   Patient has been advised of split billing requirements and indicates understanding: Yes     Healthy Habits:     Getting at least 3 servings of Calcium per day:  Yes    Bi-annual eye exam:  Yes    Dental care twice a year:  Yes    Sleep apnea or symptoms of sleep apnea:  None    Diet:  Low salt, Low fat/cholesterol and Carbohydrate counting    Frequency of exercise:  2-3 days/week    Duration of exercise:  45-60 minutes    Taking medications regularly:  Yes    Medication side effects:  Lightheadedness    PHQ-2 Total Score: 0    Additional concerns today:  No        Today's PHQ-2 Score:   PHQ-2 ( 1999 Pfizer) 4/12/2022   Q1: Little interest or pleasure in doing things 0   Q2: Feeling down, depressed or hopeless 0   PHQ-2 Score 0   PHQ-2 Total Score (12-17 Years)- Positive if 3 or more points; Administer PHQ-A if positive -   Q1: Little interest or pleasure in doing things Not at all   Q2: Feeling down, depressed or hopeless Not at all   PHQ-2 Score 0       Abuse: Current or Past (Physical, Sexual or Emotional) - No  Do you feel safe in your environment? Yes      Social History     Tobacco Use     Smoking status: Never Smoker     Smokeless tobacco: Never Used   Substance Use Topics     Alcohol use: Not Currently     Comment: occasional         Alcohol Use 4/12/2022   Prescreen: >3 drinks/day or >7 drinks/week? Not Applicable   Prescreen: >3 drinks/day or >7 drinks/week? -       Reviewed orders with patient.  Reviewed health maintenance and updated orders accordingly - Yes  Lab work is in process  Labs reviewed in EPIC    Breast Cancer Screening:    FHS-7:   Breast CA Risk Assessment (FHS-7) 8/23/2021 8/23/2021   Did any of your first-degree relatives have breast or ovarian cancer? No Yes   Did any of your relatives have bilateral breast cancer? No -   Did any man in your family have breast cancer? No -   Did any  woman in your family have breast and ovarian cancer? No -   Did any woman in your family have breast cancer before age 50 y? Yes -   Do you have 2 or more relatives with breast and/or ovarian cancer? Yes -   Do you have 2 or more relatives with breast and/or bowel cancer? No -       Mammogram Screening: Recommended annual mammography  Pertinent mammograms are reviewed under the imaging tab.    History of abnormal Pap smear: NO - age 30-65 PAP every 5 years with negative HPV co-testing recommended  PAP / HPV Latest Ref Rng & Units 10/29/2018 5/15/2014 5/20/2011   PAP (Historical) - NIL NIL NIL   HPV16 NEG:Negative Negative - -   HPV18 NEG:Negative Negative - -   HRHPV NEG:Negative Negative - -     Reviewed and updated as needed this visit by clinical staff   Tobacco   Meds   Med Hx  Surg Hx  Fam Hx  Soc Hx          Reviewed and updated as needed this visit by Provider                     S/p gastric bypass surgery - now 3 years out and has maintained her weight loss.  She continues to work with the bariatric clinic on additional weight loss measures - currently on naltrexone and wellbutrin to help with this.  Interested in rechecking iron stores as these had trended downward slightly.   Taking her post surgical vitamins regularly.    Upper back pain from large breasts - gets shoulder grooves from bra.  Interested in considering breast reduction surgery.   Excess skin in arms/legs - limits her ability to move/exercise and wear the clothing she would like after massive weight loss post bariatric surgery.    Yeast infection under breasts - treats with nystatin    GERD - related to iron supplementation    Asthma - well controlled, no concerns    Hypotension - when not able to drink water throughout the day, gets dizzy quickly - this has been true since surgery      Review of Systems   Constitutional: Negative for chills and fever.   HENT: Negative for congestion, ear pain, hearing loss and sore throat.    Eyes:  "Negative for pain and visual disturbance.   Respiratory: Negative for cough and shortness of breath.    Cardiovascular: Negative for chest pain, palpitations and peripheral edema.   Gastrointestinal: Positive for heartburn. Negative for abdominal pain, constipation, diarrhea, hematochezia and nausea.   Breasts:  Negative for tenderness, breast mass and discharge.   Genitourinary: Negative for dysuria, frequency, genital sores, hematuria, pelvic pain, urgency, vaginal bleeding and vaginal discharge.   Musculoskeletal: Negative for arthralgias, joint swelling and myalgias.   Skin: Negative for rash.   Neurological: Positive for dizziness and headaches. Negative for weakness and paresthesias.   Psychiatric/Behavioral: Negative for mood changes. The patient is not nervous/anxious.           OBJECTIVE:   BP 90/50 (BP Location: Right arm, Patient Position: Sitting, Cuff Size: Adult Large)   Pulse 72   Temp 97.2  F (36.2  C) (Tympanic)   Resp 18   Ht 1.63 m (5' 4.17\")   Wt 103.9 kg (229 lb 1.6 oz)   LMP 10/01/2016 (Approximate)   SpO2 97%   BMI 39.11 kg/m    Physical Exam  GENERAL: healthy, alert and no distress  EYES: Eyes grossly normal to inspection, PERRL and conjunctivae and sclerae normal  HENT: ear canals and TM's normal, nose and mouth without ulcers or lesions  NECK: no adenopathy, no asymmetry, masses, or scars and thyroid normal to palpation  RESP: lungs clear to auscultation - no rales, rhonchi or wheezes  BREAST: normal without masses, tenderness or nipple discharge and no palpable axillary masses or adenopathy  CV: regular rate and rhythm, normal S1 S2, no S3 or S4, no murmur, click or rub, no peripheral edema and peripheral pulses strong  ABDOMEN: soft, nontender, no hepatosplenomegaly, no masses and bowel sounds normal  MS: grooves in shoulders bilaterally at site of bra straps, extremities normal- no gross deformities noted  SKIN: confluent red patches under breasts bilaterally  NEURO: Normal " "strength and tone, mentation intact and speech normal  PSYCH: mentation appears normal, affect normal/bright    Diagnostic Test Results:  Labs reviewed in Epic  Additional labs pending    ASSESSMENT/PLAN:       ICD-10-CM    1. Routine history and physical examination of adult  Z00.00 TSH with free T4 reflex    Reviewed recent fasting labs.  Plan on thyroid recheck today due to weight gain     2. Mild intermittent asthma without complication  J45.20 albuterol (PROAIR HFA/PROVENTIL HFA/VENTOLIN HFA) 108 (90 Base) MCG/ACT inhaler  Well controlled, continue current medications     3. Bariatric surgery status  Z98.84 TSH with free T4 reflex     Iron and iron binding capacity     Ferritin     CBC with platelets    Reviewed labs from bariatric clinic - has had supplementation for iron adjusted - plan on recheck today     4. Gastroesophageal reflux disease with esophagitis without hemorrhage  K21.00 Recommended trial of Pepcid   5. Macromastia  N62 Plastic Surgery Referral    Plan plastic surgery evaluation for breast reduction and to consider removal of excess skin after massive weight loss     6. Excess skin  L98.7 Plastic surgery referral     7. Need for Tdap vaccination  Z23 TDAP VACCINE (Adacel, Boostrix)  [[id number]]   8. Visit for screening mammogram  Z12.31 MA Screen Bilateral w/Yosvany           COUNSELING:  Special attention given to:        Regular exercise       Healthy diet/nutrition       Vision screening       Immunizations    Vaccinated for: TDAP             Colorectal Cancer Screening    Estimated body mass index is 39.11 kg/m  as calculated from the following:    Height as of this encounter: 1.63 m (5' 4.17\").    Weight as of this encounter: 103.9 kg (229 lb 1.6 oz).    Weight management plan: Patient referred to endocrine and/or weight management specialty    She reports that she has never smoked. She has never used smokeless tobacco.      Counseling Resources:  ATP IV Guidelines  Pooled Cohorts Equation " Calculator  Breast Cancer Risk Calculator  BRCA-Related Cancer Risk Assessment: FHS-7 Tool  FRAX Risk Assessment  ICSI Preventive Guidelines  Dietary Guidelines for Americans, 2010  USDA's MyPlate  ASA Prophylaxis  Lung CA Screening    Kelly Bedoya MD  [facility]

## 2022-04-19 NOTE — PATIENT INSTRUCTIONS
Cast iron?   Iron fish    Labs today    Pepcid - famotidine - Pepcid AC ([facility])    Pap - due next year    Mammo - due in August    TdaP today    COVID booster - when you can finangle the right timing    Shingrix - in pharmacy when you are done with the top 2    Plastic surgery referral - call for visit with Dr Mayen.                  Preventive Health Recommendations  Female Ages 50 - 64    Yearly exam: See your health care provider every year in order to  Review health changes.   Discuss preventive care.    Review your medicines if your doctor has prescribed any.    Get a Pap test every three years (unless you have an abnormal result and your provider advises testing more often).  If you get Pap tests with HPV test, you only need to test every 5 years, unless you have an abnormal result.   You do not need a Pap test if your uterus was removed (hysterectomy) and you have not had cancer.  You should be tested each year for STDs (sexually transmitted diseases) if you're at risk.   Have a mammogram every 1 to 2 years.  Have a colonoscopy at age 50, or have a yearly FIT test (stool test). These exams screen for colon cancer.    Have a cholesterol test every 5 years, or more often if advised.  Have a diabetes test (fasting glucose) every three years. If you are at risk for diabetes, you should have this test more often.   If you are at risk for osteoporosis (brittle bone disease), think about having a bone density scan (DEXA).    Shots: Get a flu shot each year. Get a tetanus shot every 10 years.    Nutrition:   Eat at least 5 servings of fruits and vegetables each day.  Eat whole-grain bread, whole-wheat pasta and brown rice instead of white grains and rice.  Get adequate Calcium and Vitamin D.     Lifestyle  Exercise at least 150 minutes a week (30 minutes a day, 5 days a week). This will help you control your weight and prevent disease.  Limit alcohol to one drink per day.  No smoking.   Wear sunscreen to prevent  skin cancer.   See your dentist every six months for an exam and cleaning.  See your eye doctor every 1 to 2 years.

## 2022-07-15 NOTE — PROGRESS NOTES
Ana is a 52 year old who is being evaluated via a billable video visit.      If the video visit is dropped, the invitation should be resent by: Text to cell phone: [phone]  Will anyone else be joining your video visit? No      Video-Visit Details    Type of service:  Video Visit    Video Start Time: 9:36 AM    Video End Time: 10:00 AM    Originating Location (pt. Location): Home    Distant Location (provider location):  [facility] [place]     Platform used for Video Visit: [facility] Bariatric Surgery Note    RE: Ana Wyman  MR#: [id number]  : 1970  VISIT DATE: 2022    Dear Kelly Bedoya,    I had the pleasure of seeing your patient, Ana Wyman, in my post-bariatric surgery assessment clinic.    Assessment & Plan   Problem List Items Addressed This Visit     Bariatric surgery status     RYGBS 2019 [place]    We reviewed the important post op bariatric recommendations:  -eating 3 meals daily  -eating protein first, getting >60gm protein daily  -eating slowly, chewing food well  -avoiding/limiting calorie containing beverages  -avoiding fluids 30 minutes before, during, and after meals  -limiting restaurant or cafeteria eating to twice a week or less    Ana was reminded that, to avoid marginal ulcers she should avoid tobacco at all, alcohol in excess, caffeine, and NSAIDS (unless indicated for cardioprotection or othewise and opposed by a PPI).    She was encouraged to establish and maintain a consistent physical activity routine, 6-8 hours of restorative sleep each night and optimal stress management.    Ana was reminded about the importance of life long vitamin supplementation and life long follow up.                 Postsurgical malabsorption     Start Bariatric Advantage Ultrasolo, Continue calcium 500 gm TID, and decrease Vit D to 2000 international unit(s) daily.   Will recheck iron labs today             Class 2 severe obesity  due to excess calories with serious comorbidity and body mass index (BMI) of 39.0 to 39.9 in adult (H) - Primary     Healthy habits to assist with further weight loss were discussed. Ana will stop the naltrexone. She Decrease bupropion  mg tablet: 1 tablet once daily in AM for 7 days then stop.     Glenys, Pharm D has started a Prior Authorization for Saxenda.    -If Saxenda not covered can consider Victoza versus low dose phentermine.                       FOLLOW-UP:  Return to clinic to see myself or Glenys in 8-12 week after starting Saxenda or other AOM and annually for bariatric visit.     45 minutes spent on the date of the encounter doing chart review, history and exam, result review, counseling, developing plan of care, documentation, and further activities as noted        CHIEF COMPLAINT: Post-bariatric surgery follow-up    HISTORY OF PRESENT ILLNESS:  Questions Regarding Prior Weight Loss Surgery Reviewed With Patient 5/27/2022   I had the following weight loss procedure: Kay-en-y Gastric Bypass   What year was your surgery? 2018   How has your weight changed since your last visit? I have gained weight   Are you currently taking any weight loss medications? Yes   Do you currently have any of the following: Heartburn, acid reflux, or GERD (acid reflux disease)?   Have you been to the Emergency room since your last visit with us? No   Were you in the hospital since your last visit with us? No   Do you have any concerns today? Medications, heartburn     Pt last seen in Feb. 2022.  She was on naltrexone and Wellbutrin but it was not effective.  She saw Glenys who had her stop this and she is trying to get the Saxenda covered for her.       bupropion and naltrexone    BP Readings from Last 6 Encounters:   04/19/22 90/50   12/29/21 90/60   12/17/21 92/66   03/18/21 100/60   03/16/21 106/64   03/05/21 102/66     Pulse Readings from Last 6 Encounters:   04/19/22 72   12/17/21 79   03/18/21 76   03/16/21  86   03/05/21 89   03/01/21 69       Weight History:     5/27/2022   What is your highest lifetime weight? 348   What is your lowest weight since surgery? (In pounds) 219     Initial Weight (lbs): 339.3 lbs  Weight: 229 lb (103.9 kg) (pt reproted)  Last Visits Weight: 219 lb (99.3 kg)  Cumulative weight loss (lbs): 110.3  Weight Loss Percentage: 32.51%      Questions Regarding Co-Morbidities and Health Concerns Reviewed With Patient 5/27/2022   Pre-diabetes: Gone away   Diabetes II: Never   High Blood Pressure: Never   High cholesterol: Never   Heartburn/Reflux: Improved   Are you taking daily medication for heartburn, acid reflux, or GERD (acid reflux disease)? Yes   Sleep apnea: Gone away   Do you use a CPAP? -   PCOS: Never   Back pain: Improved   Joint pain: Never   Lower leg swelling: Gone away       Eating Habits 5/27/2022   How many meals do you eat per day? 3   Do you snack between meals? Sometimes   How much food are you eating at each meal? 1/2 cup to 1 cup   Are you able to separate your meals and liquids by at least 30 minutes? Yes   Are you able to avoid liquid calories? Yes       Exercise Questions Reviewed With Patient 5/27/2022   How often do you exercise? 1 to 2 times per week   What is the duration of your exercise (in minutes)? 45 Minutes   What types of exercise do you do? walking   What keeps you from being more active?  Lack of Time       Social History:      5/27/2022   Are you smoking? No   Are you drinking alcohol? No     Mom is still living with her.  IS doing better.  Family is now out of her house.  She is still anemic.  She is still in a big caregiver role.  Pt is still under a lot of stress. She didn't have a lot of time to exercise during this time.  She was either in the hospital or working from home.       To take care of herself:  She walks,  Watches a movie, tries to get away with friends,  Has a trip planned in August.  Plans to walk with her friend again.           Medications:  Current Outpatient Medications   Medication     albuterol (PROAIR HFA/PROVENTIL HFA/VENTOLIN HFA) 108 (90 Base) MCG/ACT inhaler     augmented betamethasone dipropionate (DIPROLENE-AF) 0.05 % external cream     biotin 5 MG CAPS     Calcium Citrate-Vitamin D (CALCIUM CITRATE CHEWY BITE PO)     Multiple Vitamins-Minerals (BARIATRIC MULTIVITAMINS/IRON PO)     nitroGLYcerin (NITRO-BID) 2 % OINT ointment     polyethylene glycol (MIRALAX/GLYCOLAX) packet     vitamin D3 (CHOLECALCIFEROL) 50 mcg (2000 units) tablet     insulin pen needle (32G X 6 MM) 32G X 6 MM miscellaneous     liraglutide - Weight Management (SAXENDA) 18 MG/3ML pen     No current facility-administered medications for this visit.         5/27/2022   Do you avoid NSAIDs such as (Ibuprofen, Aleve, Naproxen, Advil)?   Yes       ROS:  GI:      5/27/2022   Vomiting: No   Diarrhea: Yes   Constipation: Yes   Swallowing trouble: Yes   Abdominal pain: No   Heartburn: Yes   Rash in skin folds: Yes   Depression: No   Stress urinary incontinence No     Skin:   BAR RBS ROS - SKIN 5/27/2022   Rash in skin folds: Yes     Psych:      5/27/2022   Depression: No   Anxiety: Yes     Female Only:   BAR RBS ROS - FEMALE ONLY 5/27/2022   Female only: Loss of menstrual cycles       LABS/IMAGING/MEDICAL RECORDS REVIEW:   Hemoglobin A1C   Date Value Ref Range Status   04/14/2022 5.4 0.0 - 5.6 % Final     Comment:     Normal <5.7%   Prediabetes 5.7-6.4%    Diabetes 6.5% or higher     Note: Adopted from ADA consensus guidelines.     Vitamin D, Total (25-Hydroxy)   Date Value Ref Range Status   01/10/2022 40 20 - 75 ug/L Final     Parathyroid Hormone Intact   Date Value Ref Range Status   01/10/2022 47 18 - 80 pg/mL Final     Vitamin B12   Date Value Ref Range Status   01/10/2022 918 193 - 986 pg/mL Final     Hemoglobin   Date Value Ref Range Status   04/19/2022 13.8 11.7 - 15.7 g/dL Final     Ferritin   Date Value Ref Range Status   04/19/2022 39 8 - 252 ng/mL  "Final     Iron   Date Value Ref Range Status   04/19/2022 86 35 - 180 ug/dL Final     Iron Binding Capacity   Date Value Ref Range Status   04/19/2022 360 240 - 430 ug/dL Final     Iron Saturation Index   Date Value Ref Range Status   01/18/2021 23 15 - 46 % Final     Iron Sat Index   Date Value Ref Range Status   04/19/2022 24 15 - 46 % Final   01/10/2022 13 (L) 15 - 46 % Final     Vitamin A   Date Value Ref Range Status   01/10/2022 0.80 0.30 - 1.20 mg/L Final       PHYSICAL EXAMINATION:  Ht 5' 4\" (1.626 m)   Wt 229 lb (103.9 kg)   LMP 10/01/2016 (Approximate)   BMI 39.31 kg/m    GENERAL: Healthy, alert and no distress  EYES: Eyes grossly normal to inspection.  No discharge or erythema, or obvious scleral/conjunctival abnormalities.  RESP: No audible wheeze, cough, or visible cyanosis.  No visible retractions or increased work of breathing.    SKIN: Visible skin clear. No significant rash, abnormal pigmentation or lesions.  NEURO: Cranial nerves grossly intact.  Mentation and speech appropriate for age.  PSYCH: Mentation appears normal, affect normal/bright, judgement and insight intact, normal speech and appearance well-groomed.      Sincerely,    Sarah Stacy PA-C      "

## 2022-07-18 ENCOUNTER — TELEPHONE (OUTPATIENT)
Dept: SURGERY | Facility: CLINIC | Age: 52
End: 2022-07-18

## 2022-07-18 ENCOUNTER — VIRTUAL VISIT (OUTPATIENT)
Dept: PHARMACY | Facility: CLINIC | Age: 52
End: 2022-07-18
Payer: COMMERCIAL

## 2022-07-18 DIAGNOSIS — K59.03 DRUG-INDUCED CONSTIPATION: ICD-10-CM

## 2022-07-18 DIAGNOSIS — Z98.84 HISTORY OF ROUX-EN-Y GASTRIC BYPASS: Primary | ICD-10-CM

## 2022-07-18 DIAGNOSIS — E66.01 CLASS 2 SEVERE OBESITY DUE TO EXCESS CALORIES WITH SERIOUS COMORBIDITY AND BODY MASS INDEX (BMI) OF 37.0 TO 37.9 IN ADULT (H): Primary | ICD-10-CM

## 2022-07-18 DIAGNOSIS — J45.20 MILD INTERMITTENT ASTHMA WITHOUT COMPLICATION: ICD-10-CM

## 2022-07-18 DIAGNOSIS — I73.00 RAYNAUD'S DISEASE WITHOUT GANGRENE: ICD-10-CM

## 2022-07-18 DIAGNOSIS — E66.812 CLASS 2 SEVERE OBESITY DUE TO EXCESS CALORIES WITH SERIOUS COMORBIDITY AND BODY MASS INDEX (BMI) OF 37.0 TO 37.9 IN ADULT (H): Primary | ICD-10-CM

## 2022-07-18 DIAGNOSIS — E66.01 MORBID OBESITY (H): ICD-10-CM

## 2022-07-18 PROCEDURE — 99605 MTMS BY PHARM NP 15 MIN: CPT | Performed by: PHARMACIST

## 2022-07-18 PROCEDURE — 99607 MTMS BY PHARM ADDL 15 MIN: CPT | Performed by: PHARMACIST

## 2022-07-18 RX ORDER — FERROUS SULFATE 325(65) MG
325 TABLET ORAL
COMMUNITY
End: 2022-07-18

## 2022-07-18 RX ORDER — LIRAGLUTIDE 6 MG/ML
INJECTION, SOLUTION SUBCUTANEOUS
Qty: 15 ML | Refills: 2 | Status: SHIPPED | OUTPATIENT
Start: 2022-07-18 | End: 2023-01-06

## 2022-07-18 NOTE — Clinical Note
Brynn Smith - you see patient tomorrow and she wanted to keep that appt for check in. We ended up stopping Contrave components due to lack of efficacy. She didn't think bupropion had a drastic affect on mood either so that is why she said she would like to consider off. We are going to see if can get Sxenda covered. Also made a lot of changes to supplements as she ordered Bariatric Specific Mulvitamin so she was able to stop multiple other supplements due to contents of new multi. See my note for more info. If you have issues/concerns let me know. Just sent as HP because you see her tomorrow. Glenys RICHARDSON

## 2022-07-18 NOTE — TELEPHONE ENCOUNTER
PA Initiation    Medication: Saxenda - PA Initiated   Insurance Company: [employer] - Phone [phone] Fax [phone]  Pharmacy Filling the Rx: [facility] [place], [place] - [address] [facility]  Filling Pharmacy Phone:    Filling Pharmacy Fax:    Start Date: 7/18/2022

## 2022-07-18 NOTE — PROGRESS NOTES
Medication Therapy Management (MTM) Encounter    ASSESSMENT:                            Medication Adherence/Access: No issues identified    Obesity: Needs improvement. Due to lack of efficacy with bupropion and naltrexone and patient wanting to trial something different, can consider seeing if insurance will cover weight loss medication(s) GLP 1 agonist Saxenda and otherwise can try to see if can get Victoza covered again as different calendar year. If neither are covered can consider low dose phentermine 8 mg daily as hx heart palpitations were with higher dosing, patient's blood pressure and heart rate appear normal so able to start in that light.     History Kay-en-Y Gastric Bypass: Due to transitioning to bariatric specific multivitamin, can consider stopping multiple other vitamins/supplements she is taking - including [place], ferrous sulfate, B12, super B complex and decreasing vitamin D to 2000 international unit(s) once daily. She will not need most of those extra supplements due to contents of vitamin she is switching to.     Asthma: Stable. ACT at least 20.     Raynaud's: Stable.      Constipation: Stable.     PLAN:                            1. Decrease bupropion  mg tablet: 1 tablet once daily in AM for 7 days then stop.   2. Stop Naltrexone   3. Will try to get Saxenda covered - will start Prior Authorization   -If Saxenda not covered can consider Victoza versus low dose phentermine.   4. Stop [place], ferrous sulfate, B12, super B complex. Decrease vitamin D to 2000 international unit(s) once daily. Start Bariatric Advantage Ultra Solo Multivitamin with iron 1 capsule once daily.     Follow-up: 4 weeks from [employer] start - call [phone] to schedule.     SUBJECTIVE/OBJECTIVE:                          Ana Wyman is a 52 year old female called for an initial visit. She was referred to me from Sarah Stacy PA-C.      Reason for visit: Bupropion/Naltrexone follow  "up.    Allergies/ADRs: Reviewed in chart  Past Medical History: Reviewed in chart  Tobacco: She reports that she has never smoked. She has never used smokeless tobacco.  Alcohol: Less than 1 beverage / month    Medication Adherence/Access:   Patient uses pill box(es).  Patient takes medications 4 time(s) per day, morning lunch dinner and bedtime  Per patient, misses medication 1 times per week.   Reports she is getting back on track now with medications. Had been doing well with adherence of medications, but then mother was living with her and mother was hospitalized for 2+ months, so was focusing more on that than her own pills. Now mother out of hospital and getting back on track.     Obesity:   Naltrexone 50 mg twice daily  Bupropion  mg twice daily    Followed by Sarah Stacy PA-C, seen 2/7/2022 for Re-Establish Post-Bariatric Consult . Patient is experiencing the follow side effects: headache. She also feels she regimen was not effective as she had hoped. Doesn't feel it has helped with cravings. She does not have issues of injections. No hx pancreatitis. No hx MEN2 or MTC. Unsure of difference with mood from bupropion perspective due to all that is going on with mother, would like to trial off if not needed. If notices negative impact on mood from stopping bupropion if stopped, will reach out.   Diet/Eating Habits: Patient reports she is getting back to meal prepping. When mother was hospitalized, so family was living with her with various dietary restrictions and felt like she wasn't able to adhere to her health regimen. Getting back to this now, \"square one\". She is trying to focus on protein. She reports she does find that she would snack more often than she would like.     Exercise/Activity: Patient reports enjoys walking or hiking - started implementing this, Saturday had 22,000 steps. Getting back to this.   Failed medications include: Phentermine: palpitations; Metformin: not effective; " "Victoza: effective but not covered. Topiramate: hasn't tried but hx kidney stones 2018.     Wt Readings from Last 4 Encounters:   04/19/22 229 lb 1.6 oz (103.9 kg)   02/25/22 219 lb (99.3 kg)   02/07/22 219 lb (99.3 kg)   12/29/21 227 lb (103 kg)     Estimated body mass index is 39.11 kg/m  as calculated from the following:    Height as of 4/19/22: 5' 4.17\" (1.63 m).    Weight as of 4/19/22: 229 lb 1.6 oz (103.9 kg).    BP Readings from Last 3 Encounters:   04/19/22 90/50   12/29/21 90/60   12/17/21 92/66     History Kay-en-Y Gastric Bypass:   Bariatric Advantage Ultra Solo Multivitamin with iron 1 capsule daily - hasn't started yet  Bariatric Advantage Calcium-Vitamin 500 mg-500 international unit(s) three times daily   [place] Multivitamin 2 tablets daily   B12 5000 mcg twice weekly  Super B complex 1/2-1 tablet daily    Vitamin D 2000 international unit(s) twice daily   Biotin 5000 mcg daily   Ferrous Sulfate 325 mg (1 tablet) daily     Patient had gastric bypass completed in 2019. Patient describes bowel movements as regular. does not have complaints of acid reflux.  Shedoes not have issues of swallowing food/pills. She is going to be switching her multivitamin to different one (Bariatric Advantage Multivitamin with iron) and wants to know what other supplements she can stop. Had not been on iron when lab was last taken and just started ferrous sulfate a couple weeks ago. Reports no history of reactive hypoglycemia.     Hemoglobin   Date Value Ref Range Status   04/19/2022 13.8 11.7 - 15.7 g/dL Final   03/16/2021 11.0 (L) 11.7 - 15.7 g/dL Final     Ferritin   Date Value Ref Range Status   04/19/2022 39 8 - 252 ng/mL Final   01/18/2021 100 8 - 252 ng/mL Final     Lab Results   Component Value Date    VITDT 40 01/10/2022      Lab Results   Component Value Date    PTHI 47 01/10/2022      Lab Results   Component Value Date    B12 918 01/10/2022      Lab Results   Component Value Date    MELLISA 0.80 01/10/2022 "     Asthma:   Short-Acting Bronchodilator: Albuterol MDI and pt reports using 0 times per week..  Triggers include: upper respiratory infections.  Patient reports the following symptoms: none.  Asthma Action Plan on file: NO  ACT Total Scores 8/27/2019 2/10/2021 4/19/2022   ACT TOTAL SCORE (Goal Greater than or Equal to 20) - 25 25   In the past 12 months, how many times did you visit the emergency room for your asthma without being admitted to the hospital? 0 0 0   In the past 12 months, how many times were you hospitalized overnight because of your asthma? 0 0 0     Raynaud's:   Nitroglycerin ointment     Started using this in winter when snow shoeing or other outdoor activities. Helpful. No concerns.     Constipation:   Miralax 1 capful daily - not taking    She hasn't needed recently but did need when she was on Victoza. Has some at home for as needed use.   ----------------      I spent 45 minutes with this patient today. All changes were made via collaborative practice agreement with Sarah Stacy PA-C . A copy of the visit note was provided to the patient's provider(s).    The patient was sent via [employer] a summary of these recommendations.     Lauren Bloch, PharmD, BCACP   Medication Therapy Management Pharmacist   [facility]    Telemedicine Visit Details  Type of service:  Video Conference via InstantQ  Start Time: 10:05 AM  End Time: 10:50 AM  Originating Location (patient location): Home  Distant Location (provider location):  [facility]     Medication Therapy Recommendations  Class 2 severe obesity due to excess calories with serious comorbidity and body mass index (BMI) of 35.0 to 35.9 in adult (H)    Current Medication: buPROPion (WELLBUTRIN SR) 100 MG 12 hr tablet (Discontinued)   Rationale: More effective medication available - Ineffective medication - Effectiveness   Recommendation: Change Medication - Saxenda 18  MG/3ML Sopn - also stop naltrexone   Status: Accepted per CPA         History of Kay-en-Y gastric bypass    Current Medication: childrens multivitamin w/iron (FLINTSTONES COMPLETE) 60 MG chewable tablet (Discontinued)   Rationale: More effective medication available - Ineffective medication - Effectiveness   Recommendation: Change Medication - BARIATRIC MULTIVITAMINS/IRON PO   Status: Accepted - no CPA Needed

## 2022-07-19 ENCOUNTER — TELEPHONE (OUTPATIENT)
Dept: SURGERY | Facility: CLINIC | Age: 52
End: 2022-07-19

## 2022-07-19 ENCOUNTER — LAB (OUTPATIENT)
Dept: LAB | Facility: CLINIC | Age: 52
End: 2022-07-19
Payer: COMMERCIAL

## 2022-07-19 ENCOUNTER — VIRTUAL VISIT (OUTPATIENT)
Dept: SURGERY | Facility: CLINIC | Age: 52
End: 2022-07-19
Payer: COMMERCIAL

## 2022-07-19 VITALS — HEIGHT: 64 IN | BODY MASS INDEX: 39.09 KG/M2 | WEIGHT: 229 LBS

## 2022-07-19 DIAGNOSIS — K91.2 POSTSURGICAL MALABSORPTION: ICD-10-CM

## 2022-07-19 DIAGNOSIS — E66.01 CLASS 2 SEVERE OBESITY DUE TO EXCESS CALORIES WITH SERIOUS COMORBIDITY AND BODY MASS INDEX (BMI) OF 39.0 TO 39.9 IN ADULT (H): Primary | ICD-10-CM

## 2022-07-19 DIAGNOSIS — R94.6 ABNORMAL FINDING ON THYROID FUNCTION TEST: ICD-10-CM

## 2022-07-19 DIAGNOSIS — Z98.84 BARIATRIC SURGERY STATUS: ICD-10-CM

## 2022-07-19 DIAGNOSIS — E66.812 CLASS 2 SEVERE OBESITY DUE TO EXCESS CALORIES WITH SERIOUS COMORBIDITY AND BODY MASS INDEX (BMI) OF 39.0 TO 39.9 IN ADULT (H): Primary | ICD-10-CM

## 2022-07-19 LAB
T4 FREE SERPL-MCNC: 0.81 NG/DL (ref 0.76–1.46)
TSH SERPL DL<=0.005 MIU/L-ACNC: 2.79 MU/L (ref 0.4–4)

## 2022-07-19 PROCEDURE — 99215 OFFICE O/P EST HI 40 MIN: CPT | Mod: 95 | Performed by: PHYSICIAN ASSISTANT

## 2022-07-19 PROCEDURE — 36415 COLL VENOUS BLD VENIPUNCTURE: CPT

## 2022-07-19 PROCEDURE — 84443 ASSAY THYROID STIM HORMONE: CPT

## 2022-07-19 PROCEDURE — 84439 ASSAY OF FREE THYROXINE: CPT

## 2022-07-19 PROCEDURE — 83550 IRON BINDING TEST: CPT

## 2022-07-19 PROCEDURE — 82728 ASSAY OF FERRITIN: CPT

## 2022-07-19 NOTE — ASSESSMENT & PLAN NOTE
Start Bariatric Advantage Ultrasolo, Continue calcium 500 gm TID, and decrease Vit D to 2000 international unit(s) daily.   Will recheck iron labs today

## 2022-07-19 NOTE — ASSESSMENT & PLAN NOTE
Healthy habits to assist with further weight loss were discussed. Ana will stop the naltrexone. She Decrease bupropion  mg tablet: 1 tablet once daily in AM for 7 days then stop.     Glenys, Pharm D has started a Prior Authorization for Saxenda.    -If Saxenda not covered can consider Victoza versus low dose phentermine.

## 2022-07-19 NOTE — PATIENT INSTRUCTIONS
Here is information about the Virtual Support Groups:    We offer support groups for patients who are working on weight loss and considering, preparing for or have had weight loss surgery.       [facility] via [employer]IV[employer] Teams with Laura Paulino RN  2.   [facility] via [employer]IV[employer] Teams with Maykel Bautitsa, PhD, LP  3.   [facility] via [employer] with Kristyn Gonzalez RN  4.   [facility] via [employer]-IVC Teams with Kristyn Shaver, Replaced by [facility]  [email or web address]    [facility] Healthy Lifestyle Virtual Support Group  This group is held monthly on a Friday from 12:30 PM - to 1:30 PM. It is 60 minutes of small group guided discussion, support and resources led by National Board Certified Health , Kristyn Shaver. All are welcome who want a healthy lifestyle. To receive monthly invites to the Healthy Lifestyle Virtual Support Group or if you have any questions about having a health  or attending support group, please email Kristyn [email or web address]. Kristyn will send out invites for each session, with the phone number and the conference ID number specific to that session.    [facility] Weight Loss Surgery Support Group  The ealth [facility] Weight Loss Surgery Support Group is a patient-lead forum that meets monthly. Due to Covid-19, we are meeting remotely from 5 PM - 6 PM via Microsoft Teams on the 3rd Wednesday of the month. The group is facilitated by Laura Paulino, the program s Clinical Coordinator. The support group shares experiences, encouragement and education. It is open to those who have had weight loss surgery, are scheduled for surgery, and those who are considering surgery.     If you are interested in attending, please contact the clinic via SET or call the nurse line directly at [phone] to inform our staff that  you would like an invite sent to you. Please let us know the email you would like the invite sent to. Prior to the meeting, a link with directions on how to join the meeting will be sent to you.    [facility] Support Groups    [facility]: Bariatric Care Support Group?  This group takes place the second Tuesday of each month from 6:30 PM - 8 PM virtually using Microsoft Teams. It is led by Maykel Bautista, Ph.D who is a Licensed Psychologist with the [facility] Comprehensive Weight Management Program. There is no cost for group participation and it is open to all [facility] (and those external to this program) pre- and post- operative bariatric surgery patients as well as their support system.   Please send an email to Maykel Bautista, Ph.D., LP at?[email or web address] you would like an invitation to the group and to learn about using Microsoft Teams.    [facility]: Post-Operative Bariatric Surgery Support Group  This support group meets the 4th Wednesday of the month from 11 AM - 12 PM virtually using Microsoft Teams. It is led by Kristyn Gonzalez RN. This is a support group for [facility] bariatric patients (and those external to [facility]) who have had bariatric surgery and are at least 3 months post-surgery. There is no cost to attend and registration is not required. Please send an email to Kristyn Gonzalez RN at [email or web address] if you would like an invitation to the group and to learn about using Microsoft Teams.

## 2022-07-19 NOTE — ASSESSMENT & PLAN NOTE
RYGBS 1[birth year]/2019 Nikunj    We reviewed the important post op bariatric recommendations:  -eating 3 meals daily  -eating protein first, getting >60gm protein daily  -eating slowly, chewing food well  -avoiding/limiting calorie containing beverages  -avoiding fluids 30 minutes before, during, and after meals  -limiting restaurant or cafeteria eating to twice a week or less    Ana was reminded that, to avoid marginal ulcers she should avoid tobacco at all, alcohol in excess, caffeine, and NSAIDS (unless indicated for cardioprotection or othewise and opposed by a PPI).    She was encouraged to establish and maintain a consistent physical activity routine, 6-8 hours of restorative sleep each night and optimal stress management.    Ana was reminded about the importance of life long vitamin supplementation and life long follow up.

## 2022-07-20 NOTE — TELEPHONE ENCOUNTER
Returned pt call  Pt reports she had a lab appt yesterday (for another provider)  She thought ERWIN wanted iron labs done  Per MKD note she did but no labs were ordered.  Informed pt HARRIETTD back in the clinic tomorrow to order labs and I will call lab to see if they can add on to blood draw 7/19. I will call pt to let her know if she needs to have another blood draw tomorrow.    No further questions per pt. She is agreeable to plan.    Sarah Sheffield RN on 7/20/2022 at 9:35 AM

## 2022-07-21 LAB
FERRITIN SERPL-MCNC: 45 NG/ML (ref 8–252)
IRON SATN MFR SERPL: 14 % (ref 15–46)
IRON SERPL-MCNC: 48 UG/DL (ref 35–180)
TIBC SERPL-MCNC: 348 UG/DL (ref 240–430)

## 2022-07-21 NOTE — TELEPHONE ENCOUNTER
PRIOR AUTHORIZATION DENIED    Medication: Saxenda - PA Denied    Denial Date: 7/19/2022    Denial Rational:     Appeal Information:

## 2022-07-26 NOTE — TELEPHONE ENCOUNTER
Ayla excluded. Will reach out to Sarah Stacy PA-C for next steps/alternative weight loss medication(s) options.     Lauren Bloch, PharmD, BCACP   Medication Therapy Management Pharmacist   [facility] [place]

## 2022-08-29 ENCOUNTER — HOSPITAL ENCOUNTER (OUTPATIENT)
Dept: MAMMOGRAPHY | Facility: CLINIC | Age: 52
Discharge: HOME | End: 2022-08-29
Attending: PEDIATRICS | Admitting: PEDIATRICS
Payer: COMMERCIAL

## 2022-08-29 DIAGNOSIS — Z12.31 VISIT FOR SCREENING MAMMOGRAM: ICD-10-CM

## 2022-08-29 PROCEDURE — 77067 SCR MAMMO BI INCL CAD: CPT

## 2022-11-19 ENCOUNTER — HEALTH MAINTENANCE LETTER (OUTPATIENT)
Age: 52
End: 2022-11-19

## 2022-12-28 NOTE — PROGRESS NOTES
Ana is a 52 year old who is being evaluated via a billable video visit.      If the video visit is dropped, the invitation should be resent by: Text to cell phone: [phone]  Will anyone else be joining your video visit? No      Video-Visit Details    Type of service:  Video Visit    Video Start Time: 10:03 AM    Video End Time:10:34 AM    Originating Location (pt. Location): Home    Distant Location (provider location):  [facility] [place]     Platform used for Video Visit: [facility] Bariatric [facility] Note    RE: Ana Wyman  MR#: [id number]  : 1970  VISIT DATE: 2023    Dear Kelly Bedoya,    I had the pleasure of seeing your patient, Ana Wyman, in my post-bariatric surgery assessment clinic.    Assessment & Plan   Problem List Items Addressed This Visit     Bariatric surgery status     RYGBS 2019 [place]j         Postsurgical malabsorption     Continue taking recommended post-op vitamins.  Labs reviewed per protocol.           Class 2 severe obesity due to excess calories with serious comorbidity and body mass index (BMI) of 39.0 to 39.9 in adult (H) - Primary     Patient was congratulated on wt loss success thus far. Healthy habits to assist with further weight loss were discussed. Ana will continue her phentermine and start Saxenda. .Risks/ benefits and possible side effects were discussed and questions were answered. Written information was given.            Relevant Medications    liraglutide - Weight Management (SAXENDA) 18 MG/3ML pen    phentermine (ADIPEX-P) 15 MG capsule    nystatin (MYCOSTATIN) 380013 UNIT/GM external powder   Other Visit Diagnoses     Erythema intertrigo        Relevant Medications    nystatin (MYCOSTATIN) 278017 UNIT/GM external powder    nystatin-triamcinolone (MYCOLOG II) 704970-2.1 UNIT/GM-% external cream           PATIENT INSTRUCTIONS:  Start Saxenda  Week 1- Inject 0.6 mg daily  Week 2- Inject 1.2  mg daily  Week 3- Inject 1.8 mg daily (If hunger and portions controlled stay at this dose)    May use famotidine 20 mg BID as needed for nausea/heartburn when starting the medication.     Continue your bariatric vitamins     FOLLOW-UP:  Please call [phone] to schedule your next visit in 3 months.       40 minutes spent on the date of the encounter doing chart review, history and exam, result review, counseling, developing plan of care, documentation, and further activities as noted        CHIEF COMPLAINT: Post-bariatric surgery follow-up    HISTORY OF PRESENT ILLNESS:  Questions Regarding Prior Weight Loss Surgery Reviewed With Patient 12/30/2022   I had the following weight loss procedure: Kay-en-y Gastric Bypass   What year was your surgery? 2018   How has your weight changed since your last visit? I have gained weight   Are you currently taking any weight loss medications? Yes   Do you currently have any of the following: Heartburn, acid reflux, or GERD (acid reflux disease)?   Have you been to the Emergency room since your last visit with us? No   Were you in the hospital since your last visit with us? No   Do you have any concerns today? New insurance - try meds again that weren't covered,  rash/irritation on legs,  under breast, skin remival surgery   Stress ate during Labor Day scare with mom.  Gained some weight was up to 235 in fall.  With change in phentermine she lost some.      Weight History:     12/30/2022   What is your highest lifetime weight? 368   What is your lowest weight since surgery? (In pounds) 219     Initial Weight (lbs): 339.3 lbs  Weight: 229 lb (103.9 kg) (pt reported)  Last Visits Weight: 229 lb (103.9 kg)  Cumulative weight loss (lbs): 110.3  Weight Loss Percentage: 32.51%    VITAMINS AND MINERALS:  Bariatric Advatage  5000 Int Units of Vitamin D daily   500 mg chew three times of Calcium daily- from [facility]VI by 2 hours  No iron needed  Biotin daily    Questions Regarding  Co-Morbidities and Health Concerns Reviewed With Patient 12/30/2022   Pre-diabetes: Gone away   Diabetes II: Never   High Blood Pressure: Never   High cholesterol: Never   Heartburn/Reflux: Improved   Are you taking daily medication for heartburn, acid reflux, or GERD (acid reflux disease)? Yes   Sleep apnea: Gone away   Do you use a CPAP? -   PCOS: Never   Back pain: Improved   Joint pain: Improved   Lower leg swelling: Gone away     .  Weight Loss Medication History Reviewed With Patient 12/30/2022   Which weight loss medications are you currently taking on a regular basis?  Phentermine   Are you having any side effects from the weight loss medication that we have prescribed you? Yes   If you are having side effects please describe: Numbness in fingers, headaches   Occurs in the morning.  Is becoming worse.  Yesterday couldn't button her shirt.     Changes and Difficulties 12/30/2022   I have made the following changes to my diet since my last visit: No changes   With regards to my diet, I am still struggling with: Snacking between meals   I have made the following changes to my activity/exercise since my last visit: I will begin pilates class  on Jan 14   With regards to my activity/exercise, I am still struggling with: Now that it's winter getting out as much as I did to real/[place]sh[place]       Eating Habits 12/30/2022   How many meals do you eat per day? 3   Do you snack between meals? Sometimes   How much food are you eating at each meal? Greater than 1 cup   Are you able to separate your meals and liquids by at least 30 minutes? Sometimes   Are you able to avoid liquid calories? Yes       Exercise Questions Reviewed With Patient 12/30/2022   How often do you exercise? 1 to 2 times per week   What is the duration of your exercise (in minutes)? 30 Minutes   What types of exercise do you do? walking, group fitness classes   What keeps you from being more active?  Lack of Time       Social History:      12/30/2022    Are you smoking? No   Are you drinking alcohol? No   Still taking care of her mom.  Labor Day she had a bleeding ulcer and they had trouble finding her blood.  She almost .      Medications:  Current Outpatient Medications   Medication     albuterol (PROAIR HFA/PROVENTIL HFA/VENTOLIN HFA) 108 (90 Base) MCG/ACT inhaler     augmented betamethasone dipropionate (DIPROLENE-AF) 0.05 % external cream     biotin 5 MG CAPS     Calcium Citrate-Vitamin D (CALCIUM CITRATE CHEWY BITE PO)     insulin pen needle (32G X 6 MM) 32G X 6 MM miscellaneous     liraglutide - Weight Management (SAXENDA) 18 MG/3ML pen     Multiple Vitamins-Minerals (BARIATRIC MULTIVITAMINS/IRON PO)     nitroGLYcerin (NITRO-BID) 2 % OINT ointment     nystatin (MYCOSTATIN) 964010 UNIT/GM external powder     nystatin-triamcinolone (MYCOLOG II) 288542-6.1 UNIT/GM-% external cream     phentermine (ADIPEX-P) 15 MG capsule     polyethylene glycol (MIRALAX/GLYCOLAX) packet     vitamin D3 (CHOLECALCIFEROL) 50 mcg (2000 units) tablet     No current facility-administered medications for this visit.         2022   Do you avoid NSAIDs such as (Ibuprofen, Aleve, Naproxen, Advil)?   No       ROS:  GI:      2022   Vomiting: No   Diarrhea: Yes, within the last month.  No change in medication.    Constipation: No   Swallowing trouble: No   Abdominal pain: No   Heartburn: Yes, had a little in the morning at 4 AM   Rash in skin folds: Yes, Triamcinolone/nystatin cream, and AAA      Depression: No   Stress urinary incontinence No     Skin:   BAR RBS ROS - SKIN 2022   Rash in skin folds: Yes   Pursued a plastic surgeon.  Is looking into thigh surgery but would like to see a couple plastic surgeons.  Is going to get breast and arm surgery this year.      Psych:      2022   Depression: No   Anxiety: No     Female Only:   BAR RBS ROS - FEMALE ONLY 2022   Female only: None of the above       LABS/IMAGING/MEDICAL RECORDS REVIEW:   Hemoglobin A1C  "  Date Value Ref Range Status   04/14/2022 5.4 0.0 - 5.6 % Final     Comment:     Normal <5.7%   Prediabetes 5.7-6.4%    Diabetes 6.5% or higher     Note: Adopted from ADA consensus guidelines.     Vitamin D, Total (25-Hydroxy)   Date Value Ref Range Status   12/29/2022 43 20 - 75 ug/L Final     Parathyroid Hormone Intact   Date Value Ref Range Status   12/29/2022 30 15 - 65 pg/mL Final     Vitamin B12   Date Value Ref Range Status   12/29/2022 738 232 - 1,245 pg/mL Final     Hemoglobin   Date Value Ref Range Status   12/29/2022 14.3 11.7 - 15.7 g/dL Final     Ferritin   Date Value Ref Range Status   12/29/2022 54 11 - 328 ng/mL Final     Iron   Date Value Ref Range Status   12/29/2022 64 37 - 145 ug/dL Final     Iron Binding Capacity   Date Value Ref Range Status   12/29/2022 327 240 - 430 ug/dL Final     Iron Sat Index   Date Value Ref Range Status   12/29/2022 20 15 - 46 % Final   07/19/2022 14 (L) 15 - 46 % Final   04/19/2022 24 15 - 46 % Final     Vitamin A   Date Value Ref Range Status   12/29/2022 0.62 0.30 - 1.20 mg/L Final       PHYSICAL EXAMINATION:  /79   Pulse 76   Ht 5' 4\" (1.626 m)   Wt 229 lb (103.9 kg)   LMP 10/01/2016 (Approximate)   BMI 39.31 kg/m    GENERAL: Healthy, alert and no distress  EYES: Eyes grossly normal to inspection.  No discharge or erythema, or obvious scleral/conjunctival abnormalities.  RESP: No audible wheeze, cough, or visible cyanosis.  No visible retractions or increased work of breathing.    SKIN: Visible skin clear. No significant rash, abnormal pigmentation or lesions.  NEURO: Cranial nerves grossly intact.  Mentation and speech appropriate for age.  PSYCH: Mentation appears normal, affect normal/bright, judgement and insight intact, normal speech and appearance well-groomed.    COUNSELING PROVIDED:  We reviewed the important post op bariatric recommendations:  eating 3 meals daily  eating protein first, getting >60gm protein daily  eating slowly, chewing food " well  avoiding/limiting calorie containing beverages  avoiding fluids 30 minutes before, during, and after meals  limiting restaurant or cafeteria eating to twice a week or less  Pt reminded to avoid marginal ulcers she should avoid tobacco at all, alcohol in excess, caffeine, and NSAIDS (unless indicated for cardioprotection or othewise and opposed by a PPI).  Pt encouraged to establish and maintain a consistent physical activity routine, 6-8 hours of restorative sleep each night and optimal stress management.  Pt counseled on the importance of life long vitamin supplementation and life long follow up.    Sincerely,    Sarah Stacy PA-C

## 2022-12-29 ENCOUNTER — LAB (OUTPATIENT)
Dept: LAB | Facility: CLINIC | Age: 52
End: 2022-12-29
Payer: COMMERCIAL

## 2022-12-29 DIAGNOSIS — K91.2 POSTSURGICAL MALABSORPTION: ICD-10-CM

## 2022-12-29 DIAGNOSIS — Z98.84 BARIATRIC SURGERY STATUS: ICD-10-CM

## 2022-12-29 LAB
DEPRECATED CALCIDIOL+CALCIFEROL SERPL-MC: 43 UG/L (ref 20–75)
ERYTHROCYTE [DISTWIDTH] IN BLOOD BY AUTOMATED COUNT: 12.9 % (ref 10–15)
FERRITIN SERPL-MCNC: 54 NG/ML (ref 11–328)
HCT VFR BLD AUTO: 45.5 % (ref 35–47)
HGB BLD-MCNC: 14.3 G/DL (ref 11.7–15.7)
IRON BINDING CAPACITY (ROCHE): 327 UG/DL (ref 240–430)
IRON SATN MFR SERPL: 20 % (ref 15–46)
IRON SERPL-MCNC: 64 UG/DL (ref 37–145)
MCH RBC QN AUTO: 29.5 PG (ref 26.5–33)
MCHC RBC AUTO-ENTMCNC: 31.4 G/DL (ref 31.5–36.5)
MCV RBC AUTO: 94 FL (ref 78–100)
PLATELET # BLD AUTO: 301 10E3/UL (ref 150–450)
PTH-INTACT SERPL-MCNC: 30 PG/ML (ref 15–65)
RBC # BLD AUTO: 4.85 10E6/UL (ref 3.8–5.2)
VIT B12 SERPL-MCNC: 738 PG/ML (ref 232–1245)
WBC # BLD AUTO: 6.6 10E3/UL (ref 4–11)

## 2022-12-29 PROCEDURE — 83550 IRON BINDING TEST: CPT

## 2022-12-29 PROCEDURE — 82306 VITAMIN D 25 HYDROXY: CPT

## 2022-12-29 PROCEDURE — 83970 ASSAY OF PARATHORMONE: CPT

## 2022-12-29 PROCEDURE — 82728 ASSAY OF FERRITIN: CPT

## 2022-12-29 PROCEDURE — 84590 ASSAY OF VITAMIN A: CPT

## 2022-12-29 PROCEDURE — 85027 COMPLETE CBC AUTOMATED: CPT

## 2022-12-29 PROCEDURE — 36415 COLL VENOUS BLD VENIPUNCTURE: CPT

## 2022-12-29 PROCEDURE — 82607 VITAMIN B-12: CPT

## 2023-01-01 LAB
ANNOTATION COMMENT IMP: NORMAL
RETINYL PALMITATE SERPL-MCNC: 0.04 MG/L
VIT A SERPL-MCNC: 0.62 MG/L

## 2023-01-06 ENCOUNTER — VIRTUAL VISIT (OUTPATIENT)
Dept: SURGERY | Facility: CLINIC | Age: 53
End: 2023-01-06
Payer: COMMERCIAL

## 2023-01-06 ENCOUNTER — MYC MEDICAL ADVICE (OUTPATIENT)
Dept: SURGERY | Facility: CLINIC | Age: 53
End: 2023-01-06

## 2023-01-06 VITALS
BODY MASS INDEX: 39.09 KG/M2 | HEIGHT: 64 IN | SYSTOLIC BLOOD PRESSURE: 107 MMHG | WEIGHT: 229 LBS | HEART RATE: 76 BPM | DIASTOLIC BLOOD PRESSURE: 79 MMHG

## 2023-01-06 DIAGNOSIS — L30.4 ERYTHEMA INTERTRIGO: ICD-10-CM

## 2023-01-06 DIAGNOSIS — E66.01 CLASS 2 SEVERE OBESITY DUE TO EXCESS CALORIES WITH SERIOUS COMORBIDITY AND BODY MASS INDEX (BMI) OF 39.0 TO 39.9 IN ADULT (H): Primary | ICD-10-CM

## 2023-01-06 DIAGNOSIS — E66.812 CLASS 2 SEVERE OBESITY DUE TO EXCESS CALORIES WITH SERIOUS COMORBIDITY AND BODY MASS INDEX (BMI) OF 39.0 TO 39.9 IN ADULT (H): Primary | ICD-10-CM

## 2023-01-06 DIAGNOSIS — K91.2 POSTSURGICAL MALABSORPTION: ICD-10-CM

## 2023-01-06 DIAGNOSIS — Z98.84 BARIATRIC SURGERY STATUS: ICD-10-CM

## 2023-01-06 PROCEDURE — 99215 OFFICE O/P EST HI 40 MIN: CPT | Mod: GT | Performed by: PHYSICIAN ASSISTANT

## 2023-01-06 RX ORDER — NYSTATIN AND TRIAMCINOLONE ACETONIDE 100000; 1 [USP'U]/G; MG/G
CREAM TOPICAL
Qty: 30 G | Refills: 3 | Status: SHIPPED | OUTPATIENT
Start: 2023-01-06 | End: 2023-10-12

## 2023-01-06 RX ORDER — PHENTERMINE HYDROCHLORIDE 15 MG/1
CAPSULE ORAL
Qty: 90 CAPSULE | Refills: 1 | Status: SHIPPED | OUTPATIENT
Start: 2023-01-06 | End: 2023-04-21 | Stop reason: SINTOL

## 2023-01-06 RX ORDER — NYSTATIN 100000 [USP'U]/G
POWDER TOPICAL 2 TIMES DAILY PRN
Qty: 60 G | Refills: 11 | Status: SHIPPED | OUTPATIENT
Start: 2023-01-06 | End: 2023-10-12

## 2023-01-06 RX ORDER — LIRAGLUTIDE 6 MG/ML
INJECTION, SOLUTION SUBCUTANEOUS
Qty: 15 ML | Refills: 1 | Status: SHIPPED | OUTPATIENT
Start: 2023-01-06 | End: 2023-03-29

## 2023-01-06 NOTE — Clinical Note
Laura, DO you have an old list of plastic surgeron we recommend?  Shell is looking to have thigh surgery and would like to interview a few.    Also, can you start a list of pts who would like to start Wegovy when we an start patients at the starting dose and put Ana on it.  Thanks. ERWIN

## 2023-01-06 NOTE — PATIENT INSTRUCTIONS
"Nice to talk with you today. Thank you for allowing me the privilege of caring for you. We hope we provided you with the excellent service you deserve.     To ensure the quality of our services you may receive a patient satisfaction survey from an independent monitoring company.  The greatest compliment you can give is \"Likely to Recommend\"    Below is our plan we discussed.-  DORIAN Smith      Start Saxenda  Week 1- Inject 0.6 mg daily  Week 2- Inject 1.2 mg daily  Week 3- Inject 1.8 mg daily (If hunger and portions controlled stay at this dose)    May use famotidine 20 mg BID as needed for nausea/heartburn when starting the medication.     Continue your bariatric vitamins     FOLLOW-UP:  Please call [phone] to schedule your next visit in 3 months.     Bariatric Post Op Guidelines  General:    To avoid marginal ulcers avoid all forms of tobacco, alcohol in excess, caffeine, and NSAIDS (unless indicated for cardioprotection or othewise and opposed by a PPI).    Exercise is key for continued weight loss and weight maintenance. Aim for 30-60 minutes of physical activity most days of the week. Include cardiovascular and strength training.    Continue lifelong vitamins supplementation and annual lab follow up.  All  patients should supplement with the following bariatric postoperative vitamins:  2 Complete multivitamins with minerals (at different times than calcium)  Vitamin D 5000 Int Units/125 mg daily   Calcium 600 mg twice daily or 500 mg three times daily   Vitamin B12: 500 mcg sl daily or 1000 mcg Inj monthly  B complex daily or Thiamine 100 mg weekly  1 Iron/Vit C. Daily for females who menstruate and/or as directed    The bariatric team should be aware and evaluate all adverse gastrointestinal symptoms which can be a sign of complication. Inability to tolerate textured solid food (chicken, steak, fish) may need to be evaluated by endoscopy.    There is a 10% increase of Alcohol Use Disorder in patients " with bariatric surgery.   Most often occurring around 2 years post op.  Please call the clinic if you feel alcohol is interfering in your daily life.  We can help.     Follow up annually lifelong. Obesity is a chronic disease.  Weight gain can be expected. The goal of follow-up visits is to ensure adequate vitamin and protein absorption, evaluate food intake behavior, review exercise/activity level, and assist with weight regain.    Nutritional:  Eat 3 meals per day  (No snacks between meals.)  Do not skip meals.  This can cause overeating at the next meal and will prevent adequate protein and nutritional intake.    Aim for 60-80 grams of protein per day.  Always eat your protein first. This assists with optimal nutrition and helps you stay full longer.    Eat your protein first, and then follow with fiber.    Add fiber by including fruits, vegetables, whole grains, and beans.     Portions should be about 1 cup per meal. Use measuring cups to be accurate.  Continue to use saucer/salad plates, infant/toddler silverware to keep portion sizes small and take small bites.    Eat S-L-O-W-L-Y to make each meal last 20-30 minutes. Always stop eating when satisfied.    Aim for 64 oz. of calorie-free fluids daily.    Avoid drinking 30 min before, during, and 30 min after meal    Avoid high sugar and high fat foods to prevent high calorie intake. This will reduce your rate of weight loss and can cause weight regain.   Check nutrition labels for less than 10 grams of sugar and less than 10 grams of fat per serving.    SAXENDA (liraglutide)    Saxenda is a GLP-1 (Glucagon-like Peptide-1) medication.One of the ways it works is by slowing down the rate that food leaves your stomach. You feel weinstein and will eat less. It also helps regulate hormones that can help improve your blood sugars.    Dosing for this medication:   Week 1- Inject 0.6 mg daily  Week 2- Inject 1.2 mg daily  Week 3- Inject 1.8 mg daily (If hunger and portions  controlled stay at this dose)  Week 4- Inject 2.4 mg daily  Week 5 and thereafter- Inject 3.0 mg daily    Side effects of GLP- Medications include: The most common side effects are all GI related and consist of: nausea, heartburn, constipation, diarrhea, burping, or gassiness. Patients are advised to eat slowly and less, and nausea typically passes if people can stick it out.     The risk of pancreatitis (inflammation of the pancreas) has been associated with this type of medication, but is very rare.  If you have had pancreatitis in the past, this medication may not be for you. Please let us know about any past history of pancreas problems.  Symptoms of pancreatitis include: Pain in your upper stomach area which may travel to your back and be worse after eating. Your stomach area may be tender to the touch.  You may have vomiting or nausea and/or have a fever. If you should develop any of these symptoms, stop the medication and contact your primary care doctor. They will do a blood test to check for pancreatitis.       This medication is usually not covered by insurance and can be quite expensive. Sometimes a prior authorization is required, which may take up to 1-2 weeks for an insurance company to make a decision if they will cover the medication. Please be patient, you will be notified after a decision has been made.     (Do not stop taking it if you don't think it's working. For some people it works without them knowing it.)     In order to get refills of this or any medication we prescribe you must be seen in the medical weight mgmt clinic every 2-4 months.

## 2023-01-06 NOTE — ASSESSMENT & PLAN NOTE
Patient was congratulated on wt loss success thus far. Healthy habits to assist with further weight loss were discussed. Ana will continue her phentermine and start Saxenda. .Risks/ benefits and possible side effects were discussed and questions were answered. Written information was given.

## 2023-01-09 ENCOUNTER — TELEPHONE (OUTPATIENT)
Dept: SURGERY | Facility: CLINIC | Age: 53
End: 2023-01-09

## 2023-01-09 NOTE — TELEPHONE ENCOUNTER
Insurance covers and has high deductible.  Is about $1200 at [place] pharmacy - pharm thought drug  coupon will only save $200.  Has high deductible.    Pt would like us to call it in at [facility].   Called [facility] pharmacy and spoke with Misty Pharmacist.  Called in    insulin pen needle (32G X 6 MM) 32G X 6 MM miscellaneous 100 each 1 1/6/2023  No   Sig: Use 1 pen needles daily or as directed with Saxenda.     liraglutide - Weight Management (SAXENDA) 18 MG/3ML pen 15 mL 1 1/6/2023  --   Sig: Week 1: 0.6 mg subcutaneous daily, Week 2: 1.2 mg daily, Week 3 and on: 1.8 mg daily     Saxenda and needles were cx'd at [facility], spoke to Rupesh pharmacist.  Laura Paulino, MS, RD, RN

## 2023-01-10 NOTE — TELEPHONE ENCOUNTER
Checked in with pt if [employer] less expensive.  Pt waited until today to make sure it was called in.  Will check today and update clinic.  Laura Paulino, MS, RD, RN

## 2023-03-23 ENCOUNTER — TELEPHONE (OUTPATIENT)
Dept: SURGERY | Facility: CLINIC | Age: 53
End: 2023-03-23
Payer: COMMERCIAL

## 2023-03-23 NOTE — TELEPHONE ENCOUNTER
Prior Authorization Specialty Medication Request    Medication/Dose: Saxenda 18 mg / 3ml SOPN      Insurance Name:  Commercial  Insurance ID: [id number]  Insurance Phone Number: [phone]    Pharmacy Information (if different than what is on RX)  Name:  [facility]  Phone:  [phone]

## 2023-03-27 ENCOUNTER — TELEPHONE (OUTPATIENT)
Dept: SURGERY | Facility: CLINIC | Age: 53
End: 2023-03-27
Payer: COMMERCIAL

## 2023-03-27 DIAGNOSIS — E66.01 CLASS 2 SEVERE OBESITY DUE TO EXCESS CALORIES WITH SERIOUS COMORBIDITY AND BODY MASS INDEX (BMI) OF 39.0 TO 39.9 IN ADULT (H): ICD-10-CM

## 2023-03-27 DIAGNOSIS — E66.812 CLASS 2 SEVERE OBESITY DUE TO EXCESS CALORIES WITH SERIOUS COMORBIDITY AND BODY MASS INDEX (BMI) OF 39.0 TO 39.9 IN ADULT (H): ICD-10-CM

## 2023-03-27 NOTE — TELEPHONE ENCOUNTER
Central Prior Authorization Team   Phone: [phone]      PA Initiation    Medication: Saxenda 18 mg / 3ml SOPN  Insurance Company: [employer] - Phone [phone] Fax [phone]  [place] [facility] [place] [id number] [address]  Filling Pharmacy Phone: [phone]  Filling Pharmacy Fax:    Start Date: 3/27/2023

## 2023-03-27 NOTE — TELEPHONE ENCOUNTER
Prior Authorization Approval    Authorization Effective Date: 2/25/2023  Authorization Expiration Date: 3/26/2024  Medication: Saxenda 18 mg / 3ml SOPN-APPROVED  Approved Dose/Quantity:   Reference #:     Insurance Company: [employer] - Phone [phone] Fax [phone]  Expected CoPay:       CoPay Card Available:      Foundation Assistance Needed:    Which Pharmacy is filling the prescription (Not needed for infusion/clinic administered): [facility] [address] [address] [facility]  Pharmacy Notified: Yes  Patient Notified: No  **Instructed pharmacy to notify patient when script is ready to /ship.**

## 2023-03-27 NOTE — TELEPHONE ENCOUNTER
Please hold off for now.  It looks like Wegovy is covered.  Will have nurse call pt to discuss and then send this in if possible.  Otherwise can to 3 mo supply.  Most likely will increase to 3 mg if needed long term.  Do not always see they need thig of a dose if they had bariatric surgery.     Laura, can you please call Ana to see about Wegovy.  Is she interested in starting now that supply is available?    ERWIN

## 2023-03-27 NOTE — TELEPHONE ENCOUNTER
Ayla's prior authorization was approved. However, one month copay is still high ($1100). When pharmacy tried to run a test claim for 3 months supply, the copay was just slightly higher ($1300). If patient will be on this medication for 3 months or longer, could you send a new prescription for 3 months?     Also, is patient's maintenance dose staying at 1.8 mg? Usual maintenance dose for weight loss is 3mg so want to verify that with you as well.     Thank you,  Sung Garcia, PharmD  [facility]

## 2023-03-29 RX ORDER — LIRAGLUTIDE 6 MG/ML
INJECTION, SOLUTION SUBCUTANEOUS
Qty: 45 ML | Refills: 0 | Status: SHIPPED | OUTPATIENT
Start: 2023-03-29 | End: 2023-08-26

## 2023-03-29 NOTE — TELEPHONE ENCOUNTER
Wegovy is marginally cheaper than Saxenda.  I ran the 1mg as a test claim and these were the prices WITH the copay card (patient would need to download from wegovy.com), which saves $225/mo.    1mg, 1mo: $1095  1mg, 3mo: $1171    Let me know if you'd like me to run a different strength.    Patient has a $4000 deductible, so I expect copay will be less once that is fulfilled.    Suzanne Kasper  Pharmacy Technician/Liaison, Discharge Pharmacy   [phone] (voice or text)  [email or web address]

## 2023-03-29 NOTE — TELEPHONE ENCOUNTER
Sissy:    Since I don't  know if we would stay on the Wegovy  1 mg dose for 3 months,I think we should keep her on the Saxenda.   Once her deductible is met we can consider switching.      I will send in a 3 month supply of Saxenda   Sig:  take up to 3 mg daily as directed     Laura,    Can you please let Ana know.  Since she is doing well right now on the 1.8 mg dose, I would recommend she stay here.  If she needs to increase because of hunger between meals, or needing larger portions she can advance.      ERWIN

## 2023-04-13 NOTE — PROGRESS NOTES
"  Ana is a 53 year old who is being evaluated via a billable video visit.      The patient has been notified of following:     \"This video visit will be conducted via a call between you and your physician/provider. We have found that certain health care needs can be provided without the need for an in-person physical exam.  This service lets us provide the care you need with a video conversation.  If a prescription is necessary we can send it directly to your pharmacy.  If lab work is needed we can place an order for that and you can then stop by our lab to have the test done at a later time.    Video visits are billed at different rates depending on your insurance coverage.  Please reach out to your insurance provider with any questions.    If during the course of the call the physician/provider feels a video visit is not appropriate, you will not be charged for this service.\"    Patient has given verbal consent for Video visit? Yes    How would you like to obtain your AVS? MyChart    If the video visit is dropped, the invitation should be resent by: Text to cell phone: [phone]    Will anyone else be joining your video visit? No    I    Video-Visit Details    Type of service:  Video Visit    Video Start Time: 9:06 AM    Video End Time: 9:30 AM    Originating Location (pt. Location): Home    Distant Location (provider location):  [facility] [place]     Platform used for Video Visit: Social Plus        2023      Return Medical Weight Management Note     Ana Wyman  MRN:  [id number]  :  1970    Dear Kelly Bedoya MD,    I had the pleasure of seeing your patient Ana Wyman. She is a 53 year old female who I am continuing to see for treatment of obesity related to:        2018     2:07 PM   --   I have the following health issues associated with obesity Pre-Diabetes    Sleep Apnea    Lower Extremity Edema    GERD (Reflux)    Fatty Liver "       Assessment & Plan   Problem List Items Addressed This Visit     Class 2 severe obesity due to excess calories with serious comorbidity and body mass index (BMI) of 35.0 to 35.9 in adult (H) - Primary     Patient was congratulated on wt loss success thus far. Healthy habits to assist with further weight loss were discussed. Ana will continue the Saxenda but increase her dose to 3 mg daily.  She will finish up her Saxenda and then switch to Wegovy 1.7 mg for a month and then if tolerating increase to 2.4 mg daily.           Relevant Medications    Semaglutide-Weight Management (WEGOVY) 1.7 MG/0.75ML pen    Semaglutide-Weight Management (WEGOVY) 2.4 MG/0.75ML pen        PATIENT INSTRUCTIONS:  Discontinue Phentermine to see if this helps sleep  Continue Saxenda but increase dose to 3 mg daily.  Finish up her Saxenda and then switch to Wegovy 1.7 mg for a month and then if tolerating increase to 2.4 mg daily.  Increase vitamin D to 10,000 Int units 6 weeks before plastic surgery  Make appt to see dietician in late June to optimize nutrition for upcoming plastic surgery    FOLLOW-UP:    Please call [phone] to schedule your next visit in 3 mo.     30 minutes spent on the date of the encounter doing chart review, history and exam, result review, counseling, developing plan of care, documentation, and further activities as noted      INTERVAL HISTORY:  Ana returns for medical weight management follow up. Had RYGBS 1/28/2019 [place].   Last seen on 1/6/2023. Is on Saxenda.  Increase her dose to 2.4 mg daily a few weeks ago.  Has had a weight stall the last week or two.  Has no hunger in the morning.  Portions controlled.     WEIGHT METRICS:  Body mass index is 35.33 kg/m .   Current Weight: 212 lb 4.8 oz (96.3 kg) (Pt reported)  Last Visits Weight: 229 lb (103.9 kg)  Initial Weight (lbs): 339.3 lbs  Cumulative weight loss (lbs): 127  Weight Loss Percentage: 37.43%    Wt Readings from Last 10 Encounters:   04/21/23  212 lb 4.8 oz (96.3 kg)   01/06/23 229 lb (103.9 kg)   07/19/22 229 lb (103.9 kg)   04/19/22 229 lb 1.6 oz (103.9 kg)   02/25/22 219 lb (99.3 kg)   02/07/22 219 lb (99.3 kg)   12/29/21 227 lb (103 kg)   10/15/21 221 lb (100.2 kg)   05/21/21 220 lb (99.8 kg)   03/18/21 220 lb (99.8 kg)            4/17/2023     2:09 PM   Weight Loss Medication History Reviewed With Patient   Which weight loss medications are you currently taking on a regular basis? Phentermine    Saxenda (liraglutide)   If you are having side effects please describe: Dry mouth, insomnia   Sleep is back.  Either she can't fall asleep or wakes up at 2-3 and stay up fo a few hours.          4/17/2023     2:09 PM   Changes and Difficulties   I have made the following changes to my diet since my last visit: Practicing internitant fasting.   With regards to my diet, I am still struggling with: Ensuring enough protien   I have made the following changes to my activity/exercise since my last visit: Active 3-4 days a week   Has no hunger between meals.  Does not eat during the day.  With the exception of Collagen water which has 7 grams of protein.  Then has a nighttime meal that is about 1/2 cup to full cup.  Then has a protein rich smoothie at bedtime.        MEDICATIONS:   Current Outpatient Medications   Medication Sig Dispense Refill     albuterol (PROAIR HFA/PROVENTIL HFA/VENTOLIN HFA) 108 (90 Base) MCG/ACT inhaler Inhale 2 puffs into the lungs every 6 hours 18 g 0     augmented betamethasone dipropionate (DIPROLENE-AF) 0.05 % external cream Apply to AA BID x 1-2 weeks then PRN only. Do not apply to face 50 g 2     biotin 5 MG CAPS Take 5,000 mcg by mouth daily At noon       Calcium Citrate-Vitamin D (CALCIUM CITRATE CHEWY BITE PO) Take 500 mg by mouth 3 times daily Plus D,noon, supper, HS; Bariatric Advantage calcium citrate 500 mcg/vitamin D 500 international unit(s) per chew       insulin pen needle (31G X 5 MM) 31G X 5 MM miscellaneous Use 1 pen  needles daily or as directed. 100 each 11     insulin pen needle (32G X 6 MM) 32G X 6 MM miscellaneous Use 1 pen needles daily or as directed with Saxenda. Do not fill if Saxenda not covered. 100 each 1     liraglutide - Weight Management (SAXENDA) 18 MG/3ML pen Take up to 3 mg daily as directed. 45 mL 0     Multiple Vitamins-Minerals (BARIATRIC MULTIVITAMINS/IRON PO) Take 1 capsule by mouth daily Bariatric Advantage Ultra Solo with iron       nitroGLYcerin (NITRO-BID) 2 % OINT ointment Place 0.5 inches (7.5 mg) onto the skin daily as needed (Apply 0.5in to tips of fingers once daily prior to cold exposure) 60 g 1     nystatin (MYCOSTATIN) 465550 UNIT/GM external powder Apply topically 2 times daily as needed (rash) 60 g 11     nystatin-triamcinolone (MYCOLOG II) 684081-7.1 UNIT/GM-% external cream Apply to affected area BID up to 7 days prn rash 30 g 3     polyethylene glycol (MIRALAX/GLYCOLAX) packet Take 17 g by mouth daily 90 packet 3     Semaglutide-Weight Management (WEGOVY) 1.7 MG/0.75ML pen Inject 1.7 mg Subcutaneous once a week 3 mL 0     Semaglutide-Weight Management (WEGOVY) 2.4 MG/0.75ML pen Inject 2.4 mg Subcutaneous once a week 9 mL 0     vitamin D3 (CHOLECALCIFEROL) 50 mcg (2000 units) tablet Take 1 capsule by mouth every morning          LABS:  Hemoglobin A1C   Date Value Ref Range Status   04/14/2022 5.4 0.0 - 5.6 % Final     Comment:     Normal <5.7%   Prediabetes 5.7-6.4%    Diabetes 6.5% or higher     Note: Adopted from ADA consensus guidelines.   02/10/2021 5.1 0 - 5.6 % Final     Comment:     Normal <5.7% Prediabetes 5.7-6.4%  Diabetes 6.5% or higher - adopted from ADA   consensus guidelines.       Vitamin D Deficiency screening   Date Value Ref Range Status   01/18/2021 46 20 - 75 ug/L Final     Comment:     Season, race, dietary intake, and treatment affect the concentration of   25-hydroxy-Vitamin D. Values may decrease during winter months and increase   during summer months. Values 20-29  ug/L may indicate Vitamin D insufficiency   and values <20 ug/L may indicate Vitamin D deficiency.  Vitamin D determination is routinely performed by an immunoassay specific for   25 hydroxyvitamin D3.  If an individual is on vitamin D2 (ergocalciferol)   supplementation, please specify 25 OH vitamin D2 and D3 level determination by   LCMSMS test [id number].       Vitamin D, Total (25-Hydroxy)   Date Value Ref Range Status   12/29/2022 43 20 - 75 ug/L Final     TSH   Date Value Ref Range Status   07/19/2022 2.79 0.40 - 4.00 mU/L Final   01/18/2021 2.84 0.40 - 4.00 mU/L Final     Sodium   Date Value Ref Range Status   04/14/2022 139 133 - 144 mmol/L Final   03/16/2021 140 133 - 144 mmol/L Final     Potassium   Date Value Ref Range Status   04/14/2022 3.9 3.4 - 5.3 mmol/L Final   03/16/2021 3.8 3.4 - 5.3 mmol/L Final     Chloride   Date Value Ref Range Status   04/14/2022 107 94 - 109 mmol/L Final   03/16/2021 105 94 - 109 mmol/L Final     Carbon Dioxide   Date Value Ref Range Status   03/16/2021 27 20 - 32 mmol/L Final     Carbon Dioxide (CO2)   Date Value Ref Range Status   04/14/2022 30 20 - 32 mmol/L Final     Anion Gap   Date Value Ref Range Status   04/14/2022 2 (L) 3 - 14 mmol/L Final   03/16/2021 8 3 - 14 mmol/L Final     Glucose   Date Value Ref Range Status   04/14/2022 101 (H) 70 - 99 mg/dL Final   03/16/2021 137 (H) 70 - 99 mg/dL Final     Urea Nitrogen   Date Value Ref Range Status   04/14/2022 12 7 - 30 mg/dL Final   03/16/2021 11 7 - 30 mg/dL Final     Creatinine   Date Value Ref Range Status   04/14/2022 0.80 0.52 - 1.04 mg/dL Final   03/16/2021 0.73 0.52 - 1.04 mg/dL Final     GFR Estimate   Date Value Ref Range Status   04/14/2022 88 >60 mL/min/1.73m2 Final     Comment:     Effective December 21, 2021 eGFRcr in adults is calculated using the 2021 CKD-EPI creatinine equation which includes age and gender (Maryann et al., NEJM, DOI: 10.1056/NSWJfc2213409)   03/16/2021 >90 >60 mL/min/[1.73_m2] Final      Comment:     Non  GFR Calc  Starting 12/18/2018, serum creatinine based estimated GFR (eGFR) will be   calculated using the Chronic Kidney Disease Epidemiology Collaboration   (CKD-EPI) equation.       Calcium   Date Value Ref Range Status   04/14/2022 9.1 8.5 - 10.1 mg/dL Final   03/16/2021 8.6 8.5 - 10.1 mg/dL Final     Bilirubin Total   Date Value Ref Range Status   04/14/2022 0.7 0.2 - 1.3 mg/dL Final   03/16/2021 0.2 0.2 - 1.3 mg/dL Final     Alkaline Phosphatase   Date Value Ref Range Status   04/14/2022 97 40 - 150 U/L Final   03/16/2021 85 40 - 150 U/L Final     ALT   Date Value Ref Range Status   04/14/2022 26 0 - 50 U/L Final   03/16/2021 20 0 - 50 U/L Final     AST   Date Value Ref Range Status   04/14/2022 18 0 - 45 U/L Final   03/16/2021 14 0 - 45 U/L Final     Cholesterol   Date Value Ref Range Status   04/14/2022 183 <200 mg/dL Final   07/31/2018 178 <200 mg/dL Final     HDL Cholesterol   Date Value Ref Range Status   07/31/2018 48 (L) >49 mg/dL Final     Direct Measure HDL   Date Value Ref Range Status   04/14/2022 65 >=50 mg/dL Final     LDL Cholesterol Calculated   Date Value Ref Range Status   04/14/2022 80 <=100 mg/dL Final   07/31/2018 92 <100 mg/dL Final     Comment:     Desirable:       <100 mg/dl     Triglycerides   Date Value Ref Range Status   04/14/2022 191 (H) <150 mg/dL Final   07/31/2018 191 (H) <150 mg/dL Final     Comment:     Borderline high:  150-199 mg/dl  High:             200-499 mg/dl  Very high:       >499 mg/dl  Fasting specimen       WBC   Date Value Ref Range Status   03/16/2021 9.1 4.0 - 11.0 10e9/L Final     WBC Count   Date Value Ref Range Status   12/29/2022 6.6 4.0 - 11.0 10e3/uL Final     Hemoglobin   Date Value Ref Range Status   12/29/2022 14.3 11.7 - 15.7 g/dL Final   03/16/2021 11.0 (L) 11.7 - 15.7 g/dL Final     Hematocrit   Date Value Ref Range Status   12/29/2022 45.5 35.0 - 47.0 % Final   03/16/2021 36.6 35.0 - 47.0 % Final     MCV   Date Value  "Ref Range Status   12/29/2022 94 78 - 100 fL Final   03/16/2021 98 78 - 100 fl Final     Platelet Count   Date Value Ref Range Status   12/29/2022 301 150 - 450 10e3/uL Final   03/16/2021 411 150 - 450 10e9/L Final         BP Readings from Last 6 Encounters:   01/06/23 107/79   04/19/22 90/50   12/29/21 90/60   12/17/21 92/66   03/18/21 100/60   03/16/21 106/64       Pulse Readings from Last 6 Encounters:   01/06/23 76   04/19/22 72   12/17/21 79   03/18/21 76   03/16/21 86   03/05/21 89       PE:  Ht 5' 5\" (1.651 m)   Wt 212 lb 4.8 oz (96.3 kg)   LMP 10/01/2016 (Approximate)   BMI 35.33 kg/m    GENERAL: Healthy, alert and no distress  EYES: Eyes grossly normal to inspection.  No discharge or erythema, or obvious scleral/conjunctival abnormalities.  RESP: No audible wheeze, cough, or visible cyanosis.  No visible retractions or increased work of breathing.    SKIN: Visible skin clear. No significant rash, abnormal pigmentation or lesions.  NEURO: Cranial nerves grossly intact.  Mentation and speech appropriate for age.  PSYCH: Mentation appears normal, affect normal/bright, judgement and insight intact, normal speech and appearance well-groomed.      Sincerely,      Sarah Stacy PA-C        "

## 2023-04-20 ENCOUNTER — PATIENT OUTREACH (OUTPATIENT)
Dept: CARE COORDINATION | Facility: CLINIC | Age: 53
End: 2023-04-20
Payer: COMMERCIAL

## 2023-04-21 ENCOUNTER — VIRTUAL VISIT (OUTPATIENT)
Dept: SURGERY | Facility: CLINIC | Age: 53
End: 2023-04-21
Payer: COMMERCIAL

## 2023-04-21 ENCOUNTER — TELEPHONE (OUTPATIENT)
Dept: SURGERY | Facility: CLINIC | Age: 53
End: 2023-04-21

## 2023-04-21 VITALS — BODY MASS INDEX: 35.37 KG/M2 | HEIGHT: 65 IN | WEIGHT: 212.3 LBS

## 2023-04-21 DIAGNOSIS — E66.01 CLASS 2 SEVERE OBESITY DUE TO EXCESS CALORIES WITH SERIOUS COMORBIDITY AND BODY MASS INDEX (BMI) OF 35.0 TO 35.9 IN ADULT (H): Primary | ICD-10-CM

## 2023-04-21 DIAGNOSIS — E66.812 CLASS 2 SEVERE OBESITY DUE TO EXCESS CALORIES WITH SERIOUS COMORBIDITY AND BODY MASS INDEX (BMI) OF 35.0 TO 35.9 IN ADULT (H): Primary | ICD-10-CM

## 2023-04-21 PROCEDURE — 99214 OFFICE O/P EST MOD 30 MIN: CPT | Mod: VID | Performed by: PHYSICIAN ASSISTANT

## 2023-04-21 RX ORDER — SEMAGLUTIDE 2.4 MG/.75ML
2.4 INJECTION, SOLUTION SUBCUTANEOUS WEEKLY
Qty: 9 ML | Refills: 0 | Status: SHIPPED | OUTPATIENT
Start: 2023-04-21 | End: 2023-08-01

## 2023-04-21 RX ORDER — SEMAGLUTIDE 1.7 MG/.75ML
1.7 INJECTION, SOLUTION SUBCUTANEOUS WEEKLY
Qty: 3 ML | Refills: 0 | Status: SHIPPED | OUTPATIENT
Start: 2023-04-21 | End: 2023-08-01

## 2023-04-21 NOTE — TELEPHONE ENCOUNTER
Prior Authorization Retail Medication Request    Medication/Dose: Wegovy 1.7MG/0.75ML SOAJ    Insurance Name:  [employer]  Insurance ID:  [id number]       Pharmacy Information (if different than what is on RX)    [facility] [address] [facility]

## 2023-04-21 NOTE — ASSESSMENT & PLAN NOTE
Patient was congratulated on wt loss success thus far. Healthy habits to assist with further weight loss were discussed. Ana will continue the Saxenda but increase her dose to 3 mg daily.  She will finish up her Saxenda and then switch to Wegovy 1.7 mg for a month and then if tolerating increase to 2.4 mg daily.

## 2023-04-21 NOTE — PATIENT INSTRUCTIONS
"To ensure quality you may receive a patient satisfaction survey. The greatest compliment you can give is \"Likely to Recommend.\"    Nice to talk with you today.  Thank you for your trust. Below is the plan discussed.-  DORIAN Smith      Plan:  Discontinue Phentermine to see if this helps sleep  Continue Saxenda but increase dose to 3 mg daily.  Finish up her Saxenda and then switch to Wegovy 1.7 mg for a month and then if tolerating increase to 2.4 mg daily.  Increase vitamin D to 10,000 Int units 6 weeks before plastic surgery  Make appt to see dietician in late June to optimize nutrition for upcoming plastic surgery    FOLLOW-UP:    Please call [phone] to schedule your next visit in 3 mo.     WEGOVY (semaglutide)    Wegovy (semaglutide) injection 2.4 mg is an injectable prescription medicine used for adults with obesity (BMI ?30) or overweight (excess weight) (BMI ?27) who also have weight-related medical problems to help them lose weight and keep the weight off.  It is a GLP-1 agonist medication. GLP1 agonists stimulate the hormone GLP-1 tin your body o allow you to feel full quickly and stay full longer.    Directions:  Start Wegovy (semaglutide) 0.25 mg once weekly for 4 weeks, then if tolerating increase to 0.5 mg weekly for 4 weeks, then if tolerating increase to 1 mg weekly for 4 weeks, then if tolerating increase to 1.7 mg weekly for 4 weeks, then if tolerating increase to 2.4 mg weekly thereafter.    -Each Wegovy pen is a once weekly single-dose prefilled pen with a pen injector already built within the pen. Discard the Wegovy pen after use in sharps container.     Common Side Effects:    nausea, headache, diarrhea, stomach upset.  If these become unmanageable or concerning symptoms, please make sure to call or mychart.    Serious Side effects:   Pancreatitis (inflammation of the pancreas) has been associated with this type of medication, but is very rare.  If you have had pancreatitis in the past, " this medication may not be for you.  Symptoms of pancreatitis include: Pain in your upper stomach area which may travel to your back and be worse after eating.     Storage:   make sure that when you get the prescription that you store the prescription in the refrigerator until it is time to use the Wegovy pen.  Once it is time to use the Wegovy pen, you can keep the pen at room temperature and it is good for up to 28 days at room temperature.     How to inject:  For a video on how to use the pen please go to:  [email or web address]-[email or web address]-to-use-the-wegovy-pen.html#itemTwo     For any questions or concerns please send a [employer] message to our team or call our weight management call center at [phone] during regular business hours. For questions during evenings or weekends your messages will be addressed during the next business day.  For emergencies please call 911 or seek immediate medical care.

## 2023-04-26 NOTE — TELEPHONE ENCOUNTER
Prior Authorization Approval    Authorization Effective Date: 3/27/2023  Authorization Expiration Date: 4/25/2024  Medication: Wegovy 1.7MG/0.75ML SOAJ-PA APPROVED   Approved Dose/Quantity:  Reference #:     Insurance Company: [employer] - Phone [phone] Fax [phone]  Expected CoPay:       CoPay Card Available:      Foundation Assistance Needed:    Which Pharmacy is filling the prescription (Not needed for infusion/clinic administered): [facility] [place], [place] - [address] [facility]  Pharmacy Notified: Yes  Patient Notified: No

## 2023-04-26 NOTE — TELEPHONE ENCOUNTER
Central Prior Authorization Team   Phone: [phone]    PA Initiation    Medication: Wegovy 1.7MG/0.75ML SOAJ  Insurance Company: [employer] - Phone [phone] Fax [phone]  Pharmacy Filling the Rx: [facility] [place], [place] - [address] [facility]  Filling Pharmacy Phone: [phone]  Filling Pharmacy Fax:    Start Date: 4/26/2023

## 2023-05-08 ENCOUNTER — MYC MEDICAL ADVICE (OUTPATIENT)
Dept: PEDIATRICS | Facility: CLINIC | Age: 53
End: 2023-05-08
Payer: COMMERCIAL

## 2023-05-09 ENCOUNTER — NURSE TRIAGE (OUTPATIENT)
Dept: PEDIATRICS | Facility: CLINIC | Age: 53
End: 2023-05-09
Payer: COMMERCIAL

## 2023-05-09 DIAGNOSIS — U07.1 INFECTION DUE TO 2019 NOVEL CORONAVIRUS: Primary | ICD-10-CM

## 2023-06-01 ENCOUNTER — HEALTH MAINTENANCE LETTER (OUTPATIENT)
Age: 53
End: 2023-06-01

## 2023-07-11 SDOH — HEALTH STABILITY: PHYSICAL HEALTH: ON AVERAGE, HOW MANY MINUTES DO YOU ENGAGE IN EXERCISE AT THIS LEVEL?: 90 MIN

## 2023-07-11 SDOH — ECONOMIC STABILITY: INCOME INSECURITY: HOW HARD IS IT FOR YOU TO PAY FOR THE VERY BASICS LIKE FOOD, HOUSING, MEDICAL CARE, AND HEATING?: NOT HARD AT ALL

## 2023-07-11 SDOH — ECONOMIC STABILITY: FOOD INSECURITY: WITHIN THE PAST 12 MONTHS, YOU WORRIED THAT YOUR FOOD WOULD RUN OUT BEFORE YOU GOT MONEY TO BUY MORE.: NEVER TRUE

## 2023-07-11 SDOH — ECONOMIC STABILITY: INCOME INSECURITY: IN THE LAST 12 MONTHS, WAS THERE A TIME WHEN YOU WERE NOT ABLE TO PAY THE MORTGAGE OR RENT ON TIME?: NO

## 2023-07-11 SDOH — HEALTH STABILITY: PHYSICAL HEALTH: ON AVERAGE, HOW MANY DAYS PER WEEK DO YOU ENGAGE IN MODERATE TO STRENUOUS EXERCISE (LIKE A BRISK WALK)?: 4 DAYS

## 2023-07-11 SDOH — ECONOMIC STABILITY: FOOD INSECURITY: WITHIN THE PAST 12 MONTHS, THE FOOD YOU BOUGHT JUST DIDN'T LAST AND YOU DIDN'T HAVE MONEY TO GET MORE.: NEVER TRUE

## 2023-07-11 ASSESSMENT — ASTHMA QUESTIONNAIRES: ACT_TOTALSCORE: 25

## 2023-07-11 ASSESSMENT — LIFESTYLE VARIABLES
HOW OFTEN DO YOU HAVE SIX OR MORE DRINKS ON ONE OCCASION: NEVER
HOW MANY STANDARD DRINKS CONTAINING ALCOHOL DO YOU HAVE ON A TYPICAL DAY: 1 OR 2
HOW OFTEN DO YOU HAVE A DRINK CONTAINING ALCOHOL: 2-4 TIMES A MONTH
SKIP TO QUESTIONS 9-10: 1
AUDIT-C TOTAL SCORE: 2

## 2023-07-11 ASSESSMENT — SOCIAL DETERMINANTS OF HEALTH (SDOH)
HOW OFTEN DO YOU GET TOGETHER WITH FRIENDS OR RELATIVES?: MORE THAN THREE TIMES A WEEK
DO YOU BELONG TO ANY CLUBS OR ORGANIZATIONS SUCH AS CHURCH GROUPS UNIONS, FRATERNAL OR ATHLETIC GROUPS, OR SCHOOL GROUPS?: YES
ARE YOU MARRIED, WIDOWED, DIVORCED, SEPARATED, NEVER MARRIED, OR LIVING WITH A PARTNER?: NEVER MARRIED
IN A TYPICAL WEEK, HOW MANY TIMES DO YOU TALK ON THE PHONE WITH FAMILY, FRIENDS, OR NEIGHBORS?: MORE THAN THREE TIMES A WEEK
HOW OFTEN DO YOU ATTEND CHURCH OR RELIGIOUS SERVICES?: 1 TO 4 TIMES PER YEAR

## 2023-07-12 ENCOUNTER — VIRTUAL VISIT (OUTPATIENT)
Dept: SURGERY | Facility: CLINIC | Age: 53
End: 2023-07-12
Payer: COMMERCIAL

## 2023-07-12 VITALS — BODY MASS INDEX: 33.78 KG/M2 | WEIGHT: 203 LBS

## 2023-07-12 DIAGNOSIS — E66.9 OBESITY: Primary | ICD-10-CM

## 2023-07-12 PROCEDURE — 97803 MED NUTRITION INDIV SUBSEQ: CPT | Mod: VID

## 2023-07-12 NOTE — PROGRESS NOTES
"Virtual Visit Details    Type of service:  Video Visit     Originating Location (pt. Location): Home  Distant Location (provider location):  Off-site  Platform used for Video Visit: Well     NUTRITION POST OP APPOINTMENT + MEDICAL WEIGHT  LOSS  DATE OF VISIT: July 12, 2023    Ana Wyman  1970  female  [id number]  53 year old     ASSESSMENT:    REASON FOR VISIT:  Ana is a 53 year old year old female presents today for 4.5 year PO nutrition follow-up appointment. Patient is accompanied by self.    DIAGNOSIS:  Status post gastric bypass surgery.  Obesity Obesity Grade I BMI 30-34.9     ANTHROPOMETRICS:  Initial Weight: 339 lb   Height: 64\"    Current Weight: 203 lb per pt    BMI: 33.78 kg/(m^2).    VITAMINS AND MINERALS:  1 Complete multivitamins with minerals- Bariatric Advantage ultra Solo with iron- bed time  2-2000 Int Units of Vitamin D daily ( ok to continue for now, but need to stop after labs checked)  500 mg Bariatric Advantage chewy bites three times with meals of Calcium daily- from MVI by 2 hours  Biotin daily  MVI has adequate thiamine and vitamin D (pt prefers to  Wait until labs are checked)    NUTRITION HISTORY:  Breakfast: Ratio yogurt or 1 egg with [place] real or protein drink with 30 grams protein  Lunch: turkey, lettuce, light cream cheese or leftover from dinner  Supper: protein, veggie, baked potato   Snacks: sugar free high pudding- (PB fit, sf pudding,protein powder)  Fluids consumed: 85 oz water or Propel water, 5-10 calorie cranberry juice, protein drink, occasionally decaf coffee, ETOH- none  Consuming liquid calories: Yes  Protein intake: 60-70 grams/day  Tolerate regular texture food: Yes  Any foods not tolerated details: No  Portion size: 1 cup  Take 20-30 minutes to consume each meal: No   Eat protein foods first: Yes  Fluids and meals separate by at least 30 minutes: Yes  Chew foods thoroughly: Yes  Tolerating diet: Yes  Drinking high protein supplements: " "Yes  Consuming snacks per day: 0-1  Additional Information: Patient has 2 plastics/ reconstructions surgeries planned. Enjoyed tying paddle boarding and pickle ball over the summer! Doing some intermittent fasting off and on. Discussed pros and cons of intermittent fasting as it relates to the bariatric diet guidelines.  Since starting Wegovy some food taste a little different. Patient working hard to get weight below 200 pounds.      MEDICATION FOR WEIGHT LOSS;  Wegovy    PHYSICAL ACTIVITY:  Type: walking/ pickle ball  Frequency (days per week): 3-4  Duration (min): 3-5 miles    DIAGNOSIS:  Previous Nutrition Diagnosis: Altered gastrointestinal function related to alteration in gastrointestinal structure as evidenced by history of gastric bypass surgery.- no change    Previous goals:  Verify that multivitamin/ mineral meets clinic guidelines  (offered suggestions to alternatives to [employer] brand)-met  Limit snacking after dinner to 3 per week-met  Read nutrition labels for total fat and sugar (limit both to < 10 grams per serving)- met    Current Nutrition Diagnosis: Altered gastrointestinal function related to alteration in gastrointestinal structure as evidenced by history of gastric bypass surgery.    INTERVENTION:   Nutrition Prescription: Eat 3 meals a day at regular intervals. Consume 60-90 grams of protein daily. Follow post-surgical vitamin and mineral protocol.  Assessed learning needs and learning preferences. Will send: \"Keeping Up with Your diet After Weight Loss    GOALS:  Start 5000 mcg vitamin B-12, SL one time per week  Consisently take at least 20 minutes per meal  Add strength training videos 1-2X per week  Aim for 60-80 grams protein /day (1.2-1.4 g/ kg IBW)      Implementation: Discussed progress toward previous goals; reinforced importance of following bariatric lifestyle changes.    NUTRITION MONITORING AND EVALUATION:  Anticipated compliance: good  Verbalized good " understanding.    Follow up: Patient to follow up in 6 months (5 years post-op)    TIME SPENT WITH PATIENT:  43 minutes  Chapo Braun RD, SARAHY  [facility], [place]

## 2023-07-12 NOTE — PATIENT INSTRUCTIONS
Kong Perez-  It was great to visit with you and learn about your progress. Congratulations on your weight loss and dedication to the bariatric lifestyle changes!  Below are the goals we discussed.  GOALS:  Start 5000 mcg vitamin B-12, SL one time per week  Consisently take at least 20 minutes per meal  Add strength training videos 1-2X per week  Aim for 60-80 grams protein /day (1.2-1.4 g/ kg IBW)    Nutrition Educational Materials:  Keeping Up Your Diet after Weight Loss Surgery  [email or web address]      Please call [phone] to schedule your next visits with a Dietitian/ Provider (PA or MD) at 5 years post-op.  Thanks!  Chapo Braun RD, LD  [facility], [place]

## 2023-07-18 ENCOUNTER — OFFICE VISIT (OUTPATIENT)
Dept: PEDIATRICS | Facility: CLINIC | Age: 53
End: 2023-07-18
Payer: COMMERCIAL

## 2023-07-18 VITALS
TEMPERATURE: 97.4 F | RESPIRATION RATE: 18 BRPM | DIASTOLIC BLOOD PRESSURE: 60 MMHG | WEIGHT: 207.5 LBS | OXYGEN SATURATION: 97 % | SYSTOLIC BLOOD PRESSURE: 90 MMHG | HEART RATE: 81 BPM | BODY MASS INDEX: 34.53 KG/M2

## 2023-07-18 DIAGNOSIS — L98.7 EXCESS SKIN: ICD-10-CM

## 2023-07-18 DIAGNOSIS — Z01.818 PREOP GENERAL PHYSICAL EXAM: Primary | ICD-10-CM

## 2023-07-18 DIAGNOSIS — J45.20 MILD INTERMITTENT ASTHMA WITHOUT COMPLICATION: ICD-10-CM

## 2023-07-18 DIAGNOSIS — N62 MACROMASTIA: ICD-10-CM

## 2023-07-18 LAB
ANION GAP SERPL CALCULATED.3IONS-SCNC: 11 MMOL/L (ref 7–15)
BUN SERPL-MCNC: 13.8 MG/DL (ref 6–20)
CALCIUM SERPL-MCNC: 9.2 MG/DL (ref 8.6–10)
CHLORIDE SERPL-SCNC: 103 MMOL/L (ref 98–107)
CREAT SERPL-MCNC: 0.84 MG/DL (ref 0.51–0.95)
DEPRECATED HCO3 PLAS-SCNC: 26 MMOL/L (ref 22–29)
ERYTHROCYTE [DISTWIDTH] IN BLOOD BY AUTOMATED COUNT: 13 % (ref 10–15)
GFR SERPL CREATININE-BSD FRML MDRD: 82 ML/MIN/1.73M2
GLUCOSE SERPL-MCNC: 83 MG/DL (ref 70–99)
HCT VFR BLD AUTO: 42.6 % (ref 35–47)
HGB BLD-MCNC: 13.5 G/DL (ref 11.7–15.7)
MCH RBC QN AUTO: 29.9 PG (ref 26.5–33)
MCHC RBC AUTO-ENTMCNC: 31.7 G/DL (ref 31.5–36.5)
MCV RBC AUTO: 94 FL (ref 78–100)
PLATELET # BLD AUTO: 336 10E3/UL (ref 150–450)
POTASSIUM SERPL-SCNC: 4.1 MMOL/L (ref 3.4–5.3)
RBC # BLD AUTO: 4.52 10E6/UL (ref 3.8–5.2)
SODIUM SERPL-SCNC: 140 MMOL/L (ref 136–145)
WBC # BLD AUTO: 6.3 10E3/UL (ref 4–11)

## 2023-07-18 PROCEDURE — 36415 COLL VENOUS BLD VENIPUNCTURE: CPT | Performed by: PEDIATRICS

## 2023-07-18 PROCEDURE — 80048 BASIC METABOLIC PNL TOTAL CA: CPT | Performed by: PEDIATRICS

## 2023-07-18 PROCEDURE — 99214 OFFICE O/P EST MOD 30 MIN: CPT | Performed by: PEDIATRICS

## 2023-07-18 PROCEDURE — 85027 COMPLETE CBC AUTOMATED: CPT | Performed by: PEDIATRICS

## 2023-07-18 ASSESSMENT — PAIN SCALES - GENERAL: PAINLEVEL: NO PAIN (0)

## 2023-07-18 NOTE — H&P (VIEW-ONLY)
[facility]  [address]  [address]  [place] [address]  Phone: [phone]  Fax: [phone]  Primary Provider: Michelle Geiger  Pre-op Performing Provider: MICHELLE GEIGER    956}  PREOPERATIVE EVALUATION:  Today's date: 7/18/2023    Ana Wyman is a 53 year old female who presents for a preoperative evaluation.    Surgical Information:  Surgery/Procedure: MAMMOPLASTY, REDUCTION, BILATERAL, COSMETIC BILATERAL BRACHIOPLASTY  Surgery Location: [facility]   Surgeon: Dr. Mayen   Surgery Date: 8/11/2023  Time of Surgery: NA  Where patient plans to recover: At home with family  Fax number for surgical facility: [phone]    Assessment & Plan     The proposed surgical procedure is considered INTERMEDIATE risk.      ICD-10-CM    1. Preop general physical exam  Z01.818 CBC with platelets     Basic metabolic panel  (Ca, Cl, CO2, Creat, Gluc, K, Na, BUN)      2. Macromastia  N62       3. Excess skin  L98.7       4. Mild intermittent asthma without complication  J45.20               Risks and Recommendations:  The patient has the following additional risks and recommendations for perioperative complications:   - No identified additional risk factors other than previously addressed    Antiplatelet or Anticoagulation Medication Instructions:   - Patient is on no antiplatelet or anticoagulation medications.    Additional Medication Instructions:   - phentermine: HOLD 7 days prior to surgery.  - wegovy - HOLD 7 days prior to surgery    RECOMMENDATION:  APPROVAL GIVEN to proceed with proposed procedure, without further diagnostic evaluation.  6}    Subjective       HPI related to upcoming procedure:  Requiring breast lift and removal of excess arm skin after massive weight loss post bariatric surgery        7/11/2023     9:20 AM   Preop Questions   1. Have you ever had a heart attack or stroke? No   2. Have you ever had surgery on your heart or blood vessels, such as a  stent placement, a coronary artery bypass, or surgery on an artery in your head, neck, heart, or legs? No   3. Do you have chest pain with activity? No   4. Do you have a history of  heart failure? No   5. Do you currently have a cold, bronchitis or symptoms of other infection? No   6. Do you have a cough, shortness of breath, or wheezing? No   7. Do you or anyone in your family have previous history of blood clots? No   8. Do you or does anyone in your family have a serious bleeding problem such as prolonged bleeding following surgeries or cuts? No   9. Have you ever had problems with anemia or been told to take iron pills? UNKNOWN - normal most recently   10. Have you had any abnormal blood loss such as black, tarry or bloody stools, or abnormal vaginal bleeding? No   11. Have you ever had a blood transfusion? YES - surgery related   11a. Have you ever had a transfusion reaction? No   12. Are you willing to have a blood transfusion if it is medically needed before, during, or after your surgery? Yes   13. Have you or any of your relatives ever had problems with anesthesia? YES - PONV   14. Do you have sleep apnea, excessive snoring or daytime drowsiness? No   15. Do you have any artifical heart valves or other implanted medical devices like a pacemaker, defibrillator, or continuous glucose monitor? No   16. Do you have artificial joints? No   17. Are you allergic to latex? No   18. Is there any chance that you may be pregnant? No         Preoperative Review of :   reviewed - no record of controlled substances prescribed.      Status of Chronic Conditions:  See problem list for active medical problems.  Problems all longstanding and stable, except as noted/documented.  See ROS for pertinent symptoms related to these conditions.      Review of Systems  Constitutional, neuro, ENT, endocrine, pulmonary, cardiac, gastrointestinal, genitourinary, musculoskeletal, integument and psychiatric systems are negative,  except as otherwise noted.    Patient Active Problem List    Diagnosis Date Noted     Symptomatic abdominal panniculus 02/25/2021     Priority: Medium     Family history of malignant neoplasm of breast 10/05/2020     Priority: Medium     Insulin resistance 09/28/2020     Priority: Medium     Postural hypotension 07/29/2019     Priority: Medium     Intertrigo 07/29/2019     Priority: Medium     Class 2 severe obesity due to excess calories with serious comorbidity and body mass index (BMI) of 35.0 to 35.9 in adult (H) 07/29/2019     Priority: Medium     S/P laparoscopic cholecystectomy 04/18/2019     Priority: Medium     Bariatric surgery status 02/05/2019     Priority: Medium     RYGBS 1/28/2019 Nikunj       Postsurgical malabsorption 02/05/2019     Priority: Medium     Fatty liver 07/27/2018     Priority: Medium     Mild intermittent asthma without complication 06/11/2018     Priority: Medium     Lymphedema bilateral with mixed lipedema. 09/30/2015     Priority: Medium     Baker's cyst of knee 09/03/2015     Priority: Medium     Acanthosis nigricans 03/24/2015     Priority: Medium     Cutaneous skin tags 03/24/2015     Priority: Medium     Uterine fibroid 08/02/2012     Priority: Medium     Lichen sclerosus 07/14/2011     Priority: Medium     Family history of malignant neoplasm of genital organ, other 06/11/2007     Priority: Medium     FAMILY HX GI MALIGNANCY 05/31/2007     Priority: Medium     Esophageal reflux 09/27/2004     Priority: Medium     Allergic state 09/27/2004     Priority: Medium     Problem list name updated by automated process. Provider to review        Past Medical History:   Diagnosis Date     Allergic rhinitis, cause unspecified      Cellulitis 2005    2 episodes, one with hospitalization [facility]     Complication of anesthesia     MOTHER HAS HISTORY     DUB (dysfunctional uterine bleeding)     Neg EMB 2012     Esophageal reflux     ongoing     Fibroids      Genital problems     Lichens  Schlerosis     GERD (gastroesophageal reflux disease)      Hallux valgus (acquired)      History of steroid therapy     Inhalers, eye drops     Hypersomnia with sleep apnea, unspecified     using CPAP     Kidney stone 05/2018    2 stones     Lichen sclerosus 07/14/2011     Lymph edema 2010- present     Lymphedema bilateral with mixed lipedema. 09/30/2015     Lymphedema bilateral with mixed lipedema. 09/30/2015     Morbid obesity (H) 03/19/2013     MIGUELITO on CPAP 03/19/2013    Sleep study in 2004 and 2012       Pneumonia     Years ago - a few times     Pre-diabetes      Sleep apnea      Tendonitis currently     Uncomplicated asthma     mild     Urinary tract infection     A few times in past 10 years     Vision disorder [id number]    Dry Eye     Vitamin D deficiency 03/14/2015     Past Surgical History:   Procedure Laterality Date     COLONOSCOPY  05/15/2013    Procedure: COLONOSCOPY;  Colonoscopy;  Surgeon: Kota Blanco MD;  Location:  GI     COLONOSCOPY N/A 10/21/2019    Procedure: COLONOSCOPY, with biopsies by cold forceps;  Surgeon: Lydia Vickers MD;  Location:  GI     COSMETIC ABDOMINOPLASTY Bilateral 2/25/2021    Procedure: COSMETIC BILATERAL BELT LIPECTOMY;  Surgeon: Wade Marquez MD;  Location:  OR     GASTRIC BYPASS       GYN SURGERY  2016    Uterine Ablation     INCISION AND DRAINAGE TRUNK, COMBINED Left 2/25/2021    Procedure: EVACUATION OF HEMATOMA;  Surgeon: Wade Marquez MD;  Location:  OR     LAPAROSCOPIC BYPASS GASTRIC, CHOLECYSTECTOMY, COMBINED N/A 01/28/2019    Procedure: LAPAROSCOPIC GASTRIC BYPASS;  Surgeon: Maykel Calvin MD;  Location:  OR     LAPAROSCOPIC CHOLECYSTECTOMY N/A 04/11/2019    Procedure: LAPAROSCOPIC CHOLECYSTECTOMY;  Surgeon: Maykel Calvin MD;  Location:  OR     lichen sclerosis       ORTHOPEDIC SURGERY       PANNICULECTOMY N/A 2/25/2021    Procedure: PANNICULECTOMY;  Surgeon: Wade Marquez MD;  Location:  OR     VASCULAR SURGERY   2015    Vienous closure on both legs     vein closure  12/2016    to prevent lymphedema     [facility] NONSPECIFIC PROCEDURE  1994    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     Z NONSPECIFIC PROCEDURE  1999,2001    Foot surgeries x 2 (bunionectomy)     Z NONSPECIFIC PROCEDURE  2000    hammer toes     Current Outpatient Medications   Medication Sig Dispense Refill     augmented betamethasone dipropionate (DIPROLENE-AF) 0.05 % external cream Apply to AA BID x 1-2 weeks then PRN only. Do not apply to face 50 g 2     biotin 5 MG CAPS Take 5,000 mcg by mouth daily At noon       Calcium Citrate-Vitamin D (CALCIUM CITRATE CHEWY BITE PO) Take 500 mg by mouth 3 times daily Plus D,noon, supper, HS; Bariatric Advantage calcium citrate 500 mcg/vitamin D 500 international unit(s) per chew       Multiple Vitamins-Minerals (BARIATRIC MULTIVITAMINS/IRON PO) Take 1 capsule by mouth daily Bariatric Advantage Ultra Solo with iron       nystatin-triamcinolone (MYCOLOG II) 340246-4.1 UNIT/GM-% external cream Apply to affected area BID up to 7 days prn rash 30 g 3     phentermine (ADIPEX-P) 15 MG capsule Take 1 capsule in the morning. 90 capsule 0     polyethylene glycol (MIRALAX/GLYCOLAX) packet Take 17 g by mouth daily 90 packet 3     Semaglutide-Weight Management (WEGOVY) 1.7 MG/0.75ML pen Inject 1.7 mg Subcutaneous once a week 3 mL 0     Semaglutide-Weight Management (WEGOVY) 2.4 MG/0.75ML pen Inject 2.4 mg Subcutaneous once a week 9 mL 0     vitamin D3 (CHOLECALCIFEROL) 50 mcg (2000 units) tablet Take 1 capsule by mouth every morning        albuterol (PROAIR HFA/PROVENTIL HFA/VENTOLIN HFA) 108 (90 Base) MCG/ACT inhaler Inhale 2 puffs into the lungs every 6 hours (Patient not taking: Reported on 7/18/2023) 18 g 0     insulin pen needle (31G X 5 MM) 31G X 5 MM miscellaneous Use 1 pen needles daily or as directed. (Patient not taking: Reported on 7/18/2023) 100 each 11     insulin pen needle (32G X 6 MM) 32G X 6 MM miscellaneous  Use 1 pen needles daily or as directed with Saxenda. Do not fill if Saxenda not covered. (Patient not taking: Reported on 7/18/2023) 100 each 1     liraglutide - Weight Management (SAXENDA) 18 MG/3ML pen Take up to 3 mg daily as directed. (Patient not taking: Reported on 7/18/2023) 45 mL 0     nitroGLYcerin (NITRO-BID) 2 % OINT ointment Place 0.5 inches (7.5 mg) onto the skin daily as needed (Apply 0.5in to tips of fingers once daily prior to cold exposure) (Patient not taking: Reported on 7/18/2023) 60 g 1     nystatin (MYCOSTATIN) 423075 UNIT/GM external powder Apply topically 2 times daily as needed (rash) (Patient not taking: Reported on 7/18/2023) 60 g 11       Allergies   Allergen Reactions     Nsaids Other (See Comments)     Had gastric bypass - needs to avoid NSAIDS     Clindamycin Diarrhea     Morphine And Related Nausea and Vomiting     Shellfish Allergy         Social History     Tobacco Use     Smoking status: Never     Smokeless tobacco: Never   Substance Use Topics     Alcohol use: Not Currently     Comment: occasional     History   Drug Use No         Objective     BP 90/60 (BP Location: Right arm, Patient Position: Sitting, Cuff Size: Adult Large)   Pulse 81   Temp 97.4  F (36.3  C) (Tympanic)   Resp 18   Wt 94.1 kg (207 lb 8 oz)   LMP 10/01/2016 (Approximate)   SpO2 97%   BMI 34.53 kg/m      Physical Exam    GENERAL APPEARANCE: healthy, alert and no distress     EYES: EOMI,  PERRL     HENT: ear canals and TM's normal and nose and mouth without ulcers or lesions     NECK: no adenopathy, no asymmetry, masses, or scars and thyroid normal to palpation     RESP: lungs clear to auscultation - no rales, rhonchi or wheezes     CV: regular rates and rhythm, normal S1 S2, no S3 or S4 and no murmur, click or rub     ABDOMEN:  soft, nontender, no HSM or masses and bowel sounds normal     MS: extremities normal- no gross deformities noted, no evidence of inflammation in joints, FROM in all extremities.      SKIN: cyst left groin without any drainage or surrounding erythema     NEURO: Normal strength and tone, sensory exam grossly normal, mentation intact and speech normal     PSYCH: mentation appears normal. and affect normal/bright     LYMPHATICS: No cervical adenopathy    Recent Labs   Lab Test 12/29/22  1117 04/19/22  0917 04/14/22  0837 01/10/22  1215 10/25/21  1328   HGB 14.3 13.8  --    < >  --     324  --    < >  --    NA  --   --  139  --  142   POTASSIUM  --   --  3.9  --  3.9   CR  --   --  0.80  --  0.81   A1C  --   --  5.4  --   --     < > = values in this interval not displayed.        Diagnostics:  Recent Results (from the past 24 hour(s))   CBC with platelets    Collection Time: 07/18/23  9:37 AM   Result Value Ref Range    WBC Count 6.3 4.0 - 11.0 10e3/uL    RBC Count 4.52 3.80 - 5.20 10e6/uL    Hemoglobin 13.5 11.7 - 15.7 g/dL    Hematocrit 42.6 35.0 - 47.0 %    MCV 94 78 - 100 fL    MCH 29.9 26.5 - 33.0 pg    MCHC 31.7 31.5 - 36.5 g/dL    RDW 13.0 10.0 - 15.0 %    Platelet Count 336 150 - 450 10e3/uL      No EKG required, no history of coronary heart disease, significant arrhythmia, peripheral arterial disease or other structural heart disease.    Revised Cardiac Risk Index (RCRI):  The patient has the following serious cardiovascular risks for perioperative complications:   - No serious cardiac risks = 0 points   - walking 5 miles per day!    RCRI Interpretation: 0 points: Class I (very low risk - 0.4% complication rate)           Signed Electronically by: Kelly Bedoya MD  Copy of this evaluation report is provided to requesting physician.

## 2023-07-18 NOTE — PROGRESS NOTES
[facility]  [address]  [address]  [place] [address]  Phone: [phone]  Fax: [phone]  Primary Provider: Michelle Geiger  Pre-op Performing Provider: MICHELLE GEIGER    956}  PREOPERATIVE EVALUATION:  Today's date: 7/18/2023    Ana Wyman is a 53 year old female who presents for a preoperative evaluation.    Surgical Information:  Surgery/Procedure: MAMMOPLASTY, REDUCTION, BILATERAL, COSMETIC BILATERAL BRACHIOPLASTY  Surgery Location: [facility]   Surgeon: Dr. Mayen   Surgery Date: 8/11/2023  Time of Surgery: NA  Where patient plans to recover: At home with family  Fax number for surgical facility: [phone]    Assessment & Plan     The proposed surgical procedure is considered INTERMEDIATE risk.      ICD-10-CM    1. Preop general physical exam  Z01.818 CBC with platelets     Basic metabolic panel  (Ca, Cl, CO2, Creat, Gluc, K, Na, BUN)      2. Macromastia  N62       3. Excess skin  L98.7       4. Mild intermittent asthma without complication  J45.20               Risks and Recommendations:  The patient has the following additional risks and recommendations for perioperative complications:   - No identified additional risk factors other than previously addressed    Antiplatelet or Anticoagulation Medication Instructions:   - Patient is on no antiplatelet or anticoagulation medications.    Additional Medication Instructions:   - phentermine: HOLD 7 days prior to surgery.  - wegovy - HOLD 7 days prior to surgery    RECOMMENDATION:  APPROVAL GIVEN to proceed with proposed procedure, without further diagnostic evaluation.  6}    Subjective       HPI related to upcoming procedure:  Requiring breast lift and removal of excess arm skin after massive weight loss post bariatric surgery        7/11/2023     9:20 AM   Preop Questions   1. Have you ever had a heart attack or stroke? No   2. Have you ever had surgery on your heart or blood vessels, such as a  stent placement, a coronary artery bypass, or surgery on an artery in your head, neck, heart, or legs? No   3. Do you have chest pain with activity? No   4. Do you have a history of  heart failure? No   5. Do you currently have a cold, bronchitis or symptoms of other infection? No   6. Do you have a cough, shortness of breath, or wheezing? No   7. Do you or anyone in your family have previous history of blood clots? No   8. Do you or does anyone in your family have a serious bleeding problem such as prolonged bleeding following surgeries or cuts? No   9. Have you ever had problems with anemia or been told to take iron pills? UNKNOWN - normal most recently   10. Have you had any abnormal blood loss such as black, tarry or bloody stools, or abnormal vaginal bleeding? No   11. Have you ever had a blood transfusion? YES - surgery related   11a. Have you ever had a transfusion reaction? No   12. Are you willing to have a blood transfusion if it is medically needed before, during, or after your surgery? Yes   13. Have you or any of your relatives ever had problems with anesthesia? YES - PONV   14. Do you have sleep apnea, excessive snoring or daytime drowsiness? No   15. Do you have any artifical heart valves or other implanted medical devices like a pacemaker, defibrillator, or continuous glucose monitor? No   16. Do you have artificial joints? No   17. Are you allergic to latex? No   18. Is there any chance that you may be pregnant? No         Preoperative Review of :   reviewed - no record of controlled substances prescribed.      Status of Chronic Conditions:  See problem list for active medical problems.  Problems all longstanding and stable, except as noted/documented.  See ROS for pertinent symptoms related to these conditions.      Review of Systems  Constitutional, neuro, ENT, endocrine, pulmonary, cardiac, gastrointestinal, genitourinary, musculoskeletal, integument and psychiatric systems are negative,  except as otherwise noted.    Patient Active Problem List    Diagnosis Date Noted     Symptomatic abdominal panniculus 02/25/2021     Priority: Medium     Family history of malignant neoplasm of breast 10/05/2020     Priority: Medium     Insulin resistance 09/28/2020     Priority: Medium     Postural hypotension 07/29/2019     Priority: Medium     Intertrigo 07/29/2019     Priority: Medium     Class 2 severe obesity due to excess calories with serious comorbidity and body mass index (BMI) of 35.0 to 35.9 in adult (H) 07/29/2019     Priority: Medium     S/P laparoscopic cholecystectomy 04/18/2019     Priority: Medium     Bariatric surgery status 02/05/2019     Priority: Medium     RYGBS 1/28/2019 Nikunj       Postsurgical malabsorption 02/05/2019     Priority: Medium     Fatty liver 07/27/2018     Priority: Medium     Mild intermittent asthma without complication 06/11/2018     Priority: Medium     Lymphedema bilateral with mixed lipedema. 09/30/2015     Priority: Medium     Baker's cyst of knee 09/03/2015     Priority: Medium     Acanthosis nigricans 03/24/2015     Priority: Medium     Cutaneous skin tags 03/24/2015     Priority: Medium     Uterine fibroid 08/02/2012     Priority: Medium     Lichen sclerosus 07/14/2011     Priority: Medium     Family history of malignant neoplasm of genital organ, other 06/11/2007     Priority: Medium     FAMILY HX GI MALIGNANCY 05/31/2007     Priority: Medium     Esophageal reflux 09/27/2004     Priority: Medium     Allergic state 09/27/2004     Priority: Medium     Problem list name updated by automated process. Provider to review        Past Medical History:   Diagnosis Date     Allergic rhinitis, cause unspecified      Cellulitis 2005    2 episodes, one with hospitalization [facility]     Complication of anesthesia     MOTHER HAS HISTORY     DUB (dysfunctional uterine bleeding)     Neg EMB 2012     Esophageal reflux     ongoing     Fibroids      Genital problems     Lichens  Schlerosis     GERD (gastroesophageal reflux disease)      Hallux valgus (acquired)      History of steroid therapy     Inhalers, eye drops     Hypersomnia with sleep apnea, unspecified     using CPAP     Kidney stone 05/2018    2 stones     Lichen sclerosus 07/14/2011     Lymph edema 2010- present     Lymphedema bilateral with mixed lipedema. 09/30/2015     Lymphedema bilateral with mixed lipedema. 09/30/2015     Morbid obesity (H) 03/19/2013     MIGUELITO on CPAP 03/19/2013    Sleep study in 2004 and 2012       Pneumonia     Years ago - a few times     Pre-diabetes      Sleep apnea      Tendonitis currently     Uncomplicated asthma     mild     Urinary tract infection     A few times in past 10 years     Vision disorder [id number]    Dry Eye     Vitamin D deficiency 03/14/2015     Past Surgical History:   Procedure Laterality Date     COLONOSCOPY  05/15/2013    Procedure: COLONOSCOPY;  Colonoscopy;  Surgeon: Kota Blanco MD;  Location:  GI     COLONOSCOPY N/A 10/21/2019    Procedure: COLONOSCOPY, with biopsies by cold forceps;  Surgeon: Lydia Vickers MD;  Location:  GI     COSMETIC ABDOMINOPLASTY Bilateral 2/25/2021    Procedure: COSMETIC BILATERAL BELT LIPECTOMY;  Surgeon: Wade Marquez MD;  Location:  OR     GASTRIC BYPASS       GYN SURGERY  2016    Uterine Ablation     INCISION AND DRAINAGE TRUNK, COMBINED Left 2/25/2021    Procedure: EVACUATION OF HEMATOMA;  Surgeon: Wade Marquez MD;  Location:  OR     LAPAROSCOPIC BYPASS GASTRIC, CHOLECYSTECTOMY, COMBINED N/A 01/28/2019    Procedure: LAPAROSCOPIC GASTRIC BYPASS;  Surgeon: Maykel Calvin MD;  Location:  OR     LAPAROSCOPIC CHOLECYSTECTOMY N/A 04/11/2019    Procedure: LAPAROSCOPIC CHOLECYSTECTOMY;  Surgeon: Maykel Calvin MD;  Location:  OR     lichen sclerosis       ORTHOPEDIC SURGERY       PANNICULECTOMY N/A 2/25/2021    Procedure: PANNICULECTOMY;  Surgeon: Wade Marquez MD;  Location:  OR     VASCULAR SURGERY   2015    Vienous closure on both legs     vein closure  12/2016    to prevent lymphedema     [facility] NONSPECIFIC PROCEDURE  1994    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     Z NONSPECIFIC PROCEDURE  1999,2001    Foot surgeries x 2 (bunionectomy)     Z NONSPECIFIC PROCEDURE  2000    hammer toes     Current Outpatient Medications   Medication Sig Dispense Refill     augmented betamethasone dipropionate (DIPROLENE-AF) 0.05 % external cream Apply to AA BID x 1-2 weeks then PRN only. Do not apply to face 50 g 2     biotin 5 MG CAPS Take 5,000 mcg by mouth daily At noon       Calcium Citrate-Vitamin D (CALCIUM CITRATE CHEWY BITE PO) Take 500 mg by mouth 3 times daily Plus D,noon, supper, HS; Bariatric Advantage calcium citrate 500 mcg/vitamin D 500 international unit(s) per chew       Multiple Vitamins-Minerals (BARIATRIC MULTIVITAMINS/IRON PO) Take 1 capsule by mouth daily Bariatric Advantage Ultra Solo with iron       nystatin-triamcinolone (MYCOLOG II) 419422-2.1 UNIT/GM-% external cream Apply to affected area BID up to 7 days prn rash 30 g 3     phentermine (ADIPEX-P) 15 MG capsule Take 1 capsule in the morning. 90 capsule 0     polyethylene glycol (MIRALAX/GLYCOLAX) packet Take 17 g by mouth daily 90 packet 3     Semaglutide-Weight Management (WEGOVY) 1.7 MG/0.75ML pen Inject 1.7 mg Subcutaneous once a week 3 mL 0     Semaglutide-Weight Management (WEGOVY) 2.4 MG/0.75ML pen Inject 2.4 mg Subcutaneous once a week 9 mL 0     vitamin D3 (CHOLECALCIFEROL) 50 mcg (2000 units) tablet Take 1 capsule by mouth every morning        albuterol (PROAIR HFA/PROVENTIL HFA/VENTOLIN HFA) 108 (90 Base) MCG/ACT inhaler Inhale 2 puffs into the lungs every 6 hours (Patient not taking: Reported on 7/18/2023) 18 g 0     insulin pen needle (31G X 5 MM) 31G X 5 MM miscellaneous Use 1 pen needles daily or as directed. (Patient not taking: Reported on 7/18/2023) 100 each 11     insulin pen needle (32G X 6 MM) 32G X 6 MM miscellaneous  Use 1 pen needles daily or as directed with Saxenda. Do not fill if Saxenda not covered. (Patient not taking: Reported on 7/18/2023) 100 each 1     liraglutide - Weight Management (SAXENDA) 18 MG/3ML pen Take up to 3 mg daily as directed. (Patient not taking: Reported on 7/18/2023) 45 mL 0     nitroGLYcerin (NITRO-BID) 2 % OINT ointment Place 0.5 inches (7.5 mg) onto the skin daily as needed (Apply 0.5in to tips of fingers once daily prior to cold exposure) (Patient not taking: Reported on 7/18/2023) 60 g 1     nystatin (MYCOSTATIN) 419060 UNIT/GM external powder Apply topically 2 times daily as needed (rash) (Patient not taking: Reported on 7/18/2023) 60 g 11       Allergies   Allergen Reactions     Nsaids Other (See Comments)     Had gastric bypass - needs to avoid NSAIDS     Clindamycin Diarrhea     Morphine And Related Nausea and Vomiting     Shellfish Allergy         Social History     Tobacco Use     Smoking status: Never     Smokeless tobacco: Never   Substance Use Topics     Alcohol use: Not Currently     Comment: occasional     History   Drug Use No         Objective     BP 90/60 (BP Location: Right arm, Patient Position: Sitting, Cuff Size: Adult Large)   Pulse 81   Temp 97.4  F (36.3  C) (Tympanic)   Resp 18   Wt 94.1 kg (207 lb 8 oz)   LMP 10/01/2016 (Approximate)   SpO2 97%   BMI 34.53 kg/m      Physical Exam    GENERAL APPEARANCE: healthy, alert and no distress     EYES: EOMI,  PERRL     HENT: ear canals and TM's normal and nose and mouth without ulcers or lesions     NECK: no adenopathy, no asymmetry, masses, or scars and thyroid normal to palpation     RESP: lungs clear to auscultation - no rales, rhonchi or wheezes     CV: regular rates and rhythm, normal S1 S2, no S3 or S4 and no murmur, click or rub     ABDOMEN:  soft, nontender, no HSM or masses and bowel sounds normal     MS: extremities normal- no gross deformities noted, no evidence of inflammation in joints, FROM in all extremities.      SKIN: cyst left groin without any drainage or surrounding erythema     NEURO: Normal strength and tone, sensory exam grossly normal, mentation intact and speech normal     PSYCH: mentation appears normal. and affect normal/bright     LYMPHATICS: No cervical adenopathy    Recent Labs   Lab Test 12/29/22  1117 04/19/22  0917 04/14/22  0837 01/10/22  1215 10/25/21  1328   HGB 14.3 13.8  --    < >  --     324  --    < >  --    NA  --   --  139  --  142   POTASSIUM  --   --  3.9  --  3.9   CR  --   --  0.80  --  0.81   A1C  --   --  5.4  --   --     < > = values in this interval not displayed.        Diagnostics:  Recent Results (from the past 24 hour(s))   CBC with platelets    Collection Time: 07/18/23  9:37 AM   Result Value Ref Range    WBC Count 6.3 4.0 - 11.0 10e3/uL    RBC Count 4.52 3.80 - 5.20 10e6/uL    Hemoglobin 13.5 11.7 - 15.7 g/dL    Hematocrit 42.6 35.0 - 47.0 %    MCV 94 78 - 100 fL    MCH 29.9 26.5 - 33.0 pg    MCHC 31.7 31.5 - 36.5 g/dL    RDW 13.0 10.0 - 15.0 %    Platelet Count 336 150 - 450 10e3/uL      No EKG required, no history of coronary heart disease, significant arrhythmia, peripheral arterial disease or other structural heart disease.    Revised Cardiac Risk Index (RCRI):  The patient has the following serious cardiovascular risks for perioperative complications:   - No serious cardiac risks = 0 points   - walking 5 miles per day!    RCRI Interpretation: 0 points: Class I (very low risk - 0.4% complication rate)           Signed Electronically by: Kelly Bedoya MD  Copy of this evaluation report is provided to requesting physician.

## 2023-07-18 NOTE — PATIENT INSTRUCTIONS
Hold Wegovy starting 1 week before    Tylenol ok    Hold phentermine morning of procedure          For informational purposes only. Not to replace the advice of your health care provider. Copyright   2003, 2019 [employer] [facility]. All rights reserved. Clinically reviewed by Lucy Chase MD. [employer] [id number] - REV .  Preparing for Your Surgery  Getting started  A nurse will call you to review your health history and instructions. They will give you an arrival time based on your scheduled surgery time. Please be ready to share:  Your doctor's clinic name and phone number  Your medical, surgical, and anesthesia history  A list of allergies and sensitivities  A list of medicines, including herbal treatments and over-the-counter drugs  Whether the patient has a legal guardian (ask how to send us the papers in advance)  Please tell us if you're pregnant--or if there's any chance you might be pregnant. Some surgeries may injure a fetus (unborn baby), so they require a pregnancy test. Surgeries that are safe for a fetus don't always need a test, and you can choose whether to have one.   If you have a child who's having surgery, please ask for a copy of Preparing for Your Child's Surgery.    Preparing for surgery  Within 10 to 30 days of surgery: Have a pre-op exam (sometimes called an H&P, or History and Physical). This can be done at a clinic or pre-operative center.  If you're having a , you may not need this exam. Talk to your care team.  At your pre-op exam, talk to your care team about all medicines you take. If you need to stop any medicines before surgery, ask when to start taking them again.  We do this for your safety. Many medicines can make you bleed too much during surgery. Some change how well surgery (anesthesia) drugs work.  Call your insurance company to let them know you're having surgery. (If you don't have insurance, call [phone].)  Call your clinic if there's any change in your  health. This includes signs of a cold or flu (sore throat, runny nose, cough, rash, fever). It also includes a scrape or scratch near the surgery site.  If you have questions on the day of surgery, call your hospital or surgery center.  Eating and drinking guidelines  For your safety: Unless your surgeon tells you otherwise, follow the guidelines below.  Eat and drink as usual until 8 hours before you arrive for surgery. After that, no food or milk.  Drink clear liquids until 2 hours before you arrive. These are liquids you can see through, like water, Gatorade, and Propel Water. They also include plain black coffee and tea (no cream or milk), candy, and breath mints. You can spit out gum when you arrive.  If you drink alcohol: Stop drinking it the night before surgery.  If your care team tells you to take medicine on the morning of surgery, it's okay to take it with a sip of water.  Preventing infection  Shower or bathe the night before and morning of your surgery. Follow the instructions your clinic gave you. (If no instructions, use regular soap.)  Don't shave or clip hair near your surgery site. We'll remove the hair if needed.  Don't smoke or vape the morning of surgery. You may chew nicotine gum up to 2 hours before surgery. A nicotine patch is okay.  Note: Some surgeries require you to completely quit smoking and nicotine. Check with your surgeon.  Your care team will make every effort to keep you safe from infection. We will:  Clean our hands often with soap and water (or an alcohol-based hand rub).  Clean the skin at your surgery site with a special soap that kills germs.  Give you a special gown to keep you warm. (Cold raises the risk of infection.)  Wear special hair covers, masks, gowns and gloves during surgery.  Give antibiotic medicine, if prescribed. Not all surgeries need antibiotics.  What to bring on the day of surgery  Photo ID and insurance card  Copy of your health care directive, if you have  one  Glasses and hearing aids (bring cases)  You can't wear contacts during surgery  Inhaler and eye drops, if you use them (tell us about these when you arrive)  CPAP machine or breathing device, if you use them  A few personal items, if spending the night  If you have . . .  A pacemaker, ICD (cardiac defibrillator) or other implant: Bring the ID card.  An implanted stimulator: Bring the remote control.  A legal guardian: Bring a copy of the certified (court-stamped) guardianship papers.  Please remove any jewelry, including body piercings. Leave jewelry and other valuables at home.  If you're going home the day of surgery  You must have a responsible adult drive you home. They should stay with you overnight as well.  If you don't have someone to stay with you, and you aren't safe to go home alone, we may keep you overnight. Insurance often won't pay for this.  After surgery  If it's hard to control your pain or you need more pain medicine, please call your surgeon's office.  Questions?   If you have any questions for your care team, list them here: _________________________________________________________________________________________________________________________________________________________________________ ____________________________________ ____________________________________ ____________________________________

## 2023-07-27 NOTE — PROGRESS NOTES
"  Ana is a 53 year old who is being evaluated via a billable video visit.      The patient has been notified of following:     \"This video visit will be conducted via a call between you and your physician/provider. We have found that certain health care needs can be provided without the need for an in-person physical exam.  This service lets us provide the care you need with a video conversation.  If a prescription is necessary we can send it directly to your pharmacy.  If lab work is needed we can place an order for that and you can then stop by our lab to have the test done at a later time.    Video visits are billed at different rates depending on your insurance coverage.  Please reach out to your insurance provider with any questions.    If during the course of the call the physician/provider feels a video visit is not appropriate, you will not be charged for this service.\"    Patient has given verbal consent for Video visit? Yes    How would you like to obtain your AVS? MyChart    If the video visit is dropped, the invitation should be resent by: MY CHART      Will anyone else be joining your video visit? No    I    Video-Visit Details    Type of service:  Video Visit    Video Start Time: 12:08 PM    Video End Time:11:35    Originating Location (pt. Location): Home    Distant Location (provider location):  [facility] [place]     Platform used for Video Visit: [facility] Bariatric Surgery Note    RE: Ana Wyman  MR#: [id number]  : 1970  VISIT DATE: 2023    Dear Kelly Bedoya,    I had the pleasure of seeing your patient, Ana Wyman, in my post-bariatric surgery assessment clinic.    Assessment & Plan   Problem List Items Addressed This Visit       Bariatric surgery status     RYGBS 2019 Nikunj         Malnutrition following gastrointestinal surgery     Continue taking recommended post-op vitamins.  Labs reviewed            Obesity due " to excess calories - Primary     Patient was congratulated on wt loss success thus far. Healthy habits to assist with further weight loss were discussed. Ana will continue the Wegovy and phentermine. She will hold these during her upcoming surgery and restart following.            Relevant Medications    Semaglutide-Weight Management (WEGOVY) 2.4 MG/0.75ML pen    Iron deficiency anemia following bariatric surgery     Anemia resolved. Continue iron supplement.  Will recheck labs in Jan.              PATIENT INSTRUCTIONS:  Continue Iron supplement daily.  Will check CBC and iron studies at annual appt in Jan.  Hold Semaglutide until following Plastic surgery on Aug 11th  Hold Phentermine 7 days before surgery.  Can restart day after.   Can restart at 1.7 mg dose for 1 week and then increase to 2.4 mg weekly  No lifting over 20 lbs for the first 3-4 weeks following surgery    Goals:  Remember your weight loss journey is a marathon not a race, be gentle on yourself especially during your recovery.  You are doing great.    FOLLOW-UP:    Please call [phone] to schedule your next visit in 3 months.    40 minutes spent on the date of the encounter doing chart review, history and exam, result review, counseling, developing plan of care, documentation, and further activities as noted        CHIEF COMPLAINT: Post-bariatric surgery follow-up    HISTORY OF PRESENT ILLNESS:      7/25/2023     8:58 AM   Questions Regarding Prior Weight Loss Surgery Reviewed With Patient   I had the following weight loss procedure Kay-en-y Gastric Bypass   What year was your surgery? 2019   How has your weight changed since your last visit? I have lost weight   Do you currently have any of the following None of the above   Do you have any concerns today? Post surgery things - food and exercise     INTERVAL HISTORY:  Ana returns for medical weight management follow up.  Last seen on 4/21/2023.  We switched her to Wegovy.  She is also  taking phentermine.  Wegovy is working well.  She is just finishing up to 1.7 mg dose.  Does feel she should move up to 2.4 mg    She has plastic surgery scheduled for Friday Aug 11th- breast reduction and excess arm skin removal.  Will have oral surgery the following week.  Has been exercising a lot.  Developed a stress fracture and hematoma.        Weight History:      7/25/2023     8:58 AM   --   What is your highest lifetime weight? 364   What is your lowest weight since surgery? (In pounds) 198     BARIATRIC METRICS:    Current Weight: 198 lb (89.8 kg)  Body mass index is 32.95 kg/m .   Wt change since last visit (lbs): -14  Cumulative weight loss (lbs): 141.3          7/25/2023     8:58 AM   Questions Regarding Co-Morbidities and Health Concerns Reviewed With Patient   Pre-diabetes Gone away   Diabetes II Never   High Blood Pressure Never   High cholesterol Never   Heartburn/Reflux Gone away   Sleep apnea Gone away   PCOS Never   Back pain Stayed the same   Joint pain Stayed the same   Lower leg swelling Gone away           7/25/2023     8:58 AM   Eating Habits   How many meals do you eat per day? 2   Do you snack between meals? Sometimes   How much food are you eating at each meal? 1/2 cup to 1 cup   Are you able to separate your meals and liquids by at least 30 minutes? Sometimes   Are you able to avoid liquid calories? Yes           7/25/2023     8:58 AM   Exercise Questions Reviewed With Patient   How often do you exercise? 3 to 4 times per week   What is the duration of your exercise (in minutes)? 60+ Minutes   What types of exercise do you do? walking   What keeps you from being more active? Lack of Time       Social History:      7/25/2023     8:58 AM   --   Are you smoking? No   Are you drinking alcohol? Yes   How much alcohol? Very occasionally       Medications:  Current Outpatient Medications   Medication    augmented betamethasone dipropionate (DIPROLENE-AF) 0.05 % external cream    biotin 5 MG  CAPS    Multiple Vitamins-Minerals (BARIATRIC MULTIVITAMINS/IRON PO)    nitroGLYcerin (NITRO-BID) 2 % OINT ointment    nystatin (MYCOSTATIN) 806876 UNIT/GM external powder    nystatin-triamcinolone (MYCOLOG II) 093929-7.1 UNIT/GM-% external cream    phentermine (ADIPEX-P) 15 MG capsule    polyethylene glycol (MIRALAX/GLYCOLAX) packet    Semaglutide-Weight Management (WEGOVY) 2.4 MG/0.75ML pen    vitamin D3 (CHOLECALCIFEROL) 50 mcg (2000 units) tablet    Calcium Citrate-Vitamin D (CALCIUM CITRATE CHEWY BITE PO)    insulin pen needle (31G X 5 MM) 31G X 5 MM miscellaneous    insulin pen needle (32G X 6 MM) 32G X 6 MM miscellaneous    liraglutide - Weight Management (SAXENDA) 18 MG/3ML pen     No current facility-administered medications for this visit.         7/25/2023     8:58 AM   --   Do you avoid NSAIDs such as (Ibuprofen, Aleve, Naproxen, Advil)? Yes       ROS:  GI:       7/25/2023     8:58 AM   --   Vomiting No   Diarrhea Yes   Constipation No   Swallowing trouble No   Abdominal pain Yes   Heartburn No     Skin:       7/25/2023     8:58 AM   BAR RBS ROS - SKIN   Rash in skin folds No     Psych:       7/25/2023     8:58 AM   --   Depression No   Anxiety No     Female Only:       7/25/2023     8:58 AM   BAR RBS ROS -    Female only None of the above   Stress urinary incontinence No       LABS/IMAGING/MEDICAL RECORDS REVIEW:   Hemoglobin A1C   Date Value Ref Range Status   04/14/2022 5.4 0.0 - 5.6 % Final     Comment:     Normal <5.7%   Prediabetes 5.7-6.4%    Diabetes 6.5% or higher     Note: Adopted from ADA consensus guidelines.     Vitamin D, Total (25-Hydroxy)   Date Value Ref Range Status   12/29/2022 43 20 - 75 ug/L Final     Parathyroid Hormone Intact   Date Value Ref Range Status   12/29/2022 30 15 - 65 pg/mL Final     Vitamin B12   Date Value Ref Range Status   12/29/2022 738 232 - 1,245 pg/mL Final     Hemoglobin   Date Value Ref Range Status   07/18/2023 13.5 11.7 - 15.7 g/dL Final     Ferritin   Date  "Value Ref Range Status   12/29/2022 54 11 - 328 ng/mL Final     Iron   Date Value Ref Range Status   12/29/2022 64 37 - 145 ug/dL Final     Iron Binding Capacity   Date Value Ref Range Status   12/29/2022 327 240 - 430 ug/dL Final     Iron Sat Index   Date Value Ref Range Status   12/29/2022 20 15 - 46 % Final   07/19/2022 14 (L) 15 - 46 % Final   04/19/2022 24 15 - 46 % Final     Vitamin A   Date Value Ref Range Status   12/29/2022 0.62 0.30 - 1.20 mg/L Final       PHYSICAL EXAMINATION:  Ht 5' 5\" (1.651 m)   Wt 198 lb (89.8 kg)   LMP 10/01/2016 (Approximate)   BMI 32.95 kg/m    GENERAL: Healthy, alert and no distress  EYES: Eyes grossly normal to inspection.  No discharge or erythema, or obvious scleral/conjunctival abnormalities.  RESP: No audible wheeze, cough, or visible cyanosis.  No visible retractions or increased work of breathing.    SKIN: Visible skin clear. No significant rash, abnormal pigmentation or lesions.  NEURO: Cranial nerves grossly intact.  Mentation and speech appropriate for age.  PSYCH: Mentation appears normal, affect normal/bright, judgement and insight intact, normal speech and appearance well-groomed.    COUNSELING PROVIDED:  We reviewed the important post op bariatric recommendations:  eating 3 meals daily  eating protein first, getting >60gm protein daily  eating slowly, chewing food well  avoiding/limiting calorie containing beverages  avoiding fluids 30 minutes before, during, and after meals  limiting restaurant or cafeteria eating to twice a week or less  Pt reminded to avoid marginal ulcers she should avoid tobacco at all, alcohol in excess, caffeine, and NSAIDS (unless indicated for cardioprotection or otherwise and opposed by a PPI).  Pt encouraged to establish and maintain a consistent physical activity routine, 6-8 hours of restorative sleep each night and optimal stress management.  Pt counseled on the importance of life long vitamin supplementation and life long follow " up.    Sincerely,    Sarah Stacy PA-C

## 2023-07-31 ENCOUNTER — PATIENT OUTREACH (OUTPATIENT)
Dept: CARE COORDINATION | Facility: CLINIC | Age: 53
End: 2023-07-31

## 2023-08-01 ENCOUNTER — VIRTUAL VISIT (OUTPATIENT)
Dept: SURGERY | Facility: CLINIC | Age: 53
End: 2023-08-01
Payer: COMMERCIAL

## 2023-08-01 VITALS — HEIGHT: 65 IN | BODY MASS INDEX: 32.99 KG/M2 | WEIGHT: 198 LBS

## 2023-08-01 DIAGNOSIS — K95.89 IRON DEFICIENCY ANEMIA FOLLOWING BARIATRIC SURGERY: ICD-10-CM

## 2023-08-01 DIAGNOSIS — E66.811 CLASS 1 OBESITY DUE TO EXCESS CALORIES WITH SERIOUS COMORBIDITY AND BODY MASS INDEX (BMI) OF 32.0 TO 32.9 IN ADULT: Primary | ICD-10-CM

## 2023-08-01 DIAGNOSIS — K91.2 MALNUTRITION FOLLOWING GASTROINTESTINAL SURGERY: ICD-10-CM

## 2023-08-01 DIAGNOSIS — Z98.84 BARIATRIC SURGERY STATUS: ICD-10-CM

## 2023-08-01 DIAGNOSIS — D50.8 IRON DEFICIENCY ANEMIA FOLLOWING BARIATRIC SURGERY: ICD-10-CM

## 2023-08-01 DIAGNOSIS — E66.09 CLASS 1 OBESITY DUE TO EXCESS CALORIES WITH SERIOUS COMORBIDITY AND BODY MASS INDEX (BMI) OF 32.0 TO 32.9 IN ADULT: Primary | ICD-10-CM

## 2023-08-01 PROCEDURE — 99215 OFFICE O/P EST HI 40 MIN: CPT | Mod: VID | Performed by: PHYSICIAN ASSISTANT

## 2023-08-01 RX ORDER — SEMAGLUTIDE 2.4 MG/.75ML
2.4 INJECTION, SOLUTION SUBCUTANEOUS WEEKLY
Qty: 9 ML | Refills: 0 | Status: SHIPPED | OUTPATIENT
Start: 2023-08-01 | End: 2023-10-12

## 2023-08-01 NOTE — ASSESSMENT & PLAN NOTE
Patient was congratulated on wt loss success thus far. Healthy habits to assist with further weight loss were discussed. Ana will continue the Wegovy and phentermine. She will hold these during her upcoming surgery and restart following.

## 2023-08-01 NOTE — PATIENT INSTRUCTIONS
"To ensure quality you may receive a patient satisfaction survey. The greatest compliment you can give is \"Likely to Recommend.\"    Nice to talk with you today.  Thank you for your trust. Below is the plan discussed.-  DORIAN Smith      Plan:  Continue Iron supplement daily.  Will check CBC and iron studies at annual appt in Jan.  Hold Semaglutide until following Plastic surgery on Aug 11th  Hold Phentermine 7 days before surgery.  Can restart day after.   Can restart at 1.7 mg dose for 1 week and then increase to 2.4 mg weekly  No lifting over 20 lbs for the first 3-4 weeks following surgery    Goals:  Remember your weight loss journey is a marathon not a race, be gentle on yourself espicialy during your recovery.  You are doing great.    FOLLOW-UP:    Please call [phone] to schedule your next visit in 3 months.    MAINTAIN WEIGHT and OPTIMIZE YOUR METABOLISM     According to the National Weight Control Registry there are several things that people who have lost weight and kept it off have in common. Some of them are...    3 MEALS A DAY:  Make sure you are eating 3 meals each day.  Skipping breakfast, working through your lunch, or not eating dinner will lead to a slowing of your metabolism.  Studies show that 80% of people who skip meals are overweight or obese. Avoid \"mindless\" eating, i.e., eating at the TV, and the car, in front of the computer.    ADEQUATE PROTEIN INTAKE: Getting adequate protein is beneficial for a number of reasons: to aid the healing process, to blunt cravings immediately after eating and for a period of time after eating, to help keep blood sugars level, and to help you maintain your muscle mass.     HIGH FIBER/LOW FAT:  Lean sources of protein (skim milk, skinless, baked or broiled chicken breast, fish, etc.)  will help you meet your protein needs while fruits, vegetables, and whole grains will help you get the fiber that your body needs.  This is heart healthy eating and helps to " "keep calorie levels in balance.    FOOD DIARY:  Much like keeping a ledger for your checkbook, keeping a food and exercise diary helps you \"keep track\" of the balance of energy (calories) in and energy out. It also helps you recognize potential unhealthy deviations from healthy patterns before they become habit. It's a nice way to monitor whether you are getting the protein, fiber, and other nutrients that your body needs.    MOVE EVERY DAY:  It is essential to get your steps in every day, 7 days a week.  You don't have to \"work out \" 7 days a week, but throughout the day, getting 8000 steps will help you maintain the weight you have lost.  Parking far away, taking the stairs instead of the elevator, and pacing while on the phone are some ways to help achieve this goal.    MUSCLE MAINTENANCE: Muscle burns calories up to 70% better than fat.  As we age her body composition changes. We lose muscle mass.  Weight training can help us keep and even build muscle mass.  Dumbbells, pushups, rubber band training, weight machines are all examples of ways to keep and/or build muscle mass.    FOLLOW-UP:  Studies show that those who follow up with their health professional regularly maintained their weight loss and those who are \"lost to follow-up \"are at risk for regain.  Moreover, it is essential to monitor vitamin levels with laboratory studies for life following gastric bypass surgery.     EAT AT HOME:  People who maintain a healthy weight eat at home, or meal prepared at home, 90% of the time.  Studies show that people consume an average of 770 juanita when eating out at a restaurant and 440 juanita when eating a meal prepared at home.  This equates to almost 35 pounds of excess weight for a person who eats out once a day for 1 year.               "

## 2023-08-10 ENCOUNTER — ANESTHESIA EVENT (OUTPATIENT)
Dept: SURGERY | Facility: CLINIC | Age: 53
End: 2023-08-10
Payer: COMMERCIAL

## 2023-08-11 ENCOUNTER — ANESTHESIA (OUTPATIENT)
Dept: SURGERY | Facility: CLINIC | Age: 53
End: 2023-08-11
Payer: COMMERCIAL

## 2023-08-11 ENCOUNTER — HOSPITAL ENCOUNTER (OUTPATIENT)
Facility: CLINIC | Age: 53
Discharge: HOME | End: 2023-08-11
Attending: PLASTIC SURGERY | Admitting: PLASTIC SURGERY
Payer: COMMERCIAL

## 2023-08-11 VITALS
TEMPERATURE: 97.3 F | WEIGHT: 202.3 LBS | HEIGHT: 65 IN | BODY MASS INDEX: 33.7 KG/M2 | DIASTOLIC BLOOD PRESSURE: 71 MMHG | SYSTOLIC BLOOD PRESSURE: 100 MMHG | OXYGEN SATURATION: 96 % | HEART RATE: 75 BPM | RESPIRATION RATE: 16 BRPM

## 2023-08-11 DIAGNOSIS — N62 MACROMASTIA: Primary | ICD-10-CM

## 2023-08-11 PROCEDURE — 710N000012 HC RECOVERY PHASE 2, PER MINUTE: Performed by: PLASTIC SURGERY

## 2023-08-11 PROCEDURE — 999N000141 HC STATISTIC PRE-PROCEDURE NURSING ASSESSMENT: Performed by: PLASTIC SURGERY

## 2023-08-11 PROCEDURE — 272N000001 HC OR GENERAL SUPPLY STERILE: Performed by: PLASTIC SURGERY

## 2023-08-11 PROCEDURE — 250N000009 HC RX 250: Performed by: NURSE ANESTHETIST, CERTIFIED REGISTERED

## 2023-08-11 PROCEDURE — 250N000012 HC RX MED GY IP 250 OP 636 PS 637: Performed by: ANESTHESIOLOGY

## 2023-08-11 PROCEDURE — 250N000011 HC RX IP 250 OP 636: Performed by: ANESTHESIOLOGY

## 2023-08-11 PROCEDURE — 250N000013 HC RX MED GY IP 250 OP 250 PS 637: Performed by: ANESTHESIOLOGY

## 2023-08-11 PROCEDURE — 250N000011 HC RX IP 250 OP 636: Performed by: NURSE ANESTHETIST, CERTIFIED REGISTERED

## 2023-08-11 PROCEDURE — 258N000003 HC RX IP 258 OP 636: Performed by: PLASTIC SURGERY

## 2023-08-11 PROCEDURE — 250N000011 HC RX IP 250 OP 636: Performed by: PLASTIC SURGERY

## 2023-08-11 PROCEDURE — 250N000009 HC RX 250: Performed by: ANESTHESIOLOGY

## 2023-08-11 PROCEDURE — 710N000009 HC RECOVERY PHASE 1, LEVEL 1, PER MIN: Performed by: PLASTIC SURGERY

## 2023-08-11 PROCEDURE — 88305 TISSUE EXAM BY PATHOLOGIST: CPT | Mod: TC | Performed by: PLASTIC SURGERY

## 2023-08-11 PROCEDURE — 250N000011 HC RX IP 250 OP 636: Mod: JZ | Performed by: NURSE ANESTHETIST, CERTIFIED REGISTERED

## 2023-08-11 PROCEDURE — 250N000009 HC RX 250: Performed by: PLASTIC SURGERY

## 2023-08-11 PROCEDURE — 250N000013 HC RX MED GY IP 250 OP 250 PS 637: Performed by: PLASTIC SURGERY

## 2023-08-11 PROCEDURE — 258N000003 HC RX IP 258 OP 636: Performed by: ANESTHESIOLOGY

## 2023-08-11 PROCEDURE — 88305 TISSUE EXAM BY PATHOLOGIST: CPT | Mod: 26 | Performed by: PATHOLOGY

## 2023-08-11 PROCEDURE — 258N000003 HC RX IP 258 OP 636: Performed by: NURSE ANESTHETIST, CERTIFIED REGISTERED

## 2023-08-11 PROCEDURE — 370N000017 HC ANESTHESIA TECHNICAL FEE, PER MIN: Performed by: PLASTIC SURGERY

## 2023-08-11 PROCEDURE — 360N000076 HC SURGERY LEVEL 3, PER MIN: Performed by: PLASTIC SURGERY

## 2023-08-11 RX ORDER — HEPARIN SODIUM 5000 [USP'U]/.5ML
5000 INJECTION, SOLUTION INTRAVENOUS; SUBCUTANEOUS
Status: COMPLETED | OUTPATIENT
Start: 2023-08-11 | End: 2023-08-11

## 2023-08-11 RX ORDER — HYDROMORPHONE HCL IN WATER/PF 6 MG/30 ML
0.4 PATIENT CONTROLLED ANALGESIA SYRINGE INTRAVENOUS EVERY 5 MIN PRN
Status: DISCONTINUED | OUTPATIENT
Start: 2023-08-11 | End: 2023-08-11 | Stop reason: HOSPADM

## 2023-08-11 RX ORDER — APREPITANT 40 MG/1
40 CAPSULE ORAL ONCE
Status: COMPLETED | OUTPATIENT
Start: 2023-08-11 | End: 2023-08-11

## 2023-08-11 RX ORDER — ONDANSETRON 2 MG/ML
INJECTION INTRAMUSCULAR; INTRAVENOUS PRN
Status: DISCONTINUED | OUTPATIENT
Start: 2023-08-11 | End: 2023-08-11

## 2023-08-11 RX ORDER — SODIUM CHLORIDE, SODIUM LACTATE, POTASSIUM CHLORIDE, CALCIUM CHLORIDE 600; 310; 30; 20 MG/100ML; MG/100ML; MG/100ML; MG/100ML
INJECTION, SOLUTION INTRAVENOUS CONTINUOUS
Status: DISCONTINUED | OUTPATIENT
Start: 2023-08-11 | End: 2023-08-11 | Stop reason: HOSPADM

## 2023-08-11 RX ORDER — BUPIVACAINE HYDROCHLORIDE 2.5 MG/ML
INJECTION, SOLUTION EPIDURAL; INFILTRATION; INTRACAUDAL
Status: DISCONTINUED
Start: 2023-08-11 | End: 2023-08-11 | Stop reason: HOSPADM

## 2023-08-11 RX ORDER — CEFAZOLIN SODIUM/WATER 2 G/20 ML
2 SYRINGE (ML) INTRAVENOUS SEE ADMIN INSTRUCTIONS
Status: DISCONTINUED | OUTPATIENT
Start: 2023-08-11 | End: 2023-08-11 | Stop reason: HOSPADM

## 2023-08-11 RX ORDER — ONDANSETRON 2 MG/ML
4 INJECTION INTRAMUSCULAR; INTRAVENOUS EVERY 30 MIN PRN
Status: DISCONTINUED | OUTPATIENT
Start: 2023-08-11 | End: 2023-08-11 | Stop reason: HOSPADM

## 2023-08-11 RX ORDER — HYDROMORPHONE HCL IN WATER/PF 6 MG/30 ML
0.2 PATIENT CONTROLLED ANALGESIA SYRINGE INTRAVENOUS EVERY 5 MIN PRN
Status: DISCONTINUED | OUTPATIENT
Start: 2023-08-11 | End: 2023-08-11 | Stop reason: HOSPADM

## 2023-08-11 RX ORDER — SCOLOPAMINE TRANSDERMAL SYSTEM 1 MG/1
1 PATCH, EXTENDED RELEASE TRANSDERMAL ONCE
Status: DISCONTINUED | OUTPATIENT
Start: 2023-08-11 | End: 2023-08-11 | Stop reason: HOSPADM

## 2023-08-11 RX ORDER — SODIUM CHLORIDE, SODIUM LACTATE, POTASSIUM CHLORIDE, CALCIUM CHLORIDE 600; 310; 30; 20 MG/100ML; MG/100ML; MG/100ML; MG/100ML
INJECTION, SOLUTION INTRAVENOUS CONTINUOUS PRN
Status: DISCONTINUED | OUTPATIENT
Start: 2023-08-11 | End: 2023-08-11

## 2023-08-11 RX ORDER — HYDROCODONE BITARTRATE AND ACETAMINOPHEN 5; 325 MG/1; MG/1
1 TABLET ORAL ONCE
Status: COMPLETED | OUTPATIENT
Start: 2023-08-11 | End: 2023-08-11

## 2023-08-11 RX ORDER — FENTANYL CITRATE 50 UG/ML
INJECTION, SOLUTION INTRAMUSCULAR; INTRAVENOUS PRN
Status: DISCONTINUED | OUTPATIENT
Start: 2023-08-11 | End: 2023-08-11

## 2023-08-11 RX ORDER — DEXAMETHASONE SODIUM PHOSPHATE 4 MG/ML
INJECTION, SOLUTION INTRA-ARTICULAR; INTRALESIONAL; INTRAMUSCULAR; INTRAVENOUS; SOFT TISSUE PRN
Status: DISCONTINUED | OUTPATIENT
Start: 2023-08-11 | End: 2023-08-11

## 2023-08-11 RX ORDER — PROPOFOL 10 MG/ML
INJECTION, EMULSION INTRAVENOUS CONTINUOUS PRN
Status: DISCONTINUED | OUTPATIENT
Start: 2023-08-11 | End: 2023-08-11

## 2023-08-11 RX ORDER — PROPOFOL 10 MG/ML
INJECTION, EMULSION INTRAVENOUS PRN
Status: DISCONTINUED | OUTPATIENT
Start: 2023-08-11 | End: 2023-08-11

## 2023-08-11 RX ORDER — LIDOCAINE 40 MG/G
CREAM TOPICAL
Status: DISCONTINUED | OUTPATIENT
Start: 2023-08-11 | End: 2023-08-11 | Stop reason: HOSPADM

## 2023-08-11 RX ORDER — ONDANSETRON 4 MG/1
4 TABLET, ORALLY DISINTEGRATING ORAL EVERY 30 MIN PRN
Status: DISCONTINUED | OUTPATIENT
Start: 2023-08-11 | End: 2023-08-11 | Stop reason: HOSPADM

## 2023-08-11 RX ORDER — BUPIVACAINE HYDROCHLORIDE 2.5 MG/ML
INJECTION, SOLUTION INFILTRATION; PERINEURAL PRN
Status: DISCONTINUED | OUTPATIENT
Start: 2023-08-11 | End: 2023-08-11 | Stop reason: HOSPADM

## 2023-08-11 RX ORDER — CEFAZOLIN SODIUM/WATER 2 G/20 ML
2 SYRINGE (ML) INTRAVENOUS
Status: COMPLETED | OUTPATIENT
Start: 2023-08-11 | End: 2023-08-11

## 2023-08-11 RX ORDER — FENTANYL CITRATE 0.05 MG/ML
50 INJECTION, SOLUTION INTRAMUSCULAR; INTRAVENOUS EVERY 5 MIN PRN
Status: DISCONTINUED | OUTPATIENT
Start: 2023-08-11 | End: 2023-08-11 | Stop reason: HOSPADM

## 2023-08-11 RX ORDER — HYDROCODONE BITARTRATE AND ACETAMINOPHEN 5; 325 MG/1; MG/1
1 TABLET ORAL EVERY 4 HOURS PRN
Qty: 20 TABLET | Refills: 0 | Status: SHIPPED | OUTPATIENT
Start: 2023-08-11 | End: 2023-08-16

## 2023-08-11 RX ORDER — ACETAMINOPHEN 500 MG
1000 TABLET ORAL ONCE
Status: COMPLETED | OUTPATIENT
Start: 2023-08-11 | End: 2023-08-11

## 2023-08-11 RX ORDER — LIDOCAINE HYDROCHLORIDE 20 MG/ML
INJECTION, SOLUTION INFILTRATION; PERINEURAL PRN
Status: DISCONTINUED | OUTPATIENT
Start: 2023-08-11 | End: 2023-08-11

## 2023-08-11 RX ORDER — FENTANYL CITRATE 0.05 MG/ML
25 INJECTION, SOLUTION INTRAMUSCULAR; INTRAVENOUS EVERY 5 MIN PRN
Status: DISCONTINUED | OUTPATIENT
Start: 2023-08-11 | End: 2023-08-11 | Stop reason: HOSPADM

## 2023-08-11 RX ORDER — MAGNESIUM HYDROXIDE 1200 MG/15ML
LIQUID ORAL PRN
Status: DISCONTINUED | OUTPATIENT
Start: 2023-08-11 | End: 2023-08-11 | Stop reason: HOSPADM

## 2023-08-11 RX ADMIN — MIDAZOLAM 2 MG: 1 INJECTION INTRAMUSCULAR; INTRAVENOUS at 07:31

## 2023-08-11 RX ADMIN — SUGAMMADEX 200 MG: 100 INJECTION, SOLUTION INTRAVENOUS at 11:46

## 2023-08-11 RX ADMIN — PROPOFOL 100 MG: 10 INJECTION, EMULSION INTRAVENOUS at 10:58

## 2023-08-11 RX ADMIN — ROCURONIUM BROMIDE 50 MG: 50 INJECTION, SOLUTION INTRAVENOUS at 07:37

## 2023-08-11 RX ADMIN — SODIUM CHLORIDE, POTASSIUM CHLORIDE, SODIUM LACTATE AND CALCIUM CHLORIDE: 600; 310; 30; 20 INJECTION, SOLUTION INTRAVENOUS at 06:43

## 2023-08-11 RX ADMIN — Medication 2 G: at 07:31

## 2023-08-11 RX ADMIN — SODIUM CHLORIDE, POTASSIUM CHLORIDE, SODIUM LACTATE AND CALCIUM CHLORIDE: 600; 310; 30; 20 INJECTION, SOLUTION INTRAVENOUS at 07:43

## 2023-08-11 RX ADMIN — LIDOCAINE HYDROCHLORIDE 80 MG: 20 INJECTION, SOLUTION INFILTRATION; PERINEURAL at 07:37

## 2023-08-11 RX ADMIN — ONDANSETRON 4 MG: 2 INJECTION INTRAMUSCULAR; INTRAVENOUS at 11:42

## 2023-08-11 RX ADMIN — PHENYLEPHRINE HYDROCHLORIDE 0.2 MCG/KG/MIN: 10 INJECTION INTRAVENOUS at 08:21

## 2023-08-11 RX ADMIN — FENTANYL CITRATE 25 MCG: 50 INJECTION, SOLUTION INTRAMUSCULAR; INTRAVENOUS at 12:38

## 2023-08-11 RX ADMIN — ACETAMINOPHEN 1000 MG: 500 TABLET ORAL at 06:40

## 2023-08-11 RX ADMIN — FENTANYL CITRATE 50 MCG: 50 INJECTION, SOLUTION INTRAMUSCULAR; INTRAVENOUS at 10:19

## 2023-08-11 RX ADMIN — PROPOFOL 200 MG: 10 INJECTION, EMULSION INTRAVENOUS at 07:37

## 2023-08-11 RX ADMIN — ROCURONIUM BROMIDE 20 MG: 50 INJECTION, SOLUTION INTRAVENOUS at 10:17

## 2023-08-11 RX ADMIN — ROCURONIUM BROMIDE 30 MG: 50 INJECTION, SOLUTION INTRAVENOUS at 08:21

## 2023-08-11 RX ADMIN — Medication 2 G: at 11:26

## 2023-08-11 RX ADMIN — FENTANYL CITRATE 25 MCG: 50 INJECTION, SOLUTION INTRAMUSCULAR; INTRAVENOUS at 12:49

## 2023-08-11 RX ADMIN — APREPITANT 40 MG: 40 CAPSULE ORAL at 06:59

## 2023-08-11 RX ADMIN — ROCURONIUM BROMIDE 10 MG: 50 INJECTION, SOLUTION INTRAVENOUS at 11:00

## 2023-08-11 RX ADMIN — HYDROCODONE BITARTRATE AND ACETAMINOPHEN 1 TABLET: 5; 325 TABLET ORAL at 14:24

## 2023-08-11 RX ADMIN — ONDANSETRON 4 MG: 2 INJECTION INTRAMUSCULAR; INTRAVENOUS at 13:35

## 2023-08-11 RX ADMIN — PHENYLEPHRINE HYDROCHLORIDE 150 MCG: 10 INJECTION INTRAVENOUS at 07:50

## 2023-08-11 RX ADMIN — PROPOFOL 200 MCG/KG/MIN: 10 INJECTION, EMULSION INTRAVENOUS at 07:40

## 2023-08-11 RX ADMIN — SCOPALAMINE 1 PATCH: 1 PATCH, EXTENDED RELEASE TRANSDERMAL at 07:00

## 2023-08-11 RX ADMIN — DEXAMETHASONE SODIUM PHOSPHATE 10 MG: 4 INJECTION, SOLUTION INTRA-ARTICULAR; INTRALESIONAL; INTRAMUSCULAR; INTRAVENOUS; SOFT TISSUE at 07:41

## 2023-08-11 RX ADMIN — FENTANYL CITRATE 100 MCG: 50 INJECTION, SOLUTION INTRAMUSCULAR; INTRAVENOUS at 08:02

## 2023-08-11 RX ADMIN — HEPARIN SODIUM 5000 UNITS: 5000 INJECTION, SOLUTION INTRAVENOUS; SUBCUTANEOUS at 07:00

## 2023-08-11 RX ADMIN — FENTANYL CITRATE 100 MCG: 50 INJECTION, SOLUTION INTRAMUSCULAR; INTRAVENOUS at 07:37

## 2023-08-11 ASSESSMENT — ACTIVITIES OF DAILY LIVING (ADL)
ADLS_ACUITY_SCORE: 35

## 2023-08-11 ASSESSMENT — ENCOUNTER SYMPTOMS: SEIZURES: 0

## 2023-08-11 NOTE — OP NOTE
PLASTIC SURGERY OPERATIVE NOTE  Patient Name:  Ana Wyman     Date of Service:  8/11/2023     PREOPERATIVE DIAGNOSES:   1.  Excess skin bilateral upper arm status post massive weight loss.    POSTOPERATIVE DIAGNOSES:   1.  Excess skin bilateral upper arm status post massive weight loss.    SURGEON:  Liliane Mayen MD   OR STAFF:  Circulator: Rigo Marin, MANISHA; Chirag Mcneal II, RN  Relief Circulator: SANTOS COELLO Person: Katiana Sarah     ANESTHESIA:  General     ESTIMATED BLOOD LOSS:  * No values recorded between 8/11/2023  8:02 AM and 8/11/2023 11:58 AM *    SPECIMEN(S):    ID Type Source Tests Collected by Time Destination   1 : Right Breast Tissue Tissue Breast, Right SURGICAL PATHOLOGY EXAM Liliane Mayen MD 8/11/2023 10:12 AM    2 :  Tissue Breast, Left SURGICAL PATHOLOGY EXAM Liliane Mayen MD 8/11/2023  8:07 AM       INDICATIONS: Is a 53-year-old female with massive weight loss now presents with excess skin and adipose tissue along the bilateral upper arms.  She presents today for cosmetic brachioplasty.  Procedure will be done at the same time she undergoes a medically necessary breast reduction.      PROCEDURES:   1.  Brachioplasty      DESCRIPTION OF PROCEDURE:  Was marked for incisions and taken to the operating room.  General anesthesia was administered.  The chest area and arms were prepped and draped in a sterile manner.  Patient underwent a breast reduction procedure.  Please see a separate dictation note.    The arms were then addressed.  On the left side, a posterior arm incision was made and dissection was carried down to underlying subcutaneous tissue.  Dissection was extended to below the superficial fascia and dissection was carried anteriorly to underlying the skin laxity.  Additional dissection was done posteriorly.  Excess skin was marked and then excised.  Hemostasis was achieved.  The incision was reapproximated erupted 2-0 PDS in the  superficial fascia followed interrupted 3-0 Monocryl in the subcutaneous tissue and a running 4-0 subcuticular Monocryl suture.    A similar procedure was completed on the right side, posterior arm incision was made and dissection was carried down to the underlying subcutaneous tissue.  Dissection was carried out below the superficial fascia both anteriorly and posteriorly and then excess skin/adipose tissue was excised.  Hemostasis was achieved.  The incision was reapproximated interrupted 2-0 PDS in the superficial fascia followed by interrupted 3-0 Monocryl in the subcutaneous tissue and a running 4-0 subcuticular Monocryl suture.  Steri-Strips were applied followed by ABDs and flexinet.    IMPLANTS:  * No implants in log *    FINDINGS:  Tissue weighed 400 gms.    MD [facility]   [phone]

## 2023-08-11 NOTE — INTERVAL H&P NOTE
"I have reviewed the surgical (or preoperative) H&P that is linked to this encounter, and examined the patient. There are no significant changes    Clinical Conditions Present on Arrival:  Clinically Significant Risk Factors Present on Admission                  # Obesity: Estimated body mass index is 33.66 kg/m  as calculated from the following:    Height as of this encounter: 1.651 m (5' 5\").    Weight as of this encounter: 91.8 kg (202 lb 4.8 oz).       "

## 2023-08-11 NOTE — ANESTHESIA POSTPROCEDURE EVALUATION
Patient: Ana Wyman    Procedure: Procedure(s):  MAMMOPLASTY, REDUCTION, BILATERAL  BILATERAL COSMETIC BRACHIOPLASTY       Anesthesia Type:  General    Note:  Disposition: Inpatient   Postop Pain Control: Uneventful            Sign Out: Well controlled pain   PONV: No   Neuro/Psych: Uneventful            Sign Out: Acceptable/Baseline neuro status   Airway/Respiratory: Uneventful            Sign Out: Acceptable/Baseline resp. status   CV/Hemodynamics: Uneventful            Sign Out: Acceptable CV status; No obvious hypovolemia; No obvious fluid overload   Other NRE:    DID A NON-ROUTINE EVENT OCCUR?            Last vitals:  Vitals Value Taken Time   /63 08/11/23 1249   Temp 36.3  C (97.4  F) 08/11/23 1200   Pulse 75 08/11/23 1255   Resp 18 08/11/23 1255   SpO2 95 % 08/11/23 1255       Electronically Signed By: Sarah Saavedra  August 11, 2023  2:41 PM

## 2023-08-11 NOTE — OR NURSING
Discharge instructions reviewed with LANA Brar Trey, brother and Verito, Mom in presence of pt. All questions answered. No further concerns at this point. Teach back method used to ensure patient and family/friend understanding. Narcotics sent home with seal in place. IV removed. All belongings returned. Pt voided without difficulty. Able to tolerate PO. Pt escorted out of hospital via wheelchair.

## 2023-08-11 NOTE — OP NOTE
PLASTIC SURGERY OPERATIVE NOTE  Patient Name:  Ana Wyman     Date of Service:  8/11/2023     PREOPERATIVE DIAGNOSES:   1.  Hypertrophy of breast [N62]      POSTOPERATIVE DIAGNOSES:   1.  Hypertrophy of breast [N62]     SURGEON:  Liliane Mayen MD   OR STAFF:  Circulator: Rigo Marin RN; Chirag Mcneal II, RN  Relief Circulator: SANTOS COELLO Person: Tyrel Katiana R     Anesthesia:  General     Estimated blood loss:  * No values recorded between 8/11/2023  8:02 AM and 8/11/2023 11:58 AM *    SPECIMEN(S):    ID Type Source Tests Collected by Time Destination   1 : Right Breast Tissue Tissue Breast, Right SURGICAL PATHOLOGY EXAM Liliane Mayen MD 8/11/2023 10:12 AM    2 :  Tissue Breast, Left SURGICAL PATHOLOGY EXAM Liliane Mayen MD 8/11/2023  8:07 AM         INDICATIONS: Patient is a 53-year-old female who was approved for medically necessary breast reduction procedure due to persistent upper neck and back pain.    PROCEDURE:  Bilateral breast reduction     DESCRIPTION OF PROCEDURE:  Ana Wyman was marked for incision and taken to the operating room.  General anesthesia was administered.  The chest area was prepped and draped in a sterile manner.  On the left side, the nipple areolar complex was resized to 42 mm and medial pedicle was designed with an 7 cm base.  This area was de-epithelialized with the breast under tension.  The medial pedicle was better defined. Remaining skin incisions were made.  Excess breast tissue was removed from the inferior and lateral aspects of the breast.  The breast was reapproximated placing 3-0 monocryl in the subcutaneous tissue 3 cm from the apex.  The medial and lateral pillars were then reapproximated interrupted 2-0 PDS suture. Hemostasis was achieved.      The patient was brought to the upright position and excess skin along the inframammary fold was marked. The new nipple areola complex position was marked.  The patient  was return to the supine position.     On the left side, the excess skin along the inframammary fold was sharply excised and hemostasis was achieved.  The incision was reapproximated with interrupted 3-0 Monocryl in the subcutaneous tissue followed by a running 4-0 subcuticular Monocryl suture.  The nipple areola complex was inset into a 38 mm opening.  The nipple areola complex was matured using interrupted 4-0 monocryl followed a running 5-0 monocryl suture.    On the right side, the nipple areolar complex was resized to 42 mm and medial pedicle was designed with an 7 cm base.  This area was de-epithelialized with the breast under tension.  Remaining skin incisions were made.  The medial pedicle was better defined. Remaining skin incisions were made.  Excess breast tissue was removed from the inferior and lateral aspects of the breast.  The breast was reapproximated placing 3-0 monocryl in the subcutaneous tissue 3 cm from the apex.  The medial and lateral pillars were then reapproximated interrupted 2-0 PDS suture. Hemostasis was achieved.      The patient was brought to the upright position and excess skin along the inframammary fold was marked. The new nipple areola complex position was marked.  The patient was return to the supine position.     On the right side, the excess skin along the inframammary fold was sharply excised and hemostasis was achieved.  The incision was reapproximated with interrupted 3-0 Monocryl in the subcutaneous tissue followed by a running 4-0 subcuticular Monocryl suture.  The nipple areola complex was inset into a 38 mm opening.  The nipple areola complex was matured using interrupted 4-0 monocryl followed by arunning 5-0 monocryl suture.    The incisions were dressing with xeroform and a light gauze and secured with an ace wrap.     FINDINGS: Breast tissue from the left breast weighed 490 grams and from the right breast 605 grams.      MD [facility]    [phone]

## 2023-08-11 NOTE — OR NURSING
Pt dressed, up in recliner and transported to Phase 2.   Denies nausea-treated prophylactically given previous history, 1 PIV left in place and IVF running. Scop patch in place, small sips clears given and tolerated.

## 2023-08-11 NOTE — BRIEF OP NOTE
[facility]    Brief Operative Note    Pre-operative diagnosis: Hypertrophy of breast [N62]  Post-operative diagnosis macromastia, skin laxity bilateral upper arms    Procedure: Procedure(s):  MAMMOPLASTY, REDUCTION, BILATERAL  BILATERAL COSMETIC BRACHIOPLASTY  Surgeon: Surgeon(s) and Role:     * Liliane Mayen MD - Primary  Anesthesia: General   Estimated Blood Loss: Less than 50 ml    Drains: None  Specimens:   ID Type Source Tests Collected by Time Destination   1 : Right Breast Tissue Tissue Breast, Right SURGICAL PATHOLOGY EXAM Liliane Mayen MD 8/11/2023 10:12 AM    2 :  Tissue Breast, Left SURGICAL PATHOLOGY EXAM Liliane Mayen MD 8/11/2023  8:07 AM      Findings:   None.  Complications: None.  Implants: * No implants in log *

## 2023-08-11 NOTE — ANESTHESIA PROCEDURE NOTES
Airway       Patient location during procedure: OR       Procedure Start/Stop Times: 8/11/2023 7:39 AM  Staff -        Anesthesiologist:  Sarah Saavedra       CRNA: Mayi Santiago APRN CRNA       Performed By: CRNA  Consent for Airway        Urgency: elective  Indications and Patient Condition       Indications for airway management: benjamin-procedural       Induction type:intravenous       Mask difficulty assessment: 1 - vent by mask    Final Airway Details       Final airway type: endotracheal airway       Successful airway: ETT - single  Endotracheal Airway Details        ETT size (mm): 7.0       Cuffed: yes       Successful intubation technique: direct laryngoscopy       DL Blade Type: Wilson 2       Grade View of Cords: 1       Adjucts: stylet       Position: Right       Measured from: lips       Secured at (cm): 22       Bite block used: None    Post intubation assessment        Placement verified by: capnometry, equal breath sounds and chest rise        Number of attempts at approach: 1       Number of other approaches attempted: 0       Secured with: pink tape       Ease of procedure: easy       Dentition: Intact and Unchanged    Medication(s) Administered   Medication Administration Time: 8/11/2023 7:39 AM

## 2023-08-11 NOTE — ANESTHESIA PREPROCEDURE EVALUATION
Anesthesia Pre-Procedure Evaluation    Patient: Ana Wyman   MRN: [id number] : 1970        Procedure : Procedure(s):  MAMMOPLASTY, REDUCTION, BILATERAL  COSMETIC BILATERAL BRACHIOPLASTY, EXCISION CYST LEFT GROIN          Past Medical History:   Diagnosis Date    Allergic rhinitis, cause unspecified     Cellulitis     2 episodes, one with hospitalization [facility]    Complication of anesthesia     MOTHER HAS HISTORY    DUB (dysfunctional uterine bleeding)     Neg EMB     Esophageal reflux     ongoing    Fibroids     Genital problems     Lichens Schlerosis    GERD (gastroesophageal reflux disease)     Hallux valgus (acquired)     History of steroid therapy     Inhalers, eye drops    Hypersomnia with sleep apnea, unspecified     using CPAP    Kidney stone 2018    2 stones    Lichen sclerosus 2011    Lymph edema 2010- present    Lymphedema bilateral with mixed lipedema. 2015    Lymphedema bilateral with mixed lipedema. 2015    Morbid obesity (H) 2013    Pneumonia     Years ago - a few times    PONV (postoperative nausea and vomiting)     Pre-diabetes     Tendonitis currently    Uncomplicated asthma     mild    Urinary tract infection     A few times in past 10 years    Vision disorder [id number]    Dry Eye    Vitamin D deficiency 2015      Past Surgical History:   Procedure Laterality Date    COLONOSCOPY  05/15/2013    Procedure: COLONOSCOPY;  Colonoscopy;  Surgeon: Kota Blanco MD;  Location:  GI    COLONOSCOPY N/A 10/21/2019    Procedure: COLONOSCOPY, with biopsies by cold forceps;  Surgeon: Lydia Vickers MD;  Location:  GI    COSMETIC ABDOMINOPLASTY Bilateral 2021    Procedure: COSMETIC BILATERAL BELT LIPECTOMY;  Surgeon: Wade Marquez MD;  Location: [facility] OR    GASTRIC BYPASS      GYN SURGERY  2016    Uterine Ablation    INCISION AND DRAINAGE TRUNK, COMBINED Left 2021    Procedure: EVACUATION OF HEMATOMA;  Surgeon: Jimmy  Wade CONRAD MD;  Location:  OR    LAPAROSCOPIC BYPASS GASTRIC, CHOLECYSTECTOMY, COMBINED N/A 01/28/2019    Procedure: LAPAROSCOPIC GASTRIC BYPASS;  Surgeon: Maykel Calvin MD;  Location:  OR    LAPAROSCOPIC CHOLECYSTECTOMY N/A 04/11/2019    Procedure: LAPAROSCOPIC CHOLECYSTECTOMY;  Surgeon: Maykel Calvin MD;  Location:  OR    lichen sclerosis      ORTHOPEDIC SURGERY      PANNICULECTOMY N/A 2/25/2021    Procedure: PANNICULECTOMY;  Surgeon: Wade Marquez MD;  Location:  OR    VASCULAR SURGERY  2015    Vienous closure on both legs    vein closure  12/2016    to prevent lymphedema    ZZ NONSPECIFIC PROCEDURE  1994    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    ZZC NONSPECIFIC PROCEDURE  1999,2001    Foot surgeries x 2 (bunionectomy)    ZZ NONSPECIFIC PROCEDURE  2000    hammer toes      Allergies   Allergen Reactions    Nsaids Other (See Comments)     Had gastric bypass - needs to avoid NSAIDS    Clindamycin Diarrhea    Morphine And Related Nausea and Vomiting    Shellfish Allergy       Social History     Tobacco Use    Smoking status: Never    Smokeless tobacco: Never   Substance Use Topics    Alcohol use: Not Currently     Comment: occasional      Wt Readings from Last 1 Encounters:   08/11/23 91.8 kg (202 lb 4.8 oz)        Anesthesia Evaluation            ROS/MED HX  ENT/Pulmonary:  - neg pulmonary ROS   (+)                     asthma        recent URI,       (-) sleep apnea and allergic rhinitis   Neurologic:    (-) no seizures and no CVA   Cardiovascular:       METS/Exercise Tolerance:     Hematologic:       Musculoskeletal:       GI/Hepatic:     (+) GERD,            liver disease,       Renal/Genitourinary:     (+) renal disease,             Endo:     (+)               Obesity,       Psychiatric/Substance Use:       Infectious Disease:       Malignancy:       Other:            Physical Exam    Airway        Mallampati: II   TM distance: > 3 FB   Neck ROM: full   Mouth opening: > 3  cm    Respiratory Devices and Support         Dental       (+) Completely normal teeth      Cardiovascular   cardiovascular exam normal          Pulmonary   pulmonary exam normal                OUTSIDE LABS:  CBC:   Lab Results   Component Value Date    WBC 6.3 07/18/2023    WBC 6.6 12/29/2022    HGB 13.5 07/18/2023    HGB 14.3 12/29/2022    HCT 42.6 07/18/2023    HCT 45.5 12/29/2022     07/18/2023     12/29/2022     BMP:   Lab Results   Component Value Date     07/18/2023     04/14/2022    POTASSIUM 4.1 07/18/2023    POTASSIUM 3.9 04/14/2022    CHLORIDE 103 07/18/2023    CHLORIDE 107 04/14/2022    CO2 26 07/18/2023    CO2 30 04/14/2022    BUN 13.8 07/18/2023    BUN 12 04/14/2022    CR 0.84 07/18/2023    CR 0.80 04/14/2022    GLC 83 07/18/2023     (H) 04/14/2022     COAGS:   Lab Results   Component Value Date    INR 0.98 10/27/2017     POC:   Lab Results   Component Value Date     (H) 02/28/2021    HCG Negative 04/11/2019    HCGS Negative 09/26/2013     HEPATIC:   Lab Results   Component Value Date    ALBUMIN 3.8 04/14/2022    PROTTOTAL 7.1 04/14/2022    ALT 26 04/14/2022    AST 18 04/14/2022    ALKPHOS 97 04/14/2022    BILITOTAL 0.7 04/14/2022     OTHER:   Lab Results   Component Value Date    LACT 1.4 03/16/2021    A1C 5.4 04/14/2022    SINGH 9.2 07/18/2023    LIPASE 232 03/16/2021    AMYLASE 56 03/22/2004    TSH 2.79 07/19/2022    T4 0.81 07/19/2022    CRP 27.2 (H) 10/14/2016    SED 18 12/15/2017       Anesthesia Plan    ASA Status:  2       Anesthesia Type: General.     - Airway: ETT   Induction: Intravenous.   Maintenance: Balanced.   Techniques and Equipment:     - Airway: Video-Laryngoscope     - Lines/Monitors: 2nd IV     Consents    Anesthesia Plan(s) and associated risks, benefits, and realistic alternatives discussed. Questions answered and patient/representative(s) expressed understanding.     - Discussed:     - Discussed with:  Patient            Postoperative  Care    Pain management: IV analgesics, Oral pain medications.   PONV prophylaxis: Ondansetron (or other 5HT-3), Dexamethasone or Solumedrol, Scopolamine patch, Background Propofol Infusion     Comments:    Other Comments: Emend 40mg po            Sarah Saavedra

## 2023-08-11 NOTE — DISCHARGE INSTRUCTIONS
Today you were given 975 mg of Tylenol at 0730. The recommended daily maximum dose is 4000 mg.     Discharge Instructions following Breast Surgery  [facility] Same Day Surgery    Diet:   Resume diet as tolerated.  Drink plenty of fluids to prevent constipation.    Activity:   Gentle rotation & stretching of your arms/shoulders will prevent stiffness in joints   Increase activity gradually    Bathing/Incision Care:   You may shower as directed by surgeon   Pat incisions dry.  No lotions, powders or perfumes to incisions   Tape dressings (steri strips) will fall off in 7-10 days (if present)    What to expect:   A tingly or itchy sensation around the incision is a normal sign of healing   Some clear, pink drainage from incisions is normal.      Notify your surgeon for the following signs & symptoms:   Redness, warmth, or swelling of the incision    Foul smelling or increased drainage   Chills or temperature greater than 101 F   Pain not controlled by pain medications    Same Day Surgery Discharge Instructions for  Sedation and General Anesthesia     It's not unusual to feel dizzy, light-headed or faint for up to 24 hours after surgery or while taking pain medication.  If you have these symptoms: sit for a few minutes before standing and have someone assist you when you get up to walk or use the bathroom.    You should rest and relax for the next 24 hours. We recommend you make arrangements to have an adult stay with you for at least 24 hours after your discharge.  Avoid hazardous and strenuous activity.    DO NOT DRIVE any vehicle or operate mechanical equipment for 24 hours following the end of your surgery.  Even though you may feel normal, your reactions may be affected by the medication you have received.    Do not drink alcoholic beverages for 24 hours following surgery.     Slowly progress to your regular diet as you feel able. It's not unusual to feel nauseated and/or vomit after receiving  anesthesia.  If you develop these symptoms, drink clear liquids (apple juice, ginger ale, broth, 7-up, etc. ) until you feel better.  If your nausea and vomiting persists for 24 hours, please notify your surgeon.      All narcotic pain medications, along with inactivity and anesthesia, can cause constipation. Drinking plenty of liquids and increasing fiber intake will help.    For any questions of a medical nature, call your surgeon.    Do not make important decisions for 24 hours.    If you had general anesthesia, you may have a sore throat for a couple of days related to the breathing tube used during surgery.  You may use Cepacol lozenges to help with this discomfort.  If it worsens or if you develop a fever, contact your surgeon.     If you feel your pain is not well managed with the pain medications prescribed by your surgeon, please contact your surgeon's office to let them know so they can address your concerns.     **If you have questions or concerns about your procedure,  call Dr. Mayen at [phone]**

## 2023-08-14 LAB
PATH REPORT.COMMENTS IMP SPEC: NORMAL
PATH REPORT.COMMENTS IMP SPEC: NORMAL
PATH REPORT.FINAL DX SPEC: NORMAL
PATH REPORT.GROSS SPEC: NORMAL
PATH REPORT.MICROSCOPIC SPEC OTHER STN: NORMAL
PATH REPORT.RELEVANT HX SPEC: NORMAL
PHOTO IMAGE: NORMAL

## 2023-08-16 ENCOUNTER — ANESTHESIA EVENT (OUTPATIENT)
Dept: SURGERY | Facility: CLINIC | Age: 53
End: 2023-08-16

## 2023-09-13 ENCOUNTER — NURSE TRIAGE (OUTPATIENT)
Dept: NURSING | Facility: CLINIC | Age: 53
End: 2023-09-13
Payer: COMMERCIAL

## 2023-09-14 ENCOUNTER — MYC MEDICAL ADVICE (OUTPATIENT)
Dept: PEDIATRICS | Facility: CLINIC | Age: 53
End: 2023-09-14
Payer: COMMERCIAL

## 2023-09-14 DIAGNOSIS — L03.90 CELLULITIS, UNSPECIFIED CELLULITIS SITE: Primary | ICD-10-CM

## 2023-09-14 NOTE — TELEPHONE ENCOUNTER
Patient had a surgery a month ago.  Patient had a stich that was red and sore.  Surgeon ( Dr Mayen) prescribed an antibiotic, Bactrim.    160mg tabs 2 times a day, started yesterday, took 2 doses so far.    Patient took a shower tonight and when she got out the bottom part of her legs were warm to touch and had a fine itchy rash on them.  Patient is concerned it is due to the antibiotic that she is taking.    Patient denies any breathing difficulty, no hoarseness, no cough, no swollen tongue, no purple spots, no fever, no swelling.    Care advise: stop the medication, ok to use antihistamine, cool for the bottom legs.  WIll route to provider to follow up.    Angle Rincon RN   09/13/23 10:33 PM  [facility] Nurse Advisor  Reason for Disposition   Hives or itching    Additional Information   Negative: [1] Life-threatening reaction (anaphylaxis) in the past to the same drug AND [2] < 2 hours since exposure   Negative: Difficulty breathing or wheezing   Negative: [1] Hoarseness or cough AND [2] started soon after 1st dose of drug   Negative: [1] Swollen tongue AND [2] started soon after 1st dose of drug   Negative: [1] Purple or blood-colored rash (spots or dots) AND [2] fever   Negative: Sounds like a life-threatening emergency to the triager   Negative: Rash is only on 1 part of the body (localized)   Negative: Taking new non-prescription (OTC) antihistamine, decongestant, ear drops, eye drops, or other OTC cough/cold medicine   Negative: Taking new prescription antihistamine, allergy medicine, asthma medicine, eye drops, ear drops or nose drops   Negative: Rash started more than 3 days after stopping new prescription medicine   Negative: Swollen tongue   Negative: [1] Widespread hives AND [2] onset < 2 hours of exposure to 1st dose of drug   Negative: Fever   Negative: Patient sounds very sick or weak to the triager   Negative: [1] Purple or blood-colored rash (spots or dots) AND [2] no fever AND [3]  sounds well to triager   Negative: [1] Taking new prescription medication AND [2] rash within 4 hours of 1st dose   Negative: Large or small blisters on skin (i.e., fluid filled bubbles or sacs)   Negative: Bloody crusts on lips or sores in mouth   Negative: Face or lip swelling    Protocols used: Rash - Widespread On Drugs-A-AH

## 2023-09-15 RX ORDER — CEPHALEXIN 500 MG/1
500 CAPSULE ORAL 3 TIMES DAILY
Qty: 21 CAPSULE | Refills: 0 | Status: SHIPPED | OUTPATIENT
Start: 2023-09-15 | End: 2023-09-22

## 2023-10-12 ENCOUNTER — OFFICE VISIT (OUTPATIENT)
Dept: PEDIATRICS | Facility: CLINIC | Age: 53
End: 2023-10-12
Payer: COMMERCIAL

## 2023-10-12 VITALS
HEIGHT: 64 IN | BODY MASS INDEX: 33.28 KG/M2 | HEART RATE: 104 BPM | SYSTOLIC BLOOD PRESSURE: 94 MMHG | DIASTOLIC BLOOD PRESSURE: 66 MMHG | RESPIRATION RATE: 18 BRPM | WEIGHT: 194.9 LBS | OXYGEN SATURATION: 100 % | TEMPERATURE: 97.9 F

## 2023-10-12 DIAGNOSIS — J45.20 MILD INTERMITTENT ASTHMA WITHOUT COMPLICATION: ICD-10-CM

## 2023-10-12 DIAGNOSIS — Z12.31 VISIT FOR SCREENING MAMMOGRAM: ICD-10-CM

## 2023-10-12 DIAGNOSIS — Z01.818 PREOP GENERAL PHYSICAL EXAM: Primary | ICD-10-CM

## 2023-10-12 DIAGNOSIS — L98.7 EXCESS SKIN: ICD-10-CM

## 2023-10-12 DIAGNOSIS — E66.811 CLASS 1 OBESITY DUE TO EXCESS CALORIES WITH SERIOUS COMORBIDITY AND BODY MASS INDEX (BMI) OF 32.0 TO 32.9 IN ADULT: ICD-10-CM

## 2023-10-12 DIAGNOSIS — E66.09 CLASS 1 OBESITY DUE TO EXCESS CALORIES WITH SERIOUS COMORBIDITY AND BODY MASS INDEX (BMI) OF 32.0 TO 32.9 IN ADULT: ICD-10-CM

## 2023-10-12 PROCEDURE — 99214 OFFICE O/P EST MOD 30 MIN: CPT | Performed by: PEDIATRICS

## 2023-10-12 RX ORDER — SEMAGLUTIDE 2.4 MG/.75ML
2.4 INJECTION, SOLUTION SUBCUTANEOUS WEEKLY
Qty: 3 ML | Refills: 0 | Status: SHIPPED | OUTPATIENT
Start: 2023-10-12 | End: 2023-11-10

## 2023-10-12 ASSESSMENT — PAIN SCALES - GENERAL: PAINLEVEL: NO PAIN (0)

## 2023-10-12 NOTE — PATIENT INSTRUCTIONS
Preparing for Your Surgery  Getting started  A nurse will call you to review your health history and instructions. They will give you an arrival time based on your scheduled surgery time. Please be ready to share:  Your doctor's clinic name and phone number  Your medical, surgical, and anesthesia history  A list of allergies and sensitivities  A list of medicines, including herbal treatments and over-the-counter drugs  Whether the patient has a legal guardian (ask how to send us the papers in advance)  Please tell us if you're pregnant--or if there's any chance you might be pregnant. Some surgeries may injure a fetus (unborn baby), so they require a pregnancy test. Surgeries that are safe for a fetus don't always need a test, and you can choose whether to have one.   If you have a child who's having surgery, please ask for a copy of Preparing for Your Child's Surgery.    Preparing for surgery  Within 10 to 30 days of surgery: Have a pre-op exam (sometimes called an H&P, or History and Physical). This can be done at a clinic or pre-operative center.  If you're having a , you may not need this exam. Talk to your care team.  At your pre-op exam, talk to your care team about all medicines you take. If you need to stop any medicines before surgery, ask when to start taking them again.  We do this for your safety. Many medicines can make you bleed too much during surgery. Some change how well surgery (anesthesia) drugs work.  Call your insurance company to let them know you're having surgery. (If you don't have insurance, call [phone].)  Call your clinic if there's any change in your health. This includes signs of a cold or flu (sore throat, runny nose, cough, rash, fever). It also includes a scrape or scratch near the surgery site.  If you have questions on the day of surgery, call your hospital or surgery center.  Eating and drinking guidelines  For your safety: Unless your surgeon tells you otherwise,  follow the guidelines below.  Eat and drink as usual until 8 hours before you arrive for surgery. After that, no food or milk.  Drink clear liquids until 2 hours before you arrive. These are liquids you can see through, like water, Gatorade, and Propel Water. They also include plain black coffee and tea (no cream or milk), candy, and breath mints. You can spit out gum when you arrive.  If you drink alcohol: Stop drinking it the night before surgery.  If your care team tells you to take medicine on the morning of surgery, it's okay to take it with a sip of water.  Preventing infection  Shower or bathe the night before and morning of your surgery. Follow the instructions your clinic gave you. (If no instructions, use regular soap.)  Don't shave or clip hair near your surgery site. We'll remove the hair if needed.  Don't smoke or vape the morning of surgery. You may chew nicotine gum up to 2 hours before surgery. A nicotine patch is okay.  Note: Some surgeries require you to completely quit smoking and nicotine. Check with your surgeon.  Your care team will make every effort to keep you safe from infection. We will:  Clean our hands often with soap and water (or an alcohol-based hand rub).  Clean the skin at your surgery site with a special soap that kills germs.  Give you a special gown to keep you warm. (Cold raises the risk of infection.)  Wear special hair covers, masks, gowns and gloves during surgery.  Give antibiotic medicine, if prescribed. Not all surgeries need antibiotics.  What to bring on the day of surgery  Photo ID and insurance card  Copy of your health care directive, if you have one  Glasses and hearing aids (bring cases)  You can't wear contacts during surgery  Inhaler and eye drops, if you use them (tell us about these when you arrive)  CPAP machine or breathing device, if you use them  A few personal items, if spending the night  If you have . . .  A pacemaker, ICD (cardiac defibrillator) or other  implant: Bring the ID card.  An implanted stimulator: Bring the remote control.  A legal guardian: Bring a copy of the certified (court-stamped) guardianship papers.  Please remove any jewelry, including body piercings. Leave jewelry and other valuables at home.  If you're going home the day of surgery  You must have a responsible adult drive you home. They should stay with you overnight as well.  If you don't have someone to stay with you, and you aren't safe to go home alone, we may keep you overnight. Insurance often won't pay for this.  After surgery  If it's hard to control your pain or you need more pain medicine, please call your surgeon's office.  Questions?   If you have any questions for your care team, list them here: _________________________________________________________________________________________________________________________________________________________________________ ____________________________________ ____________________________________ ____________________________________  For informational purposes only. Not to replace the advice of your health care provider. Copyright   2003, 2019 [place] [employer]. All rights reserved. Clinically reviewed by Lucy Chase MD. [employer] 967990 - REV 12/22.    How to Take Your Medication Before Surgery  - hold phentermine and wegovy 2 weeks

## 2023-10-12 NOTE — PROGRESS NOTES
[facility]  [address]  [address]  [place] [address]  Phone: [phone]  Fax: [phone]  Primary Provider: Michelle Geiger  Pre-op Performing Provider: MICHELLE GEIGER    PREOPERATIVE EVALUATION:  Today's date: 10/12/2023    Ana is a 53 year old female who presents for a preoperative evaluation.      10/12/2023     9:54 AM   Additional Questions   Roomed by Hellen   Accompanied by SUKHI         10/12/2023     9:54 AM   Patient Reported Additional Medications   Patient reports taking the following new medications NA       Surgical Information:  Surgery/Procedure: cosmetic bilateral thigh lift, revision bilateral axillary area   Surgery Location: [facility] OR  Surgeon: Dr. Mayen  Surgery Date: 10/27/2023   Time of Surgery: 10:30 AM  Where patient plans to recover: At home with family  Fax number for surgical facility: [phone]     Assessment & Plan     The proposed surgical procedure is considered INTERMEDIATE risk.      ICD-10-CM    1. Preop general physical exam  Z01.818       2. Excess skin  L98.7       3. Class 1 obesity due to excess calories with serious comorbidity and body mass index (BMI) of 32.0 to 32.9 in adult  E66.09 Semaglutide-Weight Management (WEGOVY) 2.4 MG/0.75ML pen    Z68.32       4. Visit for screening mammogram  Z12.31 MA SCREENING DIGITAL BILAT - Future  (s+30)      5. Mild intermittent asthma without complication  J45.20                     - No identified additional risk factors other than previously addressed    Antiplatelet or Anticoagulation Medication Instructions:   - Patient is on no antiplatelet or anticoagulation medications.    Additional Medication Instructions:  Patient to hold phentermine and Wegovy x 2 weeks before procedure    RECOMMENDATION:  APPROVAL GIVEN to proceed with proposed procedure, without further diagnostic evaluation.      Subjective       HPI related to upcoming procedure: removal of excess  skin/liposuction planned for lower extremities after massive weight loss following bariatric surgery.        10/5/2023     2:57 PM   Preop Questions   1. Have you ever had a heart attack or stroke? No   2. Have you ever had surgery on your heart or blood vessels, such as a stent placement, a coronary artery bypass, or surgery on an artery in your head, neck, heart, or legs? No   3. Do you have chest pain with activity? No   4. Do you have a history of  heart failure? No   5. Do you currently have a cold, bronchitis or symptoms of other infection? No   6. Do you have a cough, shortness of breath, or wheezing? No   7. Do you or anyone in your family have previous history of blood clots? No   8. Do you or does anyone in your family have a serious bleeding problem such as prolonged bleeding following surgeries or cuts? No   9. Have you ever had problems with anemia or been told to take iron pills? YES - most recent hemoglobin normal   10. Have you had any abnormal blood loss such as black, tarry or bloody stools, or abnormal vaginal bleeding? No   11. Have you ever had a blood transfusion? YES - surgery related   11a. Have you ever had a transfusion reaction? No   12. Are you willing to have a blood transfusion if it is medically needed before, during, or after your surgery? Yes   13. Have you or any of your relatives ever had problems with anesthesia? YES - PONV   14. Do you have sleep apnea, excessive snoring or daytime drowsiness? No   15. Do you have any artifical heart valves or other implanted medical devices like a pacemaker, defibrillator, or continuous glucose monitor? No   16. Do you have artificial joints? No   17. Are you allergic to latex? No   18. Is there any chance that you may be pregnant? No         Preoperative Review of :   reviewed - phentermine as reflected in problem list, norco for dental procedure in August      Status of Chronic Conditions:  See problem list for active medical problems.   Problems all longstanding and stable, except as noted/documented.  See ROS for pertinent symptoms related to these conditions.    Review of Systems  Constitutional, neuro, ENT, endocrine, pulmonary, cardiac, gastrointestinal, genitourinary, musculoskeletal, integument and psychiatric systems are negative, except as otherwise noted.    Patient Active Problem List    Diagnosis Date Noted    Iron deficiency anemia following bariatric surgery 08/01/2023     Priority: Medium    Symptomatic abdominal panniculus 02/25/2021     Priority: Medium    Family history of malignant neoplasm of breast 10/05/2020     Priority: Medium    Insulin resistance 09/28/2020     Priority: Medium    Postural hypotension 07/29/2019     Priority: Medium    Intertrigo 07/29/2019     Priority: Medium    Obesity due to excess calories 07/29/2019     Priority: Medium    S/P laparoscopic cholecystectomy 04/18/2019     Priority: Medium    Bariatric surgery status 02/05/2019     Priority: Medium     RYGBS 1/28/2019 Nikunj      Malnutrition following gastrointestinal surgery 02/05/2019     Priority: Medium    Fatty liver 07/27/2018     Priority: Medium    Mild intermittent asthma without complication 06/11/2018     Priority: Medium    Lymphedema bilateral with mixed lipedema. 09/30/2015     Priority: Medium    Baker's cyst of knee 09/03/2015     Priority: Medium    Acanthosis nigricans 03/24/2015     Priority: Medium    Cutaneous skin tags 03/24/2015     Priority: Medium    Uterine fibroid 08/02/2012     Priority: Medium    Lichen sclerosus 07/14/2011     Priority: Medium    Family history of malignant neoplasm of genital organ, other 06/11/2007     Priority: Medium    FAMILY HX GI MALIGNANCY 05/31/2007     Priority: Medium    Esophageal reflux 09/27/2004     Priority: Medium    Allergic state 09/27/2004     Priority: Medium     Problem list name updated by automated process. Provider to review        Past Medical History:   Diagnosis Date    Allergic  rhinitis, cause unspecified     Cellulitis 2005    2 episodes, one with hospitalization [facility]    Complication of anesthesia     MOTHER HAS HISTORY    DUB (dysfunctional uterine bleeding)     Neg EMB 2012    Esophageal reflux     ongoing    Fibroids     Genital problems     Lichens Schlerosis    GERD (gastroesophageal reflux disease)     Hallux valgus (acquired)     History of steroid therapy     Inhalers, eye drops    Hypersomnia with sleep apnea, unspecified     using CPAP    Kidney stone 05/2018    2 stones    Lichen sclerosus 07/14/2011    Lymph edema 2010- present    Lymphedema bilateral with mixed lipedema. 09/30/2015    Lymphedema bilateral with mixed lipedema. 09/30/2015    Morbid obesity (H) 03/19/2013    Pneumonia     Years ago - a few times    PONV (postoperative nausea and vomiting)     Pre-diabetes     Tendonitis currently    Uncomplicated asthma     mild    Urinary tract infection     A few times in past 10 years    Vision disorder 4190-8450    Dry Eye    Vitamin D deficiency 03/14/2015     Past Surgical History:   Procedure Laterality Date    COLONOSCOPY  05/15/2013    Procedure: COLONOSCOPY;  Colonoscopy;  Surgeon: Kota Blanco MD;  Location:  GI    COLONOSCOPY N/A 10/21/2019    Procedure: COLONOSCOPY, with biopsies by cold forceps;  Surgeon: Lydia Vickers MD;  Location:  GI    COSMETIC ABDOMINOPLASTY Bilateral 2/25/2021    Procedure: COSMETIC BILATERAL BELT LIPECTOMY;  Surgeon: Wade Marquez MD;  Location:  OR    COSMETIC LIPOSUCTION, BRACHIOPLASTY, COMBINED Bilateral 8/11/2023    Procedure: BILATERAL COSMETIC BRACHIOPLASTY;  Surgeon: Liliane Mayen MD;  Location:  OR    GASTRIC BYPASS      GYN SURGERY  2016    Uterine Ablation    INCISION AND DRAINAGE TRUNK, COMBINED Left 2/25/2021    Procedure: EVACUATION OF HEMATOMA;  Surgeon: Wade Marquez MD;  Location:  OR    LAPAROSCOPIC BYPASS GASTRIC, CHOLECYSTECTOMY, COMBINED N/A 01/28/2019    Procedure:  LAPAROSCOPIC GASTRIC BYPASS;  Surgeon: Maykel Calvin MD;  Location:  OR    LAPAROSCOPIC CHOLECYSTECTOMY N/A 04/11/2019    Procedure: LAPAROSCOPIC CHOLECYSTECTOMY;  Surgeon: Maykel Calvin MD;  Location:  OR    lichen sclerosis      MAMMOPLASTY REDUCTION BILATERAL Bilateral 8/11/2023    Procedure: MAMMOPLASTY, REDUCTION, BILATERAL;  Surgeon: Liliane Mayen MD;  Location:  OR    ORTHOPEDIC SURGERY      PANNICULECTOMY N/A 2/25/2021    Procedure: PANNICULECTOMY;  Surgeon: Wade Marquez MD;  Location:  OR    VASCULAR SURGERY  2015    Vienous closure on both legs    vein closure  12/2016    to prevent lymphedema    Z NONSPECIFIC PROCEDURE  1994    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    Z NONSPECIFIC PROCEDURE  1999,2001    Foot surgeries x 2 (bunionectomy)    [facility] NONSPECIFIC PROCEDURE  2000    hammer toes     Current Outpatient Medications   Medication Sig Dispense Refill    biotin 5 MG CAPS Take 5,000 mcg by mouth daily At noon      Calcium Citrate-Vitamin D (CALCIUM CITRATE CHEWY BITE PO) Take 500 mg by mouth 3 times daily Plus D,noon, supper, HS; Bariatric Advantage calcium citrate 500 mcg/vitamin D 500 international unit(s) per chew      Multiple Vitamins-Minerals (BARIATRIC MULTIVITAMINS/IRON PO) Take 1 capsule by mouth daily Bariatric Advantage Ultra Solo with iron      nitroGLYcerin (NITRO-BID) 2 % OINT ointment Place 0.5 inches (7.5 mg) onto the skin daily as needed (Apply 0.5in to tips of fingers once daily prior to cold exposure) 60 g 1    phentermine (ADIPEX-P) 15 MG capsule Take 1 capsule in the morning. 90 capsule 0    polyethylene glycol (MIRALAX/GLYCOLAX) packet Take 17 g by mouth daily 90 packet 3    Semaglutide-Weight Management (WEGOVY) 2.4 MG/0.75ML pen Inject 2.4 mg Subcutaneous once a week 3 mL 0    vitamin D3 (CHOLECALCIFEROL) 50 mcg (2000 units) tablet Take 1 capsule by mouth every morning          Allergies   Allergen Reactions    Nsaids Other (See  "Comments)     Had gastric bypass - needs to avoid NSAIDS    Clindamycin Diarrhea    Morphine And Related Nausea and Vomiting    Shellfish Allergy     Bactrim [Sulfamethoxazole-Trimethoprim] Rash        Social History     Tobacco Use    Smoking status: Never    Smokeless tobacco: Never   Substance Use Topics    Alcohol use: Not Currently     Comment: occasional       History   Drug Use No         Objective     BP 94/66 (BP Location: Right arm, Patient Position: Sitting, Cuff Size: Adult Regular)   Pulse 104   Temp 97.9  F (36.6  C) (Tympanic)   Resp 18   Ht 1.63 m (5' 4.17\")   Wt 88.4 kg (194 lb 14.4 oz)   LMP 10/01/2016 (Approximate)   SpO2 100%   BMI 33.27 kg/m      Physical Exam    GENERAL APPEARANCE: healthy, alert and no distress     EYES: EOMI, PERRL     HENT: ear canals and TM's normal and nose and mouth without ulcers or lesions     NECK: no adenopathy, no asymmetry, masses, or scars and thyroid normal to palpation     RESP: lungs clear to auscultation - no rales, rhonchi or wheezes     CV: regular rates and rhythm, normal S1 S2, no S3 or S4 and no murmur, click or rub     ABDOMEN:  soft, nontender, no HSM or masses and bowel sounds normal     MS: extremities normal- no gross deformities noted, no evidence of inflammation in joints, FROM in all extremities.     SKIN: no suspicious lesions or rashes     NEURO: Normal strength and tone, sensory exam grossly normal, mentation intact and speech normal     PSYCH: mentation appears normal. and affect normal/bright     LYMPHATICS: No cervical adenopathy    Recent Labs   Lab Test 07/18/23  0937 12/29/22  1117 04/19/22  0917 04/14/22  0837   HGB 13.5 14.3   < >  --     301   < >  --      --   --  139   POTASSIUM 4.1  --   --  3.9   CR 0.84  --   --  0.80   A1C  --   --   --  5.4    < > = values in this interval not displayed.        Diagnostics:  No labs were ordered during this visit.   No EKG required, no history of coronary heart disease, " significant arrhythmia, peripheral arterial disease or other structural heart disease.    Revised Cardiac Risk Index (RCRI):  The patient has the following serious cardiovascular risks for perioperative complications:   - No serious cardiac risks = 0 points     RCRI Interpretation: 0 points: Class I (very low risk - 0.4% complication rate)         Signed Electronically by: Kelly Bedoya MD  Copy of this evaluation report is provided to requesting physician.

## 2023-10-20 NOTE — PATIENT INSTRUCTIONS
Recommendations from today's MTM visit:                                                    MTM (medication therapy management) is a service provided by a clinical pharmacist designed to help you get the most of out of your medicines.   Today we reviewed what your medicines are for, how to know if they are working, that your medicines are safe and how to make your medicine regimen as easy as possible.      1. Decrease bupropion  mg tablet: 1 tablet once daily in AM for 7 days then stop.   2. Stop Naltrexone   3. Will try to get Saxenda covered - will start Prior Authorization. Information on Saxenda below.   -If Saxenda not covered can consider Victoza versus low dose phentermine.   4. Stop [place], ferrous sulfate, B12, super B complex. Decrease vitamin D to 2000 international unit(s) once daily. Start Bariatric Advantage Ultra Solo Multivitamin with iron 1 capsule once daily.    Saxenda Dosing:   Start Saxenda: It is a subcutaneous injection that you inject once daily and titrate the dose slowly over time. Make sure inject the same time each day.   Week 1: Inject 0.6 mg once daily  Week 2: If tolerating, increase to 1.2 mg once daily   Week 3: If tolerating, increase to 1.8 mg once daily   Week 4: If tolerating, increase to 2.4 mg once daily   Week 5 & on: If tolerating, increase to 3 mg once daily   *If you are having some nausea to where you are hesitant to move up to the next dose, stay at the same dose you are on for an additional week to see if nausea improves/resolves. Make sure to take this time to hydrate and ensure you are drinking at least 64 oz water per day.     Saxenda Administration Video:   https://www.[employer]/about-saxenda/how-to-use-the-pen.html     Saxenda Storage and Stability:   Store new, unused Saxenda pens in the refrigerator. After first use, store in a refrigerator or at room temperature. Pens in use should be thrown away after 30 days even if they still have Saxenda left in  "them.     Saxenda Common Side Effects:   Nausea, diarrhea, constipation, headache, tiredness (fatigue), dizziness, stomach upset/pain. Less commonly, Saxenda can cause low blood sugar (symptoms: shaky, dizzy, sweaty, agitation). It is important to ensure that you are eating consistent meals and not skipping meals. Ensure you are getting at least 64 oz water daily.     Follow-up: 4 weeks from med start - [phone] to schedule     It was great speaking with you today.  I value your experience and would be very thankful for your time in providing feedback in our clinic survey. In the next few days, you may receive an email or text message from [employer] with a link to a survey related to your  clinical pharmacist.\"     My Clinical Pharmacist's contact information:                                                      Please feel free to contact me with any questions or concerns you have.      Lauren Bloch, PharmD  Medication Therapy Management Pharmacist   [facility] [place]             " Render In Strict Bullet Format?: No Detail Level: Zone Continue Regimen: Accutane 30 mg BID\\nSpironolactone 100 mg QD. Both pending BW

## 2023-10-21 DIAGNOSIS — I73.00 RAYNAUD'S DISEASE WITHOUT GANGRENE: ICD-10-CM

## 2023-10-23 RX ORDER — NITROGLYCERIN 20 MG/G
0.5 OINTMENT TOPICAL DAILY PRN
Qty: 60 G | Refills: 1 | Status: SHIPPED | OUTPATIENT
Start: 2023-10-23 | End: 2024-05-20

## 2023-10-24 NOTE — OR NURSING
Patient stated her last dose of wegovy was on 10/14/23. I instructed her to continue holding it until after surgery.

## 2023-10-26 NOTE — ANESTHESIA PREPROCEDURE EVALUATION
Anesthesia Pre-Procedure Evaluation    Patient: Ana Wyman   MRN: [id number] : 1970        Procedure : Procedure(s):  cosmetic bilateral thigh lift, revision bilateral axillary area          Past Medical History:   Diagnosis Date    Allergic rhinitis, cause unspecified     Cellulitis     2 episodes, one with hospitalization [facility]    Complication of anesthesia     MOTHER HAS HISTORY    DUB (dysfunctional uterine bleeding)     Neg EMB     Esophageal reflux     ongoing    Fibroids     Genital problems     Lichens Schlerosis    GERD (gastroesophageal reflux disease)     Hallux valgus (acquired)     History of steroid therapy     Inhalers, eye drops    Hypersomnia with sleep apnea, unspecified     using CPAP    Kidney stone 2018    2 stones    Lichen sclerosus 2011    Lymph edema 2010- present    Lymphedema bilateral with mixed lipedema. 2015    Lymphedema bilateral with mixed lipedema. 2015    Morbid obesity (H) 2013    Pneumonia     Years ago - a few times    PONV (postoperative nausea and vomiting)     Pre-diabetes     Tendonitis currently    Uncomplicated asthma     mild    Urinary tract infection     A few times in past 10 years    Vision disorder 3622-6906    Dry Eye    Vitamin D deficiency 2015      Past Surgical History:   Procedure Laterality Date    COLONOSCOPY  05/15/2013    Procedure: COLONOSCOPY;  Colonoscopy;  Surgeon: Kota Blanco MD;  Location:  GI    COLONOSCOPY N/A 10/21/2019    Procedure: COLONOSCOPY, with biopsies by cold forceps;  Surgeon: Lydia Vickers MD;  Location:  GI    COSMETIC ABDOMINOPLASTY Bilateral 2021    Procedure: COSMETIC BILATERAL BELT LIPECTOMY;  Surgeon: Wade Marquez MD;  Location:  OR    COSMETIC LIPOSUCTION, BRACHIOPLASTY, COMBINED Bilateral 2023    Procedure: BILATERAL COSMETIC BRACHIOPLASTY;  Surgeon: Liliane Mayen MD;  Location:  OR    GASTRIC BYPASS      GYN SURGERY   2016    Uterine Ablation    INCISION AND DRAINAGE TRUNK, COMBINED Left 2/25/2021    Procedure: EVACUATION OF HEMATOMA;  Surgeon: Wade Marquez MD;  Location:  OR    LAPAROSCOPIC BYPASS GASTRIC, CHOLECYSTECTOMY, COMBINED N/A 01/28/2019    Procedure: LAPAROSCOPIC GASTRIC BYPASS;  Surgeon: Maykel Calvin MD;  Location:  OR    LAPAROSCOPIC CHOLECYSTECTOMY N/A 04/11/2019    Procedure: LAPAROSCOPIC CHOLECYSTECTOMY;  Surgeon: Maykel Calvin MD;  Location:  OR    lichen sclerosis      MAMMOPLASTY REDUCTION BILATERAL Bilateral 8/11/2023    Procedure: MAMMOPLASTY, REDUCTION, BILATERAL;  Surgeon: Liliane Mayen MD;  Location:  OR    ORTHOPEDIC SURGERY      PANNICULECTOMY N/A 2/25/2021    Procedure: PANNICULECTOMY;  Surgeon: Wade Marquez MD;  Location:  OR    VASCULAR SURGERY  2015    Vienous closure on both legs    vein closure  12/2016    to prevent lymphedema    ZZC NONSPECIFIC PROCEDURE  1994    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    ZZC NONSPECIFIC PROCEDURE  1999,2001    Foot surgeries x 2 (bunionectomy)    ZZC NONSPECIFIC PROCEDURE  2000    hammer toes      Allergies   Allergen Reactions    Nsaids Other (See Comments)     Had gastric bypass - needs to avoid NSAIDS    Clindamycin Diarrhea    Morphine And Related Nausea and Vomiting    Shellfish Allergy     Bactrim [Sulfamethoxazole-Trimethoprim] Rash      Social History     Tobacco Use    Smoking status: Never    Smokeless tobacco: Never   Substance Use Topics    Alcohol use: Not Currently     Comment: occasional      Wt Readings from Last 1 Encounters:   10/12/23 88.4 kg (194 lb 14.4 oz)        Anesthesia Evaluation   Pt has had prior anesthetic.     History of anesthetic complications  - PONV.      ROS/MED HX  ENT/Pulmonary:     (+) sleep apnea,                    asthma                  Neurologic:       Cardiovascular:       METS/Exercise Tolerance:     Hematologic:     (+)      anemia,          Musculoskeletal:  Comment: H/o lymphedema bilateral      GI/Hepatic: Comment: H/o fatty liver  S/p bariatric surgery    (+) GERD,                   Renal/Genitourinary:     (+)       Nephrolithiasis ,       Endo: Comment: Insulin resistance    (+)               Obesity,       Psychiatric/Substance Use:       Infectious Disease:       Malignancy:       Other:            Physical Exam    Airway  airway exam normal      Mallampati: II   TM distance: > 3 FB   Neck ROM: full   Mouth opening: > 3 cm    Respiratory Devices and Support         Dental       (+) Minor Abnormalities - some fillings, tiny chips      Cardiovascular   cardiovascular exam normal          Pulmonary   pulmonary exam normal                OUTSIDE LABS:  CBC:   Lab Results   Component Value Date    WBC 6.3 07/18/2023    WBC 6.6 12/29/2022    HGB 13.5 07/18/2023    HGB 14.3 12/29/2022    HCT 42.6 07/18/2023    HCT 45.5 12/29/2022     07/18/2023     12/29/2022     BMP:   Lab Results   Component Value Date     07/18/2023     04/14/2022    POTASSIUM 4.1 07/18/2023    POTASSIUM 3.9 04/14/2022    CHLORIDE 103 07/18/2023    CHLORIDE 107 04/14/2022    CO2 26 07/18/2023    CO2 30 04/14/2022    BUN 13.8 07/18/2023    BUN 12 04/14/2022    CR 0.84 07/18/2023    CR 0.80 04/14/2022    GLC 83 07/18/2023     (H) 04/14/2022     COAGS:   Lab Results   Component Value Date    INR 0.98 10/27/2017     POC:   Lab Results   Component Value Date     (H) 02/28/2021    HCG Negative 04/11/2019    HCGS Negative 09/26/2013     HEPATIC:   Lab Results   Component Value Date    ALBUMIN 3.8 04/14/2022    PROTTOTAL 7.1 04/14/2022    ALT 26 04/14/2022    AST 18 04/14/2022    ALKPHOS 97 04/14/2022    BILITOTAL 0.7 04/14/2022     OTHER:   Lab Results   Component Value Date    LACT 1.4 03/16/2021    A1C 5.4 04/14/2022    SINGH 9.2 07/18/2023    LIPASE 232 03/16/2021    AMYLASE 56 03/22/2004    TSH 2.79 07/19/2022    T4 0.81 07/19/2022    CRP 27.2 (H) 10/14/2016    SED  18 12/15/2017       Anesthesia Plan    ASA Status:  2    NPO Status:  NPO Appropriate    Anesthesia Type: General.     - Airway: ETT   Induction: Propofol, Intravenous.      Techniques and Equipment:     - Airway: Video-Laryngoscope       Consents    Anesthesia Plan(s) and associated risks, benefits, and realistic alternatives discussed. Questions answered and patient/representative(s) expressed understanding.     - Discussed:     - Discussed with:  Patient            Postoperative Care    Pain management: IV analgesics, Multi-modal analgesia.   PONV prophylaxis: Ondansetron (or other 5HT-3), Aprepitant, Background Propofol Infusion, Dexamethasone or Solumedrol     Comments:                Ash Crisostomo MD

## 2023-10-27 ENCOUNTER — ANESTHESIA (OUTPATIENT)
Dept: SURGERY | Facility: CLINIC | Age: 53
End: 2023-10-27

## 2023-10-27 ENCOUNTER — HOSPITAL ENCOUNTER (OUTPATIENT)
Facility: CLINIC | Age: 53
Discharge: HOME | End: 2023-10-27
Attending: PLASTIC SURGERY | Admitting: PLASTIC SURGERY

## 2023-10-27 VITALS
OXYGEN SATURATION: 94 % | DIASTOLIC BLOOD PRESSURE: 83 MMHG | RESPIRATION RATE: 16 BRPM | SYSTOLIC BLOOD PRESSURE: 126 MMHG | WEIGHT: 194.7 LBS | TEMPERATURE: 97 F | BODY MASS INDEX: 32.44 KG/M2 | HEART RATE: 82 BPM | HEIGHT: 65 IN

## 2023-10-27 DIAGNOSIS — Z98.84 BARIATRIC SURGERY STATUS: Primary | ICD-10-CM

## 2023-10-27 PROCEDURE — 258N000003 HC RX IP 258 OP 636: Performed by: PLASTIC SURGERY

## 2023-10-27 PROCEDURE — 272N000001 HC OR GENERAL SUPPLY STERILE: Performed by: PLASTIC SURGERY

## 2023-10-27 PROCEDURE — 370N000017 HC ANESTHESIA TECHNICAL FEE, PER MIN: Performed by: PLASTIC SURGERY

## 2023-10-27 PROCEDURE — 710N000012 HC RECOVERY PHASE 2, PER MINUTE: Performed by: PLASTIC SURGERY

## 2023-10-27 PROCEDURE — 258N000003 HC RX IP 258 OP 636: Performed by: NURSE ANESTHETIST, CERTIFIED REGISTERED

## 2023-10-27 PROCEDURE — 710N000009 HC RECOVERY PHASE 1, LEVEL 1, PER MIN: Performed by: PLASTIC SURGERY

## 2023-10-27 PROCEDURE — 250N000011 HC RX IP 250 OP 636: Performed by: ANESTHESIOLOGY

## 2023-10-27 PROCEDURE — 250N000009 HC RX 250: Performed by: PLASTIC SURGERY

## 2023-10-27 PROCEDURE — 250N000009 HC RX 250: Performed by: NURSE ANESTHETIST, CERTIFIED REGISTERED

## 2023-10-27 PROCEDURE — 250N000013 HC RX MED GY IP 250 OP 250 PS 637: Performed by: PLASTIC SURGERY

## 2023-10-27 PROCEDURE — 250N000011 HC RX IP 250 OP 636: Performed by: PLASTIC SURGERY

## 2023-10-27 PROCEDURE — 250N000012 HC RX MED GY IP 250 OP 636 PS 637: Performed by: ANESTHESIOLOGY

## 2023-10-27 PROCEDURE — 250N000011 HC RX IP 250 OP 636: Performed by: NURSE ANESTHETIST, CERTIFIED REGISTERED

## 2023-10-27 PROCEDURE — 250N000013 HC RX MED GY IP 250 OP 250 PS 637: Performed by: ANESTHESIOLOGY

## 2023-10-27 PROCEDURE — 250N000025 HC SEVOFLURANE, PER MIN: Performed by: PLASTIC SURGERY

## 2023-10-27 PROCEDURE — 360N000076 HC SURGERY LEVEL 3, PER MIN: Performed by: PLASTIC SURGERY

## 2023-10-27 PROCEDURE — 999N000141 HC STATISTIC PRE-PROCEDURE NURSING ASSESSMENT: Performed by: PLASTIC SURGERY

## 2023-10-27 RX ORDER — BUPIVACAINE HYDROCHLORIDE 2.5 MG/ML
INJECTION, SOLUTION EPIDURAL; INFILTRATION; INTRACAUDAL
Status: DISCONTINUED
Start: 2023-10-27 | End: 2023-10-27 | Stop reason: HOSPADM

## 2023-10-27 RX ORDER — FENTANYL CITRATE 50 UG/ML
25 INJECTION, SOLUTION INTRAMUSCULAR; INTRAVENOUS EVERY 5 MIN PRN
Status: DISCONTINUED | OUTPATIENT
Start: 2023-10-27 | End: 2023-10-27 | Stop reason: HOSPADM

## 2023-10-27 RX ORDER — OXYCODONE AND ACETAMINOPHEN 5; 325 MG/1; MG/1
1-2 TABLET ORAL 4 TIMES DAILY PRN
Qty: 24 TABLET | Refills: 0 | Status: SHIPPED | OUTPATIENT
Start: 2023-10-27 | End: 2023-10-27

## 2023-10-27 RX ORDER — DEXAMETHASONE SODIUM PHOSPHATE 4 MG/ML
INJECTION, SOLUTION INTRA-ARTICULAR; INTRALESIONAL; INTRAMUSCULAR; INTRAVENOUS; SOFT TISSUE PRN
Status: DISCONTINUED | OUTPATIENT
Start: 2023-10-27 | End: 2023-10-27

## 2023-10-27 RX ORDER — ONDANSETRON 4 MG/1
4 TABLET, ORALLY DISINTEGRATING ORAL EVERY 30 MIN PRN
Status: DISCONTINUED | OUTPATIENT
Start: 2023-10-27 | End: 2023-10-27 | Stop reason: HOSPADM

## 2023-10-27 RX ORDER — HYDROMORPHONE HCL IN WATER/PF 6 MG/30 ML
0.4 PATIENT CONTROLLED ANALGESIA SYRINGE INTRAVENOUS EVERY 5 MIN PRN
Status: DISCONTINUED | OUTPATIENT
Start: 2023-10-27 | End: 2023-10-27 | Stop reason: HOSPADM

## 2023-10-27 RX ORDER — LIDOCAINE HYDROCHLORIDE 20 MG/ML
INJECTION, SOLUTION INFILTRATION; PERINEURAL PRN
Status: DISCONTINUED | OUTPATIENT
Start: 2023-10-27 | End: 2023-10-27

## 2023-10-27 RX ORDER — PROPOFOL 10 MG/ML
INJECTION, EMULSION INTRAVENOUS PRN
Status: DISCONTINUED | OUTPATIENT
Start: 2023-10-27 | End: 2023-10-27

## 2023-10-27 RX ORDER — FENTANYL CITRATE 50 UG/ML
INJECTION, SOLUTION INTRAMUSCULAR; INTRAVENOUS PRN
Status: DISCONTINUED | OUTPATIENT
Start: 2023-10-27 | End: 2023-10-27

## 2023-10-27 RX ORDER — BUPIVACAINE HYDROCHLORIDE 2.5 MG/ML
INJECTION, SOLUTION INFILTRATION; PERINEURAL PRN
Status: DISCONTINUED | OUTPATIENT
Start: 2023-10-27 | End: 2023-10-27 | Stop reason: HOSPADM

## 2023-10-27 RX ORDER — MAGNESIUM HYDROXIDE 1200 MG/15ML
LIQUID ORAL PRN
Status: DISCONTINUED | OUTPATIENT
Start: 2023-10-27 | End: 2023-10-27 | Stop reason: HOSPADM

## 2023-10-27 RX ORDER — ONDANSETRON 4 MG/1
4 TABLET, ORALLY DISINTEGRATING ORAL EVERY 8 HOURS PRN
Qty: 10 TABLET | Refills: 0 | Status: SHIPPED | OUTPATIENT
Start: 2023-10-27 | End: 2024-08-11

## 2023-10-27 RX ORDER — PROPOFOL 10 MG/ML
INJECTION, EMULSION INTRAVENOUS CONTINUOUS PRN
Status: DISCONTINUED | OUTPATIENT
Start: 2023-10-27 | End: 2023-10-27

## 2023-10-27 RX ORDER — ONDANSETRON 2 MG/ML
4 INJECTION INTRAMUSCULAR; INTRAVENOUS EVERY 30 MIN PRN
Status: DISCONTINUED | OUTPATIENT
Start: 2023-10-27 | End: 2023-10-27 | Stop reason: HOSPADM

## 2023-10-27 RX ORDER — APREPITANT 40 MG/1
40 CAPSULE ORAL ONCE
Status: COMPLETED | OUTPATIENT
Start: 2023-10-27 | End: 2023-10-27

## 2023-10-27 RX ORDER — HEPARIN SODIUM 5000 [USP'U]/.5ML
5000 INJECTION, SOLUTION INTRAVENOUS; SUBCUTANEOUS
Status: COMPLETED | OUTPATIENT
Start: 2023-10-27 | End: 2023-10-27

## 2023-10-27 RX ORDER — OXYCODONE HYDROCHLORIDE 5 MG/1
5 TABLET ORAL 4 TIMES DAILY PRN
Qty: 24 TABLET | Refills: 0 | Status: SHIPPED | OUTPATIENT
Start: 2023-10-27 | End: 2023-11-01

## 2023-10-27 RX ORDER — OXYCODONE AND ACETAMINOPHEN 5; 325 MG/1; MG/1
1 TABLET ORAL ONCE
Status: COMPLETED | OUTPATIENT
Start: 2023-10-27 | End: 2023-10-27

## 2023-10-27 RX ORDER — FENTANYL CITRATE 50 UG/ML
50 INJECTION, SOLUTION INTRAMUSCULAR; INTRAVENOUS EVERY 5 MIN PRN
Status: DISCONTINUED | OUTPATIENT
Start: 2023-10-27 | End: 2023-10-27 | Stop reason: HOSPADM

## 2023-10-27 RX ORDER — SODIUM CHLORIDE, SODIUM LACTATE, POTASSIUM CHLORIDE, CALCIUM CHLORIDE 600; 310; 30; 20 MG/100ML; MG/100ML; MG/100ML; MG/100ML
INJECTION, SOLUTION INTRAVENOUS CONTINUOUS PRN
Status: DISCONTINUED | OUTPATIENT
Start: 2023-10-27 | End: 2023-10-27

## 2023-10-27 RX ORDER — CEFAZOLIN SODIUM/WATER 2 G/20 ML
2 SYRINGE (ML) INTRAVENOUS
Status: COMPLETED | OUTPATIENT
Start: 2023-10-27 | End: 2023-10-27

## 2023-10-27 RX ORDER — CEFAZOLIN SODIUM/WATER 2 G/20 ML
2 SYRINGE (ML) INTRAVENOUS SEE ADMIN INSTRUCTIONS
Status: DISCONTINUED | OUTPATIENT
Start: 2023-10-27 | End: 2023-10-27 | Stop reason: HOSPADM

## 2023-10-27 RX ORDER — ACETAMINOPHEN 500 MG
1000 TABLET ORAL ONCE
Status: COMPLETED | OUTPATIENT
Start: 2023-10-27 | End: 2023-10-27

## 2023-10-27 RX ORDER — SODIUM CHLORIDE, SODIUM LACTATE, POTASSIUM CHLORIDE, CALCIUM CHLORIDE 600; 310; 30; 20 MG/100ML; MG/100ML; MG/100ML; MG/100ML
INJECTION, SOLUTION INTRAVENOUS CONTINUOUS
Status: DISCONTINUED | OUTPATIENT
Start: 2023-10-27 | End: 2023-10-27 | Stop reason: HOSPADM

## 2023-10-27 RX ORDER — HYDROMORPHONE HCL IN WATER/PF 6 MG/30 ML
0.2 PATIENT CONTROLLED ANALGESIA SYRINGE INTRAVENOUS EVERY 5 MIN PRN
Status: DISCONTINUED | OUTPATIENT
Start: 2023-10-27 | End: 2023-10-27 | Stop reason: HOSPADM

## 2023-10-27 RX ORDER — ONDANSETRON 2 MG/ML
INJECTION INTRAMUSCULAR; INTRAVENOUS PRN
Status: DISCONTINUED | OUTPATIENT
Start: 2023-10-27 | End: 2023-10-27

## 2023-10-27 RX ADMIN — SODIUM CHLORIDE, POTASSIUM CHLORIDE, SODIUM LACTATE AND CALCIUM CHLORIDE: 600; 310; 30; 20 INJECTION, SOLUTION INTRAVENOUS at 12:00

## 2023-10-27 RX ADMIN — SODIUM CHLORIDE, POTASSIUM CHLORIDE, SODIUM LACTATE AND CALCIUM CHLORIDE: 600; 310; 30; 20 INJECTION, SOLUTION INTRAVENOUS at 13:30

## 2023-10-27 RX ADMIN — APREPITANT 40 MG: 40 CAPSULE ORAL at 10:28

## 2023-10-27 RX ADMIN — ROCURONIUM BROMIDE 20 MG: 50 INJECTION, SOLUTION INTRAVENOUS at 12:15

## 2023-10-27 RX ADMIN — OXYCODONE HYDROCHLORIDE AND ACETAMINOPHEN 1 TABLET: 5; 325 TABLET ORAL at 15:39

## 2023-10-27 RX ADMIN — HYDROMORPHONE HYDROCHLORIDE 0.5 MG: 1 INJECTION, SOLUTION INTRAMUSCULAR; INTRAVENOUS; SUBCUTANEOUS at 12:45

## 2023-10-27 RX ADMIN — ROCURONIUM BROMIDE 30 MG: 50 INJECTION, SOLUTION INTRAVENOUS at 11:04

## 2023-10-27 RX ADMIN — PROPOFOL 100 MCG/KG/MIN: 10 INJECTION, EMULSION INTRAVENOUS at 11:04

## 2023-10-27 RX ADMIN — PROPOFOL 200 MG: 10 INJECTION, EMULSION INTRAVENOUS at 11:04

## 2023-10-27 RX ADMIN — ONDANSETRON 4 MG: 2 INJECTION INTRAMUSCULAR; INTRAVENOUS at 14:02

## 2023-10-27 RX ADMIN — LIDOCAINE HYDROCHLORIDE 60 MG: 20 INJECTION, SOLUTION INFILTRATION; PERINEURAL at 11:04

## 2023-10-27 RX ADMIN — ACETAMINOPHEN 1000 MG: 500 TABLET, FILM COATED ORAL at 09:06

## 2023-10-27 RX ADMIN — FENTANYL CITRATE 50 MCG: 50 INJECTION INTRAMUSCULAR; INTRAVENOUS at 11:04

## 2023-10-27 RX ADMIN — ONDANSETRON 4 MG: 2 INJECTION INTRAMUSCULAR; INTRAVENOUS at 15:15

## 2023-10-27 RX ADMIN — FENTANYL CITRATE 25 MCG: 50 INJECTION, SOLUTION INTRAMUSCULAR; INTRAVENOUS at 15:02

## 2023-10-27 RX ADMIN — SUGAMMADEX 200 MG: 100 INJECTION, SOLUTION INTRAVENOUS at 14:02

## 2023-10-27 RX ADMIN — ROCURONIUM BROMIDE 20 MG: 50 INJECTION, SOLUTION INTRAVENOUS at 11:10

## 2023-10-27 RX ADMIN — FENTANYL CITRATE 50 MCG: 50 INJECTION INTRAMUSCULAR; INTRAVENOUS at 11:27

## 2023-10-27 RX ADMIN — FENTANYL CITRATE 25 MCG: 50 INJECTION, SOLUTION INTRAMUSCULAR; INTRAVENOUS at 14:50

## 2023-10-27 RX ADMIN — MIDAZOLAM 2 MG: 1 INJECTION INTRAMUSCULAR; INTRAVENOUS at 11:00

## 2023-10-27 RX ADMIN — SODIUM CHLORIDE, POTASSIUM CHLORIDE, SODIUM LACTATE AND CALCIUM CHLORIDE: 600; 310; 30; 20 INJECTION, SOLUTION INTRAVENOUS at 11:00

## 2023-10-27 RX ADMIN — Medication 2 G: at 11:07

## 2023-10-27 RX ADMIN — PHENYLEPHRINE HYDROCHLORIDE 100 MCG: 10 INJECTION INTRAVENOUS at 11:24

## 2023-10-27 RX ADMIN — DEXAMETHASONE SODIUM PHOSPHATE 4 MG: 4 INJECTION, SOLUTION INTRA-ARTICULAR; INTRALESIONAL; INTRAMUSCULAR; INTRAVENOUS; SOFT TISSUE at 11:10

## 2023-10-27 RX ADMIN — PHENYLEPHRINE HYDROCHLORIDE 100 MCG: 10 INJECTION INTRAVENOUS at 11:15

## 2023-10-27 RX ADMIN — HEPARIN SODIUM 5000 UNITS: 5000 INJECTION, SOLUTION INTRAVENOUS; SUBCUTANEOUS at 09:08

## 2023-10-27 RX ADMIN — PHENYLEPHRINE HYDROCHLORIDE 100 MCG: 10 INJECTION INTRAVENOUS at 11:18

## 2023-10-27 RX ADMIN — ROCURONIUM BROMIDE 30 MG: 50 INJECTION, SOLUTION INTRAVENOUS at 11:29

## 2023-10-27 ASSESSMENT — ACTIVITIES OF DAILY LIVING (ADL)
ADLS_ACUITY_SCORE: 35

## 2023-10-27 NOTE — BRIEF OP NOTE
[facility]    Brief Operative Note    Pre-operative diagnosis: Encounter for cosmetic procedure [Z41.1]  Post-operative diagnosis Same as pre-operative diagnosis    Procedure: cosmetic bilateral thigh lift, Bilateral - Leg    Surgeon: Surgeon(s) and Role:     * Liliane Mayen MD - Primary  Anesthesia: General   Estimated Blood Loss: Less than 50 ml    Drains: Jaylon-Engel  Specimens: * No specimens in log *  Findings:   None.  Complications: None.  Implants: * No implants in log *

## 2023-10-27 NOTE — ANESTHESIA PROCEDURE NOTES
Airway       Patient location: [facility] - Operating Room or Procedural Area.       Procedure Start/Stop Times: 10/27/2023 11:07 AM  Staff -        Anesthesiologist:  Ash Crisostomo MD       CRNA: Carmen Alfaro APRN CRNA       Performed By: CRNAIndications and Patient Condition       Indications for airway management: benjamin-procedural and airway protection       Induction type:intravenous       Mask difficulty assessment: 1 - vent by mask    Final Airway Details       Final airway type: endotracheal airway       Successful airway: ETT - single  Endotracheal Airway Details        ETT size (mm): 7.0       Cuffed: yes       Successful intubation technique: video laryngoscopy       VL Blade Size: Glidescope 3       Adjucts: stylet       Position: Center       Measured from: gums/teeth       Secured at (cm): 21       Bite block used: None    Post intubation assessment        Placement verified by: capnometry, equal breath sounds and chest rise        Number of attempts at approach: 1       Number of other approaches attempted: 0       Secured with: silk tape       Ease of procedure: easy       Dentition: Intact and Unchanged    Medication(s) Administered   Medication Administration Time: 10/27/2023 11:07 AM

## 2023-10-27 NOTE — DISCHARGE INSTRUCTIONS
Same Day Surgery Discharge Instructions for  Sedation and General Anesthesia     It's not unusual to feel dizzy, light-headed or faint for up to 24 hours after surgery or while taking pain medication.  If you have these symptoms: sit for a few minutes before standing and have someone assist you when you get up to walk or use the bathroom.    You should rest and relax for the next 24 hours. We recommend you make arrangements to have an adult stay with you for at least 24 hours after your discharge.  Avoid hazardous and strenuous activity.    DO NOT DRIVE any vehicle or operate mechanical equipment for 24 hours following the end of your surgery.  Even though you may feel normal, your reactions may be affected by the medication you have received.    Do not drink alcoholic beverages for 24 hours following surgery.     Slowly progress to your regular diet as you feel able. It's not unusual to feel nauseated and/or vomit after receiving anesthesia.  If you develop these symptoms, drink clear liquids (apple juice, ginger ale, broth, 7-up, etc. ) until you feel better.  If your nausea and vomiting persists for 24 hours, please notify your surgeon.      All narcotic pain medications, along with inactivity and anesthesia, can cause constipation. Drinking plenty of liquids and increasing fiber intake will help.    For any questions of a medical nature, call your surgeon.    Do not make important decisions for 24 hours.    If you had general anesthesia, you may have a sore throat for a couple of days related to the breathing tube used during surgery.  You may use Cepacol lozenges to help with this discomfort.  If it worsens or if you develop a fever, contact your surgeon.     If you feel your pain is not well managed with the pain medications prescribed by your surgeon, please contact your surgeon's office to let them know so they can address your concerns.         One Percocet pain pill taken at 3:40pm.    Jaylon Engel  Drain  Home Care Instructions    What is a Jaylon Engel (MISTY) Drain?  This is a small tube that connects to a bulb.  Its gentle suction removes extra fluid from a surgical wound.  Your doctor will remove the tube when the amount of fluid decreases.  The color and amount of fluid varies.  Right after surgery the fluid is bright red.  Over time, it changes to light pink and may become clear or the color of straw.    How should I care for my tube site?  Keep the skin around the tube dry.  Check with your doctor about how to shower.  You may need to cover the site with plastic when you shower.  Or, it may be okay to let the site get wet and put on a clean bandage after you shower.    If the bandage gets wet, you will need to change it.  How should I care for the bulb?  Keep the bulb compressed at all times except while you empty it.   Attach the bulb to your clothing with tape and a safety pin.  Try to empty the bulb at the same time every day.  Empty the bulb at least once a day, or when the bulb becomes half full.  If it becomes too full, there will not be enough suction.    To empty the bulb:  Wash your hands.  Open the bulb cap.  Drain the fluid from the bulb into the measuring cup.  If you have two drains, use two cups.    Clean the mouth of the bulb with an alcohol wipe if your nurse told you to.  Squeeze the bulb (fold it in half before you close the bulb cap) If it does not stay compressed, call your nurse or clinic.  Write the amount of drainage on the drainage record (see back page).  If you have two drains, write the amount for each bulb.  Flush the drainage down the toilet.  Rinse the measuring cup.  Wash your hands.    When should I call my doctor?   Call your doctor if:  You have a fever over 101 F (38.3 C), taken under the tongue.   The drainage increases or smells bad.  The skin around your tube has increased redness, swelling, warmth or pain.  You have pus or fluid leaking at the tube site.  Your  stitches break.  You think the tube is not draining.  The tube falls out.  You have any problems or concerns.    Your drainage record:    Empty your bulb at least once per day or when 1/2 to 1/3 full.  Write down the date, time and amount of drainage in each bulb.   Bring this record to each clinic visit.    Date Time Bulb 1: amount of  Drainage in (ml or cc) Bulb 2: amount of drainage (in ml or cc) Notes

## 2023-10-27 NOTE — ANESTHESIA POSTPROCEDURE EVALUATION
Patient: Ana Wyman    Procedure: Procedure(s):  cosmetic bilateral thigh lift       Anesthesia Type:  General    Note:  Disposition: Outpatient   Postop Pain Control: Uneventful            Sign Out: Pain within normal limits for procedure.   PONV: No   Neuro/Psych: Uneventful            Sign Out: Acceptable/Baseline neuro status   Airway/Respiratory: Uneventful            Sign Out: Acceptable/Baseline resp. status   CV/Hemodynamics: Uneventful            Sign Out: Acceptable CV status; No obvious hypovolemia; No obvious fluid overload   Other NRE: NONE   DID A NON-ROUTINE EVENT OCCUR? No    Event details/Postop Comments:  Please call with questions/concerns.           Last vitals:  Vitals Value Taken Time   /75 10/27/23 1500   Temp 36.9  C (98.5  F) 10/27/23 1415   Pulse 80 10/27/23 1506   Resp 14 10/27/23 1506   SpO2 96 % 10/27/23 1506   Vitals shown include unfiled device data.    Electronically Signed By: Ash Crisostomo MD  October 27, 2023  3:08 PM

## 2023-10-27 NOTE — ANESTHESIA CARE TRANSFER NOTE
Patient: Ana Wyman    Procedure: Procedure(s):  cosmetic bilateral thigh lift       Diagnosis: Encounter for cosmetic procedure [Z41.1]  Diagnosis Additional Information: No value filed.    Anesthesia Type:   General     Note:      Level of Consciousness: awake  Oxygen Supplementation: face mask    Independent Airway: airway patency satisfactory and stable        Patient transferred to: PACU  Comments: Neuromuscular blockade reversed with sugammadex, spontaneous respirations, adequate tidal volumes, followed commands to voice, oropharynx suctioned with soft flexible catheter, extubated atraumatically, extubated with suction, airway patent after extubation.  Oxygen via facemask at 6 liters per minute to PACU. Oxygen tubing connected to wall O2 in PACU, SpO2, NiBP, and EKG monitors and alarms on and functioning, Laurent Hugger warmer connected to patient gown, report on patient's clinical status given to PACU RN, RN questions answered.         Handoff Report: Identifed the Patient, Identified the Reponsible Provider, Reviewed the pertinent medical history, Discussed the surgical course, Reviewed Intra-OP anesthesia mangement and issues during anesthesia, Set expectations for post-procedure period and Allowed opportunity for questions and acknowledgement of understanding      Vitals:  Vitals Value Taken Time   /73 10/27/23 1415   Temp     Pulse 80 10/27/23 1416   Resp 10 10/27/23 1416   SpO2 99 % 10/27/23 1416   Vitals shown include unfiled device data.    Electronically Signed By: JEOVANNY Mccurdy CRNA  October 27, 2023  2:18 PM

## 2023-10-27 NOTE — OP NOTE
PLASTIC SURGERY OPERATIVE NOTE  Patient Name:  Ana Wyman     Date of Service:  10/27/2023     PREOPERATIVE DIAGNOSES:   1.  Encounter for cosmetic procedure [Z41.1]   2.  Lipodystrophy lower extremity status post massive weight    POSTOPERATIVE DIAGNOSES:   1.  Encounter for cosmetic procedure [Z41.1]     2. Lipodystrophy lower extremity status post massive weight    SURGEON:  Liliane Mayen MD   OR STAFF:  Circulator: Marion Vázquez RN; Felicita Tompkins RN  Relief Circulator: Abigail James RN  Relief Scrub: Liza Fuentes; Martin Boswell  Scrub Person: Ami Jackson  First Assistant: Alden Contreras     ANESTHESIA:  General     ESTIMATED BLOOD LOSS:  * No values recorded between 10/27/2023 11:29 AM and 10/27/2023  2:13 PM *    SPECIMEN(S):  * No specimens in log *    INDICATIONS: 52-year-old female who underwent gastric bypass surgery and lost close to 200 pounds.  She presents today for management of excess skin and lipodystrophy of the lower extremities.      PROCEDURES:   1.  Bilateral medial thigh lift      DESCRIPTION OF PROCEDURE:  Patient was marked for incisions and taken to the operating room.  General anesthesia was administered.  The lower legs were then circumferentially prepped and draped in a sterile manner.  After marking for skin incisions, tumescent was infiltrated along the medial thighs following the preoperative markings.  Standard liposuction was then completed with 3 and 4 mm cannulas.  Additional liposuction was done in the posterior calf area bilaterally.    Excess skin was confirmed an elliptical incision was made along the medial aspect of the left leg.  The skin and subcutaneous tissue was avulsed and hemostasis was achieved.  Additional adipose tissue was removed to smooth contours.  The incision was then reapproximated erupted 2-0 PDS in the superficial fascia followed by interrupted 3-0 Monocryl in the subcutaneous tissue and a running 4-0 subcuticular Monocryl  suture.  A 15 Cypriot MISTY drain was placed and secured with 3-0 nylon suture.    Additional liposuction was done down along the inferior thigh area and the upper thigh area.  Excess skin along the left upper thigh was then removed through a groin incision.  Excess skin was marked for excision and then excised.  Hemostasis was achieved.  The incision was reapproximated erupted 2-0 PDS in the superficial fascia followed by interrupted 3-0 Monocryl in the subcutaneous tissue and a running 4-0 subcuticular Monocryl suture.    A similar procedure was completed on the other side. Excess skin was confirmed an elliptical incision was made along the medial aspect of the left leg.  The skin and subcutaneous tissue was avulsed and hemostasis was achieved.  Additional adipose tissue was removed to smooth contours.  The incision was then reapproximated erupted 2-0 PDS in the superficial fascia followed by interrupted 3-0 Monocryl in the subcutaneous tissue and a running 4-0 subcuticular Monocryl suture.  A 15 Cypriot MISTY drain was placed and secured with 3-0 nylon suture.    Additional liposuction was done down along the inferior thigh area and the upper thigh area.  Excess skin along the left upper thigh was then removed through a groin incision.  Excess skin was marked for excision and then excised.  Hemostasis was achieved.  The incision was reapproximated erupted 2-0 PDS in the superficial fascia followed by interrupted 3-0 Monocryl in the subcutaneous tissue and a running 4-0 subcuticular Monocryl suture.    Steri-Strips were placed along the incisions.  Liposuction port sites were closed with interrupted 5-0 nylon suture.    At the end right thigh tissue weighed 265 g and left thigh tissue weighed 260 g there was 1400 cc of tumescence infiltrated and 2000 ml of lipoaspirate obtained.    IMPLANTS:  * No implants in log *      MD [facility]   [phone]

## 2023-11-09 NOTE — PROGRESS NOTES
"Ana is a 53 year old who is being evaluated via a billable video visit.      The patient has been notified of following:     \"This video visit will be conducted via a call between you and your physician/provider. We have found that certain health care needs can be provided without the need for an in-person physical exam.  This service lets us provide the care you need with a video conversation.  If a prescription is necessary we can send it directly to your pharmacy.  If lab work is needed we can place an order for that and you can then stop by our lab to have the test done at a later time.    Video visits are billed at different rates depending on your insurance coverage.  Please reach out to your insurance provider with any questions.    If during the course of the call the physician/provider feels a video visit is not appropriate, you will not be charged for this service.\"    Patient has given verbal consent for Video visit? Yes    How would you like to obtain your AVS? MyChart    If the video visit is dropped, the invitation should be resent by:MY CHART  Will anyone else be joining your video visit? No    I    Video-Visit Details    Type of service:  Video Visit    Video Start Time: 8:31 AM    Video End Time:8:55 PM    Originating Location (pt. Location): Home    Distant Location (provider location):  [facility] [place]     Platform used for Video Visit: Pelon Stacy PA-C    Return Bariatric Surgery Note    RE: Ana Wyman  MR#: [id number]  : 1970  VISIT DATE: 11/10/2023    Dear Kelly Bedoya,    I had the pleasure of seeing your patient, Ana Wyman, in my post-bariatric surgery assessment clinic.    Assessment & Plan   Problem List Items Addressed This Visit       Bariatric surgery status     RYGBS on 2019 Nikunj         Obesity due to excess calories - Primary     Patient was congratulated on wt loss success thus far. Healthy habits " to assist with further weight loss were discussed. Ana will continue Wegovy and Phentermine.  Once Wegovy not covered we will continue the phentermine but can consider adding Topiramate.  Risks/ benefits and possible side effects were discussed and questions were answered. Written information was given.     We reviewed National Wt Registry and stratagies to maintain weight loss.  Ana will ad strength training when cleared form surgery.               Relevant Medications    Semaglutide-Weight Management (WEGOVY) 2.4 MG/0.75ML pen    phentermine (ADIPEX-P) 15 MG capsule        PATIENT INSTRUCTIONS:  Continue Wegovy 2.4 mg weekly  Continue Phentermine 15 mg daily. Can increase to 30 mg if needed.      When anti obesity medication not covered in 2024, we discussed possibly adding Topiramate to your Phentermine.  See info below.    Goals:  When able with recovery add in strength training to activity routine. Average 20 min 2x weekly (Can spread this out throughout week.  (5 min a day.)    FOLLOW-UP:    Please call [phone] to schedule your next visit in 3 months and also see dietician.     40  minutes spent on the date of the encounter doing chart review, history and exam, result review, counseling, developing plan of care, documentation, and further activities as noted        CHIEF COMPLAINT: Post-bariatric surgery follow-up    HISTORY OF PRESENT ILLNESS:      11/7/2023     2:28 PM   Questions Regarding Prior Weight Loss Surgery Reviewed With Patient   I had the following weight loss procedure Kay-en-y Gastric Bypass   What year was your surgery? 2019   How has your weight changed since your last visit? I have lost weight   Do you currently have any of the following None of the above   Do you have any concerns today? Just had thighplasty. I'm worried as my insurance sent a letter saying phentramine and wygovy will no longer be covered for weight management as of Jan.1, 2024     Interval Hx:   Ana returns for  medical weight management follow up.  Last seen on 8/1/2023. Is S/p RYGBS on 1/28/2019 Nikunj.  Is taking Wegovy and Phentermine.  Had thighplasty surgery  2 weeks ago.  Is doing well.  The Combo of Wegovy and Phentermine is the best weight loss combination.  Is eating 3 meals a day.  Is not hungry,  Denies side effects. Has lost 13 lbs since last visit.      Insurance will drop coverage of AOM in 2024.    Weight History:      11/7/2023     2:28 PM   --   What is your highest lifetime weight? 365   What is your lowest weight since surgery? (In pounds) 194     Initial Weight (lbs): 339 lbs  Weight: 185 lb (83.9 kg)  Last Visits Weight: 198 lb (89.8 kg)  Cumulative weight loss (lbs): 154  Weight Loss Percentage: 45.43%          11/7/2023     2:28 PM   Questions Regarding Co-Morbidities and Health Concerns Reviewed With Patient   Pre-diabetes Gone away   Diabetes II Never   High Blood Pressure Never   High cholesterol Never   Heartburn/Reflux Improved   Sleep apnea Gone away   PCOS Never   Back pain Never   Joint pain Never   Lower leg swelling Gone away           11/7/2023     2:28 PM   Eating Habits   How many meals do you eat per day? 3   Do you snack between meals? Sometimes   How much food are you eating at each meal? 1/2 cup to 1 cup   Are you able to separate your meals and liquids by at least 30 minutes? Yes   Are you able to avoid liquid calories? Yes           11/7/2023     2:28 PM   Exercise Questions Reviewed With Patient   How often do you exercise? 3 to 4 times per week   What is the duration of your exercise (in minutes)? 60+ Minutes   What types of exercise do you do? walking   What keeps you from being more active? I am as active as I can possbily be    I have just had surgery on one or more of my joints       Social History:      11/7/2023     2:28 PM   --   Are you smoking? No   Are you drinking alcohol? No       Medications:  Current Outpatient Medications   Medication    biotin 5 MG CAPS    Calcium  Citrate-Vitamin D (CALCIUM CITRATE CHEWY BITE PO)    Multiple Vitamins-Minerals (BARIATRIC MULTIVITAMINS/IRON PO)    nitroGLYcerin (NITRO-BID) 2 % OINT ointment    ondansetron (ZOFRAN ODT) 4 MG ODT tab    phentermine (ADIPEX-P) 15 MG capsule    polyethylene glycol (MIRALAX/GLYCOLAX) packet    Semaglutide-Weight Management (WEGOVY) 2.4 MG/0.75ML pen    vitamin D3 (CHOLECALCIFEROL) 50 mcg (2000 units) tablet     No current facility-administered medications for this visit.         11/7/2023     2:28 PM   --   Do you avoid NSAIDs such as (Ibuprofen, Aleve, Naproxen, Advil)? Yes       ROS:  GI:       11/7/2023     2:28 PM   --   Vomiting No   Diarrhea Yes   Constipation Yes   Swallowing trouble No   Abdominal pain No   Heartburn Yes     Skin:       11/7/2023     2:28 PM   BAR RBS ROS - SKIN   Rash in skin folds No     Psych:       11/7/2023     2:28 PM   --   Depression No   Anxiety Yes     Female Only:       11/7/2023     2:28 PM   BAR RBS ROS -    Female only Loss of menstrual cycles   Stress urinary incontinence No       LABS/IMAGING/MEDICAL RECORDS REVIEW:   Hemoglobin A1C   Date Value Ref Range Status   04/14/2022 5.4 0.0 - 5.6 % Final     Comment:     Normal <5.7%   Prediabetes 5.7-6.4%    Diabetes 6.5% or higher     Note: Adopted from ADA consensus guidelines.     Vitamin D, Total (25-Hydroxy)   Date Value Ref Range Status   12/29/2022 43 20 - 75 ug/L Final     Parathyroid Hormone Intact   Date Value Ref Range Status   12/29/2022 30 15 - 65 pg/mL Final     Vitamin B12   Date Value Ref Range Status   12/29/2022 738 232 - 1,245 pg/mL Final     Hemoglobin   Date Value Ref Range Status   07/18/2023 13.5 11.7 - 15.7 g/dL Final     Ferritin   Date Value Ref Range Status   12/29/2022 54 11 - 328 ng/mL Final     Iron   Date Value Ref Range Status   12/29/2022 64 37 - 145 ug/dL Final     Iron Binding Capacity   Date Value Ref Range Status   12/29/2022 327 240 - 430 ug/dL Final     Iron Sat Index   Date Value Ref Range  "Status   12/29/2022 20 15 - 46 % Final   07/19/2022 14 (L) 15 - 46 % Final   04/19/2022 24 15 - 46 % Final     Vitamin A   Date Value Ref Range Status   12/29/2022 0.62 0.30 - 1.20 mg/L Final       PHYSICAL EXAMINATION:  Ht 5' 5\" (1.651 m)   Wt 185 lb (83.9 kg)   LMP 10/01/2016 (Approximate)   BMI 30.79 kg/m    GENERAL: Healthy, alert and no distress  EYES: Eyes grossly normal to inspection.  No discharge or erythema, or obvious scleral/conjunctival abnormalities.  RESP: No audible wheeze, cough, or visible cyanosis.  No visible retractions or increased work of breathing.    SKIN: Visible skin clear. No significant rash, abnormal pigmentation or lesions.  NEURO: Cranial nerves grossly intact.  Mentation and speech appropriate for age.  PSYCH: Mentation appears normal, affect normal/bright, judgement and insight intact, normal speech and appearance well-groomed.    COUNSELING PROVIDED:  We reviewed the important post op bariatric recommendations:  eating 3 meals daily  eating protein first, getting >60gm protein daily  eating slowly, chewing food well  avoiding/limiting calorie containing beverages  avoiding fluids 30 minutes before, during, and after meals  limiting restaurant or cafeteria eating to twice a week or less  Pt reminded to avoid marginal ulcers she should avoid tobacco at all, alcohol in excess, caffeine, and NSAIDS (unless indicated for cardioprotection or otherwise and opposed by a PPI).  Pt encouraged to establish and maintain a consistent physical activity routine, 6-8 hours of restorative sleep each night and optimal stress management.  Pt counseled on the importance of life long vitamin supplementation and life long follow up.    Sincerely,    Sarah Stacy PA-C        "

## 2023-11-10 ENCOUNTER — VIRTUAL VISIT (OUTPATIENT)
Dept: SURGERY | Facility: CLINIC | Age: 53
End: 2023-11-10
Payer: COMMERCIAL

## 2023-11-10 VITALS — WEIGHT: 185 LBS | HEIGHT: 65 IN | BODY MASS INDEX: 30.82 KG/M2

## 2023-11-10 DIAGNOSIS — E66.09 CLASS 1 OBESITY DUE TO EXCESS CALORIES WITH SERIOUS COMORBIDITY AND BODY MASS INDEX (BMI) OF 30.0 TO 30.9 IN ADULT: Primary | ICD-10-CM

## 2023-11-10 DIAGNOSIS — E66.811 CLASS 1 OBESITY DUE TO EXCESS CALORIES WITH SERIOUS COMORBIDITY AND BODY MASS INDEX (BMI) OF 30.0 TO 30.9 IN ADULT: Primary | ICD-10-CM

## 2023-11-10 DIAGNOSIS — Z98.84 BARIATRIC SURGERY STATUS: ICD-10-CM

## 2023-11-10 PROCEDURE — 99215 OFFICE O/P EST HI 40 MIN: CPT | Mod: VID | Performed by: PHYSICIAN ASSISTANT

## 2023-11-10 RX ORDER — SEMAGLUTIDE 2.4 MG/.75ML
2.4 INJECTION, SOLUTION SUBCUTANEOUS WEEKLY
Qty: 9 ML | Refills: 1 | Status: SHIPPED | OUTPATIENT
Start: 2023-11-10 | End: 2024-05-20

## 2023-11-10 RX ORDER — PHENTERMINE HYDROCHLORIDE 15 MG/1
CAPSULE ORAL
Qty: 180 CAPSULE | Refills: 1 | Status: SHIPPED | OUTPATIENT
Start: 2023-11-10 | End: 2024-02-13

## 2023-11-10 NOTE — PATIENT INSTRUCTIONS
"To ensure quality you may receive a patient satisfaction survey. The greatest compliment you can give is \"Likely to Recommend.\"    Nice to talk with you today.  Thank you for your trust. Below is the plan discussed.-  DORIAN Smith      Plan:  Continue Wegovy 2.4 mg weekly  Continue Phentermine 15 mg daily. Can increase to 30 mg if needed.      When anti obesity medication not covered in 2024, we discussed possibly adding Topiramate to your Phentermine.  See info below.    Goals:  When able with recovery add in strength training to activity routine. Average 20 min 2x weekly (Can spread this out throughout week.  (5 min a day.)    FOLLOW-UP:    Please call [phone] to schedule your next visit in 3 months and also se dietician.     Strength Training Resources  Weight Management Strengthening Exercises   [email or web address]    Seated Exercises for Arms and Legs  [email or web address]    No Equipment Home Exercise Lists from [facility] [employer]   [email or web address]://www.[email or web address]/public/upload/files/[email or web address]_Divtgxeb_Zflyaxpj.pdf    Muscle Conditioning: Helping You Get and Stay Fit  [email or web address]    Strength training YouTube workouts  https://[email or web address]/user/KozakSportsPerform    [facility] Fitness Basics: Strength Training How-to video collection  https://www.[facility].org/healthy-lifestyle/fitness/basics/fitness-basics/hlv-53963019    Here are two websites that show some beginning strength training exercises.  https://www.[employer].com/2022/10/12/well/move/strength-training-beginners-guide.html  [email or web address]://www.[employer]/fitness/workouts/strength-training-beginners         TOPIRAMATE (TOPAMAX)    Topiramate (Topamax) is a medication that is used most often to treat migraine headaches or for seizures. It has also been found to help with weight loss. Although it's not currently FDA approved for weight loss, it has been used safely for a number of " years to help people who are carrying extra weight.     Just how topiramate helps with weight loss has not been exactly determined. However it seems to work on areas of the brain to quiet down signals related to eating.      Topiramate may make you:    >feel less interest in eating in between meals   >think less about food and eating   >find it easier to push the plate away   >find giving up pop easier    >have an easier time eating less    Instructions:  Week 1:Take 1 tablet (25 mg) at bedtime  Week 2 Take 2 tablets (50 mg) at bedtime  Week 3:Take 3 tablets (75 mg) at bedtime Stay at 3 tabs until you are seen again.     For some of our patients, the pills work right away. They feel and think quite differently about food. Other patients don't feel much of a change but find in fact they have lost weight! Like all weight loss medications, topiramate works best when you help it work.  This means:   1) Have less tempting high calorie (fattening) food around the house or office    2) Have lower calorie food (fruits, vegetables,low fat meats and dairy) for snacks    3) Eat out only one time or less each week.   4) Eat your meals at a table with the TV or computer off.    Side-effects Topiramate is generally well tolerated. The main side-effects we see are:   Tingling in hands,feet, or face (usually not very troublesome)   Mental confusion and word finding trouble (about 10% of patients have this.)     Feeling sleepy or a bit dopey- this goes away very soon after starting.       MAINTAIN WEIGHT and OPTIMIZE YOUR METABOLISM     According to the National Weight Control Registry there are several things that people who have lost weight and kept it off have in common. Some of them are...    3 MEALS A DAY:  Make sure you are eating 3 meals each day.  Skipping breakfast, working through your lunch, or not eating dinner will lead to a slowing of your metabolism.  Studies show that 80% of people who skip meals are overweight or  "obese. Avoid \"mindless\" eating, i.e., eating at the TV, and the car, in front of the computer.    ADEQUATE PROTEIN INTAKE: Getting adequate protein is beneficial for a number of reasons: to aid the healing process, to blunt cravings immediately after eating and for a period of time after eating, to help keep blood sugars level, and to help you maintain your muscle mass.     HIGH FIBER/LOW FAT:  Lean sources of protein (skim milk, skinless, baked or broiled chicken breast, fish, etc.)  will help you meet your protein needs while fruits, vegetables, and whole grains will help you get the fiber that your body needs.  This is heart healthy eating and helps to keep calorie levels in balance.    FOOD DIARY:  Much like keeping a ledger for your checkbook, keeping a food and exercise diary helps you \"keep track\" of the balance of energy (calories) in and energy out. It also helps you recognize potential unhealthy deviations from healthy patterns before they become habit. It's a nice way to monitor whether you are getting the protein, fiber, and other nutrients that your body needs.    MOVE EVERY DAY:  It is essential to get your steps in every day, 7 days a week.  You don't have to \"work out \" 7 days a week, but throughout the day, getting 8000 steps will help you maintain the weight you have lost.  Parking far away, taking the stairs instead of the elevator, and pacing while on the phone are some ways to help achieve this goal.    MUSCLE MAINTENANCE: Muscle burns calories up to 70% better than fat.  As we age her body composition changes. We lose muscle mass.  Weight training can help us keep and even build muscle mass.  Dumbbells, pushups, rubber band training, weight machines are all examples of ways to keep and/or build muscle mass.    FOLLOW-UP:  Studies show that those who follow up with their health professional regularly maintained their weight loss and those who are \"lost to follow-up \"are at risk for regain.  " Moreover, it is essential to monitor vitamin levels with laboratory studies for life following gastric bypass surgery.     EAT AT HOME:  People who maintain a healthy weight eat at home, or meal prepared at home, 90% of the time.  Studies show that people consume an average of 770 juanita when eating out at a restaurant and 440 juanita when eating a meal prepared at home.  This equates to almost 35 pounds of excess weight for a person who eats out once a day for 1 year.

## 2023-11-10 NOTE — ASSESSMENT & PLAN NOTE
Patient was congratulated on wt loss success thus far. Healthy habits to assist with further weight loss were discussed. Ana will continue Wegovy and Phentermine.  Once Wegovy not covered we will continue the phentermine but can consider adding Topiramate.  Risks/ benefits and possible side effects were discussed and questions were answered. Written information was given.     We reviewed National Wt Registry and stratagies to maintain weight loss.  Ana will ad strength training when cleared form surgery.

## 2023-11-19 ENCOUNTER — HEALTH MAINTENANCE LETTER (OUTPATIENT)
Age: 53
End: 2023-11-19

## 2023-11-28 ENCOUNTER — TRANSFERRED RECORDS (OUTPATIENT)
Dept: HEALTH INFORMATION MANAGEMENT | Facility: CLINIC | Age: 53
End: 2023-11-28

## 2023-12-04 ENCOUNTER — TRANSFERRED RECORDS (OUTPATIENT)
Dept: HEALTH INFORMATION MANAGEMENT | Facility: CLINIC | Age: 53
End: 2023-12-04
Payer: COMMERCIAL

## 2023-12-29 ENCOUNTER — MYC MEDICAL ADVICE (OUTPATIENT)
Dept: PEDIATRICS | Facility: CLINIC | Age: 53
End: 2023-12-29
Payer: COMMERCIAL

## 2023-12-29 ENCOUNTER — NURSE TRIAGE (OUTPATIENT)
Dept: PEDIATRICS | Facility: CLINIC | Age: 53
End: 2023-12-29
Payer: COMMERCIAL

## 2023-12-29 DIAGNOSIS — U07.1 INFECTION DUE TO 2019 NOVEL CORONAVIRUS: Primary | ICD-10-CM

## 2023-12-29 DIAGNOSIS — J45.20 MILD INTERMITTENT ASTHMA WITHOUT COMPLICATION: ICD-10-CM

## 2023-12-29 RX ORDER — ALBUTEROL SULFATE 90 UG/1
2 AEROSOL, METERED RESPIRATORY (INHALATION) EVERY 6 HOURS PRN
Qty: 18 G | Refills: 1 | Status: SHIPPED | OUTPATIENT
Start: 2023-12-29 | End: 2024-05-20

## 2023-12-29 NOTE — TELEPHONE ENCOUNTER
Dr. Bedoya,    Mirella protocol completed with patient, I was able to send it in.     Disposition is to speak with PCP due to the mild SOB with walking she is experiencing. She does have a history of mild intermittent asthma without complication.     Albuterol inhaler is in her medication history. Script?     Please advise on any further recommendations. Thank you!    MANISHA Chandler on 12/29/2023 at 7:47 AM

## 2023-12-29 NOTE — TELEPHONE ENCOUNTER
Called patient and notified of script for inhaler.     MANISHA Chandler on 12/29/2023 at 9:28 AM

## 2023-12-29 NOTE — TELEPHONE ENCOUNTER
Provider Response to 2nd Level Triage Request    I have reviewed the RN documentation. My recommendation is:  Provide with albuterol inhaler to start using - sent to pharmacy    OK to monitor at home.  Please give return precautions.  Kelly Bedoya MD

## 2023-12-29 NOTE — TELEPHONE ENCOUNTER
Ana MACIEL Ea Support Team B (Ma-Tc) (supporting Kelly Bedoya MD)55 minutes ago (6:23 AM)       Kong Mendoza,     I have tested positive for COVID - yesterday at [facility]. I believe symptoms started [place] day. Could you please send an Rx for Paxlovid to the [facility] pharmacy?     Thanks!  Ana         S-(situation): Tested positive yesterday    B-(background): Symptoms started [place] day    A-(assessment): Worst symptoms is post nasal drip, sore throat, cough, fatigue. Feels chest is tight when coughing, slight SOB. Chills, muscle aches. Last vacicne was this fall. History of asthma, lives with 84 year old mother who has many chronic illnesses.     R-(recommendations): Home care and paxlovid treatment    Reason for Disposition   MILD difficulty breathing (e.g., minimal/no SOB at rest, SOB with walking, pulse <100)    Protocols used: Coronavirus (COVID-19) Diagnosed or Dyklvhtgi-M-ZT      RN COVID TREATMENT VISIT  12/29/23      The patient has been triaged and does not require a higher level of care.    Ana Wyman  53 year old  Current weight? 183.0 lb    Has the patient been seen by a primary care provider at an [facility] or [facility] within the past two years? Yes.   Have you been in close proximity to/do you have a known exposure to a person with a confirmed case of influenza? No.     General treatment eligibility:  Date of positive COVID test (PCR or at home)?  12/28/2023    Are you or have you been hospitalized for this COVID-19 infection? No.   Have you received monoclonal antibodies or antiviral treatment for COVID-19 since this positive test? No.   Do you have any of the following conditions that place you at risk of being very sick from COVID-19?   - Age 50 years or older  - Chronic lung diseases such as asthma, bronchiectasis, COPD, interstitial lung disease, pulmonary embolism, pulmonary hypertension   - Overweight or Obesity  (BMI >85th percentile or BMI 25 or higher)  Yes, patient has at least one high risk condition as noted above.     Current COVID symptoms:   - fever or chills  - cough  - shortness of breath or difficulty breathing  - fatigue  - muscle or body aches  - headache  - sore throat  - congestion or runny nose  Yes. Patient has at least one symptom as selected.     How many days since symptoms started? 5 days or less. Established patient, 12 years or older weighing at least 88.2 lbs, who has symptoms that started in the past 5 days, has not been hospitalized nor received treatment already, and is at risk for being very sick from COVID-19.     Treatment eligibility by RN:  Are you currently pregnant or nursing? No  Do you have a clinically significant hypersensitivity to nirmatrelvir or ritonavir, or toxic epidermal necrolysis (TEN) or Oleary-Ky Syndrome? No  Do you have a history of hepatitis, any hepatic impairment on the Problem List (such as Child-Mcallister Class C, cirrhosis, fatty liver disease, alcoholic liver disease), or was the last liver lab (hepatic panel, ALT, AST, ALK Phos, bilirubin) elevated in the past 6 months? No  Do you have any history of severe renal impairment (eGFR < 30mL/min)? No    Is patient eligible to continue? Yes, patient meets all eligibility requirements for the RN COVID treatment (as denoted by all no responses above).     Current Outpatient Medications   Medication Sig Dispense Refill    biotin 5 MG CAPS Take 5,000 mcg by mouth daily At noon      Calcium Citrate-Vitamin D (CALCIUM CITRATE CHEWY BITE PO) Take 500 mg by mouth 3 times daily Plus D,noon, supper, HS; Bariatric Advantage calcium citrate 500 mcg/vitamin D 500 international unit(s) per chew      Multiple Vitamins-Minerals (BARIATRIC MULTIVITAMINS/IRON PO) Take 1 capsule by mouth daily Bariatric Advantage Ultra Solo with iron      nitroGLYcerin (NITRO-BID) 2 % OINT ointment Place 0.5 inches (7.5 mg) onto the skin daily as needed  (Apply 0.5in to tips of fingers once daily prior to cold exposure) 60 g 1    ondansetron (ZOFRAN ODT) 4 MG ODT tab Take 1 tablet (4 mg) by mouth every 8 hours as needed for nausea 10 tablet 0    phentermine (ADIPEX-P) 15 MG capsule Take 1 capsule in the morning.  May increase to 2 capsules if needed. 180 capsule 1    polyethylene glycol (MIRALAX/GLYCOLAX) packet Take 17 g by mouth daily 90 packet 3    Semaglutide-Weight Management (WEGOVY) 2.4 MG/0.75ML pen Inject 2.4 mg Subcutaneous once a week 9 mL 1    vitamin D3 (CHOLECALCIFEROL) 50 mcg (2000 units) tablet Take 1 capsule by mouth every morning          Medications from List 1 of the standing order (on medications that exclude the use of Paxlovid) that patient is taking: NONE. Is patient taking Ethan's Wort? No  Is patient taking Glendora's Wort or any meds from List 1? No.   Medications from List 2 of the standing order (on meds that provider needs to adjust) that patient is taking: NONE. Is patient on any of the meds from List 2? No.   Medications from List 3 of standing order (on meds that a RN needs to adjust) that patient is taking: NONE. Is patient on any meds from List 3? No.     Paxlovid has an approximate 90% reduction in hospitalization. Paxlovid can possibly cause altered sense of taste, diarrhea (loose, watery stools), high blood pressure, muscle aches.     Would patient like a Paxlovid prescription?   Yes.   Lab Results   Component Value Date    GFRESTIMATED 82 07/18/2023       Was last eGFR reduced? No, eGFR 60 or greater/ No Result on record. Patient can receive the normal renal function dose. Paxlovid Rx sent to [facility]   [facility] [phone] [id number] [facility], [address] [address]    Hours:  Mon-Fri: 8:00a - 6:00p  Sat-Sun: 8:00a - 4:00p    Drive-thru services available.    Temporary change to home medications: None    All medication adjustments (holds, etc) were discussed with the patient and patient was  asked to repeat back (teachback) their med adjustment.  Did patient understand med adjustment? No medication adjustments needed.         Reviewed the following instructions with the patient:    Paxlovid (nimatrelvir and ritonavir)    How it works  Two medicines (nirmatrelvir and ritonavir) are taken together. They stop the virus from growing. Less amount of virus is easier for your body to fight.    How to take  Medicine comes in a daily container with both medicine tablets. Take by mouth twice daily (once in the morning, once at night) for 5 days.  The number of tablets to take varies by patient.  Don't chew or break capsules. Swallow whole.    When to take  Take as soon as possible after positive COVID-19 test result, and within 5 days of your first symptoms.    Possible side effects  Can cause altered sense of taste, diarrhea (loose, watery stools), high blood pressure, muscle aches.    Geraldine Rosa RN

## 2024-01-13 ENCOUNTER — APPOINTMENT (OUTPATIENT)
Dept: CT IMAGING | Facility: CLINIC | Age: 54
End: 2024-01-13
Attending: EMERGENCY MEDICINE
Payer: COMMERCIAL

## 2024-01-13 ENCOUNTER — HOSPITAL ENCOUNTER (EMERGENCY)
Facility: CLINIC | Age: 54
Discharge: HOME | End: 2024-01-14
Attending: EMERGENCY MEDICINE | Admitting: EMERGENCY MEDICINE
Payer: COMMERCIAL

## 2024-01-13 DIAGNOSIS — N39.0 ACUTE UTI: ICD-10-CM

## 2024-01-13 DIAGNOSIS — G43.109 OCULAR MIGRAINE: ICD-10-CM

## 2024-01-13 LAB
ALBUMIN UR-MCNC: NEGATIVE MG/DL
ANION GAP SERPL CALCULATED.3IONS-SCNC: 8 MMOL/L (ref 7–15)
APPEARANCE UR: CLEAR
BACTERIA #/AREA URNS HPF: ABNORMAL /HPF
BASOPHILS # BLD AUTO: 0.1 10E3/UL (ref 0–0.2)
BASOPHILS NFR BLD AUTO: 1 %
BILIRUB UR QL STRIP: NEGATIVE
BUN SERPL-MCNC: 11.8 MG/DL (ref 6–20)
CALCIUM SERPL-MCNC: 9.9 MG/DL (ref 8.6–10)
CHLORIDE SERPL-SCNC: 105 MMOL/L (ref 98–107)
COLOR UR AUTO: ABNORMAL
CREAT SERPL-MCNC: 0.8 MG/DL (ref 0.51–0.95)
DEPRECATED HCO3 PLAS-SCNC: 29 MMOL/L (ref 22–29)
EGFRCR SERPLBLD CKD-EPI 2021: 87 ML/MIN/1.73M2
EOSINOPHIL # BLD AUTO: 0.2 10E3/UL (ref 0–0.7)
EOSINOPHIL NFR BLD AUTO: 3 %
ERYTHROCYTE [DISTWIDTH] IN BLOOD BY AUTOMATED COUNT: 12.7 % (ref 10–15)
FLUAV RNA SPEC QL NAA+PROBE: NEGATIVE
FLUBV RNA RESP QL NAA+PROBE: NEGATIVE
GLUCOSE SERPL-MCNC: 92 MG/DL (ref 70–99)
GLUCOSE UR STRIP-MCNC: NEGATIVE MG/DL
HCT VFR BLD AUTO: 43.2 % (ref 35–47)
HGB BLD-MCNC: 14 G/DL (ref 11.7–15.7)
HGB UR QL STRIP: NEGATIVE
IMM GRANULOCYTES # BLD: 0 10E3/UL
IMM GRANULOCYTES NFR BLD: 0 %
INR PPP: 0.96 (ref 0.85–1.15)
KETONES UR STRIP-MCNC: NEGATIVE MG/DL
LEUKOCYTE ESTERASE UR QL STRIP: ABNORMAL
LYMPHOCYTES # BLD AUTO: 2.5 10E3/UL (ref 0.8–5.3)
LYMPHOCYTES NFR BLD AUTO: 33 %
MCH RBC QN AUTO: 29.7 PG (ref 26.5–33)
MCHC RBC AUTO-ENTMCNC: 32.4 G/DL (ref 31.5–36.5)
MCV RBC AUTO: 92 FL (ref 78–100)
MONOCYTES # BLD AUTO: 0.6 10E3/UL (ref 0–1.3)
MONOCYTES NFR BLD AUTO: 7 %
MUCOUS THREADS #/AREA URNS LPF: PRESENT /LPF
NEUTROPHILS # BLD AUTO: 4.3 10E3/UL (ref 1.6–8.3)
NEUTROPHILS NFR BLD AUTO: 56 %
NITRATE UR QL: NEGATIVE
NRBC # BLD AUTO: 0 10E3/UL
NRBC BLD AUTO-RTO: 0 /100
PH UR STRIP: 6.5 [PH] (ref 5–7)
PLATELET # BLD AUTO: 367 10E3/UL (ref 150–450)
POTASSIUM SERPL-SCNC: 4.4 MMOL/L (ref 3.4–5.3)
RBC # BLD AUTO: 4.71 10E6/UL (ref 3.8–5.2)
RBC URINE: 3 /HPF
RSV RNA SPEC NAA+PROBE: NEGATIVE
SARS-COV-2 RNA RESP QL NAA+PROBE: NEGATIVE
SODIUM SERPL-SCNC: 142 MMOL/L (ref 135–145)
SP GR UR STRIP: 1.01 (ref 1–1.03)
SQUAMOUS EPITHELIAL: 3 /HPF
TROPONIN T SERPL HS-MCNC: 6 NG/L
UROBILINOGEN UR STRIP-MCNC: NORMAL MG/DL
WBC # BLD AUTO: 7.7 10E3/UL (ref 4–11)
WBC URINE: 54 /HPF

## 2024-01-13 PROCEDURE — 87637 SARSCOV2&INF A&B&RSV AMP PRB: CPT | Performed by: EMERGENCY MEDICINE

## 2024-01-13 PROCEDURE — 85610 PROTHROMBIN TIME: CPT | Performed by: EMERGENCY MEDICINE

## 2024-01-13 PROCEDURE — 70450 CT HEAD/BRAIN W/O DYE: CPT

## 2024-01-13 PROCEDURE — 96361 HYDRATE IV INFUSION ADD-ON: CPT

## 2024-01-13 PROCEDURE — 96374 THER/PROPH/DIAG INJ IV PUSH: CPT | Mod: 59

## 2024-01-13 PROCEDURE — 84484 ASSAY OF TROPONIN QUANT: CPT | Performed by: EMERGENCY MEDICINE

## 2024-01-13 PROCEDURE — 36415 COLL VENOUS BLD VENIPUNCTURE: CPT | Performed by: EMERGENCY MEDICINE

## 2024-01-13 PROCEDURE — 250N000011 HC RX IP 250 OP 636: Performed by: EMERGENCY MEDICINE

## 2024-01-13 PROCEDURE — 96375 TX/PRO/DX INJ NEW DRUG ADDON: CPT | Mod: 59

## 2024-01-13 PROCEDURE — 70496 CT ANGIOGRAPHY HEAD: CPT

## 2024-01-13 PROCEDURE — 81001 URINALYSIS AUTO W/SCOPE: CPT | Performed by: EMERGENCY MEDICINE

## 2024-01-13 PROCEDURE — 99285 EMERGENCY DEPT VISIT HI MDM: CPT | Mod: 25

## 2024-01-13 PROCEDURE — 258N000003 HC RX IP 258 OP 636: Performed by: EMERGENCY MEDICINE

## 2024-01-13 PROCEDURE — 85025 COMPLETE CBC W/AUTO DIFF WBC: CPT | Performed by: EMERGENCY MEDICINE

## 2024-01-13 PROCEDURE — 93005 ELECTROCARDIOGRAM TRACING: CPT | Mod: RTG

## 2024-01-13 PROCEDURE — 87086 URINE CULTURE/COLONY COUNT: CPT | Performed by: EMERGENCY MEDICINE

## 2024-01-13 PROCEDURE — 80048 BASIC METABOLIC PNL TOTAL CA: CPT | Performed by: EMERGENCY MEDICINE

## 2024-01-13 PROCEDURE — 250N000009 HC RX 250: Performed by: EMERGENCY MEDICINE

## 2024-01-13 RX ORDER — DEXAMETHASONE SODIUM PHOSPHATE 10 MG/ML
10 INJECTION, SOLUTION INTRAMUSCULAR; INTRAVENOUS ONCE
Status: COMPLETED | OUTPATIENT
Start: 2024-01-13 | End: 2024-01-13

## 2024-01-13 RX ORDER — DIPHENHYDRAMINE HYDROCHLORIDE 50 MG/ML
25 INJECTION INTRAMUSCULAR; INTRAVENOUS ONCE
Status: COMPLETED | OUTPATIENT
Start: 2024-01-13 | End: 2024-01-13

## 2024-01-13 RX ORDER — IOPAMIDOL 755 MG/ML
500 INJECTION, SOLUTION INTRAVASCULAR ONCE
Status: COMPLETED | OUTPATIENT
Start: 2024-01-13 | End: 2024-01-13

## 2024-01-13 RX ADMIN — IOPAMIDOL 67 ML: 755 INJECTION, SOLUTION INTRAVENOUS at 23:01

## 2024-01-13 RX ADMIN — PROCHLORPERAZINE EDISYLATE 10 MG: 5 INJECTION INTRAMUSCULAR; INTRAVENOUS at 22:09

## 2024-01-13 RX ADMIN — SODIUM CHLORIDE 1000 ML: 9 INJECTION, SOLUTION INTRAVENOUS at 22:06

## 2024-01-13 RX ADMIN — DIPHENHYDRAMINE HYDROCHLORIDE 25 MG: 50 INJECTION INTRAMUSCULAR; INTRAVENOUS at 22:09

## 2024-01-13 RX ADMIN — SODIUM CHLORIDE 80 ML: 9 INJECTION, SOLUTION INTRAVENOUS at 23:01

## 2024-01-13 RX ADMIN — DEXAMETHASONE SODIUM PHOSPHATE 10 MG: 10 INJECTION, SOLUTION INTRAMUSCULAR; INTRAVENOUS at 22:09

## 2024-01-13 ASSESSMENT — ACTIVITIES OF DAILY LIVING (ADL): ADLS_ACUITY_SCORE: 35

## 2024-01-14 VITALS
SYSTOLIC BLOOD PRESSURE: 117 MMHG | RESPIRATION RATE: 16 BRPM | HEART RATE: 87 BPM | OXYGEN SATURATION: 95 % | TEMPERATURE: 98.6 F | DIASTOLIC BLOOD PRESSURE: 84 MMHG

## 2024-01-14 PROCEDURE — 250N000013 HC RX MED GY IP 250 OP 250 PS 637: Performed by: EMERGENCY MEDICINE

## 2024-01-14 RX ORDER — CEPHALEXIN 500 MG/1
500 CAPSULE ORAL ONCE
Status: COMPLETED | OUTPATIENT
Start: 2024-01-14 | End: 2024-01-14

## 2024-01-14 RX ORDER — CEPHALEXIN 500 MG/1
500 CAPSULE ORAL 2 TIMES DAILY
Qty: 14 CAPSULE | Refills: 0 | Status: SHIPPED | OUTPATIENT
Start: 2024-01-14 | End: 2024-01-21

## 2024-01-14 RX ADMIN — CEPHALEXIN 500 MG: 500 CAPSULE ORAL at 00:17

## 2024-01-14 NOTE — ED PROVIDER NOTES
History     Chief Complaint:  Headache       HPI   Ana Wyman is a 53 year old female with hx of migraines in [facility] non since with right sided retroorbital headache without adrienne vision changes or focal neuro deficits since awakening today.  Feels lightheaded with this and had some stumbles closing car door and walking up the stairs with near syncope without acute focal traumatic injury or fall.  No gait changes.  No vision changes.  No arm leg weakness or sensation loss.  No speech changes or facial deficits.   No fever no chills.  No throat or neck pain.  Nausea.  Without vomiting.  No vertigo.  No CP or SOB.  No abd pain.        Independent Historian:    Family    Review of External Notes:      Medications:    cephALEXin (KEFLEX) 500 MG capsule  albuterol (PROAIR HFA/PROVENTIL HFA/VENTOLIN HFA) 108 (90 Base) MCG/ACT inhaler  biotin 5 MG CAPS  Calcium Citrate-Vitamin D (CALCIUM CITRATE CHEWY BITE PO)  Multiple Vitamins-Minerals (BARIATRIC MULTIVITAMINS/IRON PO)  nitroGLYcerin (NITRO-BID) 2 % OINT ointment  ondansetron (ZOFRAN ODT) 4 MG ODT tab  phentermine (ADIPEX-P) 15 MG capsule  polyethylene glycol (MIRALAX/GLYCOLAX) packet  Semaglutide-Weight Management (WEGOVY) 2.4 MG/0.75ML pen  vitamin D3 (CHOLECALCIFEROL) 50 mcg (2000 units) tablet        Past Medical History:    Past Medical History:   Diagnosis Date    Allergic rhinitis, cause unspecified     Cellulitis 2005    Complication of anesthesia     DUB (dysfunctional uterine bleeding)     Esophageal reflux     Fibroids     Genital problems     GERD (gastroesophageal reflux disease)     Hallux valgus (acquired)     History of steroid therapy     Hypersomnia with sleep apnea, unspecified     Kidney stone 05/2018    Lichen sclerosus 07/14/2011    Lymph edema 2010- present    Lymphedema bilateral with mixed lipedema. 09/30/2015    Lymphedema bilateral with mixed lipedema. 09/30/2015    Morbid obesity (H) 03/19/2013    Pneumonia     PONV  (postoperative nausea and vomiting)     Pre-diabetes     Tendonitis currently    Uncomplicated asthma     Urinary tract infection     Vision disorder [id number]    Vitamin D deficiency 03/14/2015       Past Surgical History:    Past Surgical History:   Procedure Laterality Date    COLONOSCOPY  05/15/2013    Procedure: COLONOSCOPY;  Colonoscopy;  Surgeon: Kota Blanco MD;  Location: [facility]    COLONOSCOPY N/A 10/21/2019    Procedure: COLONOSCOPY, with biopsies by cold forceps;  Surgeon: Lydia Vickers MD;  Location:  GI    COSMETIC ABDOMINOPLASTY Bilateral 2/25/2021    Procedure: COSMETIC BILATERAL BELT LIPECTOMY;  Surgeon: Wade Marquez MD;  Location: [facility] OR    COSMETIC LIFT LOWER EXTREMITY BILATERAL (MEDIAL THIGHS) Bilateral 10/27/2023    Procedure: cosmetic bilateral thigh lift;  Surgeon: Liliane Mayen MD;  Location:  OR    COSMETIC LIPOSUCTION, BRACHIOPLASTY, COMBINED Bilateral 8/11/2023    Procedure: BILATERAL COSMETIC BRACHIOPLASTY;  Surgeon: Liliane Mayen MD;  Location:  OR    GASTRIC BYPASS      GYN SURGERY  2016    Uterine Ablation    INCISION AND DRAINAGE TRUNK, COMBINED Left 2/25/2021    Procedure: EVACUATION OF HEMATOMA;  Surgeon: Wade Marquez MD;  Location:  OR    LAPAROSCOPIC BYPASS GASTRIC, CHOLECYSTECTOMY, COMBINED N/A 01/28/2019    Procedure: LAPAROSCOPIC GASTRIC BYPASS;  Surgeon: Maykel Calvin MD;  Location:  OR    LAPAROSCOPIC CHOLECYSTECTOMY N/A 04/11/2019    Procedure: LAPAROSCOPIC CHOLECYSTECTOMY;  Surgeon: Maykel Calvin MD;  Location:  OR    lichen sclerosis      MAMMOPLASTY REDUCTION BILATERAL Bilateral 8/11/2023    Procedure: MAMMOPLASTY, REDUCTION, BILATERAL;  Surgeon: Liliane Mayen MD;  Location:  OR    ORTHOPEDIC SURGERY      PANNICULECTOMY N/A 2/25/2021    Procedure: PANNICULECTOMY;  Surgeon: Wade Marquez MD;  Location:  OR    VASCULAR SURGERY  2015    Vienous closure on both legs    vein closure   12/2016    to prevent lymphedema    [facility] NONSPECIFIC PROCEDURE  1994    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    [facility] NONSPECIFIC PROCEDURE  1999,2001    Foot surgeries x 2 (bunionectomy)    [facility] NONSPECIFIC PROCEDURE  2000    hammer toes          Physical Exam   Patient Vitals for the past 24 hrs:   BP Temp Temp src Pulse Resp SpO2   01/13/24 2300 -- -- -- -- -- 96 %   01/13/24 2226 -- -- -- -- -- 98 %   01/13/24 2225 -- -- -- -- -- 100 %   01/13/24 2224 -- -- -- -- -- 100 %   01/13/24 2223 -- -- -- -- -- 100 %   01/13/24 2222 116/85 -- -- 78 -- 100 %   01/13/24 2148 128/85 98.6  F (37  C) Oral 84 16 100 %        Physical Exam  Constitutional: Patient is well appearing. No distress.  Head: Atraumatic.  Mouth/Throat: Oropharynx is clear and moist. No oropharyngeal exudate.  Eyes: Conjunctivae and EOM are normal. No scleral icterus.  Neck: Normal range of motion. Neck supple.   Cardiovascular: Normal rate, regular rhythm, normal heart sounds and intact distal perfusion.   Pulmonary/Chest: Breath sounds normal. No respiratory distress.  Abdominal: Soft. Bowel sounds are normal. No distension. No tenderness. No rebound or guarding.   Musculoskeletal: Normal range of motion. No edema or tenderness.   Neuro: Alert, oriented x3, PERRL, EOMI, CN 2-7 and 9-12 intact, 5/5 grasp BUE, 5/5 elbow flexion and extension BUE, 5/5 shoulder abduction BUE, 5/5 hip flexion, knee flexion, knee extension, plantar and dorsiflexion BLE, no pronator drift, normal gait  Skin: Warm and dry. No rash noted. Not diaphoretic.      Emergency Department Course   ECG  NSR HR 71    Imaging:  CTA Head Neck w Contrast   Final Result   IMPRESSION:    HEAD CT:   1.  No acute intracranial process.      HEAD CTA:    1.  No significant stenosis, aneurysm, or high flow vascular malformation identified.      NECK CTA:   1.  No hemodynamically significant stenosis in the neck vessels.    2.  No evidence for dissection.      CT Head w/o Contrast   Final  Result   IMPRESSION:    HEAD CT:   1.  No acute intracranial process.      HEAD CTA:    1.  No significant stenosis, aneurysm, or high flow vascular malformation identified.      NECK CTA:   1.  No hemodynamically significant stenosis in the neck vessels.    2.  No evidence for dissection.        Report per radiology    Laboratory:  Labs Ordered and Resulted from Time of ED Arrival to Time of ED Departure   ROUTINE UA WITH MICROSCOPIC REFLEX TO CULTURE - Abnormal       Result Value    Color Urine Straw      Appearance Urine Clear      Glucose Urine Negative      Bilirubin Urine Negative      Ketones Urine Negative      Specific Gravity Urine 1.010      Blood Urine Negative      pH Urine 6.5      Protein Albumin Urine Negative      Urobilinogen Urine Normal      Nitrite Urine Negative      Leukocyte Esterase Urine Large (*)     Bacteria Urine Few (*)     Mucus Urine Present (*)     RBC Urine 3 (*)     WBC Urine 54 (*)     Squamous Epithelials Urine 3 (*)    BASIC METABOLIC PANEL - Normal    Sodium 142      Potassium 4.4      Chloride 105      Carbon Dioxide (CO2) 29      Anion Gap 8      Urea Nitrogen 11.8      Creatinine 0.80      GFR Estimate 87      Calcium 9.9      Glucose 92     INFLUENZA A/B, RSV, & SARS-COV2 PCR - Normal    Influenza A PCR Negative      Influenza B PCR Negative      RSV PCR Negative      SARS CoV2 PCR Negative     INR - Normal    INR 0.96     TROPONIN T, HIGH SENSITIVITY - Normal    Troponin T, High Sensitivity 6     CBC WITH PLATELETS AND DIFFERENTIAL    WBC Count 7.7      RBC Count 4.71      Hemoglobin 14.0      Hematocrit 43.2      MCV 92      MCH 29.7      MCHC 32.4      RDW 12.7      Platelet Count 367      % Neutrophils 56      % Lymphocytes 33      % Monocytes 7      % Eosinophils 3      % Basophils 1      % Immature Granulocytes 0      NRBCs per 100 WBC 0      Absolute Neutrophils 4.3      Absolute Lymphocytes 2.5      Absolute Monocytes 0.6      Absolute Eosinophils 0.2      Absolute  Basophils 0.1      Absolute Immature Granulocytes 0.0      Absolute NRBCs 0.0     URINE CULTURE        Procedures       Emergency Department Course & Assessments:             Interventions:  Medications   cephALEXin (KEFLEX) capsule 500 mg (has no administration in time range)   sodium chloride 0.9% BOLUS 1,000 mL (0 mLs Intravenous Stopped 1/13/24 2313)   prochlorperazine (COMPAZINE) injection 10 mg (10 mg Intravenous $Given 1/13/24 2209)   diphenhydrAMINE (BENADRYL) injection 25 mg (25 mg Intravenous $Given 1/13/24 2209)   dexAMETHasone PF (DECADRON) injection 10 mg (10 mg Intravenous $Given 1/13/24 2209)   iopamidol (ISOVUE-370) solution 500 mL (67 mLs Intravenous $Given 1/13/24 2301)   for CT scan flush use (80 mLs Intravenous $Given 1/13/24 2301)        Assessments:      Independent Interpretation (X-rays, CTs, rhythm strip):  CT head no mass stroke or bleed  CTA no bleed aneurysm or stroke  Reviewed radiologist read    Consultations/Discussion of Management or Tests:         Social Determinants of Health affecting care:       Disposition:  The patient was discharged to home.     Impression & Plan        Medical Decision Making:  Pt has history of migraines but none sine young age that has no red flag headache or stroke sx with normal CT and CTA with vision intact ocular migraine type headache that was relieved here with medications as above.  Additionally lab workup finds no acute systemic abnl for infection or end organ dysfunction but UA does show signs of UTi that could be contributory to headache.  No red flags UTi and looks well.  Will treat empirically with abx for uti and will see PCP for possible neurology referral.      Diagnosis:    ICD-10-CM    1. Ocular migraine  G43.109       2. Acute UTI  N39.0            Discharge Medications:  New Prescriptions    CEPHALEXIN (KEFLEX) 500 MG CAPSULE    Take 1 capsule (500 mg) by mouth 2 times daily for 7 days            1/13/2024   Leo Oleary MD               Leo Oleary MD  01/14/24 0012

## 2024-01-14 NOTE — ED TRIAGE NOTES
Pt arrives with severe headache since this morning with pressure behind the right eye starting approximately 4pm. Pt fell due to feeling loss of balance twice after 4pm. VSS.     Triage Assessment (Adult)       Row Name 01/13/24 9340          Triage Assessment    Airway WDL WDL        Respiratory WDL    Respiratory WDL WDL        Skin Circulation/Temperature WDL    Skin Circulation/Temperature WDL WDL        Cardiac WDL    Cardiac WDL WDL        Peripheral/Neurovascular WDL    Peripheral Neurovascular WDL WDL        Cognitive/Neuro/Behavioral WDL    Cognitive/Neuro/Behavioral WDL WDL

## 2024-01-15 LAB
ATRIAL RATE - MUSE: 71 BPM
BACTERIA UR CULT: NORMAL
DIASTOLIC BLOOD PRESSURE - MUSE: NORMAL MMHG
INTERPRETATION ECG - MUSE: NORMAL
P AXIS - MUSE: 57 DEGREES
PR INTERVAL - MUSE: 150 MS
QRS DURATION - MUSE: 96 MS
QT - MUSE: 386 MS
QTC - MUSE: 419 MS
R AXIS - MUSE: -27 DEGREES
SYSTOLIC BLOOD PRESSURE - MUSE: NORMAL MMHG
T AXIS - MUSE: 14 DEGREES
VENTRICULAR RATE- MUSE: 71 BPM

## 2024-01-16 ENCOUNTER — TELEPHONE (OUTPATIENT)
Dept: EMERGENCY MEDICINE | Facility: CLINIC | Age: 54
End: 2024-01-16
Payer: COMMERCIAL

## 2024-01-16 NOTE — TELEPHONE ENCOUNTER
[facility]    Reason for call: Lab Result Notification     Lab Result (including Rx patient on, if applicable).  If culture, copy of lab report at bottom.  Lab Result: Final urine culture report is negative.     Adult: Negative urine culture parameters per protocol: Any # urogenital mague, single or mixed     [facility] Emergency Dept discharge antibiotic prescribed (If applicable): Keflex     Treatment recommendations per [facility] ED Lab Result Urine Culture protocol: Contact patient to discontinue antibiotic.     Creatinine Level (mg/dl)   Creatinine   Date Value Ref Range Status   01/13/2024 0.80 0.51 - 0.95 mg/dL Final   03/16/2021 0.73 0.52 - 1.04 mg/dL Final    Creatinine clearance (ml/min), if applicable Serum creatinine: 0.8 mg/dL 01/13/24 2205  Estimated creatinine clearance: 86 mL/min     Patient's current Symptoms:   Left voicemail message requesting a call back to [facility] ED Lab Result RN at [phone].  RN is available every day between 9 a.m. and 5:30 p.m.     If patient calls back and is asymptomatic, discontinue Keflex.  (Per negative culture result)        Geena Dowd, RN

## 2024-02-03 ENCOUNTER — MYC MEDICAL ADVICE (OUTPATIENT)
Dept: SURGERY | Facility: CLINIC | Age: 54
End: 2024-02-03
Payer: COMMERCIAL

## 2024-02-03 DIAGNOSIS — Z98.84 BARIATRIC SURGERY STATUS: Primary | ICD-10-CM

## 2024-02-03 DIAGNOSIS — K91.2 POSTSURGICAL MALABSORPTION: ICD-10-CM

## 2024-02-05 NOTE — TELEPHONE ENCOUNTER
Yes, add CMP.  I am thinking about seeing with my partners if we add CMP for all pts at annuals in case we decide to start meds.  TBD

## 2024-02-06 NOTE — PROGRESS NOTES
"Ana is a 53 year old who is being evaluated via a billable video visit.      The patient has been notified of following:     \"This video visit will be conducted via a call between you and your physician/provider. We have found that certain health care needs can be provided without the need for an in-person physical exam.  This service lets us provide the care you need with a video conversation.  If a prescription is necessary we can send it directly to your pharmacy.  If lab work is needed we can place an order for that and you can then stop by our lab to have the test done at a later time.    Video visits are billed at different rates depending on your insurance coverage.  Please reach out to your insurance provider with any questions.    If during the course of the call the physician/provider feels a video visit is not appropriate, you will not be charged for this service.\"    Patient has given verbal consent for Video visit? Yes    How would you like to obtain your AVS? MyChart    If the video visit is dropped, the invitation should be resent by: Text to cell phone: [phone]    Will anyone else be joining your video visit? No    I    Video-Visit Details    Type of service:  Video Visit    Originating Location (pt. Location): Home    Distant Location (provider location):   Off-Site - Provider Home Office    Platform used for Video Visit: [employer]    Video Start Time: 10:34 AM    Video End Time:10:54 AM    Return Bariatric Surgery Note    RE: Ana Wyman  MR#: [id number]  : 1970  VISIT DATE: 2024    Dear Kelly Bedoya,    I had the pleasure of seeing your patient, Ana Wyman, in my post-bariatric surgery assessment clinic.    Assessment & Plan   Problem List Items Addressed This Visit       Bariatric surgery status     RYGBS on 2019 Nikunj          Malnutrition following gastrointestinal surgery     Continue taking recommended post-op vitamins.  Labs reviewed.            " Obesity due to excess calories - Primary     Patient was congratulated on wt loss success thus far. Healthy habits to assist with further weight loss were discussed. Ana will continue Wegovy weekly until stock is gone.  She will increase her phentermine to 30 mg in the morning.  We will start naltrexone. Pt will check BP/P 2 wks after increase and send result through [employer].  Risks/ benefits and possible side effects were discussed and questions were answered. Written information was given.                  Relevant Medications    naltrexone (DEPADE/REVIA) 50 MG tablet    phentermine 30 MG capsule        AOM Considerations:  Phentermine:  Currently taking 15 mg daily  Topiramate:  Hx of kidney stones, paraesthesias.  GLP-1:  On Wegovy (combo on phentermine nad Wegovy has worked best for her)   Naltrexone:  Candidate.  Was on in past with Wellbutrin, minimal weight loss  Wellbutrin:  Candidate.  Was on in past with Naltexone, minimal weight loss  Metformin:  No Pre-DM since bariatric surgery  Contrave: No AOM coverage   Qsymia: No AOM coverage     PATIENT INSTRUCTIONS:  Continue Wegovy until stock is gone    Start Naltrexone.   Directions: Take 1/2 tab 1-2 hours prior to biggest cravings or extra hunger for a week.  If tolerating, increase to 1 tablet or split your dose into 1/2 tablet twice daily.     Increase phentermine to 30 mg in the morning  If you have more problems with insomnia following phentermine increase please MyChart  Please get blood pressure/pulse checked in 2 weeks.  Send result through [employer].     FOLLOW-UP:    Please call [phone] to schedule your next visit in June 32 minutes spent on the date of the encounter doing chart review, history and exam, result review, counseling, developing plan of care, documentation, and further activities as noted        CHIEF COMPLAINT: Post-bariatric surgery follow-up    HISTORY OF PRESENT ILLNESS:      2/8/2024     3:18 PM   Questions Regarding Prior  Weight Loss Surgery Reviewed With Patient   I had the following weight loss procedure Kay-en-y Gastric Bypass   What year was your surgery? 2019   How has your weight changed since your last visit? I have lost weight   Do you currently have any of the following Heartburn, acid reflux, or GERD (acid reflux disease)?   Do you have any concerns today? Loss of wygovy     Interval Hx:    11/10/2023 for weight management.  Is here today for annual visit.  Labs were ordered prior to visit and are WNL.  She continues Wegovy and phentermine 15 mg daily.      At last visit, discussed adding possibly Topiramate with phentermine if GLP-1 not covered by insurance this year. Has 3 weeks left of Wegovy.  Does have paraesthesias in hands and a hx of nephrolithiasis.  She also has a family history of nephrolithiasis in her brother has several kidney stones yearly.    Goals:  When able with recovery add in strength training to activity routine. Average 20 min 2x weekly (Can spread this out throughout week.  (5 min a day.)          Weight History:      2/8/2024     3:18 PM   --   What is your highest lifetime weight? 356   What is your lowest weight since surgery? (In pounds) 181     Initial Weight (lbs): 330 lbs  Weight: 183 lb (83 kg) (pt reported)  Last Visits Weight: 185 lb (83.9 kg)  Cumulative weight loss (lbs): 147  Weight Loss Percentage: 44.55%    VITAMINS AND MINERALS:  1 Complete multivitamins with minerals- Bariatric Advantage ultra Solo with iron- bed time  2-2000 Int Units of Vitamin D daily ( ok to continue for now, but need to stop after labs checked)  500 mg Bariatric Advantage chewy bites three times with meals of Calcium daily- from MVI by 2 hours  Biotin daily  MVI has adequate thiamine and vitamin D (pt prefers to  Wait until labs are checked)           2/8/2024     3:18 PM   Questions Regarding Co-Morbidities and Health Concerns Reviewed With Patient   Pre-diabetes Gone away   Diabetes II Never   High  Blood Pressure Never   High cholesterol Never   Heartburn/Reflux Improved   Sleep apnea Gone away   PCOS Never   Back pain Improved   Joint pain Improved   Lower leg swelling Gone away           2/8/2024     3:18 PM   Eating Habits   How many meals do you eat per day? 3   Do you snack between meals? Sometimes   How much food are you eating at each meal? 1/2 cup to 1 cup   Are you able to separate your meals and liquids by at least 30 minutes? Sometimes   Are you able to avoid liquid calories? Yes           2/8/2024     3:18 PM   Exercise Questions Reviewed With Patient   How often do you exercise? 5 to 6 times per week   What is the duration of your exercise (in minutes)? 60+ Minutes   What types of exercise do you do? walking    gym membership    group fitness classes    weightlifting    climbing stairs at work   What keeps you from being more active? I am as active as I can possbily be       Social History:      2/8/2024     3:18 PM   --   Are you smoking? No   Are you drinking alcohol? Yes   How much alcohol? 1-2 occasionally       Medications:  Current Outpatient Medications   Medication    biotin 5 MG CAPS    Calcium Citrate-Vitamin D (CALCIUM CITRATE CHEWY BITE PO)    Multiple Vitamins-Minerals (BARIATRIC MULTIVITAMINS/IRON PO)    naltrexone (DEPADE/REVIA) 50 MG tablet    nitroGLYcerin (NITRO-BID) 2 % OINT ointment    ondansetron (ZOFRAN ODT) 4 MG ODT tab    phentermine 30 MG capsule    polyethylene glycol (MIRALAX/GLYCOLAX) packet    Semaglutide-Weight Management (WEGOVY) 2.4 MG/0.75ML pen    vitamin D3 (CHOLECALCIFEROL) 50 mcg (2000 units) tablet    albuterol (PROAIR HFA/PROVENTIL HFA/VENTOLIN HFA) 108 (90 Base) MCG/ACT inhaler     No current facility-administered medications for this visit.         2/8/2024     3:18 PM   --   Do you avoid NSAIDs such as (Ibuprofen, Aleve, Naproxen, Advil)? Yes       ROS:  GI:       2/8/2024     3:18 PM   --   Vomiting No   Diarrhea Yes   Constipation Yes   Swallowing  "trouble No   Abdominal pain Yes   Heartburn Yes     Skin:       2/8/2024     3:18 PM   BAR RBS ROS - SKIN   Rash in skin folds No     Psych:       2/8/2024     3:18 PM   --   Depression No   Anxiety Yes     Female Only:       2/8/2024     3:18 PM   BAR RBS ROS -    Female only Loss of menstrual cycles   Stress urinary incontinence No     Increased her exercise to 5 days a week.  Gets 5 hours  week.      LABS/IMAGING/MEDICAL RECORDS REVIEW:   Reviewed    BP Readings from Last 6 Encounters:   01/14/24 117/84   10/27/23 126/83   10/12/23 94/66   08/11/23 100/71   07/18/23 90/60   01/06/23 107/79       Pulse Readings from Last 6 Encounters:   01/14/24 87   10/27/23 82   10/12/23 104   08/11/23 75   07/18/23 81   01/06/23 76   Early and half trouble staying asleep okay okay    PHYSICAL EXAMINATION:  Ht 5' 4\" (1.626 m)   Wt 183 lb (83 kg)   LMP 10/01/2016 (Approximate)   BMI 31.41 kg/m    GENERAL: Healthy, alert and no distress  RESP: No audible wheeze, cough, or visible cyanosis.  No visible retractions or increased work of breathing.    SKIN: Visible skin clear. No significant rash, abnormal pigmentation or lesions.  PSYCH: Mentation appears normal, affect normal/bright, judgement and insight intact    COUNSELING PROVIDED:  We reviewed the important post op bariatric recommendations:  eating 3 meals daily  eating protein first, getting >60gm protein daily  eating slowly, chewing food well  avoiding/limiting calorie containing beverages  avoiding fluids 30 minutes before, during, and after meals  limiting restaurant or cafeteria eating to twice a week or less  Pt reminded to avoid marginal ulcers she should avoid tobacco at all, alcohol in excess, caffeine, and NSAIDS (unless indicated for cardioprotection or otherwise and opposed by a PPI).  Pt encouraged to establish and maintain a consistent physical activity routine, 6-8 hours of restorative sleep each night and optimal stress management.  Pt counseled on the " importance of life long vitamin supplementation and life long follow up.

## 2024-02-09 ENCOUNTER — LAB (OUTPATIENT)
Dept: LAB | Facility: CLINIC | Age: 54
End: 2024-02-09
Payer: COMMERCIAL

## 2024-02-09 DIAGNOSIS — K91.2 POSTSURGICAL MALABSORPTION: ICD-10-CM

## 2024-02-09 DIAGNOSIS — Z98.84 BARIATRIC SURGERY STATUS: ICD-10-CM

## 2024-02-09 LAB
ALBUMIN SERPL BCG-MCNC: 4.6 G/DL (ref 3.5–5.2)
ALP SERPL-CCNC: 97 U/L (ref 40–150)
ALT SERPL W P-5'-P-CCNC: 27 U/L (ref 0–50)
ANION GAP SERPL CALCULATED.3IONS-SCNC: 11 MMOL/L (ref 7–15)
AST SERPL W P-5'-P-CCNC: 31 U/L (ref 0–45)
BILIRUB SERPL-MCNC: 0.5 MG/DL
BUN SERPL-MCNC: 13.8 MG/DL (ref 6–20)
CALCIUM SERPL-MCNC: 9.7 MG/DL (ref 8.6–10)
CHLORIDE SERPL-SCNC: 102 MMOL/L (ref 98–107)
CREAT SERPL-MCNC: 0.78 MG/DL (ref 0.51–0.95)
DEPRECATED HCO3 PLAS-SCNC: 27 MMOL/L (ref 22–29)
EGFRCR SERPLBLD CKD-EPI 2021: 90 ML/MIN/1.73M2
ERYTHROCYTE [DISTWIDTH] IN BLOOD BY AUTOMATED COUNT: 13.3 % (ref 10–15)
FERRITIN SERPL-MCNC: 50 NG/ML (ref 11–328)
GLUCOSE SERPL-MCNC: 90 MG/DL (ref 70–99)
HCT VFR BLD AUTO: 42.9 % (ref 35–47)
HGB BLD-MCNC: 13.8 G/DL (ref 11.7–15.7)
IRON BINDING CAPACITY (ROCHE): 365 UG/DL (ref 240–430)
IRON SATN MFR SERPL: 16 % (ref 15–46)
IRON SERPL-MCNC: 57 UG/DL (ref 37–145)
MCH RBC QN AUTO: 29.6 PG (ref 26.5–33)
MCHC RBC AUTO-ENTMCNC: 32.2 G/DL (ref 31.5–36.5)
MCV RBC AUTO: 92 FL (ref 78–100)
PLATELET # BLD AUTO: 317 10E3/UL (ref 150–450)
POTASSIUM SERPL-SCNC: 4.4 MMOL/L (ref 3.4–5.3)
PROT SERPL-MCNC: 7.5 G/DL (ref 6.4–8.3)
PTH-INTACT SERPL-MCNC: 40 PG/ML (ref 15–65)
RBC # BLD AUTO: 4.67 10E6/UL (ref 3.8–5.2)
SODIUM SERPL-SCNC: 140 MMOL/L (ref 135–145)
VIT B12 SERPL-MCNC: 769 PG/ML (ref 232–1245)
VIT D+METAB SERPL-MCNC: 50 NG/ML (ref 20–50)
WBC # BLD AUTO: 6.3 10E3/UL (ref 4–11)

## 2024-02-09 PROCEDURE — 82728 ASSAY OF FERRITIN: CPT

## 2024-02-09 PROCEDURE — 82607 VITAMIN B-12: CPT

## 2024-02-09 PROCEDURE — 83970 ASSAY OF PARATHORMONE: CPT

## 2024-02-09 PROCEDURE — 83540 ASSAY OF IRON: CPT

## 2024-02-09 PROCEDURE — 82306 VITAMIN D 25 HYDROXY: CPT

## 2024-02-09 PROCEDURE — 36415 COLL VENOUS BLD VENIPUNCTURE: CPT

## 2024-02-09 PROCEDURE — 84590 ASSAY OF VITAMIN A: CPT

## 2024-02-09 PROCEDURE — 85027 COMPLETE CBC AUTOMATED: CPT

## 2024-02-09 PROCEDURE — 80053 COMPREHEN METABOLIC PANEL: CPT

## 2024-02-09 PROCEDURE — 83550 IRON BINDING TEST: CPT

## 2024-02-12 LAB
ANNOTATION COMMENT IMP: NORMAL
RETINYL PALMITATE SERPL-MCNC: 0.02 MG/L
VIT A SERPL-MCNC: 0.69 MG/L

## 2024-02-13 ENCOUNTER — VIRTUAL VISIT (OUTPATIENT)
Dept: SURGERY | Facility: CLINIC | Age: 54
End: 2024-02-13
Payer: COMMERCIAL

## 2024-02-13 VITALS — WEIGHT: 183 LBS | BODY MASS INDEX: 31.24 KG/M2 | HEIGHT: 64 IN

## 2024-02-13 DIAGNOSIS — Z98.84 BARIATRIC SURGERY STATUS: ICD-10-CM

## 2024-02-13 DIAGNOSIS — E66.811 CLASS 1 OBESITY DUE TO EXCESS CALORIES WITH SERIOUS COMORBIDITY AND BODY MASS INDEX (BMI) OF 31.0 TO 31.9 IN ADULT: Primary | ICD-10-CM

## 2024-02-13 DIAGNOSIS — K91.2 MALNUTRITION FOLLOWING GASTROINTESTINAL SURGERY: ICD-10-CM

## 2024-02-13 DIAGNOSIS — E66.09 CLASS 1 OBESITY DUE TO EXCESS CALORIES WITH SERIOUS COMORBIDITY AND BODY MASS INDEX (BMI) OF 31.0 TO 31.9 IN ADULT: Primary | ICD-10-CM

## 2024-02-13 PROCEDURE — 99214 OFFICE O/P EST MOD 30 MIN: CPT | Mod: 95 | Performed by: PHYSICIAN ASSISTANT

## 2024-02-13 RX ORDER — PHENTERMINE HYDROCHLORIDE 30 MG/1
30 CAPSULE ORAL EVERY MORNING
Qty: 90 CAPSULE | Refills: 1 | Status: SHIPPED | OUTPATIENT
Start: 2024-02-13 | End: 2024-08-11

## 2024-02-13 RX ORDER — NALTREXONE HYDROCHLORIDE 50 MG/1
TABLET, FILM COATED ORAL
Qty: 90 TABLET | Refills: 1 | Status: SHIPPED | OUTPATIENT
Start: 2024-02-13 | End: 2024-07-08

## 2024-02-13 NOTE — ASSESSMENT & PLAN NOTE
Patient was congratulated on wt loss success thus far. Healthy habits to assist with further weight loss were discussed. Ana will continue Wegovy weekly until stock is gone.  She will increase her phentermine to 30 mg in the morning.  We will start naltrexone. Pt will check BP/P 2 wks after increase and send result through [employer].  Risks/ benefits and possible side effects were discussed and questions were answered. Written information was given.

## 2024-02-13 NOTE — PATIENT INSTRUCTIONS
"To ensure quality you may receive a patient satisfaction survey. The greatest compliment you can give is \"Likely to Recommend.\"    Nice to talk with you today.  Thank you for your trust. Below is the plan discussed.-  DORIAN Smith      Plan:  Continue Wegovy until stock is gone    Start Naltrexone.   Directions: Take 1/2 tab 1-2 hours prior to biggest cravings or extra hunger for a week.  If tolerating, increase to 1 tablet or split your dose into 1/2 tablet twice daily.     Increase phentermine to 30 mg in the morning  If you have more problems with insomnia following phentermine increase please [employer]  Please get blood pressure/pulse checked in 2 weeks.  Send result through [employer] ([employer]).     FOLLOW-UP:    Please call [phone] to schedule your next visit in June    Naltrexone    Naltrexone is a medication that is used most often to help people who are troubled by dependence on prescription pain killers or alcohol. It has also been found to help with weight loss. Although it's not currently FDA approved for weight loss, it has been used safely for a number of years to help people who are carrying extra weight.     Just how Naltrexone helps with weight loss has not been exactly determined.  It seems to work by quieting down brain signals related to strong food cravings. Many of our patients use the word \"addiction\" to describe their feelings and constant thoughts about food. It makes sense then to treat the feeling of dependence on food, outside of real hunger, with a medication designed to help with other sorts of dependence.     Our patients on Naltrexone find that they:    >feel less interest in food   >think less about food and eating and have more time to think of other things   >find it easier to push the plate away   >have an easier time eating less    For some of our patients, these feelings are very immediate. Other patients, don't feel much of a change but find they've lost weight. Like all weight loss " medications, Naltrexone works best when you help it work. This means:  1. Having less tempting high calorie (fattening) food around the house or office. (For people with strong cravings this is very important.)   2. Staying away from situations or people that may trigger your cravings .   3. Eating out only one time or less each week.  4. Eating your meals at a table with the TV or computer off.    Side-effects. Naltrexone is generally well tolerated. The main side-effect we see is  nausea or a woozy feeling. A small number of people feel quite ill. Most people have a mild reaction and some people have no reaction at all.  The good news is that this feeling does go away.     In order to avoid nausea, please start the medication with half a pill for the first few days. Go on to a full pill if you are feeling well.      If you  are nauseated on 1/2 a pill it is okay to cut back to 1/4 pill ( a very small amount). Take this for a couple of days and work your way back up to a 1/2 pill and then a whole pill. Taking the medication at night or with food  to start also may help prevent the feeling of nausea.       WARNING: This medication blocks the action of opioid type pain medications.    If you routinely take narcotic pain medication like Codeine, Oxycontin,Percocet,Morphine,Dilaudid or Methodone, do not take this until you have talked with weight management staff.   2.  If you are planning surgery you should stop Naltrexone 4 days prior to the surgery.   3.  If you have an injury that requires pain medication, make sure the health care staff knows you take Naltrexone.       For any questions/concerns contact [facility] [phone]

## 2024-04-11 DIAGNOSIS — L30.4 ERYTHEMA INTERTRIGO: ICD-10-CM

## 2024-04-11 RX ORDER — NYSTATIN AND TRIAMCINOLONE ACETONIDE 100000; 1 [USP'U]/G; MG/G
CREAM TOPICAL
Qty: 30 G | Refills: 3 | Status: SHIPPED | OUTPATIENT
Start: 2024-04-11

## 2024-05-08 DIAGNOSIS — Z98.84 BARIATRIC SURGERY STATUS: ICD-10-CM

## 2024-05-08 DIAGNOSIS — R10.13 EPIGASTRIC ABDOMINAL PAIN: Primary | ICD-10-CM

## 2024-05-08 RX ORDER — SUCRALFATE 1 G/1
1 TABLET ORAL 4 TIMES DAILY
Qty: 40 TABLET | Refills: 0 | Status: SHIPPED | OUTPATIENT
Start: 2024-05-08 | End: 2024-05-18

## 2024-05-08 RX ORDER — OMEPRAZOLE 20 MG/1
20-40 TABLET, DELAYED RELEASE ORAL DAILY
Qty: 60 TABLET | Refills: 3 | Status: SHIPPED | OUTPATIENT
Start: 2024-05-08 | End: 2024-08-11

## 2024-05-11 NOTE — MR AVS SNAPSHOT
After Visit Summary   7/27/2018    Ana Wyman    MRN: [id number]           Patient Information     Date Of Birth          1970        Visit Information        Provider Department      7/27/2018 8:30 AM Sarah Stacy PA-C [facility]      Today's Diagnoses     Morbid obesity (H)    -  1    Gastroesophageal reflux disease without esophagitis        MIGUELITO on CPAP        Prediabetes        Fatty liver        Vitamin D deficiency        Screening for iron deficiency anemia        Lymphedema bilateral with mixed lipedema.           Follow-ups after your visit        Additional Services     NUTRITION REFERRAL       Type of nutrition instruction needed: Weight Loss  Your provider has referred you to:     [facility]                  Follow-up notes from your care team     Return in 1 month (on 8/27/2018).      Your next 10 appointments already scheduled     Aug 31, 2018  3:00 PM CDT   Return Bariatric Nutrition Visit with Jolene Cadena 1, RD   [facility] ([facility])    [address] [place] [address]   [phone]            Sep 04, 2018  8:45 AM CDT   Office Visit with Jaquelin Perez MD   [facility]    [address]  [facility] [address]   [phone]           Bring a current list of meds and any records pertaining to this visit. For Physicals, please bring immunization records and any forms needing to be filled out. Please arrive 10 minutes early to complete paperwork.            Sep 10, 2018   Procedure with Lydia Vickers MD   [facility] ([facility])    [address]  [facility] [address]   [phone]           [facility] is located at [address] [place]             Sep 26, 2018 12:00 PM CDT   (Arrive by 11:30 AM)   New Visit with Laila Taylor LP   [facility] [place] ([facility] [place])    [address]  [place] [address]   [phone]            Oct 03, 2018  2:00 PM CDT   Return Visit with Laila Taylor LP   [facility] [place] ([facility] [place])    [address]  [place] [address]   [phone]            Oct 24, 2018  4:00 PM CDT   Return Visit with Laila Taylor LP   [facility] [place] ([facility] [place])    [address]  [place] [address]   [phone]              Future tests that were ordered for you today     Open Future Orders        Priority Expected Expires Ordered    CBC with platelets Routine 7/27/2018 1/23/2019 7/27/2018    Comprehensive metabolic panel Routine 7/27/2018 1/23/2019 7/27/2018    Hemoglobin A1c Routine 7/27/2018 1/23/2019 7/27/2018    Vitamin D Deficiency Routine 7/27/2018 1/23/2019 7/27/2018            Who to contact     If you have questions or need follow up information about today's clinic visit or your schedule please contact [facility] directly at [phone].  Normal or non-critical lab and imaging results will be communicated to you by [employer], letter or phone within 4 business days after the clinic has received the results. If you do not hear from us within 7 days, please contact the clinic through [employer] or phone. If you have a critical or abnormal lab result, we will notify you by phone as soon as possible.  Submit refill requests through [employer] or call your pharmacy and they will forward the refill request to us. Please allow 3 business days for your refill to be completed.          Additional Information About Your Visit        [employer] Information     [employer] gives you secure access to your electronic health record. If you see a primary care provider, you can also send messages to your care team and make  "appointments. If you have questions, please call your primary care clinic.  If you do not have a primary care provider, please call [phone] and they will assist you.        Care EveryWhere ID     This is your Care EveryWhere ID. This could be used by other organizations to access your [facility] medical records  [id number]        Your Vitals Were     Pulse Respirations Height Pulse Oximetry BMI (Body Mass Index)       82 12 5' 4\" (1.626 m) 96% 58.24 kg/m2        Blood Pressure from Last 3 Encounters:   07/27/18 128/86   06/11/18 118/82   04/29/18 134/78    Weight from Last 3 Encounters:   07/27/18 (!) 339 lb 4.8 oz (153.9 kg)   07/27/18 (!) 339 lb 4.8 oz (153.9 kg)   06/11/18 (!) 338 lb (153.3 kg)              We Performed the Following     NUTRITION REFERRAL          Today's Medication Changes          These changes are accurate as of 7/27/18 12:49 PM.  If you have any questions, ask your nurse or doctor.               Stop taking these medicines if you haven't already. Please contact your care team if you have questions.     ondansetron 4 MG ODT tab   Commonly known as:  ZOFRAN ODT   Stopped by:  Sarah Stacy PA-C           ondansetron 4 MG tablet   Commonly known as:  ZOFRAN   Stopped by:  Sarah Stacy PA-C                    Primary Care Provider Office Phone # Fax #    Kelly Bedoya [phone] [address] [place] [address]        Equal Access to Services     [facility] AH: Hadii megan sheppard hadasho Soomaali, waaxda luqadaha, qaybta kaalmada yoselin loo. So [facility] [phone].    ATENCIÓN: Si habla español, tiene a tejada disposición servicios gratuitos de asistencia lingüística. Llame al [phone].    We comply with applicable federal civil rights laws and [place] laws. We do not discriminate on the basis of race, color, national origin, age, disability, sex, sexual orientation, or gender " identity.            Thank you!     Thank you for choosing [facility]  for your care. Our goal is always to provide you with excellent care. Hearing back from our patients is one way we can continue to improve our services. Please take a few minutes to complete the written survey that you may receive in the mail after your visit with us. Thank you!             Your Updated Medication List - Protect others around you: Learn how to safely use, store and throw away your medicines at www.disposemymeds.org.          This list is accurate as of 7/27/18 12:49 PM.  Always use your most recent med list.                   Brand Name Dispense Instructions for use Diagnosis    albuterol 108 (90 Base) MCG/ACT Inhaler    PROAIR HFA/PROVENTIL HFA/VENTOLIN HFA    2 Inhaler    Inhale 2 puffs into the lungs every 6 hours as needed for shortness of breath / dyspnea or wheezing    Mild intermittent asthma without complication       clobetasol 0.05 % ointment    TEMOVATE    45 g    Apply clobetasol  0.05 percent ointment, sparingly (a dot 3 mm wide) in a thin film.  Apply to affected area only, once or twice weekly for maintenance.    Lichen sclerosus       FISH OIL PO           loratadine 10 MG tablet    CLARITIN    7 tablet    Take 1 tablet (10 mg) by mouth daily        multivitamin, therapeutic with minerals Tabs tablet      Take 1 tablet by mouth daily.        PRILOSEC OTC PO      Take 20 mg by mouth daily        RESTASIS MULTIDOSE 0.05 % ophthalmic emulsion   Generic drug:  cycloSPORINE      INSTILL 1 DROP INTO BOTH EYES TWICE A DAY           [Disease: _____________________] : Disease: [unfilled] [T: ___] : T[unfilled] [N: ___] : N[unfilled] [AJCC Stage: ____] : AJCC Stage: [unfilled] [de-identified] : 46year old female presented initially in 2023 to [facility] ([facility] for medical Oncology evaluation. She was seen initially by Dr. Arturo Jarvis at that time; her history is as per [facility] records from Dr. Jarvis and confirmed with the patient as she is transferring her care to me.  Patient had abnormal mammogram 2023, revealing a 7mm spiculated mass in right upper outer quadrant.   Core biopsy done on 10/27/2023, revealing invasive moderately differentiated ductal carcinoma. ER 90%+. IL 90%+, HER-2 negative.   Was seen by breast surgeon Dr Sylvia Reyes, s/p right breast localized lumpectomy, right SLNBx on 2023. Path revealed invasive ductal carcinoma, moderately differentiated, all lymph nodes negative.  ER positive 81-90%, IL positive 81-90%, HER-2 Negative  Oncotype 15  She completed adjuvant RT with Dr. Jerome on 3/4/24.  Risk Assessment Menarche 11, menstrual periods are regular , she had 2 elective terminations, first full term pregnancy at age 24 ; no OCP, no prior Fertility  [de-identified] : ER+WY+Her2 neg [de-identified] : Oncotype 15 [de-identified] : 5/9/24 Transferring care to me today All of the patient's prior records including radiology, pathology and prior notes reviewed; Past Medical History, Past Surgical History, Family History and Social history reviewed and updated in the patient's chart. Patient returns today to discuss adjuvant endocrine therapy and the options she has for fertility as she and her fiance are considering pregnancy.  She is feeling well and had mild fatigue during the RT, and some skin irritation She denies fevers, chills, n/v, abdominal pain, neuropathy, vaginal discharge or bleeding, urinary symptoms, cough, CP, SOB.  Cardiologist: Dr. Bryant - CAD s/p CABG w/ Dr. Nix, HLD, and HTN [100: Normal, no complaints, no evidence of disease.] : 100: Normal, no complaints, no evidence of disease.

## 2024-05-13 SDOH — HEALTH STABILITY: PHYSICAL HEALTH: ON AVERAGE, HOW MANY MINUTES DO YOU ENGAGE IN EXERCISE AT THIS LEVEL?: 60 MIN

## 2024-05-13 SDOH — HEALTH STABILITY: PHYSICAL HEALTH: ON AVERAGE, HOW MANY DAYS PER WEEK DO YOU ENGAGE IN MODERATE TO STRENUOUS EXERCISE (LIKE A BRISK WALK)?: 4 DAYS

## 2024-05-13 ASSESSMENT — ASTHMA QUESTIONNAIRES
QUESTION_2 LAST FOUR WEEKS HOW OFTEN HAVE YOU HAD SHORTNESS OF BREATH: NOT AT ALL
QUESTION_1 LAST FOUR WEEKS HOW MUCH OF THE TIME DID YOUR ASTHMA KEEP YOU FROM GETTING AS MUCH DONE AT WORK, SCHOOL OR AT HOME: NONE OF THE TIME
ACT_TOTALSCORE: 25
QUESTION_3 LAST FOUR WEEKS HOW OFTEN DID YOUR ASTHMA SYMPTOMS (WHEEZING, COUGHING, SHORTNESS OF BREATH, CHEST TIGHTNESS OR PAIN) WAKE YOU UP AT NIGHT OR EARLIER THAN USUAL IN THE MORNING: NOT AT ALL
QUESTION_4 LAST FOUR WEEKS HOW OFTEN HAVE YOU USED YOUR RESCUE INHALER OR NEBULIZER MEDICATION (SUCH AS ALBUTEROL): NOT AT ALL
ACT_TOTALSCORE: 25
QUESTION_5 LAST FOUR WEEKS HOW WOULD YOU RATE YOUR ASTHMA CONTROL: COMPLETELY CONTROLLED

## 2024-05-13 ASSESSMENT — SOCIAL DETERMINANTS OF HEALTH (SDOH): HOW OFTEN DO YOU GET TOGETHER WITH FRIENDS OR RELATIVES?: TWICE A WEEK

## 2024-05-20 ENCOUNTER — OFFICE VISIT (OUTPATIENT)
Dept: PEDIATRICS | Facility: CLINIC | Age: 54
End: 2024-05-20
Payer: COMMERCIAL

## 2024-05-20 VITALS
SYSTOLIC BLOOD PRESSURE: 101 MMHG | RESPIRATION RATE: 16 BRPM | DIASTOLIC BLOOD PRESSURE: 65 MMHG | HEART RATE: 85 BPM | BODY MASS INDEX: 33.77 KG/M2 | OXYGEN SATURATION: 98 % | TEMPERATURE: 97.9 F | HEIGHT: 64 IN | WEIGHT: 197.8 LBS

## 2024-05-20 DIAGNOSIS — I73.00 RAYNAUD'S DISEASE WITHOUT GANGRENE: ICD-10-CM

## 2024-05-20 DIAGNOSIS — Z00.00 ROUTINE GENERAL MEDICAL EXAMINATION AT A HEALTH CARE FACILITY: Primary | ICD-10-CM

## 2024-05-20 DIAGNOSIS — Z12.4 CERVICAL CANCER SCREENING: ICD-10-CM

## 2024-05-20 DIAGNOSIS — E66.01 CLASS 2 SEVERE OBESITY DUE TO EXCESS CALORIES WITH SERIOUS COMORBIDITY AND BODY MASS INDEX (BMI) OF 35.0 TO 35.9 IN ADULT (H): ICD-10-CM

## 2024-05-20 DIAGNOSIS — Z13.220 LIPID SCREENING: ICD-10-CM

## 2024-05-20 DIAGNOSIS — G47.00 INSOMNIA, UNSPECIFIED TYPE: ICD-10-CM

## 2024-05-20 DIAGNOSIS — Z98.84 BARIATRIC SURGERY STATUS: ICD-10-CM

## 2024-05-20 DIAGNOSIS — J45.20 MILD INTERMITTENT ASTHMA WITHOUT COMPLICATION: ICD-10-CM

## 2024-05-20 DIAGNOSIS — F43.21 ADJUSTMENT DISORDER WITH DEPRESSED MOOD: ICD-10-CM

## 2024-05-20 DIAGNOSIS — E66.812 CLASS 2 SEVERE OBESITY DUE TO EXCESS CALORIES WITH SERIOUS COMORBIDITY AND BODY MASS INDEX (BMI) OF 35.0 TO 35.9 IN ADULT (H): ICD-10-CM

## 2024-05-20 PROCEDURE — G0145 SCR C/V CYTO,THINLAYER,RESCR: HCPCS | Performed by: INTERNAL MEDICINE

## 2024-05-20 PROCEDURE — 99396 PREV VISIT EST AGE 40-64: CPT | Performed by: INTERNAL MEDICINE

## 2024-05-20 PROCEDURE — 87624 HPV HI-RISK TYP POOLED RSLT: CPT | Performed by: INTERNAL MEDICINE

## 2024-05-20 PROCEDURE — 99213 OFFICE O/P EST LOW 20 MIN: CPT | Mod: 25 | Performed by: INTERNAL MEDICINE

## 2024-05-20 RX ORDER — LEVALBUTEROL TARTRATE 45 UG/1
1-2 AEROSOL, METERED ORAL EVERY 6 HOURS PRN
Qty: 15 G | Refills: 3 | Status: SHIPPED | OUTPATIENT
Start: 2024-05-20

## 2024-05-20 RX ORDER — NITROGLYCERIN 20 MG/G
0.5 OINTMENT TOPICAL DAILY PRN
Qty: 60 G | Refills: 1 | Status: SHIPPED | OUTPATIENT
Start: 2024-05-20

## 2024-05-20 RX ORDER — SUCRALFATE 1 G/1
1 TABLET ORAL 4 TIMES DAILY
COMMUNITY
End: 2024-08-11

## 2024-05-20 ASSESSMENT — PAIN SCALES - GENERAL: PAINLEVEL: NO PAIN (0)

## 2024-05-20 NOTE — PROGRESS NOTES
"Preventive Care Visit  [facility] MORRIS Henson MD, Internal Medicine - Pediatrics  May 20, 2024      Assessment & Plan       ICD-10-CM    1. Routine general medical examination at a health care facility  Z00.00       2. Adjustment disorder with depressed mood  F43.21 Adult Mental Health  Referral      3. Bariatric surgery status   - followed at [facility] clinic on phentermine and naltrexone. Z98.84       4. Class 2 severe obesity due to excess calories with serious comorbidity and body mass index (BMI) of 35.0 to 35.9 in adult (H)  E66.01 TSH with free T4 reflex    Z68.35       5. Insomnia, unspecified type  G47.00 Sleep Psychology  Referral      6. Mild intermittent asthma without complication  J45.20 levalbuterol (XOPENEX HFA) 45 MCG/ACT inhaler      7. Raynaud's disease without gangrene  I73.00 nitroGLYcerin (NITRO-BID) 2 % OINT ointment      8. Lipid screening  Z13.220 Lipid panel reflex to direct LDL Fasting      9. Cervical cancer screening  Z12.4 Gynecologic Cytology (Pap) and HPV - Recommended Age 30-65 Years     Gynecologic Cytology (PAP)        BMI  Estimated body mass index is 33.77 kg/m  as calculated from the following:    Height as of this encounter: 1.63 m (5' 4.17\").    Weight as of this encounter: 89.7 kg (197 lb 12.8 oz).   Weight management plan: Discussed healthy diet and exercise guidelines    Counseling  Appropriate preventive services were discussed with this patient, including applicable screening as appropriate for fall prevention, nutrition, physical activity, Tobacco-use cessation, weight loss and cognition.  Checklist reviewing preventive services available has been given to the patient.      See Patient Instructions    Liza Perez is a 54 year old, presenting for the following:  Physical and Musculoskeletal Problem         Via the Health Maintenance questionnaire, the patient has reported the following services have been completed " -Mammogram: Kelsey Polk 2022-08-01, this information has not been sent to the abstraction team.  Health Care Directive  Patient does not have a Health Care Directive or Living Will:     HPI        5/13/2024   General Health   How would you rate your overall physical health? Good   Feel stress (tense, anxious, or unable to sleep) To some extent   (!) STRESS CONCERN      5/13/2024   Nutrition   Three or more servings of calcium each day? Yes   Diet: Other   If other, please elaborate: Bariatric diet   How many servings of fruit and vegetables per day? 4 or more   How many sweetened beverages each day? 0-1         5/13/2024   Exercise   Days per week of moderate/strenous exercise 4 days   Average minutes spent exercising at this level 60 min         5/13/2024   Social Factors   Frequency of gathering with friends or relatives Twice a week   Worry food won't last until get money to buy more No   Food not last or not have enough money for food? No   Do you have housing?  Yes   Are you worried about losing your housing? No   Lack of transportation? No   Unable to get utilities (heat,electricity)? No         5/13/2024   Fall Risk   Fallen 2 or more times in the past year? No   Trouble with walking or balance? No          5/13/2024   Dental   Dentist two times every year? Yes         5/13/2024   TB Screening   Were you born outside of the [place]? No         Today's PHQ-2 Score:       5/19/2024     8:46 AM   PHQ-2 ( 1999 [employer])   Q1: Little interest or pleasure in doing things 0   Q2: Feeling down, depressed or hopeless 0   PHQ-2 Score 0   Q1: Little interest or pleasure in doing things Not at all   Q2: Feeling down, depressed or hopeless Not at all   PHQ-2 Score 0           5/13/2024   Substance Use   Alcohol more than 3/day or more than 7/wk No   Do you use any other substances recreationally? No     Social History     Tobacco Use    Smoking status: Never    Smokeless tobacco: Never   Vaping Use    Vaping status:  "Never Used   Substance Use Topics    Alcohol use: Not Currently     Comment: occasional    Drug use: No           8/29/2022   LAST FHS-7 RESULTS   1st degree relative breast or ovarian cancer Yes   Any woman with breast cancer before 50yrs Yes   2 or more relatives with breast AND/OR ovarian cancer Yes   2 or more relatives with breast AND/OR bowel cancer No        Mammogram Screening - Mammogram every 1-2 years updated in Health Maintenance based on mutual decision making        5/13/2024   STI Screening   New sexual partner(s) since last STI/HIV test? No     History of abnormal Pap smear: No - age 30- 64 PAP with HPV every 5 years recommended        Latest Ref Rng & Units 10/29/2018     1:46 PM 10/29/2018    10:40 AM 5/15/2014    12:00 AM   PAP / HPV   PAP (Historical)   NIL  NIL    HPV 16 DNA NEG^Negative Negative      HPV 18 DNA NEG^Negative Negative      Other HR HPV NEG^Negative Negative        ASCVD Risk   The 10-year ASCVD risk score (Seamus VENCES, et al., 2019) is: 1%    Values used to calculate the score:      Age: 54 years      Sex: Female      Is Non- : No      Diabetic: No      Tobacco smoker: No      Systolic Blood Pressure: 101 mmHg      Is BP treated: No      HDL Cholesterol: 65 mg/dL      Total Cholesterol: 183 mg/dL           Reviewed and updated as needed this visit by Provider                        All other systems on a 10-point review are negative, unless otherwise noted in HPI       Objective    Exam  /65 (BP Location: Right arm, Patient Position: Sitting, Cuff Size: Adult Large)   Pulse 85   Temp 97.9  F (36.6  C) (Oral)   Resp 16   Ht 1.63 m (5' 4.17\")   Wt 89.7 kg (197 lb 12.8 oz)   LMP 10/01/2016 (Approximate)   SpO2 98%   BMI 33.77 kg/m     Estimated body mass index is 33.77 kg/m  as calculated from the following:    Height as of this encounter: 1.63 m (5' 4.17\").    Weight as of this encounter: 89.7 kg (197 lb 12.8 oz).    Physical " Exam  GENERAL: alert and no distress  EYES: Eyes grossly normal to inspection, PERRL and conjunctivae and sclerae normal  HENT: ear canals and TM's normal, nose and mouth without ulcers or lesions  NECK: no adenopathy, no asymmetry, masses, or scars  RESP: lungs clear to auscultation - no rales, rhonchi or wheezes  CV: regular rate and rhythm, normal S1 S2, no S3 or S4, no murmur, click or rub, no peripheral edema  ABDOMEN: soft, nontender, no hepatosplenomegaly, no masses and bowel sounds normal  MS: no gross musculoskeletal defects noted, no edema  SKIN: no suspicious lesions or rashes  NEURO: Normal strength and tone, mentation intact and speech normal  PSYCH: mentation appears normal, affect normal/bright        Signed Electronically by: Juliann Henson MD

## 2024-05-21 LAB
HPV HR 12 DNA CVX QL NAA+PROBE: NEGATIVE
HPV16 DNA CVX QL NAA+PROBE: NEGATIVE
HPV18 DNA CVX QL NAA+PROBE: NEGATIVE
HUMAN PAPILLOMA VIRUS FINAL DIAGNOSIS: NORMAL

## 2024-05-24 LAB
BKR LAB AP GYN ADEQUACY: NORMAL
BKR LAB AP GYN INTERPRETATION: NORMAL
BKR LAB AP PREVIOUS ABNORMAL: NORMAL
PATH REPORT.COMMENTS IMP SPEC: NORMAL
PATH REPORT.COMMENTS IMP SPEC: NORMAL
PATH REPORT.RELEVANT HX SPEC: NORMAL

## 2024-06-04 NOTE — PATIENT INSTRUCTIONS
"Preventive Care Advice   This is general advice we often give to help people stay healthy. Your care team may have specific advice just for you. Please talk to your care team about your own preventive care needs.  Lifestyle  Exercise at least 150 minutes each week (30 minutes a day, 5 days a week).  Do muscle strengthening activities 2 days a week. These help control your weight and prevent disease.  No smoking.  Wear sunscreen to prevent skin cancer.  Have your home tested for radon every 2 to 5 years. Radon is a colorless, odorless gas that can harm your lungs. To learn more, go to www.health.[facility].mn. and search for \"Radon in Homes.\"  Keep guns unloaded and locked up in a safe place like a safe or gun vault, or, use a gun lock and hide the keys. Always lock away bullets separately. To learn more, visit [employer].[place].gov and search for \"safe gun storage.\"  Nutrition  Eat 5 or more servings of fruits and vegetables each day.  Try wheat bread, brown rice and whole grain pasta (instead of white bread, rice, and pasta).  Get enough calcium and vitamin D. Check the label on foods and aim for 100% of the RDA (recommended daily allowance).  Regular exams  Have a dental exam and cleaning every 6 months.  See your health care team every year to talk about:  Any changes in your health.  Any medicines your care team has prescribed.  Preventive care, family planning, and ways to prevent chronic diseases.  Shots (vaccines)   HPV shots (up to age 26), if you've never had them before.  Hepatitis B shots (up to age 59), if you've never had them before.  COVID-19 shot: Get this shot when it's due.  Flu shot: Get a flu shot every year.  Tetanus shot: Get a tetanus shot every 10 years.  Pneumococcal, hepatitis A, and RSV shots: Ask your care team if you need these based on your risk.  Shingles shot (for age 50 and up).  General health tests  Diabetes screening:  Starting at age 35, Get screened for diabetes at least every 3 years.  If " you are younger than age 35, ask your care team if you should be screened for diabetes.  Cholesterol test: At age 39, start having a cholesterol test every 5 years, or more often if advised.  Bone density scan (DEXA): At age 50, ask your care team if you should have this scan for osteoporosis (brittle bones).  Hepatitis C: Get tested at least once in your life.  Abdominal aortic aneurysm screening: Talk to your doctor about having this screening if you:  Have ever smoked; and  Are biologically male; and  Are between the ages of 65 and 75.  STIs (sexually transmitted infections)  Before age 24: Ask your care team if you should be screened for STIs.  After age 24: Get screened for STIs if you're at risk. You are at risk for STIs (including HIV) if:  You are sexually active with more than one person.  You don't use condoms every time.  You or a partner was diagnosed with a sexually transmitted infection.  If you are at risk for HIV, ask about PrEP medicine to prevent HIV.  Get tested for HIV at least once in your life, whether you are at risk for HIV or not.  Cancer screening tests  Cervical cancer screening: If you have a cervix, begin getting regular cervical cancer screening tests at age 21. Most people who have regular screenings with normal results can stop after age 65. Talk about this with your provider.  Breast cancer scan (mammogram): If you've ever had breasts, begin having regular mammograms starting at age 40. This is a scan to check for breast cancer.  Colon cancer screening: It is important to start screening for colon cancer at age 45.  Have a colonoscopy test every 10 years (or more often if you're at risk) Or, ask your provider about stool tests like a FIT test every year or Cologuard test every 3 years.  To learn more about your testing options, visit: www.[employer]/229723.pdf.  For help making a decision, visit: dunia/tr07779.  Prostate cancer screening test: If you have a prostate and are age 55  to 69, ask your provider if you would benefit from a yearly prostate cancer screening test.  Lung cancer screening: If you are a current or former smoker age 50 to 80, ask your care team if ongoing lung cancer screenings are right for you.  For informational purposes only. Not to replace the advice of your health care provider. Copyright   2023 [employer]. All rights reserved. Clinically reviewed by the [facility] Transitions Program. [employer] 748713 - REV 04/24.

## 2024-07-02 ENCOUNTER — LAB (OUTPATIENT)
Dept: LAB | Facility: CLINIC | Age: 54
End: 2024-07-02
Payer: COMMERCIAL

## 2024-07-02 DIAGNOSIS — E66.01 CLASS 2 SEVERE OBESITY DUE TO EXCESS CALORIES WITH SERIOUS COMORBIDITY AND BODY MASS INDEX (BMI) OF 35.0 TO 35.9 IN ADULT (H): ICD-10-CM

## 2024-07-02 DIAGNOSIS — Z13.220 LIPID SCREENING: ICD-10-CM

## 2024-07-02 DIAGNOSIS — E66.812 CLASS 2 SEVERE OBESITY DUE TO EXCESS CALORIES WITH SERIOUS COMORBIDITY AND BODY MASS INDEX (BMI) OF 35.0 TO 35.9 IN ADULT (H): ICD-10-CM

## 2024-07-02 LAB
CHOLEST SERPL-MCNC: 164 MG/DL
FASTING STATUS PATIENT QL REPORTED: YES
HDLC SERPL-MCNC: 68 MG/DL
LDLC SERPL CALC-MCNC: 78 MG/DL
NONHDLC SERPL-MCNC: 96 MG/DL
TRIGL SERPL-MCNC: 92 MG/DL
TSH SERPL DL<=0.005 MIU/L-ACNC: 3.6 UIU/ML (ref 0.3–4.2)

## 2024-07-02 PROCEDURE — 82465 ASSAY BLD/SERUM CHOLESTEROL: CPT

## 2024-07-02 PROCEDURE — 84443 ASSAY THYROID STIM HORMONE: CPT

## 2024-07-02 PROCEDURE — 36415 COLL VENOUS BLD VENIPUNCTURE: CPT

## 2024-07-02 PROCEDURE — 83718 ASSAY OF LIPOPROTEIN: CPT

## 2024-07-05 NOTE — RESULT ENCOUNTER NOTE
Dear Ana,    Your lab results are normal.  At this time, I do not recommend any changes to your current plan of care.    Please feel free to call with any questions.  Otherwise, we can discuss further at your next appointment.    Sincerely,    Juliann Henson MD

## 2024-07-08 ENCOUNTER — OFFICE VISIT (OUTPATIENT)
Dept: PEDIATRICS | Facility: CLINIC | Age: 54
End: 2024-07-08
Payer: COMMERCIAL

## 2024-07-08 VITALS
RESPIRATION RATE: 16 BRPM | WEIGHT: 200.4 LBS | HEIGHT: 65 IN | HEART RATE: 89 BPM | TEMPERATURE: 98.2 F | BODY MASS INDEX: 33.39 KG/M2 | DIASTOLIC BLOOD PRESSURE: 66 MMHG | SYSTOLIC BLOOD PRESSURE: 96 MMHG | OXYGEN SATURATION: 97 %

## 2024-07-08 DIAGNOSIS — L98.7 EXCESS SKIN: ICD-10-CM

## 2024-07-08 DIAGNOSIS — Z01.818 PREOP GENERAL PHYSICAL EXAM: Primary | ICD-10-CM

## 2024-07-08 DIAGNOSIS — Z98.84 BARIATRIC SURGERY STATUS: ICD-10-CM

## 2024-07-08 DIAGNOSIS — J45.20 MILD INTERMITTENT ASTHMA WITHOUT COMPLICATION: ICD-10-CM

## 2024-07-08 PROCEDURE — 99214 OFFICE O/P EST MOD 30 MIN: CPT | Performed by: PEDIATRICS

## 2024-07-08 ASSESSMENT — PAIN SCALES - GENERAL: PAINLEVEL: NO PAIN (0)

## 2024-07-08 NOTE — PATIENT INSTRUCTIONS
Keep me posted about cold/cough - if not completely better in 2 weeks    Stop phentermine 7 days prior to surgery

## 2024-07-08 NOTE — PROGRESS NOTES
Preoperative Evaluation  [facility]  [address]  [address]  [place] [address]  Phone: [phone]  Fax: [phone]  Primary Provider: Kelly Bedoya MD  Pre-op Performing Provider: Kelly Bedoya MD  Jul 8, 2024 7/2/2024   Surgical Information   What procedure is being done? Corrections to prior cosmetic surgery on arms,  breasts and legs   Facility or Hospital where procedure/surgery will be performed: [facility]   Who is doing the procedure / surgery? Dr. Mayen   Date of surgery / procedure: 7/30/24   Time of surgery / procedure: 7:30 am   Where do you plan to recover after surgery? at home with family        Fax number for surgical facility: Fax note to: [phone]     Assessment & Plan     The proposed surgical procedure is considered INTERMEDIATE risk.      ICD-10-CM    1. Preop general physical exam  Z01.818       2. Excess skin  L98.7       3. Mild intermittent asthma without complication  J45.20       4. Bariatric surgery status  Z98.84           Very active - walking up to 8 miles per hike.         Risks and Recommendations  The patient has the following additional risks and recommendations for perioperative complications:   - No identified additional risk factors other than previously addressed    Antiplatelet or Anticoagulation Medication Instructions   - Patient is on no antiplatelet or anticoagulation medications.    Additional Medication Instructions   - phentermine: DO NOT TAKE 7 days prior to surgery.    Recommendation  Approval given to proceed with proposed procedure, without further diagnostic evaluation.    Liza Perez is a 54 year old, presenting for the following:  Pre-Op Exam          7/8/2024     3:29 PM   Additional Questions   Roomed by Shi   Accompanied by self         7/8/2024     3:29 PM   Patient Reported Additional Medications   Patient reports taking the following new medications no     HPI  related to upcoming procedure: patient planning revision of surgery for excess skin removal in multiple locations after massive weight loss after gastric bypass surgery        7/2/2024   Pre-Op Questionnaire   Have you ever had a heart attack or stroke? No   Have you ever had surgery on your heart or blood vessels, such as a stent placement, a coronary artery bypass, or surgery on an artery in your head, neck, heart, or legs? No   Do you have chest pain with activity? No   Do you have a history of heart failure? No   Do you currently have a cold, bronchitis or symptoms of other infection? No   Do you have a cough, shortness of breath, or wheezing? No   Do you or anyone in your family have previous history of blood clots? No   Do you or does anyone in your family have a serious bleeding problem such as prolonged bleeding following surgeries or cuts? No   Have you ever had problems with anemia or been told to take iron pills? (!) YES - related to gastric bypass - currently normal range on supplementation   Have you had any abnormal blood loss such as black, tarry or bloody stools, or abnormal vaginal bleeding? No   Have you ever had a blood transfusion? (!) YES   Have you ever had a transfusion reaction? No   Are you willing to have a blood transfusion if it is medically needed before, during, or after your surgery? Yes   Have you or any of your relatives ever had problems with anesthesia? (!) YES - mother had difficulty with anesthesia   Do you have sleep apnea, excessive snoring or daytime drowsiness? No   Do you have any artifical heart valves or other implanted medical devices like a pacemaker, defibrillator, or continuous glucose monitor? No   Do you have artificial joints? No   Are you allergic to latex? No            Preoperative Review of    reviewed - no record of controlled substances prescribed.      Status of Chronic Conditions:  See problem list for active medical problems.  Problems all  longstanding and stable, except as noted/documented.  See ROS for pertinent symptoms related to these conditions.    Patient Active Problem List    Diagnosis Date Noted    Class 2 severe obesity due to excess calories with serious comorbidity and body mass index (BMI) of 35.0 to 35.9 in adult (H) 05/20/2024     Priority: Medium    Iron deficiency anemia following bariatric surgery 08/01/2023     Priority: Medium    Symptomatic abdominal panniculus 02/25/2021     Priority: Medium    Family history of malignant neoplasm of breast 10/05/2020     Priority: Medium    Insulin resistance 09/28/2020     Priority: Medium    Postural hypotension 07/29/2019     Priority: Medium    Intertrigo 07/29/2019     Priority: Medium    Obesity due to excess calories 07/29/2019     Priority: Medium    S/P laparoscopic cholecystectomy 04/18/2019     Priority: Medium    Bariatric surgery status 02/05/2019     Priority: Medium     RYGBS 1/28/2019 Nikunj      Malnutrition following gastrointestinal surgery 02/05/2019     Priority: Medium    Fatty liver 07/27/2018     Priority: Medium    Mild intermittent asthma without complication 06/11/2018     Priority: Medium    Lymphedema bilateral with mixed lipedema. 09/30/2015     Priority: Medium    Baker's cyst of knee 09/03/2015     Priority: Medium    Acanthosis nigricans 03/24/2015     Priority: Medium    Cutaneous skin tags 03/24/2015     Priority: Medium    Uterine fibroid 08/02/2012     Priority: Medium    Lichen sclerosus 07/14/2011     Priority: Medium    Family history of malignant neoplasm of genital organ, other 06/11/2007     Priority: Medium    FAMILY HX GI MALIGNANCY 05/31/2007     Priority: Medium    Esophageal reflux 09/27/2004     Priority: Medium    Allergic state 09/27/2004     Priority: Medium     Problem list name updated by automated process. Provider to review        Past Medical History:   Diagnosis Date    Allergic rhinitis, cause unspecified     Cellulitis 2005    2  episodes, one with hospitalization [facility]    Complication of anesthesia     MOTHER HAS HISTORY    DUB (dysfunctional uterine bleeding)     Neg EMB 2012    Esophageal reflux     ongoing    Fibroids     Genital problems     Lichens Schlerosis    GERD (gastroesophageal reflux disease)     Hallux valgus (acquired)     History of steroid therapy     Inhalers, eye drops    Hypersomnia with sleep apnea, unspecified     using CPAP    Kidney stone 05/2018    2 stones    Lichen sclerosus 07/14/2011    Lymph edema 2010- present    Lymphedema bilateral with mixed lipedema. 09/30/2015    Lymphedema bilateral with mixed lipedema. 09/30/2015    Morbid obesity (H) 03/19/2013    Pneumonia     Years ago - a few times    PONV (postoperative nausea and vomiting)     Pre-diabetes     Tendonitis currently    Uncomplicated asthma     ((Pt Qnr Sub: ulcer)) mild    Urinary tract infection     A few times in past 10 years    Vision disorder [id number]    Dry Eye    Vitamin D deficiency 03/14/2015     Past Surgical History:   Procedure Laterality Date    COLONOSCOPY  05/15/2013    Procedure: COLONOSCOPY;  Colonoscopy;  Surgeon: Kota Blanco MD;  Location:  GI    COLONOSCOPY N/A 10/21/2019    Procedure: COLONOSCOPY, with biopsies by cold forceps;  Surgeon: Lydia Vickers MD;  Location:  GI    COSMETIC ABDOMINOPLASTY Bilateral 2/25/2021    Procedure: COSMETIC BILATERAL BELT LIPECTOMY;  Surgeon: Wade Marquez MD;  Location: [facility] OR    COSMETIC LIFT LOWER EXTREMITY BILATERAL (MEDIAL THIGHS) Bilateral 10/27/2023    Procedure: cosmetic bilateral thigh lift;  Surgeon: Liliane Mayen MD;  Location:  OR    COSMETIC LIPOSUCTION, BRACHIOPLASTY, COMBINED Bilateral 8/11/2023    Procedure: BILATERAL COSMETIC BRACHIOPLASTY;  Surgeon: Liliane Mayen MD;  Location: [facility] OR    GASTRIC BYPASS      GYN SURGERY  2016    Uterine Ablation    INCISION AND DRAINAGE TRUNK, COMBINED Left 2/25/2021    Procedure: EVACUATION OF  HEMATOMA;  Surgeon: Wade Marquez MD;  Location:  OR    LAPAROSCOPIC BYPASS GASTRIC, CHOLECYSTECTOMY, COMBINED N/A 01/28/2019    Procedure: LAPAROSCOPIC GASTRIC BYPASS;  Surgeon: Maykel Calvin MD;  Location:  OR    LAPAROSCOPIC CHOLECYSTECTOMY N/A 04/11/2019    Procedure: LAPAROSCOPIC CHOLECYSTECTOMY;  Surgeon: Maykle Calvin MD;  Location:  OR    lichen sclerosis      MAMMOPLASTY REDUCTION BILATERAL Bilateral 8/11/2023    Procedure: MAMMOPLASTY, REDUCTION, BILATERAL;  Surgeon: Liliane Mayen MD;  Location:  OR    ORTHOPEDIC SURGERY      PANNICULECTOMY N/A 2/25/2021    Procedure: PANNICULECTOMY;  Surgeon: Wade Marquez MD;  Location:  OR    VASCULAR SURGERY  2015    Vienous closure on both legs    vein closure  12/2016    to prevent lymphedema    [facility] NONSPECIFIC PROCEDURE  1994    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    ZZ NONSPECIFIC PROCEDURE  1999,2001    Foot surgeries x 2 (bunionectomy)    Z NONSPECIFIC PROCEDURE  2000    hammer toes     Current Outpatient Medications   Medication Sig Dispense Refill    biotin 5 MG CAPS Take 5,000 mcg by mouth daily At noon      Calcium Citrate-Vitamin D (CALCIUM CITRATE CHEWY BITE PO) Take 500 mg by mouth 3 times daily Plus D,noon, supper, HS; Bariatric Advantage calcium citrate 500 mcg/vitamin D 500 international unit(s) per chew      levalbuterol (XOPENEX HFA) 45 MCG/ACT inhaler Inhale 1-2 puffs into the lungs every 6 hours as needed for shortness of breath or wheezing 15 g 3    Multiple Vitamins-Minerals (BARIATRIC MULTIVITAMINS/IRON PO) Take 1 capsule by mouth daily Bariatric Advantage Ultra Solo with iron      nitroGLYcerin (NITRO-BID) 2 % OINT ointment Place 0.5 inches (7.5 mg) onto the skin daily as needed (Apply 0.5in to tips of fingers once daily prior to cold exposure) 60 g 1    nystatin-triamcinolone (MYCOLOG II) 913693-3.1 UNIT/GM-% external cream APPLY TO AFFECTED AREA TWO TIMES A DAY UP TO 7 DAYS AS NEEDED FOR  "RASH 30 g 3    omeprazole (PRILOSEC OTC) 20 MG EC tablet Take 1-2 tablets (20-40 mg) by mouth daily 60 tablet 3    ondansetron (ZOFRAN ODT) 4 MG ODT tab Take 1 tablet (4 mg) by mouth every 8 hours as needed for nausea 10 tablet 0    phentermine 30 MG capsule Take 1 capsule (30 mg) by mouth every morning 90 capsule 1    polyethylene glycol (MIRALAX/GLYCOLAX) packet Take 17 g by mouth daily 90 packet 3    sucralfate (CARAFATE) 1 GM tablet Take 1 g by mouth 4 times daily      vitamin D3 (CHOLECALCIFEROL) 50 mcg (2000 units) tablet Take 1 capsule by mouth every morning          Allergies   Allergen Reactions    Nsaids Other (See Comments)     Had gastric bypass - needs to avoid NSAIDS    Clindamycin Diarrhea    Codeine Nausea and Vomiting    Morphine And Codeine Nausea and Vomiting and Unknown    Shellfish Allergy     Sulfamethoxazole-Trimethoprim Rash        Social History     Tobacco Use    Smoking status: Never    Smokeless tobacco: Never   Substance Use Topics    Alcohol use: Not Currently     Comment: occasional       History   Drug Use No           Had a cold last week - slowly getting better, but still having some chest congestion.  No fevers or difficulty breathing.       ROS: 10 point ROS neg other than the symptoms noted above in the HPI.      Objective    BP 96/66 (BP Location: Right arm, Patient Position: Sitting, Cuff Size: Adult Regular)   Pulse 89   Temp 98.2  F (36.8  C) (Tympanic)   Resp 16   Ht 1.651 m (5' 5\")   Wt 90.9 kg (200 lb 6.4 oz)   LMP 10/01/2016 (Approximate)   SpO2 97%   BMI 33.35 kg/m     Estimated body mass index is 33.35 kg/m  as calculated from the following:    Height as of this encounter: 1.651 m (5' 5\").    Weight as of this encounter: 90.9 kg (200 lb 6.4 oz).  Physical Exam  GENERAL: alert and no distress  EYES: Eyes grossly normal to inspection, PERRL and conjunctivae and sclerae normal  HENT: ear canals and TM's normal, nose and mouth without ulcers or lesions  NECK: no " adenopathy, no asymmetry, masses, or scars  RESP: lungs clear to auscultation - no rales, rhonchi or wheezes  CV: regular rate and rhythm, normal S1 S2, no S3 or S4, no murmur, click or rub, no peripheral edema  ABDOMEN: soft, nontender, no hepatosplenomegaly, no masses and bowel sounds normal  MS: no gross musculoskeletal defects noted, no edema  SKIN: no suspicious lesions or rashes  NEURO: Normal strength and tone, mentation intact and speech normal  PSYCH: mentation appears normal, affect normal/bright    Recent Labs   Lab Test 02/09/24  0941 01/13/24  2205   HGB 13.8 14.0    367   INR  --  0.96    142   POTASSIUM 4.4 4.4   CR 0.78 0.80        Diagnostics  No labs were ordered during this visit.   No EKG required, no history of coronary heart disease, significant arrhythmia, peripheral arterial disease or other structural heart disease.    Revised Cardiac Risk Index (RCRI)  The patient has the following serious cardiovascular risks for perioperative complications:   - No serious cardiac risks = 0 points     RCRI Interpretation: 0 points: Class I (very low risk - 0.4% complication rate)         Signed Electronically by: Kelly Bedoya MD  Copy of this evaluation report is provided to requesting physician.

## 2024-07-29 ENCOUNTER — ANESTHESIA EVENT (OUTPATIENT)
Dept: SURGERY | Facility: CLINIC | Age: 54
End: 2024-07-29

## 2024-07-29 ASSESSMENT — LIFESTYLE VARIABLES: TOBACCO_USE: 0

## 2024-07-30 ENCOUNTER — HOSPITAL ENCOUNTER (OUTPATIENT)
Facility: CLINIC | Age: 54
Discharge: HOME | End: 2024-07-30
Attending: PLASTIC SURGERY | Admitting: PLASTIC SURGERY

## 2024-07-30 ENCOUNTER — ANESTHESIA (OUTPATIENT)
Dept: SURGERY | Facility: CLINIC | Age: 54
End: 2024-07-30

## 2024-07-30 VITALS
SYSTOLIC BLOOD PRESSURE: 102 MMHG | TEMPERATURE: 97.5 F | WEIGHT: 201.2 LBS | RESPIRATION RATE: 15 BRPM | BODY MASS INDEX: 33.52 KG/M2 | HEIGHT: 65 IN | OXYGEN SATURATION: 95 % | DIASTOLIC BLOOD PRESSURE: 75 MMHG | HEART RATE: 63 BPM

## 2024-07-30 DIAGNOSIS — Z98.84 BARIATRIC SURGERY STATUS: Primary | ICD-10-CM

## 2024-07-30 PROCEDURE — 250N000012 HC RX MED GY IP 250 OP 636 PS 637: Performed by: ANESTHESIOLOGY

## 2024-07-30 PROCEDURE — 710N000012 HC RECOVERY PHASE 2, PER MINUTE: Performed by: PLASTIC SURGERY

## 2024-07-30 PROCEDURE — 250N000009 HC RX 250: Performed by: REGISTERED NURSE

## 2024-07-30 PROCEDURE — 250N000013 HC RX MED GY IP 250 OP 250 PS 637: Performed by: ANESTHESIOLOGY

## 2024-07-30 PROCEDURE — 360N000076 HC SURGERY LEVEL 3, PER MIN: Performed by: PLASTIC SURGERY

## 2024-07-30 PROCEDURE — 710N000009 HC RECOVERY PHASE 1, LEVEL 1, PER MIN: Performed by: PLASTIC SURGERY

## 2024-07-30 PROCEDURE — 258N000003 HC RX IP 258 OP 636: Performed by: REGISTERED NURSE

## 2024-07-30 PROCEDURE — 250N000013 HC RX MED GY IP 250 OP 250 PS 637: Performed by: PLASTIC SURGERY

## 2024-07-30 PROCEDURE — 250N000011 HC RX IP 250 OP 636: Performed by: ANESTHESIOLOGY

## 2024-07-30 PROCEDURE — 250N000011 HC RX IP 250 OP 636: Performed by: REGISTERED NURSE

## 2024-07-30 PROCEDURE — 250N000009 HC RX 250: Performed by: PLASTIC SURGERY

## 2024-07-30 PROCEDURE — 999N000141 HC STATISTIC PRE-PROCEDURE NURSING ASSESSMENT: Performed by: PLASTIC SURGERY

## 2024-07-30 PROCEDURE — 250N000025 HC SEVOFLURANE, PER MIN: Performed by: PLASTIC SURGERY

## 2024-07-30 PROCEDURE — 250N000011 HC RX IP 250 OP 636: Performed by: PLASTIC SURGERY

## 2024-07-30 PROCEDURE — 250N000009 HC RX 250: Performed by: ANESTHESIOLOGY

## 2024-07-30 PROCEDURE — 272N000001 HC OR GENERAL SUPPLY STERILE: Performed by: PLASTIC SURGERY

## 2024-07-30 PROCEDURE — 258N000003 HC RX IP 258 OP 636: Performed by: PLASTIC SURGERY

## 2024-07-30 PROCEDURE — 370N000017 HC ANESTHESIA TECHNICAL FEE, PER MIN: Performed by: PLASTIC SURGERY

## 2024-07-30 RX ORDER — SCOLOPAMINE TRANSDERMAL SYSTEM 1 MG/1
1 PATCH, EXTENDED RELEASE TRANSDERMAL ONCE
Status: DISCONTINUED | OUTPATIENT
Start: 2024-07-30 | End: 2024-07-30 | Stop reason: HOSPADM

## 2024-07-30 RX ORDER — FENTANYL CITRATE 0.05 MG/ML
25 INJECTION, SOLUTION INTRAMUSCULAR; INTRAVENOUS EVERY 5 MIN PRN
Status: DISCONTINUED | OUTPATIENT
Start: 2024-07-30 | End: 2024-07-30 | Stop reason: HOSPADM

## 2024-07-30 RX ORDER — NALOXONE HYDROCHLORIDE 0.4 MG/ML
0.1 INJECTION, SOLUTION INTRAMUSCULAR; INTRAVENOUS; SUBCUTANEOUS
Status: DISCONTINUED | OUTPATIENT
Start: 2024-07-30 | End: 2024-07-30 | Stop reason: HOSPADM

## 2024-07-30 RX ORDER — DEXAMETHASONE SODIUM PHOSPHATE 4 MG/ML
INJECTION, SOLUTION INTRA-ARTICULAR; INTRALESIONAL; INTRAMUSCULAR; INTRAVENOUS; SOFT TISSUE PRN
Status: DISCONTINUED | OUTPATIENT
Start: 2024-07-30 | End: 2024-07-30

## 2024-07-30 RX ORDER — FENTANYL CITRATE 50 UG/ML
INJECTION, SOLUTION INTRAMUSCULAR; INTRAVENOUS PRN
Status: DISCONTINUED | OUTPATIENT
Start: 2024-07-30 | End: 2024-07-30

## 2024-07-30 RX ORDER — ONDANSETRON 4 MG/1
4 TABLET, ORALLY DISINTEGRATING ORAL EVERY 8 HOURS PRN
Qty: 10 TABLET | Refills: 0 | Status: SHIPPED | OUTPATIENT
Start: 2024-07-30

## 2024-07-30 RX ORDER — DEXAMETHASONE SODIUM PHOSPHATE 4 MG/ML
4 INJECTION, SOLUTION INTRA-ARTICULAR; INTRALESIONAL; INTRAMUSCULAR; INTRAVENOUS; SOFT TISSUE
Status: DISCONTINUED | OUTPATIENT
Start: 2024-07-30 | End: 2024-07-30 | Stop reason: HOSPADM

## 2024-07-30 RX ORDER — SODIUM CHLORIDE, SODIUM LACTATE, POTASSIUM CHLORIDE, CALCIUM CHLORIDE 600; 310; 30; 20 MG/100ML; MG/100ML; MG/100ML; MG/100ML
INJECTION, SOLUTION INTRAVENOUS CONTINUOUS PRN
Status: DISCONTINUED | OUTPATIENT
Start: 2024-07-30 | End: 2024-07-30

## 2024-07-30 RX ORDER — ONDANSETRON 4 MG/1
4 TABLET, ORALLY DISINTEGRATING ORAL EVERY 30 MIN PRN
Status: DISCONTINUED | OUTPATIENT
Start: 2024-07-30 | End: 2024-07-30 | Stop reason: HOSPADM

## 2024-07-30 RX ORDER — HYDROCODONE BITARTRATE AND ACETAMINOPHEN 5; 325 MG/1; MG/1
1 TABLET ORAL EVERY 4 HOURS PRN
Qty: 12 TABLET | Refills: 0 | Status: SHIPPED | OUTPATIENT
Start: 2024-07-30 | End: 2024-08-02

## 2024-07-30 RX ORDER — OXYCODONE HYDROCHLORIDE 5 MG/1
5 TABLET ORAL
Status: DISCONTINUED | OUTPATIENT
Start: 2024-07-30 | End: 2024-07-30 | Stop reason: HOSPADM

## 2024-07-30 RX ORDER — OXYCODONE HYDROCHLORIDE 5 MG/1
5 TABLET ORAL ONCE
Status: COMPLETED | OUTPATIENT
Start: 2024-07-30 | End: 2024-07-30

## 2024-07-30 RX ORDER — PROPOFOL 10 MG/ML
INJECTION, EMULSION INTRAVENOUS PRN
Status: DISCONTINUED | OUTPATIENT
Start: 2024-07-30 | End: 2024-07-30

## 2024-07-30 RX ORDER — HYDRALAZINE HYDROCHLORIDE 20 MG/ML
2.5-5 INJECTION INTRAMUSCULAR; INTRAVENOUS EVERY 10 MIN PRN
Status: DISCONTINUED | OUTPATIENT
Start: 2024-07-30 | End: 2024-07-30 | Stop reason: HOSPADM

## 2024-07-30 RX ORDER — CEFAZOLIN SODIUM/WATER 2 G/20 ML
2 SYRINGE (ML) INTRAVENOUS
Status: COMPLETED | OUTPATIENT
Start: 2024-07-30 | End: 2024-07-30

## 2024-07-30 RX ORDER — HYDROXYZINE HYDROCHLORIDE 25 MG/1
25 TABLET, FILM COATED ORAL EVERY 6 HOURS PRN
Status: DISCONTINUED | OUTPATIENT
Start: 2024-07-30 | End: 2024-07-30 | Stop reason: HOSPADM

## 2024-07-30 RX ORDER — BUPIVACAINE HYDROCHLORIDE 2.5 MG/ML
INJECTION, SOLUTION EPIDURAL; INFILTRATION; INTRACAUDAL
Status: DISCONTINUED
Start: 2024-07-30 | End: 2024-07-30 | Stop reason: HOSPADM

## 2024-07-30 RX ORDER — FENTANYL CITRATE 0.05 MG/ML
50 INJECTION, SOLUTION INTRAMUSCULAR; INTRAVENOUS EVERY 5 MIN PRN
Status: DISCONTINUED | OUTPATIENT
Start: 2024-07-30 | End: 2024-07-30 | Stop reason: HOSPADM

## 2024-07-30 RX ORDER — ONDANSETRON 2 MG/ML
4 INJECTION INTRAMUSCULAR; INTRAVENOUS EVERY 30 MIN PRN
Status: DISCONTINUED | OUTPATIENT
Start: 2024-07-30 | End: 2024-07-30 | Stop reason: HOSPADM

## 2024-07-30 RX ORDER — PROPOFOL 10 MG/ML
INJECTION, EMULSION INTRAVENOUS CONTINUOUS PRN
Status: DISCONTINUED | OUTPATIENT
Start: 2024-07-30 | End: 2024-07-30

## 2024-07-30 RX ORDER — ONDANSETRON 2 MG/ML
INJECTION INTRAMUSCULAR; INTRAVENOUS PRN
Status: DISCONTINUED | OUTPATIENT
Start: 2024-07-30 | End: 2024-07-30

## 2024-07-30 RX ORDER — ACETAMINOPHEN 500 MG
1000 TABLET ORAL ONCE
Status: COMPLETED | OUTPATIENT
Start: 2024-07-30 | End: 2024-07-30

## 2024-07-30 RX ORDER — ACETAMINOPHEN 325 MG/1
975 TABLET ORAL ONCE
Status: DISCONTINUED | OUTPATIENT
Start: 2024-07-30 | End: 2024-07-30 | Stop reason: HOSPADM

## 2024-07-30 RX ORDER — SODIUM CHLORIDE, SODIUM LACTATE, POTASSIUM CHLORIDE, CALCIUM CHLORIDE 600; 310; 30; 20 MG/100ML; MG/100ML; MG/100ML; MG/100ML
INJECTION, SOLUTION INTRAVENOUS CONTINUOUS
Status: DISCONTINUED | OUTPATIENT
Start: 2024-07-30 | End: 2024-07-30 | Stop reason: HOSPADM

## 2024-07-30 RX ORDER — APREPITANT 40 MG/1
40 CAPSULE ORAL ONCE
Status: COMPLETED | OUTPATIENT
Start: 2024-07-30 | End: 2024-07-30

## 2024-07-30 RX ORDER — MAGNESIUM HYDROXIDE 1200 MG/15ML
LIQUID ORAL PRN
Status: DISCONTINUED | OUTPATIENT
Start: 2024-07-30 | End: 2024-07-30 | Stop reason: HOSPADM

## 2024-07-30 RX ORDER — HYDROMORPHONE HCL IN WATER/PF 6 MG/30 ML
0.4 PATIENT CONTROLLED ANALGESIA SYRINGE INTRAVENOUS EVERY 5 MIN PRN
Status: DISCONTINUED | OUTPATIENT
Start: 2024-07-30 | End: 2024-07-30 | Stop reason: HOSPADM

## 2024-07-30 RX ORDER — LABETALOL HYDROCHLORIDE 5 MG/ML
10 INJECTION, SOLUTION INTRAVENOUS
Status: DISCONTINUED | OUTPATIENT
Start: 2024-07-30 | End: 2024-07-30 | Stop reason: HOSPADM

## 2024-07-30 RX ORDER — CEFAZOLIN SODIUM/WATER 2 G/20 ML
2 SYRINGE (ML) INTRAVENOUS SEE ADMIN INSTRUCTIONS
Status: DISCONTINUED | OUTPATIENT
Start: 2024-07-30 | End: 2024-07-30 | Stop reason: HOSPADM

## 2024-07-30 RX ORDER — HYDROMORPHONE HCL IN WATER/PF 6 MG/30 ML
0.2 PATIENT CONTROLLED ANALGESIA SYRINGE INTRAVENOUS EVERY 5 MIN PRN
Status: DISCONTINUED | OUTPATIENT
Start: 2024-07-30 | End: 2024-07-30 | Stop reason: HOSPADM

## 2024-07-30 RX ORDER — LIDOCAINE HYDROCHLORIDE 20 MG/ML
INJECTION, SOLUTION INFILTRATION; PERINEURAL PRN
Status: DISCONTINUED | OUTPATIENT
Start: 2024-07-30 | End: 2024-07-30

## 2024-07-30 RX ADMIN — PROPOFOL 160 MG: 10 INJECTION, EMULSION INTRAVENOUS at 07:41

## 2024-07-30 RX ADMIN — PHENYLEPHRINE HYDROCHLORIDE 50 MCG: 10 INJECTION INTRAVENOUS at 09:15

## 2024-07-30 RX ADMIN — DEXAMETHASONE SODIUM PHOSPHATE 8 MG: 4 INJECTION, SOLUTION INTRA-ARTICULAR; INTRALESIONAL; INTRAMUSCULAR; INTRAVENOUS; SOFT TISSUE at 07:46

## 2024-07-30 RX ADMIN — SCOLOPAMINE TRANSDERMAL SYSTEM 1 PATCH: 1 PATCH, EXTENDED RELEASE TRANSDERMAL at 07:02

## 2024-07-30 RX ADMIN — ACETAMINOPHEN 1000 MG: 500 TABLET, FILM COATED ORAL at 06:30

## 2024-07-30 RX ADMIN — HYDROMORPHONE HYDROCHLORIDE 0.4 MG: 0.2 INJECTION, SOLUTION INTRAMUSCULAR; INTRAVENOUS; SUBCUTANEOUS at 11:21

## 2024-07-30 RX ADMIN — LIDOCAINE HYDROCHLORIDE 100 MG: 20 INJECTION, SOLUTION INFILTRATION; PERINEURAL at 07:41

## 2024-07-30 RX ADMIN — FENTANYL CITRATE 50 MCG: 50 INJECTION, SOLUTION INTRAMUSCULAR; INTRAVENOUS at 10:45

## 2024-07-30 RX ADMIN — SODIUM CHLORIDE, POTASSIUM CHLORIDE, SODIUM LACTATE AND CALCIUM CHLORIDE: 600; 310; 30; 20 INJECTION, SOLUTION INTRAVENOUS at 10:24

## 2024-07-30 RX ADMIN — ROCURONIUM BROMIDE 20 MG: 50 INJECTION, SOLUTION INTRAVENOUS at 09:27

## 2024-07-30 RX ADMIN — FENTANYL CITRATE 50 MCG: 50 INJECTION, SOLUTION INTRAMUSCULAR; INTRAVENOUS at 10:59

## 2024-07-30 RX ADMIN — HYDROMORPHONE HYDROCHLORIDE 0.25 MG: 1 INJECTION, SOLUTION INTRAMUSCULAR; INTRAVENOUS; SUBCUTANEOUS at 08:51

## 2024-07-30 RX ADMIN — SODIUM CHLORIDE, POTASSIUM CHLORIDE, SODIUM LACTATE AND CALCIUM CHLORIDE: 600; 310; 30; 20 INJECTION, SOLUTION INTRAVENOUS at 07:35

## 2024-07-30 RX ADMIN — FENTANYL CITRATE 50 MCG: 50 INJECTION INTRAMUSCULAR; INTRAVENOUS at 07:41

## 2024-07-30 RX ADMIN — HYDROMORPHONE HYDROCHLORIDE 0.25 MG: 1 INJECTION, SOLUTION INTRAMUSCULAR; INTRAVENOUS; SUBCUTANEOUS at 09:21

## 2024-07-30 RX ADMIN — MIDAZOLAM 2 MG: 1 INJECTION INTRAMUSCULAR; INTRAVENOUS at 07:35

## 2024-07-30 RX ADMIN — PHENYLEPHRINE HYDROCHLORIDE 100 MCG: 10 INJECTION INTRAVENOUS at 09:34

## 2024-07-30 RX ADMIN — OXYCODONE HYDROCHLORIDE 5 MG: 5 TABLET ORAL at 11:23

## 2024-07-30 RX ADMIN — ONDANSETRON 4 MG: 2 INJECTION INTRAMUSCULAR; INTRAVENOUS at 10:00

## 2024-07-30 RX ADMIN — APREPITANT 40 MG: 40 CAPSULE ORAL at 07:03

## 2024-07-30 RX ADMIN — SUGAMMADEX 200 MG: 100 INJECTION, SOLUTION INTRAVENOUS at 10:23

## 2024-07-30 RX ADMIN — ONDANSETRON 4 MG: 4 TABLET, ORALLY DISINTEGRATING ORAL at 12:55

## 2024-07-30 RX ADMIN — PHENYLEPHRINE HYDROCHLORIDE 150 MCG: 10 INJECTION INTRAVENOUS at 09:12

## 2024-07-30 RX ADMIN — ROCURONIUM BROMIDE 50 MG: 50 INJECTION, SOLUTION INTRAVENOUS at 07:41

## 2024-07-30 RX ADMIN — PROPOFOL 150 MCG/KG/MIN: 10 INJECTION, EMULSION INTRAVENOUS at 07:41

## 2024-07-30 RX ADMIN — Medication 2 G: at 07:35

## 2024-07-30 RX ADMIN — FENTANYL CITRATE 50 MCG: 50 INJECTION INTRAMUSCULAR; INTRAVENOUS at 07:58

## 2024-07-30 RX ADMIN — PROPOFOL 20 MG: 10 INJECTION, EMULSION INTRAVENOUS at 09:16

## 2024-07-30 ASSESSMENT — ACTIVITIES OF DAILY LIVING (ADL)
ADLS_ACUITY_SCORE: 37
ADLS_ACUITY_SCORE: 38
ADLS_ACUITY_SCORE: 37

## 2024-07-30 NOTE — OR NURSING
VSS. A&O. Pain improving, tolerable with ice and PO pain meds. Rachelle PO, nausea resolved. Incisions CDI. No dizziness when up. DC instructions, meds and follow up reviewed with patient, brother and sig other. DC to home with family.

## 2024-07-30 NOTE — OR NURSING
VSS. A&O. Pain improving but still experiencing burning in bilateral groin and bilateral knees. Rachelle PO. Up dressed in recliner and transferred to phase II.

## 2024-07-30 NOTE — ANESTHESIA POSTPROCEDURE EVALUATION
Patient: Ana Wyman    Procedure: Procedure(s):  COSMETIC REVISION BRACHIOPLASTY BILATERAL  COSMETIC REVISION THIGH LIFT MEDIAL BILATERAL       Anesthesia Type:  General    Note:     Postop Pain Control: Uneventful            Sign Out: Well controlled pain   PONV: No   Neuro/Psych: Uneventful            Sign Out: Acceptable/Baseline neuro status   Airway/Respiratory: Uneventful            Sign Out: Acceptable/Baseline resp. status   CV/Hemodynamics: Uneventful            Sign Out: Acceptable CV status   Other NRE: NONE   DID A NON-ROUTINE EVENT OCCUR?            Last vitals:  Vitals Value Taken Time   /65 07/30/24 1145   Temp 36.3  C (97.4  F) 07/30/24 1130   Pulse 73 07/30/24 1150   Resp 14 07/30/24 1150   SpO2 94 % 07/30/24 1150   Vitals shown include unfiled device data.    Electronically Signed By: Wade Knight MD  July 30, 2024  11:51 AM

## 2024-07-30 NOTE — PROGRESS NOTES
Dr. Knight at bedside. Pt's BP improved to systolic in 100's and diastolic 60's-70's. Pt reports no dizziness. O2 level >90's when awake. Dips to mid 80's when asleep but pt self corrects back to 90's within 1 minute. Nausea resolved, pt tolerating crackers and clear fluids. Dr. Knight okay with this and approved discharge to home.

## 2024-07-30 NOTE — PROGRESS NOTES
Patient slightly hypotensive in phase II, symptomatic with movement. She is also reporting persistent nausea - Dr. Mayen made aware and prescriptions ordered for nause medication at home. Patient also not maintaining O2 saturation when she dozes off,  to mid 80s. Dr. Knight aware of status. Moved back to Phase 1 for further monitoring.

## 2024-07-30 NOTE — ANESTHESIA PROCEDURE NOTES
Airway       Patient location during procedure: OR       Procedure Start/Stop Times: 7/30/2024 7:45 AM  Staff -        CRNA: Kelly Jackson APRN CRNA       Performed By: CRNA  Consent for Airway        Urgency: elective  Indications and Patient Condition       Indications for airway management: benjamin-procedural       Induction type:intravenous       Mask difficulty assessment: 1 - vent by mask    Final Airway Details       Final airway type: endotracheal airway       Successful airway: ETT - single  Endotracheal Airway Details        ETT size (mm): 7.0       Cuffed: yes       Successful intubation technique: direct laryngoscopy       DL Blade Type: Wilson 2       Grade View of Cords: 1       Adjucts: stylet       Position: Right       Measured from: gums/teeth       Secured at (cm): 21       Bite block used: None    Post intubation assessment        Placement verified by: capnometry, equal breath sounds and chest rise        Number of attempts at approach: 1       Number of other approaches attempted: 0       Secured with: tape       Ease of procedure: easy       Dentition: Intact and Unchanged    Medication(s) Administered   Medication Administration Time: 7/30/2024 7:45 AM

## 2024-07-30 NOTE — OP NOTE
PLASTIC SURGERY OPERATIVE NOTE  Patient Name:  Ana Wyman     Date of Service:  7/30/2024     PREOPERATIVE DIAGNOSES:   1.  Skin laxity [L57.4]     POSTOPERATIVE DIAGNOSES:   1.  Skin laxity [L57.4]       SURGEON:  Liliane Mayen MD   OR STAFF:  Circulator: Rigo Marin RN; Favian Vazquez RN  Scrub Person: Shruthi Morris; Emi Santa  First Assistant: Alden Contreras     ANESTHESIA:  General     ESTIMATED BLOOD LOSS:  25 mL    SPECIMEN(S):    ID Type Source Tests Collected by Time Destination   A :  Tissue Breast, Right OR DOCUMENTATION ONLY Liliane Mayen MD 7/30/2024  9:47 AM    B :  Tissue Breast, Left OR DOCUMENTATION ONLY Liliane Mayen MD 7/30/2024  9:47 AM    C :  Tissue Thigh, Right OR DOCUMENTATION ONLY Liliane Mayen MD 7/30/2024  9:50 AM    D :  Tissue Thigh, Left OR DOCUMENTATION ONLY Liliane Mayen MD 7/30/2024  9:50 AM        INDICATIONS: Patient is a 54-year-old female who has lost a significant amount of weight.  She has undergone previous cosmetic procedures and returns today for additional contouring after her brachioplasty and medial thigh lift.      PROCEDURES:   1.  Scar revision bilateral brachioplasty  2.  Scar revision bilateral medial thighs      DESCRIPTION OF PROCEDURE:  Patient was marked for incisions and taken to the operating room.  General anesthesia was administered.  Patient was placed in the right lateral decubitus position and the left chest area was prepped and draped in a sterile manner.  Excess skin extending from the brachioplasty incision was marked along the and posterior chest wall and then the excess skin and subcutaneous tissue was removed.  Hemostasis was achieved.  The incision was reapproximated with interrupted 2-0 PDS in the superficial fascia followed by interrupted 3-0 Monocryl in the subcutaneous tissue and a running 4-0 subcuticular Monocryl suture.  Xeroform and a light dressing were  applied.    Patient was then turned to the left lateral decubitus position and the right chest was prepped and draped in a sterile manner.  Excess skin extending from the brachioplasty incision was then marked along the posterior chest wall.  This excess skin was sharply excised.  Hemostasis was achieved.  The incision was reapproximated with interrupted 2-0 PDS in the superficial fascia followed by interrupted 3-0 Monocryl in the subcutaneous tissue and a running 4-0 subcuticular Monocryl suture.  Xeroform and a light dressing were applied.    Patient was then turned to supine position and the lower extremity was prepped and draped in a sterile manner.  Excess skin was marked along the medial thigh area bilaterally and excess skin was sharply excised on the left side.  The skin and subcutaneous tissue was removed.  Hemostasis was achieved.  The incision was reapproximated with interrupted 2-0 PDS in the superficial fascia followed by interrupted 3-0 Monocryl in the subcutaneous tissue and a running 4-0 subcuticular Monocryl suture.    A similar procedure was completed on the other side.  Excess skin was marked and then excised.  Hemostasis was achieved.  The incision was reapproximated with interrupted 2-0 PDS in the superficial fascia followed by interrupted 3-0 Monocryl in the subcutaneous tissue and a running 4-0 subcuticular Monocryl suture.    Tumescent was infiltrated along the bilateral lower thigh area and standard liposuction was completed with 3 and 4 mm cannulas.  After achieving smooth contours, port sites were closed with interrupted 5-0 nylon suture.  Benzoin and Steri-Strips were placed along the thigh incisions.  The thighs were then wrapped with an Ace wrap.        IMPLANTS:  * No implants in log *    FINDINGS: Breast tissue weighed 295 g.  Thigh tissue weighed 360 g.  There was 1000 cc of tumescence infiltrated and 1200 cc of Lipo aspirate obtained.    MD Faheem Ayala [facility]   [phone]

## 2024-07-30 NOTE — DISCHARGE INSTRUCTIONS
Today you were given 1,000 mg of Tylenol at 6:30 am. The recommended daily maximum dose is 4000 mg.      Same Day Surgery Discharge Instructions for  Sedation and General Anesthesia     It's not unusual to feel dizzy, light-headed or faint for up to 24 hours after surgery or while taking pain medication.  If you have these symptoms: sit for a few minutes before standing and have someone assist you when you get up to walk or use the bathroom.    You should rest and relax for the next 24 hours. We recommend you make arrangements to have an adult stay with you for at least 24 hours after your discharge.  Avoid hazardous and strenuous activity.    DO NOT DRIVE any vehicle or operate mechanical equipment for 24 hours following the end of your surgery.  Even though you may feel normal, your reactions may be affected by the medication you have received.    Do not drink alcoholic beverages for 24 hours following surgery.     Slowly progress to your regular diet as you feel able. It's not unusual to feel nauseated and/or vomit after receiving anesthesia.  If you develop these symptoms, drink clear liquids (apple juice, ginger ale, broth, 7-up, etc. ) until you feel better.  If your nausea and vomiting persists for 24 hours, please notify your surgeon.      All narcotic pain medications, along with inactivity and anesthesia, can cause constipation. Drinking plenty of liquids and increasing fiber intake will help.    For any questions of a medical nature, call your surgeon.    Do not make important decisions for 24 hours.    If you had general anesthesia, you may have a sore throat for a couple of days related to the breathing tube used during surgery.  You may use Cepacol lozenges to help with this discomfort.  If it worsens or if you develop a fever, contact your surgeon.     If you feel your pain is not well managed with the pain medications prescribed by your surgeon, please contact your surgeon's office to let them know  so they can address your concerns.     Reasons to contact your surgeon:    Signs of possible infection: Check your incision daily for redness, swelling, warmth, red streaks or foul drainage.   Elevated temperature.  Pain not controlled with pain medication and/or rest.   Uncontrolled nausea or vomiting.  Any questions or concerns.        **If you have questions or concerns about your procedure,  call Dr. Mayen at [phone]**

## 2024-07-30 NOTE — ANESTHESIA PREPROCEDURE EVALUATION
Anesthesia Pre-Procedure Evaluation    Patient: Ana Wyman   MRN: [id number] : 1970        Procedure : Procedure(s):  COSMETIC REVISION BRACHIOPLASTY BILATERAL  COSMETIC REVISION THIGH LIFT MEDIAL BILATERAL          Past Medical History:   Diagnosis Date    Allergic rhinitis, cause unspecified     Cellulitis     2 episodes, one with hospitalization [facility]    Complication of anesthesia     MOTHER HAS HISTORY    DUB (dysfunctional uterine bleeding)     Neg EMB     Esophageal reflux     ongoing    Fibroids     Genital problems     Lichens Schlerosis    GERD (gastroesophageal reflux disease)     Hallux valgus (acquired)     History of steroid therapy     Inhalers, eye drops    Hypersomnia with sleep apnea, unspecified     using CPAP    Kidney stone 2018    2 stones    Lichen sclerosus 2011    Lymph edema 2010- present    Lymphedema bilateral with mixed lipedema. 2015    Lymphedema bilateral with mixed lipedema. 2015    Morbid obesity (H) 2013    Pneumonia     Years ago - a few times    PONV (postoperative nausea and vomiting)     Pre-diabetes     Tendonitis currently    Uncomplicated asthma     ((Pt Qnr Sub: ulcer)) mild    Urinary tract infection     A few times in past 10 years    Vision disorder [id number]    Dry Eye    Vitamin D deficiency 2015      Past Surgical History:   Procedure Laterality Date    COLONOSCOPY  05/15/2013    Procedure: COLONOSCOPY;  Colonoscopy;  Surgeon: Koat Blanco MD;  Location:  GI    COLONOSCOPY N/A 10/21/2019    Procedure: COLONOSCOPY, with biopsies by cold forceps;  Surgeon: Lydia Vickers MD;  Location:  GI    COSMETIC ABDOMINOPLASTY Bilateral 2021    Procedure: COSMETIC BILATERAL BELT LIPECTOMY;  Surgeon: Wade Marquez MD;  Location: [facility] OR    COSMETIC LIFT LOWER EXTREMITY BILATERAL (MEDIAL THIGHS) Bilateral 10/27/2023    Procedure: cosmetic bilateral thigh lift;  Surgeon: Liliane Mayen MD;   Location:  OR    COSMETIC LIPOSUCTION, BRACHIOPLASTY, COMBINED Bilateral 8/11/2023    Procedure: BILATERAL COSMETIC BRACHIOPLASTY;  Surgeon: Liliane Mayen MD;  Location:  OR    GASTRIC BYPASS      GYN SURGERY  2016    Uterine Ablation    INCISION AND DRAINAGE TRUNK, COMBINED Left 2/25/2021    Procedure: EVACUATION OF HEMATOMA;  Surgeon: Wade Marquez MD;  Location:  OR    LAPAROSCOPIC BYPASS GASTRIC, CHOLECYSTECTOMY, COMBINED N/A 01/28/2019    Procedure: LAPAROSCOPIC GASTRIC BYPASS;  Surgeon: Maykel Calvin MD;  Location:  OR    LAPAROSCOPIC CHOLECYSTECTOMY N/A 04/11/2019    Procedure: LAPAROSCOPIC CHOLECYSTECTOMY;  Surgeon: Maykel Calvni MD;  Location:  OR    lichen sclerosis      MAMMOPLASTY REDUCTION BILATERAL Bilateral 8/11/2023    Procedure: MAMMOPLASTY, REDUCTION, BILATERAL;  Surgeon: Liliane Mayen MD;  Location:  OR    ORTHOPEDIC SURGERY      PANNICULECTOMY N/A 2/25/2021    Procedure: PANNICULECTOMY;  Surgeon: Wade Marquez MD;  Location:  OR    VASCULAR SURGERY  2015    Vienous closure on both legs    vein closure  12/2016    to prevent lymphedema    ZZ NONSPECIFIC PROCEDURE  1994    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    ZZC NONSPECIFIC PROCEDURE  1999,2001    Foot surgeries x 2 (bunionectomy)    ZZC NONSPECIFIC PROCEDURE  2000    hammer toes      Allergies   Allergen Reactions    Nsaids Other (See Comments)     Had gastric bypass - needs to avoid NSAIDS    Clindamycin Diarrhea    Codeine Nausea and Vomiting    Morphine And Codeine Nausea and Vomiting and Unknown    Shellfish Allergy     Sulfamethoxazole-Trimethoprim Rash      Social History     Tobacco Use    Smoking status: Never    Smokeless tobacco: Never   Substance Use Topics    Alcohol use: Not Currently     Comment: occasional      Wt Readings from Last 1 Encounters:   07/08/24 90.9 kg (200 lb 6.4 oz)        Anesthesia Evaluation   Pt has had prior anesthetic. Type: General.     History of anesthetic complications  - PONV.      ROS/MED HX  ENT/Pulmonary:     (+)           allergic rhinitis,           asthma               (-) tobacco use   Neurologic:       Cardiovascular:     (+)  - Peripheral Vascular Disease-   -  - -                                      METS/Exercise Tolerance:     Hematologic:       Musculoskeletal:       GI/Hepatic: Comment: S/p gastric bypass    (+) GERD,            liver disease (Steatosis),       Renal/Genitourinary:       Endo:     (+)               Obesity (Class 1),    Type II DM: Prediabetes.   Psychiatric/Substance Use:       Infectious Disease:       Malignancy:       Other:            Physical Exam    Airway        Mallampati: II   TM distance: > 3 FB   Neck ROM: full   Mouth opening: > 3 cm    Respiratory Devices and Support         Dental           Cardiovascular          Rhythm and rate: regular and normal     Pulmonary           breath sounds clear to auscultation           OUTSIDE LABS:  CBC:   Lab Results   Component Value Date    WBC 6.3 02/09/2024    WBC 7.7 01/13/2024    HGB 13.8 02/09/2024    HGB 14.0 01/13/2024    HCT 42.9 02/09/2024    HCT 43.2 01/13/2024     02/09/2024     01/13/2024     BMP:   Lab Results   Component Value Date     02/09/2024     01/13/2024    POTASSIUM 4.4 02/09/2024    POTASSIUM 4.4 01/13/2024    CHLORIDE 102 02/09/2024    CHLORIDE 105 01/13/2024    CO2 27 02/09/2024    CO2 29 01/13/2024    BUN 13.8 02/09/2024    BUN 11.8 01/13/2024    CR 0.78 02/09/2024    CR 0.80 01/13/2024    GLC 90 02/09/2024    GLC 92 01/13/2024     COAGS:   Lab Results   Component Value Date    INR 0.96 01/13/2024     POC:   Lab Results   Component Value Date     (H) 02/28/2021    HCG Negative 04/11/2019    HCGS Negative 09/26/2013     HEPATIC:   Lab Results   Component Value Date    ALBUMIN 4.6 02/09/2024    PROTTOTAL 7.5 02/09/2024    ALT 27 02/09/2024    AST 31 02/09/2024    ALKPHOS 97 02/09/2024    BILITOTAL 0.5  "02/09/2024     OTHER:   Lab Results   Component Value Date    LACT 1.4 03/16/2021    A1C 5.4 04/14/2022    SINGH 9.7 02/09/2024    LIPASE 232 03/16/2021    AMYLASE 56 03/22/2004    TSH 3.60 07/02/2024    T4 0.81 07/19/2022    CRP 27.2 (H) 10/14/2016    SED 18 12/15/2017       Anesthesia Plan    ASA Status:  2    NPO Status:  NPO Appropriate    Anesthesia Type: General.     - Airway: ETT   Induction: Intravenous.   Maintenance: TIVA.        Consents    Anesthesia Plan(s) and associated risks, benefits, and realistic alternatives discussed. Questions answered and patient/representative(s) expressed understanding.     - Discussed:     - Discussed with:  Patient            Postoperative Care    Pain management: IV analgesics, Oral pain medications, Multi-modal analgesia.   PONV prophylaxis: Ondansetron (or other 5HT-3), Dexamethasone or Solumedrol, Background Propofol Infusion     Comments:               Wade Knight MD    I have reviewed the pertinent notes and labs in the chart from the past 30 days and (re)examined the patient.  Any updates or changes from those notes are reflected in this note.              # Obesity: Estimated body mass index is 33.35 kg/m  as calculated from the following:    Height as of 7/8/24: 1.651 m (5' 5\").    Weight as of 7/8/24: 90.9 kg (200 lb 6.4 oz).      "

## 2024-08-05 ENCOUNTER — HOSPITAL ENCOUNTER (OUTPATIENT)
Dept: ULTRASOUND IMAGING | Facility: CLINIC | Age: 54
Discharge: HOME | End: 2024-08-05
Attending: PLASTIC SURGERY | Admitting: PLASTIC SURGERY
Payer: COMMERCIAL

## 2024-08-05 DIAGNOSIS — L57.4 SKIN LAXITY: ICD-10-CM

## 2024-08-05 PROCEDURE — 93971 EXTREMITY STUDY: CPT | Mod: LT

## 2024-08-10 ENCOUNTER — APPOINTMENT (OUTPATIENT)
Dept: ULTRASOUND IMAGING | Facility: CLINIC | Age: 54
DRG: 856 | End: 2024-08-10
Attending: EMERGENCY MEDICINE
Payer: COMMERCIAL

## 2024-08-10 ENCOUNTER — HOSPITAL ENCOUNTER (INPATIENT)
Facility: CLINIC | Age: 54
LOS: 4 days | Discharge: HOME HEALTH | DRG: 856 | End: 2024-08-15
Attending: EMERGENCY MEDICINE | Admitting: STUDENT IN AN ORGANIZED HEALTH CARE EDUCATION/TRAINING PROGRAM
Payer: COMMERCIAL

## 2024-08-10 ENCOUNTER — APPOINTMENT (OUTPATIENT)
Dept: CT IMAGING | Facility: CLINIC | Age: 54
DRG: 856 | End: 2024-08-10
Attending: EMERGENCY MEDICINE
Payer: COMMERCIAL

## 2024-08-10 ENCOUNTER — APPOINTMENT (OUTPATIENT)
Dept: GENERAL RADIOLOGY | Facility: CLINIC | Age: 54
DRG: 856 | End: 2024-08-10
Attending: EMERGENCY MEDICINE
Payer: COMMERCIAL

## 2024-08-10 DIAGNOSIS — A41.9 SEPSIS WITHOUT ACUTE ORGAN DYSFUNCTION, DUE TO UNSPECIFIED ORGANISM (H): ICD-10-CM

## 2024-08-10 DIAGNOSIS — S71.102A OPEN THIGH WOUND, LEFT, INITIAL ENCOUNTER: Primary | ICD-10-CM

## 2024-08-10 DIAGNOSIS — T81.41XA INFECTION OF SUPERFICIAL INCISIONAL SURGICAL SITE AFTER PROCEDURE, INITIAL ENCOUNTER: ICD-10-CM

## 2024-08-10 LAB
ALBUMIN SERPL BCG-MCNC: 3.8 G/DL (ref 3.5–5.2)
ALP SERPL-CCNC: 99 U/L (ref 40–150)
ALT SERPL W P-5'-P-CCNC: 18 U/L (ref 0–50)
ANION GAP SERPL CALCULATED.3IONS-SCNC: 13 MMOL/L (ref 7–15)
AST SERPL W P-5'-P-CCNC: 28 U/L (ref 0–45)
BASOPHILS # BLD AUTO: 0 10E3/UL (ref 0–0.2)
BASOPHILS NFR BLD AUTO: 0 %
BILIRUB SERPL-MCNC: 0.5 MG/DL
BUN SERPL-MCNC: 18.3 MG/DL (ref 6–20)
CALCIUM SERPL-MCNC: 8.7 MG/DL (ref 8.8–10.4)
CHLORIDE SERPL-SCNC: 101 MMOL/L (ref 98–107)
CREAT SERPL-MCNC: 0.81 MG/DL (ref 0.51–0.95)
EGFRCR SERPLBLD CKD-EPI 2021: 85 ML/MIN/1.73M2
EOSINOPHIL # BLD AUTO: 0.2 10E3/UL (ref 0–0.7)
EOSINOPHIL NFR BLD AUTO: 1 %
ERYTHROCYTE [DISTWIDTH] IN BLOOD BY AUTOMATED COUNT: 14.4 % (ref 10–15)
FLUAV RNA SPEC QL NAA+PROBE: NEGATIVE
FLUBV RNA RESP QL NAA+PROBE: NEGATIVE
GLUCOSE SERPL-MCNC: 127 MG/DL (ref 70–99)
HCO3 SERPL-SCNC: 23 MMOL/L (ref 22–29)
HCT VFR BLD AUTO: 34.7 % (ref 35–47)
HGB BLD-MCNC: 11.3 G/DL (ref 11.7–15.7)
HOLD SPECIMEN: NORMAL
IMM GRANULOCYTES # BLD: 0 10E3/UL
IMM GRANULOCYTES NFR BLD: 0 %
LACTATE SERPL-SCNC: 1.4 MMOL/L (ref 0.7–2)
LIPASE SERPL-CCNC: 55 U/L (ref 13–60)
LYMPHOCYTES # BLD AUTO: 0.7 10E3/UL (ref 0.8–5.3)
LYMPHOCYTES NFR BLD AUTO: 6 %
MCH RBC QN AUTO: 30.5 PG (ref 26.5–33)
MCHC RBC AUTO-ENTMCNC: 32.6 G/DL (ref 31.5–36.5)
MCV RBC AUTO: 94 FL (ref 78–100)
MONOCYTES # BLD AUTO: 0.5 10E3/UL (ref 0–1.3)
MONOCYTES NFR BLD AUTO: 4 %
NEUTROPHILS # BLD AUTO: 10.2 10E3/UL (ref 1.6–8.3)
NEUTROPHILS NFR BLD AUTO: 88 %
NRBC # BLD AUTO: 0 10E3/UL
NRBC BLD AUTO-RTO: 0 /100
PLATELET # BLD AUTO: 371 10E3/UL (ref 150–450)
POTASSIUM SERPL-SCNC: 4 MMOL/L (ref 3.4–5.3)
PROT SERPL-MCNC: 6.6 G/DL (ref 6.4–8.3)
RBC # BLD AUTO: 3.71 10E6/UL (ref 3.8–5.2)
RSV RNA SPEC NAA+PROBE: NEGATIVE
SARS-COV-2 RNA RESP QL NAA+PROBE: NEGATIVE
SODIUM SERPL-SCNC: 137 MMOL/L (ref 135–145)
WBC # BLD AUTO: 11.6 10E3/UL (ref 4–11)

## 2024-08-10 PROCEDURE — 99285 EMERGENCY DEPT VISIT HI MDM: CPT | Mod: 25

## 2024-08-10 PROCEDURE — 93971 EXTREMITY STUDY: CPT | Mod: LT

## 2024-08-10 PROCEDURE — 250N000011 HC RX IP 250 OP 636: Performed by: EMERGENCY MEDICINE

## 2024-08-10 PROCEDURE — 73701 CT LOWER EXTREMITY W/DYE: CPT | Mod: LT

## 2024-08-10 PROCEDURE — 81001 URINALYSIS AUTO W/SCOPE: CPT | Performed by: EMERGENCY MEDICINE

## 2024-08-10 PROCEDURE — 36415 COLL VENOUS BLD VENIPUNCTURE: CPT | Performed by: EMERGENCY MEDICINE

## 2024-08-10 PROCEDURE — 87637 SARSCOV2&INF A&B&RSV AMP PRB: CPT | Performed by: EMERGENCY MEDICINE

## 2024-08-10 PROCEDURE — 87040 BLOOD CULTURE FOR BACTERIA: CPT | Performed by: EMERGENCY MEDICINE

## 2024-08-10 PROCEDURE — 83605 ASSAY OF LACTIC ACID: CPT | Performed by: EMERGENCY MEDICINE

## 2024-08-10 PROCEDURE — 80053 COMPREHEN METABOLIC PANEL: CPT | Performed by: EMERGENCY MEDICINE

## 2024-08-10 PROCEDURE — 250N000009 HC RX 250: Performed by: EMERGENCY MEDICINE

## 2024-08-10 PROCEDURE — 250N000013 HC RX MED GY IP 250 OP 250 PS 637: Performed by: EMERGENCY MEDICINE

## 2024-08-10 PROCEDURE — 93005 ELECTROCARDIOGRAM TRACING: CPT

## 2024-08-10 PROCEDURE — 71046 X-RAY EXAM CHEST 2 VIEWS: CPT

## 2024-08-10 PROCEDURE — 82550 ASSAY OF CK (CPK): CPT | Performed by: STUDENT IN AN ORGANIZED HEALTH CARE EDUCATION/TRAINING PROGRAM

## 2024-08-10 PROCEDURE — 99205 OFFICE O/P NEW HI 60 MIN: CPT | Performed by: STUDENT IN AN ORGANIZED HEALTH CARE EDUCATION/TRAINING PROGRAM

## 2024-08-10 PROCEDURE — 85025 COMPLETE CBC W/AUTO DIFF WBC: CPT | Performed by: EMERGENCY MEDICINE

## 2024-08-10 PROCEDURE — 83690 ASSAY OF LIPASE: CPT | Performed by: EMERGENCY MEDICINE

## 2024-08-10 PROCEDURE — 258N000003 HC RX IP 258 OP 636: Performed by: EMERGENCY MEDICINE

## 2024-08-10 RX ORDER — PIPERACILLIN SODIUM, TAZOBACTAM SODIUM 4; .5 G/20ML; G/20ML
4.5 INJECTION, POWDER, LYOPHILIZED, FOR SOLUTION INTRAVENOUS ONCE
Status: COMPLETED | OUTPATIENT
Start: 2024-08-10 | End: 2024-08-11

## 2024-08-10 RX ORDER — IOPAMIDOL 755 MG/ML
500 INJECTION, SOLUTION INTRAVASCULAR ONCE
Status: COMPLETED | OUTPATIENT
Start: 2024-08-10 | End: 2024-08-10

## 2024-08-10 RX ORDER — VANCOMYCIN 2 GRAM/500 ML IN 0.9 % SODIUM CHLORIDE INTRAVENOUS
2000 ONCE
Status: COMPLETED | OUTPATIENT
Start: 2024-08-11 | End: 2024-08-11

## 2024-08-10 RX ORDER — ACETAMINOPHEN 325 MG/1
975 TABLET ORAL ONCE
Status: COMPLETED | OUTPATIENT
Start: 2024-08-10 | End: 2024-08-10

## 2024-08-10 RX ADMIN — SODIUM CHLORIDE 2694 ML: 9 INJECTION, SOLUTION INTRAVENOUS at 22:18

## 2024-08-10 RX ADMIN — ACETAMINOPHEN 975 MG: 325 TABLET, FILM COATED ORAL at 22:55

## 2024-08-10 RX ADMIN — SODIUM CHLORIDE 65 ML: 9 INJECTION, SOLUTION INTRAVENOUS at 23:09

## 2024-08-10 RX ADMIN — IOPAMIDOL 100 ML: 755 INJECTION, SOLUTION INTRAVENOUS at 23:09

## 2024-08-10 ASSESSMENT — ACTIVITIES OF DAILY LIVING (ADL)
ADLS_ACUITY_SCORE: 37
ADLS_ACUITY_SCORE: 35

## 2024-08-10 ASSESSMENT — COLUMBIA-SUICIDE SEVERITY RATING SCALE - C-SSRS
1. IN THE PAST MONTH, HAVE YOU WISHED YOU WERE DEAD OR WISHED YOU COULD GO TO SLEEP AND NOT WAKE UP?: NO
2. HAVE YOU ACTUALLY HAD ANY THOUGHTS OF KILLING YOURSELF IN THE PAST MONTH?: NO
6. HAVE YOU EVER DONE ANYTHING, STARTED TO DO ANYTHING, OR PREPARED TO DO ANYTHING TO END YOUR LIFE?: NO

## 2024-08-11 ENCOUNTER — APPOINTMENT (OUTPATIENT)
Dept: GENERAL RADIOLOGY | Facility: CLINIC | Age: 54
DRG: 856 | End: 2024-08-11
Attending: HOSPITALIST
Payer: COMMERCIAL

## 2024-08-11 ENCOUNTER — APPOINTMENT (OUTPATIENT)
Dept: CT IMAGING | Facility: CLINIC | Age: 54
DRG: 856 | End: 2024-08-11
Attending: STUDENT IN AN ORGANIZED HEALTH CARE EDUCATION/TRAINING PROGRAM
Payer: COMMERCIAL

## 2024-08-11 PROBLEM — T81.41XA INFECTION OF SUPERFICIAL INCISIONAL SURGICAL SITE AFTER PROCEDURE, INITIAL ENCOUNTER: Status: ACTIVE | Noted: 2024-08-11

## 2024-08-11 PROBLEM — A41.9 SEPSIS WITHOUT ACUTE ORGAN DYSFUNCTION, DUE TO UNSPECIFIED ORGANISM (H): Status: ACTIVE | Noted: 2024-08-11

## 2024-08-11 LAB
ALBUMIN UR-MCNC: NEGATIVE MG/DL
ANION GAP SERPL CALCULATED.3IONS-SCNC: 12 MMOL/L (ref 7–15)
APPEARANCE UR: CLEAR
BILIRUB UR QL STRIP: NEGATIVE
BUN SERPL-MCNC: 13.5 MG/DL (ref 6–20)
CALCIUM SERPL-MCNC: 7.8 MG/DL (ref 8.8–10.4)
CHLORIDE SERPL-SCNC: 103 MMOL/L (ref 98–107)
CK SERPL-CCNC: 70 U/L (ref 26–192)
COLOR UR AUTO: ABNORMAL
CREAT SERPL-MCNC: 0.74 MG/DL (ref 0.51–0.95)
EGFRCR SERPLBLD CKD-EPI 2021: >90 ML/MIN/1.73M2
ERYTHROCYTE [DISTWIDTH] IN BLOOD BY AUTOMATED COUNT: 14.8 % (ref 10–15)
GLUCOSE SERPL-MCNC: 142 MG/DL (ref 70–99)
GLUCOSE UR STRIP-MCNC: NEGATIVE MG/DL
HCO3 SERPL-SCNC: 20 MMOL/L (ref 22–29)
HCT VFR BLD AUTO: 31.3 % (ref 35–47)
HGB BLD-MCNC: 10 G/DL (ref 11.7–15.7)
HGB UR QL STRIP: NEGATIVE
KETONES UR STRIP-MCNC: NEGATIVE MG/DL
LACTATE SERPL-SCNC: 1 MMOL/L (ref 0.7–2)
LACTATE SERPL-SCNC: 1 MMOL/L (ref 0.7–2)
LACTATE SERPL-SCNC: 1.2 MMOL/L (ref 0.7–2)
LEUKOCYTE ESTERASE UR QL STRIP: ABNORMAL
MCH RBC QN AUTO: 29.9 PG (ref 26.5–33)
MCHC RBC AUTO-ENTMCNC: 31.9 G/DL (ref 31.5–36.5)
MCV RBC AUTO: 93 FL (ref 78–100)
NITRATE UR QL: NEGATIVE
PH UR STRIP: 6.5 [PH] (ref 5–7)
PLATELET # BLD AUTO: 305 10E3/UL (ref 150–450)
POTASSIUM SERPL-SCNC: 3.9 MMOL/L (ref 3.4–5.3)
PROCALCITONIN SERPL IA-MCNC: 1.86 NG/ML
RBC # BLD AUTO: 3.35 10E6/UL (ref 3.8–5.2)
RBC URINE: 0 /HPF
SODIUM SERPL-SCNC: 135 MMOL/L (ref 135–145)
SP GR UR STRIP: 1.02 (ref 1–1.03)
SQUAMOUS EPITHELIAL: <1 /HPF
UROBILINOGEN UR STRIP-MCNC: NORMAL MG/DL
WBC # BLD AUTO: 22.6 10E3/UL (ref 4–11)
WBC URINE: 4 /HPF

## 2024-08-11 PROCEDURE — 120N000001 HC R&B MED SURG/OB

## 2024-08-11 PROCEDURE — 258N000003 HC RX IP 258 OP 636: Performed by: EMERGENCY MEDICINE

## 2024-08-11 PROCEDURE — 83880 ASSAY OF NATRIURETIC PEPTIDE: CPT | Performed by: HOSPITALIST

## 2024-08-11 PROCEDURE — 99233 SBSQ HOSP IP/OBS HIGH 50: CPT | Performed by: INTERNAL MEDICINE

## 2024-08-11 PROCEDURE — 80048 BASIC METABOLIC PNL TOTAL CA: CPT | Performed by: HOSPITALIST

## 2024-08-11 PROCEDURE — 250N000011 HC RX IP 250 OP 636: Performed by: STUDENT IN AN ORGANIZED HEALTH CARE EDUCATION/TRAINING PROGRAM

## 2024-08-11 PROCEDURE — 258N000003 HC RX IP 258 OP 636: Performed by: STUDENT IN AN ORGANIZED HEALTH CARE EDUCATION/TRAINING PROGRAM

## 2024-08-11 PROCEDURE — 258N000003 HC RX IP 258 OP 636: Performed by: INTERNAL MEDICINE

## 2024-08-11 PROCEDURE — 71045 X-RAY EXAM CHEST 1 VIEW: CPT

## 2024-08-11 PROCEDURE — 36415 COLL VENOUS BLD VENIPUNCTURE: CPT | Performed by: HOSPITALIST

## 2024-08-11 PROCEDURE — 83605 ASSAY OF LACTIC ACID: CPT | Performed by: STUDENT IN AN ORGANIZED HEALTH CARE EDUCATION/TRAINING PROGRAM

## 2024-08-11 PROCEDURE — 83605 ASSAY OF LACTIC ACID: CPT | Performed by: INTERNAL MEDICINE

## 2024-08-11 PROCEDURE — 83605 ASSAY OF LACTIC ACID: CPT | Performed by: HOSPITALIST

## 2024-08-11 PROCEDURE — 73700 CT LOWER EXTREMITY W/O DYE: CPT | Mod: RT

## 2024-08-11 PROCEDURE — 85027 COMPLETE CBC AUTOMATED: CPT | Performed by: STUDENT IN AN ORGANIZED HEALTH CARE EDUCATION/TRAINING PROGRAM

## 2024-08-11 PROCEDURE — 36415 COLL VENOUS BLD VENIPUNCTURE: CPT | Performed by: STUDENT IN AN ORGANIZED HEALTH CARE EDUCATION/TRAINING PROGRAM

## 2024-08-11 PROCEDURE — 250N000011 HC RX IP 250 OP 636: Performed by: EMERGENCY MEDICINE

## 2024-08-11 PROCEDURE — 84145 PROCALCITONIN (PCT): CPT | Performed by: INTERNAL MEDICINE

## 2024-08-11 PROCEDURE — 36415 COLL VENOUS BLD VENIPUNCTURE: CPT | Performed by: INTERNAL MEDICINE

## 2024-08-11 PROCEDURE — 80048 BASIC METABOLIC PNL TOTAL CA: CPT | Performed by: STUDENT IN AN ORGANIZED HEALTH CARE EDUCATION/TRAINING PROGRAM

## 2024-08-11 PROCEDURE — 250N000013 HC RX MED GY IP 250 OP 250 PS 637: Performed by: STUDENT IN AN ORGANIZED HEALTH CARE EDUCATION/TRAINING PROGRAM

## 2024-08-11 RX ORDER — ONDANSETRON 4 MG/1
4 TABLET, ORALLY DISINTEGRATING ORAL EVERY 6 HOURS PRN
Status: DISCONTINUED | OUTPATIENT
Start: 2024-08-11 | End: 2024-08-15 | Stop reason: HOSPADM

## 2024-08-11 RX ORDER — ALBUTEROL SULFATE 5 MG/ML
2.5 SOLUTION RESPIRATORY (INHALATION) EVERY 6 HOURS PRN
Status: DISCONTINUED | OUTPATIENT
Start: 2024-08-11 | End: 2024-08-15 | Stop reason: HOSPADM

## 2024-08-11 RX ORDER — AMOXICILLIN 250 MG
1 CAPSULE ORAL 2 TIMES DAILY PRN
Status: DISCONTINUED | OUTPATIENT
Start: 2024-08-11 | End: 2024-08-15 | Stop reason: HOSPADM

## 2024-08-11 RX ORDER — NALOXONE HYDROCHLORIDE 0.4 MG/ML
0.4 INJECTION, SOLUTION INTRAMUSCULAR; INTRAVENOUS; SUBCUTANEOUS
Status: DISCONTINUED | OUTPATIENT
Start: 2024-08-11 | End: 2024-08-15 | Stop reason: HOSPADM

## 2024-08-11 RX ORDER — HYDROCODONE BITARTRATE AND ACETAMINOPHEN 5; 325 MG/1; MG/1
1 TABLET ORAL EVERY 6 HOURS PRN
Status: DISCONTINUED | OUTPATIENT
Start: 2024-08-11 | End: 2024-08-11

## 2024-08-11 RX ORDER — ONDANSETRON 4 MG/1
4 TABLET, FILM COATED ORAL EVERY 6 HOURS PRN
Status: DISCONTINUED | OUTPATIENT
Start: 2024-08-11 | End: 2024-08-11

## 2024-08-11 RX ORDER — CYCLOSPORINE 0.5 MG/ML
1 EMULSION OPHTHALMIC DAILY
COMMUNITY
Start: 2024-06-04

## 2024-08-11 RX ORDER — AMOXICILLIN 250 MG
2 CAPSULE ORAL 2 TIMES DAILY PRN
Status: DISCONTINUED | OUTPATIENT
Start: 2024-08-11 | End: 2024-08-15 | Stop reason: HOSPADM

## 2024-08-11 RX ORDER — SODIUM CHLORIDE 9 MG/ML
INJECTION, SOLUTION INTRAVENOUS CONTINUOUS
Status: ACTIVE | OUTPATIENT
Start: 2024-08-11 | End: 2024-08-12

## 2024-08-11 RX ORDER — NALOXONE HYDROCHLORIDE 0.4 MG/ML
0.2 INJECTION, SOLUTION INTRAMUSCULAR; INTRAVENOUS; SUBCUTANEOUS
Status: DISCONTINUED | OUTPATIENT
Start: 2024-08-11 | End: 2024-08-15 | Stop reason: HOSPADM

## 2024-08-11 RX ORDER — VANCOMYCIN HYDROCHLORIDE 1 G/200ML
1000 INJECTION, SOLUTION INTRAVENOUS EVERY 12 HOURS
Status: DISCONTINUED | OUTPATIENT
Start: 2024-08-11 | End: 2024-08-11

## 2024-08-11 RX ORDER — PIPERACILLIN SODIUM, TAZOBACTAM SODIUM 3; .375 G/15ML; G/15ML
3.38 INJECTION, POWDER, LYOPHILIZED, FOR SOLUTION INTRAVENOUS EVERY 6 HOURS
Status: DISCONTINUED | OUTPATIENT
Start: 2024-08-11 | End: 2024-08-13

## 2024-08-11 RX ORDER — HYDROMORPHONE HYDROCHLORIDE 1 MG/ML
.3-.5 INJECTION, SOLUTION INTRAMUSCULAR; INTRAVENOUS; SUBCUTANEOUS
Status: DISCONTINUED | OUTPATIENT
Start: 2024-08-11 | End: 2024-08-15 | Stop reason: HOSPADM

## 2024-08-11 RX ORDER — VANCOMYCIN HYDROCHLORIDE
1250 EVERY 12 HOURS
Status: DISCONTINUED | OUTPATIENT
Start: 2024-08-11 | End: 2024-08-13

## 2024-08-11 RX ORDER — CALCIUM CARBONATE 500 MG/1
1000 TABLET, CHEWABLE ORAL 4 TIMES DAILY PRN
Status: DISCONTINUED | OUTPATIENT
Start: 2024-08-11 | End: 2024-08-15 | Stop reason: HOSPADM

## 2024-08-11 RX ORDER — ACETAMINOPHEN 325 MG/1
975 TABLET ORAL 3 TIMES DAILY
Status: DISCONTINUED | OUTPATIENT
Start: 2024-08-11 | End: 2024-08-15 | Stop reason: HOSPADM

## 2024-08-11 RX ORDER — SODIUM CHLORIDE 9 MG/ML
INJECTION, SOLUTION INTRAVENOUS CONTINUOUS
Status: DISCONTINUED | OUTPATIENT
Start: 2024-08-11 | End: 2024-08-11

## 2024-08-11 RX ORDER — LIDOCAINE 40 MG/G
CREAM TOPICAL
Status: DISCONTINUED | OUTPATIENT
Start: 2024-08-11 | End: 2024-08-13

## 2024-08-11 RX ORDER — HYDROCODONE BITARTRATE AND ACETAMINOPHEN 5; 325 MG/1; MG/1
1 TABLET ORAL EVERY 6 HOURS PRN
COMMUNITY
Start: 2024-08-07 | End: 2024-09-01

## 2024-08-11 RX ORDER — KETOROLAC TROMETHAMINE 15 MG/ML
15 INJECTION, SOLUTION INTRAMUSCULAR; INTRAVENOUS ONCE
Status: DISCONTINUED | OUTPATIENT
Start: 2024-08-11 | End: 2024-08-11

## 2024-08-11 RX ORDER — PHENTERMINE HYDROCHLORIDE 15 MG/1
15 CAPSULE ORAL EVERY MORNING
COMMUNITY
End: 2024-09-10

## 2024-08-11 RX ORDER — OXYCODONE HYDROCHLORIDE 5 MG/1
5 TABLET ORAL EVERY 4 HOURS PRN
Status: DISCONTINUED | OUTPATIENT
Start: 2024-08-11 | End: 2024-08-13

## 2024-08-11 RX ORDER — OXYCODONE HYDROCHLORIDE 5 MG/1
5 TABLET ORAL EVERY 4 HOURS PRN
Status: DISCONTINUED | OUTPATIENT
Start: 2024-08-11 | End: 2024-08-11

## 2024-08-11 RX ADMIN — ACETAMINOPHEN 975 MG: 325 TABLET, FILM COATED ORAL at 13:50

## 2024-08-11 RX ADMIN — PIPERACILLIN AND TAZOBACTAM 4.5 G: 4; .5 INJECTION, POWDER, FOR SOLUTION INTRAVENOUS at 00:05

## 2024-08-11 RX ADMIN — HYDROCODONE BITARTRATE AND ACETAMINOPHEN 1 TABLET: 5; 325 TABLET ORAL at 03:55

## 2024-08-11 RX ADMIN — ACETAMINOPHEN 975 MG: 325 TABLET, FILM COATED ORAL at 22:46

## 2024-08-11 RX ADMIN — SODIUM CHLORIDE: 9 INJECTION, SOLUTION INTRAVENOUS at 19:55

## 2024-08-11 RX ADMIN — HYDROMORPHONE HYDROCHLORIDE 0.5 MG: 1 INJECTION, SOLUTION INTRAMUSCULAR; INTRAVENOUS; SUBCUTANEOUS at 14:02

## 2024-08-11 RX ADMIN — SODIUM CHLORIDE: 9 INJECTION, SOLUTION INTRAVENOUS at 02:36

## 2024-08-11 RX ADMIN — PIPERACILLIN AND TAZOBACTAM 3.38 G: 3; .375 INJECTION, POWDER, FOR SOLUTION INTRAVENOUS at 19:55

## 2024-08-11 RX ADMIN — CALCIUM CARBONATE (ANTACID) CHEW TAB 500 MG 1000 MG: 500 CHEW TAB at 18:38

## 2024-08-11 RX ADMIN — ACETAMINOPHEN 975 MG: 325 TABLET, FILM COATED ORAL at 08:18

## 2024-08-11 RX ADMIN — PIPERACILLIN AND TAZOBACTAM 3.38 G: 3; .375 INJECTION, POWDER, FOR SOLUTION INTRAVENOUS at 13:50

## 2024-08-11 RX ADMIN — VANCOMYCIN HYDROCHLORIDE 2000 MG: 5 INJECTION, POWDER, LYOPHILIZED, FOR SOLUTION INTRAVENOUS at 00:39

## 2024-08-11 RX ADMIN — HYDROCODONE BITARTRATE AND ACETAMINOPHEN 1 TABLET: 5; 325 TABLET ORAL at 21:45

## 2024-08-11 RX ADMIN — SODIUM CHLORIDE: 9 INJECTION, SOLUTION INTRAVENOUS at 06:03

## 2024-08-11 RX ADMIN — PIPERACILLIN AND TAZOBACTAM 3.38 G: 3; .375 INJECTION, POWDER, FOR SOLUTION INTRAVENOUS at 07:02

## 2024-08-11 RX ADMIN — VANCOMYCIN HYDROCHLORIDE 1250 MG: 5 INJECTION, POWDER, LYOPHILIZED, FOR SOLUTION INTRAVENOUS at 12:05

## 2024-08-11 ASSESSMENT — ACTIVITIES OF DAILY LIVING (ADL)
ADLS_ACUITY_SCORE: 37
ADLS_ACUITY_SCORE: 39
ADLS_ACUITY_SCORE: 37
ADLS_ACUITY_SCORE: 37
ADLS_ACUITY_SCORE: 39
ADLS_ACUITY_SCORE: 37
ADLS_ACUITY_SCORE: 39
ADLS_ACUITY_SCORE: 39
ADLS_ACUITY_SCORE: 37
ADLS_ACUITY_SCORE: 39
ADLS_ACUITY_SCORE: 39
ADLS_ACUITY_SCORE: 37
ADLS_ACUITY_SCORE: 39
ADLS_ACUITY_SCORE: 37

## 2024-08-11 NOTE — PLAN OF CARE
"Goal Outcome Evaluation:      Plan of Care Reviewed With: patient    Overall Patient Progress: no changeOverall Patient Progress: no change    Outcome Evaluation: A/O x4. Lethargic. Denies dizziness. Febrile and hypotensive. IV dilaudid and scheduled tylenol given with some relief. BP 86/52 provider notified.    Problem: Adult Inpatient Plan of Care  Goal: Plan of Care Review  Description: The Plan of Care Review/Shift note should be completed every shift.  The Outcome Evaluation is a brief statement about your assessment that the patient is improving, declining, or no change.  This information will be displayed automatically on your shift  note.  Outcome: Progressing  Flowsheets (Taken 8/11/2024 1637)  Outcome Evaluation: A/O x4. Lethargic. Denies dizziness. Febrile and hypotensive. IV dilaudid and scheduled tylenol given with some relief.  Plan of Care Reviewed With: patient  Overall Patient Progress: no change  Goal: Patient-Specific Goal (Individualized)  Description: You can add care plan individualizations to a care plan. Examples of Individualization might be:  \"Parent requests to be called daily at 9am for status\", \"I have a hard time hearing out of my right ear\", or \"Do not touch me to wake me up as it startles  me\".  Outcome: Progressing  Goal: Absence of Hospital-Acquired Illness or Injury  Outcome: Progressing  Intervention: Identify and Manage Fall Risk  Recent Flowsheet Documentation  Taken 8/11/2024 0821 by Marylin Banks, RN  Safety Promotion/Fall Prevention:   activity supervised   assistive device/personal items within reach   clutter free environment maintained   nonskid shoes/slippers when out of bed   safety round/check completed   treat reversible contributory factors   treat underlying cause  Goal: Optimal Comfort and Wellbeing  Outcome: Progressing  Goal: Readiness for Transition of Care  Outcome: Progressing     Problem: Skin or Soft Tissue Infection  Goal: Absence of Infection Signs and " Symptoms  Outcome: Progressing     Problem: Skin Injury Risk Increased  Goal: Skin Health and Integrity  Outcome: Progressing  Intervention: Optimize Skin Protection  Recent Flowsheet Documentation  Taken 8/11/2024 0821 by Marylin Banks RN  Activity Management: bedrest     Problem: Pain Acute  Goal: Optimal Pain Control and Function  Outcome: Progressing  Intervention: Prevent or Manage Pain  Recent Flowsheet Documentation  Taken 8/11/2024 0821 by Marylin Banks RN  Medication Review/Management: medications reviewed     Problem: Comorbidity Management  Goal: Maintenance of Asthma Control  Outcome: Progressing  Intervention: Maintain Asthma Symptom Control  Recent Flowsheet Documentation  Taken 8/11/2024 0821 by Marylin Banks RN  Medication Review/Management: medications reviewed  Goal: Bariatric Home Regimen Maintained  Outcome: Progressing  Intervention: Maintain and Manage Postbariatric Surgery Care  Recent Flowsheet Documentation  Taken 8/11/2024 0821 by Marylin Banks RN  Medication Review/Management: medications reviewed

## 2024-08-11 NOTE — PHARMACY-VANCOMYCIN DOSING SERVICE
"Pharmacy Vancomycin Initial Note  Date of Service 2024  Patient's  1970  54 year old, female    Indication: Skin and Soft Tissue Infection    Current estimated CrCl = Estimated Creatinine Clearance: 93.2 mL/min (based on SCr of 0.74 mg/dL).    Creatinine for last 3 days  8/10/2024: 10:16 PM Creatinine 0.81 mg/dL    Recent Vancomycin Level(s) for last 3 days  No results found for requested labs within last 3 days.      Vancomycin IV Administrations (past 72 hours)                     Vancomycin (VANCOCIN) 2,000 mg in 0.9% NaCl 500 mL intermittent infusion (mg) 2,000 mg New Bag 24 0039                    Nephrotoxins and other renal medications (From now, onward)      Start     Dose/Rate Route Frequency Ordered Stop    24 1200  vancomycin (VANCOCIN) 1,250 mg in 200 mL dextrose intermittent infusion         1,250mg  200 mL/hr over 1 Hours Intravenous EVERY 12 HOURS 24 0525      24 0600  piperacillin-tazobactam (ZOSYN) 3.375 g vial to attach to  mL bag        Note to Pharmacy: For [facility], [facility] and [facility]: For Zosyn-naive patients, use the \"Zosyn initial dose + extended infusion\" order panel.    3.375 g  over 30 Minutes Intravenous EVERY 6 HOURS 24 0041              Contrast Orders - past 72 hours (72h ago, onward)      Start     Dose/Rate Route Frequency Stop    08/10/24 2310  iopamidol (ISOVUE-370) solution 500 mL         500 mL Intravenous ONCE 08/10/24 2309            InsightRX Prediction of Planned Initial Vancomycin Regimen  Regimen: 1250 mg IV every 12 hours.  Start time: 11:00 on 2024  Exposure target: AUC24 (range)400-600 mg/L.hr   AUC24,ss: 545 mg/L.hr  Probability of AUC24 > 400: 80 %  Ctrough,ss: 17.1 mg/L  Probability of Ctrough,ss > 20: 37 %  Probability of nephrotoxicity (Lodise ELEANOR ): 13 %        Plan:  Start vancomycin 1250mg IV q12h after loading dose.    Vancomycin monitoring method: AUC  Vancomycin therapeutic monitoring goal: 400-600 " mg*h/L  Pharmacy will check vancomycin levels as appropriate in 1-3 Days.    Serum creatinine levels will be ordered daily for the first week of therapy and at least twice weekly for subsequent weeks.      Mayi Gonzalez, PharmD, [facility]   Emergency Medicine Pharmacist  [phone] or Mack  August 11, 2024

## 2024-08-11 NOTE — PROGRESS NOTES
Plastic Surgery    Patient seen and examined. Chart reviewed. Patient well known to me.    PE: Temp: (!) 101  F (38.3  C) Temp src: Oral BP: 93/59 Pulse: 97   Resp: 18 SpO2: 96 % O2 Device: None (Room air)      Erythema tenderness left medial knee area and medial thigh area.     CT reviewed. Right thigh with seroma proximal medial thigh. Possible abscess left medial knee area but I don't believe the upper medial thigh was well visualized.    A/P:  Continue with antibiotics 24-48 hrs. Activity as tolerated. If no improvement in symptoms will plan on surgical I&D Monday evening or Tuesday. Patient agreeable with plan.     MD Faheem Ayala [facility]   [phone] (cell)

## 2024-08-11 NOTE — PHARMACY-ADMISSION MEDICATION HISTORY
Pharmacist Admission Medication History    Admission medication history is complete. The information provided in this note is only as accurate as the sources available at the time of the update.    Information Source(s): Patient and CareEverywhere/SureScripts via in-person    Pertinent Information: none    Changes made to PTA medication list:  Added: cyclosporine eye drops, Norco  Deleted: Miralax, sucralfate  Changed: Phnetermine dose    Medication History Completed By:   Mayi Gonzalez, PharmD, [facility]   Emergency Medicine Pharmacist  [phone] or Mack  August 11, 2024      PTA Med List   Medication Sig Last Dose    biotin 5 MG CAPS Take 5,000 mcg by mouth daily @1200 8/9/2024 at 1200    Calcium Citrate-Vitamin D (CALCIUM CITRATE CHEWY BITE PO) Take 500 mg by mouth 3 times daily Bariatric Advantage calcium citrate 500 mcg/vitamin D 500 international unit(s) per chew Past Week at -    cycloSPORINE (RESTASIS) 0.05 % ophthalmic emulsion Place 1 drop into both eyes daily 8/10/2024 at -    HYDROcodone-acetaminophen (NORCO) 5-325 MG tablet Take 1 tablet by mouth every 6 hours as needed for pain Past Week at -    Multiple Vitamins-Minerals (BARIATRIC MULTIVITAMINS/IRON PO) Take 1 capsule by mouth daily Bariatric Advantage Ultra Solo with iron 8/10/2024 at am    omeprazole (PRILOSEC) 20 MG DR capsule Take 10-20 mg by mouth daily 8/10/2024 at am    phentermine 15 MG capsule Take 15 mg by mouth every morning 8/10/2024 at am    vitamin D3 (CHOLECALCIFEROL) 50 mcg (2000 units) tablet Take 1 capsule by mouth every morning  8/10/2024 at am

## 2024-08-11 NOTE — ED PROVIDER NOTES
Emergency Department Note      History of Present Illness     Chief Complaint   Fever and Wound Infection    HPI   Ana Wyman is a 54 year old female who presents to the ED with her  for evaluation of fever and wound infection. The patient reports that she had liposuction and revisions on her legs, bikini line, and arms, since 07/28 she has had a hematoma in most locations. Earlier today she notes that she heard a pop on her left hip, and produced pink drainage from the site, after the pop today she began to develop a fever. The patient's  notes that the site of the wound is more pink than it had been and it is not as purple as it had been. Ana has been congested, nauseous,  tired, and had a headache. She denies cough or sore throat. On 08/05 the patient was seen by the surgeon who confirmed that she does not have DVT. Today they called the surgeon who told them to come in if she developed a fever.     Independent Historian    as detailed above.    Review of External Notes   Reviewed bariatric surgery performed at [facility] on 7/30/2024    Past Medical History     Medical History and Problem List   Allergic rhinitis   Acanthosis nigricans  Anemia, iron deficiency   Asthma   Bakers cyst   Cutaneous skin tags   Cellulitis   Complication of anesthesia   DUB   GERD   Fibroids   Fatty liver   Hallux valgus   Hypersomnia   Kidney stone   Intertrigo   Lichen sclerosus   Lymph edema   Lipedema   Obesity   Pneumonia   Postural hypotension  Prediabetes   Tendonitis   Obesity   UTI   Vision disorder   Vitamin D deficiency     Medications   Xopenex   Prilosec   Zofran   Phentermine   Carafate    Surgical History   Past Surgical History:   Procedure Laterality Date    BRACHIOPLASTY COSMETIC Bilateral 7/30/2024    Procedure: COSMETIC REVISION BRACHIOPLASTY BILATERAL;  Surgeon: Liliane Mayen MD;  Location:  OR    COLONOSCOPY  05/15/2013    Procedure: COLONOSCOPY;  Colonoscopy;  Surgeon:  Bella, Kota Brar MD;  Location:  GI    COLONOSCOPY N/A 10/21/2019    Procedure: COLONOSCOPY, with biopsies by cold forceps;  Surgeon: Lydia Vickers MD;  Location:  GI    COSMETIC ABDOMINOPLASTY Bilateral 2/25/2021    Procedure: COSMETIC BILATERAL BELT LIPECTOMY;  Surgeon: Wade Marquez MD;  Location: [facility] OR    COSMETIC LIFT LOWER EXTREMITY BILATERAL (MEDIAL THIGHS) Bilateral 10/27/2023    Procedure: cosmetic bilateral thigh lift;  Surgeon: Liliane Mayen MD;  Location: [facility] OR    COSMETIC LIFT LOWER EXTREMITY BILATERAL (MEDIAL THIGHS) Bilateral 7/30/2024    Procedure: COSMETIC REVISION THIGH LIFT MEDIAL BILATERAL;  Surgeon: Liliane Mayen MD;  Location: [facility] OR    COSMETIC LIPOSUCTION, BRACHIOPLASTY, COMBINED Bilateral 8/11/2023    Procedure: BILATERAL COSMETIC BRACHIOPLASTY;  Surgeon: Liliane Mayen MD;  Location:  OR    GASTRIC BYPASS      GYN SURGERY  2016    Uterine Ablation    INCISION AND DRAINAGE TRUNK, COMBINED Left 2/25/2021    Procedure: EVACUATION OF HEMATOMA;  Surgeon: Wade Marquez MD;  Location:  OR    LAPAROSCOPIC BYPASS GASTRIC, CHOLECYSTECTOMY, COMBINED N/A 01/28/2019    Procedure: LAPAROSCOPIC GASTRIC BYPASS;  Surgeon: Maykel Calvin MD;  Location:  OR    LAPAROSCOPIC CHOLECYSTECTOMY N/A 04/11/2019    Procedure: LAPAROSCOPIC CHOLECYSTECTOMY;  Surgeon: Maykel Calvin MD;  Location:  OR    lichen sclerosis      MAMMOPLASTY REDUCTION BILATERAL Bilateral 8/11/2023    Procedure: MAMMOPLASTY, REDUCTION, BILATERAL;  Surgeon: Liliane Mayen MD;  Location:  OR    ORTHOPEDIC SURGERY      PANNICULECTOMY N/A 2/25/2021    Procedure: PANNICULECTOMY;  Surgeon: Wade Marquez MD;  Location:  OR    VASCULAR SURGERY  2015    Vienous closure on both legs    vein closure  12/2016    to prevent lymphedema    ZZC NONSPECIFIC PROCEDURE  1994    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    ZZC NONSPECIFIC PROCEDURE  1999,2001    Foot  "surgeries x 2 (bunionectomy)    [facility] NONSPECIFIC PROCEDURE  2000    hammer toes     Physical Exam     Patient Vitals for the past 24 hrs:   BP Temp Temp src Pulse Resp SpO2 Height Weight   08/11/24 0112 (!) 89/52 -- -- -- -- -- -- --   08/11/24 0111 (!) 80/53 (!) 102  F (38.9  C) Oral 109 14 94 % -- --   08/10/24 2254 106/58 -- -- 112 12 94 % -- --   08/10/24 2247 -- -- -- 111 17 94 % -- --   08/10/24 2215 102/65 -- -- 118 -- 94 % -- --   08/10/24 2159 92/63 (!) 102.3  F (39.1  C) Oral (!) 123 20 98 % 1.626 m (5' 4\") 89.8 kg (198 lb)     Physical Exam  General: Patient is awake, alert  Head: The scalp, face, and head appear normal  Eyes: The pupils are equal, round, and reactive to light. Conjunctivae and sclerae are normal  ENT: External acoustic canals are normal. The oropharynx is normal without erythema. Uvula is in the midline  Neck: Normal range of motion.   CV: Tachycardic but regular  Resp: Lungs are clear without wheezes or rales. No respiratory distress.   GI: Abdomen is soft, no rigidity, guarding, or rebound. No distension. No tenderness to palpation in any quadrant.    MS: Normal tone. Joints grossly normal without effusions. No asymmetric leg swelling, calf or thigh tenderness.    Skin: Significant incision on bilateral lower extremities from the groin to the knee.  Along the distal portion of the left knee incision there is a sizable hematoma that is warm, erythematous and tender to the touch.  Image can be seen below.  The remainder of the incision looks clean dry and intact without any evidence of purulent drainage.  Scattered ecchymosis.  Neuro: Speech is normal and fluent. Face is symmetric. Moving all extremities.   Psych:  Normal affect.  Appropriate interactions.      Diagnostics     Lab Results   Labs Ordered and Resulted from Time of ED Arrival to Time of ED Departure   COMPREHENSIVE METABOLIC PANEL - Abnormal       Result Value    Sodium 137      Potassium 4.0      Carbon Dioxide (CO2) 23   "    Anion Gap 13      Urea Nitrogen 18.3      Creatinine 0.81      GFR Estimate 85      Calcium 8.7 (*)     Chloride 101      Glucose 127 (*)     Alkaline Phosphatase 99      AST 28      ALT 18      Protein Total 6.6      Albumin 3.8      Bilirubin Total 0.5     CBC WITH PLATELETS AND DIFFERENTIAL - Abnormal    WBC Count 11.6 (*)     RBC Count 3.71 (*)     Hemoglobin 11.3 (*)     Hematocrit 34.7 (*)     MCV 94      MCH 30.5      MCHC 32.6      RDW 14.4      Platelet Count 371      % Neutrophils 88      % Lymphocytes 6      % Monocytes 4      % Eosinophils 1      % Basophils 0      % Immature Granulocytes 0      NRBCs per 100 WBC 0      Absolute Neutrophils 10.2 (*)     Absolute Lymphocytes 0.7 (*)     Absolute Monocytes 0.5      Absolute Eosinophils 0.2      Absolute Basophils 0.0      Absolute Immature Granulocytes 0.0      Absolute NRBCs 0.0     ROUTINE UA WITH MICROSCOPIC REFLEX TO CULTURE - Abnormal    Color Urine Straw      Appearance Urine Clear      Glucose Urine Negative      Bilirubin Urine Negative      Ketones Urine Negative      Specific Gravity Urine 1.023      Blood Urine Negative      pH Urine 6.5      Protein Albumin Urine Negative      Urobilinogen Urine Normal      Nitrite Urine Negative      Leukocyte Esterase Urine Trace (*)     RBC Urine 0      WBC Urine 4      Squamous Epithelials Urine <1     LIPASE - Normal    Lipase 55     LACTIC ACID WHOLE BLOOD WITH 1X REPEAT IN 2 HR WHEN >2 - Normal    Lactic Acid, Initial 1.4     INFLUENZA A/B, RSV, & SARS-COV2 PCR - Normal    Influenza A PCR Negative      Influenza B PCR Negative      RSV PCR Negative      SARS CoV2 PCR Negative     CK TOTAL - Normal    CK 70     BLOOD CULTURE     Imaging   CT Femur Thigh Right w/o Contrast   Final Result   IMPRESSION:   1.  Fluid collection in the proximal anteromedial thigh, and a small collection in the medial aspect of the knee, which could be sterile or infected.         XR Chest 2 Views   Final Result   IMPRESSION:  Cardiomediastinal silhouette within normal limits. No focal consolidation or pleural effusion.      US Lower Extremity Venous Duplex Left   Final Result   IMPRESSION:   1.  No deep venous thrombosis in the left lower extremity.      2.  Elongated hypoechoic fluid collection in the proximal thigh, possibly a seroma or hematoma, demonstrated on current examination.      3.  Another complex hematoma in the distal thigh medially, better demonstrated on current examination.      CT Knee Left w Contrast   Final Result   IMPRESSION:   1.  Left medial distal thigh 9.7 cm lobulated multiseptated fluid collection, suspicious for abscess.           Independent Interpretation   CXR shows no PNA    ED Course      Medications Administered   Medications   sodium chloride (PF) 0.9% PF flush 3 mL (has no administration in time range)   sodium chloride (PF) 0.9% PF flush 3 mL (3 mLs Intracatheter $Given 8/10/24 2212)   Vancomycin (VANCOCIN) 2,000 mg in 0.9% NaCl 500 mL intermittent infusion (2,000 mg Intravenous $New Bag 8/11/24 0039)   sodium chloride 0.9 % infusion (has no administration in time range)   piperacillin-tazobactam (ZOSYN) 3.375 g vial to attach to  mL bag (has no administration in time range)   HYDROmorphone (PF) (DILAUDID) injection 0.3-0.5 mg (has no administration in time range)   oxyCODONE (ROXICODONE) tablet 5 mg (has no administration in time range)   naloxone (NARCAN) injection 0.2 mg (has no administration in time range)     Or   naloxone (NARCAN) injection 0.4 mg (has no administration in time range)     Or   naloxone (NARCAN) injection 0.2 mg (has no administration in time range)     Or   naloxone (NARCAN) injection 0.4 mg (has no administration in time range)   albuterol (PROVENTIL) neb solution 2.5 mg (has no administration in time range)   ondansetron (ZOFRAN) tablet 4 mg (has no administration in time range)   acetaminophen (TYLENOL) tablet 975 mg (has no administration in time range)   lidocaine 1  % 0.1-1 mL (has no administration in time range)   lidocaine (LMX4) cream (has no administration in time range)   sodium chloride (PF) 0.9% PF flush 3 mL (has no administration in time range)   sodium chloride (PF) 0.9% PF flush 3 mL (has no administration in time range)   senna-docusate (SENOKOT-S/PERICOLACE) 8.6-50 MG per tablet 1 tablet (has no administration in time range)     Or   senna-docusate (SENOKOT-S/PERICOLACE) 8.6-50 MG per tablet 2 tablet (has no administration in time range)   calcium carbonate (TUMS) chewable tablet 1,000 mg (has no administration in time range)   sodium chloride 0.9% BOLUS 2,694 mL (2,694 mLs Intravenous $New Bag 8/10/24 2218)   acetaminophen (TYLENOL) tablet 975 mg (975 mg Oral $Given 8/10/24 2255)   iopamidol (ISOVUE-370) solution 500 mL (100 mLs Intravenous $Given 8/10/24 2309)   for CT scan flush use (65 mLs Intravenous $Given 8/10/24 2309)   piperacillin-tazobactam (ZOSYN) 4.5 g vial to attach to  mL bag (0 g Intravenous Stopped 8/11/24 0039)     Procedures   Procedures     Discussion of Management   Admitting Hospitalist, Dr. Carr    ED Course   ED Course as of 08/11/24 0225   Sat Aug 10, 2024   2216 I obtained history and examined the patient as noted above.    1446 I spoke to Dr. Mayen regarding admitting the patient and surgery.   1032 I spoke to Dr. Carr regarding admission.   Sun Aug 11, 2024   0003 I rechecked and updated the patient with her results.      Additional Documentation  Supportive .     Medical Decision Making / Diagnosis     CMS Diagnoses: None    MIPS       None    MDM   Ana Wyman is a 54 year old female with recent bariatric surgery including cosmetic revision of brachioplasty and bilateral lower extremities who presents to the emergency department with fever and concern for postoperative infection.  Upon initial evaluation patient has soft blood pressures, tachycardia and fever consistent with sepsis.  Blood cultures were drawn and  lactate was obtained.  Lactate normal.  Concerned that hematoma along the distal portion of the left lower extremity incision line along the medial knee is a source of infection.  CT scan concerning for lobulated abscess.  Patient was started on vancomycin and Zosyn.  I spoke with with the patient's operating surgeon who agrees with plan for admission.  She does have operating privileges here at [facility] so she can be admitted here.  Will be admitted for further evaluation and treatment. NPO overnight.  Low concern for necrotizing fasciitis at this time.    Disposition   The patient was admitted to the hospital.     Diagnosis     ICD-10-CM    1. Infection of superficial incisional surgical site after procedure, initial encounter  T81.41XA       2. Sepsis without acute organ dysfunction, due to unspecified organism (H)  A41.9          Discharge Medications   New Prescriptions    No medications on file     I, Trena Ramirez, am serving as a scribe at 10:16 PM on 8/10/2024 to document services personally performed by Nikhil Redding based on my observations and the provider's statements to me.        Nikhil Redding MD  08/11/24 0228

## 2024-08-11 NOTE — H&P
[facility]    History and Physical - Hospitalist Service       Date of Admission:  8/10/2024    Assessment & Plan      Ana Wyman is a 54 year old female with a past medical history significant for mild intermittent asthma, status post bariatric surgery, who recently underwent cosmetic revision brachioplasty bilateral and bilateral thighs on 7/30 who presented to the ER with complaint of fever, chills, nausea, and tightness in her left leg.     Skin laxity status post cosmetic revision of bilateral thighs and arms complicated by Left medial distal thigh abscess, Right medial thigh  fluid collection  Sepsis secondary to above  Underwent above-mentioned surgery on 7/30.  Initially did well however few days later patient started noticing swelling and tightness in her left leg behind the knee.  Followed up with Dr. Mckeon in the clinic and underwent aspiration of the hematoma x 2 from left medial thigh.  She continued to have tightness and pain in her left leg.  Today she popped a possible seroma on her right thigh.  She does not have any pain or other symptoms on her right thigh.  Later in the evening she developed fever and other systemic symptoms so decided to come to the ER for further management.  Patient appears septic on the basis of fever, tachycardia, and mild leukocytosis.  Underwent CT imaging which showed left medial distal thigh abscess about 9.7 cm in size, appears lobulated and multiseptated.  Dr. Vega discussed the case with Dr. Mayen and was told Dr. Denis has privileges here at the [facility].  Patient will be continued on IV antibiotics, fluids, n.p.o. and Dr. Mayen  will do the surgery tomorrow.  Please see the exam findings below.  Patient has been having tightness and pain for past few days it is a staying the same and has not worsened.  No significant pain in thigh upon knee flexion.  CK 70.  Low suspicion for necrotizing fasciitis and compartment  "syndrome.    -Status post 2694 mL of fluid in the ED  -Continue IV normal saline at 100 mL/h  - Started on IV vancomycin and Zosyn will continue  - CT scan of right thigh further evaluate fluid collection  -Surgical intervention by Dr. Mckeon tomorrow.  N.p.o.  -Follow-up blood cultures  -Pain management with Tylenol, norco, IV Dilaudid  -IV Toradol 15 mg once  -Antiemetics as needed    Addendum:  CT of right thigh showed fluid collection in the proximal anteromedial thigh about 8.1 x 4 x 13.6 cm, which could be sterile or infected.  Single punctate focus of gas medial distal thigh is likely postsurgical.  Patient likely need draining skin/exploration of her right thigh as well.    Notified by RN about patient blood pressure 88/55, temperature 100.5.  Patient was reassessed at bedside.  She feels the same.  She is mentating well.  She reported that she usually have low blood pressure around 100.  Will continue IV fluids.  Will continue to monitor clinically and check lactic acid.       Mild intermittent asthma  Patient reported congestion that started today along with her other symptoms of fever, and nausea.  She is on room air.  No wheezing on exam.  Low suspicion for asthma exacerbation.  Will continue to monitor.  - Albuterol nebulizer as needed       Diet:  NPO  DVT Prophylaxis: None, due to recent surgery   Grace Catheter: Not present  Lines: None     Cardiac Monitoring: None  Code Status:  Full Code     Clinically Significant Risk Factors Present on Admission                          # Obesity: Estimated body mass index is 33.99 kg/m  as calculated from the following:    Height as of this encounter: 1.626 m (5' 4\").    Weight as of this encounter: 89.8 kg (198 lb).       # Asthma: noted on problem list              Disposition Plan     Medically Ready for Discharge: Anticipated in 2-4 Days           Laura Carr MD  Hospitalist Service  [facility]  Securely message with Mack (more " info)  Text page via [facility] Paging/Directory     ______________________________________________________________________    Chief Complaint   Leg pain     History is obtained from the patient    History of Present Illness   Ana Wyman is a 54 year old female with a past medical history significant for mild intermittent asthma, status post bariatric surgery, who recently underwent cosmetic revision brachioplasty bilateral and bilateral thighs on 7/30 who presented to the ER with complaint of fever, chills, nausea, and tightness in her left leg.     Patient has had gastric bypass after which she has lost significant weight.  She underwent surgery for skin laxity in the past.  She had a redo brachioplasty bilateral as well as bilateral thighs on 7/30.  Initially postoperatively she did well.  She then started noticing that her left leg, area behind her knee and above is tight and painful.  She followed up with her plastic surgeon Dr. Mayen and underwent aspiration of the fluid in her office x 2.   This was reportedly on 8/7 and 8/9.  Today patient said that when she woke up she noticed that she is all wet on her right side of the thigh.  Soon she realized that she has popped a seroma.  This was not associated with pain or tightness.  Later in the evening she noted that she is having high-grade fever associated with chills, weakness, nausea, and congestion.  She decided to come to the ER for further evaluation.     Upon presentation to the ER patient was febrile at 102.3, tachycardic at 123, blood pressure 92/63.  Lab workup revealed WBC 11.6, hemoglobin 11.3, platelet 371, metabolic panel unremarkable, lactic acid 1.4.  She tested negative for COVID, influenza A/B, RSV.  She underwent CT left knee which revealed a lobulated multiseptated fluid collection in the subcutaneous fat of the medial distal thigh measuring 9.3 x 4.2 x 9.7 cm, along with mild adjacent fatty stranding.  Visualized musculature appears  unremarkable.  Saved IV fluids at 30 mL per cake's about 269 4 mL.  She also received Zosyn and vancomycin.  Dr. Vega discussed the case with Dr. Mayen.  Per Dr. Miladis Mayen has privileges here at [facility], she requested to admit the patient to the hospital, continue antibiotics, n.p.o. for a surgical intervention tomorrow morning.       Past Medical History    Past Medical History:   Diagnosis Date    Allergic rhinitis, cause unspecified     Cellulitis 2005    2 episodes, one with hospitalization [facility]    Complication of anesthesia     MOTHER HAS HISTORY    DUB (dysfunctional uterine bleeding)     Neg EMB 2012    Esophageal reflux     ongoing    Fibroids     Genital problems     Lichens Schlerosis    GERD (gastroesophageal reflux disease)     Hallux valgus (acquired)     History of steroid therapy     Inhalers, eye drops    Hypersomnia with sleep apnea, unspecified     using CPAP    Kidney stone 05/2018    2 stones    Lichen sclerosus 07/14/2011    Lymph edema 2010- present    Lymphedema bilateral with mixed lipedema. 09/30/2015    Lymphedema bilateral with mixed lipedema. 09/30/2015    Morbid obesity (H) 03/19/2013    Pneumonia     Years ago - a few times    PONV (postoperative nausea and vomiting)     Pre-diabetes     Tendonitis currently    Uncomplicated asthma     ((Pt Qnr Sub: ulcer)) mild    Urinary tract infection     A few times in past 10 years    Vision disorder [id number]    Dry Eye    Vitamin D deficiency 03/14/2015       Past Surgical History   Past Surgical History:   Procedure Laterality Date    BRACHIOPLASTY COSMETIC Bilateral 7/30/2024    Procedure: COSMETIC REVISION BRACHIOPLASTY BILATERAL;  Surgeon: Liliane Mayen MD;  Location:  OR    COLONOSCOPY  05/15/2013    Procedure: COLONOSCOPY;  Colonoscopy;  Surgeon: Kota Blanco MD;  Location:  GI    COLONOSCOPY N/A 10/21/2019    Procedure: COLONOSCOPY, with biopsies by cold forceps;  Surgeon: Lydia Vickers MD;   Location: [facility] GI    COSMETIC ABDOMINOPLASTY Bilateral 2/25/2021    Procedure: COSMETIC BILATERAL BELT LIPECTOMY;  Surgeon: Wade Marquez MD;  Location: [facility] OR    COSMETIC LIFT LOWER EXTREMITY BILATERAL (MEDIAL THIGHS) Bilateral 10/27/2023    Procedure: cosmetic bilateral thigh lift;  Surgeon: Liliane Myaen MD;  Location: [facility] OR    COSMETIC LIFT LOWER EXTREMITY BILATERAL (MEDIAL THIGHS) Bilateral 7/30/2024    Procedure: COSMETIC REVISION THIGH LIFT MEDIAL BILATERAL;  Surgeon: Liliane Mayen MD;  Location: [facility] OR    COSMETIC LIPOSUCTION, BRACHIOPLASTY, COMBINED Bilateral 8/11/2023    Procedure: BILATERAL COSMETIC BRACHIOPLASTY;  Surgeon: Liliane Mayen MD;  Location:  OR    GASTRIC BYPASS      GYN SURGERY  2016    Uterine Ablation    INCISION AND DRAINAGE TRUNK, COMBINED Left 2/25/2021    Procedure: EVACUATION OF HEMATOMA;  Surgeon: Wade Marquez MD;  Location:  OR    LAPAROSCOPIC BYPASS GASTRIC, CHOLECYSTECTOMY, COMBINED N/A 01/28/2019    Procedure: LAPAROSCOPIC GASTRIC BYPASS;  Surgeon: Maykel Calvin MD;  Location:  OR    LAPAROSCOPIC CHOLECYSTECTOMY N/A 04/11/2019    Procedure: LAPAROSCOPIC CHOLECYSTECTOMY;  Surgeon: Maykel Calvin MD;  Location:  OR    lichen sclerosis      MAMMOPLASTY REDUCTION BILATERAL Bilateral 8/11/2023    Procedure: MAMMOPLASTY, REDUCTION, BILATERAL;  Surgeon: Liliane Mayen MD;  Location:  OR    ORTHOPEDIC SURGERY      PANNICULECTOMY N/A 2/25/2021    Procedure: PANNICULECTOMY;  Surgeon: Wade Marquez MD;  Location:  OR    VASCULAR SURGERY  2015    Vienous closure on both legs    vein closure  12/2016    to prevent lymphedema    ZZC NONSPECIFIC PROCEDURE  1994    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    ZZC NONSPECIFIC PROCEDURE  1999,2001    Foot surgeries x 2 (bunionectomy)    ZZC NONSPECIFIC PROCEDURE  2000    hammer toes       Prior to Admission Medications   Prior to Admission Medications   Prescriptions  Last Dose Informant Patient Reported? Taking?   Calcium Citrate-Vitamin D (CALCIUM CITRATE CHEWY BITE PO)  Self Yes No   Sig: Take 500 mg by mouth 3 times daily Plus D,noon, supper, HS; Bariatric Advantage calcium citrate 500 mcg/vitamin D 500 international unit(s) per chew   Multiple Vitamins-Minerals (BARIATRIC MULTIVITAMINS/IRON PO)   Yes No   Sig: Take 1 capsule by mouth daily Bariatric Advantage Ultra Solo with iron   biotin 5 MG CAPS  Self Yes No   Sig: Take 5,000 mcg by mouth daily At noon   levalbuterol (XOPENEX HFA) 45 MCG/ACT inhaler   No No   Sig: Inhale 1-2 puffs into the lungs every 6 hours as needed for shortness of breath or wheezing   nitroGLYcerin (NITRO-BID) 2 % OINT ointment   No No   Sig: Place 0.5 inches (7.5 mg) onto the skin daily as needed (Apply 0.5in to tips of fingers once daily prior to cold exposure)   nystatin-triamcinolone (MYCOLOG II) 883048-4.1 UNIT/GM-% external cream   No No   Sig: APPLY TO AFFECTED AREA TWO TIMES A DAY UP TO 7 DAYS AS NEEDED FOR RASH   omeprazole (PRILOSEC OTC) 20 MG EC tablet   No No   Sig: Take 1-2 tablets (20-40 mg) by mouth daily   ondansetron (ZOFRAN ODT) 4 MG ODT tab   No No   Sig: Take 1 tablet (4 mg) by mouth every 8 hours as needed for nausea   ondansetron (ZOFRAN ODT) 4 MG ODT tab   No No   Sig: Take 1 tablet (4 mg) by mouth every 8 hours as needed for nausea   phentermine 30 MG capsule   No No   Sig: Take 1 capsule (30 mg) by mouth every morning   polyethylene glycol (MIRALAX/GLYCOLAX) packet  Self No No   Sig: Take 17 g by mouth daily   sucralfate (CARAFATE) 1 GM tablet   Yes No   Sig: Take 1 g by mouth 4 times daily   vitamin D3 (CHOLECALCIFEROL) 50 mcg (2000 units) tablet  Self Yes No   Sig: Take 1 capsule by mouth every morning       Facility-Administered Medications: None        Review of Systems    The 10 point Review of Systems is negative other than noted in the HPI or here.      Physical Exam   Vital Signs: Temp: (!) 102  F (38.9  C) Temp src:  Oral BP: (!) 89/52 Pulse: 109   Resp: 14 SpO2: 94 % O2 Device: None (Room air)    Weight: 198 lbs 0 oz    Constitutional: awake, alert, cooperative, no apparent distress, and appears stated age  Eyes: anicteric sclera  ENT: normocephalic, without obvious abnormality  Respiratory: On room air, equal air entry bilaterally, no wheezing or crackles  Cardiovascular: Normal rate, regular rhythm, no murmur  GI: Soft, nontender, nondistended  Musculoskeletal: Right thigh has a medial incision extending from groin to down to the knee, appears clean and healing, on the medial side fluid-filled mass palpated without any overlying skin changes, no tenderness, no oozing.  Left leg also has a medial incision extending from groin to the knee, left thigh is warm, nontender, erythematous, firm mass appreciated extending from behind the knee going up to the thigh, dressing was removed and a small opening of the incision was noted up on the medial thigh, no adrienne blood or pus, patient was able to flex her left knee without any increasing pain.  Neurologic: Awake, alert, oriented to name, place and time.    Neuropsychiatric: appropriate mood and affect     Medical Decision Making       60 MINUTES SPENT BY ME on the date of service doing chart review, history, exam, documentation & further activities per the note.      Data   ------------------------- PAST 24 HR DATA REVIEWED -----------------------------------------------

## 2024-08-11 NOTE — PROVIDER NOTIFICATION
1501: Provider page: Patient appears lethargic BP is 87/51 (58). Please advise    -IVF and labs ordered.

## 2024-08-11 NOTE — ED TRIAGE NOTES
Patient here with seromas in bilateral legs after plastic surgery on 7/28. Plastic loung surgery in [place]. Now developed a fever today. 103.4 at home. Tylenol at 530pm. Norco at 8pm.

## 2024-08-11 NOTE — ED NOTES
"[facility]  ED Nurse Handoff Report    ED Chief complaint: Fever and Wound Infection  . ED Diagnosis:   Final diagnoses:   None       Allergies:   Allergies   Allergen Reactions    Nsaids Other (See Comments)     Had gastric bypass - needs to avoid NSAIDS    Clindamycin Diarrhea    Codeine Nausea and Vomiting    Morphine And Codeine Nausea and Vomiting and Unknown    Shellfish Allergy     Sulfamethoxazole-Trimethoprim Rash       Code Status: Full Code    Activity level - Baseline/Home:  independent.  Activity Level - Current:   assist of 1.   Lift room needed: No.   Bariatric: No   Needed: No   Isolation: No.   Infection: Not Applicable.     Respiratory status: Room air    Vital Signs (within 30 minutes):   Vitals:    08/10/24 2159 08/10/24 2215 08/10/24 2247 08/10/24 2254   BP: 92/63 102/65  106/58   Pulse: (!) 123 118 111 112   Resp: 20  17 12   Temp: (!) 102.3  F (39.1  C)      TempSrc: Oral      SpO2: 98% 94% 94% 94%   Weight: 89.8 kg (198 lb)      Height: 1.626 m (5' 4\")          Cardiac Rhythm:  ,   Cardiac  Cardiac Rhythm: Sinus tachycardia  Pain level:    Patient confused: No.   Patient Falls Risk: nonskid shoes/slippers when out of bed, arm band in place, and patient and family education.   Elimination Status: Has voided     Patient Report - Initial Complaint: fever/leg swelling.   Focused Assessment: Ana Wyman is a 54 year old female who presents to the ED with her  for evaluation of fever and wound infection. The patient reports that she had liposuction and revisions on her legs, bikini line, and arms, since 07/28 she has had a hematoma in most locations. Earlier today she notes that she heard a pop on her left hip, and produced pink drainage from the site, after the pop today she began to develop a fever. The patient's  notes that the site of the wound is more pink than it had been and it is not as purple as it had been. Ana has been congested, " nauseous,  tired, and had a headache. She denies cough or sore throat. On 08/05 the patient was seen by the surgeon who confirmed that she does not have DVT. Today they called the surgeon who told them to come in if she developed a fever.      Abnormal Results:   Labs Ordered and Resulted from Time of ED Arrival to Time of ED Departure   COMPREHENSIVE METABOLIC PANEL - Abnormal       Result Value    Sodium 137      Potassium 4.0      Carbon Dioxide (CO2) 23      Anion Gap 13      Urea Nitrogen 18.3      Creatinine 0.81      GFR Estimate 85      Calcium 8.7 (*)     Chloride 101      Glucose 127 (*)     Alkaline Phosphatase 99      AST 28      ALT 18      Protein Total 6.6      Albumin 3.8      Bilirubin Total 0.5     CBC WITH PLATELETS AND DIFFERENTIAL - Abnormal    WBC Count 11.6 (*)     RBC Count 3.71 (*)     Hemoglobin 11.3 (*)     Hematocrit 34.7 (*)     MCV 94      MCH 30.5      MCHC 32.6      RDW 14.4      Platelet Count 371      % Neutrophils 88      % Lymphocytes 6      % Monocytes 4      % Eosinophils 1      % Basophils 0      % Immature Granulocytes 0      NRBCs per 100 WBC 0      Absolute Neutrophils 10.2 (*)     Absolute Lymphocytes 0.7 (*)     Absolute Monocytes 0.5      Absolute Eosinophils 0.2      Absolute Basophils 0.0      Absolute Immature Granulocytes 0.0      Absolute NRBCs 0.0     LIPASE - Normal    Lipase 55     LACTIC ACID WHOLE BLOOD WITH 1X REPEAT IN 2 HR WHEN >2 - Normal    Lactic Acid, Initial 1.4     INFLUENZA A/B, RSV, & SARS-COV2 PCR - Normal    Influenza A PCR Negative      Influenza B PCR Negative      RSV PCR Negative      SARS CoV2 PCR Negative     ROUTINE UA WITH MICROSCOPIC REFLEX TO CULTURE   BLOOD CULTURE        CT Knee Left w Contrast   Final Result   IMPRESSION:   1.  Left medial distal thigh 9.7 cm lobulated multiseptated fluid collection, suspicious for abscess.         XR Chest 2 Views    (Results Pending)   US Lower Extremity Venous Duplex Left    (Results Pending)        Treatments provided: labs/fluids/abx  Family Comments:  at bedside.  OBS brochure/video discussed/provided to patient:  N/A  ED Medications:   Medications   sodium chloride (PF) 0.9% PF flush 3 mL (has no administration in time range)   sodium chloride (PF) 0.9% PF flush 3 mL (3 mLs Intracatheter $Given 8/10/24 2219)   piperacillin-tazobactam (ZOSYN) 4.5 g vial to attach to  mL bag (has no administration in time range)   sodium chloride 0.9% BOLUS 2,694 mL (2,694 mLs Intravenous $New Bag 8/10/24 2218)   acetaminophen (TYLENOL) tablet 975 mg (975 mg Oral $Given 8/10/24 2255)   iopamidol (ISOVUE-370) solution 500 mL (100 mLs Intravenous $Given 8/10/24 2309)   for CT scan flush use (65 mLs Intravenous $Given 8/10/24 2309)       Drips infusing:  No  For the majority of the shift this patient was Green.   Interventions performed were none.    Sepsis treatment initiated: No    Cares/treatment/interventions/medications to be completed following ED care: see orders    ED Nurse Name: Lesvia Dyson RN  11:41 PM

## 2024-08-11 NOTE — PLAN OF CARE
"RECEIVING UNIT ED HANDOFF REVIEW    Above ED Nurse Handoff Report was reviewed: Yes  Reviewed by: Daija Braun, RN on August 11, 2024 at 9:01 PM   MILLIE Giron called the ED to inform them the note was read: Lolita       [facility]    ED Boarding Nurse Handoff Addendum Report:    Date/time: 8/11/2024, 2:50 AM    Activity Level:  not oob yet    Fall Risk: Yes:  nonskid shoes/slippers when out of bed, arm band in place, patient and family education, and bed in lowest position, call bell within reach.    Active Infusions: Yes    Current Meds Due: See MAR    Current care needs: Pain mgt, IV fluids, Abx, cardiac monitoring.    Oxygen requirements (liters/min and/or FiO2): N/A    Respiratory status: Room air    Vital signs (within last 30 minutes):    Vitals:    08/11/24 0508 08/11/24 0559 08/11/24 0616 08/11/24 0700   BP: (!) 80/55 103/58 90/43 100/58   BP Location:       Patient Position:       Cuff Size:       Pulse: 89  103 102   Resp:       Temp: 99.6  F (37.6  C)      TempSrc: Oral      SpO2: 97%  93% 93%   Weight:       Height:           Focused assessment within last 30 minutes:    Pt is alert and oriented x 4. Pt complains of a headache, elevated tempt persists, cold compress offered and MD paged. Norco administered.  Pt denies any shortness of breath and on room air,    Bilateral thigh edema, sore and warm to touch with bruising. IV NS infusing at 100ml/hr. IV Abx Zosyn administered.    CT thigh and femur completed.     Tele: ST.     NPO maintained. Ongoing monitoring.     /58   Pulse 102   Temp 99.6  F (37.6  C) (Oral)   Resp 18   Ht 1.626 m (5' 4\")   Wt 89.8 kg (198 lb)   LMP 10/01/2016 (Approximate)   SpO2 93%   BMI 33.99 kg/m       ED Boarding Nurse name: Lolis Gallegos RN   "

## 2024-08-11 NOTE — PROGRESS NOTES
[facility]    Medicine Progress Note - Hospitalist Service    Date of Admission:  8/10/2024    Assessment & Plan   Ana Wyman is a 54 year old female with a PMH significant for mild intermittent asthma, status post bariatric surgery, who recently underwent cosmetic revision brachioplasty of bilateral thighs on 7/30. She presented to the ER with complaint of fever, chills, nausea, and tightness in her left leg.     Status post cosmetic revision of bilateral thighs and arms complicated by Left medial distal thigh soft tissue infection and possible abscess.  Right medial thigh  fluid collection  Severe sepsis sepsis secondary to above  -Patient underwent the procedure on 7/30.   - Initially did well, however, few days later she  noticed swelling and tightness in her left leg behind the knee.    -She had followed up with Dr. Mckeon in the clinic and underwent aspiration of the hematoma x 2 from left medial thigh.   -Despite the aspiration, she continued to have tightness and pain in her left leg.    -She also  noted popped possible seroma on her right thigh on the day of admission.  -She continued to have fever, chilling sensation.  -She was also noted to have hypotension.  -She got bolus of IV fluid .  -Evaluated by her plastic surgeon Dr. Mayen in ED, no surgery today and diet was ordered for her earlier  -She meets criteria for sepsis based on fever, leukocytosis, tachycardia and hypotension.  -Continue vancomycin and Zosyn.  -Lactate normal.  -Procalcitonin pending.  -Continue IV fluid NS at 125 mL/h.  -CT imaging reported left medial distal thigh abscess about 9.7 cm in size, appears lobulated and multiseptated.   -CT imaging of the left side also showed fluid collection in the proximal anteromedial thigh about 8 x 4 x 13 cm.  -No sign of deep infection.  -Pain controlled with Norco, Tylenol, Dilaudid.  -Minimize narcotics as much as possible.  -Monitor vitals closely , blood pressure  "is low, patient chronically has low blood pressure systolic about 100.  -DVT ruled out left lower extremity by ultrasound.  -DVT prophylaxis, restarted on heparin subcu as she is at increased risk.      Mild intermittent asthma  -Continue to monitor.  -She is not hypoxic, no wheezing or increased work of breathing   -Continue bronchodilators.  - Albuterol nebulizer as needed            Diet: Regular Diet Adult    DVT Prophylaxis: Heparin SQ  Grace Catheter: Not present  Lines: None     Cardiac Monitoring: None  Code Status: Full Code      Clinically Significant Risk Factors Present on Admission          # Hypocalcemia: Lowest Ca = 7.8 mg/dL in last 2 days, will monitor and replace as appropriate            # Anemia: based on hgb <11       # Obesity: Estimated body mass index is 33.99 kg/m  as calculated from the following:    Height as of this encounter: 1.626 m (5' 4\").    Weight as of this encounter: 89.8 kg (198 lb).       # Asthma: noted on problem list              Disposition Plan     Medically Ready for Discharge: Anticipated in 2-4 Days             Fitz Tavarez MD  Hospitalist Service  [facility]  Securely message with [employer] (more info)  Text page via [facility] Paging/Directory   ______________________________________________________________________    Interval History   Patient seen and examined, assumed care today, H&P, labs, medications, overnight events reviewed.  She denied any complaint, blood pressure systolic in the 80s, she is asymptomatic, laying in bed, responds to questions appropriately.  The left lower extremity is tight, able to move her foot and toes without any restriction.  Continue to have fever and chilling sensation.  No cough runny nose or sore throat      Physical Exam   Vital Signs: Temp: 100.3  F (37.9  C) Temp src: Oral BP: (!) 86/52 Pulse: 95   Resp: 18 SpO2: 94 % O2 Device: None (Room air)    Weight: 198 lbs 0 oz  General: Awake, alert, cooperative, " no apparent distress.  HEENT: Pink, nonicteric, moist oral mucosa.  CHEST : Entry bilaterally, no wheezing crackles or rales  CVS: Normal rate, regular rhythm, no murmur  GI: Soft, nontender, nondistended  EXT: Right thigh has a medial incision extending from groin to down to the knee, appears clean and healing, on the medial side fluid-filled mass palpated without any overlying skin changes, no tenderness, no oozing.  Left leg also has a medial incision extending from groin to the knee, left thigh is warm, nontender, erythematous, firm mass appreciated extending from behind the knee going up to the thigh, dressing was removed and a small opening of the incision was noted up on the medial thigh, no adrienne blood or pus, patient was able to flex her left knee without any increasing pain.      Medical Decision Making       48 MINUTES SPENT BY ME on the date of service doing chart review, history, exam, documentation & further activities per the note.      Data   Imaging results reviewed over the past 24 hrs:   Recent Results (from the past 24 hour(s))   CT Knee Left w Contrast    Narrative    EXAM: CT KNEE LEFT W CONTRAST  LOCATION: [facility]  DATE: 8/10/2024    INDICATION: concern for post op infection  COMPARISON: None.  TECHNIQUE: IV contrast. Axial, sagittal and coronal thin-section reconstruction. Dose reduction techniques were used.   CONTRAST: 100mL Isovue 370    FINDINGS:     BONES:  -No evidence of acute fracture or dislocation.  -Mild tricompartmental degenerative changes of the knee.  -0.6 cm well-corticated ossification in the posterior knee, which may represent intra-articular body or sequela of remote injury.    SOFT TISSUES:  -Lobulated multiseptated fluid collection in the subcutaneous fat of the medial distal thigh, measuring 9.3 x 4.2 x 9.7 cm. Mild adjacent fat stranding.  -Varicose veins.  -Visualized musculature appears unremarkable.  -No significant knee joint effusion.       Impression    IMPRESSION:  1.  Left medial distal thigh 9.7 cm lobulated multiseptated fluid collection, suspicious for abscess.     US Lower Extremity Venous Duplex Left    Narrative    EXAM: US LOWER EXTREMITY VENOUS DUPLEX LEFT  LOCATION: [facility]  DATE: 8/10/2024    INDICATION: Swelling. Rule out DVT, recent surgery.  COMPARISON: Ultrasound left lower extremity venous duplex 8/5/2024.  TECHNIQUE: Venous Duplex ultrasound of the left lower extremity with and without compression, augmentation and duplex. Color flow and spectral Doppler with waveform analysis performed.    FINDINGS: Exam includes the common femoral, femoral, popliteal, and contralateral common femoral veins as well as segmentally visualized deep calf veins and greater saphenous vein.     LEFT: No deep vein thrombosis. No superficial thrombophlebitis. Elongated hypoechoic fluid collection in the proximal thigh measures 13.8 x 7.5 x 5.4 cm, possibly a seroma or hematoma, demonstrated on current examination. Another complex fluid collection   in the distal thigh medially measures 6.4 x 7.7 x 1.9 cm, better demonstrated on current examination. No popliteal cyst.      Impression    IMPRESSION:  1.  No deep venous thrombosis in the left lower extremity.    2.  Elongated hypoechoic fluid collection in the proximal thigh, possibly a seroma or hematoma, demonstrated on current examination.    3.  Another complex hematoma in the distal thigh medially, better demonstrated on current examination.   XR Chest 2 Views    Narrative    EXAM: XR CHEST 2 VIEWS  LOCATION: [facility]  DATE: 8/10/2024    INDICATION: Post op fever.  COMPARISON: 3/16/2021      Impression    IMPRESSION: Cardiomediastinal silhouette within normal limits. No focal consolidation or pleural effusion.   CT Femur Thigh Right w/o Contrast    Narrative    EXAM: CT FEMUR THIGH RIGHT W/O CONTRAST  LOCATION: [facility]  DATE:  8/11/2024    INDICATION: Fluid collection in right thigh, post surgical  COMPARISON: None.  TECHNIQUE: Noncontrast. Axial, sagittal and coronal thin-section reconstruction. Dose reduction techniques were used.     FINDINGS:     BONES:  -No erosion or periostitis. No fracture location. Mild degenerative changes of the hip and knee.    SOFT TISSUES:  -Fluid collection measuring 8.1 x 4.0 x 13.6 cm in the anteromedial proximal thigh cutaneous tissue. There is an additional small fluid collection in the medial subcutaneous tissue at the level of the femoral condyles measuring 3.0 x 1.4 x 2.7 cm. Mild   subcutaneous fat stranding in the medial thigh. Single punctate focus of gas medial distal thigh likely postsurgical.      Impression    IMPRESSION:  1.  Fluid collection in the proximal anteromedial thigh, and a small collection in the medial aspect of the knee, which could be sterile or infected.       Recent Labs   Lab 08/11/24  0727 08/10/24  2216   WBC 22.6* 11.6*   HGB 10.0* 11.3*   MCV 93 94    371    137   POTASSIUM 3.9 4.0   CHLORIDE 103 101   CO2 20* 23   BUN 13.5 18.3   CR 0.74 0.81   ANIONGAP 12 13   SINGH 7.8* 8.7*   * 127*   ALBUMIN  --  3.8   PROTTOTAL  --  6.6   BILITOTAL  --  0.5   ALKPHOS  --  99   ALT  --  18   AST  --  28   LIPASE  --  55

## 2024-08-11 NOTE — PROVIDER NOTIFICATION
Pt had an episode of dizziness and brief unresponsiveness as they attempted to walk to bathroom with 1 assist. Upon assessment BP was 36/22, HR 111bpm. ED MD was called upon and continuous fluid was bolused. Pt was assisted back into bed and BP currently at 95/57, . Thank you kindly.

## 2024-08-11 NOTE — PROVIDER NOTIFICATION
Pt's BP @ 88/55, tempt at 100.5. Pt complains of headache. Please advise. Thank you kindly.    Addendum: Order received. See MAR.

## 2024-08-12 ENCOUNTER — ANESTHESIA (OUTPATIENT)
Dept: SURGERY | Facility: CLINIC | Age: 54
DRG: 856 | End: 2024-08-12
Payer: COMMERCIAL

## 2024-08-12 ENCOUNTER — ANESTHESIA EVENT (OUTPATIENT)
Dept: SURGERY | Facility: CLINIC | Age: 54
DRG: 856 | End: 2024-08-12
Payer: COMMERCIAL

## 2024-08-12 LAB
ANION GAP SERPL CALCULATED.3IONS-SCNC: 10 MMOL/L (ref 7–15)
ANION GAP SERPL CALCULATED.3IONS-SCNC: 11 MMOL/L (ref 7–15)
ATRIAL RATE - MUSE: 108 BPM
BUN SERPL-MCNC: 7.9 MG/DL (ref 6–20)
BUN SERPL-MCNC: 9.3 MG/DL (ref 6–20)
CALCIUM SERPL-MCNC: 8.2 MG/DL (ref 8.8–10.4)
CALCIUM SERPL-MCNC: 8.4 MG/DL (ref 8.8–10.4)
CHLORIDE SERPL-SCNC: 105 MMOL/L (ref 98–107)
CHLORIDE SERPL-SCNC: 105 MMOL/L (ref 98–107)
CREAT SERPL-MCNC: 0.65 MG/DL (ref 0.51–0.95)
CREAT SERPL-MCNC: 0.67 MG/DL (ref 0.51–0.95)
DIASTOLIC BLOOD PRESSURE - MUSE: NORMAL MMHG
EGFRCR SERPLBLD CKD-EPI 2021: >90 ML/MIN/1.73M2
EGFRCR SERPLBLD CKD-EPI 2021: >90 ML/MIN/1.73M2
ERYTHROCYTE [DISTWIDTH] IN BLOOD BY AUTOMATED COUNT: 14.8 % (ref 10–15)
GLUCOSE BLDC GLUCOMTR-MCNC: 90 MG/DL (ref 70–99)
GLUCOSE SERPL-MCNC: 101 MG/DL (ref 70–99)
GLUCOSE SERPL-MCNC: 135 MG/DL (ref 70–99)
HCO3 SERPL-SCNC: 19 MMOL/L (ref 22–29)
HCO3 SERPL-SCNC: 20 MMOL/L (ref 22–29)
HCT VFR BLD AUTO: 31.3 % (ref 35–47)
HGB BLD-MCNC: 10.1 G/DL (ref 11.7–15.7)
INTERPRETATION ECG - MUSE: NORMAL
MCH RBC QN AUTO: 30.3 PG (ref 26.5–33)
MCHC RBC AUTO-ENTMCNC: 32.3 G/DL (ref 31.5–36.5)
MCV RBC AUTO: 94 FL (ref 78–100)
NT-PROBNP SERPL-MCNC: 991 PG/ML (ref 0–900)
P AXIS - MUSE: 36 DEGREES
PLATELET # BLD AUTO: 276 10E3/UL (ref 150–450)
POTASSIUM SERPL-SCNC: 3.5 MMOL/L (ref 3.4–5.3)
POTASSIUM SERPL-SCNC: 3.6 MMOL/L (ref 3.4–5.3)
PR INTERVAL - MUSE: 134 MS
QRS DURATION - MUSE: 94 MS
QT - MUSE: 320 MS
QTC - MUSE: 428 MS
R AXIS - MUSE: -27 DEGREES
RBC # BLD AUTO: 3.33 10E6/UL (ref 3.8–5.2)
SODIUM SERPL-SCNC: 135 MMOL/L (ref 135–145)
SODIUM SERPL-SCNC: 135 MMOL/L (ref 135–145)
SYSTOLIC BLOOD PRESSURE - MUSE: NORMAL MMHG
T AXIS - MUSE: 27 DEGREES
VENTRICULAR RATE- MUSE: 108 BPM
WBC # BLD AUTO: 19.2 10E3/UL (ref 4–11)

## 2024-08-12 PROCEDURE — 250N000013 HC RX MED GY IP 250 OP 250 PS 637: Performed by: STUDENT IN AN ORGANIZED HEALTH CARE EDUCATION/TRAINING PROGRAM

## 2024-08-12 PROCEDURE — 120N000001 HC R&B MED SURG/OB

## 2024-08-12 PROCEDURE — 370N000017 HC ANESTHESIA TECHNICAL FEE, PER MIN: Performed by: PLASTIC SURGERY

## 2024-08-12 PROCEDURE — 250N000012 HC RX MED GY IP 250 OP 636 PS 637: Performed by: ANESTHESIOLOGY

## 2024-08-12 PROCEDURE — 710N000009 HC RECOVERY PHASE 1, LEVEL 1, PER MIN: Performed by: PLASTIC SURGERY

## 2024-08-12 PROCEDURE — 250N000011 HC RX IP 250 OP 636: Performed by: STUDENT IN AN ORGANIZED HEALTH CARE EDUCATION/TRAINING PROGRAM

## 2024-08-12 PROCEDURE — 360N000076 HC SURGERY LEVEL 3, PER MIN: Performed by: PLASTIC SURGERY

## 2024-08-12 PROCEDURE — 258N000003 HC RX IP 258 OP 636: Performed by: INTERNAL MEDICINE

## 2024-08-12 PROCEDURE — 258N000003 HC RX IP 258 OP 636: Performed by: NURSE ANESTHETIST, CERTIFIED REGISTERED

## 2024-08-12 PROCEDURE — 250N000011 HC RX IP 250 OP 636: Performed by: ANESTHESIOLOGY

## 2024-08-12 PROCEDURE — 87075 CULTR BACTERIA EXCEPT BLOOD: CPT | Performed by: PLASTIC SURGERY

## 2024-08-12 PROCEDURE — 36415 COLL VENOUS BLD VENIPUNCTURE: CPT | Performed by: INTERNAL MEDICINE

## 2024-08-12 PROCEDURE — 82374 ASSAY BLOOD CARBON DIOXIDE: CPT | Performed by: INTERNAL MEDICINE

## 2024-08-12 PROCEDURE — 0JCP0ZZ EXTIRPATION OF MATTER FROM LEFT LOWER LEG SUBCUTANEOUS TISSUE AND FASCIA, OPEN APPROACH: ICD-10-PCS | Performed by: PLASTIC SURGERY

## 2024-08-12 PROCEDURE — 250N000011 HC RX IP 250 OP 636: Performed by: NURSE ANESTHETIST, CERTIFIED REGISTERED

## 2024-08-12 PROCEDURE — 250N000009 HC RX 250: Performed by: ANESTHESIOLOGY

## 2024-08-12 PROCEDURE — 250N000025 HC SEVOFLURANE, PER MIN: Performed by: PLASTIC SURGERY

## 2024-08-12 PROCEDURE — 999N000141 HC STATISTIC PRE-PROCEDURE NURSING ASSESSMENT: Performed by: PLASTIC SURGERY

## 2024-08-12 PROCEDURE — 258N000003 HC RX IP 258 OP 636: Performed by: STUDENT IN AN ORGANIZED HEALTH CARE EDUCATION/TRAINING PROGRAM

## 2024-08-12 PROCEDURE — 250N000009 HC RX 250: Performed by: NURSE ANESTHETIST, CERTIFIED REGISTERED

## 2024-08-12 PROCEDURE — 250N000011 HC RX IP 250 OP 636: Performed by: PLASTIC SURGERY

## 2024-08-12 PROCEDURE — 272N000001 HC OR GENERAL SUPPLY STERILE: Performed by: PLASTIC SURGERY

## 2024-08-12 PROCEDURE — 0HCHXZZ EXTIRPATION OF MATTER FROM RIGHT UPPER LEG SKIN, EXTERNAL APPROACH: ICD-10-PCS | Performed by: PLASTIC SURGERY

## 2024-08-12 PROCEDURE — 0Y9D0ZZ DRAINAGE OF LEFT UPPER LEG, OPEN APPROACH: ICD-10-PCS | Performed by: PLASTIC SURGERY

## 2024-08-12 PROCEDURE — 87205 SMEAR GRAM STAIN: CPT | Performed by: PLASTIC SURGERY

## 2024-08-12 PROCEDURE — 99233 SBSQ HOSP IP/OBS HIGH 50: CPT | Performed by: INTERNAL MEDICINE

## 2024-08-12 PROCEDURE — 85027 COMPLETE CBC AUTOMATED: CPT | Performed by: INTERNAL MEDICINE

## 2024-08-12 PROCEDURE — 87070 CULTURE OTHR SPECIMN AEROBIC: CPT | Performed by: PLASTIC SURGERY

## 2024-08-12 PROCEDURE — 258N000003 HC RX IP 258 OP 636: Performed by: ANESTHESIOLOGY

## 2024-08-12 RX ORDER — DEXAMETHASONE SODIUM PHOSPHATE 4 MG/ML
4 INJECTION, SOLUTION INTRA-ARTICULAR; INTRALESIONAL; INTRAMUSCULAR; INTRAVENOUS; SOFT TISSUE
Status: COMPLETED | OUTPATIENT
Start: 2024-08-12 | End: 2024-08-12

## 2024-08-12 RX ORDER — DIAZEPAM 10 MG/2ML
2.5 INJECTION, SOLUTION INTRAMUSCULAR; INTRAVENOUS
Status: DISCONTINUED | OUTPATIENT
Start: 2024-08-12 | End: 2024-08-12 | Stop reason: HOSPADM

## 2024-08-12 RX ORDER — ONDANSETRON 2 MG/ML
INJECTION INTRAMUSCULAR; INTRAVENOUS PRN
Status: DISCONTINUED | OUTPATIENT
Start: 2024-08-12 | End: 2024-08-12

## 2024-08-12 RX ORDER — SCOLOPAMINE TRANSDERMAL SYSTEM 1 MG/1
1 PATCH, EXTENDED RELEASE TRANSDERMAL
Status: DISCONTINUED | OUTPATIENT
Start: 2024-08-12 | End: 2024-08-12 | Stop reason: HOSPADM

## 2024-08-12 RX ORDER — LIDOCAINE 40 MG/G
CREAM TOPICAL
Status: DISCONTINUED | OUTPATIENT
Start: 2024-08-12 | End: 2024-08-12 | Stop reason: HOSPADM

## 2024-08-12 RX ORDER — SODIUM CHLORIDE, SODIUM LACTATE, POTASSIUM CHLORIDE, CALCIUM CHLORIDE 600; 310; 30; 20 MG/100ML; MG/100ML; MG/100ML; MG/100ML
INJECTION, SOLUTION INTRAVENOUS CONTINUOUS
Status: DISCONTINUED | OUTPATIENT
Start: 2024-08-12 | End: 2024-08-12 | Stop reason: HOSPADM

## 2024-08-12 RX ORDER — MEPERIDINE HYDROCHLORIDE 25 MG/ML
12.5 INJECTION INTRAMUSCULAR; INTRAVENOUS; SUBCUTANEOUS EVERY 5 MIN PRN
Status: DISCONTINUED | OUTPATIENT
Start: 2024-08-12 | End: 2024-08-12 | Stop reason: HOSPADM

## 2024-08-12 RX ORDER — FAMOTIDINE 20 MG/1
20 TABLET, FILM COATED ORAL 2 TIMES DAILY
Status: DISCONTINUED | OUTPATIENT
Start: 2024-08-12 | End: 2024-08-12

## 2024-08-12 RX ORDER — HYDROMORPHONE HCL IN WATER/PF 6 MG/30 ML
0.2 PATIENT CONTROLLED ANALGESIA SYRINGE INTRAVENOUS EVERY 5 MIN PRN
Status: DISCONTINUED | OUTPATIENT
Start: 2024-08-12 | End: 2024-08-12 | Stop reason: HOSPADM

## 2024-08-12 RX ORDER — SODIUM CHLORIDE 9 MG/ML
INJECTION, SOLUTION INTRAVENOUS CONTINUOUS
Status: DISCONTINUED | OUTPATIENT
Start: 2024-08-12 | End: 2024-08-14

## 2024-08-12 RX ORDER — ALBUTEROL SULFATE 0.83 MG/ML
2.5 SOLUTION RESPIRATORY (INHALATION) EVERY 4 HOURS PRN
Status: DISCONTINUED | OUTPATIENT
Start: 2024-08-12 | End: 2024-08-12 | Stop reason: HOSPADM

## 2024-08-12 RX ORDER — APREPITANT 40 MG/1
40 CAPSULE ORAL ONCE
Status: COMPLETED | OUTPATIENT
Start: 2024-08-12 | End: 2024-08-12

## 2024-08-12 RX ORDER — ONDANSETRON 2 MG/ML
4 INJECTION INTRAMUSCULAR; INTRAVENOUS EVERY 30 MIN PRN
Status: DISCONTINUED | OUTPATIENT
Start: 2024-08-12 | End: 2024-08-12 | Stop reason: HOSPADM

## 2024-08-12 RX ORDER — LIDOCAINE HYDROCHLORIDE 20 MG/ML
INJECTION, SOLUTION INFILTRATION; PERINEURAL PRN
Status: DISCONTINUED | OUTPATIENT
Start: 2024-08-12 | End: 2024-08-12

## 2024-08-12 RX ORDER — FAMOTIDINE 20 MG/1
20 TABLET, FILM COATED ORAL 2 TIMES DAILY
Status: DISCONTINUED | OUTPATIENT
Start: 2024-08-12 | End: 2024-08-15 | Stop reason: HOSPADM

## 2024-08-12 RX ORDER — SODIUM CHLORIDE, SODIUM LACTATE, POTASSIUM CHLORIDE, CALCIUM CHLORIDE 600; 310; 30; 20 MG/100ML; MG/100ML; MG/100ML; MG/100ML
INJECTION, SOLUTION INTRAVENOUS CONTINUOUS PRN
Status: DISCONTINUED | OUTPATIENT
Start: 2024-08-12 | End: 2024-08-12

## 2024-08-12 RX ORDER — PROPOFOL 10 MG/ML
INJECTION, EMULSION INTRAVENOUS PRN
Status: DISCONTINUED | OUTPATIENT
Start: 2024-08-12 | End: 2024-08-12

## 2024-08-12 RX ORDER — FENTANYL CITRATE 50 UG/ML
50 INJECTION, SOLUTION INTRAMUSCULAR; INTRAVENOUS EVERY 5 MIN PRN
Status: DISCONTINUED | OUTPATIENT
Start: 2024-08-12 | End: 2024-08-12 | Stop reason: HOSPADM

## 2024-08-12 RX ORDER — CEFAZOLIN SODIUM/WATER 2 G/20 ML
2 SYRINGE (ML) INTRAVENOUS SEE ADMIN INSTRUCTIONS
Status: DISCONTINUED | OUTPATIENT
Start: 2024-08-12 | End: 2024-08-12 | Stop reason: HOSPADM

## 2024-08-12 RX ORDER — ONDANSETRON 4 MG/1
4 TABLET, ORALLY DISINTEGRATING ORAL EVERY 30 MIN PRN
Status: DISCONTINUED | OUTPATIENT
Start: 2024-08-12 | End: 2024-08-12 | Stop reason: HOSPADM

## 2024-08-12 RX ORDER — CEFAZOLIN SODIUM/WATER 2 G/20 ML
2 SYRINGE (ML) INTRAVENOUS
Status: DISCONTINUED | OUTPATIENT
Start: 2024-08-12 | End: 2024-08-12 | Stop reason: HOSPADM

## 2024-08-12 RX ORDER — ACETAMINOPHEN 325 MG/1
975 TABLET ORAL ONCE
Status: DISCONTINUED | OUTPATIENT
Start: 2024-08-12 | End: 2024-08-12 | Stop reason: HOSPADM

## 2024-08-12 RX ORDER — LABETALOL HYDROCHLORIDE 5 MG/ML
10 INJECTION, SOLUTION INTRAVENOUS EVERY 10 MIN PRN
Status: DISCONTINUED | OUTPATIENT
Start: 2024-08-12 | End: 2024-08-12 | Stop reason: HOSPADM

## 2024-08-12 RX ORDER — NALOXONE HYDROCHLORIDE 0.4 MG/ML
0.1 INJECTION, SOLUTION INTRAMUSCULAR; INTRAVENOUS; SUBCUTANEOUS
Status: DISCONTINUED | OUTPATIENT
Start: 2024-08-12 | End: 2024-08-12 | Stop reason: HOSPADM

## 2024-08-12 RX ADMIN — PIPERACILLIN AND TAZOBACTAM 3.38 G: 3; .375 INJECTION, POWDER, FOR SOLUTION INTRAVENOUS at 16:21

## 2024-08-12 RX ADMIN — LIDOCAINE HYDROCHLORIDE 50 MG: 20 INJECTION, SOLUTION INFILTRATION; PERINEURAL at 21:09

## 2024-08-12 RX ADMIN — ACETAMINOPHEN 975 MG: 325 TABLET, FILM COATED ORAL at 07:55

## 2024-08-12 RX ADMIN — HYDROMORPHONE HYDROCHLORIDE 0.2 MG: 0.2 INJECTION, SOLUTION INTRAMUSCULAR; INTRAVENOUS; SUBCUTANEOUS at 23:30

## 2024-08-12 RX ADMIN — FENTANYL CITRATE 50 MCG: 50 INJECTION INTRAMUSCULAR; INTRAVENOUS at 21:09

## 2024-08-12 RX ADMIN — Medication 2 G: at 21:06

## 2024-08-12 RX ADMIN — SCOPALAMINE 1 PATCH: 1 PATCH, EXTENDED RELEASE TRANSDERMAL at 20:17

## 2024-08-12 RX ADMIN — PROPOFOL 200 MG: 10 INJECTION, EMULSION INTRAVENOUS at 21:09

## 2024-08-12 RX ADMIN — PROPOFOL 50 MG: 10 INJECTION, EMULSION INTRAVENOUS at 21:30

## 2024-08-12 RX ADMIN — SODIUM CHLORIDE, POTASSIUM CHLORIDE, SODIUM LACTATE AND CALCIUM CHLORIDE: 600; 310; 30; 20 INJECTION, SOLUTION INTRAVENOUS at 19:37

## 2024-08-12 RX ADMIN — FENTANYL CITRATE 50 MCG: 50 INJECTION INTRAMUSCULAR; INTRAVENOUS at 22:18

## 2024-08-12 RX ADMIN — DEXAMETHASONE SODIUM PHOSPHATE 4 MG: 4 INJECTION, SOLUTION INTRA-ARTICULAR; INTRALESIONAL; INTRAMUSCULAR; INTRAVENOUS; SOFT TISSUE at 21:10

## 2024-08-12 RX ADMIN — SODIUM CHLORIDE, POTASSIUM CHLORIDE, SODIUM LACTATE AND CALCIUM CHLORIDE: 600; 310; 30; 20 INJECTION, SOLUTION INTRAVENOUS at 20:02

## 2024-08-12 RX ADMIN — ONDANSETRON 4 MG: 2 INJECTION INTRAMUSCULAR; INTRAVENOUS at 21:30

## 2024-08-12 RX ADMIN — PHENYLEPHRINE HYDROCHLORIDE 200 MCG: 10 INJECTION INTRAVENOUS at 21:16

## 2024-08-12 RX ADMIN — ACETAMINOPHEN 975 MG: 325 TABLET, FILM COATED ORAL at 13:50

## 2024-08-12 RX ADMIN — APREPITANT 40 MG: 40 CAPSULE ORAL at 21:03

## 2024-08-12 RX ADMIN — VANCOMYCIN HYDROCHLORIDE 1250 MG: 5 INJECTION, POWDER, LYOPHILIZED, FOR SOLUTION INTRAVENOUS at 00:02

## 2024-08-12 RX ADMIN — SODIUM CHLORIDE: 9 INJECTION, SOLUTION INTRAVENOUS at 06:57

## 2024-08-12 RX ADMIN — ROCURONIUM BROMIDE 20 MG: 50 INJECTION, SOLUTION INTRAVENOUS at 21:09

## 2024-08-12 RX ADMIN — FAMOTIDINE 20 MG: 20 TABLET ORAL at 13:50

## 2024-08-12 RX ADMIN — VANCOMYCIN HYDROCHLORIDE 1250 MG: 5 INJECTION, POWDER, LYOPHILIZED, FOR SOLUTION INTRAVENOUS at 11:13

## 2024-08-12 RX ADMIN — PIPERACILLIN AND TAZOBACTAM 3.38 G: 3; .375 INJECTION, POWDER, FOR SOLUTION INTRAVENOUS at 02:21

## 2024-08-12 RX ADMIN — PIPERACILLIN AND TAZOBACTAM 3.38 G: 3; .375 INJECTION, POWDER, FOR SOLUTION INTRAVENOUS at 07:56

## 2024-08-12 RX ADMIN — Medication 60 MG: at 21:10

## 2024-08-12 RX ADMIN — MIDAZOLAM 2 MG: 1 INJECTION INTRAMUSCULAR; INTRAVENOUS at 21:05

## 2024-08-12 RX ADMIN — FENTANYL CITRATE 50 MCG: 50 INJECTION INTRAMUSCULAR; INTRAVENOUS at 22:06

## 2024-08-12 RX ADMIN — FENTANYL CITRATE 50 MCG: 50 INJECTION INTRAMUSCULAR; INTRAVENOUS at 21:18

## 2024-08-12 RX ADMIN — FENTANYL CITRATE 50 MCG: 50 INJECTION INTRAMUSCULAR; INTRAVENOUS at 22:32

## 2024-08-12 RX ADMIN — PHENYLEPHRINE HYDROCHLORIDE 200 MCG: 10 INJECTION INTRAVENOUS at 21:28

## 2024-08-12 RX ADMIN — SODIUM CHLORIDE: 9 INJECTION, SOLUTION INTRAVENOUS at 13:50

## 2024-08-12 ASSESSMENT — ACTIVITIES OF DAILY LIVING (ADL)
ADLS_ACUITY_SCORE: 37
ADLS_ACUITY_SCORE: 43
ADLS_ACUITY_SCORE: 39
ADLS_ACUITY_SCORE: 43
ADLS_ACUITY_SCORE: 37
ADLS_ACUITY_SCORE: 37
ADLS_ACUITY_SCORE: 39
ADLS_ACUITY_SCORE: 39
ADLS_ACUITY_SCORE: 43
ADLS_ACUITY_SCORE: 37
ADLS_ACUITY_SCORE: 37
ADLS_ACUITY_SCORE: 39
ADLS_ACUITY_SCORE: 37
ADLS_ACUITY_SCORE: 43
ADLS_ACUITY_SCORE: 39
ADLS_ACUITY_SCORE: 39
ADLS_ACUITY_SCORE: 43
ADLS_ACUITY_SCORE: 39
ADLS_ACUITY_SCORE: 37
ADLS_ACUITY_SCORE: 39
ADLS_ACUITY_SCORE: 39

## 2024-08-12 NOTE — PROGRESS NOTES
Plastic Surgery    Chart reviewed. Patient not improving.    PE: Temp: 99.7  F (37.6  C) Temp src: Temporal BP: 99/56 Pulse: 98   Resp: 24 SpO2: 97 % O2 Device: Nasal cannula Oxygen Delivery: 1 LPM    deferred    A/P:  Plan I&D of bilateral medial thighs later today. NPO. Surgery scheduled.     MD Faheem Ayala [facility]   [phone] (cell)

## 2024-08-12 NOTE — CONSULTS
SPIRITUAL HEALTH SERVICES - Consult Note  Ortho 6    Referral Source/ Reason for Visit: Staff consult for emotional support.    Summary and Recommendations -     Compassionately listened to patient talk about her worsening fever and other symptoms that prompted her significant other to take her to  ED after a recent surgery.  She states that she will have another surgery later today to drain an abscess on her thigh.    She voiced concern over being able to take a special trip to [place] at the end of August.  This is a long-planned journey with her significant other that she hopes not to miss.    Plan: Gifted patient with a prayer quilt crafted by volunteers.  She expressed gratitude for visit.  No additional needs.  [facility] remains available upon request.    Rev. ALTHEA Brown.  Staff     [facility] available 24/7 for emergent requests/ referrals, either by paging the on-call  or by entering an ASAP/STAT consult in [facility], which will also page the on-call .      Assessment    Saw pt Ana Wyman regarding staff consult for emotional support.    Patient/Family Understanding of Illness and Goals of Care - Patient understands that she is at  due to an infection after surgery.    Distress and Loss - She and her extended family are working together to provide care to her elderly mother, who lives with her.    Strengths, Coping and Resources - She named her significant other, Maykel and her brother Trey as supportive people in her life.    Meaning, Beliefs and Spirituality - Patient is Am[employer].  She has no home Religion at this time.

## 2024-08-12 NOTE — PLAN OF CARE
Discussed with nursing, noted to have increasing tachypnea and fever to 102. She is at max dosing of her tylenol due to norco and scheduled tylenol dosing. Discussed utilizing cold packs and fan until tylenol can be given again. Will change norco to oxy to allow PRN tylenol. Satting ok but tachypneic, suspect due to fever and is already on broad spectrum antibiotics. BP is low and HR low 100's. CXR reassuring, lactic normal. Nursing also notes decreased urinary output from external catheter. Will recheck BMP, BNP, ABG and bladder scan. Cont fluids at 125.

## 2024-08-12 NOTE — PLAN OF CARE
"Goal Outcome Evaluation:      Plan of Care Reviewed With: patient    Overall Patient Progress: improvingOverall Patient Progress: improving    Outcome Evaluation: Pt alert and oriented. IV Vanco and Zosyn for abx therapy.    Pt currently on bedrest. Repositions in bed well. LS clear, BS present, passing flatus. Having difficulty with voiding - straight cathed at 0130 for 1,000cc of urine. Voided large amount on pad. Bladder scanned for 203cc. CMS intact. Incisions are intact however edematous, warm, and reddened. 2 open wounds posterior R thigh. Temps remain around 99 range. Encouraged CADB exercises along with utilizing the incentive spirometer. Plastics following.      Problem: Adult Inpatient Plan of Care  Goal: Plan of Care Review  Description: The Plan of Care Review/Shift note should be completed every shift.  The Outcome Evaluation is a brief statement about your assessment that the patient is improving, declining, or no change.  This information will be displayed automatically on your shift  note.  Outcome: Progressing  Flowsheets (Taken 8/12/2024 0541)  Outcome Evaluation: Pt alert and oriented. IV Vanco and Zosyn for abx therapy.  Plan of Care Reviewed With: patient  Overall Patient Progress: improving  Goal: Patient-Specific Goal (Individualized)  Description: You can add care plan individualizations to a care plan. Examples of Individualization might be:  \"Parent requests to be called daily at 9am for status\", \"I have a hard time hearing out of my right ear\", or \"Do not touch me to wake me up as it startles  me\".  Outcome: Progressing  Goal: Absence of Hospital-Acquired Illness or Injury  Outcome: Progressing  Intervention: Identify and Manage Fall Risk  Recent Flowsheet Documentation  Taken 8/12/2024 0029 by Sissy Arevalo, RN  Safety Promotion/Fall Prevention: activity supervised  Intervention: Prevent Skin Injury  Recent Flowsheet Documentation  Taken 8/12/2024 0029 by Sissy Arevalo, RN  Body " Position: supine, head elevated  Skin Protection: adhesive use limited  Device Skin Pressure Protection: absorbent pad utilized/changed  Intervention: Prevent and Manage VTE (Venous Thromboembolism) Risk  Recent Flowsheet Documentation  Taken 8/12/2024 0029 by Sissy Arevalo RN  VTE Prevention/Management: SCDs on (sequential compression devices)  Intervention: Prevent Infection  Recent Flowsheet Documentation  Taken 8/12/2024 0029 by Sissy Arevalo RN  Infection Prevention: hand hygiene promoted  Goal: Optimal Comfort and Wellbeing  Outcome: Progressing  Intervention: Monitor Pain and Promote Comfort  Recent Flowsheet Documentation  Taken 8/12/2024 0028 by Sissy Arevalo RN  Pain Management Interventions: declines  Goal: Readiness for Transition of Care  Outcome: Progressing     Problem: Skin or Soft Tissue Infection  Goal: Absence of Infection Signs and Symptoms  Outcome: Progressing  Intervention: Minimize and Manage Infection Progression  Recent Flowsheet Documentation  Taken 8/12/2024 0029 by Sissy Arevalo RN  Infection Prevention: hand hygiene promoted  Infection Management: aseptic technique maintained     Problem: Pain Acute  Goal: Optimal Pain Control and Function  Outcome: Progressing  Intervention: Develop Pain Management Plan  Recent Flowsheet Documentation  Taken 8/12/2024 0028 by Sissy Arevalo RN  Pain Management Interventions: declines  Intervention: Prevent or Manage Pain  Recent Flowsheet Documentation  Taken 8/12/2024 0029 by Sissy Arevalo RN  Bowel Elimination Promotion: adequate fluid intake promoted  Medication Review/Management: medications reviewed  Intervention: Optimize Psychosocial Wellbeing  Recent Flowsheet Documentation  Taken 8/12/2024 0029 by Sissy Arevalo RN  Supportive Measures: active listening utilized     Problem: Comorbidity Management  Goal: Maintenance of Asthma Control  Outcome: Progressing  Intervention: Maintain Asthma Symptom Control  Recent  Flowsheet Documentation  Taken 8/12/2024 0029 by Sissy Arevalo RN  Medication Review/Management: medications reviewed  Goal: Bariatric Home Regimen Maintained  Outcome: Progressing  Intervention: Maintain and Manage Postbariatric Surgery Care  Recent Flowsheet Documentation  Taken 8/12/2024 0029 by Sissy Arevalo RN  Oral Nutrition Promotion: rest periods promoted  Medication Review/Management: medications reviewed     Problem: Skin Injury Risk Increased  Goal: Skin Health and Integrity  Outcome: Progressing  Intervention: Plan: Nurse Driven Intervention: Moisture Management  Recent Flowsheet Documentation  Taken 8/12/2024 0029 by Sissy Arevalo RN  Moisture Interventions: No brief in bed  Intervention: Optimize Skin Protection  Recent Flowsheet Documentation  Taken 8/12/2024 0029 by Sissy Arevalo RN  Pressure Reduction Techniques: heels elevated off bed  Pressure Reduction Devices: heel offloading device utilized  Skin Protection: adhesive use limited  Activity Management: bedrest  Head of Bed (HOB) Positioning: HOB at 20-30 degrees  Intervention: Promote and Optimize Oral Intake  Recent Flowsheet Documentation  Taken 8/12/2024 0029 by Sissy Arevalo RN  Oral Nutrition Promotion: rest periods promoted

## 2024-08-12 NOTE — PROGRESS NOTES
[facility]    Medicine Progress Note - Hospitalist Service    Date of Admission:  8/10/2024    Assessment & Plan   Ana Wyman is a 54 year old female with a PMH significant for mild intermittent asthma, status post bariatric surgery, who recently underwent cosmetic revision brachioplasty of bilateral thighs on 7/30. She presented to the ER with complaint of fever, chills, nausea, and tightness in her left leg.     Status post cosmetic revision of bilateral thighs and arms complicated by Left medial distal thigh soft tissue infection and possible abscess.  Right medial thigh fluid collection  Severe sepsis sepsis secondary to above  -Patient underwent the procedure on 7/30.   - Initially did well, however, few days later she  noticed swelling and tightness in her left leg behind the knee.    -She had followed up with Dr. Mckeon in the clinic and underwent aspiration of the hematoma x 2 from left medial thigh.   -Despite the aspiration, she continued to have tightness and pain in her left leg.    -She also  noted popped possible seroma on her right thigh on the day of admission.  -She continued to have fever, chilling sensation.  -She was also noted to have hypotension.  -She got bolus of IV fluid .  -Evaluated by plastic surgeon Dr. Mayen in ED. initially reported no surgery today and diet was ordered for her earlier  -She meets criteria for sepsis based on fever, leukocytosis, tachycardia and hypotension.  -Continue vancomycin and Zosyn.  -Lactate normal. Procalcitonin 1.86.  -He has been on IV fluid at 125 mL/h and it was stopped this morning, will restart at 100 mL/h as she is n.p.o.  -CT imaging reported left medial distal thigh abscess about 9.7 cm in size, appears lobulated and multiseptated.   -CT imaging of the left side also showed fluid collection in the proximal anteromedial thigh about 8 x 4 x 13 cm.  -No sign of deep infection.  -Pain controlled with Norco, Tylenol,  "Dilaudid.  -Minimize narcotics as much as possible.  -Monitor vitals closely,  blood pressure is low, patient chronically has low blood pressure systolic about 100.  -DVT ruled out left lower extremity by ultrasound.  -DVT prophylaxis, restarted on heparin subcu as she is at increased risk.  -Plan for I&D for bilateral medial thigh this afternoon.      Mild intermittent asthma  -Continue to monitor.  -She is not hypoxic, no wheezing or increased work of breathing   -Continue bronchodilators.  - Albuterol nebulizer as needed    I long discussion with patient and with her significant other at bedside the plan of care.  All their question and concerns addressed showed understanding.        Diet: NPO per Anesthesia Guidelines for Procedure/Surgery Except for: Meds    DVT Prophylaxis: Heparin SQ  Grace Catheter: Not present  Lines: None     Cardiac Monitoring: ACTIVE order. Indication: sepsis  Code Status: Full Code      Clinically Significant Risk Factors          # Hypocalcemia: Lowest Ca = 7.8 mg/dL in last 2 days, will monitor and replace as appropriate                     # Obesity: Estimated body mass index is 33.99 kg/m  as calculated from the following:    Height as of this encounter: 1.626 m (5' 4\").    Weight as of this encounter: 89.8 kg (198 lb)., PRESENT ON ADMISSION     # Asthma: noted on problem list              Disposition Plan     Medically Ready for Discharge: Anticipated in 2-4 Days             Fitz Tavarez MD  Hospitalist Service  [facility]  Securely message with [employer] (more info)  Text page via [facility] Paging/Directory   ______________________________________________________________________    Interval History   Patient seen and examined, H&P, labs, medications, overnight events reviewed.  She denied any complaint, blood pressure was on the low side, latest 104/58.  Pain is fairly controlled, no nausea or vomiting, currently n.p.o. for planned I&D this afternoon.  " The left lower extremity is tight, even more swollen today, able to move her foot and toes without any restriction.  Continue to have fever and chilling sensation.  No cough runny nose or sore throat      Physical Exam   Vital Signs: Temp: 99.9  F (37.7  C) Temp src: Temporal BP: 104/58 Pulse: 71   Resp: 20 SpO2: 98 % O2 Device: Nasal cannula Oxygen Delivery: 1 LPM  Weight: 198 lbs 0 oz  General: Awake, alert, cooperative, no apparent distress.  HEENT: Pink, nonicteric, moist oral mucosa.  CHEST : Entry bilaterally, no wheezing crackles or rales  CVS: Normal rate, regular rhythm, no murmur  GI: Soft, nontender, nondistended  EXT: Right thigh has a medial incision extending from groin to down to the knee, appears clean and healing, on the medial side fluid-filled mass palpated without any overlying skin changes, no tenderness, no oozing.  Left leg also has a medial incision extending from groin to the knee, left thigh is warm, nontender, erythematous, firm mass appreciated extending from behind the knee going up to the thigh, dressing was removed and a small opening of the incision was noted up on the medial thigh, no adrienne blood or pus, patient was able to flex her left knee without any increasing pain.      Medical Decision Making       40 MINUTES SPENT BY ME on the date of service doing chart review, history, exam, documentation & further activities per the note.      Data   Imaging results reviewed over the past 24 hrs:   Recent Results (from the past 24 hour(s))   XR Chest Port 1 View    Narrative    EXAM: XR CHEST PORT 1 VIEW  LOCATION: [facility]  DATE: 8/11/2024    INDICATION: cough, tachypnea  COMPARISON: 03/16/2021      Impression    IMPRESSION: Fibroatelectasis left midlung. Cardiomediastinal silhouette within normal limits. No focal consolidation or pleural effusion.     Recent Labs   Lab 08/12/24  0730 08/11/24  2336 08/11/24  0727 08/10/24  2216   WBC 19.2*  --  22.6* 11.6*   HGB  10.1*  --  10.0* 11.3*   MCV 94  --  93 94     --  305 371    135 135 137   POTASSIUM 3.6 3.5 3.9 4.0   CHLORIDE 105 105 103 101   CO2 19* 20* 20* 23   BUN 7.9 9.3 13.5 18.3   CR 0.65 0.67 0.74 0.81   ANIONGAP 11 10 12 13   SINGH 8.2* 8.4* 7.8* 8.7*   * 135* 142* 127*   ALBUMIN  --   --   --  3.8   PROTTOTAL  --   --   --  6.6   BILITOTAL  --   --   --  0.5   ALKPHOS  --   --   --  99   ALT  --   --   --  18   AST  --   --   --  28   LIPASE  --   --   --  55

## 2024-08-12 NOTE — PLAN OF CARE
"Goal Outcome Evaluation:      Plan of Care Reviewed With: patient    Overall Patient Progress: improvingOverall Patient Progress: improving    Outcome Evaluation: plan surgery today at 19:00. pt is NPO    Patient is A/O X4. Vss. Scheduled Tylenol for pain. NPO. On bedrest. On IV Abx. Voiding large amount. IV infusing. On remote Tele; SR S [id number]. Incisions on thigh intact. Plastic surgeon following. Plan surgery today at 19:00       Problem: Adult Inpatient Plan of Care  Goal: Plan of Care Review  Description: The Plan of Care Review/Shift note should be completed every shift.  The Outcome Evaluation is a brief statement about your assessment that the patient is improving, declining, or no change.  This information will be displayed automatically on your shift  note.  Outcome: Progressing  Flowsheets (Taken 8/12/2024 1305)  Outcome Evaluation: plan surgery today at 19:00. pt is NPO  Plan of Care Reviewed With: patient  Overall Patient Progress: improving  Goal: Patient-Specific Goal (Individualized)  Description: You can add care plan individualizations to a care plan. Examples of Individualization might be:  \"Parent requests to be called daily at 9am for status\", \"I have a hard time hearing out of my right ear\", or \"Do not touch me to wake me up as it startles  me\".  Outcome: Progressing  Goal: Absence of Hospital-Acquired Illness or Injury  Outcome: Progressing  Intervention: Identify and Manage Fall Risk  Recent Flowsheet Documentation  Taken 8/12/2024 0900 by Khushboo Caban RN  Safety Promotion/Fall Prevention:   activity supervised   lighting adjusted   nonskid shoes/slippers when out of bed   safety round/check completed  Intervention: Prevent Skin Injury  Recent Flowsheet Documentation  Taken 8/12/2024 0900 by Khushboo Caban RN  Body Position: supine, head elevated  Skin Protection: adhesive use limited  Device Skin Pressure Protection: absorbent pad utilized/changed  Intervention: Prevent and Manage VTE " (Venous Thromboembolism) Risk  Recent Flowsheet Documentation  Taken 8/12/2024 0900 by Khushboo Caban RN  VTE Prevention/Management: SCDs on (sequential compression devices)  Intervention: Prevent Infection  Recent Flowsheet Documentation  Taken 8/12/2024 0900 by Khushboo Caban RN  Infection Prevention:   hand hygiene promoted   single patient room provided   rest/sleep promoted  Goal: Optimal Comfort and Wellbeing  Outcome: Progressing  Intervention: Monitor Pain and Promote Comfort  Recent Flowsheet Documentation  Taken 8/12/2024 0755 by Khushboo Caban RN  Pain Management Interventions:   medication (see MAR)   cold applied  Goal: Readiness for Transition of Care  Outcome: Progressing     Problem: Skin or Soft Tissue Infection  Goal: Absence of Infection Signs and Symptoms  Outcome: Progressing  Intervention: Minimize and Manage Infection Progression  Recent Flowsheet Documentation  Taken 8/12/2024 0900 by Khushboo Caban RN  Infection Prevention:   hand hygiene promoted   single patient room provided   rest/sleep promoted  Infection Management: aseptic technique maintained     Problem: Skin Injury Risk Increased  Goal: Skin Health and Integrity  Outcome: Progressing  Intervention: Plan: Nurse Driven Intervention: Moisture Management  Recent Flowsheet Documentation  Taken 8/12/2024 0900 by Khushboo Caban RN  Moisture Interventions: No brief in bed  Intervention: Optimize Skin Protection  Recent Flowsheet Documentation  Taken 8/12/2024 0900 by Khushboo Caban RN  Pressure Reduction Techniques: heels elevated off bed  Pressure Reduction Devices: heel offloading device utilized  Skin Protection: adhesive use limited  Activity Management: bedrest  Head of Bed (HOB) Positioning: HOB at 20-30 degrees     Problem: Pain Acute  Goal: Optimal Pain Control and Function  Outcome: Progressing  Intervention: Develop Pain Management Plan  Recent Flowsheet Documentation  Taken 8/12/2024 0755 by Khushboo Caban RN  Pain  Management Interventions:   medication (see MAR)   cold applied  Intervention: Prevent or Manage Pain  Recent Flowsheet Documentation  Taken 8/12/2024 0900 by Khushboo Caban RN  Medication Review/Management: medications reviewed  Intervention: Optimize Psychosocial Wellbeing  Recent Flowsheet Documentation  Taken 8/12/2024 0900 by Khushboo Caban RN  Supportive Measures: active listening utilized     Problem: Comorbidity Management  Goal: Maintenance of Asthma Control  Outcome: Progressing  Intervention: Maintain Asthma Symptom Control  Recent Flowsheet Documentation  Taken 8/12/2024 0900 by Khushboo Caban RN  Medication Review/Management: medications reviewed  Goal: Bariatric Home Regimen Maintained  Outcome: Progressing  Intervention: Maintain and Manage Postbariatric Surgery Care  Recent Flowsheet Documentation  Taken 8/12/2024 0900 by Khushboo Caban RN  Medication Review/Management: medications reviewed

## 2024-08-12 NOTE — PLAN OF CARE
" Goal Outcome Evaluation:      Plan of Care Reviewed With: patient, spouse    Overall Patient Progress: no changeOverall Patient Progress: no change    Outcome Evaluation: A&O x4, placed on 1 LPM NC at end of shift, BP improving, febrile CC paged tylenol given cool compress/fan applied, pain managed with norco, tachypneic CC paged chest xray lactic ABG performed no new orders, intermittent tachycardic, bladder scanned for 553 mL CC paged orders for straight catheterization placed, bed rest, purewick in place, continuous pulse ox, cardiac monitor on SR 95 bpm, NS infusing 125 mL/hr    Problem: Adult Inpatient Plan of Care  Goal: Plan of Care Review  Description: The Plan of Care Review/Shift note should be completed every shift.  The Outcome Evaluation is a brief statement about your assessment that the patient is improving, declining, or no change.  This information will be displayed automatically on your shift  note.  Outcome: Not Progressing  Flowsheets (Taken 8/11/2024 1752)  Outcome Evaluation: A&O x4, placed on 1 LPM NC at end of shift, BP improving, febrile CC paged tylenol given cool compress/fan applied, pain managed with norco, tachypneic CC paged chest xray lactic ABG performed no new orders, intermittent tachycardic, bladder scanned for 553 mL CC paged orders for straight catheterization placed, bed rest, purewick in place, continuous pulse ox, cardiac monitor on SR 95 bpm, NS infusing 125 mL/hr  Plan of Care Reviewed With:   patient   spouse  Overall Patient Progress: no change  Goal: Patient-Specific Goal (Individualized)  Description: You can add care plan individualizations to a care plan. Examples of Individualization might be:  \"Parent requests to be called daily at 9am for status\", \"I have a hard time hearing out of my right ear\", or \"Do not touch me to wake me up as it startles  me\".  Outcome: Not Progressing  Goal: Absence of Hospital-Acquired Illness or Injury  Outcome: Not " Progressing  Intervention: Identify and Manage Fall Risk  Recent Flowsheet Documentation  Taken 8/11/2024 1628 by Daija Braun RN  Safety Promotion/Fall Prevention: activity supervised  Intervention: Prevent Skin Injury  Recent Flowsheet Documentation  Taken 8/11/2024 1628 by Daija Braun RN  Body Position: supine, head elevated  Skin Protection: adhesive use limited  Device Skin Pressure Protection: absorbent pad utilized/changed  Intervention: Prevent and Manage VTE (Venous Thromboembolism) Risk  Recent Flowsheet Documentation  Taken 8/11/2024 1628 by Daija Braun RN  VTE Prevention/Management: SCDs on (sequential compression devices)  Intervention: Prevent Infection  Recent Flowsheet Documentation  Taken 8/11/2024 1628 by Daija Braun RN  Infection Prevention: hand hygiene promoted  Goal: Optimal Comfort and Wellbeing  Outcome: Not Progressing  Intervention: Monitor Pain and Promote Comfort  Recent Flowsheet Documentation  Taken 8/11/2024 2246 by Daija Braun RN  Pain Management Interventions:   cold applied   rest   medication (see MAR)  Taken 8/11/2024 2230 by Daija Braun RN  Pain Management Interventions:   cold applied   rest  Taken 8/11/2024 1628 by Daija Braun RN  Pain Management Interventions: cold applied  Goal: Readiness for Transition of Care  Outcome: Not Progressing     Problem: Skin or Soft Tissue Infection  Goal: Absence of Infection Signs and Symptoms  Outcome: Not Progressing  Intervention: Minimize and Manage Infection Progression  Recent Flowsheet Documentation  Taken 8/11/2024 1628 by Daija Braun RN  Infection Prevention: hand hygiene promoted  Infection Management: aseptic technique maintained     Problem: Skin Injury Risk Increased  Goal: Skin Health and Integrity  Outcome: Not Progressing  Intervention: Plan: Nurse Driven Intervention: Moisture Management  Recent Flowsheet Documentation  Taken 8/11/2024 2028 by Daija Braun  RN  Bathing/Skin Care:   incontinence care   linen changed  Taken 8/11/2024 1628 by Daija Braun RN  Moisture Interventions: No brief in bed  Intervention: Optimize Skin Protection  Recent Flowsheet Documentation  Taken 8/11/2024 1628 by Daija Braun RN  Pressure Reduction Techniques: heels elevated off bed  Pressure Reduction Devices: heel offloading device utilized  Skin Protection: adhesive use limited  Activity Management: bedrest  Head of Bed (HOB) Positioning: HOB at 20-30 degrees  Intervention: Promote and Optimize Oral Intake  Recent Flowsheet Documentation  Taken 8/11/2024 1628 by Daija Braun RN  Oral Nutrition Promotion: rest periods promoted     Problem: Pain Acute  Goal: Optimal Pain Control and Function  Outcome: Not Progressing  Intervention: Develop Pain Management Plan  Recent Flowsheet Documentation  Taken 8/11/2024 2246 by Daija Braun RN  Pain Management Interventions:   cold applied   rest   medication (see MAR)  Taken 8/11/2024 2230 by Daija Braun RN  Pain Management Interventions:   cold applied   rest  Taken 8/11/2024 1628 by Daija Braun RN  Pain Management Interventions: cold applied  Intervention: Prevent or Manage Pain  Recent Flowsheet Documentation  Taken 8/11/2024 1628 by Daija Braun RN  Bowel Elimination Promotion: adequate fluid intake promoted  Medication Review/Management: medications reviewed  Intervention: Optimize Psychosocial Wellbeing  Recent Flowsheet Documentation  Taken 8/11/2024 1628 by Daija Braun RN  Supportive Measures: active listening utilized     Problem: Comorbidity Management  Goal: Maintenance of Asthma Control  Outcome: Not Progressing  Intervention: Maintain Asthma Symptom Control  Recent Flowsheet Documentation  Taken 8/11/2024 1628 by Daija Braun RN  Medication Review/Management: medications reviewed  Goal: Bariatric Home Regimen Maintained  Outcome: Not Progressing  Intervention: Maintain and  Manage Postbariatric Surgery Care  Recent Flowsheet Documentation  Taken 8/11/2024 1628 by Daija Braun, RN  Oral Nutrition Promotion: rest periods promoted  Medication Review/Management: medications reviewed

## 2024-08-13 LAB
ANION GAP SERPL CALCULATED.3IONS-SCNC: 11 MMOL/L (ref 7–15)
BACTERIA SPEC CULT: ABNORMAL
BUN SERPL-MCNC: 10.1 MG/DL (ref 6–20)
CALCIUM SERPL-MCNC: 8.4 MG/DL (ref 8.8–10.4)
CHLORIDE SERPL-SCNC: 106 MMOL/L (ref 98–107)
CREAT SERPL-MCNC: 0.6 MG/DL (ref 0.51–0.95)
EGFRCR SERPLBLD CKD-EPI 2021: >90 ML/MIN/1.73M2
ERYTHROCYTE [DISTWIDTH] IN BLOOD BY AUTOMATED COUNT: 14.6 % (ref 10–15)
GLUCOSE SERPL-MCNC: 145 MG/DL (ref 70–99)
GRAM STAIN RESULT: ABNORMAL
GRAM STAIN RESULT: ABNORMAL
HCO3 SERPL-SCNC: 20 MMOL/L (ref 22–29)
HCT VFR BLD AUTO: 29.8 % (ref 35–47)
HGB BLD-MCNC: 9.4 G/DL (ref 11.7–15.7)
MCH RBC QN AUTO: 29.5 PG (ref 26.5–33)
MCHC RBC AUTO-ENTMCNC: 31.5 G/DL (ref 31.5–36.5)
MCV RBC AUTO: 93 FL (ref 78–100)
PLATELET # BLD AUTO: 307 10E3/UL (ref 150–450)
POTASSIUM SERPL-SCNC: 4.4 MMOL/L (ref 3.4–5.3)
RBC # BLD AUTO: 3.19 10E6/UL (ref 3.8–5.2)
SODIUM SERPL-SCNC: 137 MMOL/L (ref 135–145)
VANCOMYCIN SERPL-MCNC: 10.9 UG/ML
WBC # BLD AUTO: 17.8 10E3/UL (ref 4–11)

## 2024-08-13 PROCEDURE — 250N000011 HC RX IP 250 OP 636: Performed by: PLASTIC SURGERY

## 2024-08-13 PROCEDURE — 80048 BASIC METABOLIC PNL TOTAL CA: CPT | Performed by: PLASTIC SURGERY

## 2024-08-13 PROCEDURE — 250N000013 HC RX MED GY IP 250 OP 250 PS 637: Performed by: INTERNAL MEDICINE

## 2024-08-13 PROCEDURE — G0463 HOSPITAL OUTPT CLINIC VISIT: HCPCS | Mod: 25

## 2024-08-13 PROCEDURE — 36415 COLL VENOUS BLD VENIPUNCTURE: CPT | Performed by: PLASTIC SURGERY

## 2024-08-13 PROCEDURE — 250N000011 HC RX IP 250 OP 636: Performed by: INTERNAL MEDICINE

## 2024-08-13 PROCEDURE — 99222 1ST HOSP IP/OBS MODERATE 55: CPT | Performed by: INTERNAL MEDICINE

## 2024-08-13 PROCEDURE — 250N000011 HC RX IP 250 OP 636: Performed by: STUDENT IN AN ORGANIZED HEALTH CARE EDUCATION/TRAINING PROGRAM

## 2024-08-13 PROCEDURE — 85041 AUTOMATED RBC COUNT: CPT | Performed by: PLASTIC SURGERY

## 2024-08-13 PROCEDURE — 250N000013 HC RX MED GY IP 250 OP 250 PS 637: Performed by: PLASTIC SURGERY

## 2024-08-13 PROCEDURE — 120N000001 HC R&B MED SURG/OB

## 2024-08-13 PROCEDURE — 97605 NEG PRS WND THER DME<=50SQCM: CPT

## 2024-08-13 PROCEDURE — 258N000003 HC RX IP 258 OP 636: Performed by: PLASTIC SURGERY

## 2024-08-13 PROCEDURE — 80202 ASSAY OF VANCOMYCIN: CPT | Performed by: PLASTIC SURGERY

## 2024-08-13 PROCEDURE — 258N000003 HC RX IP 258 OP 636: Performed by: STUDENT IN AN ORGANIZED HEALTH CARE EDUCATION/TRAINING PROGRAM

## 2024-08-13 PROCEDURE — 99233 SBSQ HOSP IP/OBS HIGH 50: CPT | Performed by: INTERNAL MEDICINE

## 2024-08-13 RX ORDER — ENOXAPARIN SODIUM 100 MG/ML
40 INJECTION SUBCUTANEOUS EVERY 24 HOURS
Status: DISCONTINUED | OUTPATIENT
Start: 2024-08-13 | End: 2024-08-15 | Stop reason: HOSPADM

## 2024-08-13 RX ORDER — CEFTRIAXONE 2 G/1
2 INJECTION, POWDER, FOR SOLUTION INTRAMUSCULAR; INTRAVENOUS EVERY 24 HOURS
Status: DISCONTINUED | OUTPATIENT
Start: 2024-08-13 | End: 2024-08-15 | Stop reason: HOSPADM

## 2024-08-13 RX ORDER — VANCOMYCIN 2 GRAM/500 ML IN 0.9 % SODIUM CHLORIDE INTRAVENOUS
2000 EVERY 12 HOURS
Status: DISCONTINUED | OUTPATIENT
Start: 2024-08-13 | End: 2024-08-13

## 2024-08-13 RX ORDER — CEFAZOLIN SODIUM 2 G/100ML
2 INJECTION, SOLUTION INTRAVENOUS EVERY 8 HOURS
Status: DISCONTINUED | OUTPATIENT
Start: 2024-08-13 | End: 2024-08-13

## 2024-08-13 RX ORDER — HYDROMORPHONE HYDROCHLORIDE 2 MG/1
2 TABLET ORAL
Status: DISCONTINUED | OUTPATIENT
Start: 2024-08-13 | End: 2024-08-15 | Stop reason: HOSPADM

## 2024-08-13 RX ORDER — LIDOCAINE 40 MG/G
CREAM TOPICAL
Status: DISCONTINUED | OUTPATIENT
Start: 2024-08-13 | End: 2024-08-15 | Stop reason: HOSPADM

## 2024-08-13 RX ORDER — HYDROMORPHONE HYDROCHLORIDE 4 MG/1
4 TABLET ORAL
Status: DISCONTINUED | OUTPATIENT
Start: 2024-08-13 | End: 2024-08-15 | Stop reason: HOSPADM

## 2024-08-13 RX ORDER — PANTOPRAZOLE SODIUM 40 MG/1
40 TABLET, DELAYED RELEASE ORAL
Status: DISCONTINUED | OUTPATIENT
Start: 2024-08-13 | End: 2024-08-15 | Stop reason: HOSPADM

## 2024-08-13 RX ADMIN — ENOXAPARIN SODIUM 40 MG: 40 INJECTION SUBCUTANEOUS at 16:51

## 2024-08-13 RX ADMIN — ACETAMINOPHEN 975 MG: 325 TABLET, FILM COATED ORAL at 14:24

## 2024-08-13 RX ADMIN — FAMOTIDINE 20 MG: 20 TABLET ORAL at 08:14

## 2024-08-13 RX ADMIN — ACETAMINOPHEN 975 MG: 325 TABLET, FILM COATED ORAL at 08:14

## 2024-08-13 RX ADMIN — PIPERACILLIN AND TAZOBACTAM 3.38 G: 3; .375 INJECTION, POWDER, FOR SOLUTION INTRAVENOUS at 08:15

## 2024-08-13 RX ADMIN — CEFTRIAXONE 2 G: 2 INJECTION, POWDER, FOR SOLUTION INTRAMUSCULAR; INTRAVENOUS at 15:44

## 2024-08-13 RX ADMIN — HYDROMORPHONE HYDROCHLORIDE 4 MG: 4 TABLET ORAL at 01:20

## 2024-08-13 RX ADMIN — FAMOTIDINE 20 MG: 20 TABLET ORAL at 20:06

## 2024-08-13 RX ADMIN — CEFAZOLIN SODIUM 2 G: 2 INJECTION, SOLUTION INTRAVENOUS at 05:07

## 2024-08-13 RX ADMIN — PIPERACILLIN AND TAZOBACTAM 3.38 G: 3; .375 INJECTION, POWDER, FOR SOLUTION INTRAVENOUS at 01:59

## 2024-08-13 RX ADMIN — HYDROMORPHONE HYDROCHLORIDE 4 MG: 4 TABLET ORAL at 14:24

## 2024-08-13 RX ADMIN — ACETAMINOPHEN 975 MG: 325 TABLET, FILM COATED ORAL at 22:20

## 2024-08-13 RX ADMIN — PANTOPRAZOLE SODIUM 40 MG: 40 TABLET, DELAYED RELEASE ORAL at 14:24

## 2024-08-13 RX ADMIN — VANCOMYCIN HYDROCHLORIDE 1250 MG: 5 INJECTION, POWDER, LYOPHILIZED, FOR SOLUTION INTRAVENOUS at 02:50

## 2024-08-13 RX ADMIN — VANCOMYCIN HYDROCHLORIDE 2000 MG: 5 INJECTION, POWDER, LYOPHILIZED, FOR SOLUTION INTRAVENOUS at 11:30

## 2024-08-13 ASSESSMENT — ACTIVITIES OF DAILY LIVING (ADL)
ADLS_ACUITY_SCORE: 37
ADLS_ACUITY_SCORE: 37
ADLS_ACUITY_SCORE: 38
ADLS_ACUITY_SCORE: 37
ADLS_ACUITY_SCORE: 38
ADLS_ACUITY_SCORE: 37
ADLS_ACUITY_SCORE: 37
ADLS_ACUITY_SCORE: 38
ADLS_ACUITY_SCORE: 37
ADLS_ACUITY_SCORE: 38
ADLS_ACUITY_SCORE: 37
ADLS_ACUITY_SCORE: 38

## 2024-08-13 NOTE — DISCHARGE INSTRUCTIONS
Negative pressure wound therapy plan:  Wound location: Left inner thigh   Change Days:  MWF  Supplies (including all accessories) used: medium Black foam   Cleanse with Vashe prior to replacing NPWT  Suction setting: -125   Methods used:  Bridged farther out on thigh

## 2024-08-13 NOTE — PLAN OF CARE
Goal Outcome Evaluation:      Plan of Care Reviewed With: patient    Overall Patient Progress: improvingOverall Patient Progress: improving    Outcome Evaluation: Pt arrived from PACU to unit around 2330 overnight. Oral dilaudid given for pain. Avoiding NSAIDS d/t history of gastric bypass surgery. MISTY drain to right thigh. Left thigh  packing, over saturated overnight, sterile dressing change done. DC plans TBD.      Problem: Adult Inpatient Plan of Care  Goal: Plan of Care Review  Description: The Plan of Care Review/Shift note should be completed every shift.  The Outcome Evaluation is a brief statement about your assessment that the patient is improving, declining, or no change.  This information will be displayed automatically on your shift  note.  Outcome: Progressing  Flowsheets (Taken 8/13/2024 0437)  Outcome Evaluation: Pt arrived from PACU to unit around 2330 overnight. Oral dilaudid given for pain. Avoiding NSAIDS d/t history of gastric bypass surgery. MISTY drain to right thigh. Left thigh  packing. DC plans TBD.  Plan of Care Reviewed With: patient  Overall Patient Progress: improving  Goal: Absence of Hospital-Acquired Illness or Injury  Intervention: Identify and Manage Fall Risk  Recent Flowsheet Documentation  Taken 8/12/2024 2356 by Melody Crystal RN  Safety Promotion/Fall Prevention: safety round/check completed  Intervention: Prevent Infection  Recent Flowsheet Documentation  Taken 8/12/2024 2356 by Melody Crystal RN  Infection Prevention:   hand hygiene promoted   rest/sleep promoted     Problem: Skin or Soft Tissue Infection  Goal: Absence of Infection Signs and Symptoms  Intervention: Minimize and Manage Infection Progression  Recent Flowsheet Documentation  Taken 8/12/2024 2356 by Melody Crystal RN  Infection Prevention:   hand hygiene promoted   rest/sleep promoted     Problem: Pain Acute  Goal: Optimal Pain Control and Function  Intervention: Prevent or Manage Pain  Recent Flowsheet  Documentation  Taken 8/12/2024 2356 by Melody Crystal RN  Medication Review/Management: medications reviewed     Problem: Comorbidity Management  Goal: Maintenance of Asthma Control  Intervention: Maintain Asthma Symptom Control  Recent Flowsheet Documentation  Taken 8/12/2024 2356 by Melody Crystal RN  Medication Review/Management: medications reviewed  Goal: Bariatric Home Regimen Maintained  Intervention: Maintain and Manage Postbariatric Surgery Care  Recent Flowsheet Documentation  Taken 8/12/2024 2356 by Melody Crystal RN  Medication Review/Management: medications reviewed

## 2024-08-13 NOTE — PLAN OF CARE
"Wound vac placed by WOC to left thigh.  MISTY to right thigh. Serosang drainage.   Patient vital signs are at baseline: No,  Reason:  hypotension 90/40. HR stable. Room air.   Patient able to ambulate as they were prior to admission or with assist devices provided by therapies during their stay:  Yes. 1 assist, walker, GB.   Patient MUST void prior to discharge:  Yes  Patient able to tolerate oral intake:  Yes  Pain has adequate pain control using Oral analgesics:  Yes  Does patient have an identified :  Yes  Has goal D/C date and time been discussed with patient:  Yes. Awaiting culture sensitivity from left thigh wound.   Changed to Rocephin.   Bruising extensive to bilateral legs/thighs.   Pleasant. Oriented x4.   Problem: Adult Inpatient Plan of Care  Goal: Plan of Care Review  Description: The Plan of Care Review/Shift note should be completed every shift.  The Outcome Evaluation is a brief statement about your assessment that the patient is improving, declining, or no change.  This information will be displayed automatically on your shift  note.  Outcome: Progressing  Goal: Patient-Specific Goal (Individualized)  Description: You can add care plan individualizations to a care plan. Examples of Individualization might be:  \"Parent requests to be called daily at 9am for status\", \"I have a hard time hearing out of my right ear\", or \"Do not touch me to wake me up as it startles  me\".  Outcome: Progressing  Goal: Absence of Hospital-Acquired Illness or Injury  Outcome: Progressing  Intervention: Identify and Manage Fall Risk  Recent Flowsheet Documentation  Taken 8/13/2024 1135 by Estelita Klein, RN  Safety Promotion/Fall Prevention: safety round/check completed  Intervention: Prevent Skin Injury  Recent Flowsheet Documentation  Taken 8/13/2024 1135 by Estelita Klein, RN  Body Position: supine, head elevated  Skin Protection: pulse oximeter probe site changed  Device Skin Pressure Protection: " absorbent pad utilized/changed  Intervention: Prevent and Manage VTE (Venous Thromboembolism) Risk  Recent Flowsheet Documentation  Taken 8/13/2024 1135 by Estelita Klein RN  VTE Prevention/Management: SCDs on (sequential compression devices)  Intervention: Prevent Infection  Recent Flowsheet Documentation  Taken 8/13/2024 1135 by Estelita Klein, RN  Infection Prevention: hand hygiene promoted  Goal: Optimal Comfort and Wellbeing  Outcome: Progressing  Intervention: Monitor Pain and Promote Comfort  Recent Flowsheet Documentation  Taken 8/13/2024 1425 by Estelita Klein, RN  Pain Management Interventions: medication (see MAR)  Taken 8/13/2024 0800 by Estelita Klein, RN  Pain Management Interventions: medication (see MAR)  Goal: Readiness for Transition of Care  Outcome: Progressing     Problem: Skin or Soft Tissue Infection  Goal: Absence of Infection Signs and Symptoms  Outcome: Progressing  Intervention: Minimize and Manage Infection Progression  Recent Flowsheet Documentation  Taken 8/13/2024 1135 by Etselita Klein, RN  Fever Reduction/Comfort Measures:   cool cloth applied   ice pack(s) applied   lightweight bedding  Infection Prevention: hand hygiene promoted  Infection Management: aseptic technique maintained     Problem: Skin Injury Risk Increased  Goal: Skin Health and Integrity  Outcome: Progressing  Intervention: Plan: Nurse Driven Intervention: Moisture Management  Recent Flowsheet Documentation  Taken 8/13/2024 1135 by Estelita Klein, RN  Moisture Interventions:   No brief in bed   Perineal cleanser   Barrier ointment (CriticAid, Triad paste)  Bathing/Skin Care:   incontinence care   linen changed  Intervention: Optimize Skin Protection  Recent Flowsheet Documentation  Taken 8/13/2024 1440 by Estelita Klein, RN  Activity Management:   ambulated to bathroom   back to bed  Taken 8/13/2024 1135 by Estelita Klein, RN  Pressure Reduction  Techniques: heels elevated off bed  Pressure Reduction Devices: heel offloading device utilized  Skin Protection: pulse oximeter probe site changed  Activity Management:   ambulated to bathroom   up in chair  Intervention: Promote and Optimize Oral Intake  Recent Flowsheet Documentation  Taken 8/13/2024 1135 by Estelita Klein, RN  Oral Nutrition Promotion: rest periods promoted     Problem: Pain Acute  Goal: Optimal Pain Control and Function  Outcome: Progressing  Intervention: Develop Pain Management Plan  Recent Flowsheet Documentation  Taken 8/13/2024 1425 by Estelita Klein, RN  Pain Management Interventions: medication (see MAR)  Taken 8/13/2024 0800 by Estelita Klein, RN  Pain Management Interventions: medication (see MAR)  Intervention: Prevent or Manage Pain  Recent Flowsheet Documentation  Taken 8/13/2024 1135 by Estelita Klein, RN  Bowel Elimination Promotion: adequate fluid intake promoted  Medication Review/Management: medications reviewed  Intervention: Optimize Psychosocial Wellbeing  Recent Flowsheet Documentation  Taken 8/13/2024 1135 by Estelita Klein, RN  Supportive Measures: active listening utilized     Problem: Comorbidity Management  Goal: Maintenance of Asthma Control  Outcome: Progressing  Intervention: Maintain Asthma Symptom Control  Recent Flowsheet Documentation  Taken 8/13/2024 1135 by Estelita Klein, RN  Medication Review/Management: medications reviewed  Goal: Bariatric Home Regimen Maintained  Outcome: Progressing  Intervention: Maintain and Manage Postbariatric Surgery Care  Recent Flowsheet Documentation  Taken 8/13/2024 1135 by Estelita Klein, RN  Oral Nutrition Promotion: rest periods promoted  Medication Review/Management: medications reviewed   Goal Outcome Evaluation:

## 2024-08-13 NOTE — PROGRESS NOTES
[facility]    Medicine Progress Note - Hospitalist Service    Date of Admission:  8/10/2024    Assessment & Plan   Ana Wyman is a 54 year old female with a PMH significant for mild intermittent asthma, status post bariatric surgery, who recently underwent cosmetic revision brachioplasty of bilateral thighs on 7/30. She presented to the ER with complaint of fever, chills, nausea, and tightness in her left leg.     Status post cosmetic revision of bilateral thighs and arms complicated by Left medial distal thigh soft tissue infection and possible abscess.  Right medial thigh fluid collection  Severe sepsis sepsis secondary to above.  Hypotension  -Patient underwent the procedure on 7/30.   - Initially did well, however, few days later she  noticed swelling and tightness in her left leg behind the knee.    -She had followed up with Dr. Mkceon in the clinic and underwent aspiration of the hematoma x 2 from left medial thigh.   -Despite the aspiration, she continued to have tightness and pain in her left leg.    -She also noted popped possible seroma on her right thigh on the day of admission.  -She continued to have fever, chilling sensation.  -She was also noted to have hypotension.  -She got bolus of IV fluid .  -Evaluated by plastic surgeon Dr. Mayen in ED. initially reported no surgery and diet was ordered for her earlier.  She was reevaluated and underwent incision debridemen left thigh abscess, and evacuation of hematoma from the right thigh and left knee area  on 8/12.  -She continued to have borderline hypotension, orthostatic blood pressures checked was normal.  -Continue to monitor blood pressure.  -She meets criteria for sepsis based on fever, leukocytosis, tachycardia and hypotension.  -She has been on Vancomycin and Zosyn.  -Wound culture came back positive for gram-positive cocci, pending speciation and sensitivity, consulting infectious disease to evaluate the patient for  "further recommendation.  -Lactate normal. Procalcitonin 1.86.  -He has been on IV fluid at 125 mL/h and it was stopped this morning, will restart at 100 mL/h as she is n.p.o.  -CT imaging reported left medial distal thigh abscess about 9.7 cm in size, appears lobulated and multiseptated.   -CT imaging of the left side also showed fluid collection in the proximal anteromedial thigh about 8 x 4 x 13 cm.  -Pain controlled with Norco, Tylenol, Dilaudid.  -Minimize narcotics as much as possible.  -Monitor vitals closely,  blood pressure is low, patient chronically has low blood pressure systolic about 100.  -DVT ruled out left lower extremity by ultrasound.  -DVT prophylaxis, start lovenox 40 mg subcu daily, in addition to PCD's.        Mild intermittent asthma  -Continue to monitor.  -She is not hypoxic, no wheezing or increased work of breathing   -Continue bronchodilators.  - Albuterol nebulizer as needed    I long discussion with patient and with her significant other at bedside the plan of care.  All their question and concerns addressed showed understanding.        Diet: Advance Diet as Tolerated: Regular Diet Adult    DVT Prophylaxis: Heparin SQ  Grace Catheter: Not present  Lines: None     Cardiac Monitoring: None  Code Status: Full Code      Clinically Significant Risk Factors                              # Obesity: Estimated body mass index is 33.99 kg/m  as calculated from the following:    Height as of this encounter: 1.626 m (5' 4\").    Weight as of this encounter: 89.8 kg (198 lb)., PRESENT ON ADMISSION     # Asthma: noted on problem list              Disposition Plan     Medically Ready for Discharge: Anticipated in 2-4 Days    I discussed with patient at length the plan of care, all her questions and concerns addressed.         Fitz Tavarez MD  Hospitalist Service  [facility]  Securely message with [employer] (more info)  Text page via [employer] Paging/Directory "   ______________________________________________________________________    Interval History   Patient seen and examined, chart, medications, overnight events reviewed.  Blood pressure remained soft but she remained asymptomatic.  Pain is fairly controlled, no nausea or vomiting, tolerating oral intake, hide incision drainage of abscess on the left thigh on 8/12.  The left thigh is more painful than the right.  Continue to have fever and chilling sensation.  No cough runny nose or sore throat      Physical Exam   Vital Signs: Temp: 97  F (36.1  C) Temp src: Axillary BP: 96/61 Pulse: 84   Resp: 14 SpO2: 92 % O2 Device: None (Room air) Oxygen Delivery: 2 LPM  Weight: 198 lbs 0 oz  General: Awake, alert, cooperative, no apparent distress.  HEENT: Pink, nonicteric, moist oral mucosa.  CHEST : Entry bilaterally, no wheezing crackles or rales  CVS: Normal rate, regular rhythm, no murmur  GI: Soft, nontender, nondistended  EXT: Right thigh has a medial incision extending from groin to down to the knee, appears clean and healing, on the medial side fluid-filled mass palpated without any overlying skin changes, no tenderness, no oozing.  Left leg also has a medial incision extending from groin to the knee, left thigh is warm, nontender, erythematous, firm mass appreciated extending from behind the knee going up to the thigh, dressing was removed and a small opening of the incision was noted up on the medial thigh, no adrienne blood or pus, patient was able to flex her left knee without any increasing pain.  -Right upper thigh drainage in place.      Medical Decision Making       45 MINUTES SPENT BY ME on the date of service doing chart review, history, exam, documentation & further activities per the note.      Data   Imaging results reviewed over the past 24 hrs:   No results found for this or any previous visit (from the past 24 hour(s)).    Recent Labs   Lab 08/13/24  0727 08/12/24  1616 08/12/24  0730 08/11/24  8276  08/11/24  0727 08/10/24  2216   WBC 17.8*  --  19.2*  --  22.6* 11.6*   HGB 9.4*  --  10.1*  --  10.0* 11.3*   MCV 93  --  94  --  93 94     --  276  --  305 371     --  135 135 135 137   POTASSIUM 4.4  --  3.6 3.5 3.9 4.0   CHLORIDE 106  --  105 105 103 101   CO2 20*  --  19* 20* 20* 23   BUN 10.1  --  7.9 9.3 13.5 18.3   CR 0.60  --  0.65 0.67 0.74 0.81   ANIONGAP 11  --  11 10 12 13   SINGH 8.4*  --  8.2* 8.4* 7.8* 8.7*   * 90 101* 135* 142* 127*   ALBUMIN  --   --   --   --   --  3.8   PROTTOTAL  --   --   --   --   --  6.6   BILITOTAL  --   --   --   --   --  0.5   ALKPHOS  --   --   --   --   --  99   ALT  --   --   --   --   --  18   AST  --   --   --   --   --  28   LIPASE  --   --   --   --   --  55

## 2024-08-13 NOTE — CONSULTS
[facility] Nurse Inpatient Assessment     Consulted for: Left thigh    Summary: Patient with new left thigh wound draining copious serous drainage.  Recommend wound VAC to site, discussed with surgeon who agreed.      Patient History (according to provider note(s):      Ana Wyman is a 54 year old female with a PMH significant for mild intermittent asthma, status post bariatric surgery, who recently underwent cosmetic revision brachioplasty of bilateral thighs on 7/30. She presented to the ER with complaint of fever, chills, nausea, and tightness in her left leg.     Assessment:      Areas visualized during today's visit: Focused:    Negative pressure wound therapy applied to: Left thigh   Last photo: 8/13/24     Wound due to: Surgical Wound   Wound history/plan of care:  Patient s/p left thigh lift revision on 7/30/24.  She developed an abscess at the site and is now s/p I&D on 8/12/24.  Surgical date: 8/12/24   Service following: plastic surgery  Date Negative Pressure Wound Therapy initiated: 8/13/24   Interventions in place: elevation  Is patient s nutritional status compromised? no   If yes, what interventions are in place? N/A  Reason for initiating vac therapy? High risk of infections and Need for accelerated granulation tissue  Which?of?the?following?co-morbidities?apply? Obesity  If diabetic is patient on a diabetic management program? N/A   Is osteomyelitis present in wound? no   If yes what treatments are in place? N/A    Wound base: 80 % pink non-granular tissue, 20 % adipose tissue      Palpation of the wound bed: normal       Drainage: copious      Volume in cannister: NA     Last cannister change date: 8/13/24     Description of drainage: serous      Measurements (length x width x depth, in cm) 2.5  x 8  x  3.5 cm       Tunneling N/A      Undermining N/A   Periwound skin: Scar tissue       Color: pink       Temperature: normal    Odor: none   Pain: mild   Pain  "intervention prior to dressing change: oral Dilaudid  Treatment goal: Heal , Increase granulation, and Decrease moisture  STATUS: initial assessment   Supplies ordered: ordered VAC pump, canister, dressing    Number of foam pieces removed from a wound (excluding foam for bridge) :  NA    Verified this matched the number of foam pieces applied last dressing change: N/A   Number of foam pieces packed into wound (excluding foam for bridge) :  2 GranuFoam Black          Treatment Plan:     Negative pressure wound therapy plan:  Wound location: Left inner thigh   Change Days: Tues/ Fri by WOC RN this week, then start MW  Supplies (including all accessories) used: medium Black foam   Cleanse with Vashe prior to replacing NPWT  Suction setting: -125   Methods used:  Bridged farther out on thigh    Staff RN to assess integrity of dressing and ensure suction is set at appropriate level every shift.   Date canister. Chart canister output every shift. Change cannister weekly and PRN if full/occluded     Remove foam dressing and replace with BID normal saline moist gauze dressing if:   -a dressing failure which cannot be repaired within 2 hours   -patient is discharging to home without a home pump   -patient is discharging to a facility outside the local area   -if a dressing is a \"Silver Foam\", remove before Radiation Therapy or MRI     The hospital VAC pump is not to be discharged with the patient.?Ensure to disconnect patient from machine prior to discharge. Then,    - If a home KCI VAC pump has been delivered, connect home cannister to dressing tubing then connect cannister to home pump and turn on machine    - If transferring to a nearby facility with a KCI vac, can disconnect and clamp tubing then cover end with glove so can be reconnected within 2 hours        Orders: Written    RECOMMEND PRIMARY TEAM ORDER: None, at this time  Education provided: plan of care  Discussed plan of care with: Patient, Nurse, and " Physician  WO nurse follow-up plan:  Friday  Notify WOC if wound(s) deteriorate.  Nursing to notify the Provider(s) and re-consult the WO Nurse if new skin concern.    DATA:     Current support surface: Standard  Standard Isoflex gel  Containment of urine/stool: Continent of bladder and Continent of bowel  BMI: Body mass index is 33.99 kg/m .   Active diet order: Orders Placed This Encounter      Advance Diet as Tolerated: Regular Diet Adult     Output: I/O last 3 completed shifts:  In: 840 [P.O.:240; I.V.:600]  Out: 1708 [Urine:1650; Drains:58]     Labs:   Recent Labs   Lab 08/13/24  0727 08/11/24  0727 08/10/24  2216   ALBUMIN  --   --  3.8   HGB 9.4*   < > 11.3*   WBC 17.8*   < > 11.6*    < > = values in this interval not displayed.     Pressure injury risk assessment:   Sensory Perception: 4-->no impairment  Moisture: 3-->occasionally moist  Activity: 3-->walks occasionally  Mobility: 3-->slightly limited  Nutrition: 3-->adequate  Friction and Shear: 3-->no apparent problem  Emeka Score: 19    Tyler Porras RN CWOCN  Contact Via [facility] Nurse (Sophie)  Dept. Office Number: [phone]

## 2024-08-13 NOTE — CONSULTS
[facility]    Infectious Disease Consultation     Date of Admission:  8/10/2024  Date of Consult (When I saw the patient): 08/13/24    Assessment & Plan   Ana Wyman is a 54 year old female who was admitted on 8/10/2024.     Impression: 1 54-year-old female recent plastic surgery with thigh lift revision, postop with infection and sepsis, operative intervention and drainage culture growing group C strep  2 major sepsis and fever but blood cultures so far negative  3 clindamycin and sulfa allergies    REC1 await final blood culture, follow fever and white count, both still ongoing although improved, simplify to ceftriaxone with oral antibiotic should be acceptable and less bacteremic or not improving        Beto Roca MD    Reason for Consult   Reason for consult: I was asked to evaluate this patient for sepsis and left thigh postop infection.    Primary Care Physician   Kelly Bedoya    Chief Complaint   Left thigh pain and fever    History is obtained from the patient and medical records    History of Present Illness   Ana Wyman is a 54 year old female who presents with postop plastic surgery procedure from July 30, early on did okay had some swelling in the left leg but nothing perceived to be infection.  Then started having increasing redness and swelling and also some drainage from the right thigh where there had been procedure done.  She comes in developed major fever chills sweats to the point of rigors.  At admission blood cultures negative on broad antibiotics somewhat improved now with operative I&D done.  The I&D pure culture of group C strep    Past Medical History   I have reviewed this patient's medical history and updated it with pertinent information if needed.   Past Medical History:   Diagnosis Date    Allergic rhinitis, cause unspecified     Cellulitis 2005    2 episodes, one with hospitalization [facility]    Complication of anesthesia     MOTHER  HAS HISTORY    DUB (dysfunctional uterine bleeding)     Neg EMB 2012    Fibroids     Genital problems     Lichens Schlerosis    GERD (gastroesophageal reflux disease)     Hallux valgus (acquired)     History of steroid therapy     Inhalers, eye drops    Hypersomnia with sleep apnea, unspecified     using CPAP    Kidney stone 05/2018    2 stones    Lichen sclerosus 07/14/2011    Lymph edema 2010- present    Lymphedema bilateral with mixed lipedema. 09/30/2015    Obesity     Pneumonia     Years ago - a few times    PONV (postoperative nausea and vomiting)     Pre-diabetes     Tendonitis currently    Uncomplicated asthma     ((Pt Qnr Sub: ulcer)) mild    Urinary tract infection     A few times in past 10 years    Vision disorder [id number]    Dry Eye    Vitamin D deficiency 03/14/2015       Past Surgical History   I have reviewed this patient's surgical history and updated it with pertinent information if needed.  Past Surgical History:   Procedure Laterality Date    BRACHIOPLASTY COSMETIC Bilateral 7/30/2024    Procedure: COSMETIC REVISION BRACHIOPLASTY BILATERAL;  Surgeon: Liliane Mayen MD;  Location:  OR    COLONOSCOPY  05/15/2013    Procedure: COLONOSCOPY;  Colonoscopy;  Surgeon: Kota Blanco MD;  Location:  GI    COLONOSCOPY N/A 10/21/2019    Procedure: COLONOSCOPY, with biopsies by cold forceps;  Surgeon: Lydia Vickers MD;  Location:  GI    COSMETIC ABDOMINOPLASTY Bilateral 2/25/2021    Procedure: COSMETIC BILATERAL BELT LIPECTOMY;  Surgeon: Wade Marquez MD;  Location: [facility] OR    COSMETIC LIFT LOWER EXTREMITY BILATERAL (MEDIAL THIGHS) Bilateral 10/27/2023    Procedure: cosmetic bilateral thigh lift;  Surgeon: Liliane Mayen MD;  Location: [facility] OR    COSMETIC LIFT LOWER EXTREMITY BILATERAL (MEDIAL THIGHS) Bilateral 7/30/2024    Procedure: COSMETIC REVISION THIGH LIFT MEDIAL BILATERAL;  Surgeon: Liliane Mayen MD;  Location:  OR    COSMETIC LIPOSUCTION,  BRACHIOPLASTY, COMBINED Bilateral 8/11/2023    Procedure: BILATERAL COSMETIC BRACHIOPLASTY;  Surgeon: Liliane Mayen MD;  Location:  OR    GASTRIC BYPASS      GYN SURGERY  2016    Uterine Ablation    INCISION AND DRAINAGE TRUNK, COMBINED Left 2/25/2021    Procedure: EVACUATION OF HEMATOMA;  Surgeon: Wade Marquez MD;  Location:  OR    IRRIGATION AND DEBRIDEMENT BONE LOWER EXTREMITY, COMBINED Right 8/12/2024    Procedure: Evacuation right thigh seroma;  Surgeon: Liliane Mayen MD;  Location: [facility] OR    IRRIGATION AND DEBRIDEMENT LOWER EXTREMITY, COMBINED Bilateral 8/12/2024    Procedure: Evacuation left knee hematoma, incision and drainage left thigh abscess;  Surgeon: Liliane Mayen MD;  Location: [facility] OR    LAPAROSCOPIC BYPASS GASTRIC, CHOLECYSTECTOMY, COMBINED N/A 01/28/2019    Procedure: LAPAROSCOPIC GASTRIC BYPASS;  Surgeon: Maykel Calvin MD;  Location:  OR    LAPAROSCOPIC CHOLECYSTECTOMY N/A 04/11/2019    Procedure: LAPAROSCOPIC CHOLECYSTECTOMY;  Surgeon: Maykel Calvin MD;  Location:  OR    lichen sclerosis      MAMMOPLASTY REDUCTION BILATERAL Bilateral 8/11/2023    Procedure: MAMMOPLASTY, REDUCTION, BILATERAL;  Surgeon: Liliane Mayen MD;  Location:  OR    ORTHOPEDIC SURGERY      PANNICULECTOMY N/A 2/25/2021    Procedure: PANNICULECTOMY;  Surgeon: Wade Marquez MD;  Location:  OR    VASCULAR SURGERY  2015    Vienous closure on both legs    vein closure  12/2016    to prevent lymphedema    Z NONSPECIFIC PROCEDURE  1994    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    ZZC NONSPECIFIC PROCEDURE  1999,2001    Foot surgeries x 2 (bunionectomy)    Z NONSPECIFIC PROCEDURE  2000    hammer toes       Prior to Admission Medications   Prior to Admission Medications   Prescriptions Last Dose Informant Patient Reported? Taking?   Calcium Citrate-Vitamin D (CALCIUM CITRATE CHEWY BITE PO) Past Week at - Self Yes Yes   Sig: Take 500 mg by mouth 3 times  daily Bariatric Advantage calcium citrate 500 mcg/vitamin D 500 international unit(s) per chew   HYDROcodone-acetaminophen (NORCO) 5-325 MG tablet Past Week at -  Yes Yes   Sig: Take 1 tablet by mouth every 6 hours as needed for pain   Multiple Vitamins-Minerals (BARIATRIC MULTIVITAMINS/IRON PO) 8/10/2024 at am  Yes Yes   Sig: Take 1 capsule by mouth daily Bariatric Advantage Ultra Solo with iron   biotin 5 MG CAPS 8/9/2024 at 1200 Self Yes Yes   Sig: Take 5,000 mcg by mouth daily @1200   cycloSPORINE (RESTASIS) 0.05 % ophthalmic emulsion 8/10/2024 at -  Yes Yes   Sig: Place 1 drop into both eyes daily   levalbuterol (XOPENEX HFA) 45 MCG/ACT inhaler prn at prn  No No   Sig: Inhale 1-2 puffs into the lungs every 6 hours as needed for shortness of breath or wheezing   nitroGLYcerin (NITRO-BID) 2 % OINT ointment prn at prn  No No   Sig: Place 0.5 inches (7.5 mg) onto the skin daily as needed (Apply 0.5in to tips of fingers once daily prior to cold exposure)   nystatin-triamcinolone (MYCOLOG II) 490189-9.1 UNIT/GM-% external cream prn at prn  No No   Sig: APPLY TO AFFECTED AREA TWO TIMES A DAY UP TO 7 DAYS AS NEEDED FOR RASH   omeprazole (PRILOSEC) 20 MG DR capsule 8/10/2024 at am  Yes Yes   Sig: Take 10-20 mg by mouth daily   ondansetron (ZOFRAN ODT) 4 MG ODT tab prn at prn  No No   Sig: Take 1 tablet (4 mg) by mouth every 8 hours as needed for nausea   phentermine 15 MG capsule 8/10/2024 at am  Yes Yes   Sig: Take 15 mg by mouth every morning   vitamin D3 (CHOLECALCIFEROL) 50 mcg (2000 units) tablet 8/10/2024 at am Self Yes Yes   Sig: Take 1 capsule by mouth every morning       Facility-Administered Medications: None     Allergies   Allergies   Allergen Reactions    Nsaids Other (See Comments)     Had gastric bypass - needs to avoid NSAIDS    Clindamycin Diarrhea    Codeine Nausea and Vomiting    Morphine And Codeine Nausea and Vomiting and Unknown    Shellfish Allergy     Sulfamethoxazole-Trimethoprim Rash        Immunization History   Immunization History   Administered Date(s) Administered    COVID-19 12+ (2023-24) (Pfizer) 10/10/2023    COVID-19 Bivalent 12+ (Pfizer) 10/10/2022    COVID-19 MONOVALENT 12+ (Pfizer) 03/09/2021, 03/30/2021, 10/25/2021    HEPA 05/23/2005, 05/05/2006    Hepatitis A (ADULT 19+) 05/23/2005, 05/05/2006    Influenza (IIV3) PF 11/17/2006    Influenza Vaccine 18-64 (Flublok) 09/04/2020, 09/18/2021, 10/31/2022    Influenza Vaccine >6 months,quad, PF 10/10/2023    Influenza Vaccine, 6+MO IM (QUADRIVALENT W/PRESERVATIVES) 10/25/2019    Poliovirus, inactivated (IPV) 03/29/2012    TD,PF 7+ (Tenivac) 05/23/2005    TDAP (Adacel,Boostrix) 04/19/2022    TDAP Vaccine (Adacel) 03/29/2012    Td (Adult), Adsorbed 05/23/2005       Social History   I have reviewed this patient's social history and updated it with pertinent information if needed. Ana PAYAN Garo  reports that she has never smoked. She has never used smokeless tobacco. She reports that she does not currently use alcohol. She reports that she does not use drugs.    Family History   I have reviewed this patient's family history and updated it with pertinent information if needed.   Family History   Problem Relation Age of Onset    Diabetes Mother     Allergies Mother     Obesity Mother     C.A.D. Mother         triple bypass surgery, 65    Lipids Mother     Hypertension Mother     Coronary Artery Disease Mother     Hyperlipidemia Mother     Colon Polyps Mother     Anesthesia Reaction Mother     Thyroid Disease Mother     Sleep Apnea Mother     Breast Cancer Mother 82    C.A.D. Father 60        CAGB 98    Obesity Father     Cancer Father         stomach Dx age 74    Lipids Father     Hypertension Father     Coronary Artery Disease Father     Cerebrovascular Disease Father     Other Cancer Father         Esophogeal    Sleep Apnea Brother     Sleep Apnea Brother     Allergies Brother     Obesity Brother     Hypertension Brother     Obesity  "Brother     Cancer - colorectal Maternal Grandmother 75    Cancer Maternal Grandmother         liver    Hypertension Maternal Grandmother     Colon Cancer Maternal Grandmother     Other Cancer Maternal Grandmother         liver, leaukeamia    Colon Polyps Maternal Grandmother     Coronary Artery Disease Maternal Grandfather     Hypertension Maternal Grandfather     Cancer Paternal Grandmother         stomach    Obesity Paternal Grandmother     Diabetes Paternal Grandmother     Asthma Paternal Grandmother     C.A.D. Paternal Grandfather 58        fatal    Coronary Artery Disease Paternal Grandfather     Cancer - colorectal Paternal Aunt     Cancer - colorectal Other         cousin  from colon cancer in 's       Review of Systems   The 10 point Review of Systems is negative    Physical Exam   Temp: 97.6  F (36.4  C) Temp src: Oral BP: (!) 90/36 Pulse: 76   Resp: 16 SpO2: 95 % O2 Device: None (Room air) Oxygen Delivery: 2 LPM  Vital Signs with Ranges  Temp:  [97  F (36.1  C)-99.3  F (37.4  C)] 97.6  F (36.4  C)  Pulse:  [] 76  Resp:  [14-35] 16  BP: ()/(36-66) 90/36  SpO2:  [92 %-100 %] 95 %  198 lbs 0 oz  Body mass index is 33.99 kg/m .    GENERAL APPEARANCE:  awake  EYES: Eyes grossly normal to inspection  NECK: no adenopathy  RESP: lungs clear   CV: regular rates and rhythm  LYMPHATICS: normal ant/post cervical and supraclavicular nodes  ABDOMEN: soft, nontender  MS: extremities right leg relatively okay not obvious cellulitis, some ecchymosis and bruising in all locations left leg improved from description, no obvious residual fluctuance  SKIN: no suspicious lesions or rashes        Data   All laboratory and imaging data in the past 24 hours reviewed  No results for input(s): \"CULT\" in the last 168 hours.  Recent Labs   Lab Test 21  1447 10/09/19  0919 18  1121 18  1019 17  0111 17  0103 17  2002   CULT No growth No growth Light " growth  Finegoldia magna (Peptostreptococcus georges)  Susceptibility testing not routinely done  *  Since this specimen was not transported in the proper anaerobic transport media, the   isolated anaerobes may not represent the total number present.    Light growth  Normal skin mague    Light growth  Staphylococcus lugdunensis  * No beta hemolytic Streptococcus Group A isolated No beta hemolytic Streptococcus Group A isolated No beta hemolytic Streptococcus Group A isolated No growth No growth 10,000 to 50,000 colonies/mL mixed urogenital mague          All cultures:  Recent Labs   Lab 08/12/24 2133 08/10/24  2220   CULTURE Culture in progress  1+ Streptococcus dysgalactiae (Group C/G Streptococcus)*  See corresponding culture for results No growth after 2 days      Blood culture:  Results for orders placed or performed during the hospital encounter of 08/10/24   Blood Culture Peripheral Blood    Specimen: Peripheral Blood   Result Value Ref Range    Culture No growth after 2 days    Results for orders placed or performed during the hospital encounter of 02/25/21   Blood culture    Specimen: Blood    Right Arm   Result Value Ref Range    Specimen Description Blood Right Arm     Culture Micro No growth    Blood culture    Specimen: Blood    Left Arm   Result Value Ref Range    Specimen Description Blood Left Arm     Culture Micro No growth    Results for orders placed or performed during the hospital encounter of 04/29/17   Blood culture    Specimen: Hand, Right; Blood   Result Value Ref Range    Specimen Description Blood Right Hand     Special Requests Aerobic and anaerobic bottles received     Culture Micro No growth     Micro Report Status FINAL 05/05/2017    Blood culture    Specimen: Arm, Right; Blood   Result Value Ref Range    Specimen Description Blood Right Arm     Special Requests Aerobic and anaerobic bottles received     Culture Micro No growth     Micro Report Status FINAL 05/05/2017    Results  for orders placed or performed during the hospital encounter of 10/14/16   Blood culture    Specimen: Blood   Result Value Ref Range    Specimen Description Blood Left Arm     Special Requests Aerobic and anaerobic bottles received     Culture Micro No growth     Micro Report Status FINAL 10/20/2016    Blood culture    Specimen: Blood   Result Value Ref Range    Specimen Description Blood Right Arm     Special Requests Aerobic and anaerobic bottles received     Culture Micro No growth     Micro Report Status FINAL 10/20/2016      *Note: Due to a large number of results and/or encounters for the requested time period, some results have not been displayed. A complete set of results can be found in Results Review.      Urine culture:  Results for orders placed or performed during the hospital encounter of 01/13/24   Urine Culture    Specimen: Urine, Clean Catch   Result Value Ref Range    Culture <10,000 CFU/mL Mixture of Urogenital Mague    Results for orders placed or performed in visit on 04/26/17   Urine Culture Aerobic Bacterial    Specimen: Urine   Result Value Ref Range    Specimen Description Midstream Urine     Culture Micro       10,000 to 50,000 colonies/mL mixed urogenital mague    Micro Report Status FINAL 04/27/2017    Results for orders placed or performed during the hospital encounter of 07/19/12   Urine culture    Specimen: Urine   Result Value Ref Range    Specimen Description Midstream Urine     Culture Micro       Canceled, Test credited  PER [facility] MICRO DEPT SPECIMEN NOT RECEIVED 07.21.12 KRISTI    Micro Report Status FINAL 07/22/2012    Results for orders placed or performed in visit on 02/23/12   Urine culture    Specimen: Urine   Result Value Ref Range    Specimen Description Midstream Urine     Culture Micro       10 to 50,000 colonies/mL Multiple species present, probable perineal   contamination.    Micro Report Status FINAL 02/25/2012    Results for orders placed or performed in visit on 09/13/08    Urine culture   Result Value Ref Range    Specimen Description Midstream Urine     Culture Micro       10 to 50,000 colonies/mL Multiple species present, probable perineal    Micro Report Status FINAL 09/15/2008      *Note: Due to a large number of results and/or encounters for the requested time period, some results have not been displayed. A complete set of results can be found in Results Review.

## 2024-08-13 NOTE — ANESTHESIA POSTPROCEDURE EVALUATION
Patient: Ana Wyman    Procedure: Procedure(s):  Irrigation and debridement bilateral thigh       Anesthesia Type:  General    Note:     Postop Pain Control: Uneventful            Sign Out: Well controlled pain   PONV: No   Neuro/Psych: Uneventful            Sign Out: Acceptable/Baseline neuro status   Airway/Respiratory: Uneventful            Sign Out: Acceptable/Baseline resp. status   CV/Hemodynamics: Uneventful            Sign Out: Acceptable CV status   Other NRE: NONE   DID A NON-ROUTINE EVENT OCCUR? No           Last vitals:  Vitals Value Taken Time   BP 98/56 08/12/24 2300   Temp 98.7  F (37.1  C) 08/12/24 2230   Pulse 100 08/12/24 2303   Resp 32 08/12/24 2303   SpO2 95 % 08/12/24 2303   Vitals shown include unfiled device data.    Electronically Signed By: Ochoa White MD  August 12, 2024  11:04 PM

## 2024-08-13 NOTE — PLAN OF CARE
"Goal Outcome Evaluation:      Plan of Care Reviewed With: patient, family    Overall Patient Progress: no changeOverall Patient Progress: no change    Outcome Evaluation: down to PACU approx 1730- A&O x4, VSS on RA a febrile, tele SR/ST 97 bpm per tele tech, NPO, NS running 100 ml/hr, continuous pulse ox in place, IS utilized, CHG wipes/linen change done, purewick in place, IV vanco & zosyn done   Problem: Adult Inpatient Plan of Care  Goal: Plan of Care Review  Description: The Plan of Care Review/Shift note should be completed every shift.  The Outcome Evaluation is a brief statement about your assessment that the patient is improving, declining, or no change.  This information will be displayed automatically on your shift  note.  Outcome: Progressing  Flowsheets (Taken 8/12/2024 2124)  Outcome Evaluation: down to PACU approx 1730- A&O x4, VSS on RA, tele SR/ST 97 bpm per tele tech, NPO, NS running 100 ml/hr, continuous pulse ox in place, IS utilized, CHG wipes/linen change done, purewick in place, IV vanco & zosyn done  Plan of Care Reviewed With:   patient   family  Overall Patient Progress: no change  Goal: Patient-Specific Goal (Individualized)  Description: You can add care plan individualizations to a care plan. Examples of Individualization might be:  \"Parent requests to be called daily at 9am for status\", \"I have a hard time hearing out of my right ear\", or \"Do not touch me to wake me up as it startles  me\".  Outcome: Progressing  Goal: Absence of Hospital-Acquired Illness or Injury  Outcome: Progressing  Intervention: Identify and Manage Fall Risk  Recent Flowsheet Documentation  Taken 8/12/2024 1555 by Daija Braun, RN  Safety Promotion/Fall Prevention: safety round/check completed  Intervention: Prevent Skin Injury  Recent Flowsheet Documentation  Taken 8/12/2024 1602 by Daija Braun, RN  Body Position: supine, head elevated  Taken 8/12/2024 1555 by Daija Braun, RN  Body Position: " supine, head elevated  Skin Protection: pulse oximeter probe site changed  Device Skin Pressure Protection: absorbent pad utilized/changed  Intervention: Prevent and Manage VTE (Venous Thromboembolism) Risk  Recent Flowsheet Documentation  Taken 8/12/2024 1555 by Daija Braun RN  VTE Prevention/Management: SCDs on (sequential compression devices)  Intervention: Prevent Infection  Recent Flowsheet Documentation  Taken 8/12/2024 1555 by Daija Braun RN  Infection Prevention: hand hygiene promoted  Goal: Optimal Comfort and Wellbeing  Outcome: Progressing  Intervention: Monitor Pain and Promote Comfort  Recent Flowsheet Documentation  Taken 8/12/2024 1602 by Daija Braun RN  Pain Management Interventions: cold applied  Goal: Readiness for Transition of Care  Outcome: Progressing  Flowsheets (Taken 8/12/2024 1600)  Transportation Anticipated: family or friend will provide  Intervention: Mutually Develop Transition Plan  Recent Flowsheet Documentation  Taken 8/12/2024 1600 by Daija Braun RN  Transportation Anticipated: family or friend will provide  Patient/Family Anticipated Services at Transition: (possibly home health nurse) home health care  Patient/Family Anticipates Transition to: home with family  Equipment Currently Used at Home: none     Problem: Skin or Soft Tissue Infection  Goal: Absence of Infection Signs and Symptoms  Outcome: Progressing  Intervention: Minimize and Manage Infection Progression  Recent Flowsheet Documentation  Taken 8/12/2024 1555 by Daija Braun RN  Fever Reduction/Comfort Measures:   cool cloth applied   ice pack(s) applied   lightweight bedding  Infection Prevention: hand hygiene promoted  Infection Management: aseptic technique maintained     Problem: Skin Injury Risk Increased  Goal: Skin Health and Integrity  Outcome: Progressing  Intervention: Plan: Nurse Driven Intervention: Moisture Management  Recent Flowsheet Documentation  Taken 8/12/2024 1558  by Daija Braun RN  Moisture Interventions: No brief in bed  Bathing/Skin Care:   incontinence care   wipes, CHG   dressed/undressed   linen changed  Intervention: Optimize Skin Protection  Recent Flowsheet Documentation  Taken 8/12/2024 1602 by Daija Braun RN  Activity Management: bedrest  Head of Bed (HOB) Positioning: HOB at 20-30 degrees  Taken 8/12/2024 1555 by Daija Braun RN  Pressure Reduction Techniques: heels elevated off bed  Pressure Reduction Devices: heel offloading device utilized  Skin Protection: pulse oximeter probe site changed  Activity Management: bedrest  Head of Bed (HOB) Positioning: HOB at 20-30 degrees  Intervention: Promote and Optimize Oral Intake  Recent Flowsheet Documentation  Taken 8/12/2024 1555 by Daija Braun RN  Oral Nutrition Promotion: rest periods promoted     Problem: Pain Acute  Goal: Optimal Pain Control and Function  Outcome: Progressing  Intervention: Develop Pain Management Plan  Recent Flowsheet Documentation  Taken 8/12/2024 1602 by Daija Braun RN  Pain Management Interventions: cold applied  Intervention: Prevent or Manage Pain  Recent Flowsheet Documentation  Taken 8/12/2024 1555 by Dajia Braun RN  Bowel Elimination Promotion: adequate fluid intake promoted  Medication Review/Management: medications reviewed  Intervention: Optimize Psychosocial Wellbeing  Recent Flowsheet Documentation  Taken 8/12/2024 1555 by Daija Braun RN  Supportive Measures: active listening utilized     Problem: Comorbidity Management  Goal: Maintenance of Asthma Control  Outcome: Progressing  Intervention: Maintain Asthma Symptom Control  Recent Flowsheet Documentation  Taken 8/12/2024 1555 by Daija Braun RN  Medication Review/Management: medications reviewed  Goal: Bariatric Home Regimen Maintained  Outcome: Progressing  Intervention: Maintain and Manage Postbariatric Surgery Care  Recent Flowsheet Documentation  Taken 8/12/2024 1555  by Daija Braun, RN  Oral Nutrition Promotion: rest periods promoted  Medication Review/Management: medications reviewed

## 2024-08-13 NOTE — ANESTHESIA PROCEDURE NOTES
Airway       Patient location during procedure: OR       Procedure Start/Stop Times: 8/12/2024 9:12 PM  Staff -        Performed By: CRNA  Consent for Airway        Urgency: elective  Indications and Patient Condition       Indications for airway management: benjamin-procedural and airway protection       Induction type:RSI       Mask difficulty assessment: 0 - not attempted    Final Airway Details       Final airway type: endotracheal airway       Successful airway: ETT - single and Oral  Endotracheal Airway Details        ETT size (mm): 7.0       Cuffed: yes       Successful intubation technique: direct laryngoscopy       DL Blade Type: Wilson 2       Grade View of Cords: 1       Adjucts: stylet       Position: Right       Measured from: lips       Secured at (cm): 22       Bite block used: None    Post intubation assessment        Placement verified by: capnometry and equal breath sounds        Number of attempts at approach: 1       Number of other approaches attempted: 0       Secured with: tape       Ease of procedure: easy       Dentition: Intact    Medication(s) Administered   Medication Administration Time: 8/12/2024 9:12 PM

## 2024-08-13 NOTE — ANESTHESIA CARE TRANSFER NOTE
Patient: Ana Wyman    Procedure: Procedure(s):  Irrigation and debridement bilateral thigh       Diagnosis: Infection [B99.9]  Diagnosis Additional Information: No value filed.    Anesthesia Type:   General     Note:    Oropharynx: spontaneously breathing  Level of Consciousness: drowsy  Oxygen Supplementation: face mask    Independent Airway: airway patency satisfactory and stable  Dentition: dentition unchanged  Vital Signs Stable: post-procedure vital signs reviewed and stable  Report to RN Given: handoff report given  Patient transferred to: PACU    Handoff Report: Identifed the Patient, Identified the Reponsible Provider, Reviewed the pertinent medical history, Discussed the surgical course, Reviewed Intra-OP anesthesia mangement and issues during anesthesia, Set expectations for post-procedure period and Allowed opportunity for questions and acknowledgement of understanding      Vitals:  Vitals Value Taken Time   BP     Temp     Pulse 104 08/12/24 2148   Resp 36 08/12/24 2148   SpO2 96 % 08/12/24 2148   Vitals shown include unfiled device data.    Electronically Signed By: JEOVANNY Jacobs CRNA  August 12, 2024  9:49 PM

## 2024-08-13 NOTE — OP NOTE
PLASTIC SURGERY OPERATIVE NOTE  Patient Name:  Ana Wyman     Date of Service:  8/10/2024 - 8/12/2024     PREOPERATIVE DIAGNOSES:   1.  Infection [B99.9] left thigh  2.  Seroma right thigh    POSTOPERATIVE DIAGNOSES:   1.  Infection [B99.9]   left thigh  2. Seroma right thigh    SURGEON:  Liliane Mayen MD   OR STAFF:  Circulator: Martin Portillo RN  Relief Circulator: Britney Haley RN  Scrub Person: Greg Lyles     ANESTHESIA:  General     ESTIMATED BLOOD LOSS:  * No values recorded between 8/12/2024  9:20 PM and 8/12/2024  9:43 PM *    SPECIMEN(S):    ID Type Source Tests Collected by Time Destination   A : Left thigh wound Tissue Thigh, Left ANAEROBIC BACTERIAL CULTURE ROUTINE, GRAM STAIN, AEROBIC BACTERIAL CULTURE ROUTINE Liliane Mayen MD 8/12/2024  9:33 PM        INDICATIONS: Patient is a 54-year-old female who previously underwent a medial thigh lift revision on 7/30/2024.  She developed a postoperative infection that required hospital admission and IV antibiotics.  She presents today for incision and drainage of a left thigh abscess and evacuation of a right thigh seroma.      PROCEDURES:   1.  Evacuation right thigh seroma  2.  Evacuation left knee hematoma  3.  Incision and drainage left thigh abscess    DESCRIPTION OF PROCEDURE:   Patient was marked for incisions and taken to the operating room.  General anesthesia was administered.  The lower extremities were prepped and draped in a sterile manner.  On the right side, an incision was made along the thigh lift scar and dissection was carried down to underlying seroma.  The seroma pocket was encountered.  The fluid was evacuated.  A 15 English MISTY drain was placed and secured with a 3-0 nylon suture.  The skin incision was closed with interrupted 3-0 Monocryl in the subcutaneous tissue followed by running 4-0 subcuticular Monocryl suture.    The left thigh was then addressed.  An incision was made near the knee and dissection  was carried down to the underlying subcutaneous tissue.  A small hematoma was evacuated but it did not appear infected.  The incision was reapproximated interrupted 3-0 Monocryl in the subcutaneous tissue followed by running 4-0 subcuticular Monocryl suture.    An incision was then made along the superior aspect of the left thigh lift incision and dissection was carried down to the subcutaneous tissue.  A large abscess was encountered and this was evacuated.  The incision was extended.  The pocket was irrigated with saline.  The wound was then dressed with a Kerlix roll followed by an ABD.  All incisions were otherwise dressed with Xeroform followed by light gauze dressing.    IMPLANTS:  * No implants in log *    FINDINGS: Seroma right thigh, abscess left thigh; culture sent.    MD Faheem Ayala [facility]   [phone]

## 2024-08-13 NOTE — PHARMACY-VANCOMYCIN DOSING SERVICE
Pharmacy Vancomycin Note  Date of Service 2024  Patient's  1970   54 year old, female    Indication: Skin and Soft Tissue Infection  Day of Therapy: 3  Current vancomycin regimen:  1250 mg IV q12h  Current vancomycin monitoring method: AUC  Current vancomycin therapeutic monitoring goal: 400-600 mg*h/L    Insight[employer] Prediction of Current Vancomycin Regimen  Loading dose: N/A  Regimen: 1250 mg IV every 12 hours.  Start time: 04:13 on 2024  Exposure target: AUC24 (range)400-600 mg/L.hr   AUC24,ss: 288 mg/L.hr  Probability of AUC24 > 400: 2 %  Ctrough,ss: 7.2 mg/L  Probability of Ctrough,ss > 20: 1 %  Probability of nephrotoxicity (Lodise ELEANOR ): 4 %    Current estimated CrCl = Estimated Creatinine Clearance: 114.9 mL/min (based on SCr of 0.6 mg/dL).    Creatinine for last 3 days  8/10/2024: 10:16 PM Creatinine 0.81 mg/dL  2024:  7:27 AM Creatinine 0.74 mg/dL; 11:36 PM Creatinine 0.67 mg/dL  2024:  7:30 AM Creatinine 0.65 mg/dL  2024:  7:27 AM Creatinine 0.60 mg/dL    Recent Vancomycin Levels (past 3 days)  2024:  7:27 AM Vancomycin 10.9 ug/mL    Vancomycin IV Administrations (past 72 hours)                     vancomycin (VANCOCIN) 1,250 mg in 0.9% NaCl 250 mL intermittent infusion (mg) 1,250 mg New Bag 24 0250     1,250 mg New Bag 24 1113     1,250 mg New Bag  0002     1,250 mg New Bag 24 1205    Vancomycin (VANCOCIN) 2,000 mg in 0.9% NaCl 500 mL intermittent infusion (mg) 2,000 mg New Bag 24 0039                    Nephrotoxins and other renal medications (From now, onward)      Start     Dose/Rate Route Frequency Ordered Stop    24 1030  Vancomycin (VANCOCIN) 2,000 mg in 0.9% NaCl 500 mL intermittent infusion         2,000 mg  250 mL/hr over 2 Hours Intravenous EVERY 12 HOURS 24 1011      24 0600  piperacillin-tazobactam (ZOSYN) 3.375 g vial to attach to  mL bag        Note to Pharmacy: For [facility] and LAST: For  "Zosyn-naive patients, use the \"Zosyn initial dose + extended infusion\" order panel.    3.375 g  over 30 Minutes Intravenous EVERY 6 HOURS 08/11/24 0041                 Contrast Orders - past 72 hours (72h ago, onward)      Start     Dose/Rate Route Frequency Stop    08/10/24 2310  iopamidol (ISOVUE-370) solution 500 mL         500 mL Intravenous ONCE 08/10/24 2309            Interpretation of levels and current regimen:  Vancomycin level is reflective of subtherapeutic level    Has serum creatinine changed greater than 50% in last 72 hours: No    Urine output:  unable to determine    Renal Function: Stable    InsightRX Prediction of Planned New Vancomycin Regimen  Loading dose: N/A  Regimen: 2000 mg IV every 12 hours.  Start time: 04:14 on 08/14/2024  Exposure target: AUC24 (range)400-600 mg/L.hr   AUC24,ss: 460 mg/L.hr  Probability of AUC24 > 400: 80 %  Ctrough,ss: 11.7 mg/L  Probability of Ctrough,ss > 20: 9 %  Probability of nephrotoxicity (Lodise ELEANOR 2009): 7 %    Plan:  Increase Dose to 2000mg q12h  Vancomycin monitoring method: AUC  Vancomycin therapeutic monitoring goal: 400-600 mg*h/L  Pharmacy will check vancomycin levels as appropriate in 1-3 Days.  Serum creatinine levels will be ordered daily for the first week of therapy and at least twice weekly for subsequent weeks.    Kd Sánchez, [facility]    "

## 2024-08-13 NOTE — ANESTHESIA PREPROCEDURE EVALUATION
Anesthesia Pre-Procedure Evaluation    Patient: Ana Wyman   MRN: [id number] : 1970        Procedure : Procedure(s):  Irrigation and debridement bilateral thigh          Past Medical History:   Diagnosis Date    Allergic rhinitis, cause unspecified     Cellulitis     2 episodes, one with hospitalization [facility]    Complication of anesthesia     MOTHER HAS HISTORY    DUB (dysfunctional uterine bleeding)     Neg EMB     Fibroids     Genital problems     Lichens Schlerosis    GERD (gastroesophageal reflux disease)     Hallux valgus (acquired)     History of steroid therapy     Inhalers, eye drops    Hypersomnia with sleep apnea, unspecified     using CPAP    Kidney stone 2018    2 stones    Lichen sclerosus 2011    Lymph edema - present    Lymphedema bilateral with mixed lipedema. 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    Dry Eye    Vitamin D deficiency 2015      Past Surgical History:   Procedure Laterality Date    BRACHIOPLASTY COSMETIC Bilateral 2024    Procedure: COSMETIC REVISION BRACHIOPLASTY BILATERAL;  Surgeon: Liliane Mayen MD;  Location:  OR    COLONOSCOPY  05/15/2013    Procedure: COLONOSCOPY;  Colonoscopy;  Surgeon: Kota Blanco MD;  Location:  GI    COLONOSCOPY N/A 10/21/2019    Procedure: COLONOSCOPY, with biopsies by cold forceps;  Surgeon: Lydia Vickers MD;  Location:  GI    COSMETIC ABDOMINOPLASTY Bilateral 2021    Procedure: COSMETIC BILATERAL BELT LIPECTOMY;  Surgeon: Wade Marquez MD;  Location:  OR    COSMETIC LIFT LOWER EXTREMITY BILATERAL (MEDIAL THIGHS) Bilateral 10/27/2023    Procedure: cosmetic bilateral thigh lift;  Surgeon: Liliane Mayen MD;   Location: [facility] OR    COSMETIC LIFT LOWER EXTREMITY BILATERAL (MEDIAL THIGHS) Bilateral 7/30/2024    Procedure: COSMETIC REVISION THIGH LIFT MEDIAL BILATERAL;  Surgeon: Liliane Mayen MD;  Location: [facility] OR    COSMETIC LIPOSUCTION, BRACHIOPLASTY, COMBINED Bilateral 8/11/2023    Procedure: BILATERAL COSMETIC BRACHIOPLASTY;  Surgeon: Liliane Mayen MD;  Location: [facility] OR    GASTRIC BYPASS      GYN SURGERY  2016    Uterine Ablation    INCISION AND DRAINAGE TRUNK, COMBINED Left 2/25/2021    Procedure: EVACUATION OF HEMATOMA;  Surgeon: Wade Marquez MD;  Location: [facility] OR    LAPAROSCOPIC BYPASS GASTRIC, CHOLECYSTECTOMY, COMBINED N/A 01/28/2019    Procedure: LAPAROSCOPIC GASTRIC BYPASS;  Surgeon: Maykel Calvin MD;  Location:  OR    LAPAROSCOPIC CHOLECYSTECTOMY N/A 04/11/2019    Procedure: LAPAROSCOPIC CHOLECYSTECTOMY;  Surgeon: Maykel Calvin MD;  Location:  OR    lichen sclerosis      MAMMOPLASTY REDUCTION BILATERAL Bilateral 8/11/2023    Procedure: MAMMOPLASTY, REDUCTION, BILATERAL;  Surgeon: Liliane Mayen MD;  Location:  OR    ORTHOPEDIC SURGERY      PANNICULECTOMY N/A 2/25/2021    Procedure: PANNICULECTOMY;  Surgeon: Wade Marquez MD;  Location:  OR    VASCULAR SURGERY  2015    Vienous closure on both legs    vein closure  12/2016    to prevent lymphedema    ZZ NONSPECIFIC PROCEDURE  1994    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    ZZC NONSPECIFIC PROCEDURE  1999,2001    Foot surgeries x 2 (bunionectomy)    ZZ NONSPECIFIC PROCEDURE  2000    hammer toes      Allergies   Allergen Reactions    Nsaids Other (See Comments)     Had gastric bypass - needs to avoid NSAIDS    Clindamycin Diarrhea    Codeine Nausea and Vomiting    Morphine And Codeine Nausea and Vomiting and Unknown    Shellfish Allergy     Sulfamethoxazole-Trimethoprim Rash      Social History     Tobacco Use    Smoking status: Never    Smokeless tobacco: Never   Substance Use Topics    Alcohol use:  Not Currently     Comment: once per month      Wt Readings from Last 1 Encounters:   08/10/24 89.8 kg (198 lb)        Anesthesia Evaluation   Pt has had prior anesthetic. Type: General.    No history of anesthetic complications       ROS/MED HX  ENT/Pulmonary:     (+)                     Intermittent, asthma        recent URI,          Neurologic:  - neg neurologic ROS     Cardiovascular:  - neg cardiovascular ROS     METS/Exercise Tolerance:     Hematologic:  - neg hematologic  ROS     Musculoskeletal:  - neg musculoskeletal ROS     GI/Hepatic:     (+) GERD, Asymptomatic on medication,           liver disease,       Renal/Genitourinary:  - neg Renal ROS     Endo:     (+)               Obesity,       Psychiatric/Substance Use:  - neg psychiatric ROS     Infectious Disease: Comment: Thigh wounds      Malignancy:  - neg malignancy ROS     Other:  - neg other ROS          Physical Exam    Airway        Mallampati: II   TM distance: > 3 FB   Neck ROM: full   Mouth opening: > 3 cm    Respiratory Devices and Support         Dental  no notable dental history         Cardiovascular   cardiovascular exam normal          Pulmonary   pulmonary exam normal                OUTSIDE LABS:  CBC:   Lab Results   Component Value Date    WBC 19.2 (H) 08/12/2024    WBC 22.6 (H) 08/11/2024    HGB 10.1 (L) 08/12/2024    HGB 10.0 (L) 08/11/2024    HCT 31.3 (L) 08/12/2024    HCT 31.3 (L) 08/11/2024     08/12/2024     08/11/2024     BMP:   Lab Results   Component Value Date     08/12/2024     08/11/2024    POTASSIUM 3.6 08/12/2024    POTASSIUM 3.5 08/11/2024    CHLORIDE 105 08/12/2024    CHLORIDE 105 08/11/2024    CO2 19 (L) 08/12/2024    CO2 20 (L) 08/11/2024    BUN 7.9 08/12/2024    BUN 9.3 08/11/2024    CR 0.65 08/12/2024    CR 0.67 08/11/2024    GLC 90 08/12/2024     (H) 08/12/2024     COAGS:   Lab Results   Component Value Date    INR 0.96 01/13/2024     POC:   Lab Results   Component Value Date    BGM  111 (H) 02/28/2021    HCG Negative 04/11/2019    HCGS Negative 09/26/2013     HEPATIC:   Lab Results   Component Value Date    ALBUMIN 3.8 08/10/2024    PROTTOTAL 6.6 08/10/2024    ALT 18 08/10/2024    AST 28 08/10/2024    ALKPHOS 99 08/10/2024    BILITOTAL 0.5 08/10/2024     OTHER:   Lab Results   Component Value Date    LACT 1.0 08/11/2024    A1C 5.4 04/14/2022    SINGH 8.2 (L) 08/12/2024    LIPASE 55 08/10/2024    AMYLASE 56 03/22/2004    TSH 3.60 07/02/2024    T4 0.81 07/19/2022    CRP 27.2 (H) 10/14/2016    SED 18 12/15/2017       Anesthesia Plan    ASA Status:  3    NPO Status:  NPO Appropriate    Anesthesia Type: General.     - Airway: ETT   Induction: Intravenous, Propofol.   Maintenance: Balanced.        Consents    Anesthesia Plan(s) and associated risks, benefits, and realistic alternatives discussed. Questions answered and patient/representative(s) expressed understanding.     - Discussed:     - Discussed with:  Patient            Postoperative Care    Pain management: IV analgesics, Oral pain medications, Multi-modal analgesia.   PONV prophylaxis: Ondansetron (or other 5HT-3), Dexamethasone or Solumedrol     Comments:               Ochoa White MD    I have reviewed the pertinent notes and labs in the chart from the past 30 days and (re)examined the patient.  Any updates or changes from those notes are reflected in this note.

## 2024-08-14 LAB
CREAT SERPL-MCNC: 0.55 MG/DL (ref 0.51–0.95)
EGFRCR SERPLBLD CKD-EPI 2021: >90 ML/MIN/1.73M2
ERYTHROCYTE [DISTWIDTH] IN BLOOD BY AUTOMATED COUNT: 14.9 % (ref 10–15)
GLUCOSE SERPL-MCNC: 112 MG/DL (ref 70–99)
HCT VFR BLD AUTO: 28.1 % (ref 35–47)
HGB BLD-MCNC: 8.9 G/DL (ref 11.7–15.7)
IRON BINDING CAPACITY (ROCHE): 195 UG/DL (ref 240–430)
IRON SATN MFR SERPL: 10 % (ref 15–46)
IRON SERPL-MCNC: 19 UG/DL (ref 37–145)
MCH RBC QN AUTO: 29.8 PG (ref 26.5–33)
MCHC RBC AUTO-ENTMCNC: 31.7 G/DL (ref 31.5–36.5)
MCV RBC AUTO: 94 FL (ref 78–100)
PLATELET # BLD AUTO: 341 10E3/UL (ref 150–450)
RBC # BLD AUTO: 2.99 10E6/UL (ref 3.8–5.2)
WBC # BLD AUTO: 13.5 10E3/UL (ref 4–11)

## 2024-08-14 PROCEDURE — 250N000011 HC RX IP 250 OP 636: Performed by: INTERNAL MEDICINE

## 2024-08-14 PROCEDURE — 258N000003 HC RX IP 258 OP 636: Performed by: INTERNAL MEDICINE

## 2024-08-14 PROCEDURE — 250N000013 HC RX MED GY IP 250 OP 250 PS 637: Performed by: INTERNAL MEDICINE

## 2024-08-14 PROCEDURE — 36415 COLL VENOUS BLD VENIPUNCTURE: CPT | Performed by: PLASTIC SURGERY

## 2024-08-14 PROCEDURE — 82947 ASSAY GLUCOSE BLOOD QUANT: CPT | Performed by: STUDENT IN AN ORGANIZED HEALTH CARE EDUCATION/TRAINING PROGRAM

## 2024-08-14 PROCEDURE — 120N000001 HC R&B MED SURG/OB

## 2024-08-14 PROCEDURE — 83550 IRON BINDING TEST: CPT | Performed by: INTERNAL MEDICINE

## 2024-08-14 PROCEDURE — 85027 COMPLETE CBC AUTOMATED: CPT | Performed by: PLASTIC SURGERY

## 2024-08-14 PROCEDURE — 99232 SBSQ HOSP IP/OBS MODERATE 35: CPT | Performed by: INTERNAL MEDICINE

## 2024-08-14 PROCEDURE — 250N000013 HC RX MED GY IP 250 OP 250 PS 637: Performed by: PLASTIC SURGERY

## 2024-08-14 PROCEDURE — G0463 HOSPITAL OUTPT CLINIC VISIT: HCPCS

## 2024-08-14 PROCEDURE — 82565 ASSAY OF CREATININE: CPT | Performed by: PLASTIC SURGERY

## 2024-08-14 RX ORDER — METHYLPREDNISOLONE SODIUM SUCCINATE 125 MG/2ML
125 INJECTION, POWDER, LYOPHILIZED, FOR SOLUTION INTRAMUSCULAR; INTRAVENOUS
Status: DISCONTINUED | OUTPATIENT
Start: 2024-08-14 | End: 2024-08-15 | Stop reason: HOSPADM

## 2024-08-14 RX ORDER — DIPHENHYDRAMINE HYDROCHLORIDE 50 MG/ML
50 INJECTION INTRAMUSCULAR; INTRAVENOUS
Status: DISCONTINUED | OUTPATIENT
Start: 2024-08-14 | End: 2024-08-15 | Stop reason: HOSPADM

## 2024-08-14 RX ADMIN — FAMOTIDINE 20 MG: 20 TABLET ORAL at 08:13

## 2024-08-14 RX ADMIN — SODIUM CHLORIDE 500 ML: 9 INJECTION, SOLUTION INTRAVENOUS at 00:44

## 2024-08-14 RX ADMIN — ACETAMINOPHEN 975 MG: 325 TABLET, FILM COATED ORAL at 22:13

## 2024-08-14 RX ADMIN — ENOXAPARIN SODIUM 40 MG: 40 INJECTION SUBCUTANEOUS at 17:24

## 2024-08-14 RX ADMIN — IRON SUCROSE 200 MG: 20 INJECTION, SOLUTION INTRAVENOUS at 17:20

## 2024-08-14 RX ADMIN — ACETAMINOPHEN 975 MG: 325 TABLET, FILM COATED ORAL at 08:13

## 2024-08-14 RX ADMIN — ACETAMINOPHEN 975 MG: 325 TABLET, FILM COATED ORAL at 13:43

## 2024-08-14 RX ADMIN — FAMOTIDINE 20 MG: 20 TABLET ORAL at 20:06

## 2024-08-14 RX ADMIN — PANTOPRAZOLE SODIUM 40 MG: 40 TABLET, DELAYED RELEASE ORAL at 06:45

## 2024-08-14 RX ADMIN — CEFTRIAXONE 2 G: 2 INJECTION, POWDER, FOR SOLUTION INTRAMUSCULAR; INTRAVENOUS at 15:51

## 2024-08-14 ASSESSMENT — ACTIVITIES OF DAILY LIVING (ADL)
ADLS_ACUITY_SCORE: 37
DEPENDENT_IADLS:: INDEPENDENT
ADLS_ACUITY_SCORE: 37

## 2024-08-14 NOTE — PROGRESS NOTES
[facility]  Infectious Disease Progress Note          Assessment and Plan:   Assessment & Plan  Ana Wyman is a 54 year old female who was admitted on 8/10/2024.      Impression: 1 54-year-old female recent plastic surgery with thigh lift revision, postop with infection and sepsis, operative intervention and drainage culture growing group C strep  2 major sepsis and fever but blood cultures so far negative  3 clindamycin and sulfa allergies     REC1 despite initial significant sepsis, negative blood culture, feels better and resolved fever and white count improved,( still 13K), pure  culture group C strep, ceftriaxone with oral antibiotic should be acceptable and surgically ready and wound VAC arranged, would do Augmentin 875 twice daily for 10 days           Interval History:     no new complaints and doing better overall no cp, sob, n/v/d, or abd pain.  Temp down, white count 13,000, culture with group C strep, has a wound VAC on              Medications:     Current Facility-Administered Medications   Medication Dose Route Frequency Provider Last Rate Last Admin    acetaminophen (TYLENOL) tablet 975 mg  975 mg Oral TID Liliane Mayen MD   975 mg at 08/13/24 2220    cefTRIAXone (ROCEPHIN) 2 g vial to attach to  ml bag for ADULTS or NS 50 ml bag for PEDS  2 g Intravenous Q24H Beto Roca MD   2 g at 08/13/24 1544    enoxaparin ANTICOAGULANT (LOVENOX) injection 40 mg  40 mg Subcutaneous Q24H Fitz Tavarez MD   40 mg at 08/13/24 1651    famotidine (PEPCID) tablet 20 mg  20 mg Oral BID Liliane Mayen MD   20 mg at 08/13/24 2006    pantoprazole (PROTONIX) EC tablet 40 mg  40 mg Oral QAM AC Fitz Tavarez MD   40 mg at 08/14/24 0645    sodium chloride (PF) 0.9% PF flush 3 mL  3 mL Intracatheter Q8H Liliane Mayen MD   3 mL at 08/13/24 2140                  Physical Exam:   Blood pressure 103/60, pulse 77, temperature 97.2  F (36.2  " C), temperature source Temporal, resp. rate 20, height 1.626 m (5' 4\"), weight 89.8 kg (198 lb), last menstrual period 10/01/2016, SpO2 94%, not currently breastfeeding.  Wt Readings from Last 2 Encounters:   08/10/24 89.8 kg (198 lb)   07/30/24 91.3 kg (201 lb 3.2 oz)     Vital Signs with Ranges  Temp:  [97  F (36.1  C)-97.6  F (36.4  C)] 97.2  F (36.2  C)  Pulse:  [72-84] 77  Resp:  [14-20] 20  BP: ()/(36-61) 103/60  SpO2:  [94 %-95 %] 94 %    Constitutional: Awake, alert, cooperative, no apparent distress looks well     Lungs: Clear to auscultation bilaterally, no crackles or wheezing   Cardiovascular: Regular rate and rhythm, normal S1 and S2, and no murmur noted   Abdomen: Normal bowel sounds, soft, non-distended, non-tender   Skin: No rashes, no cyanosis, no edema labs look okay   Other:           Data:   All microbiology laboratory data reviewed.  Recent Labs   Lab Test 08/14/24  0721 08/13/24  0727 08/12/24  0730   WBC 13.5* 17.8* 19.2*   HGB 8.9* 9.4* 10.1*   HCT 28.1* 29.8* 31.3*   MCV 94 93 94    307 276     Recent Labs   Lab Test 08/14/24  0721 08/13/24  0727 08/12/24  0730   CR 0.55 0.60 0.65     Recent Labs   Lab Test 12/15/17  1121   SED 18     Recent Labs   Lab Test 02/25/21 2057 02/25/21  2050 12/02/19  1447 10/09/19  0919 12/03/18  1121 11/23/18  1019 04/29/17  0111 04/29/17  0103 04/26/17 2002   CULT No growth No growth Light growth  Finegoldia magna (Peptostreptococcus georges)  Susceptibility testing not routinely done  *  Since this specimen was not transported in the proper anaerobic transport media, the   isolated anaerobes may not represent the total number present.    Light growth  Normal skin mague    Light growth  Staphylococcus lugdunensis  * No beta hemolytic Streptococcus Group A isolated No beta hemolytic Streptococcus Group A isolated No beta hemolytic Streptococcus Group A isolated No growth No growth 10,000 to 50,000 colonies/mL mixed urogenital mague        "

## 2024-08-14 NOTE — PROGRESS NOTES
Plastic Surgery    Chart reviewed. Patient reports much less discomfort and did ambulate in hallway last night. She is still concerned about episode of hypotension with activity and decrease hgb.    PE: Temp: 97.2  F (36.2  C) Temp src: Temporal BP: (!) 86/53 Pulse: 77   Resp: 20 SpO2: 94 % O2 Device: None (Room air)      Right thigh with decreased ecchymosis, edema; MISTY in place. Wound VAC in place on left side with increased firmness, erythema compared to right side.    A/P:  Continue to increase activity with assistance. PT ordered. Anticipate hemoglobin to increase with diuresis. Continue current antibiotics; cx and ID consult pending. Call if questions.     MD Faheem Ayala [facility]   [phone] (cell)

## 2024-08-14 NOTE — PROGRESS NOTES
[facility]    Medicine Progress Note - Hospitalist Service    Date of Admission:  8/10/2024    Assessment & Plan   Ana Wyman is a 54 year old female with a PMH significant for mild intermittent asthma, status post bariatric surgery, who recently underwent cosmetic revision brachioplasty of bilateral thighs on 7/30. She presented to the ER with complaint of fever, chills, nausea, and tightness in her left leg.     Status post cosmetic revision of bilateral thighs and arms complicated by Left medial distal thigh soft tissue infection and possible abscess.  Right medial thigh fluid collection  Severe sepsis sepsis secondary to above.  Hypotension  -Patient underwent the procedure on 7/30.   - she initially did well, however, few days later she  noticed swelling and tightness in her left leg behind the knee.    -She had followed up with Dr. Mckeon in the clinic and underwent aspiration of the hematoma x 2 from left medial thigh.   -Despite the aspiration, she continued to have tightness and pain in her left leg.    -She also noted popped possible seroma on her right thigh on the day of admission.  -She then continued to have fever, chilling sensation, now resolved.  -CT imaging reported left medial distal thigh abscess about 9.7 cm in size, appears lobulated and multiseptated.   -CT imaging of the left side also showed fluid collection in the proximal anteromedial thigh about 8 x 4 x 13 cm.  -She was also noted to have hypotension and needed IVF boluses.  -Evaluated by plastic surgeon Dr. Mayen in ED. initially reported no surgery and diet was ordered for her earlier.  She was reevaluated and underwent incision debridemen left thigh abscess, and evacuation of hematoma from the right thigh and left knee area  on 8/12.  -She has a drain to the right thigh and wound vac to the left.  -BP remained soft, orthostatic blood pressures checked was normal.  -Continue to monitor blood pressure.  -She  met criteria for sepsis on presentation based on fever, leukocytosis, tachycardia and hypotension.  -She was started on Vancomycin and Zosyn on admission.  -Wound culture came back positive for gram-positive cocci, was strept infection.  -ID consulted input appreciated, changed antibiotics to Rocephin while her and recommended Augmentin up on discharge.  -Lactate normal. Procalcitonin 1.86.  -She has been on IV fluid, will stop it and see how her BP is off IVF.  -Pain controlled with Norco, Tylenol, Dilaudid.  -Minimize narcotics as much as possible.  -Monitor vitals closely,  blood pressure is low, patient chronically has low blood pressure systolic about 100.  -DVT ruled out left lower extremity by ultrasound.  -DVT prophylaxis, continue lovenox 40 mg subcu daily, in addition to PCD's.  -Ambulate the patient.    Acute on chronic anemia.  -Patient with history of chronic anemia.  -This could be partly due to surgery and hematoma evacuation as well as hemodilution.  -She has history of gastric bypass surgery and seem to be not absorbing enough nutrients and minerals .  -Hemoglobin was 11.3 on admission, trended down to 8.9 today.  -Check iron level, if low will give her iron infusion.  -Check hemoglobin tomorrow.    Mild intermittent asthma  -Continue to monitor.  -She is not hypoxic, no wheezing or increased work of breathing   -Continue bronchodilators.  - Albuterol nebulizer as needed    I long discussion with patient and with her significant other at bedside the plan of care.  All their question and concerns addressed showed understanding.        Diet: Advance Diet as Tolerated: Regular Diet Adult    DVT Prophylaxis: Heparin SQ  Grace Catheter: Not present  Lines: None     Cardiac Monitoring: None  Code Status: Full Code      Clinically Significant Risk Factors                              # Obesity: Estimated body mass index is 33.99 kg/m  as calculated from the following:    Height as of this encounter: 1.626  "m (5' 4\").    Weight as of this encounter: 89.8 kg (198 lb)., PRESENT ON ADMISSION     # Asthma: noted on problem list              Disposition Plan     Medically Ready for Discharge: Anticipated Tomorrow    I discussed with patient at length the plan of care, all her questions and concerns addressed.         Fitz Tavarez MD  Hospitalist Service  [facility]  Securely message with [employer] (more info)  Text page via [employer] Paging/Directory   ______________________________________________________________________    Interval History   Patient seen and examined, chart, medications, overnight events reviewed.  Blood pressure remained soft but she remained asymptomatic.  Pain is fairly controlled, no nausea or vomiting, tolerating oral intake, hide incision drainage of abscess on the left thigh on 8/12.  The left thigh is more painful than the right.  Continue to have fever and chilling sensation.  No cough runny nose or sore throat      Physical Exam   Vital Signs: Temp: 97.3  F (36.3  C) Temp src: Oral BP: 94/65 Pulse: 72   Resp: 16 SpO2: 98 % O2 Device: None (Room air)    Weight: 198 lbs 0 oz  General: Awake, alert, cooperative, no apparent distress.  HEENT: Pink, nonicteric, moist oral mucosa.  CHEST : Entry bilaterally, no wheezing crackles or rales  CVS: Normal rate, regular rhythm, no murmur  GI: Soft, nontender, nondistended  EXT: Right thigh has a medial incision extending from groin to down to the knee, appears clean and healing, on the medial side fluid-filled mass palpated without any overlying skin changes, no tenderness, no oozing.  Left leg also has a medial incision extending from groin to the knee, left thigh is warm, nontender, erythematous, firm mass appreciated extending from behind the knee going up to the thigh, dressing was removed and a small opening of the incision was noted up on the medial thigh, no adrienne blood or pus, patient was able to flex her left knee without " any increasing pain.  -Right thigh with wound VAC to left thigh with a drain.      Medical Decision Making       40 MINUTES SPENT BY ME on the date of service doing chart review, history, exam, documentation & further activities per the note.      Data   Imaging results reviewed over the past 24 hrs:   No results found for this or any previous visit (from the past 24 hour(s)).    Recent Labs   Lab 08/14/24  0721 08/13/24  0727 08/12/24  1616 08/12/24  0730 08/11/24  2336 08/11/24  0727 08/10/24  2216   WBC 13.5* 17.8*  --  19.2*  --    < > 11.6*   HGB 8.9* 9.4*  --  10.1*  --    < > 11.3*   MCV 94 93  --  94  --    < > 94    307  --  276  --    < > 371   NA  --  137  --  135 135   < > 137   POTASSIUM  --  4.4  --  3.6 3.5   < > 4.0   CHLORIDE  --  106  --  105 105   < > 101   CO2  --  20*  --  19* 20*   < > 23   BUN  --  10.1  --  7.9 9.3   < > 18.3   CR 0.55 0.60  --  0.65 0.67   < > 0.81   ANIONGAP  --  11  --  11 10   < > 13   SINGH  --  8.4*  --  8.2* 8.4*   < > 8.7*   * 145* 90 101* 135*   < > 127*   ALBUMIN  --   --   --   --   --   --  3.8   PROTTOTAL  --   --   --   --   --   --  6.6   BILITOTAL  --   --   --   --   --   --  0.5   ALKPHOS  --   --   --   --   --   --  99   ALT  --   --   --   --   --   --  18   AST  --   --   --   --   --   --  28   LIPASE  --   --   --   --   --   --  55    < > = values in this interval not displayed.

## 2024-08-14 NOTE — PLAN OF CARE
"Iron infusion. Applied abdominal binder to left thigh as recommended for the swelling of left thigh.  Rocephin IV.   Wound vac on left thigh.  Has drained 100cc to 150cc into the canister (canister is not sitting straight up).MISTY to right thigh. Serosang drainage. Dressing to MISTY has been changed x2 to this shift.   Left thigh continues to be red and swollen. Swelling appears to be increased today compared to yesterday. Swelling to left thigh causing discomfort especially when walking.   Patient vital signs are at baseline: No,  Reason:  hypotension 90s/40. HR stable. Room air.   Patient able to ambulate as they were prior to admission or with assist devices provided by therapies during their stay:  Yes. 1 assist, walker, GB. Walking longer distances in the hallways.   Patient MUST void prior to discharge:  Yes  Patient able to tolerate oral intake:  Yes  Pain has adequate pain control using Oral analgesics:  Yes  Does patient have an identified :  Yes  Has goal D/C date and time been discussed with patient:  Yes. Awaiting culture sensitivity from left thigh wound.    Rocephin.   Bruising extensive to bilateral legs/thighs.   Pleasant. Oriented x4.   Problem: Adult Inpatient Plan of Care  Goal: Plan of Care Review  Description: The Plan of Care Review/Shift note should be completed every shift.  The Outcome Evaluation is a brief statement about your assessment that the patient is improving, declining, or no change.  This information will be displayed automatically on your shift  note.  Outcome: Progressing  Flowsheets (Taken 8/14/2024 1354)  Plan of Care Reviewed With: patient  Overall Patient Progress: improving  Goal: Patient-Specific Goal (Individualized)  Description: You can add care plan individualizations to a care plan. Examples of Individualization might be:  \"Parent requests to be called daily at 9am for status\", \"I have a hard time hearing out of my right ear\", or \"Do not touch me to wake me up as it " "startles  me\".  Outcome: Progressing  Goal: Absence of Hospital-Acquired Illness or Injury  Outcome: Progressing  Intervention: Identify and Manage Fall Risk  Recent Flowsheet Documentation  Taken 8/14/2024 1131 by Estelita Klein RN  Safety Promotion/Fall Prevention: safety round/check completed  Intervention: Prevent Skin Injury  Recent Flowsheet Documentation  Taken 8/14/2024 1131 by Estelita Klein, RN  Body Position: supine, head elevated  Skin Protection: pulse oximeter probe site changed  Device Skin Pressure Protection: absorbent pad utilized/changed  Intervention: Prevent and Manage VTE (Venous Thromboembolism) Risk  Recent Flowsheet Documentation  Taken 8/14/2024 1131 by Estelita Klein, RN  VTE Prevention/Management: SCDs on (sequential compression devices)  Intervention: Prevent Infection  Recent Flowsheet Documentation  Taken 8/14/2024 1131 by Estelita Klein, RN  Infection Prevention: hand hygiene promoted  Goal: Optimal Comfort and Wellbeing  Outcome: Progressing  Intervention: Monitor Pain and Promote Comfort  Recent Flowsheet Documentation  Taken 8/14/2024 1343 by Estelita Klein, RN  Pain Management Interventions: medication (see MAR)  Taken 8/14/2024 0817 by Estelita Klein, RN  Pain Management Interventions: medication (see MAR)  Goal: Readiness for Transition of Care  Outcome: Progressing     Problem: Skin or Soft Tissue Infection  Goal: Absence of Infection Signs and Symptoms  Outcome: Progressing  Intervention: Minimize and Manage Infection Progression  Recent Flowsheet Documentation  Taken 8/14/2024 1131 by Estelita Klein, RN  Fever Reduction/Comfort Measures:   cool cloth applied   ice pack(s) applied   lightweight bedding  Infection Prevention: hand hygiene promoted  Infection Management: aseptic technique maintained     Problem: Skin Injury Risk Increased  Goal: Skin Health and Integrity  Outcome: Progressing  Intervention: Plan: Nurse " Driven Intervention: Moisture Management  Recent Flowsheet Documentation  Taken 8/14/2024 1131 by Estelita Klein, RN  Moisture Interventions:   No brief in bed   Incontinence pad   Barrier ointment (CriticAid, Triad paste)  Intervention: Optimize Skin Protection  Recent Flowsheet Documentation  Taken 8/14/2024 1344 by Estelita Klein, RN  Activity Management:   back to bed   ambulated in room  Taken 8/14/2024 1131 by Estelita Klein, RN  Pressure Reduction Techniques: heels elevated off bed  Pressure Reduction Devices: heel offloading device utilized  Skin Protection: pulse oximeter probe site changed  Activity Management:   ambulated to bathroom   up in chair  Taken 8/14/2024 0800 by Estelita Klein, RN  Activity Management: ambulated outside room  Intervention: Promote and Optimize Oral Intake  Recent Flowsheet Documentation  Taken 8/14/2024 1131 by Estelita Klein, RN  Oral Nutrition Promotion: rest periods promoted     Problem: Pain Acute  Goal: Optimal Pain Control and Function  Outcome: Progressing  Intervention: Develop Pain Management Plan  Recent Flowsheet Documentation  Taken 8/14/2024 1343 by Estelita Klein, RN  Pain Management Interventions: medication (see MAR)  Taken 8/14/2024 0817 by Estelita Klein, RN  Pain Management Interventions: medication (see MAR)  Intervention: Prevent or Manage Pain  Recent Flowsheet Documentation  Taken 8/14/2024 1131 by Estelita Klein, RN  Bowel Elimination Promotion: adequate fluid intake promoted  Medication Review/Management: medications reviewed  Intervention: Optimize Psychosocial Wellbeing  Recent Flowsheet Documentation  Taken 8/14/2024 1131 by Estelita Klein, RN  Supportive Measures: active listening utilized     Problem: Comorbidity Management  Goal: Maintenance of Asthma Control  Outcome: Progressing  Intervention: Maintain Asthma Symptom Control  Recent Flowsheet Documentation  Taken 8/14/2024  "1131 by Estelita Klein, RN  Medication Review/Management: medications reviewed  Goal: Bariatric Home Regimen Maintained  Outcome: Progressing  Intervention: Maintain and Manage Postbariatric Surgery Care  Recent Flowsheet Documentation  Taken 8/14/2024 1131 by Estelita Klein, RN  Oral Nutrition Promotion: rest periods promoted  Medication Review/Management: medications reviewed     Problem: Adult Inpatient Plan of Care  Goal: Plan of Care Review  Description: The Plan of Care Review/Shift note should be completed every shift.  The Outcome Evaluation is a brief statement about your assessment that the patient is improving, declining, or no change.  This information will be displayed automatically on your shift  note.  Outcome: Progressing  Flowsheets (Taken 8/14/2024 1354)  Plan of Care Reviewed With: patient  Overall Patient Progress: improving  Goal: Patient-Specific Goal (Individualized)  Description: You can add care plan individualizations to a care plan. Examples of Individualization might be:  \"Parent requests to be called daily at 9am for status\", \"I have a hard time hearing out of my right ear\", or \"Do not touch me to wake me up as it startles  me\".  Outcome: Progressing  Goal: Absence of Hospital-Acquired Illness or Injury  Outcome: Progressing  Intervention: Identify and Manage Fall Risk  Recent Flowsheet Documentation  Taken 8/14/2024 1131 by Estelita Klein, RN  Safety Promotion/Fall Prevention: safety round/check completed  Intervention: Prevent Skin Injury  Recent Flowsheet Documentation  Taken 8/14/2024 1131 by Estelita Klein, RN  Body Position: supine, head elevated  Skin Protection: pulse oximeter probe site changed  Device Skin Pressure Protection: absorbent pad utilized/changed  Intervention: Prevent and Manage VTE (Venous Thromboembolism) Risk  Recent Flowsheet Documentation  Taken 8/14/2024 1131 by Estelita Klein, RN  VTE Prevention/Management: SCDs on " (sequential compression devices)  Intervention: Prevent Infection  Recent Flowsheet Documentation  Taken 8/14/2024 1131 by Estelita Klein, RN  Infection Prevention: hand hygiene promoted  Goal: Optimal Comfort and Wellbeing  Outcome: Progressing  Intervention: Monitor Pain and Promote Comfort  Recent Flowsheet Documentation  Taken 8/14/2024 1343 by Estelita Klein, RN  Pain Management Interventions: medication (see MAR)  Taken 8/14/2024 0817 by Estelita Klein, RN  Pain Management Interventions: medication (see MAR)  Goal: Readiness for Transition of Care  Outcome: Progressing     Problem: Skin or Soft Tissue Infection  Goal: Absence of Infection Signs and Symptoms  Outcome: Progressing  Intervention: Minimize and Manage Infection Progression  Recent Flowsheet Documentation  Taken 8/14/2024 1131 by Estelita Klein RN  Fever Reduction/Comfort Measures:   cool cloth applied   ice pack(s) applied   lightweight bedding  Infection Prevention: hand hygiene promoted  Infection Management: aseptic technique maintained     Problem: Skin Injury Risk Increased  Goal: Skin Health and Integrity  Outcome: Progressing  Intervention: Plan: Nurse Driven Intervention: Moisture Management  Recent Flowsheet Documentation  Taken 8/14/2024 1131 by Estelita Klein, RN  Moisture Interventions:   No brief in bed   Incontinence pad   Barrier ointment (CriticAid, Triad paste)  Intervention: Optimize Skin Protection  Recent Flowsheet Documentation  Taken 8/14/2024 1344 by Estelita Klein, RN  Activity Management:   back to bed   ambulated in room  Taken 8/14/2024 1131 by Estelita Klein, RN  Pressure Reduction Techniques: heels elevated off bed  Pressure Reduction Devices: heel offloading device utilized  Skin Protection: pulse oximeter probe site changed  Activity Management:   ambulated to bathroom   up in chair  Taken 8/14/2024 0800 by Estelita Klein, RN  Activity Management:  ambulated outside room  Intervention: Promote and Optimize Oral Intake  Recent Flowsheet Documentation  Taken 8/14/2024 1131 by Estelita Klein RN  Oral Nutrition Promotion: rest periods promoted     Problem: Pain Acute  Goal: Optimal Pain Control and Function  Outcome: Progressing  Intervention: Develop Pain Management Plan  Recent Flowsheet Documentation  Taken 8/14/2024 1343 by Estelita Klein, RN  Pain Management Interventions: medication (see MAR)  Taken 8/14/2024 0817 by Estelita Klein, RN  Pain Management Interventions: medication (see MAR)  Intervention: Prevent or Manage Pain  Recent Flowsheet Documentation  Taken 8/14/2024 1131 by Estelita Klein, RN  Bowel Elimination Promotion: adequate fluid intake promoted  Medication Review/Management: medications reviewed  Intervention: Optimize Psychosocial Wellbeing  Recent Flowsheet Documentation  Taken 8/14/2024 1131 by Estelita Klein, RN  Supportive Measures: active listening utilized     Problem: Comorbidity Management  Goal: Maintenance of Asthma Control  Outcome: Progressing  Intervention: Maintain Asthma Symptom Control  Recent Flowsheet Documentation  Taken 8/14/2024 1131 by Estelita Klein, RN  Medication Review/Management: medications reviewed  Goal: Bariatric Home Regimen Maintained  Outcome: Progressing  Intervention: Maintain and Manage Postbariatric Surgery Care  Recent Flowsheet Documentation  Taken 8/14/2024 1131 by Estelita Klein, RN  Oral Nutrition Promotion: rest periods promoted  Medication Review/Management: medications reviewed   Goal Outcome Evaluation:      Plan of Care Reviewed With: patient    Overall Patient Progress: improvingOverall Patient Progress: improving

## 2024-08-14 NOTE — PROGRESS NOTES
Blood pressure trending downwards now at 86/53.  Heart rate 77.  Reviewed previous vitals and chart.  1500 mL output today.  -Give 500 mL NS bolus

## 2024-08-14 NOTE — PLAN OF CARE
Goal Outcome Evaluation:      Plan of Care Reviewed With: patient    Overall Patient Progress: improvingOverall Patient Progress: improving    Outcome Evaluation: Wound vac at continuous suction at 125. MISTY drain site leaking, plastics is aware and are not concerned, continue to change dressing when saturated. Minimal pain overnight. No prn medications given. BPs low overnight, crosscover paged and 500mL bolus given. Possible DC home on 8/15      Problem: Adult Inpatient Plan of Care  Goal: Plan of Care Review  Description: The Plan of Care Review/Shift note should be completed every shift.  The Outcome Evaluation is a brief statement about your assessment that the patient is improving, declining, or no change.  This information will be displayed automatically on your shift  note.  Outcome: Progressing  Flowsheets (Taken 8/14/2024 0457)  Outcome Evaluation: Wound vac at continuous suction at 125. MISTY drain site leaking, plastics is aware and are not concerned, continue to change dressing when saturated. Minimal pain overnight. No prn medications given. BPs low overnight, crosscover paged and 500mL bolus given. Possible DC home on 8/15  Plan of Care Reviewed With: patient  Overall Patient Progress: improving  Goal: Absence of Hospital-Acquired Illness or Injury  Intervention: Identify and Manage Fall Risk  Recent Flowsheet Documentation  Taken 8/13/2024 2017 by Melody Crystal RN  Safety Promotion/Fall Prevention: safety round/check completed  Intervention: Prevent Infection  Recent Flowsheet Documentation  Taken 8/13/2024 1947 by Melody Crystal, RN  Infection Prevention:   hand hygiene promoted   rest/sleep promoted     Problem: Skin or Soft Tissue Infection  Goal: Absence of Infection Signs and Symptoms  Intervention: Minimize and Manage Infection Progression  Recent Flowsheet Documentation  Taken 8/13/2024 1947 by Meldoy Crystal, RN  Infection Prevention:   hand hygiene promoted   rest/sleep promoted     Problem:  Skin Injury Risk Increased  Goal: Skin Health and Integrity  Intervention: Plan: Nurse Driven Intervention: Moisture Management  Recent Flowsheet Documentation  Taken 8/13/2024 2000 by Melody Crystal RN  Moisture Interventions:   No brief in bed   Incontinence pad   Perineal cleanser   Barrier ointment (CriticAid, Triad paste)  Bathing/Skin Care: incontinence care  Intervention: Optimize Skin Protection  Recent Flowsheet Documentation  Taken 8/13/2024 2100 by Melody Crystal RN  Activity Management:   ambulated outside room   back to bed     Problem: Pain Acute  Goal: Optimal Pain Control and Function  Intervention: Prevent or Manage Pain  Recent Flowsheet Documentation  Taken 8/13/2024 2017 by Melody Crystal RN  Medication Review/Management: medications reviewed     Problem: Comorbidity Management  Goal: Maintenance of Asthma Control  Intervention: Maintain Asthma Symptom Control  Recent Flowsheet Documentation  Taken 8/13/2024 2017 by Melody Crystal RN  Medication Review/Management: medications reviewed  Goal: Bariatric Home Regimen Maintained  Intervention: Maintain and Manage Postbariatric Surgery Care  Recent Flowsheet Documentation  Taken 8/13/2024 2017 by Melody Crystal RN  Medication Review/Management: medications reviewed

## 2024-08-14 NOTE — PROGRESS NOTES
[facility]  WOC Nurse Inpatient Assessment     Consulted for: Left thigh    Summary: Patient concerned thigh is larger today.  No significant change in size noted today.  Assessed site and VAC functioning properly.  OK to try abdominal binder for compression if patient desires.  If not helping bedside RN will notify WOC.  Full assessment of wound from yesterday listed below.      Patient History (according to provider note(s):      Ana Wyman is a 54 year old female with a PMH significant for mild intermittent asthma, status post bariatric surgery, who recently underwent cosmetic revision brachioplasty of bilateral thighs on 7/30. She presented to the ER with complaint of fever, chills, nausea, and tightness in her left leg.     Assessment:      Areas visualized during today's visit: Focused:    Negative pressure wound therapy applied to: Left thigh   Last photo: 8/13/24     Wound due to: Surgical Wound   Wound history/plan of care:  Patient s/p left thigh lift revision on 7/30/24.  She developed an abscess at the site and is now s/p I&D on 8/12/24.  Surgical date: 8/12/24   Service following: plastic surgery  Date Negative Pressure Wound Therapy initiated: 8/13/24   Interventions in place: elevation  Is patient s nutritional status compromised? no   If yes, what interventions are in place? N/A  Reason for initiating vac therapy? High risk of infections and Need for accelerated granulation tissue  Which?of?the?following?co-morbidities?apply? Obesity  If diabetic is patient on a diabetic management program? N/A   Is osteomyelitis present in wound? no   If yes what treatments are in place? N/A    Wound base: 80 % pink non-granular tissue, 20 % adipose tissue      Palpation of the wound bed: normal       Drainage: copious      Volume in cannister: NA     Last cannister change date: 8/13/24     Description of drainage: serous      Measurements (length x width x depth, in cm) 2.5  x 8  x   "3.5 cm       Tunneling N/A      Undermining N/A   Periwound skin: Scar tissue       Color: pink       Temperature: normal    Odor: none   Pain: mild   Pain intervention prior to dressing change: oral Dilaudid  Treatment goal: Heal , Increase granulation, and Decrease moisture  STATUS: initial assessment   Supplies ordered: ordered VAC pump, canister, dressing    Number of foam pieces removed from a wound (excluding foam for bridge) :  NA    Verified this matched the number of foam pieces applied last dressing change: N/A   Number of foam pieces packed into wound (excluding foam for bridge) :  2 GranuFoam Black          Treatment Plan:     Negative pressure wound therapy plan:  Wound location: Left inner thigh   Change Days: Tues/ Fri by WOC RN this week, then start MWF  Supplies (including all accessories) used: medium Black foam   Cleanse with Vashe prior to replacing NPWT  Suction setting: -125   Methods used:  Bridged farther out on thigh    Staff RN to assess integrity of dressing and ensure suction is set at appropriate level every shift.   Date canister. Chart canister output every shift. Change cannister weekly and PRN if full/occluded     Remove foam dressing and replace with BID normal saline moist gauze dressing if:   -a dressing failure which cannot be repaired within 2 hours   -patient is discharging to home without a home pump   -patient is discharging to a facility outside the local area   -if a dressing is a \"Silver Foam\", remove before Radiation Therapy or MRI     The hospital VAC pump is not to be discharged with the patient.?Ensure to disconnect patient from machine prior to discharge. Then,    - If a home KCI VAC pump has been delivered, connect home cannister to dressing tubing then connect cannister to home pump and turn on machine    - If transferring to a nearby facility with a KCI vac, can disconnect and clamp tubing then cover end with glove so can be reconnected within 2 hours  "       Orders: Written    RECOMMEND PRIMARY TEAM ORDER: None, at this time  Education provided: plan of care  Discussed plan of care with: Patient, Nurse, and Physician  WO nurse follow-up plan:  Friday  Notify WOC if wound(s) deteriorate.  Nursing to notify the Provider(s) and re-consult the WO Nurse if new skin concern.    DATA:     Current support surface: Standard  Standard Isoflex gel  Containment of urine/stool: Continent of bladder and Continent of bowel  BMI: Body mass index is 33.99 kg/m .   Active diet order: Orders Placed This Encounter      Advance Diet as Tolerated: Regular Diet Adult     Output: I/O last 3 completed shifts:  In: 610 [P.O.:360; I.V.:250]  Out: 3525 [Urine:3300; Drains:225]     Labs:   Recent Labs   Lab 08/14/24  0721 08/11/24  0727 08/10/24  2216   ALBUMIN  --   --  3.8   HGB 8.9*   < > 11.3*   WBC 13.5*   < > 11.6*    < > = values in this interval not displayed.     Pressure injury risk assessment:   Sensory Perception: 4-->no impairment  Moisture: 3-->occasionally moist  Activity: 3-->walks occasionally  Mobility: 3-->slightly limited  Nutrition: 3-->adequate  Friction and Shear: 3-->no apparent problem  Emeka Score: 19    Tyler Porras RN CWOCN  Contact [facility] Nurse (Sophie)  Dept. Office Number: [phone]

## 2024-08-14 NOTE — CONSULTS
Care Management Initial Consult    General Information  Assessment completed with: Patient,    Type of CM/SW Visit: Initial Assessment    Primary Care Provider verified and updated as needed: Yes   Readmission within the last 30 days: no previous admission in last 30 days      Reason for Consult: discharge planning  Advance Care Planning: Advance Care Planning Reviewed: no concerns identified        Communication Assessment  Patient's communication style: spoken language (English or Bilingual)    Hearing Difficulty or Deaf: no   Wear Glasses or Blind: yes    Cognitive  Cognitive/Neuro/Behavioral: WDL  Level of Consciousness: alert  Arousal Level: opens eyes spontaneously  Orientation: oriented x 4             Living Environment:   People in home: parent(s)     Current living Arrangements: house      Able to return to prior arrangements: yes     Family/Social Support:  Care provided by: self  Provides care for: no one  Marital Status: Single  Parent(s), Sibling(s)          Description of Support System: Supportive, Involved    Support Assessment: Adequate family and caregiver support, Adequate social supports    Current Resources:   Patient receiving home care services: No     Community Resources: None  Equipment currently used at home: none  Supplies currently used at home: None    Employment/Financial:  Employment Status: employed full-time        Does the patient's insurance plan have a 3 day qualifying hospital stay waiver?  No    Lifestyle & Psychosocial Needs:  Social Determinants of Health     Food Insecurity: Low Risk  (5/13/2024)    Food Insecurity     Within the past 12 months, did you worry that your food would run out before you got money to buy more?: No     Within the past 12 months, did the food you bought just not last and you didn t have money to get more?: No   Depression: Not at risk (5/20/2024)    PHQ-2     PHQ-2 Score: 0   Housing Stability: Low Risk  (5/13/2024)    Housing Stability     Do you  have housing? : Yes     Are you worried about losing your housing?: No   Tobacco Use: Low Risk  (8/12/2024)    Patient History     Smoking Tobacco Use: Never     Smokeless Tobacco Use: Never     Passive Exposure: Not on file   Financial Resource Strain: Low Risk  (5/13/2024)    Financial Resource Strain     Within the past 12 months, have you or your family members you live with been unable to get utilities (heat, electricity) when it was really needed?: No   Alcohol Use: Not At Risk (7/11/2023)    AUDIT-C     Frequency of Alcohol Consumption: 2-4 times a month     Average Number of Drinks: 1 or 2     Frequency of Binge Drinking: Never   Transportation Needs: Low Risk  (5/13/2024)    Transportation Needs     Within the past 12 months, has lack of transportation kept you from medical appointments, getting your medicines, non-medical meetings or appointments, work, or from getting things that you need?: No   Physical Activity: Sufficiently Active (5/13/2024)    Exercise Vital Sign     Days of Exercise per Week: 4 days     Minutes of Exercise per Session: 60 min   Interpersonal Safety: Low Risk  (5/20/2024)    Interpersonal Safety     Do you feel physically and emotionally safe where you currently live?: Yes     Within the past 12 months, have you been hit, slapped, kicked or otherwise physically hurt by someone?: No     Within the past 12 months, have you been humiliated or emotionally abused in other ways by your partner or ex-partner?: No   Stress: Stress Concern Present (5/13/2024)    [employer] [place] [employer] - Occupational Stress Questionnaire     Feeling of Stress : To some extent   Social Connections: Unknown (5/13/2024)    Social Connection and Isolation Panel [NHANES]     Frequency of Communication with Friends and Family: Not on file     Frequency of Social Gatherings with Friends and Family: Twice a week     Attends Muslim Services: Not on file     Active Member of Clubs or Organizations:  Not on file     Attends Club or Organization Meetings: Not on file     Marital Status: Not on file   Health Literacy: Not on file       Functional Status:  Prior to admission patient needed assistance:   Dependent ADLs:: Independent  Dependent IADLs:: Independent  Assesssment of Functional Status: At functional baseline    Additional Information:  Care management consult for discharge planning/disposition. Patient admitted for sepsis, medial thigh abscess following plastic surgery. She underwent incision and drainage of left thigh abscess with wound vac placement. She will need a home wound vac and home care.     Met with patient at bedside. She lives with her parents. Reviewed process for wound vac and home care. She verbalizes understanding. Submitted wound vac rental order with [facility] and submitted rx and necessary documentation. Home care referral sent and patient was accepted for home care by [facility] for wound vac dressing changes. Updated patient of home care agency. Awaiting approval for wound vac.     Care management to follow for discharge. Patient anticipates discharge tomorrow.     Noy Arreaga RN  Care Coordinator  [facility]

## 2024-08-15 ENCOUNTER — APPOINTMENT (OUTPATIENT)
Dept: PHYSICAL THERAPY | Facility: CLINIC | Age: 54
DRG: 856 | End: 2024-08-15
Attending: PLASTIC SURGERY
Payer: COMMERCIAL

## 2024-08-15 ENCOUNTER — MYC MEDICAL ADVICE (OUTPATIENT)
Dept: PEDIATRICS | Facility: CLINIC | Age: 54
End: 2024-08-15
Payer: COMMERCIAL

## 2024-08-15 VITALS
OXYGEN SATURATION: 98 % | TEMPERATURE: 97.3 F | RESPIRATION RATE: 16 BRPM | BODY MASS INDEX: 33.8 KG/M2 | DIASTOLIC BLOOD PRESSURE: 72 MMHG | SYSTOLIC BLOOD PRESSURE: 109 MMHG | WEIGHT: 198 LBS | HEIGHT: 64 IN | HEART RATE: 75 BPM

## 2024-08-15 LAB
BACTERIA TISS BX CULT: ABNORMAL
ERYTHROCYTE [DISTWIDTH] IN BLOOD BY AUTOMATED COUNT: 15 % (ref 10–15)
GLUCOSE SERPL-MCNC: 85 MG/DL (ref 70–99)
HCT VFR BLD AUTO: 27.8 % (ref 35–47)
HGB BLD-MCNC: 8.8 G/DL (ref 11.7–15.7)
MCH RBC QN AUTO: 29.4 PG (ref 26.5–33)
MCHC RBC AUTO-ENTMCNC: 31.7 G/DL (ref 31.5–36.5)
MCV RBC AUTO: 93 FL (ref 78–100)
PLATELET # BLD AUTO: 369 10E3/UL (ref 150–450)
RBC # BLD AUTO: 2.99 10E6/UL (ref 3.8–5.2)
WBC # BLD AUTO: 6.9 10E3/UL (ref 4–11)

## 2024-08-15 PROCEDURE — 85041 AUTOMATED RBC COUNT: CPT | Performed by: INTERNAL MEDICINE

## 2024-08-15 PROCEDURE — 250N000011 HC RX IP 250 OP 636: Performed by: INTERNAL MEDICINE

## 2024-08-15 PROCEDURE — 99239 HOSP IP/OBS DSCHRG MGMT >30: CPT | Performed by: INTERNAL MEDICINE

## 2024-08-15 PROCEDURE — 97530 THERAPEUTIC ACTIVITIES: CPT | Mod: GP

## 2024-08-15 PROCEDURE — 99232 SBSQ HOSP IP/OBS MODERATE 35: CPT | Performed by: INTERNAL MEDICINE

## 2024-08-15 PROCEDURE — 97161 PT EVAL LOW COMPLEX 20 MIN: CPT | Mod: GP

## 2024-08-15 PROCEDURE — 250N000013 HC RX MED GY IP 250 OP 250 PS 637: Performed by: INTERNAL MEDICINE

## 2024-08-15 PROCEDURE — G0463 HOSPITAL OUTPT CLINIC VISIT: HCPCS

## 2024-08-15 PROCEDURE — 97605 NEG PRS WND THER DME<=50SQCM: CPT

## 2024-08-15 PROCEDURE — 36415 COLL VENOUS BLD VENIPUNCTURE: CPT | Performed by: STUDENT IN AN ORGANIZED HEALTH CARE EDUCATION/TRAINING PROGRAM

## 2024-08-15 PROCEDURE — 258N000003 HC RX IP 258 OP 636: Performed by: INTERNAL MEDICINE

## 2024-08-15 PROCEDURE — 250N000013 HC RX MED GY IP 250 OP 250 PS 637: Performed by: PLASTIC SURGERY

## 2024-08-15 PROCEDURE — 82947 ASSAY GLUCOSE BLOOD QUANT: CPT | Performed by: STUDENT IN AN ORGANIZED HEALTH CARE EDUCATION/TRAINING PROGRAM

## 2024-08-15 RX ADMIN — ACETAMINOPHEN 975 MG: 325 TABLET, FILM COATED ORAL at 13:48

## 2024-08-15 RX ADMIN — CEFTRIAXONE 2 G: 2 INJECTION, POWDER, FOR SOLUTION INTRAMUSCULAR; INTRAVENOUS at 15:28

## 2024-08-15 RX ADMIN — IRON SUCROSE 200 MG: 20 INJECTION, SOLUTION INTRAVENOUS at 13:31

## 2024-08-15 RX ADMIN — PANTOPRAZOLE SODIUM 40 MG: 40 TABLET, DELAYED RELEASE ORAL at 07:35

## 2024-08-15 RX ADMIN — HYDROMORPHONE HYDROCHLORIDE 2 MG: 2 TABLET ORAL at 13:48

## 2024-08-15 RX ADMIN — FAMOTIDINE 20 MG: 20 TABLET ORAL at 14:34

## 2024-08-15 RX ADMIN — ACETAMINOPHEN 975 MG: 325 TABLET, FILM COATED ORAL at 07:35

## 2024-08-15 ASSESSMENT — ACTIVITIES OF DAILY LIVING (ADL)
ADLS_ACUITY_SCORE: 33
ADLS_ACUITY_SCORE: 37
ADLS_ACUITY_SCORE: 37
ADLS_ACUITY_SCORE: 33
ADLS_ACUITY_SCORE: 37
ADLS_ACUITY_SCORE: 33
ADLS_ACUITY_SCORE: 37
ADLS_ACUITY_SCORE: 33
ADLS_ACUITY_SCORE: 37
ADLS_ACUITY_SCORE: 33

## 2024-08-15 NOTE — PROGRESS NOTES
Care Management Discharge Note    Discharge Date: 08/15/2024       Discharge Disposition: Home Care    Discharge Services: None    Discharge DME: Wound Vac    Discharge Transportation: family or friend will provide    Private pay costs discussed: Not applicable    Does the patient's insurance plan have a 3 day qualifying hospital stay waiver?  No    PAS Confirmation Code:  N/A  Patient/family educated on Medicare website which has current facility and service quality ratings: yes    Education Provided on the Discharge Plan: Yes  Persons Notified of Discharge Plans: patient  Patient/Family in Agreement with the Plan: yes    Handoff Referral Completed: Yes    Additional Information:  Patient discharging home with wound vac and home care through [facility]. Wound vac has been approved by [facility]. Obtained vac from stock.    Reviewed home wound vac supplies, teaching at bedside. Obtained signature for proof of delivery. Faxed signed proof of delivery to [facility].    Updated [facility] of discharge. They are unable to see patient until Monday. Spoke with WOC RN here and they will change wound vac dressing today and states patient is ok to leave this dressing on until Monday. Updated [facility] and they are able to pull discharge orders off of EPIC.     Noy Arreaga RN  Care Coordinator  [facility]

## 2024-08-15 NOTE — PROGRESS NOTES
[facility]  Infectious Disease Progress Note          Assessment and Plan:   Assessment & Plan  Ana Wyman is a 54 year old female who was admitted on 8/10/2024.      Impression: 1 54-year-old female recent plastic surgery with thigh lift revision, postop with infection and sepsis, operative intervention and drainage culture growing group C strep  2 major sepsis and fever but blood cultures so far negative  3 clindamycin and sulfa allergies     REC1 despite initial significant sepsis, has negative blood culture, feels better and resolved fever and white count improved,( still 13K), pure  culture group C strep, ceftriaxone with oral antibiotic should be acceptable and surgically ready and wound VAC arranged, would do Augmentin 875 twice daily for 10 days   doing very well looks like discharge plan today, she will call us if she has any antibiotic issues           Interval History:     no new complaints and doing better overall no cp, sob, n/v/d, or abd pain.  Temp down, white count 13,000, culture with group C strep  finalized pure Culture, has a wound VAC on              Medications:     Current Facility-Administered Medications   Medication Dose Route Frequency Provider Last Rate Last Admin    acetaminophen (TYLENOL) tablet 975 mg  975 mg Oral TID Liliane Mayen MD   975 mg at 08/15/24 0735    cefTRIAXone (ROCEPHIN) 2 g vial to attach to  ml bag for ADULTS or NS 50 ml bag for PEDS  2 g Intravenous Q24H Beto Roca MD   2 g at 08/14/24 1551    enoxaparin ANTICOAGULANT (LOVENOX) injection 40 mg  40 mg Subcutaneous Q24H Fitz Tavarez MD   40 mg at 08/14/24 1724    famotidine (PEPCID) tablet 20 mg  20 mg Oral BID Liliane Mayen MD   20 mg at 08/15/24 0735    iron sucrose (VENOFER) 200 mg in sodium chloride 0.9 % 120 mL intermittent infusion  200 mg Intravenous Q24H Fitz Tavarez  mL/hr at 08/14/24 1720 200 mg at 08/14/24 1720     "pantoprazole (PROTONIX) EC tablet 40 mg  40 mg Oral QAM Fitz Davis MD   40 mg at 08/15/24 0735    sodium chloride (PF) 0.9% PF flush 3 mL  3 mL Intracatheter Q8H Liliane Mayen MD   3 mL at 08/15/24 0730                  Physical Exam:   Blood pressure 93/62, pulse 71, temperature 97.4  F (36.3  C), temperature source Temporal, resp. rate 16, height 1.626 m (5' 4\"), weight 89.8 kg (198 lb), last menstrual period 10/01/2016, SpO2 96%, not currently breastfeeding.  Wt Readings from Last 2 Encounters:   08/10/24 89.8 kg (198 lb)   07/30/24 91.3 kg (201 lb 3.2 oz)     Vital Signs with Ranges  Temp:  [97.2  F (36.2  C)-97.9  F (36.6  C)] 97.4  F (36.3  C)  Pulse:  [65-82] 71  Resp:  [16-20] 16  BP: ()/(56-70) 93/62  SpO2:  [94 %-96 %] 96 %    Constitutional: Awake, alert, cooperative, no apparent distress looks well     Lungs: Clear to auscultation bilaterally, no crackles or wheezing   Cardiovascular: Regular rate and rhythm, normal S1 and S2, and no murmur noted   Abdomen: Normal bowel sounds, soft, non-distended, non-tender   Skin: No rashes, no cyanosis, no edema labs look okay   Other:           Data:   All microbiology laboratory data reviewed.  Recent Labs   Lab Test 08/15/24  0734 08/14/24  0721 08/13/24  0727   WBC 6.9 13.5* 17.8*   HGB 8.8* 8.9* 9.4*   HCT 27.8* 28.1* 29.8*   MCV 93 94 93    341 307     Recent Labs   Lab Test 08/14/24  0721 08/13/24  0727 08/12/24  0730   CR 0.55 0.60 0.65     Recent Labs   Lab Test 12/15/17  1121   SED 18     Recent Labs   Lab Test 02/25/21 2057 02/25/21 2050 12/02/19  1447 10/09/19  0919 12/03/18  1121 11/23/18  1019 04/29/17  0111 04/29/17  0103 04/26/17 2002   CULT No growth No growth Light growth  Finegoldia magna (Peptostreptococcus georges)  Susceptibility testing not routinely done  *  Since this specimen was not transported in the proper anaerobic transport media, the   isolated anaerobes may not represent the total number " present.    Light growth  Normal skin mague    Light growth  Staphylococcus lugdunensis  * No beta hemolytic Streptococcus Group A isolated No beta hemolytic Streptococcus Group A isolated No beta hemolytic Streptococcus Group A isolated No growth No growth 10,000 to 50,000 colonies/mL mixed urogenital mague

## 2024-08-15 NOTE — PROGRESS NOTES
SPIRITUAL HEALTH SERVICES - Progress Note  [facility]    Referral Source: [facility] consult; pt requested further  support from virginie Melo.    Informed pt Ana that  Lorie will be available tomorrow.  She denied having any immediate needs for today.    Plan: Triage consult for tomorrow 8/16/2024, for Chaplain Melo.    Jayesh Mccarty M.Div., [facility]  Staff     [facility]S available 24/7 for emergent requests/referrals, either by paging the on-call  or by entering an ASAP/STAT consult in [facility], which will also page the on-call .

## 2024-08-15 NOTE — DISCHARGE SUMMARY
"[facility]  Hospitalist Discharge Summary      Date of Admission:  8/10/2024  Date of Discharge:  8/15/2024  Discharging Provider: Fitz Tavarez MD  Discharge Service: Hospitalist Service    Discharge Diagnoses   See hospital course below.    Clinically Significant Risk Factors     # Obesity: Estimated body mass index is 33.99 kg/m  as calculated from the following:    Height as of this encounter: 1.626 m (5' 4\").    Weight as of this encounter: 89.8 kg (198 lb).       Follow-ups Needed After Discharge   Follow-up Appointments     Follow-up and recommended labs and tests       Follow up with primary care provider, Kelly Bedoya, within 7 days   for hospital follow- up.  The following labs/tests are recommended: CBC,   BMP in 1 week result to primary care provider.  Follow-up with plastic surgery as instructed.  Wound care as instructed.            Unresulted Labs Ordered in the Past 30 Days of this Admission       Date and Time Order Name Status Description    8/12/2024 10:04 PM Anaerobic Bacterial Culture Routine Preliminary     8/10/2024 10:25 PM Blood Culture Peripheral Blood Preliminary         These results will be followed up by hospitalist and plastic surgery.    Discharge Disposition   Discharged to home  Condition at discharge: Stable    Hospital Course   Ana Wyman is a 54 year old female with a PMH significant for mild intermittent asthma, status post bariatric surgery, who recently underwent cosmetic revision brachioplasty of bilateral thighs on 7/30. She presented to the ER with complaint of fever, chills, nausea, and tightness in her left leg found to have left thigh abscess admitted to the hospital.     Status post cosmetic revision of bilateral thighs and arms complicated by Left medial distal thigh soft tissue infection and possible abscess left thigh, status post incision and drainage.  Right medial thigh fluid collection, seroma. status post incision " and drainage  Severe sepsis sepsis secondary to above.  Hypotension  -Patient underwent the procedure on 7/30. She initially did well, however, few days later she  noticed swelling and tightness in her left leg behind the knee. She had followed up with Dr. Mckeon in the clinic and underwent aspiration of the hematoma x 2 from left medial thigh. Despite the aspiration, she continued to have tightness and pain in her left leg. She also noted popped possible seroma on her right thigh on the day of admission. She then continued to have fever, chilling sensation, now resolved.  -CT imaging reported left medial distal thigh abscess about 9.7 cm in size, appears lobulated and multiseptated.   -CT imaging of the left side also showed fluid collection in the proximal anteromedial thigh about 8 x 4 x 13 cm.  -She was also noted to have hypotension and needed IVF boluses.  -Evaluated by plastic surgeon Dr. Mayen in ED. initially reported no surgery and diet was ordered for her earlier.  She was reevaluated and underwent incision debridemen left thigh abscess, and evacuation of hematoma from the right thigh and left knee area  on 8/12.  -She has a drain to the right thigh and wound vac to the left.  -BP remained soft, orthostatic blood pressures checked was normal.  Blood pressure was monitored and remained stable  -She met criteria for sepsis on presentation based on fever, leukocytosis, tachycardia and hypotension.  -Initially she was started on Vancomycin and Zosyn, wound culture came back positive for gram-positive cocci, Identified as a strept infection. ID consulted input appreciated, changed antibiotics to Rocephin while her and recommended Augmentin up on discharge.  -Lactate normal. Procalcitonin 1.86.  Initially she was on IV fluids later discontinued. Pain controlled with Norco, Tylenol, Dilaudid.  -Monitor vitals closely,  blood pressure is low, patient chronically has low blood pressure systolic about 100.  -Left  lower extremity ruled out for DVT.  -She was on DVT prophylaxis, lovenox 40 mg subcu daily, in addition to PCD's while here, advised to increase her activity level after discharge.  -She is discharged on wound VAC, home RN will evaluate the patient and follow the wound care.    Acute on chronic iron deficiency anemia.  -Patient with history of chronic anemia. This could be partly due to surgery and hematoma evacuation as well as hemodilution.  -She has history of gastric bypass surgery and seem to be not absorbing enough nutrients and minerals. Hemoglobin was 11.3 on admission, trended down to 8.9 today.  She is iron deficient, given IV Venofer 200 mg daily for 2 days.  She will have hemoglobin monitored as an outpatient, and will discuss with her primary physician regarding iron infusion in the future as she is not able to absorb iron due to her gastric bypass surgery.    Mild intermittent asthma  -Continue to monitor. She is not hypoxic, no wheezing or increased work of breathing. Continue bronchodilators.  - Albuterol nebulizer as needed    I had long discussion with patient the plan of discharge and follow-up, all her questions and concerns addressed and agree with the follow-up plan.    Consultations This Hospital Stay   PHARMACY TO DOSE VANCO  PHARMACY TO DOSE VANCO  PLASTIC SURGERY IP CONSULT  SPIRITUAL HEALTH SERVICES IP CONSULT  WOUND OSTOMY CONTINENCE NURSE  IP CONSULT  INFECTIOUS DISEASES IP CONSULT  PHYSICAL THERAPY ADULT IP CONSULT  CARE MANAGEMENT / SOCIAL WORK IP CONSULT  SPIRITUAL HEALTH SERVICES IP CONSULT    Code Status   Full Code    Time Spent on this Encounter   I, Fitz Tavarez MD, personally saw the patient today and spent greater than 30 minutes discharging this patient.       Fitz Tavarez MD  [facility]  [address] [place] [address]  Phone: [phone]  Fax:  [phone]  ______________________________________________________________________    Physical Exam   Vital Signs: Temp: 97.4  F (36.3  C) Temp src: Temporal BP: 93/62 Pulse: 71   Resp: 16 SpO2: 96 % O2 Device: None (Room air)    Weight: 198 lbs 0 oz  General: Awake, alert, cooperative, no apparent distress.  HEENT: Pink, nonicteric, moist oral mucosa.  CHEST : Entry bilaterally, no wheezing crackles or rales  CVS: Normal rate, regular rhythm, no murmur  GI: Soft, nontender, nondistended  EXT: Right thigh medial thigh fluid-filled mass palpated now incision drained, drain in place. Left leg also has a medial incision extending from groin to the knee, left thigh is warm, nontender, erythematous, firm mass appreciated extending from behind the knee going up to the thigh, status post abscess drain, has a wound VAC.       Primary Care Physician   Kelly Bedoya    Discharge Orders      Home Care Referral      Reason for your hospital stay    Severe sepsis, left thigh abscess possible obstruction present, right thigh hematoma     Follow-up and recommended labs and tests     Follow up with primary care provider, Kelly Bedoya, within 7 days for hospital follow- up.  The following labs/tests are recommended: CBC, BMP in 1 week result to primary care provider.  Follow-up with plastic surgery as instructed.  Wound care as instructed.     Activity    Your activity upon discharge: activity as tolerated     Wound care and dressings    Instructions to care for your wound at home: as directed by wound care and plastic surgery.  Patient also has wound VAC and drain in place and will be managed by her plastic surgeon     Diet    Follow this diet upon discharge: Orders Placed This Encounter      Advance Diet as Tolerated: Regular Diet Adult       Significant Results and Procedures   Most Recent 3 CBC's:  Recent Labs   Lab Test 08/15/24  0734 08/14/24  0721 08/13/24  0727   WBC 6.9 13.5* 17.8*   HGB 8.8* 8.9* 9.4*   MCV  93 94 93    341 307     Most Recent 3 BMP's:  Recent Labs   Lab Test 08/15/24  0734 08/14/24  0721 08/13/24  0727 08/12/24  1616 08/12/24  0730 08/11/24  2336   NA  --   --  137  --  135 135   POTASSIUM  --   --  4.4  --  3.6 3.5   CHLORIDE  --   --  106  --  105 105   CO2  --   --  20*  --  19* 20*   BUN  --   --  10.1  --  7.9 9.3   CR  --  0.55 0.60  --  0.65 0.67   ANIONGAP  --   --  11  --  11 10   SINGH  --   --  8.4*  --  8.2* 8.4*   GLC 85 112* 145*   < > 101* 135*    < > = values in this interval not displayed.     Most Recent 2 LFT's:  Recent Labs   Lab Test 08/10/24  2216 02/09/24  0941   AST 28 31   ALT 18 27   ALKPHOS 99 97   BILITOTAL 0.5 0.5   ,   Results for orders placed or performed during the hospital encounter of 08/10/24   XR Chest 2 Views    Narrative    EXAM: XR CHEST 2 VIEWS  LOCATION: [facility]  DATE: 8/10/2024    INDICATION: Post op fever.  COMPARISON: 3/16/2021      Impression    IMPRESSION: Cardiomediastinal silhouette within normal limits. No focal consolidation or pleural effusion.   US Lower Extremity Venous Duplex Left    Narrative    EXAM: US LOWER EXTREMITY VENOUS DUPLEX LEFT  LOCATION: [facility]  DATE: 8/10/2024    INDICATION: Swelling. Rule out DVT, recent surgery.  COMPARISON: Ultrasound left lower extremity venous duplex 8/5/2024.  TECHNIQUE: Venous Duplex ultrasound of the left lower extremity with and without compression, augmentation and duplex. Color flow and spectral Doppler with waveform analysis performed.    FINDINGS: Exam includes the common femoral, femoral, popliteal, and contralateral common femoral veins as well as segmentally visualized deep calf veins and greater saphenous vein.     LEFT: No deep vein thrombosis. No superficial thrombophlebitis. Elongated hypoechoic fluid collection in the proximal thigh measures 13.8 x 7.5 x 5.4 cm, possibly a seroma or hematoma, demonstrated on current examination. Another  complex fluid collection   in the distal thigh medially measures 6.4 x 7.7 x 1.9 cm, better demonstrated on current examination. No popliteal cyst.      Impression    IMPRESSION:  1.  No deep venous thrombosis in the left lower extremity.    2.  Elongated hypoechoic fluid collection in the proximal thigh, possibly a seroma or hematoma, demonstrated on current examination.    3.  Another complex hematoma in the distal thigh medially, better demonstrated on current examination.   CT Knee Left w Contrast    Narrative    EXAM: CT KNEE LEFT W CONTRAST  LOCATION: [facility]  DATE: 8/10/2024    INDICATION: concern for post op infection  COMPARISON: None.  TECHNIQUE: IV contrast. Axial, sagittal and coronal thin-section reconstruction. Dose reduction techniques were used.   CONTRAST: 100mL Isovue 370    FINDINGS:     BONES:  -No evidence of acute fracture or dislocation.  -Mild tricompartmental degenerative changes of the knee.  -0.6 cm well-corticated ossification in the posterior knee, which may represent intra-articular body or sequela of remote injury.    SOFT TISSUES:  -Lobulated multiseptated fluid collection in the subcutaneous fat of the medial distal thigh, measuring 9.3 x 4.2 x 9.7 cm. Mild adjacent fat stranding.  -Varicose veins.  -Visualized musculature appears unremarkable.  -No significant knee joint effusion.      Impression    IMPRESSION:  1.  Left medial distal thigh 9.7 cm lobulated multiseptated fluid collection, suspicious for abscess.     CT Femur Thigh Right w/o Contrast    Narrative    EXAM: CT FEMUR THIGH RIGHT W/O CONTRAST  LOCATION: [facility]  DATE: 8/11/2024    INDICATION: Fluid collection in right thigh, post surgical  COMPARISON: None.  TECHNIQUE: Noncontrast. Axial, sagittal and coronal thin-section reconstruction. Dose reduction techniques were used.     FINDINGS:     BONES:  -No erosion or periostitis. No fracture location. Mild degenerative  changes of the hip and knee.    SOFT TISSUES:  -Fluid collection measuring 8.1 x 4.0 x 13.6 cm in the anteromedial proximal thigh cutaneous tissue. There is an additional small fluid collection in the medial subcutaneous tissue at the level of the femoral condyles measuring 3.0 x 1.4 x 2.7 cm. Mild   subcutaneous fat stranding in the medial thigh. Single punctate focus of gas medial distal thigh likely postsurgical.      Impression    IMPRESSION:  1.  Fluid collection in the proximal anteromedial thigh, and a small collection in the medial aspect of the knee, which could be sterile or infected.     XR Chest Port 1 View    Narrative    EXAM: XR CHEST PORT 1 VIEW  LOCATION: [facility]  DATE: 8/11/2024    INDICATION: cough, tachypnea  COMPARISON: 03/16/2021      Impression    IMPRESSION: Fibroatelectasis left midlung. Cardiomediastinal silhouette within normal limits. No focal consolidation or pleural effusion.     *Note: Due to a large number of results and/or encounters for the requested time period, some results have not been displayed. A complete set of results can be found in Results Review.       Discharge Medications   Current Discharge Medication List        START taking these medications    Details   amoxicillin-clavulanate (AUGMENTIN) 875-125 MG tablet Take 1 tablet by mouth 2 times daily  Qty: 20 tablet, Refills: 0    Associated Diagnoses: Infection of superficial incisional surgical site after procedure, initial encounter; Sepsis without acute organ dysfunction, due to unspecified organism (H); Open thigh wound, left, initial encounter           CONTINUE these medications which have NOT CHANGED    Details   biotin 5 MG CAPS Take 5,000 mcg by mouth daily @1200      Calcium Citrate-Vitamin D (CALCIUM CITRATE CHEWY BITE PO) Take 500 mg by mouth 3 times daily Bariatric Advantage calcium citrate 500 mcg/vitamin D 500 international unit(s) per chew      cycloSPORINE (RESTASIS) 0.05 %  ophthalmic emulsion Place 1 drop into both eyes daily      HYDROcodone-acetaminophen (NORCO) 5-325 MG tablet Take 1 tablet by mouth every 6 hours as needed for pain      Multiple Vitamins-Minerals (BARIATRIC MULTIVITAMINS/IRON PO) Take 1 capsule by mouth daily Bariatric Advantage Ultra Solo with iron      omeprazole (PRILOSEC) 20 MG DR capsule Take 10-20 mg by mouth daily      phentermine 15 MG capsule Take 15 mg by mouth every morning      vitamin D3 (CHOLECALCIFEROL) 50 mcg (2000 units) tablet Take 1 capsule by mouth every morning       levalbuterol (XOPENEX HFA) 45 MCG/ACT inhaler Inhale 1-2 puffs into the lungs every 6 hours as needed for shortness of breath or wheezing  Qty: 15 g, Refills: 3    Comments: Profile Rx: patient will contact pharmacy when needed  Associated Diagnoses: Mild intermittent asthma without complication      nitroGLYcerin (NITRO-BID) 2 % OINT ointment Place 0.5 inches (7.5 mg) onto the skin daily as needed (Apply 0.5in to tips of fingers once daily prior to cold exposure)  Qty: 60 g, Refills: 1    Associated Diagnoses: Raynaud's disease without gangrene      nystatin-triamcinolone (MYCOLOG II) 197866-7.1 UNIT/GM-% external cream APPLY TO AFFECTED AREA TWO TIMES A DAY UP TO 7 DAYS AS NEEDED FOR RASH  Qty: 30 g, Refills: 3    Associated Diagnoses: Erythema intertrigo      ondansetron (ZOFRAN ODT) 4 MG ODT tab Take 1 tablet (4 mg) by mouth every 8 hours as needed for nausea  Qty: 10 tablet, Refills: 0    Associated Diagnoses: Bariatric surgery status           Allergies   Allergies   Allergen Reactions    Nsaids Other (See Comments)     Had gastric bypass - needs to avoid NSAIDS    Clindamycin Diarrhea    Codeine Nausea and Vomiting    Morphine And Codeine Nausea and Vomiting and Unknown    Shellfish Allergy     Sulfamethoxazole-Trimethoprim Rash

## 2024-08-15 NOTE — TELEPHONE ENCOUNTER
Scheduled patient for only GABE next week w/Heuring 8/22. Will reply with MyChart.      Kristyn ESCOBAR, - [facility]  Primary Care- [facility]

## 2024-08-15 NOTE — PROGRESS NOTES
08/15/24 1050   Appointment Info   Signing Clinician's Name / Credentials (PT) Soo Prasad DPT   Quick Adds   Quick Adds Edema Eval   Living Environment   People in Home parent(s)   Current Living Arrangements house   Home Accessibility stairs within home   Number of Stairs, Within Home, Primary greater than 10 stairs   Stair Railings, Within Home, Primary railings safe and in good condition   Transportation Anticipated family or friend will provide   Living Environment Comments Pt cares for mother at baseline. Has multi level house.   Self-Care   Usual Activity Tolerance good   Current Activity Tolerance moderate   Regular Exercise No   Equipment Currently Used at Home none   Fall history within last six months yes   Number of times patient has fallen within last six months 1   Activity/Exercise/Self-Care Comment Pt has fallen d/t episode of passing out. Pt highly IND at baseline, hikes and walks a lot at baseline. Cares for mother at baseline. Has family and SO to assist as needed.   General Information   Onset of Illness/Injury or Date of Surgery 08/10/24   Referring Physician Liliane Mayen MD   Patient/Family Therapy Goals Statement (PT) return home   Pertinent History of Current Problem (include personal factors and/or comorbidities that impact the POC) Ana Wyman is a 54 year old female with a PMH significant for mild intermittent asthma, status post bariatric surgery, who recently underwent cosmetic revision brachioplasty of bilateral thighs on 7/30. She presented to the ER with complaint of fever, chills, nausea, and tightness in her left leg.   Existing Precautions/Restrictions fall   Cognition   Affect/Mental Status (Cognition) WNL   Follows Commands (Cognition) WNL   Pain Assessment   Patient Currently in Pain No   Integumentary/Edema   Integumentary/Edema other (describe)   Integumentary/Edema Comments BLE edema, mostly noted in B thighs, some into calves   Onset of Edema  08/01/24   Affected Body Part(s) Left LE;Right LE   Edema Etiology Surgery   Etiology Comments following surgery   General Comments/Previous Edema Treatment/Edema Equipment R thigh drain, L inner thigh wound vac   Edema Examination/Assessment   Skin Condition Other (see comments)   Skin Condition Comments R thigh drain, L thigh wound vac, erythema   Scar No   Ulcerations No   Pitting Assessment 2+ pitting in thighs   Posture    Posture Not impaired   Range of Motion (ROM)   Range of Motion ROM deficits secondary to swelling   Strength (Manual Muscle Testing)   Strength (Manual Muscle Testing) strength is WFL   Bed Mobility   Comment, (Bed Mobility) supine<>sit IND   Transfers   Comment, (Transfers) sit<>stand with FWW and mod I   Gait/Stairs (Locomotion)   Comment, (Gait/Stairs) amb with FWW and SBA   Balance   Balance Comments steady with FWW and SBA   Clinical Impression   Criteria for Skilled Therapeutic Intervention Yes, treatment indicated   PT Diagnosis (PT) edema   Edema: Patient Presentation Edema   Influenced by the following impairments LE swelling, post op status, lightheadedness   Functional limitations due to impairments difficulty with ambulation   Clinical Presentation (PT Evaluation Complexity) stable   Clinical Presentation Rationale clinical judgement   Clinical Decision Making (Complexity) low complexity   Planned Therapy Interventions (PT) home exercise program;patient/family education;progressive activity/exercise;risk factor education;home program guidelines;manual therapy techniques   Edema: Planned Interventions Manual lymph drainage;Gradient compression bandaging;Fit for compression garment;Edema exercises;Precautions to prevent infection/exacerbation;Education;Manual therapy;ADL training   Risk & Benefits of therapy have been explained evaluation/treatment results reviewed;care plan/treatment goals reviewed;risks/benefits reviewed;current/potential barriers reviewed;participants voiced  agreement with care plan;participants included;patient   PT Total Evaluation Time   PT Eval, Low Complexity Minutes (75576) 10   Physical Therapy Goals   PT Frequency One time eval and treatment only   PT Predicted Duration/Target Date for Goal Attainment 08/15/24   PT Goals Edema   PT: Edema education to increase ability to manage edema after discharge from the hospital Patient;Verbalize;Skin care routine;signs/symptoms of intolerance;wear schedule;limb positioning;garment/bandage care;discharge recommendations;Goal Met   PT: Management of edema bandages Patient;Verbalize;garment(s);Goal Met;[place]   PT: Functional edema exercise program to reduce limb volume, increase activity tolerance and improve independence with ADL Patient;Verbalize;walking program;[place];Goal Met   PT Discharge Planning   PT Plan PT: d/c   PT Discharge Recommendation (DC Rec) home with assist   PT Rationale for DC Rec Patient appears below baseline functinoal mobility. Pt cares for mother at baseline, is highly IND. Currently pt is mod I with use of walker. Pt limited by lightheadedness and LE edema. Anticipate pt safe for d.c home with assist as needed.   PT Brief overview of current status SBA with FWW   Total Session Time   Total Session Time (sum of timed and untimed services) 10

## 2024-08-15 NOTE — PLAN OF CARE
"Provided cares for pt from [id number]. Pt a/o x4. C/o bilateral thigh pain, tylenoland dilaudid given with relief. WOCchanged wound vac dressing today. Wound vac home supplies given to pt to take home. Care coordinator went over how to use the wound vac at home. Wound nurse will becoming to pt house to help with wound vac. MISTY dressing changed, leaking at site. Pt was shown how to strip drain and how to empty drain. Drain log sent home with pt. Lower L thigh incision dressing changed today, informed pt that Dr. Mayen said it was okay to leave it open to air but pt felt more comfortable with it covered so small gauze dressing was applied to incision. BM x1 today. Pt voiding. Iron given today. IV removed. Went over discharge instructions with pt and pt . Went over wound vac and MISTY care for at home. All questions answered. Discharge medication sent home with pt. All belonings sent home with pt. Pt discharged home with .    /72 (BP Location: Right arm, Patient Position: Semi-Wheat's, Cuff Size: Adult Regular)   Pulse 75   Temp 97.3  F (36.3  C) (Temporal)   Resp 16   Ht 1.626 m (5' 4\")   Wt 89.8 kg (198 lb)   LMP 10/01/2016 (Approximate)   SpO2 98%   BMI 33.99 kg/m        Plan of Care Reviewed With: patient, spouse    Overall Patient Progress: improvingOverall Patient Progress: improving    Outcome Evaluation: Pt a/o x4. C/o bilateral thigh pain, tylenoland dilaudid given with relief. WOCchanged wound vac dressing today. Wound vac home supplies given to pt to take home. Care coordinator went over how to use the wound vac at home. Wound nurse will becoming to pt house to help with wound vac. MISTY dressing changed, leaking at site. Pt was shown how to strip drain and how to empty drain. Drain log sent home with pt. Lower L thigh incision dressing changed today, informed pt that Dr. Mayen said it was okay to leave it open to air but pt felt more comfortable with it covered so small gauze dressing " "was applied to incision. BM x1 today. Pt voiding. Iron given today. IV removed. Went over discharge instructions with pt and pt . Went over wound vac and MISTY care for at home. All questions answered. Discharge medication sent home with pt. All belonings sent home with pt. Pt discharged home with .      Problem: Adult Inpatient Plan of Care  Goal: Plan of Care Review  Description: The Plan of Care Review/Shift note should be completed every shift.  The Outcome Evaluation is a brief statement about your assessment that the patient is improving, declining, or no change.  This information will be displayed automatically on your shift  note.  Outcome: Met  Flowsheets (Taken 8/15/2024 4101)  Outcome Evaluation: Pt a/o x4. C/o bilateral thigh pain, tylenoland dilaudid given with relief. WOCchanged wound vac dressing today. Wound vac home supplies given to pt to take home. Care coordinator went over how to use the wound vac at home. Wound nurse will becoming to pt house to help with wound vac. MISTY dressing changed, leaking at site. Pt was shown how to strip drain and how to empty drain. Drain log sent home with pt. Lower L thigh incision dressing changed today, informed pt that Dr. Mayen said it was okay to leave it open to air but pt felt more comfortable with it covered so small gauze dressing was applied to incision. BM x1 today. Pt voiding. Iron given today. IV removed. Went over discharge instructions with pt and pt . Went over wound vac and MISTY care for at home. All questions answered. Discharge medication sent home with pt. All belonings sent home with pt. Pt discharged home with .  Plan of Care Reviewed With:   patient   spouse  Overall Patient Progress: improving  Goal: Patient-Specific Goal (Individualized)  Description: You can add care plan individualizations to a care plan. Examples of Individualization might be:  \"Parent requests to be called daily at 9am for status\", \"I have a hard " "time hearing out of my right ear\", or \"Do not touch me to wake me up as it startles  me\".  Outcome: Met  Goal: Absence of Hospital-Acquired Illness or Injury  Outcome: Met  Intervention: Identify and Manage Fall Risk  Recent Flowsheet Documentation  Taken 8/15/2024 1409 by Mayi Marrero RN  Safety Promotion/Fall Prevention:   activity supervised   assistive device/personal items within reach   clutter free environment maintained   increased rounding and observation   nonskid shoes/slippers when out of bed   room near nurse's station   room organization consistent   room door open   safety round/check completed   supervised activity  Intervention: Prevent Skin Injury  Recent Flowsheet Documentation  Taken 8/15/2024 1409 by Mayi Marrero RN  Device Skin Pressure Protection: absorbent pad utilized/changed  Intervention: Prevent and Manage VTE (Venous Thromboembolism) Risk  Recent Flowsheet Documentation  Taken 8/15/2024 1409 by Mayi Marrero RN  VTE Prevention/Management: SCDs off (sequential compression devices)  Intervention: Prevent Infection  Recent Flowsheet Documentation  Taken 8/15/2024 1409 by Mayi Marrero RN  Infection Prevention:   equipment surfaces disinfected   hand hygiene promoted   personal protective equipment utilized   rest/sleep promoted   single patient room provided  Goal: Optimal Comfort and Wellbeing  Outcome: Met  Intervention: Monitor Pain and Promote Comfort  Recent Flowsheet Documentation  Taken 8/15/2024 1348 by Mayi Marrero RN  Pain Management Interventions:   medication (see MAR)   cold applied  Goal: Readiness for Transition of Care  Outcome: Met     Problem: Skin or Soft Tissue Infection  Goal: Absence of Infection Signs and Symptoms  Outcome: Met  Intervention: Minimize and Manage Infection Progression  Recent Flowsheet Documentation  Taken 8/15/2024 1409 by Mayi Marrero RN  Infection Prevention:   equipment surfaces disinfected   hand " hygiene promoted   personal protective equipment utilized   rest/sleep promoted   single patient room provided     Problem: Skin Injury Risk Increased  Goal: Skin Health and Integrity  Outcome: Met  Intervention: Plan: Nurse Driven Intervention: Moisture Management  Recent Flowsheet Documentation  Taken 8/15/2024 1409 by Mayi Marrero RN  Moisture Interventions: Encourage regular toileting  Intervention: Optimize Skin Protection  Recent Flowsheet Documentation  Taken 8/15/2024 1409 by Mayi Marrero RN  Activity Management: activity adjusted per tolerance     Problem: Pain Acute  Goal: Optimal Pain Control and Function  Outcome: Met  Intervention: Develop Pain Management Plan  Recent Flowsheet Documentation  Taken 8/15/2024 1348 by Mayi Marrero RN  Pain Management Interventions:   medication (see MAR)   cold applied  Intervention: Prevent or Manage Pain  Recent Flowsheet Documentation  Taken 8/15/2024 1409 by Mayi Marrero RN  Medication Review/Management: medications reviewed  Intervention: Optimize Psychosocial Wellbeing  Recent Flowsheet Documentation  Taken 8/15/2024 1409 by Mayi Marrero RN  Spiritual Activities Assistance: affirmation provided  Supportive Measures:   active listening utilized   decision-making supported   positive reinforcement provided   problem-solving facilitated   relaxation techniques promoted   self-care encouraged   self-reflection promoted   self-responsibility promoted   verbalization of feelings encouraged     Problem: Comorbidity Management  Goal: Maintenance of Asthma Control  Outcome: Met  Intervention: Maintain Asthma Symptom Control  Recent Flowsheet Documentation  Taken 8/15/2024 1409 by Mayi Marrero RN  Medication Review/Management: medications reviewed  Goal: Bariatric Home Regimen Maintained  Outcome: Met  Intervention: Maintain and Manage Postbariatric Surgery Care  Recent Flowsheet Documentation  Taken 8/15/2024 1409 by Janes  Mayi ESCOBAR RN  Medication Review/Management: medications reviewed

## 2024-08-15 NOTE — PLAN OF CARE
Goal Outcome Evaluation:      Plan of Care Reviewed With: patient    Overall Patient Progress: improvingOverall Patient Progress: improving    Outcome Evaluation: No acute events overnight. No prn medications given. Abdominal binder on left thigh. Possible DC home today with home health care?      Problem: Adult Inpatient Plan of Care  Goal: Plan of Care Review  Description: The Plan of Care Review/Shift note should be completed every shift.  The Outcome Evaluation is a brief statement about your assessment that the patient is improving, declining, or no change.  This information will be displayed automatically on your shift  note.  Outcome: Progressing  Flowsheets (Taken 8/15/2024 0448)  Outcome Evaluation: No acute events overnight. No prn medications given. Abdominal binder on left thigh. Possible DC home today with home health care?  Plan of Care Reviewed With: patient  Overall Patient Progress: improving  Goal: Absence of Hospital-Acquired Illness or Injury  Intervention: Identify and Manage Fall Risk  Recent Flowsheet Documentation  Taken 8/14/2024 2000 by Melody Crystal RN  Safety Promotion/Fall Prevention: safety round/check completed  Intervention: Prevent Infection  Recent Flowsheet Documentation  Taken 8/14/2024 1935 by Melody Crystal RN  Infection Prevention:   rest/sleep promoted   hand hygiene promoted     Problem: Skin or Soft Tissue Infection  Goal: Absence of Infection Signs and Symptoms  Intervention: Minimize and Manage Infection Progression  Recent Flowsheet Documentation  Taken 8/14/2024 1935 by Melody Crystal RN  Infection Prevention:   rest/sleep promoted   hand hygiene promoted     Problem: Skin Injury Risk Increased  Goal: Skin Health and Integrity  Intervention: Plan: Nurse Driven Intervention: Moisture Management  Recent Flowsheet Documentation  Taken 8/14/2024 1952 by Melody Crystal RN  Moisture Interventions:   No brief in bed   Incontinence pad   Barrier ointment (CriticAid,  Triad paste)  Bathing/Skin Care: moisturizer applied  Intervention: Optimize Skin Protection  Recent Flowsheet Documentation  Taken 8/14/2024 2030 by Melody Crystal RN  Activity Management: ambulated outside room     Problem: Pain Acute  Goal: Optimal Pain Control and Function  Intervention: Prevent or Manage Pain  Recent Flowsheet Documentation  Taken 8/14/2024 2000 by Melody Crystal RN  Medication Review/Management: medications reviewed     Problem: Comorbidity Management  Goal: Maintenance of Asthma Control  Intervention: Maintain Asthma Symptom Control  Recent Flowsheet Documentation  Taken 8/14/2024 2000 by Melody Crystal RN  Medication Review/Management: medications reviewed  Goal: Bariatric Home Regimen Maintained  Intervention: Maintain and Manage Postbariatric Surgery Care  Recent Flowsheet Documentation  Taken 8/14/2024 2000 by Melody Crystal RN  Medication Review/Management: medications reviewed

## 2024-08-15 NOTE — PROGRESS NOTES
[facility] Nurse Inpatient Assessment     Consulted for: Left thigh    Summary: Patient discharging today.  VAC dressing changed, new granulation noted.  Patient hooked to home pump.      Patient History (according to provider note(s):      Ana Wyman is a 54 year old female with a PMH significant for mild intermittent asthma, status post bariatric surgery, who recently underwent cosmetic revision brachioplasty of bilateral thighs on 7/30. She presented to the ER with complaint of fever, chills, nausea, and tightness in her left leg.     Assessment:      Areas visualized during today's visit: Focused:    Negative pressure wound therapy applied to: Left thigh   Last photo: 8/13/24     Wound due to: Surgical Wound   Wound history/plan of care:  Patient s/p left thigh lift revision on 7/30/24.  She developed an abscess at the site and is now s/p I&D on 8/12/24.  Surgical date: 8/12/24   Service following: plastic surgery  Date Negative Pressure Wound Therapy initiated: 8/13/24   Interventions in place: elevation  Is patient s nutritional status compromised? no   If yes, what interventions are in place? N/A  Reason for initiating vac therapy? High risk of infections and Need for accelerated granulation tissue  Which?of?the?following?co-morbidities?apply? Obesity  If diabetic is patient on a diabetic management program? N/A   Is osteomyelitis present in wound? no   If yes what treatments are in place? N/A    Wound base: 90 % pink granulation tissue, 10 % adipose tissue      Palpation of the wound bed: normal       Drainage: copious      Volume in cannister: 250     Last cannister change date: 8/15/24-home VAC canister     Description of drainage: serosanguinous      Measurements (length x width x depth, in cm) 3  x 6  x  3.5 cm       Tunneling N/A      Undermining N/A   Periwound skin: Scar tissue, maceration and slight blistering below wound      Color: pink       Temperature: normal   "  Odor: none   Pain: moderate   Pain intervention prior to dressing change: oral Dilaudid  Treatment goal: Heal , Increase granulation, and Decrease moisture  STATUS: improving   Supplies ordered: ordered VAC dressing    Number of foam pieces removed from a wound (excluding foam for bridge) :  2 Nona Black   Verified this matched the number of foam pieces applied last dressing change: Yes  Number of foam pieces packed into wound (excluding foam for bridge) :  1 GranAbiolaoam Black          Treatment Plan:     Negative pressure wound therapy plan:  Wound location: Left inner thigh   Change Days: Tues/Th by WOC RN this week, then start MWF  Supplies (including all accessories) used: medium Black foam   Cleanse with Vashe prior to replacing NPWT  Suction setting: -125   Methods used:  Bridged farther out on thigh    Staff RN to assess integrity of dressing and ensure suction is set at appropriate level every shift.   Date canister. Chart canister output every shift. Change cannister weekly and PRN if full/occluded     Remove foam dressing and replace with BID normal saline moist gauze dressing if:   -a dressing failure which cannot be repaired within 2 hours   -patient is discharging to home without a home pump   -patient is discharging to a facility outside the local area   -if a dressing is a \"Silver Foam\", remove before Radiation Therapy or MRI     The hospital VAC pump is not to be discharged with the patient.?Ensure to disconnect patient from machine prior to discharge. Then,    - If a home KCI VAC pump has been delivered, connect home cannister to dressing tubing then connect cannister to home pump and turn on machine    - If transferring to a nearby facility with a KCI vac, can disconnect and clamp tubing then cover end with glove so can be reconnected within 2 hours        Orders: Reviewed    RECOMMEND PRIMARY TEAM ORDER: None, at this time  Education provided: plan of care  Discussed plan of care with: " Patient and Nurse  WO nurse follow-up plan: Monday/WednesdayFriday (if here next week)  Notify WOC if wound(s) deteriorate.  Nursing to notify the Provider(s) and re-consult the WO Nurse if new skin concern.    DATA:     Current support surface: Standard  Standard Isoflex gel  Containment of urine/stool: Continent of bladder and Continent of bowel  BMI: Body mass index is 33.99 kg/m .   Active diet order: Orders Placed This Encounter      Advance Diet as Tolerated: Regular Diet Adult      Diet     Output: I/O last 3 completed shifts:  In: 605 [P.O.:380; I.V.:225]  Out: 1250 [Urine:1200; Drains:50]     Labs:   Recent Labs   Lab 08/15/24  0734 08/11/24  0727 08/10/24  2216   ALBUMIN  --   --  3.8   HGB 8.8*   < > 11.3*   WBC 6.9   < > 11.6*    < > = values in this interval not displayed.     Pressure injury risk assessment:   Sensory Perception: 4-->no impairment  Moisture: 4-->rarely moist  Activity: 3-->walks occasionally  Mobility: 3-->slightly limited  Nutrition: 3-->adequate  Friction and Shear: 3-->no apparent problem  Emeka Score: 20    Tyler Porras RN CWOCN  Contact Via [facility] Nurse (Sophie)  Dept. Office Number: [phone]

## 2024-08-15 NOTE — PLAN OF CARE
Physical Therapy Discharge Summary    Reason for therapy discharge:    All goals and outcomes met, no further needs identified.    Progress towards therapy goal(s). See goals on Care Plan in [facility] electronic health record for goal details.  Goals met    Therapy recommendation(s):    No further therapy is recommended.

## 2024-08-16 ENCOUNTER — PATIENT OUTREACH (OUTPATIENT)
Dept: CARE COORDINATION | Facility: CLINIC | Age: 54
End: 2024-08-16
Payer: COMMERCIAL

## 2024-08-16 LAB — BACTERIA BLD CULT: NO GROWTH

## 2024-08-16 NOTE — PROGRESS NOTES
"Clinic Care Coordination Contact  Transitions of Care Outreach  Chief Complaint   Patient presents with    Clinic Care Coordination - Post Hospital    Clinic Care Coordination - Initial     Most Recent Admission Date: 8/10/2024   Most Recent Admission Diagnosis: Infection of superficial incisional surgical site after procedure, initial encounter - T81.41XA  Sepsis without acute organ dysfunction, due to unspecified organism (H) - A41.9   Most Recent Discharge Date: 8/15/2024   Most Recent Discharge Diagnosis: Infection of superficial incisional surgical site after procedure, initial encounter - T81.41XA  Sepsis without acute organ dysfunction, due to unspecified organism (H) - A41.9  Open thigh wound, left, initial encounter - S71.102A     Transitions of Care Assessment  Discharge Assessment  How are you doing now that you are home?: \"doing okay\"  How are your symptoms? (Red Flag symptoms escalate to triage hotline per guidelines): Improved  Do you know how to contact your clinic care team if you have future questions or changes to your health status? : Yes  Does the patient have their discharge instructions? : Yes  Does the patient have questions regarding their discharge instructions? : No  Were you started on any new medications or were there changes to any of your previous medications? : Yes  Does the patient have all of their medications?: Yes  Do you have questions regarding any of your medications? : No  Do you have all of your needed medical supplies or equipment (DME)?  (i.e. oxygen tank, CPAP, cane, etc.): Yes       Post-op (Clinicians Only)  Did the patient have surgery or a procedure: Yes  Incision: healing  Drainage: Yes  Drainage Color: serosanguinous  Incision Drainage Amount: moderate (Patient states wound vac canister is about 3/4 full and was last changed yesterday around 2:30 pm. Patient states she has wound vac supplies at home & feels comfortable changing canister if needed prior to first home " care visit planned for Monday, 08/19)  Bleeding: none  Fever: No (Patient states temperature during outreach is 98.6 F, patient states she normally runs around 97 F. Last dose of Tylenol was about 4.5 hours ago.)  Chills: No  Redness:  (a little bit, believes it is due to tape sensitivity from wound vac dressing. Also notes redness at previous IV site. Patient verbalized understanding of RNCC instruction to contact care team if this worsens.)  Warmth: No  Swelling: No  Incision site pain: No  Closure: other (Wound Vac)  Eating & Drinking: eating and drinking without complaints/concerns  Bowel Function: loose stools  Date of last BM: 08/16/24  Urinary Status: voiding without complaint/concerns    RNCC reviewed signs/symptoms of infection with patient and instructed to contact care team 24/7 after hours if any are present/worsen. Patient states wound was cultured so she is reassured that she is on the correct antibiotics.      Follow up Plan   Discharge Follow-Up  Discharge follow up appointment scheduled in alignment with recommended follow up timeframe or Transitions of Risk Category? (Low = within 30 days; Moderate= within 14 days; High= within 7 days): Yes  Discharge Follow Up Appointment Date: 08/22/24  Discharge Follow Up Appointment Scheduled with?: Primary Care Provider    Future Appointments   Date Time Provider Department Center   8/22/2024  1:00 PM Yana Henson Mai, MD [facility]   10/29/2024  9:00 [facility]   11/26/2024 11:30 AM Sarah Stacy PA-C [facility]     Outpatient Plan as outlined on AVS reviewed with patient.    For any urgent concerns, please contact our 24 hour nurse triage line: [phone] ([phone])       Veronica Tubbs RN

## 2024-08-19 ENCOUNTER — MEDICAL CORRESPONDENCE (OUTPATIENT)
Dept: HEALTH INFORMATION MANAGEMENT | Facility: CLINIC | Age: 54
End: 2024-08-19

## 2024-08-20 LAB — BACTERIA TISS BX CULT: NORMAL

## 2024-08-21 ENCOUNTER — TELEPHONE (OUTPATIENT)
Dept: PEDIATRICS | Facility: CLINIC | Age: 54
End: 2024-08-21
Payer: COMMERCIAL

## 2024-08-21 NOTE — TELEPHONE ENCOUNTER
Called and spoke with Jackie with [facility]. Relayed provider approval of orders.    Loyda Lopez RN

## 2024-08-21 NOTE — TELEPHONE ENCOUNTER
Home Care is calling regarding an established patient with [facility].       Requesting orders from: Kelly Bedoya  Provider is following patient: Yes  Is this a 60-day recertification request?  No    Orders Requested    Skilled Nursing  Request for initial certification (first set of orders)   Frequency:  2x/wk for 7 wks  2 as needed visits      Confirmed ok to leave a detailed message with call back.  Contact information confirmed and updated as needed.    Smita Mcpherson RN

## 2024-08-22 ENCOUNTER — OFFICE VISIT (OUTPATIENT)
Dept: PEDIATRICS | Facility: CLINIC | Age: 54
End: 2024-08-22
Payer: COMMERCIAL

## 2024-08-22 VITALS
DIASTOLIC BLOOD PRESSURE: 64 MMHG | SYSTOLIC BLOOD PRESSURE: 97 MMHG | WEIGHT: 201.9 LBS | TEMPERATURE: 97.4 F | HEIGHT: 65 IN | RESPIRATION RATE: 18 BRPM | OXYGEN SATURATION: 99 % | HEART RATE: 90 BPM | BODY MASS INDEX: 33.64 KG/M2

## 2024-08-22 DIAGNOSIS — D64.9 ANEMIA, UNSPECIFIED TYPE: ICD-10-CM

## 2024-08-22 DIAGNOSIS — Z09 HOSPITAL DISCHARGE FOLLOW-UP: Primary | ICD-10-CM

## 2024-08-22 DIAGNOSIS — A41.9 SEPSIS, DUE TO UNSPECIFIED ORGANISM, UNSPECIFIED WHETHER ACUTE ORGAN DYSFUNCTION PRESENT (H): ICD-10-CM

## 2024-08-22 DIAGNOSIS — E86.1 HYPOTENSION DUE TO HYPOVOLEMIA: ICD-10-CM

## 2024-08-22 LAB
ANION GAP SERPL CALCULATED.3IONS-SCNC: 13 MMOL/L (ref 7–15)
BUN SERPL-MCNC: 16.5 MG/DL (ref 6–20)
CALCIUM SERPL-MCNC: 9.4 MG/DL (ref 8.8–10.4)
CHLORIDE SERPL-SCNC: 106 MMOL/L (ref 98–107)
CREAT SERPL-MCNC: 0.71 MG/DL (ref 0.51–0.95)
EGFRCR SERPLBLD CKD-EPI 2021: >90 ML/MIN/1.73M2
ERYTHROCYTE [DISTWIDTH] IN BLOOD BY AUTOMATED COUNT: 16 % (ref 10–15)
GLUCOSE SERPL-MCNC: 87 MG/DL (ref 70–99)
HCO3 SERPL-SCNC: 23 MMOL/L (ref 22–29)
HCT VFR BLD AUTO: 36.8 % (ref 35–47)
HGB BLD-MCNC: 11.5 G/DL (ref 11.7–15.7)
MCH RBC QN AUTO: 30 PG (ref 26.5–33)
MCHC RBC AUTO-ENTMCNC: 31.3 G/DL (ref 31.5–36.5)
MCV RBC AUTO: 96 FL (ref 78–100)
PLATELET # BLD AUTO: 540 10E3/UL (ref 150–450)
POTASSIUM SERPL-SCNC: 4.3 MMOL/L (ref 3.4–5.3)
RBC # BLD AUTO: 3.83 10E6/UL (ref 3.8–5.2)
SODIUM SERPL-SCNC: 142 MMOL/L (ref 135–145)
WBC # BLD AUTO: 7.4 10E3/UL (ref 4–11)

## 2024-08-22 PROCEDURE — 36415 COLL VENOUS BLD VENIPUNCTURE: CPT | Performed by: INTERNAL MEDICINE

## 2024-08-22 PROCEDURE — G2211 COMPLEX E/M VISIT ADD ON: HCPCS | Performed by: INTERNAL MEDICINE

## 2024-08-22 PROCEDURE — 80048 BASIC METABOLIC PNL TOTAL CA: CPT | Performed by: INTERNAL MEDICINE

## 2024-08-22 PROCEDURE — 85027 COMPLETE CBC AUTOMATED: CPT | Performed by: INTERNAL MEDICINE

## 2024-08-22 PROCEDURE — 99213 OFFICE O/P EST LOW 20 MIN: CPT | Performed by: INTERNAL MEDICINE

## 2024-08-22 ASSESSMENT — PAIN SCALES - GENERAL: PAINLEVEL: MODERATE PAIN (5)

## 2024-08-22 NOTE — RESULT ENCOUNTER NOTE
Dear Ana,    Your hemoglobin has made an above average recovery in 1 week, which is excellent.  I'm hoping you continue to improve and recover!  Will be in touch with remainder of labs when available.    Sincerely,    Julinan Henson MD

## 2024-08-22 NOTE — PROGRESS NOTES
Assessment & Plan     Hospital discharge follow-up      Sepsis, due to unspecified organism, unspecified whether acute organ dysfunction present (H)  Improved - completing oral antibiotics (3 days left).  Loose stools - maybe twice a day.  Able to hydrate.  No fevers or abdominal pain.  No other signs of c. Diff today.  Signs and symptoms of recurrence of infection reviewed.    Anemia, unspecified type  S/p iron infusion in hospital.  Will monitor hgb today.  - Hemoglobin; Future  - CBC with platelets; Future    Hypotension due to hypovolemia  Borderline hypotensive today and HR in the 90s. No overt fever, but feeling low on energy.  - Basic metabolic panel  (Ca, Cl, CO2, Creat, Gluc, K, Na, BUN); Future          MED REC REQUIRED  Post Medication Reconciliation Status:       Patient Instructions   Monitor for signs of recurrent infection, including redness, pain, or fever.  Monitor loose stools - if increasing frequency or urgency, contact me.  Labs today.  Recheck hemoglobin in 2-4 weeks.    Liza Perez is a 54 year old, presenting for the following health issues:  Hospital F/U (Sepsism Low iron and Hemoglobin, legs /8/10/2024 - 8/15/2024 (5 days)/[facility]) and Consult For (Taking Unisom, exercising, check on a bruise on low abdominal region and return to work remotely )        8/22/2024    12:47 PM   Additional Questions   Roomed by REJI Frances   Accompanied by n/a         8/22/2024    12:47 PM   Patient Reported Additional Medications   Patient reports taking the following new medications Amoxicillin           HPI        Hospital Follow-up Visit:    Hospital/Nursing Home/IP Rehab Facility: [facility]  Date of Admission: 8/10/24  Date of Discharge: 8/15/24  Reason(s) for Admission: sepsis, low iron and hemoglobin & legs   Was the patient in the ICU or did the patient experience delirium during hospitalization?  No  Do you have any other stressors you would  Patient notified as directed below per Dr. Lopez.  Patient verbalized understanding and states that she was at the end of her menstrual cycle when labs were done and that she does bleed very heavy during her menstrual cycle.    Patient states that she has been evaluated by her OB/GYN for this and was advised that it is normal for her after childbirth and that she did have a fibroid. Patient states OB/GYN did recommend a hysterectomy but told her it was not emergent at that time.    Patient states she will start the iron and palomo in 2 months.   "like to discuss with your provider? No    Problems taking medications regularly:  None  Medication changes since discharge: None  Problems adhering to non-medication therapy:  None    Summary of hospitalization:  [facility] hospital discharge summary reviewed  Diagnostic Tests/Treatments reviewed.  Follow up needed: none  Other Healthcare Providers Involved in Patient s Care:         None  Update since discharge: improved.       The longitudinal plan of care for the diagnosis(es)/condition(s) as documented were addressed during this visit. Due to the added complexity in care, I will continue to support Ana in the subsequent management and with ongoing continuity of care.      Plan of care communicated with patient               All other systems on a 10-point review are negative, unless otherwise noted in HPI        Objective    BP 97/64   Pulse 90   Temp 97.4  F (36.3  C) (Tympanic)   Resp 18   Ht 1.651 m (5' 5\")   Wt 91.6 kg (201 lb 14.4 oz)   LMP 10/01/2016 (Approximate)   SpO2 99%   Breastfeeding No   BMI 33.60 kg/m    Body mass index is 33.6 kg/m .  Physical Exam   GENERAL: healthy, alert and no distress  EYES: Eyes grossly normal to inspection  NECK: no asymmetry, masses, or scars  MS: no gross musculoskeletal defects noted, no edema  SKIN: no suspicious lesions or rashes  WOUNDS: no erythema, redness.  Swollen area in left distal thigh, slowly improving.  NEURO: mentation intact and speech normal  PSYCH: mentation appears normal and affect normal/bright              Signed Electronically by: Juliann Henson MD    "

## 2024-08-22 NOTE — PATIENT INSTRUCTIONS
Monitor for signs of recurrent infection, including redness, pain, or fever.  Monitor loose stools - if increasing frequency or urgency, contact me.  Labs today.  Recheck hemoglobin in 2-4 weeks.

## 2024-08-23 ENCOUNTER — MYC MEDICAL ADVICE (OUTPATIENT)
Dept: PEDIATRICS | Facility: CLINIC | Age: 54
End: 2024-08-23
Payer: COMMERCIAL

## 2024-08-26 NOTE — RESULT ENCOUNTER NOTE
Dear Ana,    The high platelets are reactive due to your bone marrow increasing your red blood cell count - same with the RDW being high - it is a reflection of your bone marrow increasing output - all expected given how quickly your hemoglobin increased.    The rest of your lab results are normal.  At this time, I do not recommend any changes to your current plan of care.    Please feel free to call with any questions.  Otherwise, we can discuss further at your next appointment.    Sincerely,    Juliann Henson MD

## 2024-08-26 NOTE — TELEPHONE ENCOUNTER
Addressed in result note. Closing encounter   Keystone Flap Text: The defect edges were debeveled with a #15 scalpel blade.  Given the location of the defect, shape of the defect a keystone flap was deemed most appropriate.  Using a sterile surgical marker, an appropriate keystone flap was drawn incorporating the defect, outlining the appropriate donor tissue and placing the expected incisions within the relaxed skin tension lines where possible. The area thus outlined was incised deep to adipose tissue with a #15 scalpel blade.  The skin margins were undermined to an appropriate distance in all directions around the primary defect and laterally outward around the flap utilizing iris scissors.

## 2024-08-28 ENCOUNTER — TELEPHONE (OUTPATIENT)
Dept: PEDIATRICS | Facility: CLINIC | Age: 54
End: 2024-08-28
Payer: COMMERCIAL

## 2024-08-31 ENCOUNTER — NURSE TRIAGE (OUTPATIENT)
Dept: NURSING | Facility: CLINIC | Age: 54
End: 2024-08-31
Payer: COMMERCIAL

## 2024-08-31 NOTE — TELEPHONE ENCOUNTER
"Nurse Triage SBAR    Is this a 2nd Level Triage? No    Situation: Patient is calling with concern of watery diarrhea the last two evenings. Patient has had a soft stool in the morning. Yogurt drink for breakfast, protein pudding for lunch. Patient was on antibiotics for sepsis X 10 days after discharge from hospital. Patient took last oral antibiotic on Monday morning. Patient states diarrhea changed to soft stool on Thursday and Friday morning. Patient has been taking 4 immodium, yogurt, kefir.       Background: Patient hospitalized 8/10/24 for sepsis, on IV antibiotics. Discharged home on oral antibiotics. Follow up OV 8/22/24. EMR reviewed. Patient has a wound vac and visiting nurse.       Assessment:   BP yesterday at nurse visit: 92/60  Sitting BP today: 100/73, pulse 102 during the call (sitting). Temp 98.1 before the call  Standing BP during the call: not reading, patient gets an error message  Patient also reports intermittent moderate stomach pain which is present when eating, lasts until patient has several bowel or diarrhea stools  Patient states that after eating she feels \"super hot,\" then nauseas, bowel movement which may be soft or diarrhea, then patient will lay down and feels cold.   Patient has urinated once in last twelve hours that she is certain of. Patient is not sure if she is urinating when having diarrhea.  Some dizziness and lightheadedness  Patient has been drinking propel, gatorade, water  Diarrhea stool is dark brown, soft stool is same color - one diarrhea stool was green yellow during the call, stomach pain resolved after the stool    Patient states she is having a \"good amount\" of diarrhea stool about 7-8 times today.    Recommendation: Per disposition, See PCP within 24 hours. Reviewed Care Advice with patient and verbalizes understanding. Declines additional questions. Advised patient to call back with any new or worsening symptoms. Patient verbalized understanding and agrees with " plan. Patient assisted with connecting with scheduling for an appointment. If no visits available, can be seen in /[facility].     Protocol Recommended Disposition: Urgent office follow-up appointment     Rach Aguilera RN on 8/31/2024 at 6:35 PM  [facility]  Reason for Disposition   [1] SEVERE diarrhea (e.g., 7 or more times / day more than normal) AND [2] present > 24 hours (1 day)    Additional Information   Negative: Shock suspected (e.g., cold/pale/clammy skin, too weak to stand, low BP, rapid pulse)   Negative: Difficult to awaken or acting confused (e.g., disoriented, slurred speech)   Negative: Sounds like a life-threatening emergency to the triager   Negative: Vomiting also present and worse than the diarrhea   Negative: [1] Blood in stool AND [2] without diarrhea   Negative: Diarrhea in a cancer patient who is currently (or recently) receiving chemotherapy or radiation therapy, or cancer patient who has metastatic or end-stage cancer and is receiving palliative care   Negative: Diarrhea begins while taking an antibiotic by mouth (oral antibiotic)   Negative: [1] SEVERE abdominal pain (e.g., excruciating) AND [2] present > 1 hour   Negative: [1] SEVERE abdominal pain AND [2] age > 60 years   Negative: [1] Blood in the stool AND [2] moderate or large amount of blood   Negative: Black or tarry bowel movements  (Exception: Chronic-unchanged black-grey BMs AND is taking iron pills or Pepto-Bismol.)   Negative: [1] Drinking very little AND [2] dehydration suspected (e.g., no urine > 12 hours, very dry mouth, very lightheaded)   Negative: Patient sounds very sick or weak to the triager   Negative: [1] SEVERE diarrhea (e.g., 7 or more times / day more than normal) AND [2] age > 60 years   Negative: [1] Constant abdominal pain AND [2] present > 2 hours   Negative: [1] Fever > 103 F (39.4 C) AND [2] not able to get the fever down using Fever Care Advice   Negative: Started suddenly after an  "allergic medicine, an allergic food, or bee sting   Negative: Shock suspected (e.g., cold/pale/clammy skin, too weak to stand, low BP, rapid pulse)   Negative: Difficult to awaken or acting confused (e.g., disoriented, slurred speech)   Negative: Fainted   Negative: [1] Systolic BP < 90 AND [2] dizzy, lightheaded, or weak   Negative: Chest pain   Negative: Bleeding (e.g., vomiting blood, rectal bleeding or tarry stools, severe vaginal bleeding)(Exception: Fainted from sight of small amount of blood; small cut or abrasion.)   Negative: Extra heartbeats, irregular heart beating, or heart is beating very fast  (i.e., \"palpitations\")   Negative: Sounds like a life-threatening emergency to the triager   Negative: [1] Systolic BP < 80 AND [2] NOT dizzy, lightheaded or weak   Negative: Abdominal pain   Negative: Fever > 100.4 F (38.0 C)   Negative: Major surgery in the past month   Negative: [1] Drinking very little AND [2] dehydration suspected (e.g., no urine > 12 hours, very dry mouth, very lightheaded)   Negative: [1] Fall in systolic BP > 20 mm Hg from normal AND [2] dizzy, lightheaded, or weak   Negative: Patient sounds very sick or weak to the triager   Negative: [1] Systolic BP < 90 AND [2] NOT dizzy, lightheaded or weak   Negative: [1] Systolic BP  AND [2] taking blood pressure medications AND [3] dizzy, lightheaded or weak   Negative: [1] Systolic BP  AND [2] taking blood pressure medications AND [3] NOT dizzy, lightheaded or weak   Negative: [1] Fall in systolic BP > 20 mm Hg from normal AND [2] NOT dizzy, lightheaded, or weak   Negative: Fall in systolic BP > 20 mmHg after standing up     Not able to assess during call   Negative: Fall in systolic BP > 20 mmHg after eating a meal     Not available during the call   Negative: Diastolic BP < 50 mm Hg   Negative: Brief (now gone) dizziness or lightheadedness after standing up or eating   Negative: Wants doctor to measure BP    Protocols used: " Diarrhea-A-AH, Blood Pressure - Low-A-AH

## 2024-09-01 ENCOUNTER — OFFICE VISIT (OUTPATIENT)
Dept: URGENT CARE | Facility: URGENT CARE | Age: 54
End: 2024-09-01
Payer: COMMERCIAL

## 2024-09-01 VITALS
SYSTOLIC BLOOD PRESSURE: 94 MMHG | WEIGHT: 197.4 LBS | BODY MASS INDEX: 32.85 KG/M2 | TEMPERATURE: 97.9 F | HEART RATE: 88 BPM | OXYGEN SATURATION: 100 % | DIASTOLIC BLOOD PRESSURE: 64 MMHG

## 2024-09-01 DIAGNOSIS — R19.7 DIARRHEA, UNSPECIFIED TYPE: Primary | ICD-10-CM

## 2024-09-01 LAB — C DIFF TOX B STL QL: NEGATIVE

## 2024-09-01 PROCEDURE — 87493 C DIFF AMPLIFIED PROBE: CPT | Performed by: FAMILY MEDICINE

## 2024-09-01 PROCEDURE — 99212 OFFICE O/P EST SF 10 MIN: CPT | Performed by: FAMILY MEDICINE

## 2024-09-01 NOTE — PROGRESS NOTES
Assessment & Plan     Diarrhea, unspecified type  Possibly c diff. Discussed tesing nad oral rehydration/eating. Close follow up if not improving worsneing.   - C. difficile Toxin B PCR with reflex to C. difficile Antigen and Toxins A/B EIA       [id number]}    No follow-ups on file.    Marck Roque MD  [facility]    ------------------------------------------------------------------------  Subjective     Ana Sandovalnomicristiana presents to clinic today for the following health issues:  chief complaint  HPI  Diarrhea up to 10 times daily nonbloody watery onset while in the hospital and continued after she completed antibiotics.     In hosp for sepsis related to surgical site infection. These symptoms are improved. Eating ok. No fever. No vomiting. Usually no abd pain. Occ bloating gassiness.     Passing urine. Occ lightheadedness.   Review of Systems        Objective    BP 94/64   Pulse 88   Temp 97.9  F (36.6  C)   Wt 89.5 kg (197 lb 6.4 oz)   LMP 10/01/2016 (Approximate)   SpO2 100%   BMI 32.85 kg/m    Physical Exam   GENERAL: alert and no distress  ABDOMEN: soft, nontender, no hepatosplenomegaly, no masses and bowel sounds normal  SKIN: no suspicious lesions or rashes

## 2024-09-05 NOTE — PROGRESS NOTES
"Ana is a 54 year old who is being evaluated via a billable video visit.      The patient has been notified of following:     \"This video visit will be conducted via a call between you and your physician/provider. We have found that certain health care needs can be provided without the need for an in-person physical exam.  This service lets us provide the care you need with a video conversation.  If a prescription is necessary we can send it directly to your pharmacy.  If lab work is needed we can place an order for that and you can then stop by our lab to have the test done at a later time.    Video visits are billed at different rates depending on your insurance coverage.  Please reach out to your insurance provider with any questions.    If during the course of the call the physician/provider feels a video visit is not appropriate, you will not be charged for this service.\"    Patient has given verbal consent for Video visit? Yes    How would you like to obtain your AVS? MyChart    If the video visit is dropped, the invitation should be resent by: My Chart    Will anyone else be joining your video visit? No    I    Video-Visit Details    Type of service:  Video Visit    Originating Location (pt. Location): Home    Distant Location (provider location):   Off-Site - Provider Home Office    Platform used for Video Visit: [employer]    Video Start Time: 8:37 AM    Video End Time:9:03 AM    9/10/2024      Return Medical Weight Management Note     Ana Wyman  MRN:  [id number]  :  1970    Assessment & Plan   Problem List Items Addressed This Visit       Bariatric surgery status     RYGBS 2019 Nikunj   Will make appointment for annual follow-up in February with dietitian and PA         Malnutrition following gastrointestinal surgery     Continue taking recommended post-op vitamins.  Labs to be drawn in February           Obesity due to excess calories - Primary     Patient was congratulated on wt loss " success thus far. Healthy habits to assist with further weight loss were discussed. Ana will continue phentermine.   We discussed medication that may assist with weight loss. Topiramate was prescribed. Risks/ benefits and possible side effects were discussed and questions were answered.  Patient does have history of kidney stone .  Discussed focusing on hydration..       She will wait to start this medication u current plastic surgery complications have resolved . Written information was given. BMP to be drawn 3 months after starting medication.  She will follow-up in February for annual bariatric visit and med check.    Goals: Focus on your recovery.  Once recovered get back to bariatric basics                 Relevant Medications    topiramate (TOPAMAX) 25 MG tablet    phentermine 15 MG capsule    Other Relevant Orders    Basic metabolic panel    Diarrhea due to drug     Secondary to antibiotics.  Discussed starting at [facility] OTC.  Patient will get through [employer] today             AOM Considerations:  Phentermine:   Currently taking 30 mg daily  Topiramate:     Hx of kidney stones, paraesthesias.  GLP-1:             On Wegovy (combo on phentermine nad Wegovy has worked best for her)       Naltrexone:      Candidate.  Was on in past with Wellbutrin, minimal weight loss  Wellbutrin:       Candidate.  Was on in past with Naltexone, minimal weight loss  Metformin:       No Pre-DM since bariatric surgery  Contrave:        No AOM coverage   Qsymia:           No AOM coverage     PATIENT INSTRUCTIONS:  Start Banatrol OTC for diarrhea    I changed your phentermine to 15 mg milligram capsules.  You can take 2 capsules daily.  If he would like to decrease to 1 capsule daily and see if this improves your insomnia feel free.  If no change you can increase again to 2 capsules daily    Start Topiramate (Remember to drink plenty of fluids daily)     There is information below regarding compounded semaglutide.  MyChart if this  is something you are interested in.    Goals:  Get back to bariatric basics as you are starting to feel better    Follow up:    Call [phone] to schedule next visit in Feb for annual visit with PA and dietitian    41 minutes spent on the date of the encounter doing chart review, history and exam, result review, counseling, developing plan of care, documentation, and further activities as noted      INTERVAL HISTORY:  Ana returns for medical weight management follow up.  Last seen on February 2024.  H/O RYGBS on 1/28/2019 [place]  Is on 30 mg of phentermine daily.  At last visit we started naltrexone.  Patient had to stop because it caused nausea.  She has always had problems with insomnia since bariatric surgery.  Last visit we talked about decreasing phentermine.  She is not sure if her phentermine may be attributing to this.  Has appt with sleep psychologist- 10/29/2024.      Had final revision on leg and under arms plastic surgery-July 30th Ended up getting septic the following week. Was in admitted for 1 week in hospital.  Has been a huge nightmare.  Just yesterday went back to plastic surgery and is still having issues with wounds.  On antibiotics is getting significant diarrhea.      Has been drinking [employer] Protein 23 grams drinks.  Only 140 calories.      Overall is taking postoperative vitamins regularly.  Recent labs show iron deficiency anemia.  Likely due to sepsis and subsequent surgery.  Did get iron infusions in hospital.  Follow-up hemoglobin improved.        WEIGHT METRICS:  Body mass index is 33.99 kg/m .   Current Weight: 198 lb (89.8 kg) (patient reported)  Last Visits Weight: 183 lb (83 kg)  Initial Weight (lbs): 339 lbs  Cumulative weight loss (lbs): 141  Weight Loss Percentage: 41.59%    Wt Readings from Last 10 Encounters:   09/09/24 198 lb (89.8 kg)   09/01/24 197 lb 6.4 oz (89.5 kg)   08/22/24 201 lb 14.4 oz (91.6 kg)   08/10/24 198 lb (89.8 kg)   07/30/24 201 lb 3.2 oz (91.3 kg)  "  07/08/24 200 lb 6.4 oz (90.9 kg)   05/20/24 197 lb 12.8 oz (89.7 kg)   02/13/24 183 lb (83 kg)   11/10/23 185 lb (83.9 kg)   10/27/23 194 lb 11.2 oz (88.3 kg)                 9/9/2024     1:31 PM   Weight Loss Medication History Reviewed With Patient   Which weight loss medications are you currently taking on a regular basis? Phentermine   If you are not taking a weight loss medication that was prescribed to you, please indicate why: I did not like the side effects   Are you having any side effects from the weight loss medication that we have prescribed you? No           9/9/2024     1:31 PM   Changes and Difficulties   I have made the following changes to my diet since my last visit: None   With regards to my diet, I am still struggling with: Snacking, thinking about food,   I have made the following changes to my activity/exercise since my last visit: I was very active until my surgery July 30, I intend to get back to that when I've healed   With regards to my activity/exercise, I am still struggling with: Not getting as much due to surgery and set backs       LABS:  Reviewed in Epic    BP Readings from Last 6 Encounters:   09/01/24 94/64   08/22/24 97/64   08/15/24 109/72   07/30/24 102/75   07/08/24 96/66   05/20/24 101/65       Pulse Readings from Last 6 Encounters:   09/01/24 88   08/22/24 90   08/15/24 75   07/30/24 63   07/08/24 89   05/20/24 85       PE:  Ht 5' 4\" (1.626 m)   Wt 198 lb (89.8 kg)   LMP 10/01/2016 (Approximate)   BMI 33.99 kg/m    GENERAL: Healthy, alert and no distress  RESP: No audible wheeze, cough, or visible cyanosis.  No visible retractions or increased work of breathing.    SKIN: Visible skin clear. No significant rash, abnormal pigmentation or lesions.  PSYCH: Mentation appears normal, affect normal/bright, judgement and insight intact          "

## 2024-09-06 DIAGNOSIS — Z53.9 DIAGNOSIS NOT YET DEFINED: Primary | ICD-10-CM

## 2024-09-06 PROCEDURE — G0180 MD CERTIFICATION HHA PATIENT: HCPCS | Performed by: PEDIATRICS

## 2024-09-09 VITALS — BODY MASS INDEX: 33.8 KG/M2 | HEIGHT: 64 IN | WEIGHT: 198 LBS

## 2024-09-09 RX ORDER — AMOXICILLIN 500 MG/1
500 CAPSULE ORAL 2 TIMES DAILY
COMMUNITY

## 2024-09-10 ENCOUNTER — VIRTUAL VISIT (OUTPATIENT)
Dept: SURGERY | Facility: CLINIC | Age: 54
End: 2024-09-10
Payer: COMMERCIAL

## 2024-09-10 DIAGNOSIS — K52.1 DIARRHEA DUE TO DRUG: ICD-10-CM

## 2024-09-10 DIAGNOSIS — K91.2 MALNUTRITION FOLLOWING GASTROINTESTINAL SURGERY: ICD-10-CM

## 2024-09-10 DIAGNOSIS — E66.811 CLASS 1 OBESITY DUE TO EXCESS CALORIES WITH SERIOUS COMORBIDITY AND BODY MASS INDEX (BMI) OF 33.0 TO 33.9 IN ADULT: Primary | ICD-10-CM

## 2024-09-10 DIAGNOSIS — E66.09 CLASS 1 OBESITY DUE TO EXCESS CALORIES WITH SERIOUS COMORBIDITY AND BODY MASS INDEX (BMI) OF 33.0 TO 33.9 IN ADULT: Primary | ICD-10-CM

## 2024-09-10 DIAGNOSIS — Z98.84 BARIATRIC SURGERY STATUS: ICD-10-CM

## 2024-09-10 PROCEDURE — 99215 OFFICE O/P EST HI 40 MIN: CPT | Mod: 95 | Performed by: PHYSICIAN ASSISTANT

## 2024-09-10 RX ORDER — PHENTERMINE HYDROCHLORIDE 15 MG/1
30 CAPSULE ORAL EVERY MORNING
Qty: 180 CAPSULE | Refills: 1 | Status: SHIPPED | OUTPATIENT
Start: 2024-09-10

## 2024-09-10 RX ORDER — TOPIRAMATE 25 MG/1
TABLET, FILM COATED ORAL
Qty: 270 TABLET | Refills: 1 | Status: SHIPPED | OUTPATIENT
Start: 2024-09-10

## 2024-09-10 NOTE — ASSESSMENT & PLAN NOTE
Patient was congratulated on wt loss success thus far. Healthy habits to assist with further weight loss were discussed. Ana will continue phentermine.   We discussed medication that may assist with weight loss. Topiramate was prescribed. Risks/ benefits and possible side effects were discussed and questions were answered.  Patient does have history of kidney stone .  Discussed focusing on hydration..       She will wait to start this medication u current plastic surgery complications have resolved . Written information was given. BMP to be drawn 3 months after starting medication.  She will follow-up in February for annual bariatric visit and med check.    Goals: Focus on your recovery.  Once recovered get back to bariatric basics

## 2024-09-10 NOTE — ASSESSMENT & PLAN NOTE
RYGBS 1/28/2019 Nikunj   Will make appointment for annual follow-up in February with dietitian and PA

## 2024-09-10 NOTE — ASSESSMENT & PLAN NOTE
Secondary to antibiotics.  Discussed starting at [facility] OTC.  Patient will get through [employer] today

## 2024-09-10 NOTE — PATIENT INSTRUCTIONS
Nice to talk with you today. Below is the plan discussed.-  DORIAN Smith      Pt Instructions:  Start Banatrol OTC for diarrhea    I changed your phentermine to 15 mg capsules.  You can take 2 capsules daily.  If you would like to decrease to 1 capsule daily and see if this improves your insomnia feel free to do this.  If no change you can increase again to 2 capsules daily    Start Topiramate (Remember to drink plenty of fluids daily)   Week 1:Take 1 tablet (25 mg) at bedtime  Week 2 Take 2 tablets (50 mg) at bedtime  Week 3:Take 3 tablets (75 mg) at bedtime Stay at 3 tabs until you are seen again.     There is information below regarding compounded semaglutide.  MyChart if this is something you are interested in.    Goals:  Get back to bariatric basics as you are starting to feel better    Follow up:    Call [phone] to schedule next visit in Feb for annual visit with PA and dietitian    TOPIRAMATE (TOPAMAX)    Topiramate (Topamax) is a medication that is used most often to treat migraine headaches or for seizures. It has also been found to help with weight loss. Although it's not currently FDA approved for weight loss, it has been used safely for a number of years to help people who are carrying extra weight.   Topiramate helps with weight loss by working on areas of the brain to quiet down signals related to eating.     Topiramate may make you:    >feel less interest in eating in between meals   >think less about food and eating   >find it easier to push the plate away   >find giving up pop easier    >have an easier time eating less    For some of our patients, the pills work right away. They feel and think quite differently about food. Other patients don't feel much of a change but find in fact they have lost weight! Like all weight loss medications, topiramate works best when you help it work.  This means:   1) Have less tempting high calorie (fattening) food around the house or office    2) Have lower  calorie food (fruits, vegetables,low fat meats and dairy) for snacks    3) Eat out only one time or less each week.   4) Eat your meals at a table with the TV or computer off.    Side-effects Topiramate is generally well tolerated. The main side-effects we see are:   Tingling in hands,feet, or face (usually not very troublesome)   Mental confusion and word finding trouble (about 10% of patients have this.)     Feeling sleepy or a bit dopey- this goes away very soon after starting.    For any questions or concerns please send a [employer] message to our team or call our weight management call center at [phone] during regular business hours. For questions during evenings or weekends your messages will be addressed during the next business day.  For emergencies please call 911 or seek immediate medical care.     Compound Semaglutide       [facility]  Phone:  [phone]    [facility] is now offering compounded semaglutide during the time of [employer] national shortage/limited supply.  [facility] is following the highest standards for sterility and compounding practices. Not all compounding practices are equal. Therefore, [facility] will not be prescribing compounded semaglutide outside of the [facility].    Semaglutide is an injectable prescription medicine used for adults with obesity (BMI ?30) or overweight (excess weight) (BMI ?27) who also have weight-related medical problems to help them lose weight and keep the weight off.  It is a GLP-1 agonist medication. GLP1 agonists stimulate the hormone GLP-1 in your body to allow you to feel full quickly and stay full longer.  It also delays the emptying of your stomach and increases your insulin production.     As with any weight loss medication(s), there is a risk of weight regain should you stop semaglutide. It is important to be aware of this risk should you stop compounded semaglutide  with no plans to transition back to an alternative injectable option as the use of semaglutide is intended for long term weight management.    Obtaining Medication and Storage:   The pharmacy must speak to the patient directly prior to shipping medication to walk through administration, shipping and cost. Vials of compounded semaglutide and unit syringes will be mailed from the compounding pharmacy to your home in a refrigerated box. Keep your medication stored in the refrigerator. The vials are to be discarded 28 days after opening (even if there is remaining medication in the vial).     Cost:   $230 per month for doses 1 mg or less (one 1 mL vial), $370 per month for doses higher than 1 mg (two 1 mL vials)     Common Side Effects:  Monitor for nausea, diarrhea, constipation, headache, indigestion, tiredness (fatigue), stomach upset or abdominal pain. It is important to ensure that you are eating consistent meals and not skipping meals. Ensure you are getting at least 64 oz water daily. If any side effects become unmanageable, contact the care team immediately.    Dosing:  Each month you will have the opportunity/option to increase your dose. However, therapy is individualized for each patient. The below is a general guide should someone increase dosage of compound semaglutide each month.   Week 1-4: Inject 0.25 mg subcutaneously once weekly  Week 5-8: If tolerating, increase to 0.5 mg once weekly  Week 9-12: If tolerating, increase to 1 mg once weekly  Week 13-15: If tolerating, increase to 1.5 or 1.7 mg once weekly (dosing depending on what was prescribed by provider)  Week 16-19: If tolerating, increase to 2 mg once weekly  Week 20 & on: If tolerating, increase to 2.4 or 2.5 mg once weekly (dosing depending on what was prescribed by provider)    *If you are having some nausea or other side effects to where you are hesitant to move up to the next dose, stay at the same dose you are on for an additional 4 weeks to  see if side effect(s) improves/resolves. Make sure to take this time to hydrate and utilizing smaller more consistent meals, such as 4-6 small meals per day.  It may be advantageous to stay at the same dose if you are seeing good efficacy (both on and off the scale) and having minimal/manageable side effects.     Mg to Unit Conversion Chart for Reference:         Administration:  Follow the instructions to fill the syringe with medicine. Instructions can be accessed at [email or web address] or by scanning this QR code-    Follow the instructions to inject your medication. Instructions can be accessed at [email or web address]  or by scanning this QR code-      Syringe Disposal:   Place the used syringe in a sharps container. You can buy a sharps container at your local pharmacy.   If you don't have a sharps container, you can use a plastic detergent container or peanut butter jar with a lid.   Visit Learning About Safe Needle Use and Disposal (healthwise.net) and safeneedledisposal.org for more information.

## 2024-09-12 ENCOUNTER — MYC MEDICAL ADVICE (OUTPATIENT)
Dept: PEDIATRICS | Facility: CLINIC | Age: 54
End: 2024-09-12
Payer: COMMERCIAL

## 2024-09-12 DIAGNOSIS — D64.9 ANEMIA, UNSPECIFIED TYPE: Primary | ICD-10-CM

## 2024-09-12 NOTE — TELEPHONE ENCOUNTER
"See patient's [employer] message   - Patient requesting to have her hemoglobin and iron rechecked as this was recommended by Dr. Henson     Upon chart review:   - 8/22/2024 Lab Result note from Dr. Henson states:     \"Your hemoglobin has made an above average recovery in 1 week, which is excellent.  I'm hoping you continue to improve and recover!  Will be in touch with remainder of labs when available.\"    \"The high platelets are reactive due to your bone marrow increasing your red blood cell count - same with the RDW being high - it is a reflection of your bone marrow increasing output - all expected given how quickly your hemoglobin increased. The rest of your lab results are normal.  At this time, I do not recommend any changes to your current plan of care.\"     - Patient Instructions from the patient's 8/22/2024 Office Visit with Dr. Henson states:     \"Recheck hemoglobin in 2-4 weeks.\"     Component      Latest Ref Rng 8/22/2024  1:54 PM   WBC      4.0 - 11.0 10e3/uL 7.4    RBC Count      3.80 - 5.20 10e6/uL 3.83    Hemoglobin      11.7 - 15.7 g/dL 11.5 (L)    Hematocrit      35.0 - 47.0 % 36.8    MCV      78 - 100 fL 96    MCH      26.5 - 33.0 pg 30.0    MCHC      31.5 - 36.5 g/dL 31.3 (L)    RDW      10.0 - 15.0 % 16.0 (H)    Platelet Count      150 - 450 10e3/uL 540 (H)      Dr. Henson, please review and order the CBC and Iron as appropriate for the patient to have her hemoglobin and Iron rechecked.     Loyda JIMENEZ RN   [facility] [place]   "

## 2024-09-16 ENCOUNTER — LAB (OUTPATIENT)
Dept: LAB | Facility: CLINIC | Age: 54
End: 2024-09-16
Payer: COMMERCIAL

## 2024-09-16 DIAGNOSIS — E66.09 CLASS 1 OBESITY DUE TO EXCESS CALORIES WITH SERIOUS COMORBIDITY AND BODY MASS INDEX (BMI) OF 33.0 TO 33.9 IN ADULT: ICD-10-CM

## 2024-09-16 DIAGNOSIS — E66.811 CLASS 1 OBESITY DUE TO EXCESS CALORIES WITH SERIOUS COMORBIDITY AND BODY MASS INDEX (BMI) OF 33.0 TO 33.9 IN ADULT: ICD-10-CM

## 2024-09-16 DIAGNOSIS — D64.9 ANEMIA, UNSPECIFIED TYPE: Primary | ICD-10-CM

## 2024-09-16 DIAGNOSIS — D64.9 ANEMIA, UNSPECIFIED TYPE: ICD-10-CM

## 2024-09-16 LAB
ANION GAP SERPL CALCULATED.3IONS-SCNC: 11 MMOL/L (ref 7–15)
BASOPHILS # BLD AUTO: 0.1 10E3/UL (ref 0–0.2)
BASOPHILS NFR BLD AUTO: 1 %
BUN SERPL-MCNC: 13.8 MG/DL (ref 6–20)
CALCIUM SERPL-MCNC: 9.1 MG/DL (ref 8.8–10.4)
CHLORIDE SERPL-SCNC: 105 MMOL/L (ref 98–107)
CREAT SERPL-MCNC: 0.74 MG/DL (ref 0.51–0.95)
EGFRCR SERPLBLD CKD-EPI 2021: >90 ML/MIN/1.73M2
EOSINOPHIL # BLD AUTO: 0.2 10E3/UL (ref 0–0.7)
EOSINOPHIL NFR BLD AUTO: 2 %
ERYTHROCYTE [DISTWIDTH] IN BLOOD BY AUTOMATED COUNT: 14 % (ref 10–15)
GLUCOSE SERPL-MCNC: 104 MG/DL (ref 70–99)
HCO3 SERPL-SCNC: 24 MMOL/L (ref 22–29)
HCT VFR BLD AUTO: 38.9 % (ref 35–47)
HGB BLD-MCNC: 12 G/DL (ref 11.7–15.7)
IMM GRANULOCYTES # BLD: 0.1 10E3/UL
IMM GRANULOCYTES NFR BLD: 1 %
IRON BINDING CAPACITY (ROCHE): 258 UG/DL (ref 240–430)
IRON SATN MFR SERPL: 23 % (ref 15–46)
IRON SERPL-MCNC: 60 UG/DL (ref 37–145)
LYMPHOCYTES # BLD AUTO: 2.2 10E3/UL (ref 0.8–5.3)
LYMPHOCYTES NFR BLD AUTO: 23 %
MCH RBC QN AUTO: 29.3 PG (ref 26.5–33)
MCHC RBC AUTO-ENTMCNC: 30.8 G/DL (ref 31.5–36.5)
MCV RBC AUTO: 95 FL (ref 78–100)
MONOCYTES # BLD AUTO: 0.5 10E3/UL (ref 0–1.3)
MONOCYTES NFR BLD AUTO: 6 %
NEUTROPHILS # BLD AUTO: 6.5 10E3/UL (ref 1.6–8.3)
NEUTROPHILS NFR BLD AUTO: 68 %
NRBC # BLD AUTO: 0 10E3/UL
NRBC BLD AUTO-RTO: 0 /100
PLATELET # BLD AUTO: 462 10E3/UL (ref 150–450)
POTASSIUM SERPL-SCNC: 4 MMOL/L (ref 3.4–5.3)
RBC # BLD AUTO: 4.1 10E6/UL (ref 3.8–5.2)
SODIUM SERPL-SCNC: 140 MMOL/L (ref 135–145)
WBC # BLD AUTO: 9.6 10E3/UL (ref 4–11)

## 2024-09-16 PROCEDURE — 80048 BASIC METABOLIC PNL TOTAL CA: CPT

## 2024-09-16 PROCEDURE — 83550 IRON BINDING TEST: CPT

## 2024-09-16 PROCEDURE — 85004 AUTOMATED DIFF WBC COUNT: CPT

## 2024-09-16 PROCEDURE — 36415 COLL VENOUS BLD VENIPUNCTURE: CPT

## 2024-09-16 NOTE — RESULT ENCOUNTER NOTE
Dear Ana,    Your hemoglobin is back to normal limits, which is good.  The abnormal MCHC and elevated platelet counts are still related to your body increasing the production of blood cells to overcome the drop in hemoglobin.  At this time, I do not recommend any changes to your current plan of care.    I recommend to continue iron supplements for a full 6 months, at least.  We should recheck your blood counts and iron stores once you have completed a full 6 months (so around 5 months from now).  I'll place the orders - please schedule a lab appointment at your convenience around February/March of next year.    Please feel free to call with any questions.  Otherwise, we can discuss further at your next appointment.    Sincerely,    Juliann Henson MD

## 2024-09-19 ENCOUNTER — TELEPHONE (OUTPATIENT)
Dept: PEDIATRICS | Facility: CLINIC | Age: 54
End: 2024-09-19
Payer: COMMERCIAL

## 2024-09-19 NOTE — TELEPHONE ENCOUNTER
Forms/Letter Request    Type of form/letter: OTHER: Missed Visit       Do we have the form/letter: Yes: Form is on the provider's desk for review      Who is the form from? Home care    Where did/will the form come from? form was faxed in    When is form/letter needed by: unkn

## 2024-09-19 NOTE — TELEPHONE ENCOUNTER
Form reviewed and signed. Ready for faxing. Placed in station B done in-basket.     Christa Ortiz PA-C  (Covering for Dr. Bedoya)

## 2024-09-21 ENCOUNTER — PATIENT OUTREACH (OUTPATIENT)
Dept: CARE COORDINATION | Facility: CLINIC | Age: 54
End: 2024-09-21
Payer: COMMERCIAL

## 2024-09-23 ENCOUNTER — TELEPHONE (OUTPATIENT)
Dept: PEDIATRICS | Facility: CLINIC | Age: 54
End: 2024-09-23
Payer: COMMERCIAL

## 2024-09-23 NOTE — TELEPHONE ENCOUNTER
Forms/Letter Request    Type of form/letter: OTHER: [id number]       Do we have the form/letter: Yes: Form is on the provider's desk for review      Who is the form from? [facility] (if other please explain)    Where did/will the form come from? form was faxed in    When is form/letter needed by: unkn

## 2024-09-27 ENCOUNTER — TELEPHONE (OUTPATIENT)
Dept: PEDIATRICS | Facility: CLINIC | Age: 54
End: 2024-09-27
Payer: COMMERCIAL

## 2024-10-01 ENCOUNTER — TELEPHONE (OUTPATIENT)
Dept: PEDIATRICS | Facility: CLINIC | Age: 54
End: 2024-10-01
Payer: COMMERCIAL

## 2024-10-01 NOTE — TELEPHONE ENCOUNTER
Home Care is calling regarding an established patient with [facility].       Requesting orders from: Kelly Bedoya  Provider is following patient: Yes  Is this a 60-day recertification request?  No    Orders Requested    Skilled Nursing  Request for discontinuation of care   Goals have been met/progressing.      Confirmed ok to leave a detailed message with call back.  Contact information confirmed and updated as needed.    Sana Carter RN

## 2024-10-02 ENCOUNTER — TELEPHONE (OUTPATIENT)
Dept: PEDIATRICS | Facility: CLINIC | Age: 54
End: 2024-10-02
Payer: COMMERCIAL

## 2024-10-02 NOTE — TELEPHONE ENCOUNTER
Forms/Letter Request    Type of form/letter: OTHER: Trackin       Do we have the form/letter: Yes: Form is on the provider's desk for review      Who is the form from? Home care    Where did/will the form come from? form was faxed in    When is form/letter needed by: unkn

## 2024-10-04 ENCOUNTER — TELEPHONE (OUTPATIENT)
Dept: PEDIATRICS | Facility: CLINIC | Age: 54
End: 2024-10-04
Payer: COMMERCIAL

## 2024-10-04 NOTE — TELEPHONE ENCOUNTER
Forms/Letter Request    Type of form/letter: OTHER: Tracking # [id number]    Do we have the form/letter: Yes: Dr. Bedoya's inbox    Who is the form from? [facility] (if other please explain)    Where did/will the form come from? form was faxed in    When is form/letter needed by: next available    How would you like the form/letter returned: Fax : [phone]    Patient Notified form requests are processed in 5-7 business days:No    Could we send this information to you in [employer] or would you prefer to receive a phone call?:   NA

## 2024-10-16 NOTE — TELEPHONE ENCOUNTER
Forms/Letter Request    Type of form/letter: Home Health Certification Tracking # [id number]    Do we have the form/letter: Yes: Dr. Bedoya's inbox    Who is the form from? [facility] (if other please explain)    Where did/will the form come from? form was faxed in    When is form/letter needed by: next available    How would you like the form/letter returned: Fax : [phone]    Patient Notified form requests are processed in 5-7 business days:No    Could we send this information to you in [employer] or would you prefer to receive a phone call?:   NA    
-Faxed  -Copy sent to abstract    Thank you kindly,  Narendra HAGER      
Forms completed and placed in out basket    
Pt. complains of right eye blurry vision, that started about 2 days ago and  right arm weakness and right leg weakness that  began at about 11pm.  pt was taken to ct scan, labs sent.  pt denies pain at this time

## 2024-10-17 DIAGNOSIS — E66.09 CLASS 1 OBESITY DUE TO EXCESS CALORIES WITH SERIOUS COMORBIDITY AND BODY MASS INDEX (BMI) OF 33.0 TO 33.9 IN ADULT: Primary | ICD-10-CM

## 2024-10-17 DIAGNOSIS — E66.811 CLASS 1 OBESITY DUE TO EXCESS CALORIES WITH SERIOUS COMORBIDITY AND BODY MASS INDEX (BMI) OF 33.0 TO 33.9 IN ADULT: Primary | ICD-10-CM

## 2024-10-28 ASSESSMENT — SLEEP AND FATIGUE QUESTIONNAIRES
HOW LIKELY ARE YOU TO NOD OFF OR FALL ASLEEP WHILE SITTING AND TALKING TO SOMEONE: WOULD NEVER DOZE
HOW LIKELY ARE YOU TO NOD OFF OR FALL ASLEEP IN A CAR, WHILE STOPPED FOR A FEW MINUTES IN TRAFFIC: WOULD NEVER DOZE
HOW LIKELY ARE YOU TO NOD OFF OR FALL ASLEEP WHILE SITTING AND READING: MODERATE CHANCE OF DOZING
HOW LIKELY ARE YOU TO NOD OFF OR FALL ASLEEP WHILE WATCHING TV: SLIGHT CHANCE OF DOZING
HOW LIKELY ARE YOU TO NOD OFF OR FALL ASLEEP WHILE SITTING INACTIVE IN A PUBLIC PLACE: WOULD NEVER DOZE
HOW LIKELY ARE YOU TO NOD OFF OR FALL ASLEEP WHILE LYING DOWN TO REST IN THE AFTERNOON WHEN CIRCUMSTANCES PERMIT: WOULD NEVER DOZE
HOW LIKELY ARE YOU TO NOD OFF OR FALL ASLEEP WHEN YOU ARE A PASSENGER IN A CAR FOR AN HOUR WITHOUT A BREAK: WOULD NEVER DOZE
HOW LIKELY ARE YOU TO NOD OFF OR FALL ASLEEP WHILE SITTING QUIETLY AFTER LUNCH WITHOUT ALCOHOL: WOULD NEVER DOZE

## 2024-10-29 ENCOUNTER — VIRTUAL VISIT (OUTPATIENT)
Dept: SLEEP MEDICINE | Facility: CLINIC | Age: 54
End: 2024-10-29
Attending: INTERNAL MEDICINE
Payer: COMMERCIAL

## 2024-10-29 ENCOUNTER — PATIENT OUTREACH (OUTPATIENT)
Dept: GASTROENTEROLOGY | Facility: CLINIC | Age: 54
End: 2024-10-29

## 2024-10-29 VITALS — BODY MASS INDEX: 32.49 KG/M2 | WEIGHT: 195 LBS | HEIGHT: 65 IN

## 2024-10-29 DIAGNOSIS — G47.33 OSA ON CPAP: ICD-10-CM

## 2024-10-29 DIAGNOSIS — G47.00 INSOMNIA, UNSPECIFIED TYPE: ICD-10-CM

## 2024-10-29 DIAGNOSIS — Z12.11 SPECIAL SCREENING FOR MALIGNANT NEOPLASMS, COLON: Primary | ICD-10-CM

## 2024-10-29 DIAGNOSIS — F51.04 CHRONIC INSOMNIA: Primary | ICD-10-CM

## 2024-10-29 PROCEDURE — 90791 PSYCH DIAGNOSTIC EVALUATION: CPT | Mod: 95 | Performed by: PSYCHOLOGIST

## 2024-10-29 NOTE — PROGRESS NOTES
"[facility] Screening Colonoscopy Referral Review    Patient meets the inclusion criteria for screening colonoscopy standing order.    Ordering/Referring Provider:  Kelly Bedoya      BMI: Estimated body mass index is 32.45 kg/m  as calculated from the following:    Height as of an earlier encounter on 10/29/24: 1.651 m (5' 5\").    Weight as of an earlier encounter on 10/29/24: 88.5 kg (195 lb).     Sedation:  Does patient have any of the following conditions affecting sedation?  No medical conditions affecting sedation.    Previous Scopes:  Any previous recommendations or follow up needs based on previous scope?  na / No recommendations.    Medical Concerns to Postpone Order:  Does patient have any of the following medical concerns that should postpone/delay colonoscopy referral?  No medical conditions affecting colonoscopy referral.    Final Referral Details:  Based on patient's medical history patient is appropriate for referral order with moderate sedation. If patient's BMI > 50 do not schedule in [facility].  "

## 2024-10-29 NOTE — PATIENT INSTRUCTIONS
[facility] Brief CBT-I   Learning Module: Relaxation and Sleep     Relaxation and Sleep    A relaxed mind and body sets the stage for quality sleep.  Bright light, over-stimulation, stress, tension and conflict increase mental and physical arousal.  This in turn can delay and fragment your sleep.    Research shows relaxing before bed can reduce the time it takes to fall asleep and improve sleep quality.  Relaxation training works by reducing physical, emotional and mental arousal that can interfere with your sleep.     Winding Down Time    A wind-down time before you go to bed can help you relax your mind and body. It helps transition from a busy day to bedtime. Examples include:    Listening to calm music  Reading  Watching a pleasant or relaxing TV show in a dimly lit room.  Taking a bath  Setting aside your laptop, tablet and mobile phone    We recommend you set a reminder to begin your wind down time at least one hour before bedtime.  It is best to relax and wind down somewhere other than you bed.  This strengthens the link between you bed and sleep.    Relaxation Techniques    Relaxation skills are easy to learn using widely available free mobile apps or online resources.  We recommend doing some form of relaxation exercise daily for at least 10 minutes.  This can include short breathing exercises used throughout the day to help manage stress. Examples of widely used mobile apps include:    Insomnia :  In the Tools section open the Relax Your        Body or Quiet Your Mind sections for guided relaxation       exercises.    Calm:  Includes free, for-purchase and subscription relaxation       exercises.    Insight Timer:  Includes free, for-purchase and subscription       relaxation exercises.    NOTE:  Do not use guided relaxation applications while driving or operating equipment.      The Constructive Worry Technique     The Constructive Worry Technique is designed to help manage worry and an  overactive mind before bed and during the night.    All human beings worry.  Uncontrolled worry can affect your sleep and health by activating your arousal system.  Worry makes your brain, body and heart behave like there is a danger or threat.  This stress response can make it more difficult to fall asleep and stay asleep. The most common types of worry include:    Concerned about unfinished tasks  Concern over family, finances or work  Worry about things beyond your control    Scheduling Constructive Worry Time    The part of our brain that plans, sorts, and helps manage our emotions is less effective in doing its job as nighttime comes.  Without the usual distractions of the day, we tend to worry more and have trouble putting aside our worries.    A simple technique called Constructive Worry Time can help to reduce and manage your over-active mind or worry as you approach bedtime or in the middle of the night. It is most effective when practiced daily.

## 2024-10-29 NOTE — NURSING NOTE
Current patient location: [id number] [place] [id number]    Is the patient currently in the state of [place]? YES    Visit mode:VIDEO    If the visit is dropped, the patient can be reconnected by: VIDEO VISIT: Text to cell phone:   Telephone Information:   Mobile [phone]       Will anyone else be joining the visit? NO  (If patient encounters technical issues they should call [phone]    Are changes needed to the allergy or medication list? Yes made changes to meds    Are refills needed on medications prescribed by this physician? NO    Rooming Documentation:  Questionnaire(s) completed    Reason for visit: Consult    Celena GUSTAFSON

## 2024-10-29 NOTE — PROGRESS NOTES
Virtual Visit Details    Type of service:  Video Visit     Originating Location (pt. Location): Home    Distant Location (provider location):  Off-site  Platform used for Video Visit: AmWell  Visit Start Time: 9:07 a.m.  Visit End Time: 9:50 AM      SLEEP MEDICINE CONSULTATION  Behavioral Sleep Medicine  Oct 29, 2024    Name: Ana Wyman MRN# [id number]   Age: 54 year old YOB: 1970     Date of Consultation: Oct 29, 2024  Consultation is requested by: Yana Henson MD  [address] [facility] [place] [address]  Primary care provider: Kelly Bedoya    Reason for Sleep Consultation     Ana Wyman is a 54 year old female seen today for a behavioral sleep medicine consultation because of insomnia    Assessment and Plan     Sleep Diagnoses:    Chronic insomnia  MIGUELITO, resolbed with weight loss    Co-occurring Conditions:    Esophageal Reflux  Obesity  Status post Gastric Bypass Surgery    Clinical Impressions:    Ana Wyman was seen for a sleep psychology consultation and possible behavioral sleep intervention and treatment. History and clinical presentation is consistent with chronic psychophysiologic insomnia associated with prior history of obstructive sleep apnea.  She underwent bariatric surgery with significant weight loss which resulted in resolution of sleep disordered breathing.  She no longer uses CPAP therapy.  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    Recommendations and Counselin.  Recommend continuation of her generally adaptive sleep habits and schedule that need to comport with changes in her work schedule and demands.    2.  The major behavioral recommendation is for patient to consider implementing a 30-minute time to wind down before she  transitions from work her family responsibilities to going to bed.    3.  Consider using the constructive worry strategy outlined in her after visit information.    Ana was provided information about the pathophysiology of insomnia, psychophysiological factors contributing to the onset and maintenance of insomnia and how co-occurring medical conditions and intrinsic sleep disorders can affect sleep.  Treatment options were discussed  as applicable to patient specific sleep concerns and symptoms. The benefits and potential early side effects of treatment including increased daytime sleepiness were discussed.     Patient was advised to consult with their prescribing provider around use of or changes to prescription sleep medication.  Patient was counseled on the importance of avoiding driving if drowsy.    Services provided are compliant with the requirements of [place] Statute SS 256B.0625 Subd. 3b and paragraph (b)     Follow-up: 4 weeks     History of Present Sleep Complaint     Ana Wyman is a 54 year old year old female who presents with  onset of insomnia about 9 years ago with mother moving into her home and her new role as a caregiver.  In the last two years a baby monitor was added to monitor her mother's health.    She has a boyfriend who does not live with her.  Works on weekends with significant variation in evening work schedules, working up to 10 PM and often not having the ability to wind down before she goes to bed..        Additionally she took a job at the [place] [employer] has to work late into the evening for  concerts and then return back to work on the  next morning including morning chidren's conference.       Activities in bed: (Patient-Rptd) Read  Prescribed or OTC stimulants: (Patient-Rptd) Phentramine? Semaglutide?  Prescribed or OTC sleep medication: (Patient-Rptd) Unisom - twice  Previous sleep medications patients has tried: (Patient-Rptd) None    SLEEP-WAKE  SCHEDULE:    Work/School Days: Patient goes to school/work: (Patient-Rptd) Yes     Time to Bed:  (Patient-Rptd) 10:30  Sleep Latency: (Patient-Rptd) Varies 1.5 - 2 hours or longer to fall asleep, she thinks through everyt g and the day.  Difficulty falling asleep:  (Patient-Rptd) Most nights nights per week  Number of awakenings: (Patient-Rptd) 1 - 3 times,  but sometimes can't get back to sleep times per night due to (Patient-Rptd) Uncertain  Trouble falling back asleep (Patient-Rptd) A little less lately (last 2 -3 weeks), otherwise about half the time  times a week   Usually takes (Patient-Rptd) Sometimes 15 - 30 mins sometimes hours to get back to sleep  Rise time: (Patient-Rptd) 7 - 7:30  Uses alarm? (Patient-Rptd) Yes    Weekends/Non-work Days/All Other Days    Usually gets into bed at (Patient-Rptd) 11   Sleep latency:  (Patient-Rptd) 30 mins - hours   Rise time: (Patient-Rptd) 7am - 8:30 am   Uses alarm? (Patient-Rptd) Yes    Patient sleep estimates:    Average total sleep time:  (Patient-Rptd) 4 -5 hours/ night   Patient estimate of sleep need: (Patient-Rptd) 7-8 hours/ night  Preferred sleep position(s): (Patient-Rptd) Back, Side, Head Elevated     Naps    Intentional naps: (Patient-Rptd) 0 times a week  Duration:  (Patient-Rptd) 0 in duration  Feels better after a nap: (Patient-Rptd) No  Unintentional Dozing:    days per week  Driving accident or near-miss due to sleepiness/drowsiness? (Patient-Rptd) No    SLEEP DISRUPTIONS:    Breathing/Snoring    Patient snores:(Patient-Rptd) No  Other people complain about Her snoring: (Patient-Rptd) No  Patient has been told She stops breathing in Her sleep: (Patient-Rptd) No  She has issues with any of the following:      Movement:    Patient gets pain, discomfort, with an urge to move: (Patient-Rptd) No  Symptoms occur when She is resting: (Patient-Rptd) No  Symptoms occur more at night:(Patient-Rptd) No  Patient has been told She kicks Her legs at  night:(Patient-Rptd) No     Behaviors in Sleep:    Ana Wyman has experienced the following behaviors while sleeping: (Patient-Rptd) Eating, Teeth grinding    She has experienced sudden muscle weakness during the day: (Patient-Rptd) No    Caffeine, Alcohol and Other Substances:    Number of caffeinated beverages(per day: (Patient-Rptd) 0  Last caffeine use is usually:    Uses alcohol to promote sleep: (Patient-Rptd) No  Drinks alcohol near bedtime: (Patient-Rptd) No      FAMILY HISTORY OF SLEEP DISORDERS    Patient has a family member been diagnosed with a sleep disorder: (Patient-Rptd) Yes  (Patient-Rptd) Sleep Apnea           SCALES     EPWORTH SLEEPINESS SCALE      Sitting and reading (Patient-Rptd) 2   Watching TV (Patient-Rptd) 1   Sitting, inactive in a public place (theatre or mtg.) (Patient-Rptd) 0    As a passenger in a car (Patient-Rptd) 0   Lying down to rest in the afternoon when circumstance permit (Patient-Rptd) 0   Sitting and talking to someone (Patient-Rptd) 0   Sitting quietly after lunch without alcohol (Patient-Rptd) 0   In a car, while stopped for a few minutes in traffic (Patient-Rptd) 0   TOTAL SCORE (nl <11) (Patient-Rptd) 3     INSOMNIA SEVERITY INDEX       Difficulty falling asleep (Patient-Rptd) 3   Difficult staying asleep (Patient-Rptd) 3   Problems waking up to early (Patient-Rptd) 2   How SATISFIED/DISSATISFIED are you with your CURRENT sleep pattern? (Patient-Rptd) 3   How NOTICEABLE to others do you think your sleep pattern is in terms of your quality of life? (Patient-Rptd) 2   How WORRIED/DISTRESSED are you about your current sleep pattern? (Patient-Rptd) 2   To what extent do you consider your sleep problem to INTERFERE with your daily fuctioning(e.g. daytime fatigue, mood, ability to function at work/daily chores, concentration, mood,etc.) CURRENTLY? (Patient-Rptd) 2   INSOMNIA SEVERITY INDEX TOTAL SCORE (Patient-Rptd) 17    Absence of insomnia (0-7); sub-threshold  insomnia (8-14); moderate insomnia (15-21); and severe insomnia (22-28)    STOP BANG     1. Snoring: Do you snore loudly (louder than talking or loud enough to be heard through closed doors)?  (Patient-Rptd) No    2. Tired: Do you often feel tired, fatigued, or sleepy during daytime?  (Patient-Rptd) Yes    3. Observed: Has anyone observed you stop breathing during your sleep?  (Patient-Rptd) No    4. Blood pressure: Do you have or are you being treated for high blood pressure?  (Patient-Rptd) No    5. BMI: BMI more than 35 kg/m2?  (Patient-Rptd) No    6. Age: Age over 50 yr old?  (Patient-Rptd) Yes    7. Neck circumference: Neck circumference greater than 40 cm?  (Patient-Rptd) No    8. Gender: Gender male?  (Patient-Rptd) No    STOP-BANG Total Score: (Patient-Rptd) 2    MIGUELITO Risk  0-2 Low risk for MIGUELITO  3-4  Intermediate risk for MIGUELITO  5-8 High risk      PATIENT HEALTH QUESTIONNAIRE-9 (PHQ - 9)    Over the last 2 weeks, how often have you been bothered by any of the following problems?    1. Little interest or pleasure in doing things -      2. Feeling down, depressed, or hopeless -      3. Trouble falling or staying asleep, or sleeping too much -     4. Feeling tired or having little energy -      5. Poor appetite or overeating -      6. Feeling bad about yourself - or that you are a failure or have let yourself or your family down -      7. Trouble concentrating on things, such as reading the newspaper or watching television -     8. Moving or speaking so slowly that other people could have noticed? Or the opposite - being so fidgety or restless that you have been moving around a lot more than usual     9. Thoughts that you would be better off dead or of hurting  yourself in some way     Total Score:       If you checked off any problems, how difficult have these problems made it for you to do your work, take care of things at home, or get along with other people?      Developed by Massimo Mejia, Brie FUENTES  James Rondon and colleagues, with an educational james from [employer] No permission required to reproduce, translate, display or distribute. permission required to reproduce, translate, display or distribute.      MACIEL-7        7/22/2016    11:05 AM 9/26/2018     1:20 PM 10/16/2018     4:14 PM   MACIEL-7 SCORE   Total Score 1 1 1       CAGE-AID    CAGE- AID Score -        No data to display                         Previous Sleep Consultations/Studies     She was initially diagnosed at [facility] on 4/29/2005. Her AHI was 122.9/hr, .3/hr, O2 maty 77%. CPAP 10 cm was effective. Her wt was 300#.      She had a CPAP titration sleep study at [facility] on 8/21/2009. CPAP was titrated to 9 cm, but her RDI was still 8.7/hr (AHI was <1). Her weight was 317#.     A home study was performed to re-evaluate her apnea baseline after a weight loss of about 80 pounds.  HST 12/13/21 Results:  Weight: 221 pounds  AHI 0.9/hr, O2 maty 88%. There were periods where her O2 baseline was sitting right at 89-90%, but she did not show hypoxemia on this study.     We opted to perform an in lab study to verify that her apnea and hypoxemia were truly resolved with weight loss.     PSG Results 2/6/22:  Weight: 227 pounds  Sleep Architecture: Sleep efficiency was normal. Reduced initial sleep latency and reduced REM latency, suggest possible sleep deprivation.  All sleep stages were observed.  The total recording time of the polysomnogram was 500.2 minutes. The total sleep time was 458.5 minutes. Sleep latency was decreased at 6.8 minutes without the use of a sleep aid. REM latency was 73.5 minutes. Arousal index was normal at 17.5 arousals per hour. Sleep efficiency was normal at 91.7%. Wake after sleep onset was 34.0 minutes. The patient spent 5.3% of total sleep time in Stage N1, 57.0% in Stage N2, 18.8% in Stage N3, and 18.9% in REM. Time in REM supine was 57.0 minutes.     Respiration: Mild intermittent snoring was reported,  but there was no evidence of clinically significant obstructive sleep apnea.    Events ? The polysomnogram revealed a presence of - obstructive, - central, and - mixed apneas resulting in an apnea index of - events per hour. There were 11 obstructive hypopneas and - central hypopneas resulting in an obstructive hypopnea index of 1.4 and central hypopnea index of - events per hour. The combined apnea/hypopnea index was 1.4 events per hour (central apnea/hypopnea index was - events per hour). The REM AHI was 4.9 events per hour. The supine AHI was 3.0 events per hour. The RERA index was 4.3 events per hour.  The RDI was 5.8 events per hour.  Snoring - was reported as mild and intermittent.  Respiratory rate and pattern - was notable for normal respiratory rate and pattern.  Sustained Sleep Associated Hypoventilation - Transcutaneous carbon dioxide monitoring was not used, however significant hypoventilation was not suggested by oximetry.  Sleep Associated Hypoxemia - (Greater than 5 minutes O2 sat at or below 88%) was not present. Baseline oxygen saturation was 93.5%. Lowest oxygen saturation was 88.8%. Time spent less than or equal to 88% was 0 minutes. Time spent less than or equal to 89% was 0.1 minutes.     Movement Activity: There were no significant limb movements during the study.  Periodic Limb Activity - There were 59 PLMs during the entire study. The PLM index was 7.7 movements per hour. The PLM Arousal Index was 3.5 per hour.  REM EMG Activity - Excessive transient/sustained muscle activity was not present.  Nocturnal Behavior - Abnormal sleep related behaviors were not noted during/arising out of NREM / REM sleep.  Bruxism - None apparent.     Cardiac Summary: Normal sinus rhythm was noted  The average pulse rate was 70.0 bpm. The minimum pulse rate was 53.9 bpm while the maximum pulse rate was 99.1 bpm.  Artifact noted during first part of study as one of the EKG leads was unplugged. Arrhythmias were not  noted.     Last seen by Benet Goltz PAC in 2022:  She had been waking around 3 AM and having some difficulty getting back to sleep. Now, she wakes at 5 AM and is not awake as long. That happened last week. She is more likely to wake early on days she has to get up earlier.   Bedtime: 10:30-11 PM. Wake time: 6:30-7 AM. No naps.  She is a little more tired with getting back to sleep around 5 AM. That happens at least 3 times per week. She feels her stress is not bad. She is not sure why she wakes. When she wakes, she tries using the restroom, getting a drink and if she can't fall back to sleep, she will get up. She gets a walk a few times per week. She is working at [email or web address] on weekends, so works late. She may get home 10-11 PM and go to sleep 12-1 AM. She still gets up about 8 AM on weekends.  On weekdays, she has to get up earlier.        Vitals     LMP 10/01/2016 (Approximate)      Medical History     Allergies:    Allergies   Allergen Reactions    Nsaids Other (See Comments)     Had gastric bypass - needs to avoid NSAIDS    Clindamycin Diarrhea    Codeine Nausea and Vomiting    Morphine And Codeine Nausea and Vomiting and Unknown    Shellfish Allergy     Sulfamethoxazole-Trimethoprim Rash       Medications:    Current Outpatient Medications   Medication Sig Dispense Refill    amoxicillin (AMOXIL) 500 MG capsule Take 500 mg by mouth 2 times daily.      biotin 5 MG CAPS Take 5,000 mcg by mouth daily @1200      Calcium Citrate-Vitamin D (CALCIUM CITRATE CHEWY BITE PO) Take 500 mg by mouth 3 times daily Bariatric Advantage calcium citrate 500 mcg/vitamin D 500 international unit(s) per chew      COMPOUNDED NON-CONTROLLED SUBSTANCE (CMPD RX) - PHARMACY TO MIX COMPOUNDED MEDICATION Semaglutide, inject 0.25 mg subcutaneously once weekly 4 each 1    cycloSPORINE (RESTASIS) 0.05 % ophthalmic emulsion Place 1 drop into both eyes daily      levalbuterol (XOPENEX HFA) 45 MCG/ACT inhaler Inhale 1-2 puffs into the lungs  every 6 hours as needed for shortness of breath or wheezing (Patient not taking: Reported on 9/9/2024) 15 g 3    Multiple Vitamins-Minerals (BARIATRIC MULTIVITAMINS/IRON PO) Take 1 capsule by mouth daily Bariatric Advantage Ultra Solo with iron      nitroGLYcerin (NITRO-BID) 2 % OINT ointment Place 0.5 inches (7.5 mg) onto the skin daily as needed (Apply 0.5in to tips of fingers once daily prior to cold exposure) (Patient not taking: Reported on 9/1/2024) 60 g 1    nystatin-triamcinolone (MYCOLOG II) 310728-4.1 UNIT/GM-% external cream APPLY TO AFFECTED AREA TWO TIMES A DAY UP TO 7 DAYS AS NEEDED FOR RASH 30 g 3    omeprazole (PRILOSEC) 20 MG DR capsule Take 10-20 mg by mouth daily      ondansetron (ZOFRAN ODT) 4 MG ODT tab Take 1 tablet (4 mg) by mouth every 8 hours as needed for nausea (Patient not taking: Reported on 9/9/2024) 10 tablet 0    phentermine 15 MG capsule Take 2 capsules (30 mg) by mouth every morning. 180 capsule 1    topiramate (TOPAMAX) 25 MG tablet Take 25 mg daily week 1, increase to 50 mg daily week 2, then increase to 75 mg daily week 3 and beyond 270 tablet 1    vitamin D3 (CHOLECALCIFEROL) 50 mcg (2000 units) tablet Take 1 capsule by mouth every morning          Problem List:  Patient Active Problem List    Diagnosis Date Noted    Diarrhea due to drug 09/10/2024     Priority: Medium    Infection of superficial incisional surgical site after procedure, initial encounter 08/11/2024     Priority: Medium    Sepsis without acute organ dysfunction, due to unspecified organism (H) 08/11/2024     Priority: Medium    Class 2 severe obesity due to excess calories with serious comorbidity and body mass index (BMI) of 35.0 to 35.9 in adult (H) 05/20/2024     Priority: Medium    Iron deficiency anemia following bariatric surgery 08/01/2023     Priority: Medium    Symptomatic abdominal panniculus 02/25/2021     Priority: Medium    Family history of malignant neoplasm of breast 10/05/2020     Priority:  Medium    Insulin resistance 09/28/2020     Priority: Medium    Postural hypotension 07/29/2019     Priority: Medium    Intertrigo 07/29/2019     Priority: Medium    Obesity due to excess calories 07/29/2019     Priority: Medium    S/P laparoscopic cholecystectomy 04/18/2019     Priority: Medium    Bariatric surgery status 02/05/2019     Priority: Medium     RYGBS 1/28/2019 Nikunj      Malnutrition following gastrointestinal surgery 02/05/2019     Priority: Medium    Fatty liver 07/27/2018     Priority: Medium    Mild intermittent asthma without complication 06/11/2018     Priority: Medium    Lymphedema bilateral with mixed lipedema. 09/30/2015     Priority: Medium    Baker's cyst of knee 09/03/2015     Priority: Medium    Acanthosis nigricans 03/24/2015     Priority: Medium    Cutaneous skin tags 03/24/2015     Priority: Medium    Uterine fibroid 08/02/2012     Priority: Medium    Lichen sclerosus 07/14/2011     Priority: Medium    Family history of malignant neoplasm of genital organ, other 06/11/2007     Priority: Medium    FAMILY HX GI MALIGNANCY 05/31/2007     Priority: Medium    Esophageal reflux 09/27/2004     Priority: Medium    Allergic state 09/27/2004     Priority: Medium     Problem list name updated by automated process. Provider to review          Past Medical/Surgical History:  Past Medical History:   Diagnosis Date    Allergic rhinitis, cause unspecified     Cellulitis 2005    2 episodes, one with hospitalization [facility]    Complication of anesthesia     MOTHER HAS HISTORY    DUB (dysfunctional uterine bleeding)     Neg EMB 2012    Fibroids     Genital problems     Lichens Schlerosis    GERD (gastroesophageal reflux disease)     Hallux valgus (acquired)     History of steroid therapy     Inhalers, eye drops    Hypersomnia with sleep apnea, unspecified     using CPAP    Kidney stone 05/2018    2 stones    Lichen sclerosus 07/14/2011    Lymph edema 2010- present    Lymphedema bilateral with mixed  lipedema. 09/30/2015    Obesity     Pneumonia     Years ago - a few times    PONV (postoperative nausea and vomiting)     Pre-diabetes     Tendonitis currently    Uncomplicated asthma     ((Pt Qnr Sub: ulcer)) mild    Urinary tract infection     A few times in past 10 years    Vision disorder [id number]    Dry Eye    Vitamin D deficiency 03/14/2015     Patient Active Problem List    Diagnosis Date Noted    Diarrhea due to drug 09/10/2024     Priority: Medium    Infection of superficial incisional surgical site after procedure, initial encounter 08/11/2024     Priority: Medium    Sepsis without acute organ dysfunction, due to unspecified organism (H) 08/11/2024     Priority: Medium    Class 2 severe obesity due to excess calories with serious comorbidity and body mass index (BMI) of 35.0 to 35.9 in adult (H) 05/20/2024     Priority: Medium    Iron deficiency anemia following bariatric surgery 08/01/2023     Priority: Medium    Symptomatic abdominal panniculus 02/25/2021     Priority: Medium    Family history of malignant neoplasm of breast 10/05/2020     Priority: Medium    Insulin resistance 09/28/2020     Priority: Medium    Postural hypotension 07/29/2019     Priority: Medium    Intertrigo 07/29/2019     Priority: Medium    Obesity due to excess calories 07/29/2019     Priority: Medium    S/P laparoscopic cholecystectomy 04/18/2019     Priority: Medium    Bariatric surgery status 02/05/2019     Priority: Medium     RYGBS 1/28/2019 Nikunj      Malnutrition following gastrointestinal surgery 02/05/2019     Priority: Medium    Fatty liver 07/27/2018     Priority: Medium    Mild intermittent asthma without complication 06/11/2018     Priority: Medium    Lymphedema bilateral with mixed lipedema. 09/30/2015     Priority: Medium    Baker's cyst of knee 09/03/2015     Priority: Medium    Acanthosis nigricans 03/24/2015     Priority: Medium    Cutaneous skin tags 03/24/2015     Priority: Medium    Uterine fibroid 08/02/2012      Priority: Medium    Lichen sclerosus 07/14/2011     Priority: Medium    Family history of malignant neoplasm of genital organ, other 06/11/2007     Priority: Medium    FAMILY HX GI MALIGNANCY 05/31/2007     Priority: Medium    Esophageal reflux 09/27/2004     Priority: Medium    Allergic state 09/27/2004     Priority: Medium     Problem list name updated by automated process. Provider to review       Patient Active Problem List   Diagnosis    Esophageal reflux    Allergic state    FAMILY HX GI MALIGNANCY    Family history of malignant neoplasm of genital organ, other    Lichen sclerosus    Uterine fibroid    Acanthosis nigricans    Cutaneous skin tags    Baker's cyst of knee    Lymphedema bilateral with mixed lipedema.    Mild intermittent asthma without complication    Fatty liver    Bariatric surgery status    Malnutrition following gastrointestinal surgery    S/P laparoscopic cholecystectomy    Postural hypotension    Intertrigo    Obesity due to excess calories    Insulin resistance    Family history of malignant neoplasm of breast    Symptomatic abdominal panniculus    Iron deficiency anemia following bariatric surgery    Class 2 severe obesity due to excess calories with serious comorbidity and body mass index (BMI) of 35.0 to 35.9 in adult (H)    Infection of superficial incisional surgical site after procedure, initial encounter    Sepsis without acute organ dysfunction, due to unspecified organism (H)    Diarrhea due to drug        Most Recent Labs:  Lab on 09/16/2024   Component Date Value Ref Range Status    Sodium 09/16/2024 140  135 - 145 mmol/L Final    Potassium 09/16/2024 4.0  3.4 - 5.3 mmol/L Final    Chloride 09/16/2024 105  98 - 107 mmol/L Final    Carbon Dioxide (CO2) 09/16/2024 24  22 - 29 mmol/L Final    Anion Gap 09/16/2024 11  7 - 15 mmol/L Final    Urea Nitrogen 09/16/2024 13.8  6.0 - 20.0 mg/dL Final    Creatinine 09/16/2024 0.74  0.51 - 0.95 mg/dL Final    GFR Estimate 09/16/2024 >90   >60 mL/min/1.73m2 Final    eGFR calculated using 2021 CKD-EPI equation.    Calcium 09/16/2024 9.1  8.8 - 10.4 mg/dL Final    Reference intervals for this test were updated on 7/16/2024 to reflect our healthy population more accurately. There may be differences in the flagging of prior results with similar values performed with this method. Those prior results can be interpreted in the context of the updated reference intervals.    Glucose 09/16/2024 104 (H)  70 - 99 mg/dL Final    Iron 09/16/2024 60  37 - 145 ug/dL Final    Iron Binding Capacity 09/16/2024 258  240 - 430 ug/dL Final    Iron Sat Index 09/16/2024 23  15 - 46 % Final    WBC Count 09/16/2024 9.6  4.0 - 11.0 10e3/uL Final    RBC Count 09/16/2024 4.10  3.80 - 5.20 10e6/uL Final    Hemoglobin 09/16/2024 12.0  11.7 - 15.7 g/dL Final    Hematocrit 09/16/2024 38.9  35.0 - 47.0 % Final    MCV 09/16/2024 95  78 - 100 fL Final    MCH 09/16/2024 29.3  26.5 - 33.0 pg Final    MCHC 09/16/2024 30.8 (L)  31.5 - 36.5 g/dL Final    RDW 09/16/2024 14.0  10.0 - 15.0 % Final    Platelet Count 09/16/2024 462 (H)  150 - 450 10e3/uL Final    % Neutrophils 09/16/2024 68  % Final    % Lymphocytes 09/16/2024 23  % Final    % Monocytes 09/16/2024 6  % Final    % Eosinophils 09/16/2024 2  % Final    % Basophils 09/16/2024 1  % Final    % Immature Granulocytes 09/16/2024 1  % Final    NRBCs per 100 WBC 09/16/2024 0  <1 /100 Final    Absolute Neutrophils 09/16/2024 6.5  1.6 - 8.3 10e3/uL Final    Absolute Lymphocytes 09/16/2024 2.2  0.8 - 5.3 10e3/uL Final    Absolute Monocytes 09/16/2024 0.5  0.0 - 1.3 10e3/uL Final    Absolute Eosinophils 09/16/2024 0.2  0.0 - 0.7 10e3/uL Final    Absolute Basophils 09/16/2024 0.1  0.0 - 0.2 10e3/uL Final    Absolute Immature Granulocytes 09/16/2024 0.1  <=0.4 10e3/uL Final    Absolute NRBCs 09/16/2024 0.0  10e3/uL Final       Mental Health History     Prior Mental Health Diagnosis:   None reported    Mental Health Treatment:   None  reported    Chemical Abuse/Treatment:    None reported    Family History     Family History   Problem Relation Age of Onset    Diabetes Mother     Allergies Mother     Obesity Mother     C.A.D. Mother         triple bypass surgery, 65    Lipids Mother     Hypertension Mother     Coronary Artery Disease Mother     Hyperlipidemia Mother     Colon Polyps Mother     Anesthesia Reaction Mother     Thyroid Disease Mother     Sleep Apnea Mother     Breast Cancer Mother 82    C.A.D. Father 60        CAGB 98    Obesity Father     Cancer Father         stomach Dx age 74    Lipids Father     Hypertension Father     Coronary Artery Disease Father     Cerebrovascular Disease Father     Other Cancer Father         Esophogeal    Sleep Apnea Brother     Sleep Apnea Brother     Allergies Brother     Obesity Brother     Hypertension Brother     Obesity Brother     Cancer - colorectal Maternal Grandmother 75    Cancer Maternal Grandmother         liver    Hypertension Maternal Grandmother     Colon Cancer Maternal Grandmother     Other Cancer Maternal Grandmother         liver, leaukeamia    Colon Polyps Maternal Grandmother     Coronary Artery Disease Maternal Grandfather     Hypertension Maternal Grandfather     Cancer Paternal Grandmother         stomach    Obesity Paternal Grandmother     Diabetes Paternal Grandmother     Asthma Paternal Grandmother     C.A.D. Paternal Grandfather 58        fatal    Coronary Artery Disease Paternal Grandfather     Cancer - colorectal Paternal Aunt     Cancer - colorectal Other         cousin  from colon cancer in 's       Social History         Social Drivers of Health     Food Insecurity: Low Risk  (2024)    Food Insecurity     Within the past 12 months, did you worry that your food would run out before you got money to buy more?: No     Within the past 12 months, did the food you bought just not last and you didn t have money to get more?: No   Depression: Not at risk (2024)     PHQ-2     PHQ-2 Score: 0   Housing Stability: Low Risk  (5/13/2024)    Housing Stability     Do you have housing? : Yes     Are you worried about losing your housing?: No   Tobacco Use: Low Risk  (9/10/2024)    Patient History     Smoking Tobacco Use: Never     Smokeless Tobacco Use: Never     Passive Exposure: Never   Financial Resource Strain: Low Risk  (5/13/2024)    Financial Resource Strain     Within the past 12 months, have you or your family members you live with been unable to get utilities (heat, electricity) when it was really needed?: No   Alcohol Use: Not At Risk (7/11/2023)    AUDIT-C     Frequency of Alcohol Consumption: 2-4 times a month     Average Number of Drinks: 1 or 2     Frequency of Binge Drinking: Never   Transportation Needs: Low Risk  (5/13/2024)    Transportation Needs     Within the past 12 months, has lack of transportation kept you from medical appointments, getting your medicines, non-medical meetings or appointments, work, or from getting things that you need?: No   Physical Activity: Sufficiently Active (5/13/2024)    Exercise Vital Sign     Days of Exercise per Week: 4 days     Minutes of Exercise per Session: 60 min   Interpersonal Safety: Low Risk  (5/20/2024)    Interpersonal Safety     Do you feel physically and emotionally safe where you currently live?: Yes     Within the past 12 months, have you been hit, slapped, kicked or otherwise physically hurt by someone?: No     Within the past 12 months, have you been humiliated or emotionally abused in other ways by your partner or ex-partner?: No   Stress: Stress Concern Present (5/13/2024)    [employer] [place] [employer] - Occupational Stress Questionnaire     Feeling of Stress : To some extent   Social Connections: Unknown (5/13/2024)    Social Connection and Isolation Panel [NHANES]     Frequency of Communication with Friends and Family: Not on file     Frequency of Social Gatherings with Friends and Family: Twice  a week     Attends Jewish Services: Not on file     Active Member of Clubs or Organizations: Not on file     Attends Club or Organization Meetings: Not on file     Marital Status: Not on file   Health Literacy: Not on file         Mental Status Examination     Ana presented as oriented X3 with speech and language intact.  The patient was cooperative throughout the evaluation with no signs of hallucinations, delusions, loosening of associations or other thought disturbance.  Mood was normal Affect was congruent with mood. Insight and judgement were intact.  Memory appeared intact for recent and remote elements.  There was no report of suicidal ideation, intention or plan. Attention and concentration were within normal.        Ash Menendez PsyD, LP, DBSM  Diplomate, Behavioral Sleep Medicine  [facility]      Copy:   Kelly Bedoya Mai, MD  [address] [place] [address]    Note: This dictation was created using voice recognition software. This document may contain an error not identified before finalizing the document. If the error changes the accuracy of the document, I would appreciate it being brought to my attention.

## 2024-10-30 ENCOUNTER — TELEPHONE (OUTPATIENT)
Dept: GASTROENTEROLOGY | Facility: CLINIC | Age: 54
End: 2024-10-30
Payer: COMMERCIAL

## 2024-10-30 NOTE — TELEPHONE ENCOUNTER
"Endoscopy Scheduling Screen    Have you had any respiratory illness or flu-like symptoms in the last 10 days?  No    What is your communication preference for Instructions and/or Bowel Prep?   MyChart    What insurance is in the chart?  Other:      Ordering/Referring Provider:   MICHELLE GEIGER        (If ordering provider performs procedure, schedule with ordering provider unless otherwise instructed. )    BMI: Estimated body mass index is 32.45 kg/m  as calculated from the following:    Height as of 10/29/24: 1.651 m (5' 5\").    Weight as of 10/29/24: 88.5 kg (195 lb).     Sedation Ordered  moderate sedation.   If patient BMI > 50 do not schedule in [facility].    If patient BMI > 45 do not schedule at [facility].    Are you taking methadone or Suboxone?  NO, No RN review required.    Have you been diagnosed and are being treated for severe PTSD or severe anxiety?  NO, No RN review required.    Are you taking any prescription medications for pain 3 or more times per week?   NO, No RN review required.    Do you have a history of malignant hyperthermia?  No    (Females) Are you currently pregnant?   No     Have you been diagnosed or told you have pulmonary hypertension?   No    Do you have an LVAD?  No    Have you been told you have moderate to severe sleep apnea?  No.    Have you been told you have COPD, asthma, or any other lung disease?  No    Do you have any heart conditions?  No     Have you ever had or are you waiting for an organ transplant?  No. Continue scheduling, no site restrictions.    Have you had a stroke or transient ischemic attack (TIA aka \"mini stroke\" in the last 6 months?   No    Have you been diagnosed with or been told you have cirrhosis of the liver?   No.    Are you currently on dialysis?   No    Do you need assistance transferring?   No    BMI: Estimated body mass index is 32.45 kg/m  as calculated from the following:    Height as of 10/29/24: 1.651 m (5' 5\").    Weight as of 10/29/24: 88.5 kg " (195 lb).     Is patients BMI > 40 and scheduling location [facility]?  No    Do you take an injectable or oral medication for weight loss or diabetes (excluding insulin)?  Yes, hold time can be up to 7 days. Please consult with you prescribing provider to discuss endoscopy recommendations. (Please schedule at least 7 days out.) Wegovy    Do you take the medication Naltrexone?  No    Do you take blood thinners?  No       Prep   Are you currently on dialysis or do you have chronic kidney disease?  No    Do you have a diagnosis of diabetes?  No    Do you have a diagnosis of cystic fibrosis (CF)?  No    On a regular basis do you go 3 -5 days between bowel movements?  No    BMI > 40?  No    Preferred Pharmacy:    [facility] [address]  [address] [facility] [address]  Phone: [phone] Fax: [phone]          Final Scheduling Details     Procedure scheduled  Colonoscopy    Surgeon:  Carline     Date of procedure:  12/2     Pre-OP / PAC:   No - Not required for this site.    Location  [facility] - Patient preference.    Sedation   Moderate Sedation - Per order.      Patient Reminders:   You will receive a call from a Nurse to review instructions and health history.  This assessment must be completed prior to your procedure.  Failure to complete the Nurse assessment may result in the procedure being cancelled.      On the day of your procedure, please designate an adult(s) who can drive you home stay with you for the next 24 hours. The medicines used in the exam will make you sleepy. You will not be able to drive.      You cannot take public transportation, ride share services, or non-medical taxi service without a responsible caregiver.  Medical transport services are allowed with the requirement that a responsible caregiver will receive you at your destination.  We require that drivers and caregivers are confirmed prior to your procedure.

## 2024-10-30 NOTE — TELEPHONE ENCOUNTER
Pt called to change time of appt. Pt still scheduled 12/02/2024 at [facility] w/Dr. Crowley, but their procedure time sas been moved. Updated details have been sent

## 2024-11-04 ENCOUNTER — MYC REFILL (OUTPATIENT)
Dept: PEDIATRICS | Facility: CLINIC | Age: 54
End: 2024-11-04
Payer: COMMERCIAL

## 2024-11-04 ENCOUNTER — MYC REFILL (OUTPATIENT)
Dept: SURGERY | Facility: CLINIC | Age: 54
End: 2024-11-04
Payer: COMMERCIAL

## 2024-11-04 ENCOUNTER — MYC MEDICAL ADVICE (OUTPATIENT)
Dept: SURGERY | Facility: CLINIC | Age: 54
End: 2024-11-04
Payer: COMMERCIAL

## 2024-11-04 DIAGNOSIS — E66.811 CLASS 1 OBESITY DUE TO EXCESS CALORIES WITH SERIOUS COMORBIDITY AND BODY MASS INDEX (BMI) OF 33.0 TO 33.9 IN ADULT: ICD-10-CM

## 2024-11-04 DIAGNOSIS — K21.9 GASTROESOPHAGEAL REFLUX DISEASE, UNSPECIFIED WHETHER ESOPHAGITIS PRESENT: Primary | ICD-10-CM

## 2024-11-04 DIAGNOSIS — E66.09 CLASS 1 OBESITY DUE TO EXCESS CALORIES WITH SERIOUS COMORBIDITY AND BODY MASS INDEX (BMI) OF 33.0 TO 33.9 IN ADULT: ICD-10-CM

## 2024-11-05 RX ORDER — PHENTERMINE HYDROCHLORIDE 15 MG/1
30 CAPSULE ORAL EVERY MORNING
Qty: 180 CAPSULE | Refills: 1 | OUTPATIENT
Start: 2024-11-05

## 2024-11-05 NOTE — TELEPHONE ENCOUNTER
Pt provided prescription for 3 months with 1 RF 9/10/24.  Informed pt to contact clinic.  Will refuse due to too early - per clinic protocol.  Laura Paulino, MS, RD, RN

## 2024-11-05 NOTE — TELEPHONE ENCOUNTER
Dr. Bedoya,    Called and spoke to pt who states she thinks it was previously prescribed after a hospital visit. Pt requesting PCP takeover omeprazole 20 mg daily. Please review and advise, thank you!  Alden Quach RN on 11/5/2024 at 5:50 PM

## 2024-11-07 NOTE — TELEPHONE ENCOUNTER
Yes, I can take over.  Please prescribe 90 day supply.   The omeprazole was ordered already by PCP.  Laura Paulino MS, RD, RN

## 2024-11-12 ENCOUNTER — TELEPHONE (OUTPATIENT)
Dept: SURGERY | Facility: CLINIC | Age: 54
End: 2024-11-12
Payer: COMMERCIAL

## 2024-11-12 NOTE — TELEPHONE ENCOUNTER
General Call    Contacts       Contact Date/Time Type Contact Phone/Fax    11/12/2024 10:31 AM CST Phone (Incoming) Ana Wyman (Self) [phone] (M)          Reason for Call:  medication refill    What are your questions or concerns:  pt states her medication Semaglutide was not sent to  pharmacy and she will need it today or tomorrow      Could we send this information to you in [facility] or would you prefer to receive a phone call?:   Patient would prefer a phone call   Okay to leave a detailed message?: Yes at Cell number on file:    Telephone Information:   Mobile [phone]

## 2024-11-18 ENCOUNTER — TELEPHONE (OUTPATIENT)
Dept: GASTROENTEROLOGY | Facility: CLINIC | Age: 54
End: 2024-11-18

## 2024-11-18 ENCOUNTER — OFFICE VISIT (OUTPATIENT)
Dept: PEDIATRICS | Facility: CLINIC | Age: 54
End: 2024-11-18
Payer: COMMERCIAL

## 2024-11-18 ENCOUNTER — HOSPITAL ENCOUNTER (OUTPATIENT)
Dept: ULTRASOUND IMAGING | Facility: CLINIC | Age: 54
Discharge: HOME | End: 2024-11-18
Attending: NURSE PRACTITIONER | Admitting: NURSE PRACTITIONER
Payer: COMMERCIAL

## 2024-11-18 VITALS
BODY MASS INDEX: 34.3 KG/M2 | OXYGEN SATURATION: 98 % | WEIGHT: 205.88 LBS | RESPIRATION RATE: 16 BRPM | HEART RATE: 84 BPM | SYSTOLIC BLOOD PRESSURE: 109 MMHG | TEMPERATURE: 97.8 F | DIASTOLIC BLOOD PRESSURE: 79 MMHG | HEIGHT: 65 IN

## 2024-11-18 DIAGNOSIS — K11.20 PAROTITIS: ICD-10-CM

## 2024-11-18 DIAGNOSIS — Z12.11 SPECIAL SCREENING FOR MALIGNANT NEOPLASMS, COLON: Primary | ICD-10-CM

## 2024-11-18 DIAGNOSIS — Z12.31 VISIT FOR SCREENING MAMMOGRAM: ICD-10-CM

## 2024-11-18 DIAGNOSIS — K11.20 PAROTITIS: Primary | ICD-10-CM

## 2024-11-18 PROCEDURE — 99213 OFFICE O/P EST LOW 20 MIN: CPT | Performed by: NURSE PRACTITIONER

## 2024-11-18 PROCEDURE — 76536 US EXAM OF HEAD AND NECK: CPT

## 2024-11-18 RX ORDER — BISACODYL 5 MG/1
TABLET, DELAYED RELEASE ORAL
Qty: 4 TABLET | Refills: 0 | Status: SHIPPED | OUTPATIENT
Start: 2024-11-18

## 2024-11-18 RX ORDER — BISACODYL 5 MG/1
TABLET, DELAYED RELEASE ORAL
Qty: 4 TABLET | Refills: 0 | Status: SHIPPED | OUTPATIENT
Start: 2024-11-18 | End: 2024-11-18

## 2024-11-18 RX ORDER — POLYETHYLENE GLYCOL 3350 17 G/17G
POWDER, FOR SOLUTION ORAL
Qty: 238 G | Refills: 0 | Status: SHIPPED | OUTPATIENT
Start: 2024-11-18

## 2024-11-18 RX ORDER — LINEZOLID 600 MG/1
600 TABLET, FILM COATED ORAL 2 TIMES DAILY
Qty: 20 TABLET | Refills: 0 | Status: SHIPPED | OUTPATIENT
Start: 2024-11-18

## 2024-11-18 RX ORDER — ONDANSETRON 4 MG/1
TABLET, FILM COATED ORAL
Qty: 3 TABLET | Refills: 0 | Status: SHIPPED | OUTPATIENT
Start: 2024-11-18

## 2024-11-18 ASSESSMENT — ASTHMA QUESTIONNAIRES
ACT_TOTALSCORE: 25
QUESTION_4 LAST FOUR WEEKS HOW OFTEN HAVE YOU USED YOUR RESCUE INHALER OR NEBULIZER MEDICATION (SUCH AS ALBUTEROL): NOT AT ALL
QUESTION_2 LAST FOUR WEEKS HOW OFTEN HAVE YOU HAD SHORTNESS OF BREATH: NOT AT ALL
QUESTION_1 LAST FOUR WEEKS HOW MUCH OF THE TIME DID YOUR ASTHMA KEEP YOU FROM GETTING AS MUCH DONE AT WORK, SCHOOL OR AT HOME: NONE OF THE TIME
QUESTION_5 LAST FOUR WEEKS HOW WOULD YOU RATE YOUR ASTHMA CONTROL: COMPLETELY CONTROLLED
QUESTION_3 LAST FOUR WEEKS HOW OFTEN DID YOUR ASTHMA SYMPTOMS (WHEEZING, COUGHING, SHORTNESS OF BREATH, CHEST TIGHTNESS OR PAIN) WAKE YOU UP AT NIGHT OR EARLIER THAN USUAL IN THE MORNING: NOT AT ALL
ACT_TOTALSCORE: 25

## 2024-11-18 ASSESSMENT — PAIN SCALES - GENERAL: PAINLEVEL_OUTOF10: EXTREME PAIN (8)

## 2024-11-18 NOTE — TELEPHONE ENCOUNTER
Pre assessment completed for upcoming procedure.   (Please see previous telephone encounter notes for complete details)    Procedure details:    Arrival time and facility location reviewed.    Pre op exam needed? No.    Designated  policy reviewed. Instructed to have someone stay 6  hours post procedure.       Medication review:    Medications reviewed. Please see supporting documentation below. Holding recommendations discussed (if applicable).       Prep for procedure:     Procedure prep instructions reviewed.        Any additional information needed:  Changed prep to low volume extended golytely as patient as a history of gastric bypass. Patient stated they have only been on semaglutide for 4 weeks, writer stated that this is protocol regardless. Writer did send zofran for assistance with prep and discussed that patient could phani Miralax back a little bit if they were just getting straight diarrhea that interfered with her daily life.       Patient  verbalized understanding and had no questions or concerns at this time.      Shruthi Kelley RN  Endoscopy Procedure Pre Assessment   [phone] option 2

## 2024-11-18 NOTE — PATIENT INSTRUCTIONS
It was nice seeing you today.    [facility] Radiology Scheduling  [phone]     Please let me know if you have any questions regarding today's visit!    Take care,    FRANCISCO Chiu, ANILA  Family Medicine

## 2024-11-18 NOTE — TELEPHONE ENCOUNTER
Pre visit planning completed.      Procedure details:    Patient scheduled for Colonoscopy on 12.02.2024.     Arrival time: 1345. Procedure time 1430    Facility location: [facility]; [address] [place] [id number]. Check in location: Main entrance, door #1 on the North side of the building under roundabout awning. DO NOT GO TO SURGERY/ED ENTRANCE.     Sedation type: Conscious sedation     Pre op exam needed? No.    Indication for procedure:  Special screening for malignant neoplasms, colon       Chart review:     Electronic implanted devices? No    Recent diagnosis of diverticulitis within the last 6 weeks? No      Medication review:    Diabetic? No    Anticoagulants? No    Weight loss medication/injectable? Yes. Semaglutide. Weekly dosing of medication.  HOLD 7 days before procedure.  Follow up with managing provider.  and Phentermine: HOLD 7 days before procedure.    Other medication HOLDING recommendations:  N/A      Prep for procedure:     Bowel prep recommendation: Extended Golytely. Bowel prep prescription sent to    [place], [place] - [address] [address] [facility]    Due to: GLP-1 agonist medication noted in chart.     Prep instructions sent via [employer]         Angle Dover RN  Endoscopy Procedure Pre Assessment   [phone] option 2

## 2024-11-18 NOTE — PROGRESS NOTES
"  Assessment & Plan     Parotitis  New.  Started on antibiotics and given referral for US.  Patient to hold phentermine until completion of antibiotics.  Patient to start Augmentin today and linezolid tomorrow night.  - linezolid (ZYVOX) 600 MG tablet; Take 1 tablet (600 mg) by mouth 2 times daily.  - amoxicillin-clavulanate (AUGMENTIN) 875-125 MG tablet; Take 1 tablet by mouth 2 times daily.  - US Head Neck Soft Tissue; Future    Visit for screening mammogram  - MA Screening Bilateral w/ Yosvany; Future      Subjective   Ana is a 54 year old, presenting for the following health issues:  Musculoskeletal Problem        11/18/2024     1:50 PM   Additional Questions   Roomed by blel   Accompanied by jim         11/18/2024     1:50 PM   Patient Reported Additional Medications   Patient reports taking the following new medications na     History of Present Illness       Reason for visit:  Swelling in neck that is painful, not only at the point of swelling,  but has spread and right ear and it is now very swollen too. It has caused a headache and the pain is running down my shoulder into my arm. ,  Symptom onset:  1-3 days ago  Symptom intensity:  Severe  Symptom progression:  Worsening  Had these symptoms before:  No  What makes it worse:  Lying on the side of my head,  touch  What makes it better:  Not really   She is taking medications regularly.       Started Friday  Started with swollen neck glad, then progressed into right ear pain and jaw.  Has tried Benadryl and Tylenol with no relief.  Last dental exam with the last 4 weeks.    Review of Systems  Constitutional, HEENT, cardiovascular, pulmonary, gi and gu systems are negative, except as otherwise noted.      Objective    /79   Pulse 84   Temp 97.8  F (36.6  C) (Temporal)   Resp 16   Ht 1.651 m (5' 5\")   Wt 93.4 kg (205 lb 14.1 oz)   LMP 10/01/2016 (Approximate)   SpO2 98%   BMI 34.26 kg/m    Body mass index is 34.26 kg/m .    Physical Exam "   GENERAL: alert and no distress  EYES: Eyes grossly normal to inspection, PERRL and conjunctivae and sclerae normal  HENT: normal cephalic/atraumatic, ear canals and TM's normal, nose and mouth without ulcers or lesions, oropharynx clear, and oral mucous membranes moist  RESP: lungs clear to auscultation - no rales, rhonchi or wheezes  CV: regular rate and rhythm, normal S1 S2, no S3 or S4, no murmur, click or rub, no peripheral edema  PSYCH: mentation appears normal, affect normal/bright          Signed Electronically by: Arely Chiu NP

## 2024-11-19 ENCOUNTER — MYC MEDICAL ADVICE (OUTPATIENT)
Dept: PEDIATRICS | Facility: CLINIC | Age: 54
End: 2024-11-19
Payer: COMMERCIAL

## 2024-11-19 ENCOUNTER — PATIENT OUTREACH (OUTPATIENT)
Dept: CARE COORDINATION | Facility: CLINIC | Age: 54
End: 2024-11-19
Payer: COMMERCIAL

## 2024-11-20 NOTE — TELEPHONE ENCOUNTER
Advised of provider message. Please check on patient's symptoms tomorrow.  Thanks!  Katiana SIDDIQUI RN, BSN  Clinic RN  [facility]

## 2024-11-20 NOTE — TELEPHONE ENCOUNTER
Glad symptoms are improving.  Let's have her continue to take Augmentin.  She can stop Linezolid as of now and no, she can eat as she normally does as she is not taking this medication.    Can we check on her tomorrow as well?     Please let me know if you have any questions,    FRANCISCO Chiu, ANILA  Family Medicine

## 2024-11-20 NOTE — TELEPHONE ENCOUNTER
Called and spoke to patient. She states slight improvement. Still some pain and swelling. No neurological symptoms.    Patient had concerns regarding taking one dose of the linezolid. She states she was not advised of drug interactions with food. She states she ate a banana right before getting/taking this medication. She states she googled interactions and it was noted it interacts with Bananas. Per mircromedex, does have major food interactions with avocado and bitter orange; moderate with Tyramine foods. She is inquiring if she needs to avoid certain foods for 2 weeks since she took 1 dose of the medications.      Patient is okay with waiting to see if she continues to improve on the Augmentin. She declined the CT for now. She will report and new or worsening symptoms.    Please advise.  Thanks!  Katiana SIDDIQUI RN, BSN  Clinic RN  [facility]

## 2024-11-20 NOTE — TELEPHONE ENCOUNTER
Please call patient back:    -How are her symptoms?  Have the pain and swelling improved?  Any new symptoms?  Any neurologic symptoms?  -Have her continue to take the Augmentin.  If she is responding to this, she does not need to take the Linezolid.  Linezolid is given if therapy against ?R?A is indicated.  Since she is allergic to Bactrim, this is the next option.  -The US showed a defining area but no vascular concerns.  We can get a CT if she is having new symptoms, symptoms are not improving, or she'd like to pursue another image for reassurance.  I can order this, just let me know.      Arely

## 2024-11-20 NOTE — TELEPHONE ENCOUNTER
Please see patient's mychart message    Questions regarding linezolid:  - interactions with food   - interactions in general  - wondering if she can just take augmentin or if there is something else?    Also wondering next steps after seeing US results, notified patient you would review and respond.    Penny Hull RN

## 2024-11-21 NOTE — TELEPHONE ENCOUNTER
Arely,     Patient's update today as requested- she feels slightly improved. Still experiencing some swelling/redness, headache, fatigue.     Please advise if there are any concerns at this point with her symptoms, otherwise we will have her continue to monitor!     Thanks,  Raquel Lee RN

## 2024-11-26 ENCOUNTER — TELEPHONE (OUTPATIENT)
Dept: GASTROENTEROLOGY | Facility: CLINIC | Age: 54
End: 2024-11-26

## 2024-11-26 ENCOUNTER — VIRTUAL VISIT (OUTPATIENT)
Dept: PEDIATRICS | Facility: CLINIC | Age: 54
End: 2024-11-26
Payer: COMMERCIAL

## 2024-11-26 DIAGNOSIS — K11.20 PAROTITIS: Primary | ICD-10-CM

## 2024-11-26 PROCEDURE — 99214 OFFICE O/P EST MOD 30 MIN: CPT | Mod: 95 | Performed by: PEDIATRICS

## 2024-11-26 RX ORDER — DOXYCYCLINE 100 MG/1
100 CAPSULE ORAL 2 TIMES DAILY
Qty: 14 CAPSULE | Refills: 0 | Status: SHIPPED | OUTPATIENT
Start: 2024-11-26

## 2024-11-26 NOTE — TELEPHONE ENCOUNTER
Caller: Ana Wyman   Reason for Reschedule/Cancellation (please be detailed, any staff messages or encounters to note?):     Per pt - bladder infection       Prior to reschedule please review:  Ordering Provider:Kelly Bedoya MD   Sedation Determined: mod   Does patient have any ASC Exclusions, please identify?: n       Notes on Cancelled Procedure:  Procedure:Lower Endoscopy [Colonoscopy]   Date: 12/02/2024  [facility]; [facility] [address] [place] [address]  Surgeon: Carline         Rescheduled: yes   Procedure: Lower Endoscopy [Colonoscopy]  Date: 02/03/2025  Location: [facility]; [address] [place] [address]  Surgeon: hanny   Sedation Level Scheduled  mod         Reason for Sedation Level per order   Prep/Instructions updated and sent: vaughnhart     Does patient need PAC or Pre -Op Rescheduled? : n       Send In - basket message to Panc - Gurdeep Pool if EUS procedure is canceled or rescheduled: [ N/A, YES or NO] n

## 2024-11-26 NOTE — PROGRESS NOTES
"Ana is a 54 year old who is being evaluated via a billable video visit.    How would you like to obtain your AVS? MyChart  If the video visit is dropped, the invitation should be resent by: Send to e-mail at: [email or web address]  Will anyone else be joining your video visit? No      Assessment & Plan       ICD-10-CM    1. Parotitis  K11.20 amoxicillin-clavulanate (AUGMENTIN) 875-125 MG tablet     doxycycline hyclate (VIBRAMYCIN) 100 MG capsule    Now improving on oral antibiotics - will continue to monitor closely and sent in a new prescription for antibiotics in the event that symptoms return, will restart another course.    Has CT face/neck scheduled next week and will follow    Will alert us if not improving                    BMI  Estimated body mass index is 34.26 kg/m  as calculated from the following:    Height as of 11/18/24: 1.651 m (5' 5\").    Weight as of 11/18/24: 93.4 kg (205 lb 14.1 oz).   Weight management plan: Patient referred to endocrine and/or weight management specialty        Subjective   Ana is a 54 year old, presenting for the following health issues:    Follow up Parotiitis      History of Present Illness       Reason for visit:  Swelling in neck that is painful, not only at the point of swelling,  but has spread and right ear and it is now very swollen too. It has caused a headache and the pain is running down my shoulder into my arm. ,  Symptom onset:  1-3 days ago  Symptom intensity:  Severe  Symptom progression:  Worsening  Had these symptoms before:  No  What makes it worse:  Lying on the side of my head,  touch  What makes it better:  Not really   She is taking medications regularly.       Seen in clinic by Arely Chiu on 11/18 - started on linezolid and augmentin.   Transitioned to doxy from linezolid    Neck US perfomed - results showed IMPRESSION: Ultrasound images of the right parotid gland in the area of concern demonstrates a hypoechoic ill-defined area measuring 7 x 4 x 5 mm " which is indeterminant. No internal vascularity. Could possibly represent a small lymph node. Additional   small lymph node is noted within the parotid gland with a small fatty hilum measuring 5 mm short axis. Few additional nonenlarged lymph nodes are noted adjacent to the parotid gland. No other abnormality identified.    Has follow up CT planned next week.    Ear and side of right face were terribly painful and are now improving on antibiotics.  Warm to touch.      Has been using sour candy and staying well hydrated.  Had been taking claritin daily and thinks the dryness may have contributed.    Scheduled colonoscopy for Monday and started prep and diarrhea has been terrible - wondering about postponing this due to infection.        Objective           Vitals:  No vitals were obtained today due to virtual visit.    Physical Exam   GENERAL: alert and no distress  EYES: Eyes grossly normal to inspection.  No discharge or erythema, or obvious scleral/conjunctival abnormalities.  HENT: right face slightly reddened and ear slightly swollen  RESP: No audible wheeze, cough, or visible cyanosis.    SKIN: Visible skin clear. No significant rash, abnormal pigmentation or lesions.  NEURO: Cranial nerves grossly intact.  Mentation and speech appropriate for age.  PSYCH: Appropriate affect, tone, and pace of words    Imaging reviewed      Video-Visit Details    Type of service:  Video Visit   Originating Location (pt. Location): Home    Distant Location (provider location):  On-site  Platform used for Video Visit: Pelon  Signed Electronically by: Kelly Bedoya MD

## 2024-12-03 ENCOUNTER — MYC MEDICAL ADVICE (OUTPATIENT)
Dept: PEDIATRICS | Facility: CLINIC | Age: 54
End: 2024-12-03
Payer: COMMERCIAL

## 2024-12-04 ENCOUNTER — OFFICE VISIT (OUTPATIENT)
Dept: PEDIATRICS | Facility: CLINIC | Age: 54
End: 2024-12-04
Payer: COMMERCIAL

## 2024-12-04 VITALS
OXYGEN SATURATION: 97 % | BODY MASS INDEX: 35.71 KG/M2 | WEIGHT: 209.2 LBS | HEIGHT: 64 IN | HEART RATE: 85 BPM | RESPIRATION RATE: 18 BRPM | TEMPERATURE: 98 F | DIASTOLIC BLOOD PRESSURE: 60 MMHG | SYSTOLIC BLOOD PRESSURE: 92 MMHG

## 2024-12-04 DIAGNOSIS — K11.20 PAROTITIS: Primary | ICD-10-CM

## 2024-12-04 PROCEDURE — G2211 COMPLEX E/M VISIT ADD ON: HCPCS | Performed by: PHYSICIAN ASSISTANT

## 2024-12-04 PROCEDURE — 99213 OFFICE O/P EST LOW 20 MIN: CPT | Performed by: PHYSICIAN ASSISTANT

## 2024-12-04 ASSESSMENT — PAIN SCALES - GENERAL: PAINLEVEL_OUTOF10: NO PAIN (0)

## 2024-12-04 NOTE — TELEPHONE ENCOUNTER
"Per chart review, last Virtual visit w/ PCP 11/26/24:   amoxicillin-clavulanate (AUGMENTIN) 875-125 MG tablet          doxycycline hyclate (VIBRAMYCIN) 100 MG capsule     Now improving on oral antibiotics - will continue to monitor closely and sent in a new prescription for antibiotics in the event that symptoms return, will restart another course.     Has CT face/neck scheduled next week and will follow     Will alert us if not improving     RN called and spoke with patient to get current update.     Patient took second dose of ABX at 4am this morning. Patient feels she is \"turning the corner\" and starting to feel better. Patient continues to have pain in right cheek, right jaw, right side of neck, and has a headache. Current pain level is rated at 4/10. Last night pain level was 6 or 7/10. Had fever last night, no fever now. Last checked temp around 7:40am this morning, temp was 98.4. Patient took tylenol extra strength (1000mg) at 7pm last night, took again 2 or 3am. Patient reports her symptoms \"definitely started the same as last time\".     Provider: Patient to continue ABX course and keep CT as scheduled? Any other recommendations at this time?     Patient would prefer [employer] message back as she will be in meetings for work today.     Jin OH RN 12/4/2024 at 8:33 AM      "

## 2024-12-05 ENCOUNTER — HOSPITAL ENCOUNTER (OUTPATIENT)
Dept: CT IMAGING | Facility: CLINIC | Age: 54
Discharge: HOME | End: 2024-12-05
Attending: NURSE PRACTITIONER
Payer: COMMERCIAL

## 2024-12-05 DIAGNOSIS — K11.20 PAROTITIS: ICD-10-CM

## 2024-12-05 PROCEDURE — 250N000011 HC RX IP 250 OP 636: Performed by: NURSE PRACTITIONER

## 2024-12-05 PROCEDURE — 70491 CT SOFT TISSUE NECK W/DYE: CPT

## 2024-12-05 RX ORDER — IOPAMIDOL 755 MG/ML
90 INJECTION, SOLUTION INTRAVASCULAR ONCE
Status: COMPLETED | OUTPATIENT
Start: 2024-12-05 | End: 2024-12-05

## 2024-12-05 RX ADMIN — IOPAMIDOL 90 ML: 755 INJECTION, SOLUTION INTRAVENOUS at 15:40

## 2024-12-06 ENCOUNTER — HOSPITAL ENCOUNTER (OUTPATIENT)
Dept: MAMMOGRAPHY | Facility: CLINIC | Age: 54
Discharge: HOME | End: 2024-12-06
Attending: NURSE PRACTITIONER | Admitting: NURSE PRACTITIONER
Payer: COMMERCIAL

## 2024-12-06 DIAGNOSIS — Z12.31 VISIT FOR SCREENING MAMMOGRAM: ICD-10-CM

## 2024-12-06 PROCEDURE — 77063 BREAST TOMOSYNTHESIS BI: CPT

## 2024-12-06 PROCEDURE — 77067 SCR MAMMO BI INCL CAD: CPT

## 2024-12-08 ENCOUNTER — MYC MEDICAL ADVICE (OUTPATIENT)
Dept: SURGERY | Facility: CLINIC | Age: 54
End: 2024-12-08
Payer: COMMERCIAL

## 2024-12-08 DIAGNOSIS — E66.811 CLASS 1 OBESITY DUE TO EXCESS CALORIES WITH SERIOUS COMORBIDITY AND BODY MASS INDEX (BMI) OF 33.0 TO 33.9 IN ADULT: ICD-10-CM

## 2024-12-08 DIAGNOSIS — E66.09 CLASS 1 OBESITY DUE TO EXCESS CALORIES WITH SERIOUS COMORBIDITY AND BODY MASS INDEX (BMI) OF 33.0 TO 33.9 IN ADULT: ICD-10-CM

## 2024-12-09 NOTE — TELEPHONE ENCOUNTER
Called  pharmacy in  to ask the last date of pick-up and for how much.    Per pharm tech, a 3-month supply of phentermine was filled on 9/11.    Patient now requesting to [facility].    Will krish up the remanding 3 months on her original prescription and route to provider.    Jeanie ROMANO RN, BSN

## 2024-12-10 RX ORDER — PHENTERMINE HYDROCHLORIDE 15 MG/1
30 CAPSULE ORAL EVERY MORNING
Qty: 180 CAPSULE | Refills: 0 | Status: SHIPPED | OUTPATIENT
Start: 2024-12-10

## 2024-12-12 ENCOUNTER — MYC MEDICAL ADVICE (OUTPATIENT)
Dept: PEDIATRICS | Facility: CLINIC | Age: 54
End: 2024-12-12
Payer: COMMERCIAL

## 2024-12-12 NOTE — TELEPHONE ENCOUNTER
"Patient had recent appointment 12/4/24 with Christa Ortiz PA-C for parotitis.     OV 12/4:  \"Patient initially seen 11/18 with US and started on Augmentin and Doxycycline. Reports she had improvement on antibiotics. She had follow-up virtual visit with Dr. Bedoya on 11/26 and was almost done with antibiotics. She was given another round of antibiotics to take if worsening. Reports she was doing well until yesterday. She started having right sided head pain, ear pain, fullness similar to the initial infection. She has been taking some Tylenol. \"    Patient wanting to verify it would be OK if she has crowns placed with the recent illnesses she has had.     Thanks,   Raquel Lee, RN    "

## 2024-12-20 NOTE — PROGRESS NOTES
"Ana is a 54 year old who is being evaluated via a billable video visit.      The patient has been notified of following:     \"This video visit will be conducted via a call between you and your physician/provider. We have found that certain health care needs can be provided without the need for an in-person physical exam.  This service lets us provide the care you need with a video conversation.  If a prescription is necessary we can send it directly to your pharmacy.  If lab work is needed we can place an order for that and you can then stop by our lab to have the test done at a later time.    Video visits are billed at different rates depending on your insurance coverage.  Please reach out to your insurance provider with any questions.    If during the course of the call the physician/provider feels a video visit is not appropriate, you will not be charged for this service.\"    Patient has given verbal consent for Video visit? Yes    How would you like to obtain your AVS? MyChart    If the video visit is dropped, the invitation should be resent by: Send to e-mail at: [email or web address]    Will anyone else be joining your video visit? No    I    Video-Visit Details    Type of service:  Video Visit    Originating Location (pt. Location): Home in [place]    Distant Location (provider location):   Home office in [place]     Platform used for Video Visit: [employer]    Video Start Time: 11:28 AM    Video End Time:12:00 PM    Return Bariatric Surgery Note    RE: Ana Wyman  MR#: [id number]  : 1970  VISIT DATE: 2024    Dear Kelly Bedoya,    I had the pleasure of seeing your patient, Ana Wyman, in my post-bariatric surgery assessment clinic.    Assessment & Plan   Problem List Items Addressed This Visit       Esophageal reflux    Relevant Medications    COMPOUNDED NON-CONTROLLED SUBSTANCE (CMPD RX) - PHARMACY TO MIX COMPOUNDED MEDICATION    phentermine 15 MG capsule    Fatty liver    Relevant " Medications    COMPOUNDED NON-CONTROLLED SUBSTANCE (CMPD RX) - PHARMACY TO MIX COMPOUNDED MEDICATION    phentermine 15 MG capsule    Bariatric surgery status    Relevant Orders    CBC with platelets    Vitamin B12    Vitamin D Screen    Parathyroid Hormone Intact    Iron and Iron Binding Capacity    Ferritin    Postsurgical malabsorption     1/28/2019 Kay-en-Y with cholecystectomy with Dr. Calvin  Continue taking recommended post-op vitamins.  Labs ordered per protocol.  Recommend schedule with dietitians.  Added broad labs w/some hand paresthesia - again suspect structural/question carpal tunnel but will add corresponding screening labs.         Relevant Orders    CBC with platelets    Vitamin B12    Vitamin D Screen    Parathyroid Hormone Intact    Iron and Iron Binding Capacity    Ferritin    Vitamin A    Zinc    Copper level    Vitamin B1 whole blood    Vitamin B6    Comprehensive metabolic panel    Class 1 obesity with serious comorbidity and body mass index (BMI) of 34.0 to 34.9 in adult, unspecified obesity type - Primary     1/28/2019 Kay-en-Y with cholecystectomy with Dr. Calvin  Continue phentermine and semaglutide titration. Noting hand tingling - discussed low risk related to semaglutide side effect. Recommend reviewing with PCP/ortho - sounds possible carpal tunnel w/occurring at night and has occurred on/off past few years. Will monitor w/semaglutide dose increase.         Relevant Medications    COMPOUNDED NON-CONTROLLED SUBSTANCE (CMPD RX) - PHARMACY TO MIX COMPOUNDED MEDICATION    phentermine 15 MG capsule    Other Relevant Orders    Comprehensive metabolic panel     Other Visit Diagnoses       Numbness and tingling in both hands        Relevant Orders    Vitamin B1 whole blood    Vitamin B6    Comprehensive metabolic panel           AOM Considerations per Sarah Stacy PA-C :  Phentermine:   Currently taking 30 mg daily  Topiramate:     Hx of kidney stones, paraesthesias.  GLP-1:             On  Wegovy (combo on phentermine nad Wegovy has worked best for her)       Naltrexone:      Candidate.  Was on in past with Wellbutrin, minimal weight loss  Wellbutrin:       Candidate.  Was on in past with Naltexone, minimal weight loss  Metformin:       No Pre-DM since bariatric surgery  Contrave:        No AOM coverage   Qsymia:           No AOM coverage     PATIENT INSTRUCTIONS:  Plan:  Labs ordered. Call [phone] to schedule.  Continue your bariatric vitamins     Talk to your orthopedic doctor/primary care about feeling like hands could be falling asleep at night. Sometimes this is seen with hand conditions such as Carpal Tunnel syndrome. There is a slight chance for tingling symptoms with semaglutide but this is rare. As discussed - I will add more broad some lab testing w/our bariatric surgery history as well.    Increase to 1 mg (20 units) Semaglutide. Be in touch on MyChart if feeling needing to change that dose.     Continue phentermine  - refills sent today.    Schedule annual visit with dietitian team.    FOLLOW-UP:  Call [phone] to schedule next visit      40 minutes spent on the date of the encounter doing chart review, history and exam, result review, counseling, developing plan of care, documentation, and further activities as noted        CHIEF COMPLAINT: Post-bariatric surgery follow-up for annual follow-up of gastric bypass in 2019 with Dr. Calvin.  Last seen by Sarah Stacy PA-C on 9/10/2024 with plan to continue phentermine medically as well as add topiramate.  Plan to check BMP 3 months after starting medication.    Ended up not going with topiramate and pivoted to compounded semaglutide/wegovy - did the 0.25 mg dose. At the 0.5 mg dose (last dose last week - but script was still entered as 0.25 mg and the pharmacy sent both vials). Has been doing 0.5 mg dosing. Reports was planning to start this next month at 1 mg.    Reports generally tolerating both semaglutide and phentermine well.  "Unsure if a side effect or not but has been noting hands feel like they're falling asleep - can feel tingling - more so at night when sleeping. Gets in both hands - more left. Has had off/on for a few years - unsure if w/last Wegovy. She does note new trigger thumb and questions if could be related.    HISTORY OF PRESENT ILLNESS:      12/26/2024    10:16 AM   Questions Regarding Prior Weight Loss Surgery Reviewed With Patient   I had the following weight loss procedure Kay-en-y Gastric Bypass   What year was your surgery? 2018   How has your weight changed since your last visit? I have gained weight   Do you currently have any of the following None of the above   Do you have any concerns today? 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    Weight History:      12/26/2024    10:16 AM   --   What is your highest lifetime weight? 348   What is your lowest weight since surgery? (In pounds) 178     Initial Weight (lbs): 339 lbs  Weight: 199 lb (90.3 kg)  Last Visits Weight: 198 lb (89.8 kg)  Cumulative weight loss (lbs): 140  Weight Loss Percentage: 41.3%    Patient is taking the following bariatric postoperative vitamins:  2 Complete multivitamins with minerals (at different times than calcium)  5000 Int Units of Vitamin D daily   500 mg of calcium  mg of calcium BID  500 mcg of Vitamin B12 sl daily  1000 mcg Vitamin B12 injection monthly  B complex daily   Thiamine weekly  1 Iron/Vit C. Daily    Just \"reported to be taking\" at visit with Sarah this fall in September.    Ultra solo with iron  Vitamin d  Calcium  TID chews  Biotin    MVI has adequate thiamine and vitamin D (pt prefers to Wait until labs are checked)  per last RD visit          12/26/2024    10:16 AM   Questions Regarding Co-Morbidities and Health Concerns Reviewed With Patient   Pre-diabetes Gone away   Diabetes II Never   High Blood Pressure Never   High " cholesterol Gone away   Heartburn/Reflux Improved   Sleep apnea Gone away   PCOS Never   Back pain Improved   Joint pain Stayed the same   Lower leg swelling Gone away           12/26/2024    10:16 AM   Eating Habits   How many meals do you eat per day? 3   Do you snack between meals? Sometimes   How much food are you eating at each meal? 1/2 cup to 1 cup   Are you able to separate your meals and liquids by at least 30 minutes? Yes   Are you able to avoid liquid calories? Yes           12/26/2024    10:16 AM   Exercise Questions Reviewed With Patient   How often do you exercise? 3 to 4 times per week   What is the duration of your exercise (in minutes)? 45 Minutes   What types of exercise do you do? walking   What keeps you from being more active? Lack of Time     Early new years to prioritize.    Social History:      12/26/2024    10:16 AM   --   Are you smoking? No   Are you drinking alcohol? No       Medications:  Current Outpatient Medications   Medication Sig Dispense Refill    amoxicillin (AMOXIL) 500 MG capsule Take 500 mg by mouth 2 times daily.      amoxicillin-clavulanate (AUGMENTIN) 875-125 MG tablet Take 1 tablet by mouth 2 times daily. 20 tablet 0    biotin 5 MG CAPS Take 5,000 mcg by mouth daily @1200      bisacodyl (DULCOLAX) 5 MG EC tablet One day prior to procedure at 3PM take two (2) tablets. If your procedure is BEFORE 11AM, take two (2) tablets at 11PM. If your procedure is AFTER 11AM, day of procedure take (2) tablets at 6AM. 4 tablet 0    Calcium Citrate-Vitamin D (CALCIUM CITRATE CHEWY BITE PO) Take 500 mg by mouth 3 times daily Bariatric Advantage calcium citrate 500 mcg/vitamin D 500 international unit(s) per chew      COMPOUNDED NON-CONTROLLED SUBSTANCE (CMPD RX) - PHARMACY TO MIX COMPOUNDED MEDICATION Semaglutide, inject 1.0 mg subcutaneously once weekly. To start after tolerating 0.5 mg for at least 1 month 4 each 5    cycloSPORINE (RESTASIS) 0.05 % ophthalmic emulsion Place 1 drop into  both eyes daily      doxycycline hyclate (VIBRAMYCIN) 100 MG capsule Take 1 capsule (100 mg) by mouth 2 times daily. 14 capsule 0    levalbuterol (XOPENEX HFA) 45 MCG/ACT inhaler Inhale 1-2 puffs into the lungs every 6 hours as needed for shortness of breath or wheezing 15 g 3    Multiple Vitamins-Minerals (BARIATRIC MULTIVITAMINS/IRON PO) Take 1 capsule by mouth daily Bariatric Advantage Ultra Solo with iron      nystatin-triamcinolone (MYCOLOG II) 312084-4.1 UNIT/GM-% external cream APPLY TO AFFECTED AREA TWO TIMES A DAY UP TO 7 DAYS AS NEEDED FOR RASH 30 g 3    omeprazole (PRILOSEC) 20 MG DR capsule Take 1 capsule (20 mg) by mouth daily. 90 capsule 3    ondansetron (ZOFRAN) 4 MG tablet Take one tablet every six hours for nausea during colonoscopy bowel prepping 3 tablet 0    phentermine 15 MG capsule Take 2 capsules (30 mg) by mouth every morning. 180 capsule 0    polyethylene glycol (GOLYTELY) 236 g suspension One day prior to procedure at 3 pm fill container with water. Cover and shake until mixed well. Drink an 8 oz. glass of mixture every 10-15 minutes until the 1st half of the jug is gone. Place the remainder of the Golytely in the refrigerator. At 8 pm, drink the 2nd half of the jug of Golytely bowel prep. Drink an 8 oz. glass of Golytely every 10-15 minutes until the container of Golytely is gone. 4000 mL 0    polyethylene glycol (MIRALAX) 17 GM/Dose powder Take 1 capful (17g) of Miralax mixed with 8 oz of a clear liquid twice daily for 7 days prior to your scheduled procedure. 238 g 0    vitamin D3 (CHOLECALCIFEROL) 50 mcg (2000 units) tablet Take 1 capsule by mouth every morning        No current facility-administered medications for this visit.         12/26/2024    10:16 AM   --   Do you avoid NSAIDs such as (Ibuprofen, Aleve, Naproxen, Advil)? Yes       ROS:  GI:       12/26/2024    10:16 AM   --   Vomiting No   Diarrhea Yes   Constipation Yes   Swallowing trouble No   Abdominal pain No   Heartburn No  "    Feels is related to semaglutide - reports mild and similar to prior    Skin:       12/26/2024    10:16 AM   BAR RBS ROS - SKIN   Rash in skin folds No     Psych:       12/26/2024    10:16 AM   --   Depression No   Anxiety No     Female Only:       12/26/2024    10:16 AM   BAR RBS ROS -    Female only None of the above   Stress urinary incontinence No       LABS/IMAGING/MEDICAL RECORDS REVIEW:   Reviewed    As noted above.    Osteoporosis Risk Score/FRAX score  [email or web address]  Risk of Hip Fracture: 0.2 (Significant if >3%)  Risk of Overall Fracture: 4.5 (Significant if >20%)    If no prior fx, parent fx hip, smoking, regular glucocorticoid use, RA, secondary OA, alcohol 3+.      PHYSICAL EXAMINATION:  Ht 5' 4\" (1.626 m)   Wt 199 lb (90.3 kg)   LMP 10/01/2016 (Approximate)   BMI 34.16 kg/m    GENERAL: Healthy, alert and no distress  EYES: Eyes grossly normal to inspection.    RESP: No audible wheeze, cough, or visible cyanosis.  No increased work of breathing.    SKIN: Visible skin clear. No significant rash, abnormal pigmentation or lesions.  NEURO: Mentation and speech appropriate for age.  PSYCH: Mentation appears normal, affect normal/bright, judgement and insight intact, normal speech and appearance well-groomed.      Sincerely,      Lurdes Patton PA-C       "

## 2024-12-31 ENCOUNTER — VIRTUAL VISIT (OUTPATIENT)
Dept: SURGERY | Facility: CLINIC | Age: 54
End: 2024-12-31
Payer: COMMERCIAL

## 2024-12-31 VITALS — WEIGHT: 199 LBS | BODY MASS INDEX: 33.97 KG/M2 | HEIGHT: 64 IN

## 2024-12-31 DIAGNOSIS — E66.811 CLASS 1 OBESITY WITH SERIOUS COMORBIDITY AND BODY MASS INDEX (BMI) OF 34.0 TO 34.9 IN ADULT, UNSPECIFIED OBESITY TYPE: Primary | ICD-10-CM

## 2024-12-31 DIAGNOSIS — E66.811 CLASS 1 OBESITY DUE TO EXCESS CALORIES WITH SERIOUS COMORBIDITY AND BODY MASS INDEX (BMI) OF 33.0 TO 33.9 IN ADULT: ICD-10-CM

## 2024-12-31 DIAGNOSIS — K76.0 FATTY LIVER: ICD-10-CM

## 2024-12-31 DIAGNOSIS — K21.9 GASTROESOPHAGEAL REFLUX DISEASE WITHOUT ESOPHAGITIS: ICD-10-CM

## 2024-12-31 DIAGNOSIS — K91.2 POSTSURGICAL MALABSORPTION: ICD-10-CM

## 2024-12-31 DIAGNOSIS — R20.0 NUMBNESS AND TINGLING IN BOTH HANDS: ICD-10-CM

## 2024-12-31 DIAGNOSIS — Z98.84 BARIATRIC SURGERY STATUS: ICD-10-CM

## 2024-12-31 DIAGNOSIS — R20.2 NUMBNESS AND TINGLING IN BOTH HANDS: ICD-10-CM

## 2024-12-31 DIAGNOSIS — E66.09 CLASS 1 OBESITY DUE TO EXCESS CALORIES WITH SERIOUS COMORBIDITY AND BODY MASS INDEX (BMI) OF 33.0 TO 33.9 IN ADULT: ICD-10-CM

## 2024-12-31 PROBLEM — E66.812 CLASS 2 SEVERE OBESITY DUE TO EXCESS CALORIES WITH SERIOUS COMORBIDITY AND BODY MASS INDEX (BMI) OF 35.0 TO 35.9 IN ADULT (H): Status: RESOLVED | Noted: 2024-05-20 | Resolved: 2024-12-31

## 2024-12-31 PROBLEM — E66.01 CLASS 2 SEVERE OBESITY DUE TO EXCESS CALORIES WITH SERIOUS COMORBIDITY AND BODY MASS INDEX (BMI) OF 35.0 TO 35.9 IN ADULT (H): Status: RESOLVED | Noted: 2024-05-20 | Resolved: 2024-12-31

## 2024-12-31 RX ORDER — PHENTERMINE HYDROCHLORIDE 15 MG/1
30 CAPSULE ORAL EVERY MORNING
Qty: 180 CAPSULE | Refills: 0 | Status: SHIPPED | OUTPATIENT
Start: 2024-12-31

## 2024-12-31 NOTE — ASSESSMENT & PLAN NOTE
1/28/2019 Kay-en-Y with cholecystectomy with Dr. Calvin  Continue phentermine and semaglutide titration. Noting hand tingling - discussed low risk related to semaglutide side effect. Recommend reviewing with PCP/ortho - sounds possible carpal tunnel w/occurring at night and has occurred on/off past few years. Will monitor w/semaglutide dose increase.

## 2024-12-31 NOTE — PROGRESS NOTES
ASSESSMENT & PLAN    Ana was seen today for pain.    Diagnoses and all orders for this visit:    Trigger finger of left thumb    Carpal tunnel syndrome of left wrist    Other orders  -     Hand / Upper Extremity Injection/Arthrocentesis: L thumb A1        54 year old female presents with left thumb triggering for the past several weeks with associated pain over the A1 pulley.  She also reports she has numbness tingling at night of the first 3 digits.  Discussed that I can treat her for her trigger thumb today with an injection and that I think the numbness and tingling is more consistent with carpal tunnel syndrome and she can return to be treated for this at any time    Plan:  -Trigger thumb injection performed in clinic today  -Can wear figure 8 splint as needed for the next 2-3 weeks until triggering resolves  -If you continue to have hand numbness/tingling, consider returning to clinic to be treated for this. Can try night splint  -Follow up if symptoms worsen or fail to improve     Hand / Upper Extremity Injection/Arthrocentesis: L thumb A1    Date/Time: 1/2/2025 10:15 AM    Performed by: Graciela Viera DO  Authorized by: Graciela Viera DO    Indications:  Pain and therapeutic  Needle Size:  25 G  Guidance: ultrasound    Approach:  Volar  Condition: trigger finger    Location:  Thumb    Site:  L thumb A1  Medications:  40 mg triamcinolone 40 MG/ML; 1 mL lidocaine 1 %  Outcome:  Tolerated well, no immediate complications  Procedure discussed: discussed risks, benefits, and alternatives    Consent Given by:  Patient  Timeout: timeout called immediately prior to procedure    Prep: patient was prepped and draped in usual sterile fashion     Ultrasound was used to ensure safe and accurate needle placement and injection. Ultrasound images of the procedure were permanently stored.          Dr. Graciela Viera DO, CAQ  [facility]  Sports Medicine     -----  Chief  "Complaint   Patient presents with    Left Hand - Pain     thumb       SUBJECTIVE  Ana Wyman is a/an 54 year old female who is seen as a self referral for evaluation of left hand thumb. This started 2 week(s) ago, with insidious onset. Pain is located over the  entire thumb and radiates down hand  . Symptoms are worsened by using it, bending. She has tried medications: (tylenol). Associated symptoms include: numbness and tingling.  Reports that she has had triggering for the past several weeks, but does have some paresthesias of the first 3 digits at night, which sounds consistent with carpal tunnel syndrome.    The patient is seen alone    Prior injury/Surgical history of affected joint: nothing to date.  Social History/Occupation: manager at [employer]    REVIEW OF SYSTEMS:  Pertinent positives/negative: As stated above in HPI    OBJECTIVE:  /77   Pulse 102   Ht 1.651 m (5' 5\")   Wt 93.8 kg (206 lb 11.2 oz)   LMP 10/01/2016 (Approximate)   BMI 34.40 kg/m     General: Alert and in no distress  CV: Extremities appear well perfused   Resp: normal respiratory effort  MSK:  Left 1st digit exam  Palpation: Tender to palpation at the A1 Pulley  ROM: Full extension, Flexion full  Triggering noted on exam  Sensation grossly intact to light touch  Negative cmc grind       RADIOLOGY:  Final results and radiologist's interpretation available in the [facility] health record.  Images below were personally reviewed and discussed with the patient in the office today.  My personal interpretation of the performed imaging: no new imaging                 Disclaimer: This note consists of text and symbols derived from dictation and/or voice recognition software. As a result, there may be errors in the script that have gone undetected. Please consider this when interpreting information found in this chart.    "

## 2024-12-31 NOTE — PATIENT INSTRUCTIONS
Nice to talk with you today.  Below is the plan discussed.-  . Lurdes Patton PA-C    Plan:  Labs ordered. Call [phone] to schedule.  Continue your bariatric vitamins     Talk to your orthopedic doctor/primary care about feeling like hands could be falling asleep at night. Sometimes this is seen with hand conditions such as Carpal Tunnel syndrome. There is a slight chance for tingling symptoms with semaglutide but this is rare. As discussed - I will add more broad some lab testing w/our bariatric surgery history as well.    Increase to 1 mg (20 units) Semaglutide. Be in touch on MyChart if feeling needing to change that dose.     Continue phentermine  - refills sent today.    Schedule annual visit with dietitian team.    FOLLOW-UP:  Call [phone] to schedule next visit    Bariatric Post Op Guidelines  General:  Follow up annually lifelong.   Obesity is a chronic disease.  Weight gain can be expected. The goal of follow-up visits is to ensure adequate vitamin and protein absorption, evaluate food intake behavior, review exercise/activity level, and assist with weight regain.    To avoid marginal ulcers avoid all forms of tobacco, alcohol in excess, caffeine, and NSAIDS     Exercise is key for weight loss and weight maintenance. Aim for 30-60 minutes of physical activity most days.  Include cardiovascular and strength training.    Continue lifelong vitamins supplementation and annual lab follow up.  All patients should supplement with the following bariatric postoperative vitamins:  2 Complete multivitamins with minerals (at different times than calcium)  Vitamin D 5000 Int Units/125 mg daily   Calcium 600 mg twice daily or 500 mg three times daily   Vitamin B12: 500 mcg sl daily or 1000 mcg Inj monthly  B complex daily or Thiamine 100 mg weekly  1 Iron/Vit C. Daily for females who menstruate and/or as directed    Relay GI symptoms which can be a sign of complications. Inability to tolerate solid food  (chicken, steak, fish) should by need to be evaluated.    There is a 10% increase of Alcohol Use Disorder in patients with bariatric surgery.   Most often occurring around 2 years post op.  Call if you feel alcohol is interfering in your daily life.  We can help.     Nutritional:  Eat 3 meals per day  (No snacks between meals.)  Do not skip meals.  This can cause overeating at the next meal and will prevent adequate protein and nutritional intake.    Aim for 60-80 grams of protein per day.  Always eat your protein first. This assists with optimal nutrition and helps you stay full longer.    Eat your protein first, and then follow with fiber.    Add fiber by including fruits, vegetables, whole grains, and beans.     Portions should be 1 cup per meal.   Continue to use saucer/salad plates, infant/toddler silverware to keep portion sizes small and take small bites.  Make each meal last 20-30 minutes. Always stop eating when satisfied.    Aim for 64 oz. of calorie-free fluids daily.    Avoid drinking 30 min before, during, and 30 min after meal    Avoid high sugar and high fat foods to prevent high calorie intake.   Check nutrition labels for less than 10 grams of sugar and less than 10 grams of fat per serving.    Compound Semaglutide at [facility]  [facility] is offering compounded semaglutide during the time of Wegovy/Ozempic national shortage. Semaglutide is the generic name of Wegovy (for Weight Management) and Ozempic (for Type 2 Diabetes). [facility] is following the highest standards for sterility and compounding practices. Compounding of semaglutide is legal for as long as Wegovy/Ozempic are on the FDA's drug shortage list. When Wegovy/Ozempic are taken off the FDA's shortage list, compounding semaglutide will no longer be available/legal. Pharmacy can provide 1 last refill up to 1 month from resolution of shortage date on FDA drug shortage list. When this occurs,  "patients will have to turn to acquiring Wegovy via its available manufactured pen, look into alternative weight loss medication(s), or stop the medication. Due to high demand of compound semaglutide, orders may take 1-2 weeks to obtain from time of prescribing.     As with any weight loss medication(s), there is a risk of weight regain should you stop semaglutide. It is important to be aware of this risk should you stop compounded semaglutide with no plans to transition back to an alternative injectable option. The use of semaglutide as a weight management medication, is intended for long term use.     Obtaining Medication From Pharmacy:   Automated call will contact patient when pharmacy has received RX. Patient must call [facility] back to speak directly to team member prior to shipping medication to verify patient name, product, shipping, and cost. During this call, pharmacy technician will verify if needles/syringes will be needed and can be added to order for $5 additional cost per 10 unit syringe packs. Vials of compounded semaglutide [facility] from [facility] to your home in a refrigerated box. The vial you will be given is a multi-use vial, meaning that you will use the same vial during the next 28 days for each of your injections. Use a new syringe for each injection. The syringe that will be used to draw up your dose is a \"unit\" syringe, meaning your dose will have an associated \"mg\" and \"units\". Please see your prescription and the below information to verify the dose you should be injecting in UNITS prior to injection.     Storage:  Keep your medication stored in the refrigerator. The vials are to be discarded 28 days after opening (even if there is remaining medication in the vial).     Do not pre-fill syringes from the multi-use vial for future use. Sterility cannot be verified. You should only be drawing up the amount needed for the injection that day, then placing " vial back into refrigerator for storage for next injection.     Common Side Effects:  Side effect profile is the same as Wegovy. Monitor for nausea, diarrhea, constipation, headache, indigestion, tiredness (fatigue), stomach upset or abdominal pain. Less commonly, semaglutide can cause low blood sugar (symptoms: shaky, dizzy, sweaty, agitation). Please reach out to the care team should you feel like this is occurring. It is important to ensure that you are eating consistent meals and not skipping meals. Ensure you are getting at least 64 oz water daily. If any side effects become unmanageable, contact the care team immediately. See Wegovy package insert for rare but serious side effects, contraindications and warnings.     Dosing:  Each week you will have the opportunity/option to increase your dose. However, therapy is individualized for each patient. The below is a general guide should someone increase dosage of compound semaglutide each month.   Week 1-4: Inject 0.25 mg (5 units) subcutaneously once weekly  Week 5-8: If tolerating, increase to 0.5 mg (10 units) once weekly  Week 9-12: If tolerating, increase to 1 mg (20 units) once weekly  Week 13-15: If tolerating, increase to 1.5 mg (30 units) OR 1.7 mg (34 units) once weekly (dosing depending on what was prescribed by provider)  Week 16-19: If tolerating, increase to 2 mg (40 units) once weekly  Week 20 & on: If tolerating, increase to 2.4 mg (48 units) or 2.5 mg (50 units) once weekly (dosing depending on what was prescribed by provider)  *If you are having nausea or other side effects to where you are hesitant to move up to the next dose, stay at the same dose you are on for an additional 2-4 weeks to see if side effect(s) improve/resolve. Make sure to take this time to hydrate and utilizing smaller more consistent meals, such as 4-6 small meals per day.  It may be advantageous to stay at the same dose if you are seeing good efficacy (both on and off the  scale) and having minimal/manageable side effects. If you do not have additional refills on that dose's prescription, please reach out to the clinic.    Mg to Unit Conversion Chart for Reference:         Administration:  Follow the instructions to fill the syringe with medicine. Instructions can be accessed at [email or web address] or by scanning this QR code-    Follow the instructions to inject your medication. Instructions can be accessed at [email or web address]  or by scanning this QR code-      Syringe Disposal:   Place the used syringe in a sharps container. You can buy a sharps container at your local pharmacy.   If you don't have a sharps container, you can use a plastic detergent container with a lid.   Visit Learning About Safe Needle Use and Disposal ([employer]) and safeneedledisposal.org for more information.     Cost:   $230 per month for doses 1 mg or less (one 1 mL vial), $370 per month for doses higher than 1 mg (two 1 mL vials)   Optional $5 per 10 pack unit needle/syringe, no additional RX required    [facility] Phone:  [phone]

## 2024-12-31 NOTE — ASSESSMENT & PLAN NOTE
1/28/2019 Kay-en-Y with cholecystectomy with Dr. Calvin  Continue taking recommended post-op vitamins.  Labs ordered per protocol.  Recommend schedule with dietitians.  Added broad labs w/some hand paresthesia - again suspect structural/question carpal tunnel but will add corresponding screening labs.

## 2025-01-02 ENCOUNTER — OFFICE VISIT (OUTPATIENT)
Dept: ORTHOPEDICS | Facility: CLINIC | Age: 55
End: 2025-01-02
Payer: COMMERCIAL

## 2025-01-02 VITALS
WEIGHT: 206.7 LBS | BODY MASS INDEX: 34.44 KG/M2 | SYSTOLIC BLOOD PRESSURE: 114 MMHG | HEART RATE: 102 BPM | HEIGHT: 65 IN | DIASTOLIC BLOOD PRESSURE: 77 MMHG

## 2025-01-02 DIAGNOSIS — G56.02 CARPAL TUNNEL SYNDROME OF LEFT WRIST: ICD-10-CM

## 2025-01-02 DIAGNOSIS — M65.312 TRIGGER FINGER OF LEFT THUMB: Primary | ICD-10-CM

## 2025-01-02 RX ORDER — TRIAMCINOLONE ACETONIDE 40 MG/ML
40 INJECTION, SUSPENSION INTRA-ARTICULAR; INTRAMUSCULAR
Status: COMPLETED | OUTPATIENT
Start: 2025-01-02 | End: 2025-01-02

## 2025-01-02 RX ORDER — LIDOCAINE HYDROCHLORIDE 10 MG/ML
1 INJECTION, SOLUTION INFILTRATION; PERINEURAL
Status: COMPLETED | OUTPATIENT
Start: 2025-01-02 | End: 2025-01-02

## 2025-01-02 RX ADMIN — TRIAMCINOLONE ACETONIDE 40 MG: 40 INJECTION, SUSPENSION INTRA-ARTICULAR; INTRAMUSCULAR at 10:15

## 2025-01-02 RX ADMIN — LIDOCAINE HYDROCHLORIDE 1 ML: 10 INJECTION, SOLUTION INFILTRATION; PERINEURAL at 10:15

## 2025-01-02 NOTE — PATIENT INSTRUCTIONS
1. Trigger finger of left thumb    2. Carpal tunnel syndrome of left wrist        Plan:  -Trigger thumb injection performed in clinic today  -Can wear figure 8 splint as needed for the next 2-3 weeks until triggering resolves  -If you continue to have hand numbness/tingling, consider returning to clinic to be treated for this. Can try night splint  -Follow up if symptoms worsen or fail to improve     If you had imaging performed/ordered at your appointment today, you can expect to see your radiology results in [place] within approximately 48 business hours.     If you have questions/concerns after your appointment, please send my team a [place] message or call the clinic at [phone].     Dr. Graciela Viera, , [facility]  Sports Medicine and Orthopedics    Dr. Viera's Clinic Locations and Contact Numbers:   [place] APPOINTMENTS: [phone]      [address] [facility] RADIOLOGY: [phone]   [place] [address] PHYSICAL THERAPY: [phone]    HAND THERAPY/OT: [phone]   [place] BILLING QUESTIONS: [phone] [id number] FAX: [phone]   [place] [address]

## 2025-01-02 NOTE — LETTER
1/2/2025      Ana Wyman  [address] [facility] [address]      Dear Colleague,    Thank you for referring your patient, Ana Wyman, to the [facility] [place]. Please see a copy of my visit note below.    ASSESSMENT & PLAN    Ana was seen today for pain.    Diagnoses and all orders for this visit:    Trigger finger of left thumb    Carpal tunnel syndrome of left wrist    Other orders  -     Hand / Upper Extremity Injection/Arthrocentesis: L thumb A1        54 year old female presents with left thumb triggering for the past several weeks with associated pain over the A1 pulley.  She also reports she has numbness tingling at night of the first 3 digits.  Discussed that I can treat her for her trigger thumb today with an injection and that I think the numbness and tingling is more consistent with carpal tunnel syndrome and she can return to be treated for this at any time    Plan:  -Trigger thumb injection performed in clinic today  -Can wear figure 8 splint as needed for the next 2-3 weeks until triggering resolves  -If you continue to have hand numbness/tingling, consider returning to clinic to be treated for this. Can try night splint  -Follow up if symptoms worsen or fail to improve     Hand / Upper Extremity Injection/Arthrocentesis: L thumb A1    Date/Time: 1/2/2025 10:15 AM    Performed by: Graciela Viera DO  Authorized by: Graciela Viera DO    Indications:  Pain and therapeutic  Needle Size:  25 G  Guidance: ultrasound    Approach:  Volar  Condition: trigger finger    Location:  Thumb    Site:  L thumb A1  Medications:  40 mg triamcinolone 40 MG/ML; 1 mL lidocaine 1 %  Outcome:  Tolerated well, no immediate complications  Procedure discussed: discussed risks, benefits, and alternatives    Consent Given by:  Patient  Timeout: timeout called immediately prior to procedure    Prep: patient was prepped and draped in usual sterile  "fashion     Ultrasound was used to ensure safe and accurate needle placement and injection. Ultrasound images of the procedure were permanently stored.          Dr. Graciela Viera DO, CAQ  [facility]  Sports Medicine     -----  Chief Complaint   Patient presents with     Left Hand - Pain     thumb       SUBJECTIVE  Ana Wyman is a/an 54 year old female who is seen as a self referral for evaluation of left hand thumb. This started 2 week(s) ago, with insidious onset. Pain is located over the  entire thumb and radiates down hand  . Symptoms are worsened by using it, bending. She has tried medications: (tylenol). Associated symptoms include: numbness and tingling.  Reports that she has had triggering for the past several weeks, but does have some paresthesias of the first 3 digits at night, which sounds consistent with carpal tunnel syndrome.    The patient is seen alone    Prior injury/Surgical history of affected joint: nothing to date.  Social History/Occupation: manager at [employer]    REVIEW OF SYSTEMS:  Pertinent positives/negative: As stated above in HPI    OBJECTIVE:  /77   Pulse 102   Ht 1.651 m (5' 5\")   Wt 93.8 kg (206 lb 11.2 oz)   LMP 10/01/2016 (Approximate)   BMI 34.40 kg/m     General: Alert and in no distress  CV: Extremities appear well perfused   Resp: normal respiratory effort  MSK:  Left 1st digit exam  Palpation: Tender to palpation at the A1 Pulley  ROM: Full extension, Flexion full  Triggering noted on exam  Sensation grossly intact to light touch  Negative cmc grind       RADIOLOGY:  Final results and radiologist's interpretation available in the [facility] health record.  Images below were personally reviewed and discussed with the patient in the office today.  My personal interpretation of the performed imaging: no new imaging                 Disclaimer: This note consists of text and symbols derived from dictation and/or voice recognition " software. As a result, there may be errors in the script that have gone undetected. Please consider this when interpreting information found in this chart.        Again, thank you for allowing me to participate in the care of your patient.        Sincerely,        Graciela Viera, DO    Electronically signed

## 2025-01-03 ENCOUNTER — LAB (OUTPATIENT)
Dept: LAB | Facility: CLINIC | Age: 55
End: 2025-01-03
Payer: COMMERCIAL

## 2025-01-03 DIAGNOSIS — K91.2 POSTSURGICAL MALABSORPTION: ICD-10-CM

## 2025-01-03 DIAGNOSIS — Z98.84 BARIATRIC SURGERY STATUS: ICD-10-CM

## 2025-01-03 LAB
ERYTHROCYTE [DISTWIDTH] IN BLOOD BY AUTOMATED COUNT: 13.8 % (ref 10–15)
FERRITIN SERPL-MCNC: 81 NG/ML (ref 11–328)
HCT VFR BLD AUTO: 41.9 % (ref 35–47)
HGB BLD-MCNC: 13.5 G/DL (ref 11.7–15.7)
IRON BINDING CAPACITY (ROCHE): 316 UG/DL (ref 240–430)
IRON SATN MFR SERPL: 24 % (ref 15–46)
IRON SERPL-MCNC: 77 UG/DL (ref 37–145)
MCH RBC QN AUTO: 28.9 PG (ref 26.5–33)
MCHC RBC AUTO-ENTMCNC: 32.2 G/DL (ref 31.5–36.5)
MCV RBC AUTO: 90 FL (ref 78–100)
PLATELET # BLD AUTO: 305 10E3/UL (ref 150–450)
PTH-INTACT SERPL-MCNC: 36 PG/ML (ref 15–65)
RBC # BLD AUTO: 4.67 10E6/UL (ref 3.8–5.2)
VIT B12 SERPL-MCNC: 546 PG/ML (ref 232–1245)
VIT D+METAB SERPL-MCNC: 38 NG/ML (ref 20–50)
WBC # BLD AUTO: 7.7 10E3/UL (ref 4–11)

## 2025-01-03 PROCEDURE — 85048 AUTOMATED LEUKOCYTE COUNT: CPT

## 2025-01-03 PROCEDURE — 83540 ASSAY OF IRON: CPT

## 2025-01-03 PROCEDURE — 82607 VITAMIN B-12: CPT

## 2025-01-03 PROCEDURE — 85018 HEMOGLOBIN: CPT

## 2025-01-03 PROCEDURE — 83550 IRON BINDING TEST: CPT

## 2025-01-03 PROCEDURE — 82728 ASSAY OF FERRITIN: CPT

## 2025-01-03 PROCEDURE — 83970 ASSAY OF PARATHORMONE: CPT

## 2025-01-03 PROCEDURE — 84590 ASSAY OF VITAMIN A: CPT

## 2025-01-03 PROCEDURE — 82306 VITAMIN D 25 HYDROXY: CPT

## 2025-01-03 PROCEDURE — 36415 COLL VENOUS BLD VENIPUNCTURE: CPT

## 2025-01-06 LAB
ANNOTATION COMMENT IMP: NORMAL
RETINYL PALMITATE SERPL-MCNC: 0.03 MG/L
VIT A SERPL-MCNC: 0.66 MG/L

## 2025-01-09 ENCOUNTER — MYC MEDICAL ADVICE (OUTPATIENT)
Dept: PEDIATRICS | Facility: CLINIC | Age: 55
End: 2025-01-09

## 2025-01-09 DIAGNOSIS — Z12.11 SPECIAL SCREENING FOR MALIGNANT NEOPLASMS, COLON: Primary | ICD-10-CM

## 2025-01-09 NOTE — TELEPHONE ENCOUNTER
Please see patient MyChart message. RN t'd up new referral for provider approval.     Jin OH RN 1/9/2025 at 2:04 PM

## 2025-01-10 ENCOUNTER — LAB (OUTPATIENT)
Dept: LAB | Facility: CLINIC | Age: 55
End: 2025-01-10
Payer: COMMERCIAL

## 2025-01-10 DIAGNOSIS — R20.0 NUMBNESS AND TINGLING IN BOTH HANDS: ICD-10-CM

## 2025-01-10 DIAGNOSIS — E66.811 CLASS 1 OBESITY WITH SERIOUS COMORBIDITY AND BODY MASS INDEX (BMI) OF 34.0 TO 34.9 IN ADULT, UNSPECIFIED OBESITY TYPE: ICD-10-CM

## 2025-01-10 DIAGNOSIS — R20.2 NUMBNESS AND TINGLING IN BOTH HANDS: ICD-10-CM

## 2025-01-10 DIAGNOSIS — K91.2 POSTSURGICAL MALABSORPTION: ICD-10-CM

## 2025-01-10 LAB
ALBUMIN SERPL BCG-MCNC: 4.5 G/DL (ref 3.5–5.2)
ALP SERPL-CCNC: 118 U/L (ref 40–150)
ALT SERPL W P-5'-P-CCNC: 20 U/L (ref 0–50)
ANION GAP SERPL CALCULATED.3IONS-SCNC: 11 MMOL/L (ref 7–15)
AST SERPL W P-5'-P-CCNC: 22 U/L (ref 0–45)
BILIRUB SERPL-MCNC: 0.3 MG/DL
BUN SERPL-MCNC: 16.7 MG/DL (ref 6–20)
CALCIUM SERPL-MCNC: 9.5 MG/DL (ref 8.8–10.4)
CHLORIDE SERPL-SCNC: 106 MMOL/L (ref 98–107)
CREAT SERPL-MCNC: 0.67 MG/DL (ref 0.51–0.95)
EGFRCR SERPLBLD CKD-EPI 2021: >90 ML/MIN/1.73M2
GLUCOSE SERPL-MCNC: 101 MG/DL (ref 70–99)
HCO3 SERPL-SCNC: 25 MMOL/L (ref 22–29)
POTASSIUM SERPL-SCNC: 3.7 MMOL/L (ref 3.4–5.3)
PROT SERPL-MCNC: 7.3 G/DL (ref 6.4–8.3)
SODIUM SERPL-SCNC: 142 MMOL/L (ref 135–145)

## 2025-01-10 PROCEDURE — 84630 ASSAY OF ZINC: CPT

## 2025-01-10 PROCEDURE — 82525 ASSAY OF COPPER: CPT

## 2025-01-10 PROCEDURE — 36415 COLL VENOUS BLD VENIPUNCTURE: CPT

## 2025-01-10 PROCEDURE — 82435 ASSAY OF BLOOD CHLORIDE: CPT

## 2025-01-10 PROCEDURE — 80053 COMPREHEN METABOLIC PANEL: CPT

## 2025-01-10 PROCEDURE — 84207 ASSAY OF VITAMIN B-6: CPT

## 2025-01-10 PROCEDURE — 84425 ASSAY OF VITAMIN B-1: CPT

## 2025-01-10 PROCEDURE — 84295 ASSAY OF SERUM SODIUM: CPT

## 2025-01-13 LAB
COPPER SERPL-MCNC: 101.9 UG/DL
PYRIDOXAL PHOS SERPL-SCNC: 106.2 NMOL/L
ZINC SERPL-MCNC: 56.3 UG/DL

## 2025-01-14 LAB — VIT B1 PYROPHOSHATE BLD-SCNC: 223 NMOL/L

## 2025-01-16 DIAGNOSIS — K91.2 POSTSURGICAL MALABSORPTION: Primary | ICD-10-CM

## 2025-03-16 ENCOUNTER — TRANSFERRED RECORDS (OUTPATIENT)
Dept: HEALTH INFORMATION MANAGEMENT | Facility: CLINIC | Age: 55
End: 2025-03-16
Payer: COMMERCIAL

## 2025-03-24 ENCOUNTER — TRANSFERRED RECORDS (OUTPATIENT)
Dept: HEALTH INFORMATION MANAGEMENT | Facility: CLINIC | Age: 55
End: 2025-03-24
Payer: COMMERCIAL

## 2025-04-16 ENCOUNTER — OFFICE VISIT (OUTPATIENT)
Dept: URGENT CARE | Facility: URGENT CARE | Age: 55
End: 2025-04-16
Payer: COMMERCIAL

## 2025-04-16 ENCOUNTER — ANCILLARY PROCEDURE (OUTPATIENT)
Dept: GENERAL RADIOLOGY | Facility: CLINIC | Age: 55
End: 2025-04-16
Attending: PHYSICIAN ASSISTANT
Payer: COMMERCIAL

## 2025-04-16 VITALS
SYSTOLIC BLOOD PRESSURE: 102 MMHG | BODY MASS INDEX: 34.03 KG/M2 | RESPIRATION RATE: 23 BRPM | WEIGHT: 204.5 LBS | DIASTOLIC BLOOD PRESSURE: 69 MMHG | OXYGEN SATURATION: 99 % | TEMPERATURE: 97.9 F | HEART RATE: 91 BPM

## 2025-04-16 DIAGNOSIS — R10.9 FLANK PAIN: ICD-10-CM

## 2025-04-16 DIAGNOSIS — R30.0 DYSURIA: Primary | ICD-10-CM

## 2025-04-16 PROBLEM — E88.819 INSULIN RESISTANCE: Status: RESOLVED | Noted: 2020-09-28 | Resolved: 2025-04-16

## 2025-04-16 LAB
ALBUMIN UR-MCNC: NEGATIVE MG/DL
APPEARANCE UR: CLEAR
BACTERIA #/AREA URNS HPF: ABNORMAL /HPF
BILIRUB UR QL STRIP: NEGATIVE
COLOR UR AUTO: YELLOW
GLUCOSE UR STRIP-MCNC: NEGATIVE MG/DL
HGB UR QL STRIP: ABNORMAL
KETONES UR STRIP-MCNC: NEGATIVE MG/DL
LEUKOCYTE ESTERASE UR QL STRIP: ABNORMAL
NITRATE UR QL: NEGATIVE
PH UR STRIP: 5.5 [PH] (ref 5–7)
RBC #/AREA URNS AUTO: ABNORMAL /HPF
SP GR UR STRIP: <=1.005 (ref 1–1.03)
SQUAMOUS #/AREA URNS AUTO: ABNORMAL /LPF
UROBILINOGEN UR STRIP-ACNC: 0.2 E.U./DL
WBC #/AREA URNS AUTO: ABNORMAL /HPF

## 2025-04-16 PROCEDURE — 99213 OFFICE O/P EST LOW 20 MIN: CPT | Performed by: PHYSICIAN ASSISTANT

## 2025-04-16 PROCEDURE — 3074F SYST BP LT 130 MM HG: CPT | Performed by: PHYSICIAN ASSISTANT

## 2025-04-16 PROCEDURE — 74019 RADEX ABDOMEN 2 VIEWS: CPT | Mod: TC | Performed by: RADIOLOGY

## 2025-04-16 PROCEDURE — 81001 URINALYSIS AUTO W/SCOPE: CPT | Performed by: PHYSICIAN ASSISTANT

## 2025-04-16 PROCEDURE — 3078F DIAST BP <80 MM HG: CPT | Performed by: PHYSICIAN ASSISTANT

## 2025-04-16 NOTE — PROGRESS NOTES
Urgent Care Clinic Visit    Chief Complaint   Patient presents with    Urgent Care     Side pains x 2 days/urine frequency/odor/super flushed and hot-had spotting last week and recently after having ablation 5yrs ago-has had kidney stones before               4/16/2025     6:59 PM   Additional Questions   Roomed by Izabella   Accompanied by self     Pre-Provider Visit Orders- Urinalysis UA/UC  Patient reports the following symptoms:  possible urinary tract infection (UTI) , difficulty with urination , frequent urination , and foul-smelling urine   Does the patient report any of the following symptoms: vaginal discharge, vaginal itching, possible yeast infection, has a urinary catheter in place, or unable to void in a specimen cup?  No

## 2025-04-17 NOTE — PATIENT INSTRUCTIONS
Your urine and XR look OK, the final radiology report is not yet in  Push fluids  Strain your urine  Return to clinic or go to ER if you are having fever, chills, severe flank or abdominal pain, vomiting, gross blood in your urine, weakness/lightheadedness/dizziness etc     Monitor your spotting, if symptoms continue, please follow-up with PCP as you will need an Ultrasound

## 2025-04-17 NOTE — PROGRESS NOTES
Assessment & Plan     1. Dysuria (Primary)  - UA Macroscopic with reflex to Microscopic and Culture - Clinic Collect  - UA Microscopic with Reflex to Culture  - Urine Culture Aerobic Bacterial - lab collect; Future  - Urine Culture Aerobic Bacterial - lab collect    2. Flank pain  - XR Abdomen 2 Views; Future  - Stone analysis; Future    UA is without pyuria or hematuria. Her urine culture is pending. No evidence for stone on XR - of course not the most sensitive imaging.   I suspect that her flank pain is muscular, as it is reproducible.   I will have her monitor her symptoms - we discussed s&S for follow-up - and strain her urine. Urine culture is pending.   Severe flank or abdominal pain, vomiting, fever/chills,  lightheadedness, weakness etc follow-up in ER    In regards to vaginal spotting X 2 - this has been very minimal (one drop) I would like her to monitor her symptoms and if it continues to follow-up with PCP.       No follow-ups on file.    Diagnosis and treatment plan was reviewed with patient and/or family.   We went over any labs or imaging. Discussed worsening symptoms or little to no relief despite treatment plan to follow-up with PCP or return to clinic.  Patient verbalizes understanding. All questions were addressed and answered.     Kristyn Monterroso PA-C  [facility] [place]    CHIEF COMPLAINT:   Chief Complaint   Patient presents with    Urgent Care     Side pains x 2 days/urine frequency/odor/super flushed and hot-had spotting last week and recently after having ablation 5yrs ago-has had kidney stones before     Subjective     Ana is a 55 year old female who presents to clinic today for evaluation of flank pain. Started in the past 2 days. She has also had some urinary frequency and urgency.No dysuria. Voiding in small amounts. She feels like she has to go, but it slowly comes out.    Hx of kidney stone one time in the past - approximately 8 years ago. She has not seen any  "blood in the urine.       One time each week, she will get a very small amount of vaginal \"spotting\"    No nausea or vomiting.       Past Medical History:   Diagnosis Date    Allergic rhinitis, cause unspecified     Cellulitis 2005    2 episodes, one with hospitalization [facility]    Complication of anesthesia     MOTHER HAS HISTORY    DUB (dysfunctional uterine bleeding)     Neg EMB 2012    Fibroids     Genital problems     Lichens Schlerosis    GERD (gastroesophageal reflux disease)     Hallux valgus (acquired)     History of steroid therapy     Inhalers, eye drops    Hypersomnia with sleep apnea, unspecified     using CPAP    Kidney stone 05/2018    2 stones    Lichen sclerosus 07/14/2011    Lymph edema 2010- present    Lymphedema bilateral with mixed lipedema. 09/30/2015    Obesity     Pneumonia     Years ago - a few times    PONV (postoperative nausea and vomiting)     Pre-diabetes     Tendonitis currently    Uncomplicated asthma     ((Pt Qnr Sub: ulcer)) mild    Urinary tract infection     A few times in past 10 years    Vision disorder [id number]    Dry Eye    Vitamin D deficiency 03/14/2015     Past Surgical History:   Procedure Laterality Date    BRACHIOPLASTY COSMETIC Bilateral 7/30/2024    Procedure: COSMETIC REVISION BRACHIOPLASTY BILATERAL;  Surgeon: Liliane Mayen MD;  Location:  OR    COLONOSCOPY  05/15/2013    Procedure: COLONOSCOPY;  Colonoscopy;  Surgeon: Koat Blanco MD;  Location:  GI    COLONOSCOPY N/A 10/21/2019    Procedure: COLONOSCOPY, with biopsies by cold forceps;  Surgeon: Lydia Vickers MD;  Location:  GI    COSMETIC ABDOMINOPLASTY Bilateral 2/25/2021    Procedure: COSMETIC BILATERAL BELT LIPECTOMY;  Surgeon: Wade Marquez MD;  Location:  OR    COSMETIC LIFT LOWER EXTREMITY BILATERAL (MEDIAL THIGHS) Bilateral 10/27/2023    Procedure: cosmetic bilateral thigh lift;  Surgeon: Liliane Mayen MD;  Location:  OR    COSMETIC LIFT LOWER EXTREMITY " BILATERAL (MEDIAL THIGHS) Bilateral 7/30/2024    Procedure: COSMETIC REVISION THIGH LIFT MEDIAL BILATERAL;  Surgeon: Liliane Mayen MD;  Location: [facility] OR    COSMETIC LIPOSUCTION, BRACHIOPLASTY, COMBINED Bilateral 8/11/2023    Procedure: BILATERAL COSMETIC BRACHIOPLASTY;  Surgeon: Liliane Mayen MD;  Location: [facility] OR    GASTRIC BYPASS      GYN SURGERY  2016    Uterine Ablation    INCISION AND DRAINAGE TRUNK, COMBINED Left 2/25/2021    Procedure: EVACUATION OF HEMATOMA;  Surgeon: Wade Marquez MD;  Location:  OR    IRRIGATION AND DEBRIDEMENT BONE LOWER EXTREMITY, COMBINED Right 8/12/2024    Procedure: Evacuation right thigh seroma;  Surgeon: Liliane Mayen MD;  Location: [facility] OR    IRRIGATION AND DEBRIDEMENT LOWER EXTREMITY, COMBINED Bilateral 8/12/2024    Procedure: Evacuation left knee hematoma, incision and drainage left thigh abscess;  Surgeon: Liliane Mayen MD;  Location: [facility] OR    LAPAROSCOPIC BYPASS GASTRIC, CHOLECYSTECTOMY, COMBINED N/A 01/28/2019    Procedure: LAPAROSCOPIC GASTRIC BYPASS;  Surgeon: Maykel Calvin MD;  Location:  OR    LAPAROSCOPIC CHOLECYSTECTOMY N/A 04/11/2019    Procedure: LAPAROSCOPIC CHOLECYSTECTOMY;  Surgeon: Maykel Calvin MD;  Location:  OR    lichen sclerosis      MAMMOPLASTY REDUCTION BILATERAL Bilateral 8/11/2023    Procedure: MAMMOPLASTY, REDUCTION, BILATERAL;  Surgeon: Liliane Maeyn MD;  Location:  OR    ORTHOPEDIC SURGERY      PANNICULECTOMY N/A 2/25/2021    Procedure: PANNICULECTOMY;  Surgeon: Wade Marquez MD;  Location:  OR    VASCULAR SURGERY  2015    Vienous closure on both legs    vein closure  12/2016    to prevent lymphedema    ZZC NONSPECIFIC PROCEDURE  1994    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    ZZC NONSPECIFIC PROCEDURE  1999,2001    Foot surgeries x 2 (bunionectomy)    ZZC NONSPECIFIC PROCEDURE  2000    hammer toes     Social History     Tobacco Use    Smoking status: Never      Passive exposure: Never    Smokeless tobacco: Never   Substance Use Topics    Alcohol use: Not Currently     Comment: once per month     Current Outpatient Medications   Medication Sig Dispense Refill    biotin 5 MG CAPS Take 5,000 mcg by mouth daily @1200      Calcium Citrate-Vitamin D (CALCIUM CITRATE CHEWY BITE PO) Take 500 mg by mouth 3 times daily Bariatric Advantage calcium citrate 500 mcg/vitamin D 500 international unit(s) per chew      COMPOUNDED NON-CONTROLLED SUBSTANCE (CMPD RX) - PHARMACY TO MIX COMPOUNDED MEDICATION Semaglutide, inject 2 mg subcutaneously once weekly. 4 each 3    cycloSPORINE (RESTASIS) 0.05 % ophthalmic emulsion Place 1 drop into both eyes daily      levalbuterol (XOPENEX HFA) 45 MCG/ACT inhaler Inhale 1-2 puffs into the lungs every 6 hours as needed for shortness of breath or wheezing 15 g 3    Multiple Vitamins-Minerals (BARIATRIC MULTIVITAMINS/IRON PO) Take 1 capsule by mouth daily Bariatric Advantage Ultra Solo with iron      phentermine 15 MG capsule Take 2 capsules (30 mg) by mouth every morning. 180 capsule 0    Semaglutide-Weight Management (WEGOVY) 1.7 MG/0.75ML pen Inject 1.7 mg subcutaneously once a week. 3 mL 1    vitamin D3 (CHOLECALCIFEROL) 50 mcg (2000 units) tablet Take 1 capsule by mouth every morning       amoxicillin (AMOXIL) 500 MG capsule Take 500 mg by mouth 2 times daily. (Patient not taking: Reported on 4/16/2025)      amoxicillin-clavulanate (AUGMENTIN) 875-125 MG tablet Take 1 tablet by mouth 2 times daily. (Patient not taking: Reported on 4/16/2025) 20 tablet 0    bisacodyl (DULCOLAX) 5 MG EC tablet One day prior to procedure at 3PM take two (2) tablets. If your procedure is BEFORE 11AM, take two (2) tablets at 11PM. If your procedure is AFTER 11AM, day of procedure take (2) tablets at 6AM. 4 tablet 0    doxycycline hyclate (VIBRAMYCIN) 100 MG capsule Take 1 capsule (100 mg) by mouth 2 times daily. (Patient not taking: Reported on 4/16/2025) 14 capsule 0     nystatin-triamcinolone (MYCOLOG II) 149075-5.1 UNIT/GM-% external cream APPLY TO AFFECTED AREA TWO TIMES A DAY UP TO 7 DAYS AS NEEDED FOR RASH (Patient not taking: Reported on 4/16/2025) 30 g 3    omeprazole (PRILOSEC) 20 MG DR capsule Take 1 capsule (20 mg) by mouth daily. (Patient not taking: Reported on 4/16/2025) 90 capsule 3    ondansetron (ZOFRAN) 4 MG tablet Take one tablet every six hours for nausea during colonoscopy bowel prepping (Patient not taking: Reported on 4/16/2025) 3 tablet 0    polyethylene glycol (GOLYTELY) 236 g suspension One day prior to procedure at 3 pm fill container with water. Cover and shake until mixed well. Drink an 8 oz. glass of mixture every 10-15 minutes until the 1st half of the jug is gone. Place the remainder of the Golytely in the refrigerator. At 8 pm, drink the 2nd half of the jug of Golytely bowel prep. Drink an 8 oz. glass of Golytely every 10-15 minutes until the container of Golytely is gone. (Patient not taking: Reported on 4/16/2025) 4000 mL 0    polyethylene glycol (MIRALAX) 17 GM/Dose powder Take 1 capful (17g) of Miralax mixed with 8 oz of a clear liquid twice daily for 7 days prior to your scheduled procedure. (Patient not taking: Reported on 4/16/2025) 238 g 0     No current facility-administered medications for this visit.     Allergies   Allergen Reactions    Nsaids Other (See Comments)     Had gastric bypass - needs to avoid NSAIDS    Clindamycin Diarrhea    Codeine Nausea and Vomiting    Morphine And Codeine Nausea and Vomiting and Unknown    Shellfish Allergy     Sulfamethoxazole-Trimethoprim Rash       10 point ROS of systems were all negative except for pertinent positives noted in my HPI.      Exam:   /69   Pulse 91   Temp 97.9  F (36.6  C) (Tympanic)   Resp 23   Wt 92.8 kg (204 lb 8 oz)   LMP 10/01/2016 (Approximate)   SpO2 99%   BMI 34.03 kg/m    Constitutional: healthy, alert and no distress  ENT: MMM. Throat without tonsillar hypertrophy  or erythema  Neck: neck is supple, no cervical lymphadenopathy or nuchal rigidity  Cardiovascular: RRR  Respiratory: CTA bilaterally, no rhonchi or rales  Gastrointestinal: soft and nontender  BACK: TTP over right flank and right lateral abdomen and chest. Pain is made worse with certain movement.   Skin: no rashes  Neurologic: Moves all extremities.    Results for orders placed or performed in visit on 04/16/25   UA Macroscopic with reflex to Microscopic and Culture - Clinic Collect     Status: Abnormal    Specimen: Urine, Clean Catch   Result Value Ref Range    Color Urine Yellow Colorless, Straw, Light Yellow, Yellow    Appearance Urine Clear Clear    Glucose Urine Negative Negative mg/dL    Bilirubin Urine Negative Negative    Ketones Urine Negative Negative mg/dL    Specific Gravity Urine <=1.005 1.003 - 1.035    Blood Urine Small (A) Negative    pH Urine 5.5 5.0 - 7.0    Protein Albumin Urine Negative Negative mg/dL    Urobilinogen Urine 0.2 0.2, 1.0 E.U./dL    Nitrite Urine Negative Negative    Leukocyte Esterase Urine Small (A) Negative   UA Microscopic with Reflex to Culture     Status: Abnormal   Result Value Ref Range    Bacteria Urine Few (A) None Seen /HPF    RBC Urine 0-2 0-2 /HPF /HPF    WBC Urine 0-5 0-5 /HPF /HPF    Squamous Epithelials Urine Few (A) None Seen /LPF    Narrative    Urine Culture not indicated

## 2025-04-21 ENCOUNTER — PATIENT OUTREACH (OUTPATIENT)
Dept: CARE COORDINATION | Facility: CLINIC | Age: 55
End: 2025-04-21
Payer: COMMERCIAL

## 2025-05-05 ENCOUNTER — PATIENT OUTREACH (OUTPATIENT)
Dept: CARE COORDINATION | Facility: CLINIC | Age: 55
End: 2025-05-05
Payer: COMMERCIAL

## 2025-05-06 ENCOUNTER — LAB (OUTPATIENT)
Dept: LAB | Facility: CLINIC | Age: 55
End: 2025-05-06
Payer: COMMERCIAL

## 2025-05-06 DIAGNOSIS — K91.2 POSTSURGICAL MALABSORPTION: ICD-10-CM

## 2025-05-06 PROCEDURE — 36415 COLL VENOUS BLD VENIPUNCTURE: CPT

## 2025-05-06 PROCEDURE — 84425 ASSAY OF VITAMIN B-1: CPT

## 2025-05-06 PROCEDURE — 84630 ASSAY OF ZINC: CPT

## 2025-05-07 LAB — ZINC SERPL-MCNC: 65.1 UG/DL

## 2025-05-08 ENCOUNTER — RESULTS FOLLOW-UP (OUTPATIENT)
Dept: SURGERY | Facility: CLINIC | Age: 55
End: 2025-05-08

## 2025-05-09 LAB — VIT B1 PYROPHOSHATE BLD-SCNC: 174 NMOL/L

## 2025-06-06 PROBLEM — E66.09 CLASS 1 OBESITY DUE TO EXCESS CALORIES WITH SERIOUS COMORBIDITY AND BODY MASS INDEX (BMI) OF 33.0 TO 33.9 IN ADULT: Status: ACTIVE | Noted: 2019-07-29

## 2025-06-06 PROBLEM — E66.811 CLASS 1 OBESITY DUE TO EXCESS CALORIES WITH SERIOUS COMORBIDITY AND BODY MASS INDEX (BMI) OF 33.0 TO 33.9 IN ADULT: Status: ACTIVE | Noted: 2019-07-29

## 2025-06-17 ENCOUNTER — MYC MEDICAL ADVICE (OUTPATIENT)
Dept: SURGERY | Facility: CLINIC | Age: 55
End: 2025-06-17
Payer: COMMERCIAL

## 2025-06-17 ENCOUNTER — NURSE TRIAGE (OUTPATIENT)
Dept: PEDIATRICS | Facility: CLINIC | Age: 55
End: 2025-06-17
Payer: COMMERCIAL

## 2025-06-17 DIAGNOSIS — E66.811 CLASS 1 OBESITY DUE TO EXCESS CALORIES WITH SERIOUS COMORBIDITY AND BODY MASS INDEX (BMI) OF 33.0 TO 33.9 IN ADULT: ICD-10-CM

## 2025-06-17 DIAGNOSIS — E66.09 CLASS 1 OBESITY DUE TO EXCESS CALORIES WITH SERIOUS COMORBIDITY AND BODY MASS INDEX (BMI) OF 33.0 TO 33.9 IN ADULT: ICD-10-CM

## 2025-06-17 NOTE — TELEPHONE ENCOUNTER
Per pt msg Zepbound 10 mg dose rx closed out at [facility].  Will resend per clinic protocol.  Laura Paulino MS, RD, RN

## 2025-06-18 ASSESSMENT — ASTHMA QUESTIONNAIRES
QUESTION_4 LAST FOUR WEEKS HOW OFTEN HAVE YOU USED YOUR RESCUE INHALER OR NEBULIZER MEDICATION (SUCH AS ALBUTEROL): NOT AT ALL
QUESTION_3 LAST FOUR WEEKS HOW OFTEN DID YOUR ASTHMA SYMPTOMS (WHEEZING, COUGHING, SHORTNESS OF BREATH, CHEST TIGHTNESS OR PAIN) WAKE YOU UP AT NIGHT OR EARLIER THAN USUAL IN THE MORNING: NOT AT ALL
QUESTION_5 LAST FOUR WEEKS HOW WOULD YOU RATE YOUR ASTHMA CONTROL: COMPLETELY CONTROLLED
QUESTION_1 LAST FOUR WEEKS HOW MUCH OF THE TIME DID YOUR ASTHMA KEEP YOU FROM GETTING AS MUCH DONE AT WORK, SCHOOL OR AT HOME: NONE OF THE TIME
QUESTION_2 LAST FOUR WEEKS HOW OFTEN HAVE YOU HAD SHORTNESS OF BREATH: NOT AT ALL
ACT_TOTALSCORE: 25

## 2025-06-18 NOTE — TELEPHONE ENCOUNTER
"S-(situation): Patient sent in my chart message regarding a new breast lump, RN called and spoke with patient to triage.     B-(background): Lump was found yesterday 6/17. Hx of reduction surgery.    A-(assessment): Patient endorsing painful lump, about the size of a nickel. Patient states the pain does radiate.   Denies redness, rash, fever, open wounds, nipple discharge,       R-(recommendations): RN scheduled with patient with Christa Ortiz PA-C 6/19/25 at 3:30pm with 3:10 arrival time. Instructed pt to call back if new or worsening symptoms. Reviewed care advice under care tab with patient. Patient was given an opportunity to ask questions, verbalized understanding of plan, and is agreeable. Christa Ortiz PA-C OK with same day appointment taken.      Raquel NEAL RN on 6/18/2025 at 11:29 AM       Reason for Disposition   Breast lump    Additional Information   Negative: Chest pain   Negative: Breastfeeding questions about baby   Negative: Breastfeeding questions about mother (breast symptoms or feeling sick)   Negative: Breastfeeding questions about mother's medicines and diet   Negative: Postpartum breast pain and swelling, not going to continue breastfeeding / pumping   Negative: Small spot, skin growth or mole   Negative: SEVERE breast pain and fever > 103 F  (39.4 C)   Negative: Patient sounds very sick or weak to the triager   Negative: Breast looks infected (spreading redness, feels hot or painful to touch) and fever   Negative: Breast looks infected (spreading redness, feels hot or painful to touch) and no fever   Negative: Painful rash and multiple small blisters grouped together (i.e., dermatomal distribution or \"band\" or \"stripe\")   Negative: Cuts, burns, or bruises of breasts and suspicious history for the injury    Answer Assessment - Initial Assessment Questions  1. SYMPTOM: \"What's the main symptom you're concerned about?\"  (e.g., lump, nipple discharge, pain, rash )      Lump L outside, more by " "side  2. LOCATION: \"Where is the lump located?\"      L outside, side   3. ONSET: \"When did lump  start?\"      Last night  4. PRIOR HISTORY: \"Do you have any history of prior problems with your breasts?\" (e.g., breast cancer, breast implant, fibrocystic breast disease)      no  5. CAUSE: \"What do you think is causing this symptom?\"      unsure  6. OTHER SYMPTOMS: \"Do you have any other symptoms?\" (e.g., fever, breast pain, nipple discharge, redness or rash)      No   7. PREGNANCY-BREASTFEEDING: \"Is there any chance you are pregnant?\" \"When was your last menstrual period?\" \"Are you breastfeeding?\"      no    Protocols used: Breast Symptoms-A-[place]    "

## 2025-06-19 ENCOUNTER — OFFICE VISIT (OUTPATIENT)
Dept: PEDIATRICS | Facility: CLINIC | Age: 55
End: 2025-06-19
Payer: COMMERCIAL

## 2025-06-19 VITALS
WEIGHT: 205 LBS | OXYGEN SATURATION: 94 % | DIASTOLIC BLOOD PRESSURE: 60 MMHG | RESPIRATION RATE: 18 BRPM | TEMPERATURE: 98.4 F | HEIGHT: 65 IN | SYSTOLIC BLOOD PRESSURE: 104 MMHG | BODY MASS INDEX: 34.16 KG/M2 | HEART RATE: 102 BPM

## 2025-06-19 DIAGNOSIS — M79.622 AXILLARY PAIN, LEFT: Primary | ICD-10-CM

## 2025-06-19 DIAGNOSIS — R22.32 LUMP OF AXILLA, LEFT: ICD-10-CM

## 2025-06-19 PROCEDURE — 3074F SYST BP LT 130 MM HG: CPT | Performed by: PHYSICIAN ASSISTANT

## 2025-06-19 PROCEDURE — 99213 OFFICE O/P EST LOW 20 MIN: CPT | Performed by: PHYSICIAN ASSISTANT

## 2025-06-19 PROCEDURE — 1125F AMNT PAIN NOTED PAIN PRSNT: CPT | Performed by: PHYSICIAN ASSISTANT

## 2025-06-19 PROCEDURE — G2211 COMPLEX E/M VISIT ADD ON: HCPCS | Performed by: PHYSICIAN ASSISTANT

## 2025-06-19 PROCEDURE — 3078F DIAST BP <80 MM HG: CPT | Performed by: PHYSICIAN ASSISTANT

## 2025-06-19 ASSESSMENT — PAIN SCALES - GENERAL: PAINLEVEL_OUTOF10: MODERATE PAIN (4)

## 2025-06-19 NOTE — PROGRESS NOTES
{PROVIDER CHARTING PREFERENCE:783363}    Liza Perez is a 55 year old, presenting for the following health issues:  Breast Problem        6/19/2025     3:13 PM   Additional Questions   Roomed by Tiesha OLIVAS CMA   Accompanied by self         6/19/2025     3:13 PM   Patient Reported Additional Medications   Patient reports taking the following new medications n/a     History of Present Illness       Reason for visit:  Pain/ feel a lump in my left breast  Symptom onset:  3-7 days ago  Symptoms include:  Pain off and on in left breast for a week or two, felt a lump yesterday  Symptom intensity:  Mild  Symptom progression:  Staying the same  Had these symptoms before:  No  What makes it worse:  No  What makes it better:  No   She is taking medications regularly.      See triage note from 6/17/25:   S-(situation): Patient sent in my chart message regarding a new breast lump, RN called and spoke with patient to triage.      B-(background): Lump was found yesterday 6/17. Hx of reduction surgery.     A-(assessment): Patient endorsing painful lump, about the size of a nickel. Patient states the pain does radiate.   Denies redness, rash, fever, open wounds, nipple discharge,         R-(recommendations): RN scheduled with patient with Christa Ortiz PA-C 6/19/25 at 3:30pm with 3:10 arrival time. Instructed pt to call back if new or worsening symptoms. Reviewed care advice under care tab with patient. Patient was given an opportunity to ask questions, verbalized understanding of plan, and is agreeable. Christa Ortiz PA-C OK with same day appointment taken.        Raquel NEAL RN on 6/18/2025 at 11:29 AM         Reason for Disposition    Breast lump    Additional Information    Negative: Chest pain    Negative: Breastfeeding questions about baby    Negative: Breastfeeding questions about mother (breast symptoms or feeling sick)    Negative: Breastfeeding questions about mother's medicines and diet    Negative:  "Postpartum breast pain and swelling, not going to continue breastfeeding / pumping    Negative: Small spot, skin growth or mole    Negative: SEVERE breast pain and fever > 103 F  (39.4 C)    Negative: Patient sounds very sick or weak to the triager    Negative: Breast looks infected (spreading redness, feels hot or painful to touch) and fever    Negative: Breast looks infected (spreading redness, feels hot or painful to touch) and no fever    Negative: Painful rash and multiple small blisters grouped together (i.e., dermatomal distribution or \"band\" or \"stripe\")    Negative: Cuts, burns, or bruises of breasts and suspicious history for the injury    Answer Assessment - Initial Assessment Questions  1. SYMPTOM: \"What's the main symptom you're concerned about?\"  (e.g., lump, nipple discharge, pain, rash )      Lump L outside, more by side  2. LOCATION: \"Where is the lump located?\"      L outside, side   3. ONSET: \"When did lump  start?\"      Last night  4. PRIOR HISTORY: \"Do you have any history of prior problems with your breasts?\" (e.g., breast cancer, breast implant, fibrocystic breast disease)      no  5. CAUSE: \"What do you think is causing this symptom?\"      unsure  6. OTHER SYMPTOMS: \"Do you have any other symptoms?\" (e.g., fever, breast pain, nipple discharge, redness or rash)      No   7. PREGNANCY-BREASTFEEDING: \"Is there any chance you are pregnant?\" \"When was your last menstrual period?\" \"Are you breastfeeding?\"      no    Protocols used: Breast Symptoms-A-[place]  {MA/LPN/RN Pre-Provider Visit Orders- hCG/UA/Strep (Optional):210673}  {SUPERLIST (Optional):569787}  {additonal problems for provider to add (Optional):507469}    {ROS Picklists (Optional):673409}      Objective    /60 (BP Location: Right arm, Patient Position: Sitting)   Pulse 102   Temp 98.4  F (36.9  C) (Temporal)   Resp 18   Ht 1.651 m (5' 5\")   Wt 93 kg (205 lb)   LMP 10/01/2016 (Approximate)   SpO2 94%   BMI 34.11 kg/m    Body " mass index is 34.11 kg/m .  Physical Exam   {Exam List (Optional):419514}    {Diagnostic Test Results (Optional):153368}        Signed Electronically by: Christa Ortiz PA-C  {Email feedback regarding this note to [email or web address]   :[id number]}

## 2025-06-21 ENCOUNTER — HEALTH MAINTENANCE LETTER (OUTPATIENT)
Age: 55
End: 2025-06-21

## 2025-06-24 ENCOUNTER — HOSPITAL ENCOUNTER (OUTPATIENT)
Dept: MAMMOGRAPHY | Facility: CLINIC | Age: 55
Discharge: HOME | End: 2025-06-24
Attending: PHYSICIAN ASSISTANT
Payer: COMMERCIAL

## 2025-06-24 DIAGNOSIS — R22.32 LUMP OF AXILLA, LEFT: ICD-10-CM

## 2025-06-24 DIAGNOSIS — M79.622 AXILLARY PAIN, LEFT: ICD-10-CM

## 2025-06-24 PROCEDURE — 77061 BREAST TOMOSYNTHESIS UNI: CPT | Mod: LT

## 2025-06-24 PROCEDURE — 76882 US LMTD JT/FCL EVL NVASC XTR: CPT | Mod: LT

## 2025-07-11 NOTE — PROGRESS NOTES
Pt is completing a home sleep test. Pt was instructed on how to put on the Noxturnal T3 device and associated equipment before going to bed and given the opportunity to practice putting it on before leaving the sleep center. Pt was reminded to bring the home sleep test kit back to the center tomorrow, at agreed upon time for download and reporting.     
This HSAT was performed using a Noxturnal T3 device which recorded snore, sound, movement activity, body position, nasal pressure, oronasal thermal airflow, pulse, oximetry and both chest and abdominal respiratory effort. HSAT data was restricted to the time patient states they were in bed.     HSAT was scored using 1B 4% hypopnea rule.     HST AHI (Non-PAT): 0.9  Snoring was reported as mild.  Time with SpO2 below 89% was 0 minutes.   Overall signal quality was good     Pt will follow up with sleep provider to determine appropriate therapy.     HST POST-STUDY QUESTIONNAIRE    1. What time did you go to bed?  10:00 p.m.  2. How long do you think it took to fall asleep?  30 - 40 min  3. What time did you wake up to start the day?  7 a.m  4. Did you get up during the night at all?  yes  5. If you woke up, do you remember approximately what time(s)? 10:30, 11:40, 12:00, 2:15, 4:20  6. Did you have any difficulty with the equipment?  No  7. Did you us any type of treatment with this study?  None  8. Was the head of the bed elevated? Yes  9. Did you sleep in a recliner?  No  10. Did you stop using CPAP at least 3 days before this test?  No I was retesting so didn't have advanced notice to stop CPAP.  11. Any other information you'd like us to know?      
Attending Only

## (undated) DEVICE — SOL NACL 0.9% IRRIG 1000ML BOTTLE 2F7124

## (undated) DEVICE — SUCTION CANISTER MEDIVAC LINER 3000ML W/LID 65651-530

## (undated) DEVICE — DECANTER BAG 2002S

## (undated) DEVICE — ESU GROUND PAD UNIVERSAL W/O CORD

## (undated) DEVICE — STPL CIRCULAR XL 21MM W/TILT TIP EEAXL21

## (undated) DEVICE — SU MONOCRYL 0 CT-1 36" UND Y946H

## (undated) DEVICE — SU VICRYL 3-0 SH 27" J316H

## (undated) DEVICE — PAD CHUX UNDERPAD 23X24" 7136

## (undated) DEVICE — ENDO TROCAR FIRST ENTRY KII FIOS Z-THRD 11X100MM CTF33

## (undated) DEVICE — SU VICRYL 0 UR-6 27" J603H

## (undated) DEVICE — ENDO SHEARS 5MM 176643

## (undated) DEVICE — ESU ELEC BLADE HEX-LOCKING 2.5" E1450X

## (undated) DEVICE — SU PDS II 0 ENDOLOOP EZ10G

## (undated) DEVICE — LINEN HALF SHEET 5512

## (undated) DEVICE — BLADE KNIFE SURG 10 371110

## (undated) DEVICE — DRAIN JACKSON PRATT 15FR ROUND SU130-1323

## (undated) DEVICE — ENDO POUCH UNIVERSAL RETRIEVAL SYSTEM INZII 5MM CD003

## (undated) DEVICE — SOL ADH LIQUID BENZOIN SWAB 0.6ML C1544

## (undated) DEVICE — SU VICRYL 3-0 SH 27" UND J416H

## (undated) DEVICE — DRSG ABDOMINAL 07 1/2X8" 7197D

## (undated) DEVICE — LINEN TOWEL PACK X5 5464

## (undated) DEVICE — PACK LAP CHOLE SLC15LCFSD

## (undated) DEVICE — SOL NACL 0.9% IRRIG 1000ML BOTTLE 07138-09

## (undated) DEVICE — ENDO FORCEP ENDOJAW BIOPSY 2.8MMX230CM FB-220U

## (undated) DEVICE — CLIP APPLIER ENDO ROTATING 10MM MED/LG ER320

## (undated) DEVICE — DRSG STERI STRIP 1/2X4" R1547

## (undated) DEVICE — TUBING SUCTION MEDI-VAC SOFT 3/16"X20' N520A

## (undated) DEVICE — GLOVE BIOGEL PI MICRO SZ 6.5 48565

## (undated) DEVICE — ESU GROUND PAD ADULT W/CORD E7507

## (undated) DEVICE — PACK MINOR SBA15MIFSE

## (undated) DEVICE — ESU HARMONIC ACE LAP SHEARS STRYKER ACE+ 7 5MMX36CM HARH36

## (undated) DEVICE — SYR BULB IRRIG DOVER 60 ML LATEX FREE 67000

## (undated) DEVICE — STPL POWERED ECHELON 60MM PSEE60A

## (undated) DEVICE — PREP SKIN SCRUB TRAY 4461A

## (undated) DEVICE — SU PDS II 2-0 CT-2 27"  Z333H

## (undated) DEVICE — SYR 03ML LL W/O NDL 309657

## (undated) DEVICE — SU MONOCRYL 4-0 PS-2 18" UND Y496G

## (undated) DEVICE — DRSG GAUZE 4X4" TRAY

## (undated) DEVICE — DRSG STERI STRIP 1X5" R1548

## (undated) DEVICE — TUBING SUCTION LIPECTOMY

## (undated) DEVICE — SPONGE LAP 18X18" X8435

## (undated) DEVICE — SU MONOCRYL 5-0 P-3 18" UND Y493G

## (undated) DEVICE — ESU PENCIL W/SMOKE EVAC NEPTUNE STRYKER 0703-046-000

## (undated) DEVICE — DEVICE SUTURE GRASPER TROCAR CLOSURE 14GA PMITCSG

## (undated) DEVICE — DRAPE SHEET REV FOLD 3/4 9349

## (undated) DEVICE — SYSTEM CLEARIFY VISUALIZATION 21-345

## (undated) DEVICE — SU SILK 2-0 FS-1 18" 685G

## (undated) DEVICE — Device

## (undated) DEVICE — TUBING INFUSION INFILTRATION LIPOSUCTION 156" 24-6008

## (undated) DEVICE — DRSG GAUZE 4X4" 3033

## (undated) DEVICE — SOL WATER IRRIG 1000ML BOTTLE 2F7114

## (undated) DEVICE — PACK MAJOR SBA15MAFSI

## (undated) DEVICE — SUCTION IRR STRYKERFLOW II W/TIP 250-070-520

## (undated) DEVICE — STPL RELOAD REG TISSUE ECHELON 60 X 3.6MM BLUE GST60B

## (undated) DEVICE — SU NUROLON 0 CT-1 CR 8X18" C521D

## (undated) DEVICE — LINEN FULL SHEET 5511

## (undated) DEVICE — DRAPE STOCKINETTE IMPERVIOUS 12" 1587

## (undated) DEVICE — ESU ELEC BLADE 2.75" COATED/INSULATED E1455

## (undated) DEVICE — SOL NACL 0.9% INJ 1000ML BAG 2B1324X

## (undated) DEVICE — TUBE GASTRIC EVACUATOR STOMACH 32FR LATEX

## (undated) DEVICE — DRSG KERLIX FLUFFS X5

## (undated) DEVICE — CATH TRAY FOLEY 16FR BARDEX W/DRAIN BAG STATLOCK 300316A

## (undated) DEVICE — ESU HOLDER LAP INST DISP PURPLE LONG 330MM H-PRO-330

## (undated) DEVICE — SU VICRYL 3-0 TIE 12X18" J904T

## (undated) DEVICE — GLOVE GAMMEX DERMAPRENE ULTRA SZ 8.5 LF 8517

## (undated) DEVICE — GLOVE BIOGEL PI MICRO SZ 7.5 48575

## (undated) DEVICE — DRSG ABDOMINAL 12X16"

## (undated) DEVICE — ENDO TROCAR SLEEVE KII Z-THREADED 05X100MM CTS02

## (undated) DEVICE — EVAC SYSTEM CLEAR FLOW SC082500

## (undated) DEVICE — DRAIN JACKSON PRATT RESERVOIR 100ML SU130-1305

## (undated) DEVICE — DRSG XEROFORM 1X8"

## (undated) DEVICE — DRSG GAUZE 2X2" 8042

## (undated) DEVICE — SYR BULB IRRIG 50ML LATEX FREE 0035280

## (undated) DEVICE — STPL SKIN 35W ROTATING HEAD PRW35

## (undated) DEVICE — SU PDS II 0 CTX 60" Z990G

## (undated) DEVICE — PEN MARKING SKIN

## (undated) DEVICE — CATH TRAY FOLEY SURESTEP 16FR W/URNE MTR STLK LATEX A303316A

## (undated) DEVICE — KIT ENDO TURNOVER/PROCEDURE W/CLEAN A SCOPE LINERS 103888

## (undated) DEVICE — TUBING INFILTRATION SINGLE SPIKE 188" 24-6001-00

## (undated) DEVICE — ESU CORD MONOPOLAR 10'  E0510

## (undated) DEVICE — DRSG KERLIX 4 1/2"X4YDS ROLL 6715

## (undated) DEVICE — PEN MARKING SKIN W/LABELS 31145884

## (undated) DEVICE — GOWN XLG DISP 9545

## (undated) DEVICE — PREP CHLORAPREP 26ML TINTED ORANGE  260815

## (undated) DEVICE — CLIP ETHICON LIGACLIP MED WHITE LT200

## (undated) DEVICE — PACK MINOR CUSTOM RIDGES SBA32RMRMA

## (undated) DEVICE — BNDG ABDOMINAL BINDER 9X62-84" 79-89210

## (undated) DEVICE — SUCTION CANISTER MEDIVAC LINER 1500ML W/LID 65651-515

## (undated) DEVICE — DRSG TEGADERM 4X4 3/4" 1626

## (undated) DEVICE — DRSG XEROFORM 5X9" 8884431605

## (undated) DEVICE — KIT CULTURE ESWAB AEROBE/ANAEROBE WHITE TOP R723480

## (undated) DEVICE — SU MONOCRYL 4-0 PS-1 27'  UND Y935H

## (undated) DEVICE — BNDG ABDOMINAL BINDER 9X45-62" 79-89071

## (undated) DEVICE — TUBING SUCTION 12"X1/4" N612

## (undated) DEVICE — BAG CLEAR TRASH 1.3M 39X33" P4040C

## (undated) DEVICE — BLADE KNIFE SURG 11 371111

## (undated) DEVICE — GLOVE PROTEXIS BLUE W/NEU-THERA 7.5  2D73EB75

## (undated) DEVICE — APPLICATOR COTTON TIP 6"X2 STERILE LF 6012

## (undated) DEVICE — GLOVE BIOGEL PI ULTRATOUCH G SZ 6.5 42165

## (undated) DEVICE — SU VICRYL 4-0 PS-2 18" UND J496H

## (undated) DEVICE — SUCTION TIP YANKAUER W/O VENT K86

## (undated) DEVICE — DRAPE BREAST/CHEST 29420

## (undated) DEVICE — CLOSURE SYS SKIN PREMIERPRO EXOFIN FUSION 4X22CM STRL 3472

## (undated) DEVICE — SU MONOCRYL 3-0 PS-2 27" Y427H

## (undated) DEVICE — GLOVE BIOGEL PI MICRO SZ 8.0 48580

## (undated) DEVICE — ENDO TROCAR SLEEVE KII Z-THREADED 12X100MM CTS22

## (undated) DEVICE — SU SILK 2-0 FSL 18" 677G

## (undated) DEVICE — SU PLAIN FAST ABSORB 5-0 PC-1 18" 1915G

## (undated) DEVICE — DRSG BANDAID 3/4X3"

## (undated) DEVICE — PAD CHUX UNDERPAD 30X30"

## (undated) DEVICE — INTRODUCER EEA STPL TRANS ANAL/ABD 21MM EEATAID21D

## (undated) DEVICE — ENDO TROCAR FIRST ENTRY KII FIOS Z-THRD 05X100MM CTF03

## (undated) DEVICE — SU PDS II 0 CT 36" Z358T

## (undated) DEVICE — SU PDS II 2-0 CT-1 27" Z339H

## (undated) DEVICE — PREP CHLORAPREP 26ML TINTED HI-LITE ORANGE 930815

## (undated) DEVICE — SU SILK 2-0 SH 30" K833H

## (undated) DEVICE — ENDO TROCAR FIRST ENTRY KII FIOS Z-THRD 12X100MM CTF73

## (undated) DEVICE — DRAIN PENROSE 0.50"X18" LATEX FREE GR203

## (undated) DEVICE — SYR 50ML CATH TIP W/O NDL 309620

## (undated) DEVICE — SU PROLENE 2-0 CT-2 30" 8411H

## (undated) DEVICE — DRSG TEGADERM 2 1/2X 2 3/4"

## (undated) DEVICE — SU MONOCRYL 4-0 P-3 18" UND Y494G

## (undated) DEVICE — LINEN TOWEL PACK X10 5473

## (undated) DEVICE — STPL SKIN SUBCUTICULAR INSORB  2030

## (undated) DEVICE — SU VICRYL 2-0 SH 27" J317H

## (undated) DEVICE — SPONGE LAP 18X18" 23250-400

## (undated) DEVICE — PREP CHLORHEXIDINE 4% 4OZ (HIBICLENS) 57504

## (undated) DEVICE — SU VICRYL 0 TIE 12X18" J906G

## (undated) DEVICE — BLADE CLIPPER 4406

## (undated) DEVICE — BNDG ELASTIC 6" DBL LENGTH UNSTERILE 6611-16

## (undated) DEVICE — DRAIN JACKSON PRATT 15FR ROUND SIL LF JP-2229

## (undated) DEVICE — SU ETHILON 5-0 P-3 18" BLACK 698G

## (undated) DEVICE — DRSG KERLIX 4 1/2"X4YDS ROLL 6730

## (undated) DEVICE — BLADE KNIFE SURG 15 371115

## (undated) DEVICE — ENDO POUCH UNIV RETRIEVAL SYSTEM INZII 10MM CD001

## (undated) RX ORDER — HYDROCODONE BITARTRATE AND ACETAMINOPHEN 5; 325 MG/1; MG/1
TABLET ORAL
Status: DISPENSED
Start: 2023-08-11

## (undated) RX ORDER — CEFAZOLIN SODIUM 1 G/3ML
INJECTION, POWDER, FOR SOLUTION INTRAMUSCULAR; INTRAVENOUS
Status: DISPENSED
Start: 2021-02-25

## (undated) RX ORDER — ONDANSETRON 2 MG/ML
INJECTION INTRAMUSCULAR; INTRAVENOUS
Status: DISPENSED
Start: 2021-02-25

## (undated) RX ORDER — VECURONIUM BROMIDE 1 MG/ML
INJECTION, POWDER, LYOPHILIZED, FOR SOLUTION INTRAVENOUS
Status: DISPENSED
Start: 2021-02-25

## (undated) RX ORDER — PROPOFOL 10 MG/ML
INJECTION, EMULSION INTRAVENOUS
Status: DISPENSED
Start: 2023-10-27

## (undated) RX ORDER — FENTANYL CITRATE 50 UG/ML
INJECTION, SOLUTION INTRAMUSCULAR; INTRAVENOUS
Status: DISPENSED
Start: 2021-02-25

## (undated) RX ORDER — PROPOFOL 10 MG/ML
INJECTION, EMULSION INTRAVENOUS
Status: DISPENSED
Start: 2021-02-25

## (undated) RX ORDER — LIDOCAINE HYDROCHLORIDE 20 MG/ML
INJECTION, SOLUTION EPIDURAL; INFILTRATION; INTRACAUDAL; PERINEURAL
Status: DISPENSED
Start: 2019-01-28

## (undated) RX ORDER — PROPOFOL 10 MG/ML
INJECTION, EMULSION INTRAVENOUS
Status: DISPENSED
Start: 2023-08-11

## (undated) RX ORDER — ONDANSETRON 2 MG/ML
INJECTION INTRAMUSCULAR; INTRAVENOUS
Status: DISPENSED
Start: 2019-04-11

## (undated) RX ORDER — ONDANSETRON 2 MG/ML
INJECTION INTRAMUSCULAR; INTRAVENOUS
Status: DISPENSED
Start: 2023-08-11

## (undated) RX ORDER — HEPARIN SODIUM 5000 [USP'U]/.5ML
INJECTION, SOLUTION INTRAVENOUS; SUBCUTANEOUS
Status: DISPENSED
Start: 2019-01-28

## (undated) RX ORDER — CEFAZOLIN SODIUM 2 G/100ML
INJECTION, SOLUTION INTRAVENOUS
Status: DISPENSED
Start: 2021-02-25

## (undated) RX ORDER — LIDOCAINE HYDROCHLORIDE 20 MG/ML
INJECTION, SOLUTION EPIDURAL; INFILTRATION; INTRACAUDAL; PERINEURAL
Status: DISPENSED
Start: 2021-02-25

## (undated) RX ORDER — APREPITANT 40 MG/1
CAPSULE ORAL
Status: DISPENSED
Start: 2023-10-27

## (undated) RX ORDER — CEFAZOLIN SODIUM 1 G/3ML
INJECTION, POWDER, FOR SOLUTION INTRAMUSCULAR; INTRAVENOUS
Status: DISPENSED
Start: 2023-08-11

## (undated) RX ORDER — FENTANYL CITRATE 0.05 MG/ML
INJECTION, SOLUTION INTRAMUSCULAR; INTRAVENOUS
Status: DISPENSED
Start: 2023-08-11

## (undated) RX ORDER — PROPOFOL 10 MG/ML
INJECTION, EMULSION INTRAVENOUS
Status: DISPENSED
Start: 2024-07-30

## (undated) RX ORDER — HYDROMORPHONE HYDROCHLORIDE 1 MG/ML
INJECTION, SOLUTION INTRAMUSCULAR; INTRAVENOUS; SUBCUTANEOUS
Status: DISPENSED
Start: 2021-02-26

## (undated) RX ORDER — HEPARIN SODIUM 5000 [USP'U]/.5ML
INJECTION, SOLUTION INTRAVENOUS; SUBCUTANEOUS
Status: DISPENSED
Start: 2021-02-25

## (undated) RX ORDER — FENTANYL CITRATE 50 UG/ML
INJECTION, SOLUTION INTRAMUSCULAR; INTRAVENOUS
Status: DISPENSED
Start: 2019-04-11

## (undated) RX ORDER — FENTANYL CITRATE 50 UG/ML
INJECTION, SOLUTION INTRAMUSCULAR; INTRAVENOUS
Status: DISPENSED
Start: 2023-08-11

## (undated) RX ORDER — DEXAMETHASONE SODIUM PHOSPHATE 4 MG/ML
INJECTION, SOLUTION INTRA-ARTICULAR; INTRALESIONAL; INTRAMUSCULAR; INTRAVENOUS; SOFT TISSUE
Status: DISPENSED
Start: 2023-08-11

## (undated) RX ORDER — HYDROMORPHONE HYDROCHLORIDE 1 MG/ML
INJECTION, SOLUTION INTRAMUSCULAR; INTRAVENOUS; SUBCUTANEOUS
Status: DISPENSED
Start: 2019-01-28

## (undated) RX ORDER — CEFAZOLIN SODIUM/WATER 2 G/20 ML
SYRINGE (ML) INTRAVENOUS
Status: DISPENSED
Start: 2024-07-30

## (undated) RX ORDER — LIDOCAINE HYDROCHLORIDE 10 MG/ML
INJECTION, SOLUTION EPIDURAL; INFILTRATION; INTRACAUDAL; PERINEURAL
Status: DISPENSED
Start: 2019-04-11

## (undated) RX ORDER — ONDANSETRON 2 MG/ML
INJECTION INTRAMUSCULAR; INTRAVENOUS
Status: DISPENSED
Start: 2023-10-27

## (undated) RX ORDER — DEXAMETHASONE SODIUM PHOSPHATE 4 MG/ML
INJECTION, SOLUTION INTRA-ARTICULAR; INTRALESIONAL; INTRAMUSCULAR; INTRAVENOUS; SOFT TISSUE
Status: DISPENSED
Start: 2021-02-25

## (undated) RX ORDER — SCOLOPAMINE TRANSDERMAL SYSTEM 1 MG/1
PATCH, EXTENDED RELEASE TRANSDERMAL
Status: DISPENSED
Start: 2023-08-11

## (undated) RX ORDER — FENTANYL CITRATE 0.05 MG/ML
INJECTION, SOLUTION INTRAMUSCULAR; INTRAVENOUS
Status: DISPENSED
Start: 2023-10-27

## (undated) RX ORDER — DEXAMETHASONE SODIUM PHOSPHATE 4 MG/ML
INJECTION, SOLUTION INTRA-ARTICULAR; INTRALESIONAL; INTRAMUSCULAR; INTRAVENOUS; SOFT TISSUE
Status: DISPENSED
Start: 2024-07-30

## (undated) RX ORDER — HEPARIN SODIUM 5000 [USP'U]/.5ML
INJECTION, SOLUTION INTRAVENOUS; SUBCUTANEOUS
Status: DISPENSED
Start: 2023-10-27

## (undated) RX ORDER — LIDOCAINE HYDROCHLORIDE 10 MG/ML
INJECTION, SOLUTION EPIDURAL; INFILTRATION; INTRACAUDAL; PERINEURAL
Status: DISPENSED
Start: 2024-08-12

## (undated) RX ORDER — HYDROMORPHONE HYDROCHLORIDE 1 MG/ML
INJECTION, SOLUTION INTRAMUSCULAR; INTRAVENOUS; SUBCUTANEOUS
Status: DISPENSED
Start: 2021-02-25

## (undated) RX ORDER — APREPITANT 40 MG/1
CAPSULE ORAL
Status: DISPENSED
Start: 2023-08-11

## (undated) RX ORDER — ACETAMINOPHEN 500 MG
TABLET ORAL
Status: DISPENSED
Start: 2023-08-11

## (undated) RX ORDER — FENTANYL CITRATE 50 UG/ML
INJECTION, SOLUTION INTRAMUSCULAR; INTRAVENOUS
Status: DISPENSED
Start: 2019-10-21

## (undated) RX ORDER — APREPITANT 40 MG/1
CAPSULE ORAL
Status: DISPENSED
Start: 2024-07-30

## (undated) RX ORDER — LIDOCAINE HYDROCHLORIDE 20 MG/ML
INJECTION, SOLUTION EPIDURAL; INFILTRATION; INTRACAUDAL; PERINEURAL
Status: DISPENSED
Start: 2019-04-11

## (undated) RX ORDER — HYDROMORPHONE HCL IN WATER/PF 6 MG/30 ML
PATIENT CONTROLLED ANALGESIA SYRINGE INTRAVENOUS
Status: DISPENSED
Start: 2024-07-30

## (undated) RX ORDER — GLYCOPYRROLATE 0.2 MG/ML
INJECTION, SOLUTION INTRAMUSCULAR; INTRAVENOUS
Status: DISPENSED
Start: 2024-08-12

## (undated) RX ORDER — DEXAMETHASONE SODIUM PHOSPHATE 4 MG/ML
INJECTION, SOLUTION INTRA-ARTICULAR; INTRALESIONAL; INTRAMUSCULAR; INTRAVENOUS; SOFT TISSUE
Status: DISPENSED
Start: 2024-08-12

## (undated) RX ORDER — PROPOFOL 10 MG/ML
INJECTION, EMULSION INTRAVENOUS
Status: DISPENSED
Start: 2019-04-11

## (undated) RX ORDER — SIMETHICONE 40MG/0.6ML
SUSPENSION, DROPS(FINAL DOSAGE FORM)(ML) ORAL
Status: DISPENSED
Start: 2019-10-21

## (undated) RX ORDER — FENTANYL CITRATE 50 UG/ML
INJECTION, SOLUTION INTRAMUSCULAR; INTRAVENOUS
Status: DISPENSED
Start: 2023-10-27

## (undated) RX ORDER — GABAPENTIN 300 MG/1
CAPSULE ORAL
Status: DISPENSED
Start: 2021-02-25

## (undated) RX ORDER — BUPIVACAINE HYDROCHLORIDE AND EPINEPHRINE 5; 5 MG/ML; UG/ML
INJECTION, SOLUTION EPIDURAL; INTRACAUDAL; PERINEURAL
Status: DISPENSED
Start: 2019-04-11

## (undated) RX ORDER — SCOLOPAMINE TRANSDERMAL SYSTEM 1 MG/1
PATCH, EXTENDED RELEASE TRANSDERMAL
Status: DISPENSED
Start: 2019-04-11

## (undated) RX ORDER — HYDROMORPHONE HYDROCHLORIDE 1 MG/ML
INJECTION, SOLUTION INTRAMUSCULAR; INTRAVENOUS; SUBCUTANEOUS
Status: DISPENSED
Start: 2023-10-27

## (undated) RX ORDER — DEXAMETHASONE SODIUM PHOSPHATE 4 MG/ML
INJECTION, SOLUTION INTRA-ARTICULAR; INTRALESIONAL; INTRAMUSCULAR; INTRAVENOUS; SOFT TISSUE
Status: DISPENSED
Start: 2023-10-27

## (undated) RX ORDER — SCOLOPAMINE TRANSDERMAL SYSTEM 1 MG/1
PATCH, EXTENDED RELEASE TRANSDERMAL
Status: DISPENSED
Start: 2024-08-12

## (undated) RX ORDER — FENTANYL CITRATE 50 UG/ML
INJECTION, SOLUTION INTRAMUSCULAR; INTRAVENOUS
Status: DISPENSED
Start: 2024-08-12

## (undated) RX ORDER — DEXAMETHASONE SODIUM PHOSPHATE 4 MG/ML
INJECTION, SOLUTION INTRA-ARTICULAR; INTRALESIONAL; INTRAMUSCULAR; INTRAVENOUS; SOFT TISSUE
Status: DISPENSED
Start: 2019-04-11

## (undated) RX ORDER — ONDANSETRON 2 MG/ML
INJECTION INTRAMUSCULAR; INTRAVENOUS
Status: DISPENSED
Start: 2019-01-28

## (undated) RX ORDER — DIPHENHYDRAMINE HYDROCHLORIDE 50 MG/ML
INJECTION INTRAMUSCULAR; INTRAVENOUS
Status: DISPENSED
Start: 2021-02-25

## (undated) RX ORDER — HYDROMORPHONE HYDROCHLORIDE 1 MG/ML
INJECTION, SOLUTION INTRAMUSCULAR; INTRAVENOUS; SUBCUTANEOUS
Status: DISPENSED
Start: 2019-04-11

## (undated) RX ORDER — FENTANYL CITRATE 50 UG/ML
INJECTION, SOLUTION INTRAMUSCULAR; INTRAVENOUS
Status: DISPENSED
Start: 2019-01-28

## (undated) RX ORDER — BUPIVACAINE HYDROCHLORIDE 2.5 MG/ML
INJECTION, SOLUTION EPIDURAL; INFILTRATION; INTRACAUDAL
Status: DISPENSED
Start: 2019-01-28

## (undated) RX ORDER — ACETAMINOPHEN 500 MG
TABLET ORAL
Status: DISPENSED
Start: 2023-10-27

## (undated) RX ORDER — CEFAZOLIN SODIUM IN 0.9 % NACL 3 G/100 ML
INTRAVENOUS SOLUTION, PIGGYBACK (ML) INTRAVENOUS
Status: DISPENSED
Start: 2019-01-28

## (undated) RX ORDER — FENTANYL CITRATE 0.05 MG/ML
INJECTION, SOLUTION INTRAMUSCULAR; INTRAVENOUS
Status: DISPENSED
Start: 2024-07-30

## (undated) RX ORDER — FENTANYL CITRATE 50 UG/ML
INJECTION, SOLUTION INTRAMUSCULAR; INTRAVENOUS
Status: DISPENSED
Start: 2024-07-30

## (undated) RX ORDER — PROPOFOL 10 MG/ML
INJECTION, EMULSION INTRAVENOUS
Status: DISPENSED
Start: 2019-01-28

## (undated) RX ORDER — SCOLOPAMINE TRANSDERMAL SYSTEM 1 MG/1
PATCH, EXTENDED RELEASE TRANSDERMAL
Status: DISPENSED
Start: 2019-01-28

## (undated) RX ORDER — EPINEPHRINE 1 MG/ML
INJECTION, SOLUTION INTRAMUSCULAR; SUBCUTANEOUS
Status: DISPENSED
Start: 2019-01-28

## (undated) RX ORDER — ONDANSETRON 2 MG/ML
INJECTION INTRAMUSCULAR; INTRAVENOUS
Status: DISPENSED
Start: 2024-08-12

## (undated) RX ORDER — SCOLOPAMINE TRANSDERMAL SYSTEM 1 MG/1
PATCH, EXTENDED RELEASE TRANSDERMAL
Status: DISPENSED
Start: 2024-07-30

## (undated) RX ORDER — ACETAMINOPHEN 500 MG
TABLET ORAL
Status: DISPENSED
Start: 2024-07-30

## (undated) RX ORDER — PROPOFOL 10 MG/ML
INJECTION, EMULSION INTRAVENOUS
Status: DISPENSED
Start: 2024-08-12

## (undated) RX ORDER — HEPARIN SODIUM 5000 [USP'U]/.5ML
INJECTION, SOLUTION INTRAVENOUS; SUBCUTANEOUS
Status: DISPENSED
Start: 2023-08-11

## (undated) RX ORDER — ONDANSETRON 2 MG/ML
INJECTION INTRAMUSCULAR; INTRAVENOUS
Status: DISPENSED
Start: 2024-07-30

## (undated) RX ORDER — CEFAZOLIN SODIUM 1 G/3ML
INJECTION, POWDER, FOR SOLUTION INTRAMUSCULAR; INTRAVENOUS
Status: DISPENSED
Start: 2019-01-28

## (undated) RX ORDER — HYDROMORPHONE HYDROCHLORIDE 1 MG/ML
INJECTION, SOLUTION INTRAMUSCULAR; INTRAVENOUS; SUBCUTANEOUS
Status: DISPENSED
Start: 2024-07-30

## (undated) RX ORDER — SCOLOPAMINE TRANSDERMAL SYSTEM 1 MG/1
PATCH, EXTENDED RELEASE TRANSDERMAL
Status: DISPENSED
Start: 2021-02-25

## (undated) RX ORDER — ONDANSETRON 4 MG/1
TABLET, ORALLY DISINTEGRATING ORAL
Status: DISPENSED
Start: 2024-07-30

## (undated) RX ORDER — OXYCODONE HYDROCHLORIDE 5 MG/1
TABLET ORAL
Status: DISPENSED
Start: 2024-07-30

## (undated) RX ORDER — CEFAZOLIN SODIUM/WATER 2 G/20 ML
SYRINGE (ML) INTRAVENOUS
Status: DISPENSED
Start: 2024-08-12

## (undated) RX ORDER — DEXAMETHASONE SODIUM PHOSPHATE 4 MG/ML
INJECTION, SOLUTION INTRA-ARTICULAR; INTRALESIONAL; INTRAMUSCULAR; INTRAVENOUS; SOFT TISSUE
Status: DISPENSED
Start: 2019-01-28

## (undated) RX ORDER — HYDROMORPHONE HCL IN WATER/PF 6 MG/30 ML
PATIENT CONTROLLED ANALGESIA SYRINGE INTRAVENOUS
Status: DISPENSED
Start: 2024-08-12

## (undated) RX ORDER — OXYCODONE HCL 10 MG/1
TABLET, FILM COATED, EXTENDED RELEASE ORAL
Status: DISPENSED
Start: 2021-02-25

## (undated) RX ORDER — OXYCODONE AND ACETAMINOPHEN 5; 325 MG/1; MG/1
TABLET ORAL
Status: DISPENSED
Start: 2023-10-27